# Patient Record
Sex: FEMALE | Race: WHITE | Employment: OTHER | URBAN - METROPOLITAN AREA
[De-identification: names, ages, dates, MRNs, and addresses within clinical notes are randomized per-mention and may not be internally consistent; named-entity substitution may affect disease eponyms.]

---

## 2017-01-16 ENCOUNTER — TELEPHONE (OUTPATIENT)
Dept: PAIN MANAGEMENT | Age: 67
End: 2017-01-16

## 2017-02-10 ENCOUNTER — TELEPHONE (OUTPATIENT)
Dept: INTERNAL MEDICINE CLINIC | Age: 67
End: 2017-02-10

## 2017-02-10 DIAGNOSIS — Z00.8 ENCOUNTER FOR PSYCHOLOGICAL EVALUATION: Primary | ICD-10-CM

## 2017-03-24 ENCOUNTER — OFFICE VISIT (OUTPATIENT)
Dept: INTERNAL MEDICINE CLINIC | Age: 67
End: 2017-03-24

## 2017-03-24 VITALS
SYSTOLIC BLOOD PRESSURE: 128 MMHG | HEART RATE: 64 BPM | WEIGHT: 192.6 LBS | DIASTOLIC BLOOD PRESSURE: 76 MMHG | HEIGHT: 59 IN | RESPIRATION RATE: 16 BRPM | BODY MASS INDEX: 38.83 KG/M2

## 2017-03-24 DIAGNOSIS — M48.061 SPINAL STENOSIS OF LUMBAR REGION: Primary | ICD-10-CM

## 2017-03-24 DIAGNOSIS — I10 ESSENTIAL HYPERTENSION: Chronic | ICD-10-CM

## 2017-03-24 DIAGNOSIS — R53.83 FATIGUE, UNSPECIFIED TYPE: ICD-10-CM

## 2017-03-24 PROCEDURE — 99214 OFFICE O/P EST MOD 30 MIN: CPT | Performed by: INTERNAL MEDICINE

## 2017-03-24 RX ORDER — ZOLPIDEM TARTRATE 10 MG/1
10 TABLET ORAL NIGHTLY
Qty: 90 TABLET | Refills: 1 | Status: SHIPPED | OUTPATIENT
Start: 2017-03-24 | End: 2017-08-16 | Stop reason: SDUPTHER

## 2017-03-24 RX ORDER — OXYMORPHONE HYDROCHLORIDE 5 MG/1
5 TABLET ORAL 2 TIMES DAILY
COMMUNITY

## 2017-03-24 RX ORDER — LISINOPRIL 40 MG/1
TABLET ORAL
Qty: 90 TABLET | Refills: 1 | Status: SHIPPED | OUTPATIENT
Start: 2017-03-24 | End: 2017-09-22 | Stop reason: SDUPTHER

## 2017-04-05 ENCOUNTER — TELEPHONE (OUTPATIENT)
Dept: INTERNAL MEDICINE CLINIC | Age: 67
End: 2017-04-05

## 2017-04-10 ASSESSMENT — ENCOUNTER SYMPTOMS
ALLERGIC/IMMUNOLOGIC NEGATIVE: 1
EYES NEGATIVE: 1
RESPIRATORY NEGATIVE: 1

## 2017-04-12 PROBLEM — M15.0 PRIMARY OSTEOARTHRITIS INVOLVING MULTIPLE JOINTS: Status: ACTIVE | Noted: 2017-04-12

## 2017-04-12 PROBLEM — M79.7 FIBROMYALGIA: Status: ACTIVE | Noted: 2017-04-12

## 2017-04-12 PROBLEM — R52 PAIN MANAGEMENT: Status: ACTIVE | Noted: 2017-04-12

## 2017-04-12 PROBLEM — M48.04 THORACIC SPINAL STENOSIS: Status: ACTIVE | Noted: 2017-04-12

## 2017-04-12 PROBLEM — M15.9 PRIMARY OSTEOARTHRITIS INVOLVING MULTIPLE JOINTS: Status: ACTIVE | Noted: 2017-04-12

## 2017-04-13 ENCOUNTER — OFFICE VISIT (OUTPATIENT)
Dept: RHEUMATOLOGY | Age: 67
End: 2017-04-13

## 2017-04-13 VITALS
BODY MASS INDEX: 37.97 KG/M2 | SYSTOLIC BLOOD PRESSURE: 110 MMHG | DIASTOLIC BLOOD PRESSURE: 60 MMHG | HEART RATE: 74 BPM | WEIGHT: 189.6 LBS

## 2017-04-13 DIAGNOSIS — M79.7 FIBROMYALGIA: ICD-10-CM

## 2017-04-13 DIAGNOSIS — M15.9 PRIMARY OSTEOARTHRITIS INVOLVING MULTIPLE JOINTS: ICD-10-CM

## 2017-04-13 DIAGNOSIS — R52 PAIN MANAGEMENT: ICD-10-CM

## 2017-04-13 DIAGNOSIS — M81.0 OSTEOPOROSIS: ICD-10-CM

## 2017-04-13 DIAGNOSIS — E55.9 VITAMIN D DEFICIENCY: ICD-10-CM

## 2017-04-13 DIAGNOSIS — M48.04 THORACIC SPINAL STENOSIS: Primary | ICD-10-CM

## 2017-04-13 PROCEDURE — 99213 OFFICE O/P EST LOW 20 MIN: CPT | Performed by: INTERNAL MEDICINE

## 2017-04-13 RX ORDER — GABAPENTIN 300 MG/1
600 CAPSULE ORAL 3 TIMES DAILY
Qty: 360 CAPSULE | Refills: 3 | Status: SHIPPED | OUTPATIENT
Start: 2017-04-13 | End: 2018-04-10 | Stop reason: SDUPTHER

## 2017-04-13 RX ORDER — DULOXETIN HYDROCHLORIDE 30 MG/1
30 CAPSULE, DELAYED RELEASE ORAL 2 TIMES DAILY
Qty: 180 CAPSULE | Refills: 3 | Status: SHIPPED | OUTPATIENT
Start: 2017-04-13 | End: 2017-07-14 | Stop reason: SDUPTHER

## 2017-04-14 ENCOUNTER — HOSPITAL ENCOUNTER (OUTPATIENT)
Dept: NON INVASIVE DIAGNOSTICS | Age: 67
Discharge: HOME | End: 2017-04-14
Attending: INTERNAL MEDICINE | Admitting: INTERNAL MEDICINE

## 2017-04-14 LAB
C DIFFICILE TOXIN, EIA: NORMAL
CRYPTOSPORIDIUM ANTIGEN STOOL: NORMAL
GIARDIA ANTIGEN STOOL: NORMAL
VITAMIN D 25-HYDROXY: 14.9 NG/ML

## 2017-04-15 LAB — GI BACTERIAL PATHOGENS BY PCR: NORMAL

## 2017-05-02 ENCOUNTER — TELEPHONE (OUTPATIENT)
Dept: INTERNAL MEDICINE CLINIC | Age: 67
End: 2017-05-02

## 2017-05-02 DIAGNOSIS — E55.9 VITAMIN D DEFICIENCY: Primary | ICD-10-CM

## 2017-05-02 RX ORDER — CHOLECALCIFEROL (VITAMIN D3) 1250 MCG
1 CAPSULE ORAL WEEKLY
Qty: 12 CAPSULE | Refills: 0 | Status: SHIPPED | OUTPATIENT
Start: 2017-05-02 | End: 2017-08-23

## 2017-05-04 ENCOUNTER — TELEPHONE (OUTPATIENT)
Dept: INTERNAL MEDICINE CLINIC | Age: 67
End: 2017-05-04

## 2017-05-31 ENCOUNTER — OFFICE VISIT (OUTPATIENT)
Dept: INTERNAL MEDICINE CLINIC | Age: 67
End: 2017-05-31

## 2017-05-31 VITALS
DIASTOLIC BLOOD PRESSURE: 84 MMHG | SYSTOLIC BLOOD PRESSURE: 138 MMHG | WEIGHT: 196 LBS | HEART RATE: 60 BPM | BODY MASS INDEX: 39.25 KG/M2

## 2017-05-31 DIAGNOSIS — Z01.818 PRE-OP EVALUATION: Primary | ICD-10-CM

## 2017-05-31 DIAGNOSIS — M48.061 SPINAL STENOSIS OF LUMBAR REGION: ICD-10-CM

## 2017-05-31 PROCEDURE — 99214 OFFICE O/P EST MOD 30 MIN: CPT | Performed by: INTERNAL MEDICINE

## 2017-05-31 PROCEDURE — 3288F FALL RISK ASSESSMENT DOCD: CPT | Performed by: INTERNAL MEDICINE

## 2017-05-31 PROCEDURE — G8510 SCR DEP NEG, NO PLAN REQD: HCPCS | Performed by: INTERNAL MEDICINE

## 2017-05-31 PROCEDURE — 93000 ELECTROCARDIOGRAM COMPLETE: CPT | Performed by: INTERNAL MEDICINE

## 2017-05-31 ASSESSMENT — PATIENT HEALTH QUESTIONNAIRE - PHQ9
SUM OF ALL RESPONSES TO PHQ QUESTIONS 1-9: 2
2. FEELING DOWN, DEPRESSED OR HOPELESS: 1
SUM OF ALL RESPONSES TO PHQ9 QUESTIONS 1 & 2: 2
1. LITTLE INTEREST OR PLEASURE IN DOING THINGS: 1

## 2017-07-03 ENCOUNTER — TELEPHONE (OUTPATIENT)
Dept: INTERNAL MEDICINE CLINIC | Age: 67
End: 2017-07-03

## 2017-07-14 ENCOUNTER — TELEPHONE (OUTPATIENT)
Dept: RHEUMATOLOGY | Age: 67
End: 2017-07-14

## 2017-07-14 DIAGNOSIS — M79.7 FIBROMYALGIA: ICD-10-CM

## 2017-07-14 RX ORDER — DULOXETIN HYDROCHLORIDE 30 MG/1
30 CAPSULE, DELAYED RELEASE ORAL 2 TIMES DAILY
Qty: 180 CAPSULE | Refills: 1 | Status: SHIPPED | OUTPATIENT
Start: 2017-07-14 | End: 2017-11-10 | Stop reason: SDUPTHER

## 2017-07-14 RX ORDER — LANSOPRAZOLE 30 MG/1
30 CAPSULE, DELAYED RELEASE ORAL NIGHTLY
Qty: 90 CAPSULE | Refills: 1 | Status: SHIPPED | OUTPATIENT
Start: 2017-07-14 | End: 2017-11-10 | Stop reason: SDUPTHER

## 2017-07-14 RX ORDER — POTASSIUM CHLORIDE 20 MEQ/1
20 TABLET, EXTENDED RELEASE ORAL 2 TIMES DAILY
Qty: 180 TABLET | Refills: 1 | Status: SHIPPED | OUTPATIENT
Start: 2017-07-14 | End: 2017-11-10 | Stop reason: SDUPTHER

## 2017-07-21 ENCOUNTER — OFFICE VISIT (OUTPATIENT)
Dept: INTERNAL MEDICINE CLINIC | Age: 67
End: 2017-07-21

## 2017-07-21 VITALS
TEMPERATURE: 97.8 F | OXYGEN SATURATION: 96 % | BODY MASS INDEX: 38.76 KG/M2 | HEIGHT: 60 IN | DIASTOLIC BLOOD PRESSURE: 76 MMHG | WEIGHT: 197.4 LBS | SYSTOLIC BLOOD PRESSURE: 120 MMHG | RESPIRATION RATE: 20 BRPM | HEART RATE: 94 BPM

## 2017-07-21 DIAGNOSIS — Z01.818 PRE-OP EVALUATION: ICD-10-CM

## 2017-07-21 DIAGNOSIS — M54.14 THORACIC RADICULOPATHY: Primary | ICD-10-CM

## 2017-07-21 PROCEDURE — 99214 OFFICE O/P EST MOD 30 MIN: CPT | Performed by: INTERNAL MEDICINE

## 2017-08-10 ENCOUNTER — TELEPHONE (OUTPATIENT)
Dept: INTERNAL MEDICINE CLINIC | Age: 67
End: 2017-08-10

## 2017-08-11 RX ORDER — ALPRAZOLAM 1 MG/1
1 TABLET ORAL 3 TIMES DAILY
Qty: 90 TABLET | Refills: 0 | Status: SHIPPED | OUTPATIENT
Start: 2017-08-11 | End: 2017-09-19 | Stop reason: SDUPTHER

## 2017-08-16 RX ORDER — ZOLPIDEM TARTRATE 10 MG/1
10 TABLET ORAL NIGHTLY
Qty: 30 TABLET | Refills: 2 | Status: SHIPPED | OUTPATIENT
Start: 2017-08-16 | End: 2018-03-07 | Stop reason: SDUPTHER

## 2017-08-17 ENCOUNTER — TELEPHONE (OUTPATIENT)
Dept: RHEUMATOLOGY | Age: 67
End: 2017-08-17

## 2017-08-18 ENCOUNTER — TELEPHONE (OUTPATIENT)
Dept: RHEUMATOLOGY | Age: 67
End: 2017-08-18

## 2017-08-18 RX ORDER — RAMELTEON 8 MG/1
8 TABLET ORAL NIGHTLY
Qty: 30 TABLET | Refills: 0 | Status: SHIPPED | OUTPATIENT
Start: 2017-08-18 | End: 2017-09-17

## 2017-08-21 DIAGNOSIS — E55.9 VITAMIN D DEFICIENCY: ICD-10-CM

## 2017-08-22 LAB — VITAMIN D 25-HYDROXY: 34 NG/ML

## 2017-09-08 ENCOUNTER — OFFICE VISIT (OUTPATIENT)
Dept: INTERNAL MEDICINE CLINIC | Age: 67
End: 2017-09-08

## 2017-09-08 VITALS
SYSTOLIC BLOOD PRESSURE: 128 MMHG | RESPIRATION RATE: 14 BRPM | TEMPERATURE: 98.3 F | BODY MASS INDEX: 37.81 KG/M2 | DIASTOLIC BLOOD PRESSURE: 72 MMHG | HEART RATE: 102 BPM | WEIGHT: 193.6 LBS | OXYGEN SATURATION: 94 %

## 2017-09-08 DIAGNOSIS — J01.00 ACUTE MAXILLARY SINUSITIS, RECURRENCE NOT SPECIFIED: Primary | ICD-10-CM

## 2017-09-08 PROCEDURE — 99213 OFFICE O/P EST LOW 20 MIN: CPT | Performed by: INTERNAL MEDICINE

## 2017-09-08 RX ORDER — GUAIFENESIN 600 MG/1
600 TABLET, EXTENDED RELEASE ORAL 2 TIMES DAILY
Qty: 20 TABLET | Refills: 0 | Status: SHIPPED | OUTPATIENT
Start: 2017-09-08 | End: 2018-08-14 | Stop reason: ALTCHOICE

## 2017-09-08 RX ORDER — AZITHROMYCIN 250 MG/1
TABLET, FILM COATED ORAL
Qty: 1 PACKET | Refills: 0 | Status: SHIPPED | OUTPATIENT
Start: 2017-09-08 | End: 2017-09-18

## 2017-09-17 ASSESSMENT — ENCOUNTER SYMPTOMS
ALLERGIC/IMMUNOLOGIC NEGATIVE: 1
EYES NEGATIVE: 1
RESPIRATORY NEGATIVE: 1
SORE THROAT: 0
RHINORRHEA: 1
GASTROINTESTINAL NEGATIVE: 1
SINUS PRESSURE: 1

## 2017-09-19 RX ORDER — ALPRAZOLAM 1 MG/1
1 TABLET ORAL 3 TIMES DAILY
Qty: 270 TABLET | Refills: 0 | Status: SHIPPED | OUTPATIENT
Start: 2017-09-19 | End: 2017-10-02 | Stop reason: SDUPTHER

## 2017-09-22 ENCOUNTER — OFFICE VISIT (OUTPATIENT)
Dept: INTERNAL MEDICINE CLINIC | Age: 67
End: 2017-09-22

## 2017-09-22 VITALS
HEART RATE: 80 BPM | SYSTOLIC BLOOD PRESSURE: 124 MMHG | WEIGHT: 194 LBS | DIASTOLIC BLOOD PRESSURE: 64 MMHG | RESPIRATION RATE: 16 BRPM | BODY MASS INDEX: 37.89 KG/M2

## 2017-09-22 DIAGNOSIS — Z01.818 PRE-OP EVALUATION: ICD-10-CM

## 2017-09-22 DIAGNOSIS — J01.00 SUBACUTE MAXILLARY SINUSITIS: ICD-10-CM

## 2017-09-22 DIAGNOSIS — M48.061 SPINAL STENOSIS OF LUMBAR REGION: Primary | ICD-10-CM

## 2017-09-22 DIAGNOSIS — I10 ESSENTIAL HYPERTENSION: Chronic | ICD-10-CM

## 2017-09-22 LAB
A/G RATIO: 1.4 (ref 1.1–2.2)
ALBUMIN SERPL-MCNC: 3.8 G/DL (ref 3.4–5)
ALP BLD-CCNC: 64 U/L (ref 40–129)
ALT SERPL-CCNC: 33 U/L (ref 10–40)
ANION GAP SERPL CALCULATED.3IONS-SCNC: 13 MMOL/L (ref 3–16)
APTT: 35.8 SEC (ref 24.1–34.9)
AST SERPL-CCNC: 33 U/L (ref 15–37)
BILIRUB SERPL-MCNC: 0.4 MG/DL (ref 0–1)
BUN BLDV-MCNC: 16 MG/DL (ref 7–20)
CALCIUM SERPL-MCNC: 9.5 MG/DL (ref 8.3–10.6)
CHLORIDE BLD-SCNC: 101 MMOL/L (ref 99–110)
CHOLESTEROL, TOTAL: 155 MG/DL (ref 0–199)
CO2: 29 MMOL/L (ref 21–32)
CREAT SERPL-MCNC: 0.5 MG/DL (ref 0.6–1.2)
GFR AFRICAN AMERICAN: >60
GFR NON-AFRICAN AMERICAN: >60
GLOBULIN: 2.7 G/DL
GLUCOSE BLD-MCNC: 98 MG/DL (ref 70–99)
HCT VFR BLD CALC: 40.9 % (ref 36–48)
HDLC SERPL-MCNC: 43 MG/DL (ref 40–60)
HEMOGLOBIN: 13.6 G/DL (ref 12–16)
INR BLD: 1.02 (ref 0.85–1.15)
LDL CHOLESTEROL CALCULATED: 77 MG/DL
MCH RBC QN AUTO: 30.9 PG (ref 26–34)
MCHC RBC AUTO-ENTMCNC: 33.3 G/DL (ref 31–36)
MCV RBC AUTO: 92.8 FL (ref 80–100)
PDW BLD-RTO: 13.4 % (ref 12.4–15.4)
PLATELET # BLD: 240 K/UL (ref 135–450)
PMV BLD AUTO: 8.5 FL (ref 5–10.5)
POTASSIUM SERPL-SCNC: 4.4 MMOL/L (ref 3.5–5.1)
PROTHROMBIN TIME: 11.5 SEC (ref 9.6–13)
RBC # BLD: 4.41 M/UL (ref 4–5.2)
SODIUM BLD-SCNC: 143 MMOL/L (ref 136–145)
TOTAL PROTEIN: 6.5 G/DL (ref 6.4–8.2)
TRIGL SERPL-MCNC: 175 MG/DL (ref 0–150)
VLDLC SERPL CALC-MCNC: 35 MG/DL
WBC # BLD: 7.3 K/UL (ref 4–11)

## 2017-09-22 PROCEDURE — 99214 OFFICE O/P EST MOD 30 MIN: CPT | Performed by: INTERNAL MEDICINE

## 2017-09-22 RX ORDER — LISINOPRIL 40 MG/1
TABLET ORAL
Qty: 90 TABLET | Refills: 1 | Status: SHIPPED | OUTPATIENT
Start: 2017-09-22 | End: 2017-09-22 | Stop reason: SDUPTHER

## 2017-09-22 RX ORDER — GUAIFENESIN 600 MG/1
600 TABLET, EXTENDED RELEASE ORAL 2 TIMES DAILY
Qty: 20 TABLET | Refills: 0 | Status: SHIPPED | OUTPATIENT
Start: 2017-09-22 | End: 2018-01-09 | Stop reason: SDUPTHER

## 2017-09-22 RX ORDER — LISINOPRIL 40 MG/1
TABLET ORAL
Qty: 90 TABLET | Refills: 1 | Status: SHIPPED | OUTPATIENT
Start: 2017-09-22 | End: 2018-03-07 | Stop reason: SDUPTHER

## 2017-09-22 RX ORDER — AZITHROMYCIN 250 MG/1
TABLET, FILM COATED ORAL
Qty: 1 PACKET | Refills: 0 | Status: SHIPPED | OUTPATIENT
Start: 2017-09-22 | End: 2017-10-02

## 2017-10-02 ENCOUNTER — TELEPHONE (OUTPATIENT)
Dept: INTERNAL MEDICINE CLINIC | Age: 67
End: 2017-10-02

## 2017-10-02 RX ORDER — ALPRAZOLAM 1 MG/1
1 TABLET ORAL 3 TIMES DAILY
Qty: 45 TABLET | Refills: 0 | Status: SHIPPED | OUTPATIENT
Start: 2017-10-02 | End: 2017-11-16 | Stop reason: SDUPTHER

## 2017-10-02 NOTE — TELEPHONE ENCOUNTER
Patient calling regarding her Xanax. She says she did not receive the pills that were ordered on 9/19/17. She received a letter from [address] [place] [address] [address] saying there was a problem in sending them. She says she is completely out. Asking what  would recommend to do.

## 2017-10-18 ENCOUNTER — TELEPHONE (OUTPATIENT)
Dept: RHEUMATOLOGY | Age: 67
End: 2017-10-18

## 2017-10-18 NOTE — TELEPHONE ENCOUNTER
Pt called stating that she has a terrible trouble with her allergies. She has been taking zyrtec daily. She is wondering if  would be ok with her taking the Zyrtec or would it be beneficial to try something else? Please call and advise.

## 2017-10-19 ENCOUNTER — OFFICE VISIT (OUTPATIENT)
Dept: RHEUMATOLOGY | Age: 67
End: 2017-10-19

## 2017-10-19 VITALS
BODY MASS INDEX: 36.72 KG/M2 | DIASTOLIC BLOOD PRESSURE: 70 MMHG | SYSTOLIC BLOOD PRESSURE: 138 MMHG | HEART RATE: 74 BPM | WEIGHT: 188 LBS

## 2017-10-19 DIAGNOSIS — M15.9 PRIMARY OSTEOARTHRITIS INVOLVING MULTIPLE JOINTS: Primary | ICD-10-CM

## 2017-10-19 DIAGNOSIS — M81.0 OSTEOPOROSIS, POST-MENOPAUSAL: Chronic | ICD-10-CM

## 2017-10-19 DIAGNOSIS — Z23 NEED FOR INFLUENZA VACCINATION: ICD-10-CM

## 2017-10-19 DIAGNOSIS — M48.04 THORACIC SPINAL STENOSIS: ICD-10-CM

## 2017-10-19 DIAGNOSIS — M79.7 FIBROMYALGIA: ICD-10-CM

## 2017-10-19 PROCEDURE — 90662 IIV NO PRSV INCREASED AG IM: CPT | Performed by: INTERNAL MEDICINE

## 2017-10-19 PROCEDURE — G0008 ADMIN INFLUENZA VIRUS VAC: HCPCS | Performed by: INTERNAL MEDICINE

## 2017-10-19 PROCEDURE — 99213 OFFICE O/P EST LOW 20 MIN: CPT | Performed by: INTERNAL MEDICINE

## 2017-10-19 NOTE — PROGRESS NOTES
[facility] ([facility]) Physicians  [facility] [place] [facility]ology Progress Note  Bin Healy MD            [x] [facility]         []  [facility]                                      [place] [address]. [address], [address]      [phone]                [phone]      Fax: [phone]                 Fax: [phone]           ______________________________________________________________________      Patient Name:  Lisa Shaw is a 79 y.o. patient     Returns today for follow-up of her osteoarthritis and osteoporosis. Since last seen she recently had a spinal stimulator placed by Dr. Sung Hdz. Notes that her axial spine symptoms have improved but now her peripheral arthritis have worsened as her narcotics have been decreased. She is having difficulty involving bilateral hands especially her right thumb and her right ankle. She did not get the DEXA scan done as was requested last office visit. Denies having any recent bone fractures. She has not received Reclast yet. Currently ambulating with the assistance of a single prong cane. Past medical/surgical history, medications and allergies are reviewed and updated as appropriate.     Allergies   Allergen Reactions    Sulfa Antibiotics Swelling       Past Medical History:        Diagnosis Date    Clostridium difficile infection 2/22/15    Hypertension     Osteoarthritis     Osteoporosis 2008    Rheumatic fever     Self-catheterizes urinary bladder     as needed    Urinary retention        Past Surgical History:        Procedure Laterality Date    BLADDER REPAIR      CARPAL TUNNEL RELEASE      CYSTOSCOPY  10/29/2012    bladder biopsy    DENTAL SURGERY      FOOT SURGERY      HYSTERECTOMY      LUMBAR SPINE SURGERY  2004    MANDIBLE RECONSTRUCTION      TOTAL KNEE BILITOT 0.4 09/22/2017 1021          No results found for: SEDRATE  No results found for: RONALDO, ERVIN, SSA, SSB, C3, C4  No results found for: HAV, HEPAIGM, HEPBIGM, HEPBCAB, HBEAG, HEPCAB  No results found for: RONALDO, ANATITER, ANAINT, PATH  No results found for: DSDNAG, DSDNAIGGIFA  No results found for: SSAROAB, SSALAAB  No results found for: SMAB, RNPAB  No results found for: CENTABIGG  No results found for: SEDRATE, RF, CCPABIGG  No results found for: C3, C4, ACE  Lab Results   Component Value Date    VITD25 34.0 08/21/2017       No results found. Assessment/Plan:       Assessment/Plan:  Mayra Rodriguez was seen today for follow-up. Diagnoses and all orders for this visit:    Primary osteoarthritis involving multiple joints- also advised her to consider using over-the-counter lidocaine patches  -     diclofenac sodium (VOLTAREN) 1 % GEL; Apply 2 g topically 4 times daily as needed for Pain. Risks, side effects and warning signs were fully discussed with patient. Reading information was given to patient to review. Fibromyalgiacontinue with Cymbalta and Neurontin. Thoracic spinal stenosiswill continue to follow-up with Dr. Abbie Wall. We'll see how she does with a new spinal stimulator    Osteoporosis, post-menopausal- needs a repeat DEXA scan to see whether or not she'll need continued Reclast.  -     DEXA Bone Density 2 Sites; Future    Return in about 6 months (around 4/19/2018). The risks and benefits of my recommendations, as well as other treatment options, benefits and side effects were discussed with the patient today. Questions were answered. [facility]  [facility]  [address] [place] [address] [address] , [address] [place], [place] [address]YWI:[phone]  [facility]:[phone]    NOTE: This report is transcribed by using voice recognition software dragon. Every effort is made to ensure accuracy; however, inadvertent computerized transcription errors may be present. Anupama Kaminski MD, 10/19/2017 10:37 AM

## 2017-10-19 NOTE — PATIENT INSTRUCTIONS
--fever, sore throat, swelling in your face or tongue, burning in your eyes, skin pain followed by a red or purple skin rash that spreads (especially in the face or upper body) and causes blistering and peeling. Common side effects may include:  · indigestion, gas, stomach pain, nausea, vomiting;  · diarrhea, constipation;  · headache, dizziness, drowsiness;  · stuffy nose;  · itching, increased sweating;  · increased blood pressure; or  · skin redness, itching, dryness, scaling, or peeling where the medicine was applied. This is not a complete list of side effects and others may occur. Call your doctor for medical advice about side effects. You may report side effects to FDA at 8-207-FDA-3098. What other drugs will affect diclofenac topical?  Ask your doctor before using diclofenac topical if you take an antidepressant such as citalopram, escitalopram, fluoxetine (Prozac), fluvoxamine, paroxetine, sertraline (Zoloft), trazodone, or vilazodone. Taking any of these medicines with an NSAID may cause you to bruise or bleed easily. Tell your doctor about all your current medicines and any you start or stop using, especially:  · cyclosporine;  · lithium;  · methotrexate;  · a blood thinner (warfarin, Coumadin, Jantoven);  · heart or blood pressure medication, including a diuretic or \"water pill\"; or  · steroid medicine (prednisone and others). This list is not complete. Other drugs may interact with diclofenac topical, including prescription and over-the-counter medicines, vitamins, and herbal products. Not all possible interactions are listed in this medication guide. Where can I get more information? Your pharmacist can provide more information about diclofenac topical.    Remember, keep this and all other medicines out of the reach of children, never share your medicines with others, and use this medication only for the indication prescribed.   Every effort has been made to ensure that the information provided by [address] is accurate, up-to-date, and complete, but no guarantee is made to that effect. Drug information contained herein may be time sensitive. [facility] information has been compiled for use by healthcare practitioners and consumers in the [place] and therefore [facility] does not warrant that uses outside of the [place] are appropriate, unless specifically indicated otherwise. [facility]'s drug information does not endorse drugs, diagnose patients or recommend therapy. [facility] drug information is an informational resource designed to assist licensed healthcare practitioners in caring for their patients and/or to serve consumers viewing this service as a supplement to, and not a substitute for, the expertise, skill, knowledge and judgment of healthcare practitioners. The absence of a warning for a given drug or drug combination in no way should be construed to indicate that the drug or drug combination is safe, effective or appropriate for any given patient. [facility] does not assume any responsibility for any aspect of healthcare administered with the aid of information [facility] provides. The information contained herein is not intended to cover all possible uses, directions, precautions, warnings, drug interactions, allergic reactions, or adverse effects. If you have questions about the drugs you are taking, check with your doctor, nurse or pharmacist.  Copyright 9301-1261 [address] Version: 9.05. Revision date: 5/25/2016. Care instructions adapted under license by [facility] ([facility]). If you have questions about a medical condition or this instruction, always ask your healthcare professional. [place] [address] any warranty or liability for your use of this information.        Patient Education          lidocaine topical  Pronunciation:  LYE gong jones TOP i tricia  Brand:  AneCream, AneCream with Tegaderm, Bactine, CidalEaze, Glydo, LidaMantle, Lidocream, Lidoderm, LidoRx, LMX 4, Medi-Quik Spray, RadiaGuard, RectiCare, Regenecare HA Spray, Solarcaine Aloe Extra Burn Relief, Xylocaine Topical  What is the most important information I should know about lidocaine topical?  An overdose of numbing medicine can cause fatal side effects if too much of the medicine is absorbed through your skin. Do not use large amounts of lidocaine topical, or cover treated skin areas with a bandage or plastic wrap without medical advice. Keep both used and unused lidocaine skin patches out of the reach of children or pets. The amount of lidocaine in the skin patches could be harmful to a child or pet who accidentally sucks on or swallows the patch. What is lidocaine topical?  Lidocaine is a local anesthetic (numbing medication). It works by blocking nerve signals in your body. Lidocaine topical (for use on the skin) is used to reduce pain or discomfort caused by skin irritations such as sunburn, insect bites, poison ivy, poison oak, poison sumac, and minor cuts, scratches, or burns. Lidocaine topical is also used to treat rectal discomfort caused by hemorrhoids. Lidocaine topical may also be used for purposes not listed in this medication guide. What should I discuss with my healthcare provider before using lidocaine topical?  You should not use lidocaine topical if you are allergic to any type of numbing medicine. Fatal overdoses have occurred when numbing medicines were used without the advice of a medical doctor (such as during a cosmetic procedure like laser hair removal). However, overdose has also occurred in women treated with a numbing medicine before having a mammography. Be aware that many cosmetic procedures are performed without a medical doctor present. To make sure lidocaine topical is safe for you, tell your doctor if you have:  · liver disease; or  · if you take a heart rhythm medicine. Lidocaine topical is not expected to harm an unborn baby.  Tell your doctor if you are likely that other drugs you take orally or inject will have an effect on topically applied lidocaine. But many drugs can interact with each other. Tell each of your health care providers about all medicines you use, including prescription and over-the-counter medicines, vitamins, and herbal products. Where can I get more information? Your pharmacist has information about lidocaine topical.    Remember, keep this and all other medicines out of the reach of children, never share your medicines with others, and use this medication only for the indication prescribed. Every effort has been made to ensure that the information provided by [address] is accurate, up-to-date, and complete, but no guarantee is made to that effect. Drug information contained herein may be time sensitive. [facility] information has been compiled for use by healthcare practitioners and consumers in the [place] and therefore [facility] does not warrant that uses outside of the [place] are appropriate, unless specifically indicated otherwise. [facility]'s drug information does not endorse drugs, diagnose patients or recommend therapy. [facility] drug information is an informational resource designed to assist licensed healthcare practitioners in caring for their patients and/or to serve consumers viewing this service as a supplement to, and not a substitute for, the expertise, skill, knowledge and judgment of healthcare practitioners. The absence of a warning for a given drug or drug combination in no way should be construed to indicate that the drug or drug combination is safe, effective or appropriate for any given patient. [facility] does not assume any responsibility for any aspect of healthcare administered with the aid of information [facility] provides. The information contained herein is not intended to cover all possible uses, directions, precautions, warnings, drug interactions, allergic reactions, or adverse effects.  If you

## 2017-10-20 ENCOUNTER — TELEPHONE (OUTPATIENT)
Dept: INTERNAL MEDICINE CLINIC | Age: 67
End: 2017-10-20

## 2017-10-26 ENCOUNTER — TELEPHONE (OUTPATIENT)
Dept: INTERNAL MEDICINE CLINIC | Age: 67
End: 2017-10-26

## 2017-10-26 NOTE — TELEPHONE ENCOUNTER
Pt calling having trouble with her Insurance and her Bushra Daubs are not sending it to her--something about it not being on the formulary ---please call the pt. Thanks.

## 2017-10-31 ENCOUNTER — HOSPITAL ENCOUNTER (OUTPATIENT)
Dept: GENERAL RADIOLOGY | Age: 67
Discharge: HOME | End: 2017-10-31
Attending: INTERNAL MEDICINE | Admitting: INTERNAL MEDICINE

## 2017-10-31 ENCOUNTER — TELEPHONE (OUTPATIENT)
Dept: INTERNAL MEDICINE CLINIC | Age: 67
End: 2017-10-31

## 2017-10-31 ENCOUNTER — TELEPHONE (OUTPATIENT)
Dept: RHEUMATOLOGY | Age: 67
End: 2017-10-31

## 2017-10-31 DIAGNOSIS — M81.0 AGE-RELATED OSTEOPOROSIS WITHOUT CURRENT PATHOLOGICAL FRACTURE: ICD-10-CM

## 2017-10-31 DIAGNOSIS — M81.0 OSTEOPOROSIS: ICD-10-CM

## 2017-10-31 NOTE — TELEPHONE ENCOUNTER
Spoke w/pt. She received a letter in September denying coverage. She will mail the letter to the office as opposed to calling her pharmacy.

## 2017-10-31 NOTE — TELEPHONE ENCOUNTER
Phone call from [facility]. They wanted to make sure that Dr. Jojo Hurd saw the DEXA Scan report. It is in Epic.

## 2017-11-02 ENCOUNTER — TELEPHONE (OUTPATIENT)
Dept: RHEUMATOLOGY | Age: 67
End: 2017-11-02

## 2017-11-06 ENCOUNTER — TELEPHONE (OUTPATIENT)
Dept: INTERNAL MEDICINE CLINIC | Age: 67
End: 2017-11-06

## 2017-11-06 NOTE — TELEPHONE ENCOUNTER
Pt is calling stating she is still not able to get her Xanax. She is requesting that Dr Silverman Speaker call her pharmacy [facility].

## 2017-11-10 ENCOUNTER — OFFICE VISIT (OUTPATIENT)
Dept: INTERNAL MEDICINE CLINIC | Age: 67
End: 2017-11-10

## 2017-11-10 VITALS
WEIGHT: 191 LBS | BODY MASS INDEX: 37.3 KG/M2 | SYSTOLIC BLOOD PRESSURE: 132 MMHG | RESPIRATION RATE: 16 BRPM | DIASTOLIC BLOOD PRESSURE: 68 MMHG | HEART RATE: 74 BPM

## 2017-11-10 DIAGNOSIS — K21.9 GASTROESOPHAGEAL REFLUX DISEASE WITHOUT ESOPHAGITIS: ICD-10-CM

## 2017-11-10 DIAGNOSIS — M79.7 FIBROMYALGIA: ICD-10-CM

## 2017-11-10 DIAGNOSIS — Z11.4 SCREENING FOR HIV WITHOUT PRESENCE OF RISK FACTORS: ICD-10-CM

## 2017-11-10 DIAGNOSIS — Z11.59 NEED FOR HEPATITIS C SCREENING TEST: ICD-10-CM

## 2017-11-10 DIAGNOSIS — F41.9 ANXIETY: Primary | ICD-10-CM

## 2017-11-10 LAB — HEPATITIS C ANTIBODY INTERPRETATION: NORMAL

## 2017-11-10 PROCEDURE — 99214 OFFICE O/P EST MOD 30 MIN: CPT | Performed by: INTERNAL MEDICINE

## 2017-11-10 RX ORDER — DULOXETIN HYDROCHLORIDE 30 MG/1
30 CAPSULE, DELAYED RELEASE ORAL 2 TIMES DAILY
Qty: 180 CAPSULE | Refills: 1 | Status: SHIPPED | OUTPATIENT
Start: 2017-11-10 | End: 2018-09-07 | Stop reason: SDUPTHER

## 2017-11-10 RX ORDER — LANSOPRAZOLE 30 MG/1
30 CAPSULE, DELAYED RELEASE ORAL NIGHTLY
Qty: 90 CAPSULE | Refills: 1 | Status: SHIPPED | OUTPATIENT
Start: 2017-11-10 | End: 2018-09-25 | Stop reason: ALTCHOICE

## 2017-11-10 RX ORDER — POTASSIUM CHLORIDE 20 MEQ/1
20 TABLET, EXTENDED RELEASE ORAL 2 TIMES DAILY
Qty: 180 TABLET | Refills: 1 | Status: SHIPPED | OUTPATIENT
Start: 2017-11-10 | End: 2018-09-07 | Stop reason: SDUPTHER

## 2017-11-10 RX ORDER — LORAZEPAM 1 MG/1
1 TABLET ORAL EVERY 6 HOURS PRN
Qty: 270 TABLET | Refills: 1 | Status: SHIPPED | OUTPATIENT
Start: 2017-11-10 | End: 2018-01-09

## 2017-11-10 RX ORDER — LORAZEPAM 1 MG/1
1 TABLET ORAL EVERY 8 HOURS PRN
Qty: 45 TABLET | Refills: 0 | Status: SHIPPED | OUTPATIENT
Start: 2017-11-10 | End: 2017-11-25

## 2017-11-13 LAB — HIV-1 AND HIV-2 ANTIBODIES: NORMAL

## 2017-11-14 ENCOUNTER — TELEPHONE (OUTPATIENT)
Dept: INTERNAL MEDICINE CLINIC | Age: 67
End: 2017-11-14

## 2017-11-14 NOTE — TELEPHONE ENCOUNTER
Pt state she has a yeast infection. Pt c/o dryness and itching. No discharge. X 3-4wks    Pt has tried OTC meds without relief. Pt asking for the pill to clear this up.     [facility] on file

## 2017-11-15 RX ORDER — FLUCONAZOLE 150 MG/1
150 TABLET ORAL ONCE
Qty: 1 TABLET | Refills: 1 | Status: SHIPPED | OUTPATIENT
Start: 2017-11-15 | End: 2017-11-15

## 2017-11-15 NOTE — TELEPHONE ENCOUNTER
Pt called yesterday to request a prescription for a yeast infection and didn't get a call back she said

## 2017-11-15 NOTE — TELEPHONE ENCOUNTER
Patient informed of message. Pt said the Ativan makes her itch. She has stopped taking it and wants the Xanax instead. She will just pay out of pocket for the Xanax. I advised pt she will have have to bring in remaining pills of Ativan and call pharmacy to cancel mail order script. Pt agreed and will come in tomorrow to exchange scripts.

## 2017-11-16 RX ORDER — ALPRAZOLAM 1 MG/1
1 TABLET ORAL 3 TIMES DAILY
Qty: 45 TABLET | Refills: 0 | Status: SHIPPED | OUTPATIENT
Start: 2017-11-16 | End: 2018-03-07 | Stop reason: SDUPTHER

## 2017-11-16 NOTE — TELEPHONE ENCOUNTER
Dr Gomez Leblanc is aware. Pt could not tolerate the Ativan so she will continue w/the Xanax. Insurance does not cover the Xanax, but pt is willing to pay for it.

## 2017-11-16 NOTE — TELEPHONE ENCOUNTER
Phone call from [facility]. Received a script for Alprazolam today. Patient wants to pay cash for this med. The only problem is on 11/10/17 she picked up Lorazepam #45 from [facility]. She ran this script through her insurance. Pharmacy wanted  to be aware.

## 2017-11-19 ASSESSMENT — ENCOUNTER SYMPTOMS
EYES NEGATIVE: 1
GASTROINTESTINAL NEGATIVE: 1
ALLERGIC/IMMUNOLOGIC NEGATIVE: 1
RESPIRATORY NEGATIVE: 1

## 2017-11-20 NOTE — PROGRESS NOTES
Subjective:      Patient ID: Lorel Soulier is a 79 y.o. female. HPI The patient returns for three-month follow up due to hypertension and anxiety. Patient otherwise is stable and voices note new complaints at this time. In addition, the patient has a history of fibromyalgia which continues to debilitate her. Past Medical History:   Diagnosis Date    Clostridium difficile infection 2/22/15    Hypertension     Osteoarthritis     Osteoporosis 2008    Rheumatic fever     Self-catheterizes urinary bladder     as needed    Urinary retention      Social History     Social History    Marital status:      Spouse name: N/A    Number of children: N/A    Years of education: N/A     Occupational History    retired      Social History Main Topics    Smoking status: Never Smoker    Smokeless tobacco: Never Used    Alcohol use Yes      Comment: occasional / wine once a month    Drug use: No    Sexual activity: Not on file     Other Topics Concern    Not on file     Social History Narrative    No narrative on file       Review of Systems   Constitutional: Negative. HENT: Negative. Eyes: Negative. Respiratory: Negative. Cardiovascular: Negative. Gastrointestinal: Negative. Endocrine: Negative. Genitourinary: Negative. Musculoskeletal: Negative. Skin: Negative. Allergic/Immunologic: Negative. Neurological: Negative. Hematological: Negative. Psychiatric/Behavioral: Negative. Objective:   Physical Exam   Constitutional: She is oriented to person, place, and time. She appears well-developed and well-nourished. Neck: Normal range of motion. Neck supple. No JVD present. Cardiovascular: Normal rate, regular rhythm, normal heart sounds and intact distal pulses. No murmur heard. Pulmonary/Chest: Effort normal.   Musculoskeletal: Normal range of motion. She exhibits no tenderness. Lymphadenopathy:     She has no cervical adenopathy.    Neurological: She

## 2017-11-28 ENCOUNTER — TELEPHONE (OUTPATIENT)
Dept: INTERNAL MEDICINE CLINIC | Age: 67
End: 2017-11-28

## 2017-11-28 NOTE — TELEPHONE ENCOUNTER
Late cancellation for today for a Reclast infusion. Patient states she recently had surgery and is in a lot of pain. She has asked if she can be rescheduled to next Tuesday. Please advise.

## 2017-12-27 ENCOUNTER — NURSE ONLY (OUTPATIENT)
Dept: RHEUMATOLOGY | Age: 67
End: 2017-12-27

## 2017-12-27 VITALS
RESPIRATION RATE: 16 BRPM | SYSTOLIC BLOOD PRESSURE: 142 MMHG | TEMPERATURE: 98 F | HEART RATE: 80 BPM | DIASTOLIC BLOOD PRESSURE: 78 MMHG

## 2017-12-27 DIAGNOSIS — M81.0 OSTEOPOROSIS, POST-MENOPAUSAL: Primary | Chronic | ICD-10-CM

## 2017-12-27 PROCEDURE — 96365 THER/PROPH/DIAG IV INF INIT: CPT | Performed by: INTERNAL MEDICINE

## 2017-12-27 RX ORDER — ZOLEDRONIC ACID 5 MG/100ML
5 INJECTION, SOLUTION INTRAVENOUS ONCE
Status: COMPLETED | OUTPATIENT
Start: 2017-12-27 | End: 2017-12-27

## 2017-12-27 RX ADMIN — ZOLEDRONIC ACID 5 MG: 5 INJECTION, SOLUTION INTRAVENOUS at 10:35

## 2017-12-27 NOTE — PROGRESS NOTES
Pt here for Reclast  infusion # 3 , no  visit with Dr. Cinthia Cho, today. No  Adverse effects from previous infusion, no labwork  drawn. Infusion  tolerated well. [facility].

## 2018-01-09 ENCOUNTER — HOSPITAL ENCOUNTER (OUTPATIENT)
Dept: PAIN MANAGEMENT | Age: 68
Discharge: HOME | End: 2018-01-09
Attending: ANESTHESIOLOGY | Admitting: ANESTHESIOLOGY

## 2018-01-09 VITALS
BODY MASS INDEX: 37.3 KG/M2 | TEMPERATURE: 97.5 F | DIASTOLIC BLOOD PRESSURE: 73 MMHG | WEIGHT: 190 LBS | RESPIRATION RATE: 14 BRPM | SYSTOLIC BLOOD PRESSURE: 146 MMHG | HEIGHT: 60 IN | HEART RATE: 86 BPM | OXYGEN SATURATION: 98 %

## 2018-01-09 DIAGNOSIS — R52 PAIN: ICD-10-CM

## 2018-01-09 RX ORDER — TRAZODONE HYDROCHLORIDE 50 MG/1
50 TABLET ORAL NIGHTLY
COMMUNITY
End: 2018-03-07

## 2018-01-09 ASSESSMENT — PAIN DESCRIPTION - DESCRIPTORS: DESCRIPTORS: PINS AND NEEDLES;SHARP

## 2018-01-09 NOTE — PROGRESS NOTES
Teaching/ education completed for home care including pain management, wound care and infection control. Patient verbalized understanding.

## 2018-03-07 ENCOUNTER — OFFICE VISIT (OUTPATIENT)
Dept: INTERNAL MEDICINE CLINIC | Age: 68
End: 2018-03-07

## 2018-03-07 VITALS
SYSTOLIC BLOOD PRESSURE: 122 MMHG | BODY MASS INDEX: 38.18 KG/M2 | HEIGHT: 59 IN | WEIGHT: 189.4 LBS | HEART RATE: 80 BPM | DIASTOLIC BLOOD PRESSURE: 72 MMHG | RESPIRATION RATE: 14 BRPM

## 2018-03-07 DIAGNOSIS — I10 ESSENTIAL HYPERTENSION: Chronic | ICD-10-CM

## 2018-03-07 DIAGNOSIS — M81.0 OSTEOPOROSIS, POST-MENOPAUSAL: Chronic | ICD-10-CM

## 2018-03-07 DIAGNOSIS — G47.09 OTHER INSOMNIA: ICD-10-CM

## 2018-03-07 DIAGNOSIS — M79.7 FIBROMYALGIA: ICD-10-CM

## 2018-03-07 DIAGNOSIS — F41.9 ANXIETY: ICD-10-CM

## 2018-03-07 DIAGNOSIS — M15.9 PRIMARY OSTEOARTHRITIS INVOLVING MULTIPLE JOINTS: ICD-10-CM

## 2018-03-07 DIAGNOSIS — Z78.9 SELF-CATHETERIZES URINARY BLADDER: Chronic | ICD-10-CM

## 2018-03-07 DIAGNOSIS — M62.08 DIASTASIS OF RECTUS ABDOMINIS: ICD-10-CM

## 2018-03-07 DIAGNOSIS — M54.50 ACUTE BILATERAL LOW BACK PAIN WITHOUT SCIATICA: Primary | ICD-10-CM

## 2018-03-07 LAB
ANION GAP SERPL CALCULATED.3IONS-SCNC: 18 MMOL/L (ref 3–16)
BILIRUBIN, POC: NEGATIVE
BLOOD URINE, POC: NEGATIVE
BUN BLDV-MCNC: 12 MG/DL (ref 7–20)
CALCIUM SERPL-MCNC: 9 MG/DL (ref 8.3–10.6)
CHLORIDE BLD-SCNC: 99 MMOL/L (ref 99–110)
CLARITY, POC: CLEAR
CO2: 26 MMOL/L (ref 21–32)
COLOR, POC: YELLOW
CREAT SERPL-MCNC: 0.5 MG/DL (ref 0.6–1.2)
GFR AFRICAN AMERICAN: >60
GFR NON-AFRICAN AMERICAN: >60
GLUCOSE BLD-MCNC: 86 MG/DL (ref 70–99)
GLUCOSE URINE, POC: NEGATIVE
KETONES, POC: NEGATIVE
LEUKOCYTE EST, POC: NEGATIVE
NITRITE, POC: NEGATIVE
PH, POC: 7
POTASSIUM SERPL-SCNC: 4.3 MMOL/L (ref 3.5–5.1)
PROTEIN, POC: NEGATIVE
SODIUM BLD-SCNC: 143 MMOL/L (ref 136–145)
SPECIFIC GRAVITY, POC: 1.01
UROBILINOGEN, POC: 0.2

## 2018-03-07 PROCEDURE — 81002 URINALYSIS NONAUTO W/O SCOPE: CPT | Performed by: INTERNAL MEDICINE

## 2018-03-07 PROCEDURE — 99214 OFFICE O/P EST MOD 30 MIN: CPT | Performed by: INTERNAL MEDICINE

## 2018-03-07 RX ORDER — TIZANIDINE 4 MG/1
4 TABLET ORAL 2 TIMES DAILY PRN
COMMUNITY
End: 2018-03-07

## 2018-03-07 RX ORDER — ZOLPIDEM TARTRATE 10 MG/1
10 TABLET ORAL NIGHTLY
Qty: 30 TABLET | Refills: 2 | Status: SHIPPED | OUTPATIENT
Start: 2018-03-07 | End: 2018-05-21 | Stop reason: SDUPTHER

## 2018-03-07 RX ORDER — LIDOCAINE 50 MG/G
OINTMENT TOPICAL 3 TIMES DAILY
COMMUNITY
End: 2018-08-14

## 2018-03-07 RX ORDER — ALPRAZOLAM 1 MG/1
1 TABLET ORAL 3 TIMES DAILY
Qty: 45 TABLET | Refills: 1 | Status: SHIPPED | OUTPATIENT
Start: 2018-03-07 | End: 2018-05-21 | Stop reason: SDUPTHER

## 2018-03-07 RX ORDER — LISINOPRIL 40 MG/1
TABLET ORAL
Qty: 90 TABLET | Refills: 1 | Status: SHIPPED | OUTPATIENT
Start: 2018-03-07 | End: 2018-09-25 | Stop reason: SDUPTHER

## 2018-03-07 ASSESSMENT — ENCOUNTER SYMPTOMS
CONSTIPATION: 0
CHEST TIGHTNESS: 0
SHORTNESS OF BREATH: 0
BACK PAIN: 1
DIARRHEA: 0

## 2018-03-07 NOTE — PROGRESS NOTES
MG extended release capsule Take 1 capsule by mouth 2 times daily (Patient taking differently: Take 60 mg by mouth 2 times daily ) 180 capsule 1    potassium chloride (KLOR-CON M) 20 MEQ extended release tablet Take 1 tablet by mouth 2 times daily 180 tablet 1    guaiFENesin (MUCINEX) 600 MG extended release tablet Take 1 tablet by mouth 2 times daily 20 tablet 0    Cholecalciferol (VITAMIN D3) 2000 units CAPS Take 2,000 Units by mouth daily      gabapentin (NEURONTIN) 300 MG capsule Take 2 capsules by mouth 3 times daily 360 capsule 3    oxymorphone (OPANA) 5 MG tablet Take 10 mg by mouth  2 x day.  zoledronic acid (RECLAST) 5 MG/100ML SOLN Infuse 5 mg intravenously once IV, yearly      Cetirizine HCl (ZYRTEC PO) Take 1 tablet by mouth daily       aspirin 81 MG chewable tablet Take 81 mg by mouth daily.  Calcium Carbonate-Vit D-Min (CALCIUM 1200 PO) Take 1 capsule by mouth 2 times daily.  Ascorbic Acid (VITAMIN C) 500 MG tablet Take 500 mg by mouth daily. Current Facility-Administered Medications   Medication Dose Route Frequency Provider Last Rate Last Dose    zoledronic acid (RECLAST) 5 mg/100 mL infusion  5 mg Intravenous See Admin Instructions Brigitte Scott  mL/hr at 04/06/16 0900 5 mg at 04/06/16 0900       Return in about 4 months (around 7/7/2018).

## 2018-03-07 NOTE — PATIENT INSTRUCTIONS
partner. · Keep your bedroom quiet, dark, and cool. Use curtains, blinds, or a sleep mask to block out light. To block out noise, use earplugs, soothing music, or a \"white noise\" machine. Your evening and bedtime routine  · Create a relaxing bedtime routine. You might want to take a warm shower or bath, listen to soothing music, or drink a cup of noncaffeinated tea. · Go to bed at the same time every night. And get up at the same time every morning, even if you feel tired. What to avoid  · Limit caffeine (coffee, tea, caffeinated sodas) during the day, and don't have any for at least 4 to 6 hours before bedtime. · Don't drink alcohol before bedtime. Alcohol can cause you to wake up more often during the night. · Don't smoke or use tobacco, especially in the evening. Nicotine can keep you awake. · Don't take naps during the day, especially close to bedtime. · Don't lie in bed awake for too long. If you can't fall asleep, or if you wake up in the middle of the night and can't get back to sleep within 15 minutes or so, get out of bed and go to another room until you feel sleepy. · Don't take medicine right before bed that may keep you awake or make you feel hyper or energized. Your doctor can tell you if your medicine may do this and if you can take it earlier in the day. If you can't sleep  · Imagine yourself in a peaceful, pleasant scene. Focus on the details and feelings of being in a place that is relaxing. · Get up and do a quiet or boring activity until you feel sleepy. · Don't drink any liquids after 6 p.m. if you wake up often because you have to go to the bathroom. Where can you learn more? Go to https://Cardeeoapril.RubyRide. org and sign in to your [facility] account. Enter N171 in the Teleport box to learn more about \"Learning About Sleeping Well. \"     If you do not have an account, please click on the \"Sign Up Now\" link. Current as of:  May 12, 2017  Content Version:

## 2018-03-07 NOTE — ASSESSMENT & PLAN NOTE
Involves multiple joints. Has seen Dr. Luke Poole for rheum. Sees Dr. Hilario Riggs for pain management. On Opana and  Gabapentin for pain.

## 2018-03-15 ENCOUNTER — TELEPHONE (OUTPATIENT)
Dept: INTERNAL MEDICINE CLINIC | Age: 68
End: 2018-03-15

## 2018-03-15 NOTE — TELEPHONE ENCOUNTER
Pt advised we cannot fill for pt -- got VM - left msg  For pt       Will forward to Dr Madhu Infante as a Ami [place]

## 2018-04-09 ENCOUNTER — TELEPHONE (OUTPATIENT)
Dept: INTERNAL MEDICINE CLINIC | Age: 68
End: 2018-04-09

## 2018-04-09 DIAGNOSIS — M79.7 FIBROMYALGIA: ICD-10-CM

## 2018-04-10 RX ORDER — GABAPENTIN 300 MG/1
600 CAPSULE ORAL 3 TIMES DAILY
Qty: 360 CAPSULE | Refills: 3 | Status: SHIPPED | OUTPATIENT
Start: 2018-04-10 | End: 2018-12-06 | Stop reason: SDUPTHER

## 2018-04-25 ENCOUNTER — TELEPHONE (OUTPATIENT)
Dept: INTERNAL MEDICINE CLINIC | Age: 68
End: 2018-04-25

## 2018-04-25 DIAGNOSIS — R25.2 LEG CRAMPS: Primary | ICD-10-CM

## 2018-05-21 ENCOUNTER — OFFICE VISIT (OUTPATIENT)
Dept: INTERNAL MEDICINE CLINIC | Age: 68
End: 2018-05-21

## 2018-05-21 VITALS
BODY MASS INDEX: 39.83 KG/M2 | SYSTOLIC BLOOD PRESSURE: 150 MMHG | WEIGHT: 197.2 LBS | HEART RATE: 76 BPM | DIASTOLIC BLOOD PRESSURE: 82 MMHG

## 2018-05-21 DIAGNOSIS — E16.2 HYPOGLYCEMIA: ICD-10-CM

## 2018-05-21 DIAGNOSIS — R59.9 ENLARGED LYMPH NODE: ICD-10-CM

## 2018-05-21 DIAGNOSIS — F51.04 CHRONIC INSOMNIA: ICD-10-CM

## 2018-05-21 DIAGNOSIS — R59.9 ENLARGED LYMPH NODE: Primary | ICD-10-CM

## 2018-05-21 DIAGNOSIS — F41.9 ANXIETY: ICD-10-CM

## 2018-05-21 LAB
BASOPHILS ABSOLUTE: 0.1 K/UL (ref 0–0.2)
BASOPHILS RELATIVE PERCENT: 0.7 %
EOSINOPHILS ABSOLUTE: 0.1 K/UL (ref 0–0.6)
EOSINOPHILS RELATIVE PERCENT: 1.8 %
HCT VFR BLD CALC: 41.9 % (ref 36–48)
HEMOGLOBIN: 14.1 G/DL (ref 12–16)
LYMPHOCYTES ABSOLUTE: 3.4 K/UL (ref 1–5.1)
LYMPHOCYTES RELATIVE PERCENT: 41.5 %
MCH RBC QN AUTO: 32.3 PG (ref 26–34)
MCHC RBC AUTO-ENTMCNC: 33.7 G/DL (ref 31–36)
MCV RBC AUTO: 96 FL (ref 80–100)
MONOCYTES ABSOLUTE: 0.7 K/UL (ref 0–1.3)
MONOCYTES RELATIVE PERCENT: 9 %
NEUTROPHILS ABSOLUTE: 3.8 K/UL (ref 1.7–7.7)
NEUTROPHILS RELATIVE PERCENT: 47 %
PDW BLD-RTO: 13.4 % (ref 12.4–15.4)
PLATELET # BLD: 288 K/UL (ref 135–450)
PMV BLD AUTO: 8.6 FL (ref 5–10.5)
RBC # BLD: 4.36 M/UL (ref 4–5.2)
WBC # BLD: 8.1 K/UL (ref 4–11)

## 2018-05-21 PROCEDURE — 99214 OFFICE O/P EST MOD 30 MIN: CPT | Performed by: INTERNAL MEDICINE

## 2018-05-21 RX ORDER — ZOLPIDEM TARTRATE 10 MG/1
TABLET ORAL
Qty: 30 TABLET | Refills: 2 | Status: SHIPPED | OUTPATIENT
Start: 2018-05-21 | End: 2018-07-21

## 2018-05-21 RX ORDER — ALPRAZOLAM 1 MG/1
1 TABLET ORAL 3 TIMES DAILY
Qty: 45 TABLET | Refills: 1 | Status: SHIPPED | OUTPATIENT
Start: 2018-05-21 | End: 2018-08-31 | Stop reason: SDUPTHER

## 2018-05-21 RX ORDER — LANCETS 30 GAUGE
EACH MISCELLANEOUS
Qty: 100 EACH | Refills: 3 | Status: SHIPPED | OUTPATIENT
Start: 2018-05-21 | End: 2021-09-20

## 2018-05-21 ASSESSMENT — ENCOUNTER SYMPTOMS
CONSTIPATION: 0
SHORTNESS OF BREATH: 0
DIARRHEA: 0
CHEST TIGHTNESS: 0
SORE THROAT: 0
SINUS PRESSURE: 0

## 2018-05-30 ENCOUNTER — TELEPHONE (OUTPATIENT)
Dept: INTERNAL MEDICINE CLINIC | Age: 68
End: 2018-05-30

## 2018-05-30 DIAGNOSIS — M79.7 FIBROMYALGIA: ICD-10-CM

## 2018-05-30 DIAGNOSIS — M13.0 POLYARTHRITIS: Primary | ICD-10-CM

## 2018-05-30 DIAGNOSIS — M51.24 THORACIC DISC HERNIATION: ICD-10-CM

## 2018-06-01 RX ORDER — GABAPENTIN 300 MG/1
CAPSULE ORAL
Qty: 360 CAPSULE | Refills: 0 | OUTPATIENT
Start: 2018-06-01

## 2018-07-05 DIAGNOSIS — R25.2 LEG CRAMPS: ICD-10-CM

## 2018-07-05 LAB
ANION GAP SERPL CALCULATED.3IONS-SCNC: 10 MMOL/L (ref 3–16)
BUN BLDV-MCNC: 16 MG/DL (ref 7–20)
CALCIUM SERPL-MCNC: 9 MG/DL (ref 8.3–10.6)
CHLORIDE BLD-SCNC: 109 MMOL/L (ref 99–110)
CO2: 24 MMOL/L (ref 21–32)
CREAT SERPL-MCNC: 0.5 MG/DL (ref 0.6–1.2)
GFR AFRICAN AMERICAN: >60
GFR NON-AFRICAN AMERICAN: >60
GLUCOSE BLD-MCNC: 96 MG/DL (ref 70–99)
MAGNESIUM: 1.9 MG/DL (ref 1.8–2.4)
POTASSIUM SERPL-SCNC: 4.4 MMOL/L (ref 3.5–5.1)
SODIUM BLD-SCNC: 143 MMOL/L (ref 136–145)

## 2018-07-06 ENCOUNTER — OFFICE VISIT (OUTPATIENT)
Dept: INTERNAL MEDICINE CLINIC | Age: 68
End: 2018-07-06

## 2018-07-06 VITALS
RESPIRATION RATE: 14 BRPM | HEART RATE: 68 BPM | WEIGHT: 196.8 LBS | SYSTOLIC BLOOD PRESSURE: 112 MMHG | BODY MASS INDEX: 39.75 KG/M2 | DIASTOLIC BLOOD PRESSURE: 64 MMHG

## 2018-07-06 DIAGNOSIS — M81.0 AGE-RELATED OSTEOPOROSIS WITHOUT CURRENT PATHOLOGICAL FRACTURE: ICD-10-CM

## 2018-07-06 DIAGNOSIS — E16.2 HYPOGLYCEMIA: ICD-10-CM

## 2018-07-06 DIAGNOSIS — B35.1 MYCOTIC TOENAILS: ICD-10-CM

## 2018-07-06 DIAGNOSIS — M79.7 FIBROMYALGIA: ICD-10-CM

## 2018-07-06 DIAGNOSIS — G47.33 OSA (OBSTRUCTIVE SLEEP APNEA): ICD-10-CM

## 2018-07-06 DIAGNOSIS — I10 ESSENTIAL HYPERTENSION: Primary | ICD-10-CM

## 2018-07-06 DIAGNOSIS — F41.9 ANXIETY: ICD-10-CM

## 2018-07-06 DIAGNOSIS — L60.0 ONYCHOMYCOSIS WITH INGROWN TOENAIL: ICD-10-CM

## 2018-07-06 DIAGNOSIS — B35.1 ONYCHOMYCOSIS WITH INGROWN TOENAIL: ICD-10-CM

## 2018-07-06 DIAGNOSIS — M51.36 DEGENERATIVE DISC DISEASE, LUMBAR: ICD-10-CM

## 2018-07-06 DIAGNOSIS — F51.04 CHRONIC INSOMNIA: ICD-10-CM

## 2018-07-06 DIAGNOSIS — I51.89 DIASTOLIC DYSFUNCTION: ICD-10-CM

## 2018-07-06 DIAGNOSIS — M79.89 LEG SWELLING: ICD-10-CM

## 2018-07-06 PROCEDURE — 99214 OFFICE O/P EST MOD 30 MIN: CPT | Performed by: INTERNAL MEDICINE

## 2018-07-06 RX ORDER — DIPHENOXYLATE HYDROCHLORIDE AND ATROPINE SULFATE 2.5; .025 MG/1; MG/1
1 TABLET ORAL PRN
COMMUNITY

## 2018-07-06 RX ORDER — TIZANIDINE 4 MG/1
4 TABLET ORAL PRN
COMMUNITY
End: 2018-10-16 | Stop reason: SINTOL

## 2018-07-06 ASSESSMENT — ENCOUNTER SYMPTOMS
CHEST TIGHTNESS: 0
SHORTNESS OF BREATH: 0
DIARRHEA: 1
BACK PAIN: 1
ABDOMINAL PAIN: 1
ANAL BLEEDING: 0
CONSTIPATION: 0

## 2018-07-06 ASSESSMENT — PATIENT HEALTH QUESTIONNAIRE - PHQ9
2. FEELING DOWN, DEPRESSED OR HOPELESS: 1
SUM OF ALL RESPONSES TO PHQ9 QUESTIONS 1 & 2: 1
SUM OF ALL RESPONSES TO PHQ QUESTIONS 1-9: 1
1. LITTLE INTEREST OR PLEASURE IN DOING THINGS: 0
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
## 2018-07-09 NOTE — ASSESSMENT & PLAN NOTE
Patient has known diastolic dysfunction. She also was diagnosed with obstructive sleep apnea which was treated with UPPV surgery but she never had a follow-up sleep study. We'll repeat an echocardiogram.  Recommend follow-up sleep study. She would like to delay the sleep study until she gets settled into her home.

## 2018-07-09 NOTE — ASSESSMENT & PLAN NOTE
Does sleep well with Ambien. Typically takes 5 mg q.h.s. but occasionally will take 10 mg. Denies unusual behaviors.

## 2018-07-09 NOTE — ASSESSMENT & PLAN NOTE
Continues to follow with pain management. Does have a spinal stimulator that this one doesn't work nearly as well as the one she had for the trial.  She does have an appointment for an epidural injection on Monday.
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
## 2018-07-26 DIAGNOSIS — E16.2 HYPOGLYCEMIA: ICD-10-CM

## 2018-07-26 DIAGNOSIS — I10 ESSENTIAL HYPERTENSION: ICD-10-CM

## 2018-07-26 DIAGNOSIS — M81.0 AGE-RELATED OSTEOPOROSIS WITHOUT CURRENT PATHOLOGICAL FRACTURE: ICD-10-CM

## 2018-07-26 LAB
A/G RATIO: 1.7 (ref 1.1–2.2)
ALBUMIN SERPL-MCNC: 4 G/DL (ref 3.4–5)
ALP BLD-CCNC: 58 U/L (ref 40–129)
ALT SERPL-CCNC: 32 U/L (ref 10–40)
ANION GAP SERPL CALCULATED.3IONS-SCNC: 13 MMOL/L (ref 3–16)
AST SERPL-CCNC: 26 U/L (ref 15–37)
BILIRUB SERPL-MCNC: 0.3 MG/DL (ref 0–1)
BUN BLDV-MCNC: 15 MG/DL (ref 7–20)
CALCIUM SERPL-MCNC: 9.3 MG/DL (ref 8.3–10.6)
CHLORIDE BLD-SCNC: 104 MMOL/L (ref 99–110)
CHOLESTEROL, TOTAL: 214 MG/DL (ref 0–199)
CO2: 26 MMOL/L (ref 21–32)
CREAT SERPL-MCNC: 0.6 MG/DL (ref 0.6–1.2)
GFR AFRICAN AMERICAN: >60
GFR NON-AFRICAN AMERICAN: >60
GLOBULIN: 2.3 G/DL
GLUCOSE BLD-MCNC: 117 MG/DL (ref 70–99)
HDLC SERPL-MCNC: 60 MG/DL (ref 40–60)
LDL CHOLESTEROL CALCULATED: 108 MG/DL
POTASSIUM SERPL-SCNC: 4.2 MMOL/L (ref 3.5–5.1)
SODIUM BLD-SCNC: 143 MMOL/L (ref 136–145)
TOTAL PROTEIN: 6.3 G/DL (ref 6.4–8.2)
TRIGL SERPL-MCNC: 230 MG/DL (ref 0–150)
TSH REFLEX: 1.44 UIU/ML (ref 0.27–4.2)
VITAMIN D 25-HYDROXY: 30.5 NG/ML
VLDLC SERPL CALC-MCNC: 46 MG/DL

## 2018-07-27 LAB
ESTIMATED AVERAGE GLUCOSE: 111.2 MG/DL
HBA1C MFR BLD: 5.5 %

## 2018-07-31 DIAGNOSIS — Z79.899 MEDICATION MANAGEMENT: ICD-10-CM

## 2018-07-31 DIAGNOSIS — E78.00 HYPERCHOLESTEREMIA: Primary | ICD-10-CM

## 2018-07-31 RX ORDER — ROSUVASTATIN CALCIUM 10 MG/1
10 TABLET, COATED ORAL NIGHTLY
Qty: 30 TABLET | Refills: 3 | Status: SHIPPED | OUTPATIENT
Start: 2018-07-31 | End: 2018-09-25 | Stop reason: SDUPTHER

## 2018-08-01 ENCOUNTER — HOSPITAL ENCOUNTER (OUTPATIENT)
Dept: NON INVASIVE DIAGNOSTICS | Age: 68
Discharge: HOME | End: 2018-08-01
Attending: INTERNAL MEDICINE | Admitting: INTERNAL MEDICINE

## 2018-08-01 DIAGNOSIS — M79.89 OTHER SPECIFIED SOFT TISSUE DISORDERS (CODE): ICD-10-CM

## 2018-08-01 LAB
LEFT VENTRICULAR EJECTION FRACTION HIGH VALUE: 55 %
LEFT VENTRICULAR EJECTION FRACTION MODE: NORMAL
LV EF: 55 %
LVEF MODALITY: NORMAL

## 2018-08-09 ENCOUNTER — TELEPHONE (OUTPATIENT)
Dept: INTERNAL MEDICINE CLINIC | Age: 68
End: 2018-08-09

## 2018-08-09 DIAGNOSIS — I10 ESSENTIAL HYPERTENSION: Primary | ICD-10-CM

## 2018-08-09 DIAGNOSIS — R93.1 ABNORMAL ECHOCARDIOGRAM: ICD-10-CM

## 2018-08-09 NOTE — TELEPHONE ENCOUNTER
[facility] scheduling calling because the diagnosis code for pt's stress test is not covered. I10 & R93.1      Please advise.

## 2018-08-10 ENCOUNTER — TELEPHONE (OUTPATIENT)
Dept: INTERNAL MEDICINE CLINIC | Age: 68
End: 2018-08-10

## 2018-08-13 ENCOUNTER — TELEPHONE (OUTPATIENT)
Dept: INTERNAL MEDICINE CLINIC | Age: 68
End: 2018-08-13

## 2018-08-13 NOTE — TELEPHONE ENCOUNTER
Pt called scheduling to schedule stress test. She is unable to do the stress with treadmill. Linden Dumont from scheduling is asking for a new order for stress test without treadmill.

## 2018-08-14 ENCOUNTER — OFFICE VISIT (OUTPATIENT)
Dept: INTERNAL MEDICINE CLINIC | Age: 68
End: 2018-08-14

## 2018-08-14 VITALS
WEIGHT: 194 LBS | RESPIRATION RATE: 14 BRPM | DIASTOLIC BLOOD PRESSURE: 68 MMHG | HEART RATE: 84 BPM | BODY MASS INDEX: 39.18 KG/M2 | SYSTOLIC BLOOD PRESSURE: 122 MMHG

## 2018-08-14 DIAGNOSIS — R93.1 ABNORMAL ECHOCARDIOGRAM: ICD-10-CM

## 2018-08-14 DIAGNOSIS — R53.81 PHYSICAL DECONDITIONING: ICD-10-CM

## 2018-08-14 PROCEDURE — 99214 OFFICE O/P EST MOD 30 MIN: CPT | Performed by: INTERNAL MEDICINE

## 2018-08-14 NOTE — PROGRESS NOTES
Patient: Donovan Winchester is a 76 y.o. female who presents today with the following Chief Complaint(s):  Chief Complaint   Patient presents with    Follow-up     Discuss additional cardiac testing       Had echocardiogram done for LE edema- normal EF (55%) but did show some posterior wall diskenesis. Is unable to walk on treadmill d/t history of knee replacement with burning in knees and feet. Also had back surgery with lots of hardware in place. Is not very active. She does \"breathe funny\" at times- not hard to breathe. Does get chest tightness. Is mild, feels tight in her upper chest. Notices when she gets stressed out. Is not very active given her physical limitations. Pain limits her activity. Has been limited in activity since around 2006 due to pain. EKG in May with no ischemia. Cardiac risk factors: HTN, Hyperlipidemia. Does take a BASA qd. Allergies   Allergen Reactions    Ativan [Lorazepam] Swelling     Tongue swelling    Sulfa Antibiotics Swelling    Keflex [Cephalexin] Other (See Comments)     Leg cramps      Past Medical History:   Diagnosis Date    Anxiety     Clostridium difficile infection 2/22/15    Hypertension     Insomnia disorder     Osteoarthritis     Osteoporosis 2008    Rheumatic fever     Self-catheterizes urinary bladder     as needed    Urinary retention       Past Surgical History:   Procedure Laterality Date    BLADDER REPAIR      CARPAL TUNNEL RELEASE      CYSTOSCOPY  10/29/2012    bladder biopsy    DENTAL SURGERY      FOOT SURGERY      HYSTERECTOMY      LUMBAR SPINE SURGERY  2004    MANDIBLE RECONSTRUCTION      OTHER SURGICAL HISTORY      spinal cord stimulator    TOTAL KNEE ARTHROPLASTY Right 10/18/13      Social History     Social History    Marital status:       Spouse name: N/A    Number of children: N/A    Years of education: N/A     Occupational History    retired      Social History Main Topics    Smoking status: Never Smoker    Smokeless tobacco: Never Used    Alcohol use Yes      Comment: occasional / wine once a month    Drug use: No    Sexual activity: Not on file     Other Topics Concern    Not on file     Social History Narrative    No narrative on file     Family History   Problem Relation Age of Onset    COPD Mother     High Blood Pressure Mother     Rheum Arthritis Mother     Thyroid Disease Mother     Alcohol Abuse Father     Clotting Disorder Sister     Thyroid Disease Sister     Hypertension Brother     Diabetes Sister     Heart Disease Sister     Hypertension Sister     Thyroid Disease Sister     Cancer Brother     Heart Disease Brother     Hypertension Brother     Substance Abuse Brother     No Known Problems Son     No Known Problems Daughter         Outpatient Medications Prior to Visit   Medication Sig Dispense Refill    rosuvastatin (CRESTOR) 10 MG tablet Take 1 tablet by mouth nightly 30 tablet 3    diphenoxylate-atropine (LOMOTIL) 2.5-0.025 MG per tablet Take 1 tablet by mouth as needed for Diarrhea. Pastora Anderson tiZANidine (ZANAFLEX) 4 MG tablet Take 4 mg by mouth as needed      Blood Glucose Monitoring Suppl DAYRON Check blood sugars fasting in morning and as needed for feeling bad. 1 Device 0    glucose blood VI test strips (ONE TOUCH ULTRA TEST) strip 1 each by In Vitro route daily Fasting qam and As needed. 100 strip 1    Lancets MISC Check sugars fasting qam and prn 100 each 3    gabapentin (NEURONTIN) 300 MG capsule Take 2 capsules by mouth 3 times daily.  360 capsule 3    lisinopril (PRINIVIL;ZESTRIL) 40 MG tablet TAKE 1 TABLET DAILY 90 tablet 1    lansoprazole (PREVACID) 30 MG delayed release capsule Take 1 capsule by mouth nightly 90 capsule 1    DULoxetine (CYMBALTA) 30 MG extended release capsule Take 1 capsule by mouth 2 times daily (Patient taking differently: Take 60 mg by mouth 2 times daily ) 180 capsule 1    potassium chloride (KLOR-CON M) 20 MEQ extended release Left Ear: Tympanic membrane, external ear and ear canal normal.   Nose: Nose normal.   Mouth/Throat: Oropharynx is clear and moist and mucous membranes are normal.   Eyes: Pupils are equal, round, and reactive to light. Conjunctivae and EOM are normal. Left eye exhibits no discharge. No scleral icterus. Neck: Carotid bruit is not present. No thyroid mass and no thyromegaly present. Cardiovascular: Normal rate, regular rhythm, normal heart sounds and intact distal pulses. No murmur heard. Pulses:       Dorsalis pedis pulses are 2+ on the right side, and 2+ on the left side. No LE edema   Pulmonary/Chest: Effort normal and breath sounds normal.   Musculoskeletal:   Slow, stiff gait   Lymphadenopathy:     She has no cervical adenopathy. Neurological: She is alert. ASSESSMENT/PLAN:    Problem List Items Addressed This Visit     Physical deconditioning     Very inactive related to pain- specifically in her back and knees. Abnormal echocardiogram     Cardiac risk factors include HTN and Hyperlipidemia. She is very sedentary as her activity is limited by pain in her back and knees (does follow with pain management) and as such she has no complaints of chest pain or shortness of breath. She does not climb stairs and is unable to do any exertional exercise. Echocardiogram was done to evaluate for diastolic dysfunction with increased LE edema and h/o BEBE treated surgically and never had follow up. Echo did show mild posterior wall diakinesis with normal EF. EKG did not show any evidence of previous ischemic event. As she does have cardiac risk factors but no symptoms (but is also extremely inactive), will ask her to see cardiology for their opinion regarding wall motion abnormality and need for additional testing. Continue on ASA 81 mg qd.           Relevant Orders    Erick Carrillo MD          Current Outpatient Prescriptions   Medication Sig Dispense Refill    rosuvastatin ([address] [place] [address])

## 2018-08-15 PROBLEM — R53.81 PHYSICAL DECONDITIONING: Status: ACTIVE | Noted: 2018-08-15

## 2018-08-15 ASSESSMENT — ENCOUNTER SYMPTOMS
CONSTIPATION: 0
DIARRHEA: 0
SHORTNESS OF BREATH: 0
CHEST TIGHTNESS: 1

## 2018-08-16 ENCOUNTER — TELEPHONE (OUTPATIENT)
Dept: CARDIOLOGY CLINIC | Age: 68
End: 2018-08-16

## 2018-08-16 NOTE — TELEPHONE ENCOUNTER
Patient called 8/15/18 to make a new patient appt due to an abnormal echo done at [facility] . LES wants GXT , his nurse Yemi Jean Baptiste is ordering the test . After reviewing results he will decide on appt day/time .

## 2018-08-21 ENCOUNTER — TELEPHONE (OUTPATIENT)
Dept: INTERNAL MEDICINE CLINIC | Age: 68
End: 2018-08-21

## 2018-08-23 ENCOUNTER — TELEPHONE (OUTPATIENT)
Dept: INTERNAL MEDICINE CLINIC | Age: 68
End: 2018-08-23

## 2018-08-23 ENCOUNTER — HOSPITAL ENCOUNTER (OUTPATIENT)
Dept: NON INVASIVE DIAGNOSTICS | Age: 68
Discharge: HOME | End: 2018-08-24
Attending: INTERNAL MEDICINE | Admitting: INTERNAL MEDICINE

## 2018-08-23 DIAGNOSIS — I10 ESSENTIAL (PRIMARY) HYPERTENSION: ICD-10-CM

## 2018-08-23 NOTE — TELEPHONE ENCOUNTER
[facility] call and states that, pt is there for stress test now,  But Dr Jeremias Larson already cancelled the stress test , but pt was not notified about this.

## 2018-08-24 ENCOUNTER — OFFICE VISIT (OUTPATIENT)
Dept: CARDIOLOGY CLINIC | Age: 68
End: 2018-08-24

## 2018-08-24 VITALS
HEART RATE: 91 BPM | WEIGHT: 194 LBS | SYSTOLIC BLOOD PRESSURE: 110 MMHG | HEIGHT: 57 IN | DIASTOLIC BLOOD PRESSURE: 70 MMHG | BODY MASS INDEX: 41.85 KG/M2

## 2018-08-24 DIAGNOSIS — E78.49 OTHER HYPERLIPIDEMIA: ICD-10-CM

## 2018-08-24 DIAGNOSIS — R93.1 ABNORMAL ECHOCARDIOGRAM: ICD-10-CM

## 2018-08-24 DIAGNOSIS — I10 ESSENTIAL HYPERTENSION: Primary | ICD-10-CM

## 2018-08-24 PROCEDURE — 93000 ELECTROCARDIOGRAM COMPLETE: CPT | Performed by: INTERNAL MEDICINE

## 2018-08-24 PROCEDURE — 99204 OFFICE O/P NEW MOD 45 MIN: CPT | Performed by: INTERNAL MEDICINE

## 2018-08-24 NOTE — PROGRESS NOTES
Last 3 Encounters:   08/24/18 110/70   08/14/18 122/68   07/06/18 112/64      Constitutional and General Appearance:  appears stated age  Eyes - no xanthelasma  Respiratory:  · Normal excursion and expansion without use of accessory muscles  · Resp Auscultation: Normal breath sounds without dullness  Cardiovascular:  · The apical impulses not displaced  · Heart is regular rate and rhythm with normal S1, S2  · PMI is normal  · The carotid upstroke is normal, no bruit noted   · JVP is not elevated  · Peripheral pulses are symmetrical  · There is no clubbing, cyanosis of the extremities  · No edema  · No varicosities  · Femoral Arteries: 2+ and equal without bruits  · Pedal Pulses: 2+ and equal   Abdomen:  · No masses or tenderness  · Aorta not palpable  · Normal bowel sounds  Neurological/Psychiatric:  · Alert and oriented x3  · Moves all extremities well  · Exhibits normal gait balance and coordination      Assessment/Plan  1. Essential hypertension -well controlled   EKG>sinus tachy   2. Abnormal echocardiogram - I have reviewed the images with Dr Blain Bence and we do not see any abnormality on the echo. He will try to amend the erroneous report. Echo 8/1/18: EF 55%. mild dyskinesis of the inferior wall. E/e\"=6.9 . Normal diastolic function. Mild tricuspid regurgitation with a RVSP of 23 mmHg  GXT 1/30/15: normal myocardial perfusion    3. Hyperlipidemia - stable, labs 7/18: ; TRIG 230; HDL 60;     PLAN: Continue risk factor reduction. She does not have evidence of any structural heart disease by echo or GXT. Scribe's attestation: This note was scribed in the presence of Dr Leo Saldaña MD by Jose Peraza RN. The scribe's documentation has been prepared under my direction and personally reviewed by me in its entirety. I confirm that the note above accurately reflects all work, treatment, procedures, and medical decision making performed by me.              Thank you for allowing to me to participate in the care of [facility].

## 2018-08-29 ENCOUNTER — TELEPHONE (OUTPATIENT)
Dept: CARDIOLOGY CLINIC | Age: 68
End: 2018-08-29

## 2018-08-29 NOTE — TELEPHONE ENCOUNTER
Seen on Friday and was told that [address] would talk to another doctor about her case then call her back .

## 2018-08-31 DIAGNOSIS — F41.9 ANXIETY: ICD-10-CM

## 2018-08-31 RX ORDER — ALPRAZOLAM 1 MG/1
TABLET ORAL
Qty: 45 TABLET | Refills: 0 | Status: SHIPPED | OUTPATIENT
Start: 2018-08-31 | End: 2018-09-24 | Stop reason: SDUPTHER

## 2018-09-05 ENCOUNTER — TELEPHONE (OUTPATIENT)
Dept: INTERNAL MEDICINE CLINIC | Age: 68
End: 2018-09-05

## 2018-09-05 NOTE — TELEPHONE ENCOUNTER
Pt is calling stating she is coming in tomorrow for labs and wants to know if she can drop off a urine sample at the same time. She states for the last 7 days she has had burning and foul smell to urine.

## 2018-09-06 DIAGNOSIS — R30.0 DYSURIA: Primary | ICD-10-CM

## 2018-09-06 DIAGNOSIS — M81.0 OSTEOPOROSIS, POST-MENOPAUSAL: Chronic | ICD-10-CM

## 2018-09-06 DIAGNOSIS — Z79.899 MEDICATION MANAGEMENT: ICD-10-CM

## 2018-09-06 DIAGNOSIS — E78.00 HYPERCHOLESTEREMIA: ICD-10-CM

## 2018-09-06 LAB
A/G RATIO: 1.7 (ref 1.1–2.2)
ALBUMIN SERPL-MCNC: 4 G/DL (ref 3.4–5)
ALP BLD-CCNC: 70 U/L (ref 40–129)
ALT SERPL-CCNC: 43 U/L (ref 10–40)
ANION GAP SERPL CALCULATED.3IONS-SCNC: 15 MMOL/L (ref 3–16)
AST SERPL-CCNC: 29 U/L (ref 15–37)
BACTERIA: ABNORMAL /HPF
BILIRUB SERPL-MCNC: <0.2 MG/DL (ref 0–1)
BILIRUBIN URINE: NEGATIVE
BLOOD, URINE: NEGATIVE
BUN BLDV-MCNC: 20 MG/DL (ref 7–20)
CALCIUM SERPL-MCNC: 9.2 MG/DL (ref 8.3–10.6)
CHLORIDE BLD-SCNC: 101 MMOL/L (ref 99–110)
CLARITY: ABNORMAL
CO2: 25 MMOL/L (ref 21–32)
COLOR: YELLOW
CREAT SERPL-MCNC: 0.6 MG/DL (ref 0.6–1.2)
EPITHELIAL CELLS, UA: 2 /HPF (ref 0–5)
GFR AFRICAN AMERICAN: >60
GFR NON-AFRICAN AMERICAN: >60
GLOBULIN: 2.4 G/DL
GLUCOSE BLD-MCNC: 105 MG/DL (ref 70–99)
GLUCOSE URINE: NEGATIVE MG/DL
HYALINE CASTS: 1 /LPF (ref 0–8)
KETONES, URINE: NEGATIVE MG/DL
LEUKOCYTE ESTERASE, URINE: ABNORMAL
MICROSCOPIC EXAMINATION: YES
NITRITE, URINE: NEGATIVE
PH UA: 6.5
POTASSIUM SERPL-SCNC: 4.2 MMOL/L (ref 3.5–5.1)
PROTEIN UA: NEGATIVE MG/DL
RBC UA: 2 /HPF (ref 0–4)
SODIUM BLD-SCNC: 141 MMOL/L (ref 136–145)
SPECIFIC GRAVITY UA: 1.01
TOTAL PROTEIN: 6.4 G/DL (ref 6.4–8.2)
URINE TYPE: ABNORMAL
UROBILINOGEN, URINE: 0.2 E.U./DL
WBC UA: 18 /HPF (ref 0–5)

## 2018-09-07 ENCOUNTER — TELEPHONE (OUTPATIENT)
Dept: INTERNAL MEDICINE CLINIC | Age: 68
End: 2018-09-07

## 2018-09-07 DIAGNOSIS — N39.0 RECURRENT UTI: Primary | Chronic | ICD-10-CM

## 2018-09-07 DIAGNOSIS — I10 ESSENTIAL HYPERTENSION: Primary | ICD-10-CM

## 2018-09-07 DIAGNOSIS — M79.7 FIBROMYALGIA: ICD-10-CM

## 2018-09-07 RX ORDER — POTASSIUM CHLORIDE 20 MEQ/1
20 TABLET, EXTENDED RELEASE ORAL 2 TIMES DAILY
Qty: 180 TABLET | Refills: 1 | Status: SHIPPED | OUTPATIENT
Start: 2018-09-07 | End: 2019-03-06 | Stop reason: SDUPTHER

## 2018-09-07 RX ORDER — DULOXETIN HYDROCHLORIDE 30 MG/1
30 CAPSULE, DELAYED RELEASE ORAL 2 TIMES DAILY
Qty: 180 CAPSULE | Refills: 1 | Status: SHIPPED | OUTPATIENT
Start: 2018-09-07 | End: 2019-03-15 | Stop reason: DRUGHIGH

## 2018-09-07 RX ORDER — CEPHALEXIN 250 MG/1
250 CAPSULE ORAL 3 TIMES DAILY
Qty: 21 CAPSULE | Refills: 0 | Status: SHIPPED | OUTPATIENT
Start: 2018-09-07 | End: 2018-09-10 | Stop reason: SINTOL

## 2018-09-09 LAB
ORGANISM: ABNORMAL
URINE CULTURE, ROUTINE: ABNORMAL
URINE CULTURE, ROUTINE: ABNORMAL

## 2018-09-10 ENCOUNTER — TELEPHONE (OUTPATIENT)
Dept: INTERNAL MEDICINE CLINIC | Age: 68
End: 2018-09-10

## 2018-09-10 DIAGNOSIS — N30.00 ACUTE CYSTITIS WITHOUT HEMATURIA: Primary | ICD-10-CM

## 2018-09-10 RX ORDER — AMOXICILLIN 875 MG/1
875 TABLET, COATED ORAL 2 TIMES DAILY
Qty: 10 TABLET | Refills: 0 | Status: SHIPPED | OUTPATIENT
Start: 2018-09-10 | End: 2018-09-12 | Stop reason: SINTOL

## 2018-09-10 NOTE — TELEPHONE ENCOUNTER
Pt call to let Dr Marcos Price know cephALEXin (KEFLEX) 250 MG capsule made her legs cramp. She cannot eat this med anymore.     She request call back,

## 2018-09-12 ENCOUNTER — TELEPHONE (OUTPATIENT)
Dept: INTERNAL MEDICINE CLINIC | Age: 68
End: 2018-09-12

## 2018-09-12 DIAGNOSIS — N39.0 RECURRENT UTI: Primary | Chronic | ICD-10-CM

## 2018-09-12 RX ORDER — NITROFURANTOIN 25; 75 MG/1; MG/1
100 CAPSULE ORAL 2 TIMES DAILY
Qty: 20 CAPSULE | Refills: 0 | Status: SHIPPED | OUTPATIENT
Start: 2018-09-12 | End: 2018-09-22

## 2018-09-13 DIAGNOSIS — N39.0 RECURRENT UTI: Chronic | ICD-10-CM

## 2018-09-19 ENCOUNTER — TELEPHONE (OUTPATIENT)
Dept: INTERNAL MEDICINE CLINIC | Age: 68
End: 2018-09-19

## 2018-09-19 DIAGNOSIS — F51.04 CHRONIC INSOMNIA: Primary | ICD-10-CM

## 2018-09-19 NOTE — TELEPHONE ENCOUNTER
By review of her chart, it looks like she has also tried Trazodone and Ambien- does she know why those were discontinued?       saw

## 2018-09-20 RX ORDER — TRAZODONE HYDROCHLORIDE 50 MG/1
TABLET ORAL
Qty: 30 TABLET | Refills: 0 | Status: SHIPPED | OUTPATIENT
Start: 2018-09-20 | End: 2018-11-09 | Stop reason: SDUPTHER

## 2018-09-20 NOTE — TELEPHONE ENCOUNTER
Ambien is not a great choice. Would prefer to try Trazodone as it has fewer side effects and is less likely to cause unsafe sleep behaviors and is less likely to have a severe interaction with her Xanax.  saw

## 2018-09-24 DIAGNOSIS — F41.9 ANXIETY: ICD-10-CM

## 2018-09-24 RX ORDER — ALPRAZOLAM 1 MG/1
TABLET ORAL
Qty: 45 TABLET | Refills: 0 | Status: SHIPPED | OUTPATIENT
Start: 2018-09-24 | End: 2019-02-08 | Stop reason: SDUPTHER

## 2018-09-25 ENCOUNTER — OFFICE VISIT (OUTPATIENT)
Dept: INTERNAL MEDICINE CLINIC | Age: 68
End: 2018-09-25
Payer: MEDICARE

## 2018-09-25 VITALS
DIASTOLIC BLOOD PRESSURE: 70 MMHG | WEIGHT: 195.4 LBS | SYSTOLIC BLOOD PRESSURE: 120 MMHG | HEART RATE: 76 BPM | BODY MASS INDEX: 38.36 KG/M2 | HEIGHT: 60 IN

## 2018-09-25 DIAGNOSIS — J01.01 RECURRENT MAXILLARY SINUSITIS: ICD-10-CM

## 2018-09-25 DIAGNOSIS — N39.0 RECURRENT UTI: Chronic | ICD-10-CM

## 2018-09-25 DIAGNOSIS — R35.0 FREQUENCY OF URINATION: Primary | ICD-10-CM

## 2018-09-25 DIAGNOSIS — F51.04 CHRONIC INSOMNIA: ICD-10-CM

## 2018-09-25 DIAGNOSIS — K76.0 FATTY LIVER: ICD-10-CM

## 2018-09-25 DIAGNOSIS — E78.5 HYPERLIPIDEMIA LDL GOAL <100: ICD-10-CM

## 2018-09-25 DIAGNOSIS — K21.9 GASTROESOPHAGEAL REFLUX DISEASE WITHOUT ESOPHAGITIS: ICD-10-CM

## 2018-09-25 DIAGNOSIS — I10 ESSENTIAL HYPERTENSION: Chronic | ICD-10-CM

## 2018-09-25 LAB
BILIRUBIN, POC: NORMAL
BLOOD URINE, POC: NORMAL
CLARITY, POC: CLEAR
COLOR, POC: YELLOW
GLUCOSE URINE, POC: NORMAL
KETONES, POC: NORMAL
LEUKOCYTE EST, POC: NORMAL
NITRITE, POC: NORMAL
PH, POC: 5.5
PROTEIN, POC: NORMAL
SPECIFIC GRAVITY, POC: 1.01
UROBILINOGEN, POC: NORMAL

## 2018-09-25 PROCEDURE — 81002 URINALYSIS NONAUTO W/O SCOPE: CPT | Performed by: INTERNAL MEDICINE

## 2018-09-25 PROCEDURE — 99214 OFFICE O/P EST MOD 30 MIN: CPT | Performed by: INTERNAL MEDICINE

## 2018-09-25 RX ORDER — FAMOTIDINE 20 MG/1
20 TABLET, FILM COATED ORAL 2 TIMES DAILY PRN
COMMUNITY
End: 2018-09-25 | Stop reason: CLARIF

## 2018-09-25 RX ORDER — CEFUROXIME AXETIL 250 MG/1
250 TABLET ORAL 2 TIMES DAILY
Qty: 42 TABLET | Refills: 0 | Status: SHIPPED | OUTPATIENT
Start: 2018-09-25 | End: 2018-10-16

## 2018-09-25 RX ORDER — FLUCONAZOLE 150 MG/1
150 TABLET ORAL ONCE
Qty: 1 TABLET | Refills: 1 | Status: SHIPPED | OUTPATIENT
Start: 2018-09-25 | End: 2018-09-25 | Stop reason: SDUPTHER

## 2018-09-25 RX ORDER — CEFUROXIME AXETIL 250 MG/1
250 TABLET ORAL 2 TIMES DAILY
Qty: 42 TABLET | Refills: 0 | Status: SHIPPED | OUTPATIENT
Start: 2018-09-25 | End: 2018-09-25 | Stop reason: SDUPTHER

## 2018-09-25 RX ORDER — ROSUVASTATIN CALCIUM 10 MG/1
10 TABLET, COATED ORAL NIGHTLY
Qty: 90 TABLET | Refills: 3 | Status: SHIPPED | OUTPATIENT
Start: 2018-09-25 | End: 2019-09-09 | Stop reason: SDUPTHER

## 2018-09-25 RX ORDER — FLUCONAZOLE 150 MG/1
150 TABLET ORAL ONCE
Qty: 1 TABLET | Refills: 1 | Status: SHIPPED | OUTPATIENT
Start: 2018-09-25 | End: 2018-09-25

## 2018-09-25 RX ORDER — LISINOPRIL 40 MG/1
TABLET ORAL
Qty: 90 TABLET | Refills: 1 | Status: SHIPPED | OUTPATIENT
Start: 2018-09-25 | End: 2019-03-15 | Stop reason: SDUPTHER

## 2018-09-25 NOTE — PATIENT INSTRUCTIONS
Continue Zyrtec. Continue saline nose spray,    If you symptoms are ENTIRELY GONE after 10-14 days, you may stop the antibiotic (ceftin) otherwise take for the full 3 weeks.

## 2018-09-25 NOTE — PROGRESS NOTES
ASCVD risk score (Juanis Shah et al., 2013) is: 9%    Values used to calculate the score:      Age: 76 years      Sex: Female      Is Non- : No      Diabetic: No      Tobacco smoker: No      Systolic Blood Pressure: 739 mmHg      Is BP treated: Yes      HDL Cholesterol: 60 mg/dL      Total Cholesterol: 214 mg/dL          Relevant Medications    lisinopril (PRINIVIL;ZESTRIL) 40 MG tablet    rosuvastatin (CRESTOR) 10 MG tablet    Gastroesophageal reflux disease without esophagitis     Recently changed from omeprazole to Pepcid. Doing ok with the change. Fatty liver     Following with Dr. Kendy Frost. Liver enzymes are normal.          Essential hypertension     Well controlled on lisinopril 40 mg po qd. Relevant Medications    lisinopril (PRINIVIL;ZESTRIL) 40 MG tablet    rosuvastatin (CRESTOR) 10 MG tablet    Chronic insomnia     Continue on Trazodone 50 mg po qhs. Other Visit Diagnoses     Frequency of urination    -  Primary    Relevant Orders    POCT Urinalysis no Micro (Completed)    URINE CULTURE (Completed)          Current Outpatient Prescriptions   Medication Sig Dispense Refill    lisinopril (PRINIVIL;ZESTRIL) 40 MG tablet TAKE 1 TABLET DAILY 90 tablet 1    cefUROXime (CEFTIN) 250 MG tablet Take 1 tablet by mouth 2 times daily for 21 days 42 tablet 0    rosuvastatin (CRESTOR) 10 MG tablet Take 1 tablet by mouth nightly 90 tablet 3    ALPRAZolam (XANAX) 1 MG tablet TAKE ONE TABLET BY MOUTH THREE TIMES A DAY 45 tablet 0    traZODone (DESYREL) 50 MG tablet 1/2-1 po qhs prn insomnia 30 tablet 0    DULoxetine (CYMBALTA) 30 MG extended release capsule Take 1 capsule by mouth 2 times daily 180 capsule 1    potassium chloride (KLOR-CON M) 20 MEQ extended release tablet Take 1 tablet by mouth 2 times daily 180 tablet 1    diphenoxylate-atropine (LOMOTIL) 2.5-0.025 MG per tablet Take 1 tablet by mouth as needed for Diarrhea. .      tiZANidine (ZANAFLEX) 4 MG

## 2018-09-27 LAB — URINE CULTURE, ROUTINE: NORMAL

## 2018-09-29 PROBLEM — K21.9 GASTROESOPHAGEAL REFLUX DISEASE WITHOUT ESOPHAGITIS: Status: ACTIVE | Noted: 2018-09-29

## 2018-09-29 PROBLEM — K76.0 FATTY LIVER: Status: ACTIVE | Noted: 2018-09-29

## 2018-09-29 PROBLEM — E78.5 HYPERLIPIDEMIA LDL GOAL <100: Status: ACTIVE | Noted: 2018-08-24

## 2018-09-29 ASSESSMENT — ENCOUNTER SYMPTOMS
CHEST TIGHTNESS: 0
RHINORRHEA: 0
SINUS PRESSURE: 1
DIARRHEA: 0
EYE ITCHING: 0
EYE DISCHARGE: 0
VOICE CHANGE: 0
NAUSEA: 0
TROUBLE SWALLOWING: 0
SINUS PAIN: 0
COUGH: 1
WHEEZING: 0
VOMITING: 0
SHORTNESS OF BREATH: 0
SORE THROAT: 1

## 2018-09-30 NOTE — ASSESSMENT & PLAN NOTE
UA mildly positive. Will send urine for C&S. Treating sinusitis with Ceftin which should help with UTI as well.

## 2018-09-30 NOTE — ASSESSMENT & PLAN NOTE
With symptoms progressing over the past 3 weeks. Continue with Zyrtec and nasal saline. Given length of symptoms, will add Ceftin 250 mg po BID x 3 weeks, may stop after 10-14 days if she completely symptom-free.

## 2018-10-02 RX ORDER — TRAZODONE HYDROCHLORIDE 50 MG/1
50 TABLET ORAL NIGHTLY
Qty: 90 TABLET | Refills: 1 | Status: SHIPPED | OUTPATIENT
Start: 2018-10-02 | End: 2019-03-15 | Stop reason: SDUPTHER

## 2018-10-11 ENCOUNTER — OFFICE VISIT (OUTPATIENT)
Dept: INTERNAL MEDICINE CLINIC | Age: 68
End: 2018-10-11
Payer: MEDICARE

## 2018-10-11 VITALS
WEIGHT: 197.4 LBS | OXYGEN SATURATION: 95 % | SYSTOLIC BLOOD PRESSURE: 118 MMHG | DIASTOLIC BLOOD PRESSURE: 80 MMHG | HEIGHT: 60 IN | HEART RATE: 112 BPM | BODY MASS INDEX: 38.76 KG/M2 | TEMPERATURE: 98.3 F

## 2018-10-11 DIAGNOSIS — R10.2 PELVIC PAIN: Primary | ICD-10-CM

## 2018-10-11 DIAGNOSIS — J32.9 RECURRENT SINUSITIS: ICD-10-CM

## 2018-10-11 DIAGNOSIS — R44.8 FACIAL PRESSURE: ICD-10-CM

## 2018-10-11 LAB
BILIRUBIN, POC: ABNORMAL
BLOOD URINE, POC: ABNORMAL
CLARITY, POC: CLEAR
COLOR, POC: YELLOW
GLUCOSE URINE, POC: ABNORMAL
INFLUENZA A ANTIBODY: NORMAL
INFLUENZA B ANTIBODY: NORMAL
KETONES, POC: ABNORMAL
LEUKOCYTE EST, POC: ABNORMAL
NITRITE, POC: ABNORMAL
PH, POC: 7
PROTEIN, POC: ABNORMAL
SPECIFIC GRAVITY, POC: 1.01
UROBILINOGEN, POC: ABNORMAL

## 2018-10-11 PROCEDURE — 87804 INFLUENZA ASSAY W/OPTIC: CPT | Performed by: NURSE PRACTITIONER

## 2018-10-11 PROCEDURE — 99213 OFFICE O/P EST LOW 20 MIN: CPT | Performed by: NURSE PRACTITIONER

## 2018-10-11 PROCEDURE — 81002 URINALYSIS NONAUTO W/O SCOPE: CPT | Performed by: NURSE PRACTITIONER

## 2018-10-11 ASSESSMENT — ENCOUNTER SYMPTOMS
SHORTNESS OF BREATH: 0
EYE DISCHARGE: 0
DIARRHEA: 0
SORE THROAT: 0
EYE PAIN: 0
PHOTOPHOBIA: 0
SINUS PAIN: 0
RHINORRHEA: 0
CONSTIPATION: 0
VOMITING: 0
COUGH: 0
TROUBLE SWALLOWING: 0
ABDOMINAL PAIN: 1
NAUSEA: 0
EYE REDNESS: 0
SINUS PRESSURE: 1
EYE ITCHING: 0
WHEEZING: 1

## 2018-10-11 NOTE — PATIENT INSTRUCTIONS
Continue to take Zyrtec daily  Take Flonase daily. Continue taking all of your antibiotic as prescribed  I will call with urine results  If symptoms worsen or fail to improve, call  Patient Education        Sinusitis: Care Instructions  Your Care Instructions    Sinusitis is an infection of the lining of the sinus cavities in your head. Sinusitis often follows a cold. It causes pain and pressure in your head and face. In most cases, sinusitis gets better on its own in 1 to 2 weeks. But some mild symptoms may last for several weeks. Sometimes antibiotics are needed. Follow-up care is a key part of your treatment and safety. Be sure to make and go to all appointments, and call your doctor if you are having problems. It's also a good idea to know your test results and keep a list of the medicines you take. How can you care for yourself at home? · Take an over-the-counter pain medicine, such as acetaminophen (Tylenol), ibuprofen (Advil, Motrin), or naproxen (Aleve). Read and follow all instructions on the label. · If the doctor prescribed antibiotics, take them as directed. Do not stop taking them just because you feel better. You need to take the full course of antibiotics. · Be careful when taking over-the-counter cold or flu medicines and Tylenol at the same time. Many of these medicines have acetaminophen, which is Tylenol. Read the labels to make sure that you are not taking more than the recommended dose. Too much acetaminophen (Tylenol) can be harmful. · Breathe warm, moist air from a steamy shower, a hot bath, or a sink filled with hot water. Avoid cold, dry air. Using a humidifier in your home may help. Follow the directions for cleaning the machine. · Use saline (saltwater) nasal washes to help keep your nasal passages open and wash out mucus and bacteria. You can buy saline nose drops at a grocery store or drugstore.  Or you can make your own at home by adding 1 teaspoon of salt and 1 teaspoon of

## 2018-10-11 NOTE — PROGRESS NOTES
Acute Office Visit  10/10/18     SUBJECTIVE:    Patient ID: Chelle Camejo is a 76 y.o. female. Chief Complaint   Patient presents with    Sinus Problem     bilateral ear pressure, headache described as pressure, facial pain- started about 4 weeks ago    Dysuria     & suprapubic pain- has tried several ATB, has noticed a little difference but not a lot      HPI: The patient presents to the office for an acute visit. On 09/07/18, Keflex was prescribed for a UTI. She took this for two days and stopped d/t leg cramps, so she was placed on amoxcillin 875 mg po BID x 5 days per her PCP. She was then placed on Macrobid. The pt was seen by her PCP on 9/25/18 and treated for a UTI and sinusitis. She had a mildly positive UA per the PCP and Ceftin was prescribed for the UTI and recurrent maxillary sinusitis. She was recommended to take Zyrtec and use nasal saline. She was prescribed Ceftin 250 mg PO BID x3 weeks. She presents today, stating her symptoms are improving, but not 100% better. She continues to take Ceftin 250 PO BID for 21 days. She is taking a Zyrtec daily and a Flonase nasal spray \"when she needs it. \" She states she has not used this frequently. She also uses saline nasal spray occasionally. Her symptoms started about 1 month ago. She thinks her symptoms are improving at this time, but she is not 100% better. She reports that her fatigue is persistent as well as bilateral ear pressure and facial pressure. However, she believes these are also improving. She still has a week of the ATB left per the pt. She reports that she is wheezing only last night, which resolved today. No itchy, watery eyes. No sneezing. She denies nasal congestion. She reports a dry nonproductive cough that only occurs every few days. She reports a feeling of fullness in her face and ears occasionally. She reports lower abdominal/pelvic cramps. She denies dysuria, frequency, urgency or odor.  She was having urinary bad. 1 Device 0    glucose blood VI test strips (ONE TOUCH ULTRA TEST) strip 1 each by In Vitro route daily Fasting qam and As needed. 100 strip 1    Lancets MISC Check sugars fasting qam and prn 100 each 3    gabapentin (NEURONTIN) 300 MG capsule Take 2 capsules by mouth 3 times daily. 360 capsule 3    Cholecalciferol (VITAMIN D3) 2000 units CAPS Take 2,000 Units by mouth daily      oxymorphone (OPANA) 5 MG tablet Take 10 mg by mouth  2 x day.  zoledronic acid (RECLAST) 5 MG/100ML SOLN Infuse 5 mg intravenously once IV, yearly      Cetirizine HCl (ZYRTEC PO) Take 1 tablet by mouth daily       aspirin 81 MG chewable tablet Take 81 mg by mouth daily.  Calcium Carbonate-Vit D-Min (CALCIUM 1200 PO) Take 1 capsule by mouth 2 times daily.  Ascorbic Acid (VITAMIN C) 500 MG tablet Take 500 mg by mouth daily. Current Facility-Administered Medications   Medication Dose Route Frequency Provider Last Rate Last Dose    zoledronic acid (RECLAST) 5 mg/100 mL infusion  5 mg Intravenous See Admin Instructions Richelle Navas  mL/hr at 04/06/16 0900 5 mg at 04/06/16 0900      Review of Systems   Constitutional: Positive for fatigue. Negative for appetite change, chills, diaphoresis, fever and unexpected weight change. HENT: Positive for sinus pressure. Negative for congestion, drooling, ear discharge, hearing loss, postnasal drip, rhinorrhea, sinus pain, sneezing, sore throat and trouble swallowing. Ear pressure   Eyes: Negative for photophobia, pain, discharge, redness, itching and visual disturbance. Respiratory: Positive for wheezing (last night only- has resolved). Negative for cough and shortness of breath. Cardiovascular: Negative for chest pain and palpitations. Gastrointestinal: Positive for abdominal pain. Negative for constipation, diarrhea, nausea and vomiting. Genitourinary: Positive for pelvic pain.  Negative for decreased urine volume, dysuria, flank pain, frequency, hematuria, urgency, vaginal bleeding, vaginal discharge and vaginal pain. Skin: Negative for pallor. Neurological: Negative for dizziness, light-headedness and headaches. OBJECTIVE:  /80   Pulse 112   Temp 98.3 °F (36.8 °C) (Oral)   Ht 5' (1.524 m)   Wt 197 lb 6.4 oz (89.5 kg)   SpO2 95%   BMI 38.55 kg/m²     Physical Exam   Constitutional: She is oriented to person, place, and time. She appears well-developed and well-nourished. No distress. HENT: + tenderness with maxillary and facial sinus pressure  Head: Normocephalic and atraumatic. Right Ear: Hearing and external ear normal.   Left Ear: Hearing and external ear normal.   Nose: Nose normal.   Mouth/Throat: Oropharynx is clear and moist and mucous membranes are normal. No oropharyngeal exudate. Eyes: Pupils are equal, round, and reactive to light. Conjunctivae and EOM are normal. Right eye exhibits no discharge. Left eye exhibits no discharge. No scleral icterus. Neck: Normal range of motion. Neck supple. No thyromegaly present. Cardiovascular: Normal rate, regular rhythm and normal heart sounds. No murmur heard. Pulmonary/Chest: Effort normal and breath sounds normal. No stridor. No respiratory distress. She has no wheezes. Abdominal: Soft. Bowel sounds are normal. She exhibits no distension and no mass. There is no tenderness. There is no rebound and no guarding. Genitourinary:   Genitourinary Comments: No CVA tenderness noted   Musculoskeletal: Normal range of motion. She exhibits no tenderness. Lymphadenopathy:     She has no cervical adenopathy. Neurological: She is alert and oriented to person, place, and time. Skin: Skin is warm and dry. No rash noted. She is not diaphoretic. No erythema. No pallor. Psychiatric: She has a normal mood and affect. Vitals reviewed. ASSESSMENT/PLAN:  Brendan Theodore was seen today for sinus problem and dysuria.     Diagnoses and all orders for this visit:    Pelvic pain  -

## 2018-10-14 LAB
ORGANISM: ABNORMAL
URINE CULTURE, ROUTINE: ABNORMAL
URINE CULTURE, ROUTINE: ABNORMAL

## 2018-10-16 ENCOUNTER — TELEPHONE (OUTPATIENT)
Dept: INTERNAL MEDICINE CLINIC | Age: 68
End: 2018-10-16

## 2018-10-16 DIAGNOSIS — N39.0 RECURRENT UTI: Primary | Chronic | ICD-10-CM

## 2018-10-16 RX ORDER — CIPROFLOXACIN 500 MG/1
500 TABLET, FILM COATED ORAL 2 TIMES DAILY
Qty: 10 TABLET | Refills: 0 | Status: SHIPPED | OUTPATIENT
Start: 2018-10-16 | End: 2018-10-21

## 2018-11-06 ENCOUNTER — TELEPHONE (OUTPATIENT)
Dept: INTERNAL MEDICINE CLINIC | Age: 68
End: 2018-11-06

## 2018-11-06 NOTE — TELEPHONE ENCOUNTER
Patient has an appointment on 11/9/18. She thinks she needs to get lab work done before her appointment. Please call and let her know.

## 2018-11-09 ENCOUNTER — OFFICE VISIT (OUTPATIENT)
Dept: INTERNAL MEDICINE CLINIC | Age: 68
End: 2018-11-09
Payer: MEDICARE

## 2018-11-09 VITALS
WEIGHT: 191.2 LBS | SYSTOLIC BLOOD PRESSURE: 154 MMHG | BODY MASS INDEX: 37.34 KG/M2 | DIASTOLIC BLOOD PRESSURE: 104 MMHG | HEART RATE: 60 BPM

## 2018-11-09 DIAGNOSIS — I51.89 DIASTOLIC DYSFUNCTION: ICD-10-CM

## 2018-11-09 DIAGNOSIS — F41.9 ANXIETY: ICD-10-CM

## 2018-11-09 DIAGNOSIS — Z23 NEED FOR INFLUENZA VACCINATION: ICD-10-CM

## 2018-11-09 DIAGNOSIS — E78.5 HYPERLIPIDEMIA LDL GOAL <100: ICD-10-CM

## 2018-11-09 DIAGNOSIS — I10 ESSENTIAL HYPERTENSION: ICD-10-CM

## 2018-11-09 DIAGNOSIS — M81.0 OSTEOPOROSIS, POST-MENOPAUSAL: Primary | Chronic | ICD-10-CM

## 2018-11-09 PROBLEM — J01.01 RECURRENT MAXILLARY SINUSITIS: Status: RESOLVED | Noted: 2018-09-25 | Resolved: 2018-11-09

## 2018-11-09 PROCEDURE — 99214 OFFICE O/P EST MOD 30 MIN: CPT | Performed by: INTERNAL MEDICINE

## 2018-11-09 PROCEDURE — G0008 ADMIN INFLUENZA VIRUS VAC: HCPCS | Performed by: INTERNAL MEDICINE

## 2018-11-09 PROCEDURE — 90662 IIV NO PRSV INCREASED AG IM: CPT | Performed by: INTERNAL MEDICINE

## 2018-11-09 ASSESSMENT — ENCOUNTER SYMPTOMS
BACK PAIN: 1
CONSTIPATION: 0
DIARRHEA: 0
CHEST TIGHTNESS: 0
SHORTNESS OF BREATH: 0

## 2018-11-09 NOTE — PROGRESS NOTES
Patient: Hanny Beasley is a 76 y.o. female who presents today with the following Chief Complaint(s):  Chief Complaint   Patient presents with    Check-Up     4 month tom        Here today for follow up. Is scheduled to get a nerve block in right knee with Dr. Aleida Dutton. Still following with pain management. Bladder is doing ok. Breathing is doing ok. No sob, no wheezing. Anxiety is \"ok\". Blood pressure is \"not good\". Forgot to take her medication today so is judi. Is typically good as long as she remembers to take it. No labs. Sugars have been ok for the most part but has had a few spells where her sugars were a little low- felt better after eating. Has been on Reclast for several years now. Thinks it has been around 4-5 years. Last DEXA 10/31/17. Dr. Leticia Ramesh. Needs flu vaccine today. Allergies   Allergen Reactions    Ativan [Lorazepam] Swelling     Tongue swelling    Sulfa Antibiotics Swelling    Keflex [Cephalexin] Other (See Comments)     Leg cramps      Past Medical History:   Diagnosis Date    Anxiety     Clostridium difficile infection 2/22/15    Hypertension     Insomnia disorder     Osteoarthritis     Osteoporosis 2008    Rheumatic fever     Self-catheterizes urinary bladder     as needed    Urinary retention       Past Surgical History:   Procedure Laterality Date    BLADDER REPAIR      CARPAL TUNNEL RELEASE      CYSTOSCOPY  10/29/2012    bladder biopsy    DENTAL SURGERY      FOOT SURGERY      HYSTERECTOMY      LUMBAR SPINE SURGERY  2004    MANDIBLE RECONSTRUCTION      OTHER SURGICAL HISTORY      spinal cord stimulator    TOTAL KNEE ARTHROPLASTY Right 10/18/13      Social History     Social History    Marital status:       Spouse name: N/A    Number of children: N/A    Years of education: N/A     Occupational History    retired      Social History Main Topics    Smoking status: Never Smoker    Smokeless tobacco: Never Used    Alcohol use Yes no discharge. No scleral icterus. Neck: Normal range of motion. Neck supple. Carotid bruit is not present. No thyroid mass and no thyromegaly present. Cardiovascular: Normal rate, regular rhythm, normal heart sounds and intact distal pulses. No murmur heard. Pulses:       Dorsalis pedis pulses are 2+ on the right side, and 2+ on the left side. No LE edema   Pulmonary/Chest: Effort normal. She has no wheezes. She has no rales. She exhibits no tenderness. Lymphadenopathy:     She has no cervical adenopathy. Neurological: She is alert and oriented to person, place, and time. Skin: Skin is warm and dry. No pallor. Psychiatric: She has a normal mood and affect. Her behavior is normal. Judgment and thought content normal.       ASSESSMENT/PLAN:    Problem List Items Addressed This Visit     Osteoporosis, post-menopausal - Primary (Chronic)     On Reclast yearly since 2016. Due for repeat DEXA. Ordered. Relevant Orders    Vitamin D 25 Hydroxy    Essential hypertension     Well controlled on lisinopril 40 mg po qd. Relevant Orders    CBC Auto Differential    Comprehensive Metabolic Panel, Fasting    Anxiety     Stable currently. Remains on Cymbalta 30 mg bid. Diastolic dysfunction     Stable. No further edema. Continue good blood pressure control. Normal diastolic function on last echo. Hyperlipidemia LDL goal <100     Remains on Crestor 10 mg qd. Due for labs again in January.           Relevant Orders    Comprehensive Metabolic Panel, Fasting    Lipid Panel    TSH with Reflex      Other Visit Diagnoses     Need for influenza vaccination              Current Outpatient Prescriptions   Medication Sig Dispense Refill    traZODone (DESYREL) 50 MG tablet Take 1 tablet by mouth nightly 90 tablet 1    lisinopril (PRINIVIL;ZESTRIL) 40 MG tablet TAKE 1 TABLET DAILY 90 tablet 1    rosuvastatin (CRESTOR) 10 MG tablet Take 1 tablet by mouth nightly 90 tablet 3    DULoxetine

## 2018-11-15 ENCOUNTER — TELEPHONE (OUTPATIENT)
Dept: INTERNAL MEDICINE CLINIC | Age: 68
End: 2018-11-15

## 2018-11-15 RX ORDER — HYDROCHLOROTHIAZIDE 12.5 MG/1
12.5 CAPSULE, GELATIN COATED ORAL EVERY MORNING
Qty: 30 CAPSULE | Refills: 0 | Status: SHIPPED | OUTPATIENT
Start: 2018-11-15 | End: 2018-11-30 | Stop reason: SDUPTHER

## 2018-11-21 DIAGNOSIS — F41.9 ANXIETY: ICD-10-CM

## 2018-11-21 RX ORDER — ALPRAZOLAM 1 MG/1
TABLET ORAL
Qty: 45 TABLET | Refills: 0 | Status: SHIPPED | OUTPATIENT
Start: 2018-11-21 | End: 2018-12-21

## 2018-11-30 ENCOUNTER — OFFICE VISIT (OUTPATIENT)
Dept: INTERNAL MEDICINE CLINIC | Age: 68
End: 2018-11-30
Payer: MEDICARE

## 2018-11-30 VITALS
RESPIRATION RATE: 14 BRPM | BODY MASS INDEX: 37.22 KG/M2 | DIASTOLIC BLOOD PRESSURE: 78 MMHG | WEIGHT: 190.6 LBS | HEART RATE: 64 BPM | SYSTOLIC BLOOD PRESSURE: 132 MMHG

## 2018-11-30 DIAGNOSIS — I10 ESSENTIAL HYPERTENSION: Primary | ICD-10-CM

## 2018-11-30 DIAGNOSIS — R05.9 COUGH: ICD-10-CM

## 2018-11-30 DIAGNOSIS — M79.7 FIBROMYALGIA: ICD-10-CM

## 2018-11-30 PROCEDURE — 99213 OFFICE O/P EST LOW 20 MIN: CPT | Performed by: INTERNAL MEDICINE

## 2018-11-30 RX ORDER — GABAPENTIN 300 MG/1
600 CAPSULE ORAL 3 TIMES DAILY
Qty: 360 CAPSULE | Refills: 3 | Status: CANCELLED | OUTPATIENT
Start: 2018-11-30 | End: 2019-11-30

## 2018-11-30 RX ORDER — HYDROCHLOROTHIAZIDE 12.5 MG/1
12.5 CAPSULE, GELATIN COATED ORAL EVERY MORNING
Qty: 90 CAPSULE | Refills: 3 | Status: SHIPPED | OUTPATIENT
Start: 2018-11-30 | End: 2018-12-17

## 2018-11-30 ASSESSMENT — ENCOUNTER SYMPTOMS
CONSTIPATION: 0
SINUS PRESSURE: 0
SHORTNESS OF BREATH: 0
COUGH: 1
DIARRHEA: 0
CHEST TIGHTNESS: 0

## 2018-11-30 NOTE — PROGRESS NOTES
Occupational History    retired      Social History Main Topics    Smoking status: Never Smoker    Smokeless tobacco: Never Used    Alcohol use Yes      Comment: occasional / wine once a month    Drug use: No    Sexual activity: Not on file     Other Topics Concern    Not on file     Social History Narrative    No narrative on file     Family History   Problem Relation Age of Onset    COPD Mother     High Blood Pressure Mother     Rheum Arthritis Mother     Thyroid Disease Mother     Alcohol Abuse Father     Clotting Disorder Sister     Thyroid Disease Sister     Hypertension Brother     Diabetes Sister     Heart Disease Sister     Hypertension Sister     Thyroid Disease Sister     Cancer Brother     Heart Disease Brother     Hypertension Brother     Substance Abuse Brother     No Known Problems Son     No Known Problems Daughter         Outpatient Medications Prior to Visit   Medication Sig Dispense Refill    ALPRAZolam (XANAX) 1 MG tablet TAKE ONE TABLET BY MOUTH THREE TIMES A DAY 45 tablet 0    traZODone (DESYREL) 50 MG tablet Take 1 tablet by mouth nightly 90 tablet 1    lisinopril (PRINIVIL;ZESTRIL) 40 MG tablet TAKE 1 TABLET DAILY 90 tablet 1    rosuvastatin (CRESTOR) 10 MG tablet Take 1 tablet by mouth nightly 90 tablet 3    DULoxetine (CYMBALTA) 30 MG extended release capsule Take 1 capsule by mouth 2 times daily 180 capsule 1    potassium chloride (KLOR-CON M) 20 MEQ extended release tablet Take 1 tablet by mouth 2 times daily 180 tablet 1    diphenoxylate-atropine (LOMOTIL) 2.5-0.025 MG per tablet Take 1 tablet by mouth as needed for Diarrhea. [facility] Blood Glucose Monitoring Suppl DAYORN Check blood sugars fasting in morning and as needed for feeling bad. 1 Device 0    glucose blood VI test strips (ONE TOUCH ULTRA TEST) strip 1 each by In Vitro route daily Fasting qam and As needed.  100 strip 1    Lancets MISC Check sugars fasting qam and prn 100 each 3    gabapentin

## 2018-12-06 DIAGNOSIS — M79.7 FIBROMYALGIA: ICD-10-CM

## 2018-12-07 RX ORDER — GABAPENTIN 300 MG/1
600 CAPSULE ORAL 3 TIMES DAILY
Qty: 360 CAPSULE | Refills: 1 | Status: SHIPPED | OUTPATIENT
Start: 2018-12-07 | End: 2019-03-15 | Stop reason: SDUPTHER

## 2018-12-07 RX ORDER — GABAPENTIN 300 MG/1
CAPSULE ORAL
Qty: 360 CAPSULE | Refills: 3 | OUTPATIENT
Start: 2018-12-07

## 2018-12-09 NOTE — ASSESSMENT & PLAN NOTE
Sounds to be related to GERD. Continue Pepcid but would schedule. May also be related to lisinopril and will consider changing to an ARB should her cough not improve with Pepcid.

## 2018-12-12 ENCOUNTER — TELEPHONE (OUTPATIENT)
Dept: INTERNAL MEDICINE CLINIC | Age: 68
End: 2018-12-12

## 2018-12-12 DIAGNOSIS — M81.0 OSTEOPOROSIS, POST-MENOPAUSAL: Primary | Chronic | ICD-10-CM

## 2018-12-30 PROBLEM — R05.9 COUGH: Status: RESOLVED | Noted: 2018-11-30 | Resolved: 2018-12-30

## 2019-02-08 DIAGNOSIS — F41.9 ANXIETY: ICD-10-CM

## 2019-02-08 RX ORDER — ALPRAZOLAM 1 MG/1
TABLET ORAL
Qty: 45 TABLET | Refills: 0 | Status: SHIPPED | OUTPATIENT
Start: 2019-02-08 | End: 2019-03-10

## 2019-02-12 ENCOUNTER — TELEPHONE (OUTPATIENT)
Dept: INTERNAL MEDICINE CLINIC | Age: 69
End: 2019-02-12

## 2019-02-13 ENCOUNTER — HOSPITAL ENCOUNTER (OUTPATIENT)
Dept: GENERAL RADIOLOGY | Age: 69
Discharge: HOME | End: 2019-02-13
Payer: MEDICARE

## 2019-02-13 ENCOUNTER — HOSPITAL ENCOUNTER (OUTPATIENT)
Age: 69
Discharge: HOME | End: 2019-02-13
Payer: MEDICARE

## 2019-02-13 DIAGNOSIS — M96.1 POSTLAMINECTOMY SYNDROME: ICD-10-CM

## 2019-02-13 PROCEDURE — 72100 X-RAY EXAM L-S SPINE 2/3 VWS: CPT

## 2019-02-25 ENCOUNTER — TELEPHONE (OUTPATIENT)
Dept: INTERNAL MEDICINE CLINIC | Age: 69
End: 2019-02-25

## 2019-02-26 DIAGNOSIS — M81.0 OSTEOPOROSIS, POST-MENOPAUSAL: Chronic | ICD-10-CM

## 2019-02-26 DIAGNOSIS — I10 ESSENTIAL HYPERTENSION: ICD-10-CM

## 2019-02-26 DIAGNOSIS — E78.5 HYPERLIPIDEMIA LDL GOAL <100: ICD-10-CM

## 2019-02-26 LAB
A/G RATIO: 1.4 (ref 1.1–2.2)
ALP BLD-CCNC: 49 U/L (ref 40–129)
ALT SERPL-CCNC: 29 U/L (ref 10–40)
AST SERPL-CCNC: 28 U/L (ref 15–37)
BASOPHILS ABSOLUTE: 0 K/UL (ref 0–0.2)
BASOPHILS RELATIVE PERCENT: 0.4 %
BILIRUB SERPL-MCNC: 0.5 MG/DL (ref 0–1)
CHOLESTEROL, TOTAL: 108 MG/DL (ref 0–199)
EOSINOPHILS ABSOLUTE: 0.1 K/UL (ref 0–0.6)
EOSINOPHILS RELATIVE PERCENT: 2 %
GLOBULIN: 2.9 G/DL
GLUCOSE BLD-MCNC: 95 MG/DL (ref 70–99)
HCT VFR BLD CALC: 42.3 % (ref 36–48)
HDLC SERPL-MCNC: 48 MG/DL (ref 40–60)
HEMOGLOBIN: 14.2 G/DL (ref 12–16)
LDL CHOLESTEROL CALCULATED: 35 MG/DL
LYMPHOCYTES ABSOLUTE: 2.6 K/UL (ref 1–5.1)
LYMPHOCYTES RELATIVE PERCENT: 42.2 %
MCH RBC QN AUTO: 31.1 PG (ref 26–34)
MCHC RBC AUTO-ENTMCNC: 33.5 G/DL (ref 31–36)
MCV RBC AUTO: 92.8 FL (ref 80–100)
MONOCYTES ABSOLUTE: 0.4 K/UL (ref 0–1.3)
MONOCYTES RELATIVE PERCENT: 6.8 %
NEUTROPHILS ABSOLUTE: 3 K/UL (ref 1.7–7.7)
NEUTROPHILS RELATIVE PERCENT: 48.6 %
PDW BLD-RTO: 13 % (ref 12.4–15.4)
PHOSPHORUS: 4.1 MG/DL (ref 2.5–4.9)
PLATELET # BLD: 204 K/UL (ref 135–450)
PMV BLD AUTO: 8.6 FL (ref 5–10.5)
RBC # BLD: 4.56 M/UL (ref 4–5.2)
TOTAL PROTEIN: 7.1 G/DL (ref 6.4–8.2)
TRIGL SERPL-MCNC: 127 MG/DL (ref 0–150)
TSH REFLEX: 1.86 UIU/ML (ref 0.27–4.2)
VITAMIN D 25-HYDROXY: 54.4 NG/ML
VLDLC SERPL CALC-MCNC: 25 MG/DL
WBC # BLD: 6.3 K/UL (ref 4–11)

## 2019-03-06 DIAGNOSIS — I10 ESSENTIAL HYPERTENSION: ICD-10-CM

## 2019-03-06 RX ORDER — POTASSIUM CHLORIDE 20 MEQ/1
TABLET, EXTENDED RELEASE ORAL
Qty: 180 TABLET | Refills: 1 | Status: SHIPPED | OUTPATIENT
Start: 2019-03-06 | End: 2019-09-02 | Stop reason: SDUPTHER

## 2019-03-15 ENCOUNTER — TELEPHONE (OUTPATIENT)
Dept: RHEUMATOLOGY | Age: 69
End: 2019-03-15

## 2019-03-15 ENCOUNTER — OFFICE VISIT (OUTPATIENT)
Dept: INTERNAL MEDICINE CLINIC | Age: 69
End: 2019-03-15
Payer: MEDICARE

## 2019-03-15 VITALS
BODY MASS INDEX: 38.75 KG/M2 | OXYGEN SATURATION: 97 % | HEART RATE: 98 BPM | WEIGHT: 198.4 LBS | DIASTOLIC BLOOD PRESSURE: 74 MMHG | SYSTOLIC BLOOD PRESSURE: 132 MMHG

## 2019-03-15 DIAGNOSIS — E78.5 HYPERLIPIDEMIA LDL GOAL <100: ICD-10-CM

## 2019-03-15 DIAGNOSIS — R06.00 DYSPNEA, UNSPECIFIED TYPE: ICD-10-CM

## 2019-03-15 DIAGNOSIS — M79.7 FIBROMYALGIA: ICD-10-CM

## 2019-03-15 DIAGNOSIS — F33.1 MODERATE EPISODE OF RECURRENT MAJOR DEPRESSIVE DISORDER (HCC): Primary | ICD-10-CM

## 2019-03-15 DIAGNOSIS — F51.04 CHRONIC INSOMNIA: ICD-10-CM

## 2019-03-15 DIAGNOSIS — R53.81 PHYSICAL DECONDITIONING: ICD-10-CM

## 2019-03-15 DIAGNOSIS — I10 ESSENTIAL HYPERTENSION: Chronic | ICD-10-CM

## 2019-03-15 DIAGNOSIS — J30.2 SEASONAL ALLERGIES: ICD-10-CM

## 2019-03-15 DIAGNOSIS — R06.09 DYSPNEA ON EXERTION: ICD-10-CM

## 2019-03-15 DIAGNOSIS — E66.01 CLASS 2 SEVERE OBESITY DUE TO EXCESS CALORIES WITH SERIOUS COMORBIDITY AND BODY MASS INDEX (BMI) OF 38.0 TO 38.9 IN ADULT (HCC): ICD-10-CM

## 2019-03-15 DIAGNOSIS — F41.9 ANXIETY: ICD-10-CM

## 2019-03-15 PROBLEM — E66.812 CLASS 2 SEVERE OBESITY DUE TO EXCESS CALORIES WITH SERIOUS COMORBIDITY AND BODY MASS INDEX (BMI) OF 38.0 TO 38.9 IN ADULT: Status: ACTIVE | Noted: 2019-03-15

## 2019-03-15 PROCEDURE — 93000 ELECTROCARDIOGRAM COMPLETE: CPT | Performed by: INTERNAL MEDICINE

## 2019-03-15 PROCEDURE — 99214 OFFICE O/P EST MOD 30 MIN: CPT | Performed by: INTERNAL MEDICINE

## 2019-03-15 RX ORDER — FLUTICASONE PROPIONATE 50 MCG
2 SPRAY, SUSPENSION (ML) NASAL DAILY
Qty: 3 BOTTLE | Refills: 1 | Status: SHIPPED | OUTPATIENT
Start: 2019-03-15 | End: 2020-03-30

## 2019-03-15 RX ORDER — GABAPENTIN 300 MG/1
600 CAPSULE ORAL 3 TIMES DAILY
Qty: 360 CAPSULE | Refills: 1 | Status: SHIPPED | OUTPATIENT
Start: 2019-03-15 | End: 2019-08-07 | Stop reason: SDUPTHER

## 2019-03-15 RX ORDER — HYDROXYZINE HYDROCHLORIDE 25 MG/1
25 TABLET, FILM COATED ORAL EVERY 8 HOURS PRN
Qty: 30 TABLET | Refills: 0 | Status: SHIPPED | OUTPATIENT
Start: 2019-03-15 | End: 2019-06-10 | Stop reason: SDUPTHER

## 2019-03-15 RX ORDER — LISINOPRIL 40 MG/1
TABLET ORAL
Qty: 90 TABLET | Refills: 1 | Status: SHIPPED | OUTPATIENT
Start: 2019-03-15 | End: 2019-09-11 | Stop reason: SDUPTHER

## 2019-03-15 RX ORDER — DULOXETIN HYDROCHLORIDE 30 MG/1
90 CAPSULE, DELAYED RELEASE ORAL DAILY
Qty: 180 CAPSULE | Refills: 1 | Status: SHIPPED
Start: 2019-03-15 | End: 2019-04-01 | Stop reason: SDUPTHER

## 2019-03-15 RX ORDER — HYDROCHLOROTHIAZIDE 12.5 MG/1
CAPSULE, GELATIN COATED ORAL
Qty: 30 CAPSULE | Refills: 5 | Status: SHIPPED | OUTPATIENT
Start: 2019-03-15 | End: 2019-08-02 | Stop reason: SDUPTHER

## 2019-03-15 RX ORDER — TRAZODONE HYDROCHLORIDE 50 MG/1
50 TABLET ORAL NIGHTLY
Qty: 90 TABLET | Refills: 1 | Status: SHIPPED | OUTPATIENT
Start: 2019-03-15 | End: 2019-05-15 | Stop reason: SDUPTHER

## 2019-03-15 ASSESSMENT — PATIENT HEALTH QUESTIONNAIRE - PHQ9
SUM OF ALL RESPONSES TO PHQ9 QUESTIONS 1 & 2: 2
SUM OF ALL RESPONSES TO PHQ QUESTIONS 1-9: 2
SUM OF ALL RESPONSES TO PHQ QUESTIONS 1-9: 2
2. FEELING DOWN, DEPRESSED OR HOPELESS: 1
1. LITTLE INTEREST OR PLEASURE IN DOING THINGS: 1

## 2019-03-17 ASSESSMENT — ENCOUNTER SYMPTOMS
SHORTNESS OF BREATH: 1
BLOOD IN STOOL: 0
DIARRHEA: 0
VOMITING: 0
CHEST TIGHTNESS: 0
WHEEZING: 0
NAUSEA: 0
BACK PAIN: 1
CONSTIPATION: 0
ABDOMINAL PAIN: 0

## 2019-03-18 ENCOUNTER — TELEPHONE (OUTPATIENT)
Dept: INTERNAL MEDICINE CLINIC | Age: 69
End: 2019-03-18

## 2019-03-21 ENCOUNTER — NURSE ONLY (OUTPATIENT)
Dept: RHEUMATOLOGY | Age: 69
End: 2019-03-21
Payer: MEDICARE

## 2019-03-21 VITALS
DIASTOLIC BLOOD PRESSURE: 76 MMHG | RESPIRATION RATE: 16 BRPM | TEMPERATURE: 97.9 F | HEART RATE: 80 BPM | SYSTOLIC BLOOD PRESSURE: 136 MMHG

## 2019-03-21 DIAGNOSIS — M81.0 OSTEOPOROSIS, POST-MENOPAUSAL: Primary | ICD-10-CM

## 2019-03-21 PROCEDURE — 96365 THER/PROPH/DIAG IV INF INIT: CPT | Performed by: INTERNAL MEDICINE

## 2019-03-21 RX ORDER — ZOLEDRONIC ACID 5 MG/100ML
5 INJECTION, SOLUTION INTRAVENOUS ONCE
Status: COMPLETED | OUTPATIENT
Start: 2019-03-21 | End: 2019-03-21

## 2019-03-21 RX ADMIN — ZOLEDRONIC ACID 5 MG: 5 INJECTION, SOLUTION INTRAVENOUS at 10:05

## 2019-03-27 ENCOUNTER — HOSPITAL ENCOUNTER (OUTPATIENT)
Dept: NON INVASIVE DIAGNOSTICS | Age: 69
Discharge: HOME | End: 2019-03-27
Payer: MEDICARE

## 2019-03-27 DIAGNOSIS — R06.09 DYSPNEA ON EXERTION: ICD-10-CM

## 2019-03-27 LAB
LV EF: 70 %
LVEF MODALITY: NORMAL

## 2019-03-27 PROCEDURE — 93017 CV STRESS TEST TRACING ONLY: CPT | Performed by: INTERNAL MEDICINE

## 2019-03-27 PROCEDURE — 78452 HT MUSCLE IMAGE SPECT MULT: CPT | Performed by: INTERNAL MEDICINE

## 2019-03-27 PROCEDURE — A9502 TC99M TETROFOSMIN: HCPCS | Performed by: INTERNAL MEDICINE

## 2019-03-27 PROCEDURE — 3430000000 HC RX DIAGNOSTIC RADIOPHARMACEUTICAL: Performed by: INTERNAL MEDICINE

## 2019-03-27 PROCEDURE — 6360000002 HC RX W HCPCS: Performed by: INTERNAL MEDICINE

## 2019-03-27 RX ADMIN — TETROFOSMIN 30 MILLICURIE: 0.23 INJECTION, POWDER, LYOPHILIZED, FOR SOLUTION INTRAVENOUS at 10:24

## 2019-03-27 RX ADMIN — REGADENOSON 0.4 MG: 0.08 INJECTION, SOLUTION INTRAVENOUS at 10:22

## 2019-03-27 RX ADMIN — TETROFOSMIN 10 MILLICURIE: 0.23 INJECTION, POWDER, LYOPHILIZED, FOR SOLUTION INTRAVENOUS at 09:00

## 2019-04-01 ENCOUNTER — TELEPHONE (OUTPATIENT)
Dept: INTERNAL MEDICINE CLINIC | Age: 69
End: 2019-04-01

## 2019-04-01 DIAGNOSIS — M79.7 FIBROMYALGIA: ICD-10-CM

## 2019-04-01 RX ORDER — KETOCONAZOLE 20 MG/ML
SHAMPOO TOPICAL
Qty: 120 ML | Refills: 2 | Status: SHIPPED | OUTPATIENT
Start: 2019-04-01 | End: 2019-08-23 | Stop reason: SDUPTHER

## 2019-04-01 RX ORDER — DULOXETIN HYDROCHLORIDE 30 MG/1
90 CAPSULE, DELAYED RELEASE ORAL DAILY
Qty: 180 CAPSULE | Refills: 1 | Status: SHIPPED | OUTPATIENT
Start: 2019-04-01 | End: 2019-04-08 | Stop reason: SDUPTHER

## 2019-04-01 NOTE — TELEPHONE ENCOUNTER
Patient was seen on 3/15/19. She says that  was going to send a script for medicated shampoo for her. Nothing was sent to [facility] in [place]. Would still like to try this shampoo as nothing else has worked for her flaky scalp.

## 2019-04-08 ENCOUNTER — TELEPHONE (OUTPATIENT)
Dept: INTERNAL MEDICINE CLINIC | Age: 69
End: 2019-04-08

## 2019-04-08 DIAGNOSIS — M79.7 FIBROMYALGIA: ICD-10-CM

## 2019-04-08 RX ORDER — DULOXETIN HYDROCHLORIDE 30 MG/1
90 CAPSULE, DELAYED RELEASE ORAL DAILY
Qty: 270 CAPSULE | Refills: 3 | Status: SHIPPED | OUTPATIENT
Start: 2019-04-08 | End: 2019-04-18 | Stop reason: DRUGHIGH

## 2019-04-08 RX ORDER — DULOXETIN HYDROCHLORIDE 30 MG/1
90 CAPSULE, DELAYED RELEASE ORAL DAILY
Qty: 30 CAPSULE | Refills: 0 | Status: SHIPPED | OUTPATIENT
Start: 2019-04-08 | End: 2019-04-19 | Stop reason: DRUGHIGH

## 2019-04-08 NOTE — TELEPHONE ENCOUNTER
Patient is having trouble getting her Cymbalta filled. She says that according to [facility] they need an override approval form  for the increased dose of 3 capsules daily. She is completely out. Asking if a small amount could be sent to [facility].

## 2019-04-08 NOTE — TELEPHONE ENCOUNTER
Re-sent rx to [facility] (was sent in on 4/1) and sent a 10 day supply to [facility] in [place] for her. saw

## 2019-04-17 ENCOUNTER — TELEPHONE (OUTPATIENT)
Dept: INTERNAL MEDICINE CLINIC | Age: 69
End: 2019-04-17

## 2019-04-17 NOTE — TELEPHONE ENCOUNTER
Pt calling needs you to order her Cymbalta for now for 2 a day to [facility] until she and you can get the 3 a day straightend out with the insurance ---she only has 2 pills left. Thanks.

## 2019-04-18 RX ORDER — DULOXETIN HYDROCHLORIDE 30 MG/1
60 CAPSULE, DELAYED RELEASE ORAL DAILY
Qty: 60 CAPSULE | Refills: 0 | Status: SHIPPED | OUTPATIENT
Start: 2019-04-18 | End: 2019-04-19 | Stop reason: DRUGHIGH

## 2019-04-18 RX ORDER — DULOXETIN HYDROCHLORIDE 30 MG/1
60 CAPSULE, DELAYED RELEASE ORAL DAILY
COMMUNITY
End: 2019-04-18 | Stop reason: SDUPTHER

## 2019-04-18 NOTE — TELEPHONE ENCOUNTER
Called [facility] and spoke with Liz Santana. Did the PA over the phone. Medicare and Selestine Pick will only allow 60 mg. An appeal can be done. They will send fax/letter with all this information. Explained to patient . She will be out so a script for Cymbalta 30 mg 2 daily will be sent to [facility].

## 2019-04-19 ENCOUNTER — TELEPHONE (OUTPATIENT)
Dept: RHEUMATOLOGY | Age: 69
End: 2019-04-19

## 2019-04-19 RX ORDER — DULOXETIN HYDROCHLORIDE 30 MG/1
30 CAPSULE, DELAYED RELEASE ORAL DAILY
Qty: 30 CAPSULE | Refills: 5 | Status: SHIPPED | OUTPATIENT
Start: 2019-04-19 | End: 2019-05-01 | Stop reason: DRUGHIGH

## 2019-04-19 RX ORDER — DULOXETIN HYDROCHLORIDE 60 MG/1
60 CAPSULE, DELAYED RELEASE ORAL DAILY
Qty: 30 CAPSULE | Refills: 5 | Status: SHIPPED | OUTPATIENT
Start: 2019-04-19 | End: 2019-05-01 | Stop reason: DRUGHIGH

## 2019-04-19 NOTE — TELEPHONE ENCOUNTER
Is she taking 1 60 and 1 30 mg for 90 mg or just 2 30 mg for 60 mg? I have both in her chart, please confirm. Also will send local supply and mail order.  Thank saw

## 2019-04-19 NOTE — TELEPHONE ENCOUNTER
Pt was taking 60 mg in the morning. She states she was then switched to 90 mg daily. She just started this week taking 60 mg since the insurance was not willing to pt for 90 mg daily.

## 2019-04-19 NOTE — TELEPHONE ENCOUNTER
Please let her know that I have called in a 30 mg and a 60 mg pill to take BOTH for a 90 mg dose. Please let me know if there is a problem.  saw

## 2019-05-01 ENCOUNTER — TELEPHONE (OUTPATIENT)
Dept: INTERNAL MEDICINE CLINIC | Age: 69
End: 2019-05-01

## 2019-05-01 RX ORDER — DULOXETIN HYDROCHLORIDE 60 MG/1
60 CAPSULE, DELAYED RELEASE ORAL DAILY
Qty: 30 CAPSULE | Refills: 5 | Status: SHIPPED | OUTPATIENT
Start: 2019-05-01 | End: 2019-07-11 | Stop reason: SDUPTHER

## 2019-05-01 NOTE — TELEPHONE ENCOUNTER
Pt calling ---pt wanting to know if she can go back to the 60 mg of Duloxetine instead of the 90 mg---it makes her light headed and has tingling doesn't like the way it makes her feel and she has not noticed any improvement in her condition---please call the pt. Thanks.

## 2019-05-07 ENCOUNTER — OFFICE VISIT (OUTPATIENT)
Dept: INTERNAL MEDICINE CLINIC | Age: 69
End: 2019-05-07
Payer: MEDICARE

## 2019-05-07 VITALS
OXYGEN SATURATION: 93 % | WEIGHT: 195.2 LBS | BODY MASS INDEX: 38.12 KG/M2 | HEART RATE: 104 BPM | SYSTOLIC BLOOD PRESSURE: 138 MMHG | DIASTOLIC BLOOD PRESSURE: 58 MMHG

## 2019-05-07 DIAGNOSIS — M54.41 ACUTE RIGHT-SIDED LOW BACK PAIN WITH RIGHT-SIDED SCIATICA: Primary | ICD-10-CM

## 2019-05-07 PROCEDURE — 99213 OFFICE O/P EST LOW 20 MIN: CPT | Performed by: INTERNAL MEDICINE

## 2019-05-07 RX ORDER — PREDNISONE 10 MG/1
TABLET ORAL
Qty: 30 TABLET | Refills: 0 | Status: SHIPPED | OUTPATIENT
Start: 2019-05-07 | End: 2019-05-19

## 2019-05-07 ASSESSMENT — ENCOUNTER SYMPTOMS
BACK PAIN: 1
CONSTIPATION: 0
DIARRHEA: 0

## 2019-05-07 NOTE — PROGRESS NOTES
Patient: Cameron Clay is a 71 y.o. female who presents today with the following Chief Complaint(s):  Chief Complaint   Patient presents with    Back Pain       HPI     Here today for acute visit. Was attempting to move her headboard with bungee cords and has since developed right sided low back pain, radiates around her hip and into her thigh. Started about 5-6 days ago. Called her pain specialist and cannot get a sooner appointment without imaging. Has been taking her Opana (from pain management),using ice and heat. Ice is helping a little. Hurts to lift her leg straight up to get into bed. Not taking any NSAID's. No weakness in her leg that is different than her knee OA. No new paresthesias or numbness (does have chronic b/l foot paresthesias). Lab Results   Component Value Date    CREATININE 0.6 09/06/2018         Allergies   Allergen Reactions    Ativan [Lorazepam] Swelling     Tongue swelling    Sulfa Antibiotics Swelling    Keflex [Cephalexin] Other (See Comments)     Leg cramps      Past Medical History:   Diagnosis Date    Anxiety     Clostridium difficile infection 2/22/15    Hypertension     Insomnia disorder     Osteoarthritis     Osteoporosis 2008    Rheumatic fever     Self-catheterizes urinary bladder     as needed    Urinary retention       Past Surgical History:   Procedure Laterality Date    BLADDER REPAIR      CARPAL TUNNEL RELEASE      CYSTOSCOPY  10/29/2012    bladder biopsy    DENTAL SURGERY      FOOT SURGERY      HYSTERECTOMY      LUMBAR SPINE SURGERY  2004    MANDIBLE RECONSTRUCTION      OTHER SURGICAL HISTORY      spinal cord stimulator    TOTAL KNEE ARTHROPLASTY Right 10/18/13      Social History     Socioeconomic History    Marital status:       Spouse name: Not on file    Number of children: Not on file    Years of education: Not on file    Highest education level: Not on file   Occupational History    Occupation: retired   Social Needs    capsule TAKE ONE CAPSULE BY MOUTH EVERY MORNING 30 capsule 5    gabapentin (NEURONTIN) 300 MG capsule Take 2 capsules by mouth 3 times daily. 360 capsule 1    lisinopril (PRINIVIL;ZESTRIL) 40 MG tablet TAKE 1 TABLET DAILY 90 tablet 1    fluticasone (FLONASE) 50 MCG/ACT nasal spray 2 sprays by Each Nare route daily 3 Bottle 1    traZODone (DESYREL) 50 MG tablet Take 1 tablet by mouth nightly 90 tablet 1    potassium chloride (KLOR-CON M) 20 MEQ extended release tablet TAKE 1 TABLET TWICE A  tablet 1    rosuvastatin (CRESTOR) 10 MG tablet Take 1 tablet by mouth nightly 90 tablet 3    diphenoxylate-atropine (LOMOTIL) 2.5-0.025 MG per tablet Take 1 tablet by mouth as needed for Diarrhea. [facility] Blood Glucose Monitoring Suppl DAYRON Check blood sugars fasting in morning and as needed for feeling bad. 1 Device 0    glucose blood VI test strips (ONE TOUCH ULTRA TEST) strip 1 each by In Vitro route daily Fasting qam and As needed. 100 strip 1    Lancets MISC Check sugars fasting qam and prn 100 each 3    Cholecalciferol (VITAMIN D3) 5000 units CAPS Take 5,000 Units by mouth daily       oxymorphone (OPANA) 5 MG tablet Take 10 mg by mouth  2 x day.  zoledronic acid (RECLAST) 5 MG/100ML SOLN Infuse 5 mg intravenously once IV, yearly      Cetirizine HCl (ZYRTEC PO) Take 1 tablet by mouth daily       aspirin 81 MG chewable tablet Take 81 mg by mouth daily.  Calcium Carbonate-Vit D-Min (CALCIUM 1200 PO) Take 1 capsule by mouth 2 times daily.  Ascorbic Acid (VITAMIN C) 500 MG tablet Take 500 mg by mouth daily.          Facility-Administered Medications Prior to Visit   Medication Dose Route Frequency Provider Last Rate Last Dose    zoledronic acid (RECLAST) 5 mg/100 mL infusion  5 mg Intravenous See Admin Instructions Alexandria Hdz  mL/hr at 04/06/16 0900 5 mg at 04/06/16 0900       Patient'spast medical history, surgical history, family history, medications,  and allergies  were all reviewed and updated as appropriate today. Review of Systems   Constitutional: Negative for fever. Gastrointestinal: Negative for constipation and diarrhea. Genitourinary: Negative for difficulty urinating. Musculoskeletal: Positive for back pain. Neurological: Negative for weakness and numbness. BP (!) 138/58   Pulse 104   Wt 195 lb 3.2 oz (88.5 kg)   SpO2 93%   BMI 38.12 kg/m²   Physical Exam   Constitutional: She is oriented to person, place, and time. She appears well-developed and well-nourished. Cardiovascular: Normal rate, regular rhythm and normal heart sounds. No murmur heard. Pulses:       Dorsalis pedis pulses are 2+ on the right side, and 2+ on the left side. No LE edema   Musculoskeletal:        Lumbar back: She exhibits decreased range of motion and tenderness (over right buttocks. ). She exhibits no swelling, no edema and no spasm. Straight Leg Raise negative. b/l. Neurological: She is alert and oriented to person, place, and time. She has normal strength. Reflex Scores:       Patellar reflexes are 2+ on the right side and 2+ on the left side. Achilles reflexes are 2+ on the right side and 2+ on the left side. ASSESSMENT/PLAN:    Problem List Items Addressed This Visit     Acute right-sided low back pain with right-sided sciatica - Primary     Prednisone taper. Has appointment with pain management next week. May need repeat MRI if symptoms do not improve with prednisone. Relevant Medications    predniSONE (DELTASONE) 10 MG tablet          Current Outpatient Medications   Medication Sig Dispense Refill    predniSONE (DELTASONE) 10 MG tablet Take 4 tablets by mouth daily for 3 days, THEN 3 tablets daily for 3 days, THEN 2 tablets daily for 3 days, THEN 1 tablet daily for 3 days.  30 tablet 0    DULoxetine (CYMBALTA) 60 MG extended release capsule Take 1 capsule by mouth daily 30 capsule 5    ketoconazole (NIZORAL) 2 % shampoo Apply topically daily as needed. 120 mL 2    hydrochlorothiazide (MICROZIDE) 12.5 MG capsule TAKE ONE CAPSULE BY MOUTH EVERY MORNING 30 capsule 5    gabapentin (NEURONTIN) 300 MG capsule Take 2 capsules by mouth 3 times daily. 360 capsule 1    lisinopril (PRINIVIL;ZESTRIL) 40 MG tablet TAKE 1 TABLET DAILY 90 tablet 1    fluticasone (FLONASE) 50 MCG/ACT nasal spray 2 sprays by Each Nare route daily 3 Bottle 1    potassium chloride (KLOR-CON M) 20 MEQ extended release tablet TAKE 1 TABLET TWICE A  tablet 1    rosuvastatin (CRESTOR) 10 MG tablet Take 1 tablet by mouth nightly 90 tablet 3    diphenoxylate-atropine (LOMOTIL) 2.5-0.025 MG per tablet Take 1 tablet by mouth as needed for Diarrhea. [facility] Blood Glucose Monitoring Suppl DAYRON Check blood sugars fasting in morning and as needed for feeling bad. 1 Device 0    glucose blood VI test strips (ONE TOUCH ULTRA TEST) strip 1 each by In Vitro route daily Fasting qam and As needed. 100 strip 1    Lancets MISC Check sugars fasting qam and prn 100 each 3    Cholecalciferol (VITAMIN D3) 5000 units CAPS Take 5,000 Units by mouth daily       oxymorphone (OPANA) 5 MG tablet Take 10 mg by mouth  2 x day.  zoledronic acid (RECLAST) 5 MG/100ML SOLN Infuse 5 mg intravenously once IV, yearly      Cetirizine HCl (ZYRTEC PO) Take 1 tablet by mouth daily       aspirin 81 MG chewable tablet Take 81 mg by mouth daily.  Calcium Carbonate-Vit D-Min (CALCIUM 1200 PO) Take 1 capsule by mouth 2 times daily.  Ascorbic Acid (VITAMIN C) 500 MG tablet Take 500 mg by mouth daily.         traZODone (DESYREL) 100 MG tablet Take 1 tablet by mouth nightly 30 tablet 5     Current Facility-Administered Medications   Medication Dose Route Frequency Provider Last Rate Last Dose    zoledronic acid (RECLAST) 5 mg/100 mL infusion  5 mg Intravenous See Admin Instructions Sharon Dc  mL/hr at 04/06/16 0900 5 mg at 04/06/16 0900       Return if symptoms worsen or fail to improve, for keep Kavitha appointment.

## 2019-05-15 ENCOUNTER — TELEPHONE (OUTPATIENT)
Dept: INTERNAL MEDICINE CLINIC | Age: 69
End: 2019-05-15

## 2019-05-15 RX ORDER — TRAZODONE HYDROCHLORIDE 100 MG/1
100 TABLET ORAL NIGHTLY
Qty: 30 TABLET | Refills: 5
Start: 2019-05-15 | End: 2019-06-04 | Stop reason: SDUPTHER

## 2019-05-19 NOTE — ASSESSMENT & PLAN NOTE
Prednisone taper. Has appointment with pain management next week. May need repeat MRI if symptoms do not improve with prednisone.

## 2019-06-04 ENCOUNTER — TELEPHONE (OUTPATIENT)
Dept: INTERNAL MEDICINE CLINIC | Age: 69
End: 2019-06-04

## 2019-06-04 RX ORDER — TRAZODONE HYDROCHLORIDE 100 MG/1
300 TABLET ORAL NIGHTLY
Qty: 270 TABLET | Refills: 1 | Status: SHIPPED | OUTPATIENT
Start: 2019-06-04 | End: 2019-11-23 | Stop reason: SDUPTHER

## 2019-06-05 ENCOUNTER — OFFICE VISIT (OUTPATIENT)
Dept: PSYCHIATRY | Age: 69
End: 2019-06-05
Payer: MEDICARE

## 2019-06-05 VITALS
HEART RATE: 89 BPM | DIASTOLIC BLOOD PRESSURE: 70 MMHG | OXYGEN SATURATION: 94 % | SYSTOLIC BLOOD PRESSURE: 100 MMHG | WEIGHT: 194.2 LBS | HEIGHT: 60 IN | BODY MASS INDEX: 38.13 KG/M2

## 2019-06-05 DIAGNOSIS — F33.0 MILD EPISODE OF RECURRENT MAJOR DEPRESSIVE DISORDER (HCC): Primary | ICD-10-CM

## 2019-06-05 DIAGNOSIS — F41.1 GENERALIZED ANXIETY DISORDER: ICD-10-CM

## 2019-06-05 PROCEDURE — 99204 OFFICE O/P NEW MOD 45 MIN: CPT | Performed by: PSYCHIATRY & NEUROLOGY

## 2019-06-05 RX ORDER — HYDROXYZINE HYDROCHLORIDE 25 MG/1
25 TABLET, FILM COATED ORAL 3 TIMES DAILY PRN
COMMUNITY
End: 2019-06-11

## 2019-06-05 RX ORDER — BUSPIRONE HYDROCHLORIDE 7.5 MG/1
7.5 TABLET ORAL 2 TIMES DAILY
Qty: 60 TABLET | Refills: 1 | Status: SHIPPED | OUTPATIENT
Start: 2019-06-05 | End: 2019-07-05 | Stop reason: SDUPTHER

## 2019-06-05 ASSESSMENT — ANXIETY QUESTIONNAIRES
6. BECOMING EASILY ANNOYED OR IRRITABLE: 0-NOT AT ALL SURE
GAD7 TOTAL SCORE: 9
7. FEELING AFRAID AS IF SOMETHING AWFUL MIGHT HAPPEN: 0-NOT AT ALL SURE
4. TROUBLE RELAXING: 2-OVER HALF THE DAYS
2. NOT BEING ABLE TO STOP OR CONTROL WORRYING: 3-NEARLY EVERY DAY
3. WORRYING TOO MUCH ABOUT DIFFERENT THINGS: 3-NEARLY EVERY DAY
1. FEELING NERVOUS, ANXIOUS, OR ON EDGE: 1-SEVERAL DAYS
5. BEING SO RESTLESS THAT IT IS HARD TO SIT STILL: 0-NOT AT ALL SURE

## 2019-06-05 ASSESSMENT — PATIENT HEALTH QUESTIONNAIRE - PHQ9
6. FEELING BAD ABOUT YOURSELF - OR THAT YOU ARE A FAILURE OR HAVE LET YOURSELF OR YOUR FAMILY DOWN: 0
3. TROUBLE FALLING OR STAYING ASLEEP: 3
8. MOVING OR SPEAKING SO SLOWLY THAT OTHER PEOPLE COULD HAVE NOTICED. OR THE OPPOSITE, BEING SO FIGETY OR RESTLESS THAT YOU HAVE BEEN MOVING AROUND A LOT MORE THAN USUAL: 0
SUM OF ALL RESPONSES TO PHQ QUESTIONS 1-9: 8
SUM OF ALL RESPONSES TO PHQ QUESTIONS 1-9: 8
9. THOUGHTS THAT YOU WOULD BE BETTER OFF DEAD, OR OF HURTING YOURSELF: 0
2. FEELING DOWN, DEPRESSED OR HOPELESS: 2
1. LITTLE INTEREST OR PLEASURE IN DOING THINGS: 0
7. TROUBLE CONCENTRATING ON THINGS, SUCH AS READING THE NEWSPAPER OR WATCHING TELEVISION: 0
10. IF YOU CHECKED OFF ANY PROBLEMS, HOW DIFFICULT HAVE THESE PROBLEMS MADE IT FOR YOU TO DO YOUR WORK, TAKE CARE OF THINGS AT HOME, OR GET ALONG WITH OTHER PEOPLE: 0
5. POOR APPETITE OR OVEREATING: 0
4. FEELING TIRED OR HAVING LITTLE ENERGY: 3
SUM OF ALL RESPONSES TO PHQ9 QUESTIONS 1 & 2: 2
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
## 2019-06-07 ENCOUNTER — TELEPHONE (OUTPATIENT)
Dept: INTERNAL MEDICINE CLINIC | Age: 69
End: 2019-06-07

## 2019-06-07 DIAGNOSIS — J30.2 SEASONAL ALLERGIES: Primary | ICD-10-CM

## 2019-06-07 RX ORDER — METHYLPREDNISOLONE 4 MG/1
TABLET ORAL
Qty: 1 KIT | Refills: 0 | Status: SHIPPED | OUTPATIENT
Start: 2019-06-07 | End: 2019-06-13

## 2019-06-07 NOTE — PROGRESS NOTES
ed Add 52 Modifier (Optional): no Consent: The patient's consent was obtained including but not limited to risks of crusting, scabbing, blistering, scarring, darker or lighter pigmentary change, recurrence, incomplete removal and infection. Medical Necessity Information: It is in your best interest to select a reason for this procedure from the list below. All of these items fulfill various CMS LCD requirements except the new and changing color options. Detail Level: Detailed Medical Necessity Clause: This procedure was medically necessary because the lesions that were treated were: Number Of Freeze-Thaw Cycles: 2 freeze-thaw cycles Post-Care Instructions: I reviewed with the patient in detail post-care instructions. Patient is to wear sunprotection, and avoid picking at any of the treated lesions. Pt may apply Vaseline to crusted or scabbing areas.

## 2019-06-07 NOTE — TELEPHONE ENCOUNTER
Pt calling wanting something for her allergies---eyes watering --headache-face swollen-ears ringing---offered her an appt but said she could not dirve today---please call pt. Thanks.

## 2019-06-27 ENCOUNTER — OFFICE VISIT (OUTPATIENT)
Dept: PSYCHOLOGY | Age: 69
End: 2019-06-27
Payer: MEDICARE

## 2019-06-27 DIAGNOSIS — F43.22 ADJUSTMENT DISORDER WITH ANXIOUS MOOD: Primary | ICD-10-CM

## 2019-06-27 PROCEDURE — 90791 PSYCH DIAGNOSTIC EVALUATION: CPT | Performed by: PSYCHOLOGIST

## 2019-06-27 ASSESSMENT — PATIENT HEALTH QUESTIONNAIRE - PHQ9
5. POOR APPETITE OR OVEREATING: 0
1. LITTLE INTEREST OR PLEASURE IN DOING THINGS: 0
7. TROUBLE CONCENTRATING ON THINGS, SUCH AS READING THE NEWSPAPER OR WATCHING TELEVISION: 0
2. FEELING DOWN, DEPRESSED OR HOPELESS: 1
4. FEELING TIRED OR HAVING LITTLE ENERGY: 3
9. THOUGHTS THAT YOU WOULD BE BETTER OFF DEAD, OR OF HURTING YOURSELF: 0
10. IF YOU CHECKED OFF ANY PROBLEMS, HOW DIFFICULT HAVE THESE PROBLEMS MADE IT FOR YOU TO DO YOUR WORK, TAKE CARE OF THINGS AT HOME, OR GET ALONG WITH OTHER PEOPLE: 1
8. MOVING OR SPEAKING SO SLOWLY THAT OTHER PEOPLE COULD HAVE NOTICED. OR THE OPPOSITE, BEING SO FIGETY OR RESTLESS THAT YOU HAVE BEEN MOVING AROUND A LOT MORE THAN USUAL: 0
6. FEELING BAD ABOUT YOURSELF - OR THAT YOU ARE A FAILURE OR HAVE LET YOURSELF OR YOUR FAMILY DOWN: 0
SUM OF ALL RESPONSES TO PHQ9 QUESTIONS 1 & 2: 1
3. TROUBLE FALLING OR STAYING ASLEEP: 3
SUM OF ALL RESPONSES TO PHQ QUESTIONS 1-9: 7
SUM OF ALL RESPONSES TO PHQ QUESTIONS 1-9: 7

## 2019-06-27 NOTE — PROGRESS NOTES
Behavioral Health Consultation  dArianna Oneill, Ph.D.  Psychologist  2019  10:09 AM      Time spent with Patient: 30 minutes  This is patient's first [facility] appointment. Reason for Consult:    Chief Complaint   Patient presents with    Stress       Pt provided informed consent for the behavioral health program. Discussed with patient model of service to include the limits of confidentiality (i.e. abuse reporting, suicide  intervention, etc.) and short-term intervention focused approach. Pt indicated understanding. Feedback given to PCP. S:  Pt seen per PCP re: anxiety. Pt reported symptoms of anxiety, including anxious thoughts (constantino r/t rel w her kids), insomnia (poor onset and maintenance, 3 hrs for onset w medication; estimates getting 6 hrs/night, thinks she needs 8), fatigue. Reported periods chest tight, jittery, but denied other sxs of panic. Occurs a few times/wk. Typically handles stress through prayer; no longer going to [employer] bc of pain, but watches on the TV.   6 yrs ago \"that's been the roughest thing of my life. \" Dalia Haw her children do not call as frequently as pt thinks is appropriate, but the do accept her calls (except for her daughter who will only allow pt to text \"so she has a record of what I said. \") This is r/t pt accusing daughter of saying daughter doesn't have any positive feelings for her, admits this may have been a misinterpretation d/t medications. Daughter lives next door, but pt is fearful of her dogs. Has one close friend and close to [facility] (not in good health). In the process of a reverse mortgage. 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 Wants to volunteer at the hospital, but struggling w pain. Enjoys working in her flowers, goes out 1x/wk w friend, watching TV, cleaning.       O:  MSE:    Appearance    alert, cooperative  Impulsive behavior No  Speech    spontaneous, normal rate and normal volume  Mood reports that she drinks alcohol. A:  PHQ Scores 6/27/2019 6/5/2019 3/15/2019 7/6/2018 5/31/2017   PHQ2 Score 1 2 2 1 2   PHQ9 Score 7 8 2 1 2     Interpretation of Total Score Depression Severity: 1-4 = Minimal depression, 5-9 = Mild depression, 10-14 = Moderate depression, 15-19 = Moderately severe depression, 20-27 = Severe depression      Diagnosis:    Adjustment disorder with anxiety    Plan:  Pt interventions:  Established rapport, Conducted functional assessment, [place]-setting to identify pt's primary goals for DEYANIRAJAZ ISSA [facility] visit / overall health and Supportive techniques    Documentation was done using voice recognition dragon software. Every effort was made to ensure accuracy; however, inadvertent, unintentional computerized transcription errors may be present.

## 2019-06-28 ENCOUNTER — TELEPHONE (OUTPATIENT)
Dept: INTERNAL MEDICINE CLINIC | Age: 69
End: 2019-06-28

## 2019-06-28 NOTE — TELEPHONE ENCOUNTER
Pt is taking trazodone 100mg   She has to take it 2-3hrs before she goes to bed. She is only gets 5-6hrs of sleep. Is there something else she can try?     Jim on file

## 2019-07-05 ENCOUNTER — OFFICE VISIT (OUTPATIENT)
Dept: INTERNAL MEDICINE CLINIC | Age: 69
End: 2019-07-05
Payer: MEDICARE

## 2019-07-05 VITALS
DIASTOLIC BLOOD PRESSURE: 68 MMHG | HEART RATE: 108 BPM | BODY MASS INDEX: 36.99 KG/M2 | OXYGEN SATURATION: 95 % | SYSTOLIC BLOOD PRESSURE: 128 MMHG | WEIGHT: 189.4 LBS

## 2019-07-05 DIAGNOSIS — I10 ESSENTIAL HYPERTENSION: Primary | ICD-10-CM

## 2019-07-05 DIAGNOSIS — L23.7 ALLERGIC CONTACT DERMATITIS DUE TO PLANTS, EXCEPT FOOD: ICD-10-CM

## 2019-07-05 DIAGNOSIS — R53.83 FATIGUE, UNSPECIFIED TYPE: ICD-10-CM

## 2019-07-05 DIAGNOSIS — E55.9 VITAMIN D INSUFFICIENCY: ICD-10-CM

## 2019-07-05 DIAGNOSIS — F41.1 GENERALIZED ANXIETY DISORDER: ICD-10-CM

## 2019-07-05 DIAGNOSIS — J01.10 ACUTE NON-RECURRENT FRONTAL SINUSITIS: ICD-10-CM

## 2019-07-05 DIAGNOSIS — K76.0 FATTY LIVER: ICD-10-CM

## 2019-07-05 DIAGNOSIS — E78.5 HYPERLIPIDEMIA LDL GOAL <100: ICD-10-CM

## 2019-07-05 PROCEDURE — 96372 THER/PROPH/DIAG INJ SC/IM: CPT | Performed by: INTERNAL MEDICINE

## 2019-07-05 PROCEDURE — 99214 OFFICE O/P EST MOD 30 MIN: CPT | Performed by: INTERNAL MEDICINE

## 2019-07-05 RX ORDER — BUSPIRONE HYDROCHLORIDE 10 MG/1
10 TABLET ORAL 3 TIMES DAILY
Qty: 30 TABLET | Refills: 1 | Status: SHIPPED | OUTPATIENT
Start: 2019-07-05 | End: 2019-08-14 | Stop reason: SDUPTHER

## 2019-07-05 RX ORDER — METHYLPREDNISOLONE ACETATE 80 MG/ML
80 INJECTION, SUSPENSION INTRA-ARTICULAR; INTRALESIONAL; INTRAMUSCULAR; SOFT TISSUE ONCE
Status: COMPLETED | OUTPATIENT
Start: 2019-07-05 | End: 2019-07-05

## 2019-07-05 RX ORDER — AMOXICILLIN AND CLAVULANATE POTASSIUM 875; 125 MG/1; MG/1
1 TABLET, FILM COATED ORAL 2 TIMES DAILY
Qty: 20 TABLET | Refills: 0 | Status: SHIPPED | OUTPATIENT
Start: 2019-07-05 | End: 2019-07-15

## 2019-07-05 RX ORDER — HYDROXYZINE 50 MG/1
50 TABLET, FILM COATED ORAL EVERY 4 HOURS PRN
Qty: 60 TABLET | Refills: 1 | Status: SHIPPED | OUTPATIENT
Start: 2019-07-05 | End: 2019-08-23 | Stop reason: ALTCHOICE

## 2019-07-05 RX ADMIN — METHYLPREDNISOLONE ACETATE 80 MG: 80 INJECTION, SUSPENSION INTRA-ARTICULAR; INTRALESIONAL; INTRAMUSCULAR; SOFT TISSUE at 10:35

## 2019-07-05 ASSESSMENT — ENCOUNTER SYMPTOMS
COUGH: 1
VOMITING: 0
SORE THROAT: 1
SHORTNESS OF BREATH: 0
EYE DISCHARGE: 0
SINUS PAIN: 0
CHEST TIGHTNESS: 0
EYE ITCHING: 0
VOICE CHANGE: 0
SINUS PRESSURE: 1
TROUBLE SWALLOWING: 0
RHINORRHEA: 0
DIARRHEA: 0
WHEEZING: 0
NAUSEA: 0

## 2019-07-05 NOTE — PROGRESS NOTES
insecurity:     Worry: Not on file     Inability: Not on file    Transportation needs:     Medical: Not on file     Non-medical: Not on file   Tobacco Use    Smoking status: Never Smoker    Smokeless tobacco: Never Used   Substance and Sexual Activity    Alcohol use: Yes     Comment: occasional / wine once a month    Drug use: No    Sexual activity: Not on file   Lifestyle    Physical activity:     Days per week: Not on file     Minutes per session: Not on file    Stress: Not on file   Relationships    Social connections:     Talks on phone: Not on file     Gets together: Not on file     Attends [employer]ism service: Not on file     Active member of club or organization: Not on file     Attends meetings of clubs or organizations: Not on file     Relationship status: Not on file    Intimate partner violence:     Fear of current or ex partner: Not on file     Emotionally abused: Not on file     Physically abused: Not on file     Forced sexual activity: Not on file   Other Topics Concern    Not on file   Social History Narrative    Not on file     Family History   Problem Relation Age of Onset    COPD Mother     High Blood Pressure Mother     Rheum Arthritis Mother     Thyroid Disease Mother     Alcohol Abuse Father     Clotting Disorder Sister     Thyroid Disease Sister     Hypertension Brother     Diabetes Sister     Heart Disease Sister     Hypertension Sister     Thyroid Disease Sister     Cancer Brother     Heart Disease Brother     Hypertension Brother     Substance Abuse Brother     No Known Problems Son     No Known Problems Daughter         Outpatient Medications Prior to Visit   Medication Sig Dispense Refill    traZODone (DESYREL) 100 MG tablet Take 3 tablets by mouth nightly 270 tablet 1    DULoxetine (CYMBALTA) 60 MG extended release capsule Take 1 capsule by mouth daily 30 capsule 5    ketoconazole (NIZORAL) 2 % shampoo Apply topically daily as needed.  120 mL 2    400 mL/hr at 04/06/16 0900 5 mg at 04/06/16 0900       Patient'spast medical history, surgical history, family history, medications,  and allergies  were all reviewed and updated as appropriate today. Review of Systems   Constitutional: Positive for fatigue. Negative for chills and fever. HENT: Positive for congestion, ear pain, postnasal drip, sinus pressure and sore throat. Negative for rhinorrhea, sinus pain, sneezing, trouble swallowing and voice change. Eyes: Negative for discharge and itching. Respiratory: Positive for cough. Negative for chest tightness, shortness of breath and wheezing. Cardiovascular: Negative for chest pain. Gastrointestinal: Negative for diarrhea, nausea and vomiting. Skin: Positive for rash. /68   Pulse 108   Wt 189 lb 6.4 oz (85.9 kg)   SpO2 95%   BMI 36.99 kg/m²   Physical Exam   Constitutional: She appears well-developed and well-nourished. She is cooperative. She appears ill. No distress. HENT:   Right Ear: No tenderness. Tympanic membrane is injected. Tympanic membrane is not retracted and not bulging. No middle ear effusion. Left Ear: No tenderness. Tympanic membrane is injected. Tympanic membrane is not retracted and not bulging. No middle ear effusion. Nose: Mucosal edema and rhinorrhea present. Right sinus exhibits frontal sinus tenderness. Right sinus exhibits no maxillary sinus tenderness. Left sinus exhibits frontal sinus tenderness. Left sinus exhibits no maxillary sinus tenderness. Mouth/Throat: No posterior oropharyngeal erythema. Cardiovascular: Normal rate, regular rhythm and normal heart sounds. Pulmonary/Chest: Effort normal and breath sounds normal. She has no wheezes. Lymphadenopathy:     She has no cervical adenopathy. Neurological: She is alert. Skin: Rash noted. Rash is papular. Confluent red, bumpy rash on face, upper chest.   Few red bumps on arms and back of neck. No weeping lesions.

## 2019-07-05 NOTE — ASSESSMENT & PLAN NOTE
Patient had to discontinue Xanax due to her pain medicine. She has not found hydroxyzine 25 mg as needed or BuSpar 7.5 mg twice daily to be effective. Increase BuSpar to 10 mg twice daily and increase hydroxyzine to 50 mg every 6 hours as needed.

## 2019-07-05 NOTE — ASSESSMENT & PLAN NOTE
Given length of symptoms and upcoming cataract surgery, will treat with Augmentin 875 twice daily x10 days (she lists an allergy to Keflex but states that she has tolerated penicillin without difficulties) continue Zyrtec and Sudafed. Continue Flonase. Add nasal saline. Depo-Medrol injection that is being given for her contact dermatitis will all sleep likely help with her sinus symptoms as well.

## 2019-07-06 ENCOUNTER — HOSPITAL ENCOUNTER (EMERGENCY)
Age: 69
Discharge: HOME | End: 2019-07-06
Attending: EMERGENCY MEDICINE
Payer: MEDICARE

## 2019-07-06 VITALS
DIASTOLIC BLOOD PRESSURE: 79 MMHG | SYSTOLIC BLOOD PRESSURE: 141 MMHG | HEART RATE: 92 BPM | HEIGHT: 61 IN | TEMPERATURE: 99.7 F | OXYGEN SATURATION: 97 % | RESPIRATION RATE: 20 BRPM | BODY MASS INDEX: 35.68 KG/M2 | WEIGHT: 189 LBS

## 2019-07-06 DIAGNOSIS — R21 RASH: Primary | ICD-10-CM

## 2019-07-06 PROCEDURE — 99282 EMERGENCY DEPT VISIT SF MDM: CPT

## 2019-07-06 PROCEDURE — 6370000000 HC RX 637 (ALT 250 FOR IP): Performed by: EMERGENCY MEDICINE

## 2019-07-06 RX ORDER — FAMOTIDINE 20 MG/1
20 TABLET, FILM COATED ORAL ONCE
Status: COMPLETED | OUTPATIENT
Start: 2019-07-06 | End: 2019-07-06

## 2019-07-06 RX ORDER — PREDNISONE 20 MG/1
60 TABLET ORAL ONCE
Status: COMPLETED | OUTPATIENT
Start: 2019-07-06 | End: 2019-07-06

## 2019-07-06 RX ORDER — PREDNISONE 20 MG/1
TABLET ORAL
Qty: 18 TABLET | Refills: 0 | Status: SHIPPED | OUTPATIENT
Start: 2019-07-06 | End: 2019-07-16

## 2019-07-06 RX ORDER — CIMETIDINE 400 MG/1
400 TABLET, FILM COATED ORAL 2 TIMES DAILY
Qty: 20 TABLET | Refills: 0 | Status: SHIPPED | OUTPATIENT
Start: 2019-07-06 | End: 2019-08-23 | Stop reason: ALTCHOICE

## 2019-07-06 RX ADMIN — FAMOTIDINE 20 MG: 20 TABLET, FILM COATED ORAL at 17:31

## 2019-07-06 RX ADMIN — PREDNISONE 60 MG: 20 TABLET ORAL at 17:31

## 2019-07-06 ASSESSMENT — PAIN DESCRIPTION - DESCRIPTORS: DESCRIPTORS: BURNING

## 2019-07-06 ASSESSMENT — PAIN DESCRIPTION - LOCATION
LOCATION: EYE
LOCATION: EYE

## 2019-07-06 ASSESSMENT — PAIN DESCRIPTION - PAIN TYPE
TYPE: ACUTE PAIN
TYPE: ACUTE PAIN

## 2019-07-06 ASSESSMENT — PAIN SCALES - GENERAL
PAINLEVEL_OUTOF10: 7
PAINLEVEL_OUTOF10: 7

## 2019-07-06 ASSESSMENT — PAIN DESCRIPTION - FREQUENCY: FREQUENCY: INTERMITTENT

## 2019-07-06 NOTE — ED TRIAGE NOTES
Complains of rash since 7/3/19 when she pulled vine off her house while trimming mike bushes. Complains of itching and burning.   Continues to spread despite receiving steroid shot yesterday

## 2019-07-06 NOTE — ED PROVIDER NOTES
stridor. Cardiovascular: Negative for chest pain. Gastrointestinal: Negative for diarrhea, nausea and vomiting. Genitourinary: Negative for flank pain. Musculoskeletal: Negative for neck pain and neck stiffness. Skin: Positive for rash. Neurological: Negative for speech difficulty and light-headedness. Hematological: Negative for adenopathy. Psychiatric/Behavioral: The patient is not nervous/anxious. Positives and Pertinent negatives as per HPI. Except as noted abovein the ROS, all other systems were reviewed and negative. PAST MEDICAL HISTORY     Past Medical History:   Diagnosis Date    Anxiety     Clostridium difficile infection 2/22/15    Hypertension     Insomnia disorder     Osteoarthritis     Osteoporosis 2008    Rheumatic fever     Self-catheterizes urinary bladder     as needed    Urinary retention          SURGICAL HISTORY     Past Surgical History:   Procedure Laterality Date    BLADDER REPAIR      CARPAL TUNNEL RELEASE      CYSTOSCOPY  10/29/2012    bladder biopsy    DENTAL SURGERY      FOOT SURGERY      HYSTERECTOMY      LUMBAR SPINE SURGERY  2004    MANDIBLE RECONSTRUCTION      OTHER SURGICAL HISTORY      spinal cord stimulator    TOTAL KNEE ARTHROPLASTY Right 10/18/13         CURRENTMEDICATIONS       Discharge Medication List as of 7/6/2019  5:27 PM      CONTINUE these medications which have NOT CHANGED    Details   amoxicillin-clavulanate (AUGMENTIN) 875-125 MG per tablet Take 1 tablet by mouth 2 times daily for 10 days, Disp-20 tablet, R-0Print      busPIRone (BUSPAR) 10 MG tablet Take 1 tablet by mouth 3 times daily, Disp-30 tablet, R-1Print      hydrOXYzine (ATARAX) 50 MG tablet Take 1 tablet by mouth every 4 hours as needed for Itching, Disp-60 tablet, R-1Print      DULoxetine (CYMBALTA) 60 MG extended release capsule Take 1 capsule by mouth daily, Disp-30 capsule, R-5Normal      ketoconazole (NIZORAL) 2 % shampoo Apply topically daily as needed. , a voice recognition program.  Efforts were made to edit the dictations but occasionally words aremis-transcribed.)    Ashwini Amador MD (electronically signed)            Norah Rodriguez MD  07/08/19 [id number]

## 2019-07-08 ASSESSMENT — ENCOUNTER SYMPTOMS
NAUSEA: 0
VOICE CHANGE: 0
EYE DISCHARGE: 0
VOMITING: 0
TROUBLE SWALLOWING: 0
WHEEZING: 0
SHORTNESS OF BREATH: 0
DIARRHEA: 0
STRIDOR: 0
SORE THROAT: 0

## 2019-07-11 ENCOUNTER — TELEPHONE (OUTPATIENT)
Dept: INTERNAL MEDICINE CLINIC | Age: 69
End: 2019-07-11

## 2019-07-11 RX ORDER — DULOXETIN HYDROCHLORIDE 60 MG/1
60 CAPSULE, DELAYED RELEASE ORAL DAILY
Qty: 90 CAPSULE | Refills: 3 | Status: SHIPPED | OUTPATIENT
Start: 2019-07-11 | End: 2020-07-22 | Stop reason: SDUPTHER

## 2019-07-11 NOTE — TELEPHONE ENCOUNTER
[facility] is requesting 90 day Duloxetine HCL 60 mg. Medication sent to the pharmacy.

## 2019-07-15 ENCOUNTER — OFFICE VISIT (OUTPATIENT)
Dept: INTERNAL MEDICINE CLINIC | Age: 69
End: 2019-07-15
Payer: MEDICARE

## 2019-07-15 VITALS
HEIGHT: 61 IN | BODY MASS INDEX: 35.5 KG/M2 | HEART RATE: 104 BPM | WEIGHT: 188 LBS | DIASTOLIC BLOOD PRESSURE: 78 MMHG | SYSTOLIC BLOOD PRESSURE: 136 MMHG

## 2019-07-15 DIAGNOSIS — Z01.818 PRE-OP EXAMINATION: Primary | ICD-10-CM

## 2019-07-15 DIAGNOSIS — F33.1 MODERATE EPISODE OF RECURRENT MAJOR DEPRESSIVE DISORDER (HCC): ICD-10-CM

## 2019-07-15 DIAGNOSIS — G47.33 OSA (OBSTRUCTIVE SLEEP APNEA): ICD-10-CM

## 2019-07-15 DIAGNOSIS — I51.89 DIASTOLIC DYSFUNCTION: ICD-10-CM

## 2019-07-15 DIAGNOSIS — F41.9 ANXIETY: ICD-10-CM

## 2019-07-15 DIAGNOSIS — H25.9 AGE-RELATED CATARACT OF BOTH EYES, UNSPECIFIED AGE-RELATED CATARACT TYPE: ICD-10-CM

## 2019-07-15 DIAGNOSIS — E66.01 CLASS 2 SEVERE OBESITY DUE TO EXCESS CALORIES WITH SERIOUS COMORBIDITY AND BODY MASS INDEX (BMI) OF 38.0 TO 38.9 IN ADULT (HCC): ICD-10-CM

## 2019-07-15 DIAGNOSIS — I10 ESSENTIAL HYPERTENSION: ICD-10-CM

## 2019-07-15 DIAGNOSIS — E78.5 HYPERLIPIDEMIA LDL GOAL <100: ICD-10-CM

## 2019-07-15 DIAGNOSIS — F51.04 CHRONIC INSOMNIA: ICD-10-CM

## 2019-07-15 PROCEDURE — 3288F FALL RISK ASSESSMENT DOCD: CPT | Performed by: NURSE PRACTITIONER

## 2019-07-15 PROCEDURE — 93000 ELECTROCARDIOGRAM COMPLETE: CPT | Performed by: NURSE PRACTITIONER

## 2019-07-15 PROCEDURE — 99214 OFFICE O/P EST MOD 30 MIN: CPT | Performed by: NURSE PRACTITIONER

## 2019-07-15 ASSESSMENT — ENCOUNTER SYMPTOMS
WHEEZING: 0
NAUSEA: 0
CHEST TIGHTNESS: 0
DIARRHEA: 0
ABDOMINAL DISTENTION: 0
SHORTNESS OF BREATH: 0
ABDOMINAL PAIN: 0
RHINORRHEA: 0
VOMITING: 0
EYE REDNESS: 0
BLOOD IN STOOL: 0
BACK PAIN: 0
COUGH: 0
EYE PAIN: 0
APNEA: 0
CONSTIPATION: 0
SINUS PRESSURE: 0

## 2019-07-15 NOTE — PROGRESS NOTES
She is not diaphoretic. No erythema. Psychiatric: She has a normal mood and affect. Thought content normal.     Diagnosis  Assessment and Plan  1. Pre-op examination  Perioperative risk related to the patient's upcoming surgery is considered low. She is cleared for surgery. Pre-op exam was completed on 7/15/19     - EKG 12 Lead: NSR not acute signs of ischemia or infarct. Has labs ordered - will have complete  Normal stress test in April 2019    2. Senile cataract of right eye, unspecified age-related cataract type  Stable, continue with planned surgery     3. Essential hypertension  Stable, controlled on current regimen. 4. Chronic insomnia  Stable, controlled on current regimen. 5. Diastolic dysfunction  Stable, controlled on current regimen. Last echo EF >55    6. BEBE (obstructive sleep apnea)  Stable, controlled on current regimen. 7. Hyperlipidemia LDL goal <100  Stable, controlled on current regimen. 8. Moderate episode of recurrent major depressive disorder (HCC)  Stable, controlled on current regimen. 9. Anxiety  Stable, controlled on current regimen. 10. Class 2 severe obesity due to excess calories with serious comorbidity and body mass index (BMI) of 38.0 to 38.9 in adult (HCC)  Stable, controlled on current regimen.         Follow up in 3 months or sooner if needed with PCP    Electronically signed by WILLOW Shah CNP on 7/15/2019 at 11:42 AM

## 2019-07-17 ENCOUNTER — ANESTHESIA EVENT (OUTPATIENT)
Dept: SURGERY | Age: 69
End: 2019-07-17
Payer: MEDICARE

## 2019-07-18 ENCOUNTER — ANESTHESIA (OUTPATIENT)
Dept: SURGERY | Age: 69
End: 2019-07-18
Payer: MEDICARE

## 2019-07-18 ENCOUNTER — PREP FOR PROCEDURE (OUTPATIENT)
Dept: OPHTHALMOLOGY | Age: 69
End: 2019-07-18

## 2019-07-18 ENCOUNTER — HOSPITAL ENCOUNTER (OUTPATIENT)
Age: 69
Setting detail: OUTPATIENT SURGERY
Discharge: HOME | End: 2019-07-18
Attending: OPHTHALMOLOGY | Admitting: OPHTHALMOLOGY
Payer: MEDICARE

## 2019-07-18 VITALS
BODY MASS INDEX: 35.34 KG/M2 | HEIGHT: 60 IN | RESPIRATION RATE: 20 BRPM | DIASTOLIC BLOOD PRESSURE: 66 MMHG | OXYGEN SATURATION: 94 % | TEMPERATURE: 97 F | WEIGHT: 180 LBS | SYSTOLIC BLOOD PRESSURE: 149 MMHG | HEART RATE: 78 BPM

## 2019-07-18 VITALS
SYSTOLIC BLOOD PRESSURE: 127 MMHG | DIASTOLIC BLOOD PRESSURE: 79 MMHG | OXYGEN SATURATION: 100 % | RESPIRATION RATE: 18 BRPM

## 2019-07-18 PROCEDURE — 2500000003 HC RX 250 WO HCPCS: Performed by: OPHTHALMOLOGY

## 2019-07-18 PROCEDURE — 6360000002 HC RX W HCPCS: Performed by: NURSE ANESTHETIST, CERTIFIED REGISTERED

## 2019-07-18 PROCEDURE — 2709999900 HC NON-CHARGEABLE SUPPLY: Performed by: OPHTHALMOLOGY

## 2019-07-18 PROCEDURE — 2580000003 HC RX 258: Performed by: NURSE ANESTHETIST, CERTIFIED REGISTERED

## 2019-07-18 PROCEDURE — 7100000010 HC PHASE II RECOVERY - FIRST 15 MIN: Performed by: OPHTHALMOLOGY

## 2019-07-18 PROCEDURE — 3600000014 HC SURGERY LEVEL 4 ADDTL 15MIN: Performed by: OPHTHALMOLOGY

## 2019-07-18 PROCEDURE — 3700000001 HC ADD 15 MINUTES (ANESTHESIA): Performed by: OPHTHALMOLOGY

## 2019-07-18 PROCEDURE — 6370000000 HC RX 637 (ALT 250 FOR IP): Performed by: OPHTHALMOLOGY

## 2019-07-18 PROCEDURE — 3600000004 HC SURGERY LEVEL 4 BASE: Performed by: OPHTHALMOLOGY

## 2019-07-18 PROCEDURE — 3700000000 HC ANESTHESIA ATTENDED CARE: Performed by: OPHTHALMOLOGY

## 2019-07-18 PROCEDURE — V2632 POST CHMBR INTRAOCULAR LENS: HCPCS | Performed by: OPHTHALMOLOGY

## 2019-07-18 DEVICE — LENS INTOCU +13.5 DIOPT L13MM DIA6MM 0DEG HAPTIC ANG A: Type: IMPLANTABLE DEVICE | Site: EYE | Status: FUNCTIONAL

## 2019-07-18 RX ORDER — BALANCED SALT SOLUTION 6.4; .75; .48; .3; 3.9; 1.7 MG/ML; MG/ML; MG/ML; MG/ML; MG/ML; MG/ML
SOLUTION OPHTHALMIC
Status: COMPLETED | OUTPATIENT
Start: 2019-07-18 | End: 2019-07-18

## 2019-07-18 RX ORDER — SODIUM CHLORIDE 9 MG/ML
INJECTION, SOLUTION INTRAVENOUS CONTINUOUS PRN
Status: DISCONTINUED | OUTPATIENT
Start: 2019-07-18 | End: 2019-07-18 | Stop reason: SDUPTHER

## 2019-07-18 RX ORDER — BRIMONIDINE TARTRATE 2 MG/ML
SOLUTION/ DROPS OPHTHALMIC
Status: COMPLETED | OUTPATIENT
Start: 2019-07-18 | End: 2019-07-18

## 2019-07-18 RX ORDER — CIPROFLOXACIN HYDROCHLORIDE 3.5 MG/ML
1 SOLUTION/ DROPS TOPICAL SEE ADMIN INSTRUCTIONS
Status: DISCONTINUED | OUTPATIENT
Start: 2019-07-18 | End: 2019-07-18 | Stop reason: HOSPADM

## 2019-07-18 RX ORDER — FENTANYL CITRATE 50 UG/ML
INJECTION, SOLUTION INTRAMUSCULAR; INTRAVENOUS PRN
Status: DISCONTINUED | OUTPATIENT
Start: 2019-07-18 | End: 2019-07-18 | Stop reason: SDUPTHER

## 2019-07-18 RX ORDER — FENTANYL CITRATE 50 UG/ML
INJECTION, SOLUTION INTRAMUSCULAR; INTRAVENOUS PRN
Status: DISCONTINUED | OUTPATIENT
Start: 2019-07-18 | End: 2019-07-18

## 2019-07-18 RX ORDER — SODIUM CHLORIDE 0.9 % (FLUSH) 0.9 %
10 SYRINGE (ML) INJECTION EVERY 12 HOURS SCHEDULED
Status: DISCONTINUED | OUTPATIENT
Start: 2019-07-18 | End: 2019-07-18 | Stop reason: SDUPTHER

## 2019-07-18 RX ORDER — SODIUM CHLORIDE 9 MG/ML
INJECTION, SOLUTION INTRAVENOUS CONTINUOUS
Status: DISCONTINUED | OUTPATIENT
Start: 2019-07-18 | End: 2019-07-18 | Stop reason: HOSPADM

## 2019-07-18 RX ORDER — SODIUM CHLORIDE 0.9 % (FLUSH) 0.9 %
10 SYRINGE (ML) INJECTION PRN
Status: DISCONTINUED | OUTPATIENT
Start: 2019-07-18 | End: 2019-07-18 | Stop reason: SDUPTHER

## 2019-07-18 RX ORDER — LIDOCAINE HYDROCHLORIDE 10 MG/ML
INJECTION, SOLUTION EPIDURAL; INFILTRATION; INTRACAUDAL; PERINEURAL
Status: COMPLETED | OUTPATIENT
Start: 2019-07-18 | End: 2019-07-18

## 2019-07-18 RX ORDER — TETRACAINE HYDROCHLORIDE 5 MG/ML
SOLUTION OPHTHALMIC
Status: COMPLETED | OUTPATIENT
Start: 2019-07-18 | End: 2019-07-18

## 2019-07-18 RX ORDER — SODIUM CHLORIDE 0.9 % (FLUSH) 0.9 %
10 SYRINGE (ML) INJECTION EVERY 12 HOURS SCHEDULED
Status: DISCONTINUED | OUTPATIENT
Start: 2019-07-18 | End: 2019-07-18 | Stop reason: HOSPADM

## 2019-07-18 RX ORDER — CIPROFLOXACIN HYDROCHLORIDE 3.5 MG/ML
1 SOLUTION/ DROPS TOPICAL SEE ADMIN INSTRUCTIONS
Status: CANCELLED | OUTPATIENT
Start: 2019-07-18

## 2019-07-18 RX ORDER — SODIUM CHLORIDE 0.9 % (FLUSH) 0.9 %
10 SYRINGE (ML) INJECTION PRN
Status: DISCONTINUED | OUTPATIENT
Start: 2019-07-18 | End: 2019-07-18 | Stop reason: HOSPADM

## 2019-07-18 RX ORDER — TETRACAINE HYDROCHLORIDE 5 MG/ML
1 SOLUTION OPHTHALMIC ONCE
Status: COMPLETED | OUTPATIENT
Start: 2019-07-18 | End: 2019-07-18

## 2019-07-18 RX ORDER — SODIUM CHLORIDE 0.9 % (FLUSH) 0.9 %
10 SYRINGE (ML) INJECTION PRN
Status: CANCELLED | OUTPATIENT
Start: 2019-07-18

## 2019-07-18 RX ORDER — SODIUM CHLORIDE 0.9 % (FLUSH) 0.9 %
10 SYRINGE (ML) INJECTION EVERY 12 HOURS SCHEDULED
Status: CANCELLED | OUTPATIENT
Start: 2019-07-18

## 2019-07-18 RX ORDER — TETRACAINE HYDROCHLORIDE 5 MG/ML
1 SOLUTION OPHTHALMIC ONCE
Status: CANCELLED | OUTPATIENT
Start: 2019-07-18 | End: 2019-07-18

## 2019-07-18 RX ADMIN — TETRACAINE HYDROCHLORIDE 1 DROP: 5 SOLUTION OPHTHALMIC at 09:27

## 2019-07-18 RX ADMIN — FENTANYL CITRATE 50 MCG: 50 INJECTION INTRAMUSCULAR; INTRAVENOUS at 10:18

## 2019-07-18 RX ADMIN — FENTANYL CITRATE 50 MCG: 50 INJECTION INTRAMUSCULAR; INTRAVENOUS at 10:33

## 2019-07-18 RX ADMIN — Medication 3 ML: at 09:27

## 2019-07-18 RX ADMIN — SODIUM CHLORIDE: 9 INJECTION, SOLUTION INTRAVENOUS at 10:16

## 2019-07-18 RX ADMIN — CIPROFLOXACIN 1 DROP: 3 SOLUTION OPHTHALMIC at 09:27

## 2019-07-18 ASSESSMENT — PAIN - FUNCTIONAL ASSESSMENT: PAIN_FUNCTIONAL_ASSESSMENT: 0-10

## 2019-07-18 ASSESSMENT — PAIN SCALES - GENERAL
PAINLEVEL_OUTOF10: 0
PAINLEVEL_OUTOF10: 0

## 2019-07-18 ASSESSMENT — ENCOUNTER SYMPTOMS
SHORTNESS OF BREATH: 1
DYSPNEA ACTIVITY LEVEL: AFTER AMBULATING 1 FLIGHT OF STAIRS

## 2019-07-18 ASSESSMENT — LIFESTYLE VARIABLES: SMOKING_STATUS: 0

## 2019-07-19 NOTE — PROGRESS NOTES
[facility] time___0945_________        Surgery time_____1115_______    Take the following medications with a sip of water: Takes lisinopril in the evening     Do not eat or drink anything after 12:00 midnight prior to your surgery. This includes water chewing gum, mints and ice chips. You may brush your teeth and gargle the morning of your surgery, but do not swallow the water     Please see your family doctor/pediatrician for a history and physical and/or concerning medications. H&P 7/15/2019 [place] NP    Bring any test results/reports from your physicians office. If you are under the care of a heart doctor or specialist doctor, please be aware that you may be asked to them for clearance    You may be asked to stop blood thinners such as Coumadin, Plavix, Fragmin, Lovenox, etc., or any anti-inflammatories such as:  Aspirin, Ibuprofen, Advil, Naproxen prior to your surgery. We also ask that you stop any OTC medications such as fish oil, vitamin E, glucosamine, garlic, Multivitamins, COQ 10, etc.    We ask that you do not smoke 24 hours prior to surgery  We ask that you do not  drink any alcoholic beverages 24 hours prior to surgery     You must make arrangements for a responsible adult to take you home after your surgery. For your safety you will not be allowed to leave alone or drive yourself home. Your surgery will be cancelled if you do not have a ride home. Also for your safety, it is strongly suggested that someone stay with you the first 24 hours after your surgery. A parent or legal guardian must accompany a child scheduled for surgery and plan to stay at the hospital until the child is discharged. Please do not bring other children with you. For your comfort, please wear simple loose fitting clothing to the hospital.  Please do not bring valuables. Wear short sleeve button down shirt or loose fitting. Bring eye drops.      Do not

## 2019-07-19 NOTE — PROGRESS NOTES
C-Difficile admission screening and protocol:     * Admitted with diarrhea? YES____    NO___x__     *Prior history of C-Diff. In last 3 months? YES____   NO__x___     *Antibiotic use in the past 6-8 weeks? NO______YES__x____                 If yes which  ANTIBIOTIC AND REASON__eye drops____     *Prior hospitalization or nursing home in the last month?  YES____   NO__x__

## 2019-07-22 ENCOUNTER — NURSE ONLY (OUTPATIENT)
Dept: INTERNAL MEDICINE CLINIC | Age: 69
End: 2019-07-22
Payer: MEDICARE

## 2019-07-22 ENCOUNTER — TELEPHONE (OUTPATIENT)
Dept: INTERNAL MEDICINE CLINIC | Age: 69
End: 2019-07-22

## 2019-07-22 DIAGNOSIS — N30.00 ACUTE CYSTITIS WITHOUT HEMATURIA: Primary | ICD-10-CM

## 2019-07-22 DIAGNOSIS — N30.00 ACUTE CYSTITIS WITHOUT HEMATURIA: ICD-10-CM

## 2019-07-22 DIAGNOSIS — R30.0 DYSURIA: Primary | ICD-10-CM

## 2019-07-22 LAB
BILIRUBIN, POC: NORMAL
BLOOD URINE, POC: NORMAL
CLARITY, POC: NORMAL
COLOR, POC: NORMAL
GLUCOSE URINE, POC: NORMAL
KETONES, POC: NORMAL
LEUKOCYTE EST, POC: NORMAL
NITRITE, POC: POSITIVE
PH, POC: 6
PROTEIN, POC: NORMAL
SPECIFIC GRAVITY, POC: 1.02
UROBILINOGEN, POC: 0.2

## 2019-07-22 PROCEDURE — 81002 URINALYSIS NONAUTO W/O SCOPE: CPT | Performed by: INTERNAL MEDICINE

## 2019-07-22 RX ORDER — CIPROFLOXACIN 250 MG/1
250 TABLET, FILM COATED ORAL 2 TIMES DAILY
Qty: 6 TABLET | Refills: 0 | Status: SHIPPED | OUTPATIENT
Start: 2019-07-22 | End: 2019-07-22 | Stop reason: SDUPTHER

## 2019-07-22 RX ORDER — CIPROFLOXACIN 250 MG/1
250 TABLET, FILM COATED ORAL 2 TIMES DAILY
Qty: 6 TABLET | Refills: 0 | Status: SHIPPED | OUTPATIENT
Start: 2019-07-22 | End: 2019-07-25

## 2019-07-25 ENCOUNTER — PREP FOR PROCEDURE (OUTPATIENT)
Dept: OPHTHALMOLOGY | Age: 69
End: 2019-07-25

## 2019-07-25 ENCOUNTER — ANESTHESIA EVENT (OUTPATIENT)
Dept: SURGERY | Age: 69
End: 2019-07-25
Payer: MEDICARE

## 2019-07-25 LAB
ORGANISM: ABNORMAL
URINE CULTURE, ROUTINE: ABNORMAL
URINE CULTURE, ROUTINE: ABNORMAL

## 2019-07-25 RX ORDER — SODIUM CHLORIDE 0.9 % (FLUSH) 0.9 %
10 SYRINGE (ML) INJECTION EVERY 12 HOURS SCHEDULED
Status: CANCELLED | OUTPATIENT
Start: 2019-07-25

## 2019-07-25 RX ORDER — SODIUM CHLORIDE 0.9 % (FLUSH) 0.9 %
10 SYRINGE (ML) INJECTION PRN
Status: CANCELLED | OUTPATIENT
Start: 2019-07-25

## 2019-07-25 RX ORDER — CIPROFLOXACIN HYDROCHLORIDE 3.5 MG/ML
1 SOLUTION/ DROPS TOPICAL SEE ADMIN INSTRUCTIONS
Status: CANCELLED | OUTPATIENT
Start: 2019-07-25

## 2019-07-25 RX ORDER — TETRACAINE HYDROCHLORIDE 5 MG/ML
1 SOLUTION OPHTHALMIC ONCE
Status: CANCELLED | OUTPATIENT
Start: 2019-07-25 | End: 2019-07-25

## 2019-07-26 ENCOUNTER — HOSPITAL ENCOUNTER (OUTPATIENT)
Age: 69
Setting detail: OUTPATIENT SURGERY
Discharge: HOME | End: 2019-07-26
Attending: OPHTHALMOLOGY | Admitting: OPHTHALMOLOGY
Payer: MEDICARE

## 2019-07-26 ENCOUNTER — ANESTHESIA (OUTPATIENT)
Dept: SURGERY | Age: 69
End: 2019-07-26
Payer: MEDICARE

## 2019-07-26 VITALS
TEMPERATURE: 96.8 F | RESPIRATION RATE: 18 BRPM | OXYGEN SATURATION: 95 % | WEIGHT: 189 LBS | SYSTOLIC BLOOD PRESSURE: 128 MMHG | HEART RATE: 78 BPM | HEIGHT: 61 IN | DIASTOLIC BLOOD PRESSURE: 75 MMHG | BODY MASS INDEX: 35.68 KG/M2

## 2019-07-26 VITALS — DIASTOLIC BLOOD PRESSURE: 62 MMHG | SYSTOLIC BLOOD PRESSURE: 93 MMHG | OXYGEN SATURATION: 98 %

## 2019-07-26 PROCEDURE — 2500000003 HC RX 250 WO HCPCS: Performed by: OPHTHALMOLOGY

## 2019-07-26 PROCEDURE — 6360000002 HC RX W HCPCS: Performed by: NURSE ANESTHETIST, CERTIFIED REGISTERED

## 2019-07-26 PROCEDURE — 3600000004 HC SURGERY LEVEL 4 BASE: Performed by: OPHTHALMOLOGY

## 2019-07-26 PROCEDURE — 3600000014 HC SURGERY LEVEL 4 ADDTL 15MIN: Performed by: OPHTHALMOLOGY

## 2019-07-26 PROCEDURE — 7100000011 HC PHASE II RECOVERY - ADDTL 15 MIN: Performed by: OPHTHALMOLOGY

## 2019-07-26 PROCEDURE — V2632 POST CHMBR INTRAOCULAR LENS: HCPCS | Performed by: OPHTHALMOLOGY

## 2019-07-26 PROCEDURE — 2500000003 HC RX 250 WO HCPCS: Performed by: NURSE ANESTHETIST, CERTIFIED REGISTERED

## 2019-07-26 PROCEDURE — 2709999900 HC NON-CHARGEABLE SUPPLY: Performed by: OPHTHALMOLOGY

## 2019-07-26 PROCEDURE — 3700000000 HC ANESTHESIA ATTENDED CARE: Performed by: OPHTHALMOLOGY

## 2019-07-26 PROCEDURE — 3700000001 HC ADD 15 MINUTES (ANESTHESIA): Performed by: OPHTHALMOLOGY

## 2019-07-26 PROCEDURE — 7100000010 HC PHASE II RECOVERY - FIRST 15 MIN: Performed by: OPHTHALMOLOGY

## 2019-07-26 PROCEDURE — 6370000000 HC RX 637 (ALT 250 FOR IP): Performed by: OPHTHALMOLOGY

## 2019-07-26 PROCEDURE — 2580000003 HC RX 258: Performed by: ANESTHESIOLOGY

## 2019-07-26 DEVICE — LENS INTOCU +14.5 DIOPT L13MM DIA6MM 0DEG HAPTIC ANG A: Type: IMPLANTABLE DEVICE | Site: EYE | Status: FUNCTIONAL

## 2019-07-26 RX ORDER — SODIUM CHLORIDE 0.9 % (FLUSH) 0.9 %
10 SYRINGE (ML) INJECTION EVERY 12 HOURS SCHEDULED
Status: DISCONTINUED | OUTPATIENT
Start: 2019-07-26 | End: 2019-07-26 | Stop reason: HOSPADM

## 2019-07-26 RX ORDER — BRIMONIDINE TARTRATE 2 MG/ML
SOLUTION/ DROPS OPHTHALMIC
Status: COMPLETED | OUTPATIENT
Start: 2019-07-26 | End: 2019-07-26

## 2019-07-26 RX ORDER — LIDOCAINE HYDROCHLORIDE 10 MG/ML
INJECTION, SOLUTION EPIDURAL; INFILTRATION; INTRACAUDAL; PERINEURAL
Status: COMPLETED | OUTPATIENT
Start: 2019-07-26 | End: 2019-07-26

## 2019-07-26 RX ORDER — SODIUM CHLORIDE 0.9 % (FLUSH) 0.9 %
10 SYRINGE (ML) INJECTION PRN
Status: DISCONTINUED | OUTPATIENT
Start: 2019-07-26 | End: 2019-07-26 | Stop reason: SDUPTHER

## 2019-07-26 RX ORDER — CIPROFLOXACIN HYDROCHLORIDE 3.5 MG/ML
1 SOLUTION/ DROPS TOPICAL SEE ADMIN INSTRUCTIONS
Status: COMPLETED | OUTPATIENT
Start: 2019-07-26 | End: 2019-07-26

## 2019-07-26 RX ORDER — LIDOCAINE HYDROCHLORIDE 20 MG/ML
INJECTION, SOLUTION EPIDURAL; INFILTRATION; INTRACAUDAL; PERINEURAL PRN
Status: DISCONTINUED | OUTPATIENT
Start: 2019-07-26 | End: 2019-07-26 | Stop reason: SDUPTHER

## 2019-07-26 RX ORDER — SODIUM CHLORIDE 0.9 % (FLUSH) 0.9 %
10 SYRINGE (ML) INJECTION PRN
Status: DISCONTINUED | OUTPATIENT
Start: 2019-07-26 | End: 2019-07-26 | Stop reason: HOSPADM

## 2019-07-26 RX ORDER — BALANCED SALT SOLUTION 6.4; .75; .48; .3; 3.9; 1.7 MG/ML; MG/ML; MG/ML; MG/ML; MG/ML; MG/ML
SOLUTION OPHTHALMIC
Status: COMPLETED | OUTPATIENT
Start: 2019-07-26 | End: 2019-07-26

## 2019-07-26 RX ORDER — LIDOCAINE HYDROCHLORIDE 10 MG/ML
1 INJECTION, SOLUTION EPIDURAL; INFILTRATION; INTRACAUDAL; PERINEURAL
Status: DISCONTINUED | OUTPATIENT
Start: 2019-07-26 | End: 2019-07-26 | Stop reason: HOSPADM

## 2019-07-26 RX ORDER — TETRACAINE HYDROCHLORIDE 5 MG/ML
1 SOLUTION OPHTHALMIC ONCE
Status: COMPLETED | OUTPATIENT
Start: 2019-07-26 | End: 2019-07-26

## 2019-07-26 RX ORDER — SODIUM CHLORIDE 9 MG/ML
INJECTION, SOLUTION INTRAVENOUS CONTINUOUS
Status: DISCONTINUED | OUTPATIENT
Start: 2019-07-26 | End: 2019-07-26 | Stop reason: HOSPADM

## 2019-07-26 RX ORDER — CIPROFLOXACIN 250 MG/1
250 TABLET, FILM COATED ORAL 2 TIMES DAILY
Qty: 14 TABLET | Refills: 0 | Status: SHIPPED | OUTPATIENT
Start: 2019-07-26 | End: 2019-08-02

## 2019-07-26 RX ORDER — SODIUM CHLORIDE 0.9 % (FLUSH) 0.9 %
10 SYRINGE (ML) INJECTION EVERY 12 HOURS SCHEDULED
Status: DISCONTINUED | OUTPATIENT
Start: 2019-07-26 | End: 2019-07-26 | Stop reason: SDUPTHER

## 2019-07-26 RX ORDER — TETRACAINE HYDROCHLORIDE 5 MG/ML
SOLUTION OPHTHALMIC
Status: COMPLETED | OUTPATIENT
Start: 2019-07-26 | End: 2019-07-26

## 2019-07-26 RX ORDER — PROPOFOL 10 MG/ML
INJECTION, EMULSION INTRAVENOUS PRN
Status: DISCONTINUED | OUTPATIENT
Start: 2019-07-26 | End: 2019-07-26 | Stop reason: SDUPTHER

## 2019-07-26 RX ADMIN — LIDOCAINE HYDROCHLORIDE 60 MG: 20 INJECTION, SOLUTION EPIDURAL; INFILTRATION; INTRACAUDAL; PERINEURAL at 10:35

## 2019-07-26 RX ADMIN — PROPOFOL 20 MG: 10 INJECTION, EMULSION INTRAVENOUS at 10:40

## 2019-07-26 RX ADMIN — PROPOFOL 20 MG: 10 INJECTION, EMULSION INTRAVENOUS at 10:44

## 2019-07-26 RX ADMIN — PROPOFOL 30 MG: 10 INJECTION, EMULSION INTRAVENOUS at 10:52

## 2019-07-26 RX ADMIN — CIPROFLOXACIN 1 DROP: 3 SOLUTION OPHTHALMIC at 09:44

## 2019-07-26 RX ADMIN — TETRACAINE HYDROCHLORIDE 1 DROP: 5 SOLUTION OPHTHALMIC at 09:36

## 2019-07-26 RX ADMIN — PROPOFOL 20 MG: 10 INJECTION, EMULSION INTRAVENOUS at 10:49

## 2019-07-26 RX ADMIN — Medication 3 ML: at 09:36

## 2019-07-26 RX ADMIN — PROPOFOL 20 MG: 10 INJECTION, EMULSION INTRAVENOUS at 10:42

## 2019-07-26 RX ADMIN — PROPOFOL 20 MG: 10 INJECTION, EMULSION INTRAVENOUS at 10:38

## 2019-07-26 RX ADMIN — CIPROFLOXACIN 1 DROP: 3 SOLUTION OPHTHALMIC at 09:36

## 2019-07-26 RX ADMIN — Medication 3 ML: at 09:43

## 2019-07-26 RX ADMIN — PROPOFOL 20 MG: 10 INJECTION, EMULSION INTRAVENOUS at 10:53

## 2019-07-26 RX ADMIN — PROPOFOL 20 MG: 10 INJECTION, EMULSION INTRAVENOUS at 10:39

## 2019-07-26 RX ADMIN — Medication 3 ML: at 09:48

## 2019-07-26 RX ADMIN — POVIDONE-IODINE 0.1 ML: 5 SOLUTION OPHTHALMIC at 09:36

## 2019-07-26 RX ADMIN — PROPOFOL 20 MG: 10 INJECTION, EMULSION INTRAVENOUS at 10:36

## 2019-07-26 RX ADMIN — SODIUM CHLORIDE: 9 INJECTION, SOLUTION INTRAVENOUS at 09:48

## 2019-07-26 RX ADMIN — PROPOFOL 30 MG: 10 INJECTION, EMULSION INTRAVENOUS at 10:35

## 2019-07-26 ASSESSMENT — PAIN SCALES - GENERAL
PAINLEVEL_OUTOF10: 0
PAINLEVEL_OUTOF10: 0

## 2019-07-26 ASSESSMENT — ENCOUNTER SYMPTOMS
SHORTNESS OF BREATH: 1
DYSPNEA ACTIVITY LEVEL: AFTER AMBULATING 1 FLIGHT OF STAIRS

## 2019-07-26 ASSESSMENT — LIFESTYLE VARIABLES: SMOKING_STATUS: 0

## 2019-07-26 ASSESSMENT — PAIN - FUNCTIONAL ASSESSMENT: PAIN_FUNCTIONAL_ASSESSMENT: 0-10

## 2019-07-26 NOTE — OP NOTE
Mary Jane Dickey    OPERATIVE NOTE    Preoperative Diagnosis: Cataract left eye    Postoperative Diagnosis: Cataract left eye    Procedure: Phacoemulsification with intraocular lens inplantation, left eye  Surgeon: Akanksha Goncalves MD    Anesthesia: MAC, topical.    Complications: none    Estimated blood loss: minimal    Specimens: none    Indications for procedure: The patient is a 71y.o. year old with decreased vision, glare and halos around lights, and trouble with activities of daily living. Examination revealed a visually significant cataract in the left eye. Risks, benefits, and alternatives to surgery were discussed with the patient and the patient elected to proceed with phacoemulsification with lens implantation. Details of the procedure: Following informed consent, the patient was taken to the operating room and placed in the supine position. Monitored anesthesia care was administered. The eye was prepped and draped in the usual sterile fashion using aseptic technique for cataract surgery. A side port incision was made. 1% preservative free lidocaine was injected through the side port incision for topical anesthesia. The eye was filled with viscoelastic and a 2.4 mm keratome blade was used to make a 3-plane clear corneal incision in the temporal cornea. The cystitome was used to make a tear in the anterior capsule and a Utrata forceps was used to make a complete curvilinear capsulorrhexis. The lens was hydrodissected and freely rotated. Phacoemulsification was performed. Irrigation/aspiration was used to remove all cortical material from the capsular bag. The eye was filled with viscoelastic and a foldable posterior chamber intraocular lens was injected into the capsular bag. The lens was rotated to the appropriate axis as needed. Irrigation/aspiration was used to remove all excess viscoelastic. The eye was pressurized with BSS and the wounds were check for leaks and none were found.   The

## 2019-07-30 DIAGNOSIS — E55.9 VITAMIN D INSUFFICIENCY: ICD-10-CM

## 2019-07-30 DIAGNOSIS — K76.0 FATTY LIVER: ICD-10-CM

## 2019-07-30 DIAGNOSIS — I10 ESSENTIAL HYPERTENSION: ICD-10-CM

## 2019-07-30 DIAGNOSIS — E78.5 HYPERLIPIDEMIA LDL GOAL <100: ICD-10-CM

## 2019-07-30 DIAGNOSIS — R53.83 FATIGUE, UNSPECIFIED TYPE: ICD-10-CM

## 2019-07-30 LAB
A/G RATIO: 1.7 (ref 1.1–2.2)
ALBUMIN SERPL-MCNC: 4.3 G/DL (ref 3.4–5)
ALP BLD-CCNC: 33 U/L (ref 40–129)
ALT SERPL-CCNC: 19 U/L (ref 10–40)
ANION GAP SERPL CALCULATED.3IONS-SCNC: 12 MMOL/L (ref 3–16)
AST SERPL-CCNC: 27 U/L (ref 15–37)
BASOPHILS ABSOLUTE: 0 K/UL (ref 0–0.2)
BASOPHILS RELATIVE PERCENT: 0.6 %
BILIRUB SERPL-MCNC: 0.4 MG/DL (ref 0–1)
BUN BLDV-MCNC: 19 MG/DL (ref 7–20)
CALCIUM SERPL-MCNC: 10.1 MG/DL (ref 8.3–10.6)
CHLORIDE BLD-SCNC: 99 MMOL/L (ref 99–110)
CHOLESTEROL, TOTAL: 124 MG/DL (ref 0–199)
CO2: 30 MMOL/L (ref 21–32)
CREAT SERPL-MCNC: 0.8 MG/DL (ref 0.6–1.2)
EOSINOPHILS ABSOLUTE: 0.1 K/UL (ref 0–0.6)
EOSINOPHILS RELATIVE PERCENT: 2.7 %
FOLATE: >20 NG/ML (ref 4.78–24.2)
GFR AFRICAN AMERICAN: >60
GFR NON-AFRICAN AMERICAN: >60
GLOBULIN: 2.5 G/DL
GLUCOSE BLD-MCNC: 88 MG/DL (ref 70–99)
HCT VFR BLD CALC: 40.3 % (ref 36–48)
HDLC SERPL-MCNC: 62 MG/DL (ref 40–60)
HEMOGLOBIN: 13.6 G/DL (ref 12–16)
LDL CHOLESTEROL CALCULATED: 39 MG/DL
LYMPHOCYTES ABSOLUTE: 2.1 K/UL (ref 1–5.1)
LYMPHOCYTES RELATIVE PERCENT: 38.6 %
MCH RBC QN AUTO: 31.6 PG (ref 26–34)
MCHC RBC AUTO-ENTMCNC: 33.7 G/DL (ref 31–36)
MCV RBC AUTO: 93.6 FL (ref 80–100)
MONOCYTES ABSOLUTE: 0.4 K/UL (ref 0–1.3)
MONOCYTES RELATIVE PERCENT: 6.9 %
NEUTROPHILS ABSOLUTE: 2.7 K/UL (ref 1.7–7.7)
NEUTROPHILS RELATIVE PERCENT: 51.2 %
PDW BLD-RTO: 13.2 % (ref 12.4–15.4)
PLATELET # BLD: 174 K/UL (ref 135–450)
PMV BLD AUTO: 8.2 FL (ref 5–10.5)
POTASSIUM SERPL-SCNC: 3.9 MMOL/L (ref 3.5–5.1)
RBC # BLD: 4.3 M/UL (ref 4–5.2)
SODIUM BLD-SCNC: 141 MMOL/L (ref 136–145)
TOTAL PROTEIN: 6.8 G/DL (ref 6.4–8.2)
TRIGL SERPL-MCNC: 114 MG/DL (ref 0–150)
TSH REFLEX: 1.32 UIU/ML (ref 0.27–4.2)
VITAMIN B-12: 354 PG/ML (ref 211–911)
VITAMIN D 25-HYDROXY: 56.6 NG/ML
VLDLC SERPL CALC-MCNC: 23 MG/DL
WBC # BLD: 5.3 K/UL (ref 4–11)

## 2019-08-02 RX ORDER — HYDROCHLOROTHIAZIDE 12.5 MG/1
CAPSULE, GELATIN COATED ORAL
Qty: 90 CAPSULE | Refills: 1 | Status: SHIPPED | OUTPATIENT
Start: 2019-08-02 | End: 2020-01-29

## 2019-08-07 ENCOUNTER — OFFICE VISIT (OUTPATIENT)
Dept: PSYCHOLOGY | Age: 69
End: 2019-08-07

## 2019-08-07 DIAGNOSIS — F43.22 ADJUSTMENT DISORDER WITH ANXIOUS MOOD: Primary | ICD-10-CM

## 2019-08-07 DIAGNOSIS — M79.7 FIBROMYALGIA: ICD-10-CM

## 2019-08-07 PROCEDURE — 90832 PSYTX W PT 30 MINUTES: CPT | Performed by: PSYCHOLOGIST

## 2019-08-07 RX ORDER — GABAPENTIN 300 MG/1
CAPSULE ORAL
Qty: 360 CAPSULE | Refills: 0 | Status: SHIPPED | OUTPATIENT
Start: 2019-08-07 | End: 2019-11-23 | Stop reason: SDUPTHER

## 2019-08-09 ENCOUNTER — TELEPHONE (OUTPATIENT)
Dept: INTERNAL MEDICINE CLINIC | Age: 69
End: 2019-08-09

## 2019-08-13 ENCOUNTER — TELEPHONE (OUTPATIENT)
Dept: INTERNAL MEDICINE CLINIC | Age: 69
End: 2019-08-13

## 2019-08-13 DIAGNOSIS — N39.0 RECURRENT UTI: Primary | Chronic | ICD-10-CM

## 2019-08-13 RX ORDER — NITROFURANTOIN 25; 75 MG/1; MG/1
100 CAPSULE ORAL 2 TIMES DAILY
Qty: 20 CAPSULE | Refills: 0 | Status: SHIPPED | OUTPATIENT
Start: 2019-08-13 | End: 2019-08-23 | Stop reason: ALTCHOICE

## 2019-08-14 LAB
EPITHELIAL CELLS, UA: 4 /HPF (ref 0–5)
HYALINE CASTS: 1 /LPF (ref 0–8)
RBC UA: 8 /HPF (ref 0–4)
WBC UA: 7 /HPF (ref 0–5)

## 2019-08-16 ENCOUNTER — TELEPHONE (OUTPATIENT)
Dept: INTERNAL MEDICINE CLINIC | Age: 69
End: 2019-08-16

## 2019-08-16 LAB — URINE CULTURE, ROUTINE: NORMAL

## 2019-08-23 ENCOUNTER — OFFICE VISIT (OUTPATIENT)
Dept: INTERNAL MEDICINE CLINIC | Age: 69
End: 2019-08-23
Payer: MEDICARE

## 2019-08-23 VITALS
WEIGHT: 184 LBS | BODY MASS INDEX: 34.74 KG/M2 | OXYGEN SATURATION: 93 % | HEART RATE: 78 BPM | DIASTOLIC BLOOD PRESSURE: 62 MMHG | SYSTOLIC BLOOD PRESSURE: 118 MMHG | HEIGHT: 61 IN

## 2019-08-23 DIAGNOSIS — F41.9 ANXIETY: Primary | ICD-10-CM

## 2019-08-23 DIAGNOSIS — I10 ESSENTIAL HYPERTENSION: ICD-10-CM

## 2019-08-23 DIAGNOSIS — L21.0 SEBORRHEA CAPITIS: ICD-10-CM

## 2019-08-23 DIAGNOSIS — R41.3 MEMORY IMPAIRMENT: ICD-10-CM

## 2019-08-23 DIAGNOSIS — G47.33 OSA (OBSTRUCTIVE SLEEP APNEA): ICD-10-CM

## 2019-08-23 DIAGNOSIS — M81.0 OSTEOPOROSIS, POST-MENOPAUSAL: Chronic | ICD-10-CM

## 2019-08-23 DIAGNOSIS — N39.0 RECURRENT UTI: Chronic | ICD-10-CM

## 2019-08-23 DIAGNOSIS — Z12.39 BREAST CANCER SCREENING: ICD-10-CM

## 2019-08-23 DIAGNOSIS — Z23 NEED FOR PNEUMOCOCCAL VACCINATION: ICD-10-CM

## 2019-08-23 DIAGNOSIS — F51.04 CHRONIC INSOMNIA: ICD-10-CM

## 2019-08-23 DIAGNOSIS — E78.5 HYPERLIPIDEMIA LDL GOAL <100: ICD-10-CM

## 2019-08-23 PROCEDURE — 99215 OFFICE O/P EST HI 40 MIN: CPT | Performed by: INTERNAL MEDICINE

## 2019-08-23 PROCEDURE — 90670 PCV13 VACCINE IM: CPT | Performed by: INTERNAL MEDICINE

## 2019-08-23 PROCEDURE — G0009 ADMIN PNEUMOCOCCAL VACCINE: HCPCS | Performed by: INTERNAL MEDICINE

## 2019-08-23 RX ORDER — KETOCONAZOLE 20 MG/ML
SHAMPOO TOPICAL
Qty: 120 ML | Refills: 2 | Status: SHIPPED | OUTPATIENT
Start: 2019-08-23 | End: 2020-12-11 | Stop reason: SDUPTHER

## 2019-08-23 ASSESSMENT — ENCOUNTER SYMPTOMS
ROS SKIN COMMENTS: SCALP ITCHING
CHEST TIGHTNESS: 0
DIARRHEA: 0
SHORTNESS OF BREATH: 0
CONSTIPATION: 0

## 2019-08-23 NOTE — PROGRESS NOTES
Instructions Obdulia Gamez  mL/hr at 04/06/16 0900 5 mg at 04/06/16 0900       Return in about 6 months (around 2/23/2020) for sooner if needed; labs prior.

## 2019-08-26 ENCOUNTER — TELEPHONE (OUTPATIENT)
Dept: INTERNAL MEDICINE CLINIC | Age: 69
End: 2019-08-26

## 2019-08-30 ENCOUNTER — TELEPHONE (OUTPATIENT)
Dept: INTERNAL MEDICINE CLINIC | Age: 69
End: 2019-08-30

## 2019-08-30 NOTE — TELEPHONE ENCOUNTER
Called Jim about request. Pharmacist said patient brought in script. They do not make the 1% anymore. There is an OTC rinse that is very similar. It is called Scalpisin 1%. Called patient to let her know. She will try this option.

## 2019-08-31 PROBLEM — L21.0 SEBORRHEA CAPITIS: Status: ACTIVE | Noted: 2019-08-31

## 2019-08-31 PROBLEM — R41.3 MEMORY IMPAIRMENT: Status: ACTIVE | Noted: 2019-08-31

## 2019-09-02 DIAGNOSIS — I10 ESSENTIAL HYPERTENSION: ICD-10-CM

## 2019-09-03 RX ORDER — POTASSIUM CHLORIDE 20 MEQ/1
TABLET, EXTENDED RELEASE ORAL
Qty: 180 TABLET | Refills: 4 | Status: SHIPPED | OUTPATIENT
Start: 2019-09-03 | End: 2021-03-03 | Stop reason: SDUPTHER

## 2019-09-05 ENCOUNTER — OFFICE VISIT (OUTPATIENT)
Dept: PSYCHOLOGY | Age: 69
End: 2019-09-05
Payer: MEDICARE

## 2019-09-05 DIAGNOSIS — F43.22 ADJUSTMENT DISORDER WITH ANXIOUS MOOD: Primary | ICD-10-CM

## 2019-09-05 PROCEDURE — 90832 PSYTX W PT 30 MINUTES: CPT | Performed by: PSYCHOLOGIST

## 2019-09-05 ASSESSMENT — PATIENT HEALTH QUESTIONNAIRE - PHQ9
8. MOVING OR SPEAKING SO SLOWLY THAT OTHER PEOPLE COULD HAVE NOTICED. OR THE OPPOSITE, BEING SO FIGETY OR RESTLESS THAT YOU HAVE BEEN MOVING AROUND A LOT MORE THAN USUAL: 0
6. FEELING BAD ABOUT YOURSELF - OR THAT YOU ARE A FAILURE OR HAVE LET YOURSELF OR YOUR FAMILY DOWN: 1
SUM OF ALL RESPONSES TO PHQ QUESTIONS 1-9: 10
1. LITTLE INTEREST OR PLEASURE IN DOING THINGS: 0
5. POOR APPETITE OR OVEREATING: 1
SUM OF ALL RESPONSES TO PHQ QUESTIONS 1-9: 10
3. TROUBLE FALLING OR STAYING ASLEEP: 2
10. IF YOU CHECKED OFF ANY PROBLEMS, HOW DIFFICULT HAVE THESE PROBLEMS MADE IT FOR YOU TO DO YOUR WORK, TAKE CARE OF THINGS AT HOME, OR GET ALONG WITH OTHER PEOPLE: 1
9. THOUGHTS THAT YOU WOULD BE BETTER OFF DEAD, OR OF HURTING YOURSELF: 0
7. TROUBLE CONCENTRATING ON THINGS, SUCH AS READING THE NEWSPAPER OR WATCHING TELEVISION: 1
4. FEELING TIRED OR HAVING LITTLE ENERGY: 3
2. FEELING DOWN, DEPRESSED OR HOPELESS: 2
SUM OF ALL RESPONSES TO PHQ9 QUESTIONS 1 & 2: 2

## 2019-09-05 NOTE — PATIENT INSTRUCTIONS
1. Write down what you want to say  2. Remember excuses versus explanations  3.  Continues to set good boundaries

## 2019-09-09 RX ORDER — ROSUVASTATIN CALCIUM 10 MG/1
TABLET, COATED ORAL
Qty: 90 TABLET | Refills: 4 | Status: SHIPPED | OUTPATIENT
Start: 2019-09-09 | End: 2020-08-14 | Stop reason: SDUPTHER

## 2019-09-11 DIAGNOSIS — I10 ESSENTIAL HYPERTENSION: Chronic | ICD-10-CM

## 2019-09-11 RX ORDER — LISINOPRIL 40 MG/1
TABLET ORAL
Qty: 90 TABLET | Refills: 4 | Status: SHIPPED | OUTPATIENT
Start: 2019-09-11 | End: 2021-03-03 | Stop reason: SDUPTHER

## 2019-10-17 ENCOUNTER — OFFICE VISIT (OUTPATIENT)
Dept: PSYCHOLOGY | Age: 69
End: 2019-10-17
Payer: MEDICARE

## 2019-10-17 DIAGNOSIS — F41.1 GENERALIZED ANXIETY DISORDER: Primary | ICD-10-CM

## 2019-10-17 DIAGNOSIS — F33.1 MODERATE EPISODE OF RECURRENT MAJOR DEPRESSIVE DISORDER (HCC): ICD-10-CM

## 2019-10-17 PROCEDURE — 90832 PSYTX W PT 30 MINUTES: CPT | Performed by: PSYCHOLOGIST

## 2019-10-29 ENCOUNTER — CARE COORDINATION (OUTPATIENT)
Dept: CARE COORDINATION | Age: 69
End: 2019-10-29

## 2019-11-16 LAB — URINE CULTURE, ROUTINE: NORMAL

## 2019-11-20 ENCOUNTER — TELEPHONE (OUTPATIENT)
Dept: INTERNAL MEDICINE CLINIC | Age: 69
End: 2019-11-20

## 2019-11-20 NOTE — TELEPHONE ENCOUNTER
Pt calling to cx appt for today---she is feeling better and doesn't feel she needs to come in now---Thanks.

## 2019-11-23 DIAGNOSIS — M79.7 FIBROMYALGIA: ICD-10-CM

## 2019-11-25 ENCOUNTER — CARE COORDINATION (OUTPATIENT)
Dept: CARE COORDINATION | Age: 69
End: 2019-11-25

## 2019-11-25 RX ORDER — TRAZODONE HYDROCHLORIDE 100 MG/1
TABLET ORAL
Qty: 270 TABLET | Refills: 4 | Status: SHIPPED | OUTPATIENT
Start: 2019-11-25 | End: 2020-12-02 | Stop reason: SDUPTHER

## 2019-11-25 RX ORDER — GABAPENTIN 300 MG/1
CAPSULE ORAL
Qty: 360 CAPSULE | Refills: 6 | Status: SHIPPED | OUTPATIENT
Start: 2019-11-25 | End: 2020-09-29 | Stop reason: SDUPTHER

## 2019-12-29 ENCOUNTER — APPOINTMENT (OUTPATIENT)
Dept: GENERAL RADIOLOGY | Age: 69
End: 2019-12-29
Payer: MEDICARE

## 2019-12-29 ENCOUNTER — HOSPITAL ENCOUNTER (EMERGENCY)
Age: 69
Discharge: HOME | End: 2019-12-29
Payer: MEDICARE

## 2019-12-29 VITALS
RESPIRATION RATE: 17 BRPM | TEMPERATURE: 97.1 F | DIASTOLIC BLOOD PRESSURE: 81 MMHG | SYSTOLIC BLOOD PRESSURE: 122 MMHG | HEART RATE: 97 BPM | OXYGEN SATURATION: 96 %

## 2019-12-29 PROCEDURE — 99283 EMERGENCY DEPT VISIT LOW MDM: CPT

## 2019-12-29 PROCEDURE — 73030 X-RAY EXAM OF SHOULDER: CPT

## 2019-12-29 PROCEDURE — 73080 X-RAY EXAM OF ELBOW: CPT

## 2019-12-29 RX ORDER — ACETAMINOPHEN 500 MG
1000 TABLET ORAL 3 TIMES DAILY
Qty: 60 TABLET | Refills: 0 | Status: SHIPPED | OUTPATIENT
Start: 2019-12-29 | End: 2020-02-06 | Stop reason: ALTCHOICE

## 2019-12-29 RX ORDER — ACETAMINOPHEN 500 MG
1000 TABLET ORAL ONCE
Status: DISCONTINUED | OUTPATIENT
Start: 2019-12-29 | End: 2019-12-29 | Stop reason: HOSPADM

## 2019-12-29 ASSESSMENT — PAIN SCALES - GENERAL: PAINLEVEL_OUTOF10: 7

## 2019-12-29 ASSESSMENT — PAIN DESCRIPTION - PAIN TYPE: TYPE: ACUTE PAIN

## 2019-12-29 NOTE — ED PROVIDER NOTES
[facility]        Pt Name: Erma Farooq  MRN: [id number]  [place] 1950  Date of evaluation: 12/29/2019  Provider: WILLOW Clay CNP  PCP: Toño Santacruz,     This patient was not seen and evaluated by the attending physician. CHIEF COMPLAINT       Chief Complaint   Patient presents with    Shoulder Pain     started last week, right elbow, and right shoulder pain        HISTORY OF PRESENT ILLNESS   (Location/Symptom, Timing/Onset, Context/Setting, Quality, Duration, Modifying Factors, Severity)  Note limiting factors. Erma Farooq is a 71 y.o. female medical history of anxiety, C. difficile, HTN, insomnia, OA, rheumatic fever, spinal cord stimulator, urinary retention, hysterectomy who presents the ED with complaints of  Right elbow pain and shoulder pain after trying to pull herself up into a large truck multiple times yesterday. Said that she started having intermittent shoulder and elbow pain yesterday, however was much worse yesterday. Says she has a history of arthritis. She is taking Opana for back pain, however this is not helped. She is asking for an Ace wrap and Tylenol for relief. Nursing Notes were all reviewed and agreed with or any disagreements were addressed in the HPI. REVIEW OF SYSTEMS    (2-9 systems for level 4, 10 or more for level 5)     Review of Systems    Positives and Pertinent negatives as per HPI. Except as noted above in the ROS, all other systems were reviewed and negative.        PAST MEDICAL HISTORY     Past Medical History:   Diagnosis Date    Anxiety     Clostridium difficile infection 2/22/15    Hypertension     Insomnia disorder     Osteoarthritis     Osteoporosis 2008    Rheumatic fever     S/P insertion of spinal cord stimulator     Self-catheterizes urinary bladder     as needed 7/8 no longer catherizing     Urinary retention SURGICAL HISTORY     Past Surgical History:   Procedure Laterality Date    BLADDER REPAIR      CARPAL TUNNEL RELEASE      COLONOSCOPY      CYSTOSCOPY  10/29/2012    bladder biopsy    DENTAL SURGERY      FOOT SURGERY      HYSTERECTOMY      INTRACAPSULAR CATARACT EXTRACTION Right 7/18/2019    PHACOEMULSIFICATION WITH INTRAOCULAR LENS IMPLANT performed by Alcides Ozuna MD at [facility] EXTRACTION Left 7/26/2019    PHACOEMULSIFICATION WITH INTRAOCULAR LENS IMPLANT performed by Alcides Ozuna MD at [place] [address]    MANDIBLE RECONSTRUCTION      OTHER SURGICAL HISTORY      spinal cord stimulator    TOTAL KNEE ARTHROPLASTY Right 10/18/13         CURRENTMEDICATIONS       Previous Medications    ASCORBIC ACID (VITAMIN C) 500 MG TABLET    Take 500 mg by mouth daily. ASPIRIN 81 MG CHEWABLE TABLET    Take 81 mg by mouth daily. BLOOD GLUCOSE MONITORING SUPPL DAYRON    Check blood sugars fasting in morning and as needed for feeling bad. BUSPIRONE (BUSPAR) 10 MG TABLET    TAKE ONE TABLET BY MOUTH THREE TIMES A DAY    CALCIUM CARBONATE-VIT D-MIN (CALCIUM 1200 PO)    Take 1 capsule by mouth 2 times daily. CETIRIZINE HCL (ZYRTEC PO)    Take 1 tablet by mouth 2 times daily     CHOLECALCIFEROL (VITAMIN D3) 5000 UNITS CAPS    Take 5,000 Units by mouth daily     DIPHENOXYLATE-ATROPINE (LOMOTIL) 2.5-0.025 MG PER TABLET    Take 1 tablet by mouth as needed for Diarrhea. .    DULOXETINE (CYMBALTA) 60 MG EXTENDED RELEASE CAPSULE    Take 1 capsule by mouth daily    FLUTICASONE (FLONASE) 50 MCG/ACT NASAL SPRAY    2 sprays by Each Nare route daily    GABAPENTIN (NEURONTIN) 300 MG CAPSULE    TAKE 2 CAPSULES THREE TIMES A DAY    GLUCOSE BLOOD VI TEST STRIPS (ONE TOUCH ULTRA TEST) STRIP    1 each by In Vitro route daily Fasting qam and As needed.     HYDROCHLOROTHIAZIDE (MICROZIDE) 12.5 MG CAPSULE    TAKE ONE CAPSULE BY MOUTH EVERY MORNING HYDROCORTISONE, TOPICAL, 1 % SOLN    Apply 1 each topically daily as needed (itching of scalp, flaking of scalp)    KETOCONAZOLE (NIZORAL) 2 % SHAMPOO    Apply topically daily as needed. LANCETS MISC    Check sugars fasting qam and prn    LISINOPRIL (PRINIVIL;ZESTRIL) 40 MG TABLET    TAKE 1 TABLET DAILY    OXYMORPHONE (OPANA) 5 MG TABLET    Take 10 mg by mouth  2 x day. POTASSIUM CHLORIDE (KLOR-CON M) 20 MEQ EXTENDED RELEASE TABLET    TAKE 1 TABLET TWICE A DAY    ROSUVASTATIN (CRESTOR) 10 MG TABLET    TAKE 1 TABLET NIGHTLY    TRAZODONE (DESYREL) 100 MG TABLET    TAKE 3 TABLETS NIGHTLY    ZOLEDRONIC ACID (RECLAST) 5 MG/100ML SOLN    Infuse 5 mg intravenously once IV, yearly         ALLERGIES     Ativan [lorazepam]; Sulfa antibiotics; and Keflex [cephalexin]    FAMILYHISTORY       Family History   Problem Relation Age of Onset    COPD Mother     High Blood Pressure Mother     Rheum Arthritis Mother     Thyroid Disease Mother     Alcohol Abuse Father     Clotting Disorder Sister     Thyroid Disease Sister     Hypertension Brother     Diabetes Sister     Heart Disease Sister     Hypertension Sister     Thyroid Disease Sister     Cancer Brother     Heart Disease Brother     Hypertension Brother     Substance Abuse Brother     No Known Problems Son     No Known Problems Daughter           SOCIAL HISTORY       Social History     Tobacco Use    Smoking status: Never Smoker    Smokeless tobacco: Never Used   Substance Use Topics    Alcohol use: Not Currently    Drug use: No       SCREENINGS             PHYSICAL EXAM    (up to 7 for level 4, 8 or more for level 5)     ED Triage Vitals [12/29/19 1208]   BP Temp Temp src Pulse Resp SpO2 Height Weight   122/81 97.1 °F (36.2 °C) -- 97 17 96 % -- --       Physical Exam  Vitals signs and nursing note reviewed. Constitutional:       Appearance: She is well-developed. HENT:      Head: Normocephalic and atraumatic.       Nose: Nose normal.   Eyes:

## 2020-01-06 ENCOUNTER — CARE COORDINATION (OUTPATIENT)
Dept: CARE COORDINATION | Age: 70
End: 2020-01-06

## 2020-01-06 ENCOUNTER — TELEPHONE (OUTPATIENT)
Dept: INTERNAL MEDICINE CLINIC | Age: 70
End: 2020-01-06

## 2020-01-06 NOTE — CARE COORDINATION
1/15/2020 10:45 AM F [facility] RM 3 [facility] Still Console   2/4/2020  9:40 AM Deedee Dewey, DO [facility]           New Medications?:   Yes   Tylenol Arthritis twice daily. Medication Reconciliation by phone - No  Understands Medications? Yes  Taking Medications? Yes  Can you swallow your pills? Yes    Any further needs in the home i.e. Equipment?   No    Link to services in community?:  No

## 2020-01-08 ENCOUNTER — TELEPHONE (OUTPATIENT)
Dept: INTERNAL MEDICINE CLINIC | Age: 70
End: 2020-01-08

## 2020-01-08 ENCOUNTER — CARE COORDINATION (OUTPATIENT)
Dept: CARE COORDINATION | Age: 70
End: 2020-01-08

## 2020-01-08 DIAGNOSIS — R30.0 DYSURIA: Primary | ICD-10-CM

## 2020-01-08 NOTE — CARE COORDINATION
Ambulatory Care Coordination Note  1/8/2020  CM Risk Score: 5  Charlson 10 Year Mortality Risk Score: 23%     ACC: Luda Echeverria RN    Summary Note: Call to follow up on shoulder pain. States she was ordered to take aleve to reduce inflammation, but has not been out to pick it up yet. States that she is now having chills and thinks that she has a UTI. Denies pain or burning or strong odor with urination. Denies fever. States that she will have blood work and u/a completed tomorrow. PLAN  Follow up shoulder pain/aleve. Follow up chills, possible UTI. Follow up fall prevention. Follow up maintains relationship with mental health provider; review symptoms to report. Care Coordination Interventions    Program Enrollment:  Complex Care  Referral from Primary Care Provider:  No  Suggested Interventions and Community Resources  Fall Risk Prevention:  Completed (Comment: Has grab bars. Uses cane prn. No shower bench and doesnt want one at this time. )  Medication Assistance Program:  Declined  Medi Set or Pill Pack:  Declined  Pharmacist:  Declined         Goals Addressed    None         Prior to Admission medications    Medication Sig Start Date End Date Taking?  Authorizing Provider   busPIRone (BUSPAR) 10 MG tablet TAKE ONE TABLET BY MOUTH THREE TIMES A DAY 1/6/20   Jennifer Michael, DO   acetaminophen (TYLENOL) 500 MG tablet Take 2 tablets by mouth 3 times daily for 30 doses 12/29/19 1/8/20  WILLOW Cain CNP   gabapentin (NEURONTIN) 300 MG capsule TAKE 2 CAPSULES THREE TIMES A DAY 11/25/19 1/24/20  Jennifer Michael, DO   traZODone (DESYREL) 100 MG tablet TAKE 3 TABLETS NIGHTLY 11/25/19   Jennifer Michael, DO   lisinopril (PRINIVIL;ZESTRIL) 40 MG tablet TAKE 1 TABLET DAILY 9/11/19   Jennifer Michael, DO   rosuvastatin (CRESTOR) 10 MG tablet TAKE 1 TABLET NIGHTLY 9/9/19   Jennifer Michael, DO   potassium chloride (KLOR-CON M) 20 MEQ extended release tablet TAKE 1 TABLET TWICE A DAY

## 2020-01-09 DIAGNOSIS — I10 ESSENTIAL HYPERTENSION: ICD-10-CM

## 2020-01-09 DIAGNOSIS — E78.5 HYPERLIPIDEMIA LDL GOAL <100: ICD-10-CM

## 2020-01-09 LAB
A/G RATIO: 1.4 (ref 1.1–2.2)
ALBUMIN SERPL-MCNC: 4.2 G/DL (ref 3.4–5)
ALP BLD-CCNC: 31 U/L (ref 40–129)
ALT SERPL-CCNC: 18 U/L (ref 10–40)
ANION GAP SERPL CALCULATED.3IONS-SCNC: 16 MMOL/L (ref 3–16)
AST SERPL-CCNC: 33 U/L (ref 15–37)
BILIRUB SERPL-MCNC: 0.4 MG/DL (ref 0–1)
BUN BLDV-MCNC: 13 MG/DL (ref 7–20)
CALCIUM SERPL-MCNC: 9.5 MG/DL (ref 8.3–10.6)
CHLORIDE BLD-SCNC: 99 MMOL/L (ref 99–110)
CHOLESTEROL, TOTAL: 129 MG/DL (ref 0–199)
CO2: 23 MMOL/L (ref 21–32)
CREAT SERPL-MCNC: 0.8 MG/DL (ref 0.6–1.2)
GFR AFRICAN AMERICAN: >60
GFR NON-AFRICAN AMERICAN: >60
GLOBULIN: 2.9 G/DL
GLUCOSE BLD-MCNC: 96 MG/DL (ref 70–99)
HDLC SERPL-MCNC: 54 MG/DL (ref 40–60)
LDL CHOLESTEROL CALCULATED: 44 MG/DL
POTASSIUM SERPL-SCNC: 4 MMOL/L (ref 3.5–5.1)
SODIUM BLD-SCNC: 138 MMOL/L (ref 136–145)
TOTAL PROTEIN: 7.1 G/DL (ref 6.4–8.2)
TRIGL SERPL-MCNC: 155 MG/DL (ref 0–150)
VLDLC SERPL CALC-MCNC: 31 MG/DL

## 2020-01-10 LAB
BASOPHILS ABSOLUTE: 0 K/UL (ref 0–0.2)
BASOPHILS RELATIVE PERCENT: 0.4 %
BILIRUBIN URINE: NEGATIVE
BLOOD, URINE: NEGATIVE
CLARITY: ABNORMAL
COLOR: YELLOW
COMMENT UA: ABNORMAL
CRENATED RBC'S: ABNORMAL
EOSINOPHILS ABSOLUTE: 0.2 K/UL (ref 0–0.6)
EOSINOPHILS RELATIVE PERCENT: 4.3 %
EPITHELIAL CELLS, UA: 17 /HPF (ref 0–5)
GLUCOSE URINE: NEGATIVE MG/DL
HCT VFR BLD CALC: 41.2 % (ref 36–48)
HEMOGLOBIN: 13.9 G/DL (ref 12–16)
KETONES, URINE: NEGATIVE MG/DL
LEUKOCYTE ESTERASE, URINE: ABNORMAL
LYMPHOCYTES ABSOLUTE: 2.2 K/UL (ref 1–5.1)
LYMPHOCYTES RELATIVE PERCENT: 38.6 %
MCH RBC QN AUTO: 32.5 PG (ref 26–34)
MCHC RBC AUTO-ENTMCNC: 33.8 G/DL (ref 31–36)
MCV RBC AUTO: 96.2 FL (ref 80–100)
MICROSCOPIC EXAMINATION: YES
MONOCYTES ABSOLUTE: 0.4 K/UL (ref 0–1.3)
MONOCYTES RELATIVE PERCENT: 6.6 %
NEUTROPHILS ABSOLUTE: 2.9 K/UL (ref 1.7–7.7)
NEUTROPHILS RELATIVE PERCENT: 50.1 %
NITRITE, URINE: NEGATIVE
PDW BLD-RTO: 13.1 % (ref 12.4–15.4)
PH UA: 7 (ref 5–8)
PLATELET # BLD: 235 K/UL (ref 135–450)
PLATELET SLIDE REVIEW: ADEQUATE
PMV BLD AUTO: 9 FL (ref 5–10.5)
POIKILOCYTES: ABNORMAL
PROTEIN UA: NEGATIVE MG/DL
RBC # BLD: 4.28 M/UL (ref 4–5.2)
RBC UA: ABNORMAL /HPF (ref 0–2)
SLIDE REVIEW: ABNORMAL
SPECIFIC GRAVITY UA: 1.01 (ref 1–1.03)
URINE TYPE: ABNORMAL
UROBILINOGEN, URINE: 0.2 E.U./DL
WBC # BLD: 5.8 K/UL (ref 4–11)
WBC UA: 45 /HPF (ref 0–5)

## 2020-01-10 RX ORDER — NITROFURANTOIN 25; 75 MG/1; MG/1
100 CAPSULE ORAL 2 TIMES DAILY
Qty: 20 CAPSULE | Refills: 0 | Status: SHIPPED | OUTPATIENT
Start: 2020-01-10 | End: 2020-01-20

## 2020-01-12 LAB
ORGANISM: ABNORMAL
URINE CULTURE, ROUTINE: ABNORMAL

## 2020-01-15 ENCOUNTER — HOSPITAL ENCOUNTER (OUTPATIENT)
Dept: GENERAL RADIOLOGY | Age: 70
Discharge: HOME | End: 2020-01-15
Payer: MEDICARE

## 2020-01-15 ENCOUNTER — HOSPITAL ENCOUNTER (OUTPATIENT)
Dept: WOMENS IMAGING | Age: 70
Discharge: HOME | End: 2020-01-15
Payer: MEDICARE

## 2020-01-15 PROCEDURE — 77063 BREAST TOMOSYNTHESIS BI: CPT

## 2020-01-15 PROCEDURE — 77080 DXA BONE DENSITY AXIAL: CPT

## 2020-01-20 ENCOUNTER — CARE COORDINATION (OUTPATIENT)
Dept: CARE COORDINATION | Age: 70
End: 2020-01-20

## 2020-01-20 SDOH — ECONOMIC STABILITY: TRANSPORTATION INSECURITY
IN THE PAST 12 MONTHS, HAS THE LACK OF TRANSPORTATION KEPT YOU FROM MEDICAL APPOINTMENTS OR FROM GETTING MEDICATIONS?: NO

## 2020-01-20 SDOH — ECONOMIC STABILITY: TRANSPORTATION INSECURITY
IN THE PAST 12 MONTHS, HAS LACK OF TRANSPORTATION KEPT YOU FROM MEETINGS, WORK, OR FROM GETTING THINGS NEEDED FOR DAILY LIVING?: NO

## 2020-01-20 NOTE — CARE COORDINATION
Ambulatory Care Coordination Note  1/20/2020  CM Risk Score: 5  Charlson 10 Year Mortality Risk Score: 23%     ACC: [place] Angela, RN    Summary Note: Reviewed and updated goals. Medication discrepancy noted: taking buspar 10mg daily instead of the prescribed TID. Educated on not changing her medications, doses or frequencies without contacting provider, as she could suffer effects of making these changes. Verbalized understanding. States shoulder has healed from injury. States that she continues with macrobid for bladder infection and that she thinks she is feeling better now. Cancelled last  appointment with Dr. Darryl Opitz stating that she feels that therapy is not doing her any good. Educated patient that having someone to talk to about her problems can be beneficial. Reviewed last therapy session and how Dr. Sang Walter suggestions helped her get through the holiday season. Stated that she will consider rescheduling appointment. Reminded of 02/04 appointment with pcp. PLAN  Follow up proper medication adherence. Follow up right place/time/care for ED use. Follow up [facility]; did she make appointment with dr. Darryl Opitz? Care Coordination Interventions    Program Enrollment:  Complex Care  Referral from Primary Care Provider:  No  Suggested Interventions and Community Resources  Fall Risk Prevention:  Completed (Comment: Has grab bars. Uses cane prn. No shower bench and doesnt want one at this time. )  Medication Assistance Program:  [address] [place] [address]  or Pill Pack:  Declined  Pharmacist:  Declined  Other Therapy Services:  Completed (Comment: Reviewed mental health therapy. )         Goals Addressed                 This Visit's Progress       Care Coordination     Behavioral Health   Worsening     I will work towards the following Behavioral Health goals: I will continue to follow up with my psychologist /counselor and/or psychiatrist., I will take my medications daily as prescribed.  and make myself a priority     Barriers: fear of failure, lack of support, overwhelmed by complexity of regimen and stress  Plan for overcoming my barriers: work with ACM and staff at [facility]   Confidence: 5/10  Anticipated Goal Completion Date: 5/2020       COMPLETED: Reduce Falls    On track     I will reduce my risk of falls by the following: Remove rugs or use non slip rugs  Use walking aids like cane or walker  Understanding the benefit of a tub bench for safety. Barriers: lack of motivation  Plan for overcoming my barriers: Discuss need for tub bench with provider. Use cane with ambulation as needed. Confidence: 3/10  Anticipated Goal Completion Date: 3/06/20            Prior to Admission medications    Medication Sig Start Date End Date Taking?  Authorizing Provider   nitrofurantoin, macrocrystal-monohydrate, (MACROBID) 100 MG capsule Take 1 capsule by mouth 2 times daily for 10 days 1/10/20 1/20/20 Yes Maria E Navas DO   busPIRone (BUSPAR) 10 MG tablet TAKE ONE TABLET BY MOUTH THREE TIMES A DAY  Patient taking differently: Take 10 mg by mouth daily  1/6/20  Yes Maria E Navas DO   gabapentin (NEURONTIN) 300 MG capsule TAKE 2 CAPSULES THREE TIMES A DAY 11/25/19 1/24/20 Yes Maria E Navas DO   traZODone (DESYREL) 100 MG tablet TAKE 3 TABLETS NIGHTLY 11/25/19  Yes Maria E Navas DO   lisinopril (PRINIVIL;ZESTRIL) 40 MG tablet TAKE 1 TABLET DAILY 9/11/19  Yes Maria E Navas DO   rosuvastatin (CRESTOR) 10 MG tablet TAKE 1 TABLET NIGHTLY 9/9/19  Yes Maria E Navas DO   potassium chloride (KLOR-CON M) 20 MEQ extended release tablet TAKE 1 TABLET TWICE A DAY 9/3/19  Yes Maria E Navas DO   hydrochlorothiazide (MICROZIDE) 12.5 MG capsule TAKE ONE CAPSULE BY MOUTH EVERY MORNING 8/2/19  Yes Maria E Navas DO   DULoxetine (CYMBALTA) 60 MG extended release capsule Take 1 capsule by mouth daily 7/11/19  Yes Maria E Navas DO   Cholecalciferol (VITAMIN D3) 5000 units CAPS Take 5,000 Units by mouth

## 2020-01-29 RX ORDER — HYDROCHLOROTHIAZIDE 12.5 MG/1
CAPSULE, GELATIN COATED ORAL
Qty: 90 CAPSULE | Refills: 4 | Status: SHIPPED | OUTPATIENT
Start: 2020-01-29 | End: 2020-11-13 | Stop reason: SDUPTHER

## 2020-02-05 ENCOUNTER — OFFICE VISIT (OUTPATIENT)
Dept: INTERNAL MEDICINE CLINIC | Age: 70
End: 2020-02-05
Payer: MEDICARE

## 2020-02-05 VITALS
SYSTOLIC BLOOD PRESSURE: 128 MMHG | OXYGEN SATURATION: 96 % | DIASTOLIC BLOOD PRESSURE: 70 MMHG | WEIGHT: 183.6 LBS | HEART RATE: 100 BPM | BODY MASS INDEX: 34.69 KG/M2

## 2020-02-05 PROBLEM — H60.543 ECZEMA OF BOTH EXTERNAL EARS: Status: ACTIVE | Noted: 2020-02-05

## 2020-02-05 PROBLEM — R10.11 RUQ PAIN: Status: ACTIVE | Noted: 2020-02-05

## 2020-02-05 PROBLEM — J01.10 ACUTE NON-RECURRENT FRONTAL SINUSITIS: Status: RESOLVED | Noted: 2019-07-05 | Resolved: 2020-02-05

## 2020-02-05 PROBLEM — R09.81 CHRONIC NASAL CONGESTION: Status: ACTIVE | Noted: 2020-02-05

## 2020-02-05 PROCEDURE — 99214 OFFICE O/P EST MOD 30 MIN: CPT | Performed by: INTERNAL MEDICINE

## 2020-02-05 RX ORDER — BUSPIRONE HYDROCHLORIDE 10 MG/1
10 TABLET ORAL DAILY
Qty: 30 TABLET | Refills: 5 | Status: SHIPPED | OUTPATIENT
Start: 2020-02-05 | End: 2020-04-03 | Stop reason: SDUPTHER

## 2020-02-05 RX ORDER — HYDROXYZINE 50 MG/1
50 TABLET, FILM COATED ORAL EVERY 4 HOURS PRN
Qty: 60 TABLET | Refills: 1 | Status: SHIPPED | OUTPATIENT
Start: 2020-02-05 | End: 2020-07-22 | Stop reason: SDUPTHER

## 2020-02-05 RX ORDER — DICLOFENAC SODIUM 75 MG/1
75 TABLET, DELAYED RELEASE ORAL 2 TIMES DAILY
Qty: 60 TABLET | Refills: 3 | Status: SHIPPED | OUTPATIENT
Start: 2020-02-05 | End: 2020-06-29 | Stop reason: SDUPTHER

## 2020-02-05 RX ORDER — FLUOCINOLONE ACETONIDE 0.11 MG/ML
1 OIL AURICULAR (OTIC) 2 TIMES DAILY
Qty: 1 BOTTLE | Refills: 3 | Status: SHIPPED | OUTPATIENT
Start: 2020-02-05

## 2020-02-05 ASSESSMENT — ENCOUNTER SYMPTOMS
BACK PAIN: 1
CONSTIPATION: 0
DIARRHEA: 0
CHEST TIGHTNESS: 0
SHORTNESS OF BREATH: 0

## 2020-02-05 NOTE — ASSESSMENT & PLAN NOTE
Most recent DEXA was in January 2020 and did show some improvement overall in her T-scores. She has been on Reclast since 2016. Continue Reclast for 2 additional years.

## 2020-02-05 NOTE — PROGRESS NOTES
Patient: Bruno Castro is a 71 y.o. female who presents today with the following Chief Complaint(s):  Chief Complaint   Patient presents with    6 Month Follow-Up       HPI     Here today for follow up. Does not feel well- tired, hurting all over. Does see Dr. Rodger Bray this afternoon for injection in her shoulder. Struggling with chronic sinus congestion- will wake up with sinuses congested and swelling. Would like to see ENT. Anxiety is doing ok with Buspar 10 mg qd. Is taking Tylenol and Aleve for OA. Is worried that her OA can change to RA. Having pain in RUQ that radiates around to her back. Pain is a dull pain. Does still have her gall bladder. Not sure what makes the pain worse. Pain is intermittent. Is wondering if she can drink a little bit of wine with dinner a few times per week. HLD- remains on Crestor 10 mg qd w/out difficulty. HTN- remains on lisionpirl 40 mg qd w/out difficulty. Osteoporosis- on Reclast. Due in March. Last DEXA was in January 2020 and was improved from 2017. Has been on Reclast about 7 or 8 years. Saw her gyn (Dr. Jeramie Hoskins) who gave her an estrogen cream to see if this helps with incontinence.      Lab Results   Component Value Date     01/09/2020    K 4.0 01/09/2020    CL 99 01/09/2020    CO2 23 01/09/2020    BUN 13 01/09/2020    CREATININE 0.8 01/09/2020    GLUCOSE 96 01/09/2020    CALCIUM 9.5 01/09/2020    PROT 7.1 01/09/2020    LABALBU 4.2 01/09/2020    BILITOT 0.4 01/09/2020    ALKPHOS 31 (L) 01/09/2020    AST 33 01/09/2020    ALT 18 01/09/2020    LABGLOM >60 01/09/2020    GFRAA >60 01/09/2020    AGRATIO 1.4 01/09/2020    GLOB 2.9 01/09/2020       Lab Results   Component Value Date    WBC 5.8 01/09/2020    HGB 13.9 01/09/2020    HCT 41.2 01/09/2020    MCV 96.2 01/09/2020     01/09/2020     Lab Results   Component Value Date    CHOL 129 01/09/2020    CHOL 124 07/30/2019    CHOL 108 02/26/2019     Lab Results   Component Value Date rosuvastatin (CRESTOR) 10 MG tablet TAKE 1 TABLET NIGHTLY 90 tablet 4    potassium chloride (KLOR-CON M) 20 MEQ extended release tablet TAKE 1 TABLET TWICE A  tablet 4    DULoxetine (CYMBALTA) 60 MG extended release capsule Take 1 capsule by mouth daily 90 capsule 3    fluticasone (FLONASE) 50 MCG/ACT nasal spray 2 sprays by Each Nare route daily 3 Bottle 1    diphenoxylate-atropine (LOMOTIL) 2.5-0.025 MG per tablet Take 1 tablet by mouth as needed for Diarrhea. Chelsy Bogaert Cholecalciferol (VITAMIN D3) 5000 units CAPS Take 5,000 Units by mouth daily       oxymorphone (OPANA) 5 MG tablet Take 5 mg by mouth 2 times daily. Indications: per DR Harvey Mckeon 2 x day      zoledronic acid (RECLAST) 5 MG/100ML SOLN Infuse 5 mg intravenously once IV, yearly      Cetirizine HCl (ZYRTEC PO) Take 1 tablet by mouth 2 times daily       aspirin 81 MG chewable tablet Take 81 mg by mouth daily.  Calcium Carbonate-Vit D-Min (CALCIUM 1200 PO) Take 1 capsule by mouth 2 times daily.  Ascorbic Acid (VITAMIN C) 500 MG tablet Take 500 mg by mouth daily.  busPIRone (BUSPAR) 10 MG tablet TAKE ONE TABLET BY MOUTH THREE TIMES A DAY (Patient taking differently: Take 10 mg by mouth daily ) 30 tablet 3    acetaminophen (TYLENOL) 500 MG tablet Take 2 tablets by mouth 3 times daily for 30 doses 60 tablet 0    gabapentin (NEURONTIN) 300 MG capsule TAKE 2 CAPSULES THREE TIMES A  capsule 6    ketoconazole (NIZORAL) 2 % shampoo Apply topically daily as needed. (Patient not taking: Reported on 1/20/2020) 120 mL 2    Blood Glucose Monitoring Suppl DAYRON Check blood sugars fasting in morning and as needed for feeling bad. (Patient not taking: Reported on 1/20/2020) 1 Device 0    glucose blood VI test strips (ONE TOUCH ULTRA TEST) strip 1 each by In Vitro route daily Fasting qam and As needed.  (Patient not taking: Reported on 1/20/2020) 100 strip 1    Lancets MISC Check sugars fasting qam and prn (Patient not taking: Reported on 1/20/2020) 100 each 3     Facility-Administered Medications Prior to Visit   Medication Dose Route Frequency Provider Last Rate Last Dose    zoledronic acid (RECLAST) 5 mg/100 mL infusion  5 mg Intravenous See Admin Instructions Bernardo Yadav  mL/hr at 04/06/16 0900 5 mg at 04/06/16 0900       Patient'spast medical history, surgical history, family history, medications,  and allergies  were all reviewed and updated as appropriate today. Review of Systems   Constitutional: Negative for appetite change, fatigue and fever. Respiratory: Negative for chest tightness and shortness of breath. Cardiovascular: Negative for chest pain. Gastrointestinal: Negative for constipation and diarrhea. Genitourinary: Positive for frequency. Musculoskeletal: Positive for back pain. Negative for gait problem. Skin: Negative for rash. /70   Pulse 100   Wt 183 lb 9.6 oz (83.3 kg)   SpO2 96%   BMI 34.69 kg/m²   Physical Exam  Vitals signs and nursing note reviewed. Constitutional:       Appearance: She is well-developed. She is not toxic-appearing. HENT:      Head: Normocephalic. Right Ear: Tympanic membrane, ear canal and external ear normal.      Left Ear: Tympanic membrane, ear canal and external ear normal.      Ears:      Comments: Dry skin b/l ears. Nose: Nose normal.      Mouth/Throat:      Mouth: Mucous membranes are moist.      Pharynx: No oropharyngeal exudate or posterior oropharyngeal erythema. Neck:      Thyroid: No thyroid mass or thyromegaly. Vascular: No carotid bruit. Cardiovascular:      Rate and Rhythm: Normal rate and regular rhythm. Pulses:           Dorsalis pedis pulses are 2+ on the right side and 2+ on the left side. Heart sounds: Normal heart sounds. No murmur. Comments: No LE edema  Pulmonary:      Effort: Pulmonary effort is normal.      Breath sounds: Normal breath sounds. Abdominal:      Palpations: Abdomen is soft. Tenderness: There is abdominal tenderness in the right upper quadrant. Hernia: A hernia is present. Hernia is present in the ventral area (rectus diastasis). Lymphadenopathy:      Cervical: No cervical adenopathy. Neurological:      Mental Status: She is alert. Psychiatric:         Mood and Affect: Mood normal.         Behavior: Behavior normal. Behavior is cooperative. ASSESSMENT/PLAN:    Problem List Items Addressed This Visit     Chronic nasal congestion     Patient complains of chronic sinus congestion and swelling. She does use Flonase and Zyrtec daily. Refer to ENT per her request.         Relevant Orders    Coco Bone MD, Otolaryngology, [facility]    Eczema of both external ears     Add Dermotic drops twice daily as needed. Relevant Medications    fluocinolone (DERMOTIC) 0.01 % OIL oil    Essential hypertension     Well-controlled on lisinopril 40 mg daily. Continue. Relevant Orders    CBC Auto Differential    Comprehensive Metabolic Panel    Fatty liver     Most recent LFTs are normal.  She is complaining of some right upper quadrant discomfort. Check ultrasound. Relevant Orders    US Liver    Generalized anxiety disorder - Primary     Seems to be doing better overall. She remains on BuSpar 10 mg nightly with hydroxyzine as needed  With Cymbalta 60 mg daily. Relevant Medications    hydrOXYzine (ATARAX) 50 MG tablet    busPIRone (BUSPAR) 10 MG tablet    Hyperlipidemia LDL goal <100     Well-controlled on Crestor 10 mg daily. Relevant Orders    Lipid Panel    TSH with Reflex    Moderate episode of recurrent major depressive disorder ([address]     She actually seems to be doing better today. Continue Cymbalta 60 mg daily. Relevant Medications    hydrOXYzine (ATARAX) 50 MG tablet    busPIRone (BUSPAR) 10 MG tablet    BEBE (obstructive sleep apnea)     Patient does not have CPAP.   She states that she is status post surgical repair. Osteoporosis, post-menopausal (Chronic)     Most recent DEXA was in January 2020 and did show some improvement overall in her T-scores. She has been on Reclast since 2016. Continue Reclast for 2 additional years. Relevant Orders    Vitamin D 25 Hydroxy    Primary osteoarthritis involving multiple joints     Creatinine is normal.  Add diclofenac 75 mg twice daily as needed. Does follow with Dr. Felice Angel for pain management and is on Opana and gabapentin. She has seen Dr. Ramone Boone in the past for rheumatologic work-up which has been negative. She is concerned that osteoarthritis can transition to rheumatoid arthritis. Reviewed that these are different diseases and she should not develop rheumatoid arthritis because of having osteoarthritis. Relevant Medications    diclofenac (VOLTAREN) 75 MG EC tablet    RUQ pain     LFTs are normal.  She does have a history of fatty liver. She is not had her gallbladder removed. Check right upper quadrant ultrasound. Relevant Orders    US Liver    Seasonal allergies     Continue with Zyrtec and Flonase. Referring to ENT due to chronic sinus congestion.                Current Outpatient Medications   Medication Sig Dispense Refill    hydrOXYzine (ATARAX) 50 MG tablet Take 1 tablet by mouth every 4 hours as needed for Itching 60 tablet 1    busPIRone (BUSPAR) 10 MG tablet Take 1 tablet by mouth daily 30 tablet 5    diclofenac (VOLTAREN) 75 MG EC tablet Take 1 tablet by mouth 2 times daily 60 tablet 3    fluocinolone (DERMOTIC) 0.01 % OIL oil Place 1 drop in ear(s) 2 times daily 1 Bottle 3    hydrochlorothiazide (MICROZIDE) 12.5 MG capsule TAKE 1 CAPSULE EVERY MORNING 90 capsule 4    traZODone (DESYREL) 100 MG tablet TAKE 3 TABLETS NIGHTLY 270 tablet 4    lisinopril (PRINIVIL;ZESTRIL) 40 MG tablet TAKE 1 TABLET DAILY 90 tablet 4    rosuvastatin (CRESTOR) 10 MG tablet TAKE 1 TABLET NIGHTLY 90 tablet 4    potassium chloride (KLOR-CON M) 20 MEQ extended release tablet TAKE 1 TABLET TWICE A  tablet 4    DULoxetine (CYMBALTA) 60 MG extended release capsule Take 1 capsule by mouth daily 90 capsule 3    fluticasone (FLONASE) 50 MCG/ACT nasal spray 2 sprays by Each Nare route daily 3 Bottle 1    diphenoxylate-atropine (LOMOTIL) 2.5-0.025 MG per tablet Take 1 tablet by mouth as needed for Diarrhea. Maryagnes Course Cholecalciferol (VITAMIN D3) 5000 units CAPS Take 5,000 Units by mouth daily       oxymorphone (OPANA) 5 MG tablet Take 5 mg by mouth 2 times daily. Indications: per DR Rachel Metcalf 2 x day      zoledronic acid (RECLAST) 5 MG/100ML SOLN Infuse 5 mg intravenously once IV, yearly      Cetirizine HCl (ZYRTEC PO) Take 1 tablet by mouth 2 times daily       aspirin 81 MG chewable tablet Take 81 mg by mouth daily.  Calcium Carbonate-Vit D-Min (CALCIUM 1200 PO) Take 1 capsule by mouth 2 times daily.  Ascorbic Acid (VITAMIN C) 500 MG tablet Take 500 mg by mouth daily.  acetaminophen (TYLENOL) 500 MG tablet Take 2 tablets by mouth 3 times daily for 30 doses 60 tablet 0    gabapentin (NEURONTIN) 300 MG capsule TAKE 2 CAPSULES THREE TIMES A  capsule 6    ketoconazole (NIZORAL) 2 % shampoo Apply topically daily as needed. (Patient not taking: Reported on 1/20/2020) 120 mL 2    Blood Glucose Monitoring Suppl DAYRON Check blood sugars fasting in morning and as needed for feeling bad. (Patient not taking: Reported on 1/20/2020) 1 Device 0    glucose blood VI test strips (ONE TOUCH ULTRA TEST) strip 1 each by In Vitro route daily Fasting qam and As needed.  (Patient not taking: Reported on 1/20/2020) 100 strip 1    Lancets MISC Check sugars fasting qam and prn (Patient not taking: Reported on 1/20/2020) 100 each 3     Current Facility-Administered Medications   Medication Dose Route Frequency Provider Last Rate Last Dose    zoledronic acid (RECLAST) 5 mg/100 mL infusion  5 mg Intravenous See Rachana Minor

## 2020-02-05 NOTE — ASSESSMENT & PLAN NOTE
Most recent LFTs are normal.  She is complaining of some right upper quadrant discomfort. Check ultrasound.

## 2020-02-05 NOTE — ASSESSMENT & PLAN NOTE
Patient complains of chronic sinus congestion and swelling. She does use Flonase and Zyrtec daily.   Refer to ENT per her request.

## 2020-02-05 NOTE — PATIENT INSTRUCTIONS
Do take Aleve or Advil with diclofenac. You may take up to 6 Extra Strength Tylenol or 4 Tylenol Arthritis per day.

## 2020-02-06 ENCOUNTER — CARE COORDINATION (OUTPATIENT)
Dept: CARE COORDINATION | Age: 70
End: 2020-02-06

## 2020-02-06 RX ORDER — ACETAMINOPHEN 500 MG
1000 TABLET ORAL 3 TIMES DAILY PRN
COMMUNITY

## 2020-02-06 SDOH — ECONOMIC STABILITY: FOOD INSECURITY: WITHIN THE PAST 12 MONTHS, YOU WORRIED THAT YOUR FOOD WOULD RUN OUT BEFORE YOU GOT MONEY TO BUY MORE.: NEVER TRUE

## 2020-02-06 SDOH — SOCIAL STABILITY: SOCIAL NETWORK: ARE YOU MARRIED, WIDOWED, DIVORCED, SEPARATED, NEVER MARRIED, OR LIVING WITH A PARTNER?: WIDOWED

## 2020-02-06 SDOH — ECONOMIC STABILITY: FOOD INSECURITY: WITHIN THE PAST 12 MONTHS, THE FOOD YOU BOUGHT JUST DIDN'T LAST AND YOU DIDN'T HAVE MONEY TO GET MORE.: NEVER TRUE

## 2020-02-06 NOTE — CARE COORDINATION
Ambulatory Care Coordination Note  2/6/2020  CM Risk Score: 5  Charlson 10 Year Mortality Risk Score: 23%     ACC: Amanda Pedroza RN    Summary Note: Reviewed goal. Has no plan to follow up with mental health provider at this time. Is pleased with current treatment. Reviewed medications for oa. Fall prevention reviewed; states gets on stepladder. Discussed limiting to using when someone is there with her and having phone in her pocket. Denied any other fall risks. Denies uti symptoms at this time. Denies any barriers to meeting her health care goals at this time. Encouraged to reach out to [facility] or PCP office with any additional needs. Closing episode. Care Coordination Interventions    Program Enrollment:  Complex Care  Referral from Primary Care Provider:  No  Suggested Interventions and Community Resources  Fall Risk Prevention:  Completed (Comment: Uses stepladdder at times. Instructed to have phone in pocket and someone close by when using ladder. )  Medication Assistance Program:  [facility]  or Pill Pack:  Declined  Pharmacist:  Declined  Other Therapy Services:  Declined (Comment: Reviewed mental health therapy. )         Goals Addressed                 This Visit's Progress       Care Coordination     COMPLETED: Behavioral Health   No change     I will work towards the following Behavioral Health goals: I will continue to follow up with my psychologist /counselor and/or psychiatrist., I will take my medications daily as prescribed. and make myself a priority     Barriers: fear of failure, lack of support, overwhelmed by complexity of regimen and stress  Plan for overcoming my barriers: work with ACM and staff at [facility]   Confidence: 5/10  Anticipated Goal Completion Date: 5/2020            Prior to Admission medications    Medication Sig Start Date End Date Taking?  Authorizing Provider   acetaminophen (TYLENOL) 500 MG tablet Take 1,000 mg by mouth 3 times daily as needed for Pain   Yes Historical Provider, MD   diclofenac (VOLTAREN) 75 MG EC tablet Take 1 tablet by mouth 2 times daily 2/5/20  Yes Kylee Quarry, DO   hydrOXYzine (ATARAX) 50 MG tablet Take 1 tablet by mouth every 4 hours as needed for Itching 2/5/20   Kylee Quarry, DO   busPIRone (BUSPAR) 10 MG tablet Take 1 tablet by mouth daily 2/5/20   [facility] Quarry, DO   fluocinolone (DERMOTIC) 0.01 % OIL oil Place 1 drop in ear(s) 2 times daily 2/5/20   Kylee Quarry, DO   hydrochlorothiazide (MICROZIDE) 12.5 MG capsule TAKE 1 CAPSULE EVERY MORNING 1/29/20 Bebe Quarry, DO   gabapentin (NEURONTIN) 300 MG capsule TAKE 2 CAPSULES THREE TIMES A DAY 11/25/19 1/24/20  [facility] Quar, DO   traZODone (DESYREL) 100 MG tablet TAKE 3 TABLETS NIGHTLY 11/25/19   [facility] Quar, DO   lisinopril (PRINIVIL;ZESTRIL) 40 MG tablet TAKE 1 TABLET DAILY 9/11/19   [facility], DO   rosuvastatin (CRESTOR) 10 MG tablet TAKE 1 TABLET NIGHTLY 9/9/19   [facility], DO   potassium chloride (KLOR-CON M) 20 MEQ extended release tablet TAKE 1 TABLET TWICE A DAY 9/3/19   [facility] Quar, DO   ketoconazole (NIZORAL) 2 % shampoo Apply topically daily as needed. Patient not taking: Reported on 1/20/2020 8/23/19 Bebe Quar, DO   DULoxetine (CYMBALTA) 60 MG extended release capsule Take 1 capsule by mouth daily 7/11/19   [facility], DO   fluticasone [facility]) 50 MCG/ACT nasal spray 2 sprays by Each Nare route daily 3/15/19   Kylee Quar, DO   diphenoxylate-atropine (LOMOTIL) 2.5-0.025 MG per tablet Take 1 tablet by mouth as needed for Diarrhea. .    Historical Provider, MD   Blood Glucose Monitoring Suppl DAYRON Check blood sugars fasting in morning and as needed for feeling bad. Patient not taking: Reported on 1/20/2020 5/21/18   Kylee Quarblaze, DO   glucose blood VI test strips (ONE TOUCH ULTRA TEST) strip 1 each by In Vitro route daily Fasting qam and As needed.   Patient not taking: Reported on 1/20/2020 5/21/18   Mary Lundberg DO   Lancets MISC Check sugars fasting qam and prn  Patient not taking: Reported on 1/20/2020 5/21/18   Mary Lundberg DO   Cholecalciferol (VITAMIN D3) 5000 units CAPS Take 5,000 Units by mouth daily     Historical Provider, MD   oxymorphone (OPANA) 5 MG tablet Take 5 mg by mouth 2 times daily. Indications: per DR Harvey Mckeon 2 x day    Historical Provider, MD   zoledronic acid (RECLAST) 5 MG/100ML SOLN Infuse 5 mg intravenously once IV, yearly    Historical Provider, MD   Cetirizine HCl (ZYRTEC PO) Take 1 tablet by mouth 2 times daily     Historical Provider, MD   aspirin 81 MG chewable tablet Take 81 mg by mouth daily. Historical Provider, MD   Calcium Carbonate-Vit D-Min (CALCIUM 1200 PO) Take 1 capsule by mouth 2 times daily. Historical Provider, MD   Ascorbic Acid (VITAMIN C) 500 MG tablet Take 500 mg by mouth daily.       Historical Provider, MD       Future Appointments   Date Time Provider Sung Kovacs   8/7/2020 10:00 AM DO GINA [facility]      and   General Assessment    Do you have any symptoms that are causing concern?:  No

## 2020-02-07 ENCOUNTER — TELEPHONE (OUTPATIENT)
Dept: INTERNAL MEDICINE CLINIC | Age: 70
End: 2020-02-07

## 2020-02-07 NOTE — TELEPHONE ENCOUNTER
I recommend taking 1 in the morning and 1 at night. Take with food. Can be taken with her other pills. saw

## 2020-02-07 NOTE — TELEPHONE ENCOUNTER
Patient is requesting a call back from Dr. Chantell Duong or her nurse. Patient has some questions that she would like to go over with either one.

## 2020-03-03 ENCOUNTER — TELEPHONE (OUTPATIENT)
Dept: RHEUMATOLOGY | Age: 70
End: 2020-03-03

## 2020-03-03 NOTE — LETTER
[facility] Rheumatology  [id number]  Phone: [phone]  Fax: [phone]    Salud Pedro DO        June 17, 2020    Tuan Quiroz [address] [place] [address]      Dear Mirella Allison:    We have tried to contact you several times regarding your Reclast infusion for your . We need to     If you have any questions or concerns, please don't hesitate to call.     Sincerely,        Salud Pedro DO

## 2020-04-03 ENCOUNTER — TELEPHONE (OUTPATIENT)
Dept: INTERNAL MEDICINE CLINIC | Age: 70
End: 2020-04-03

## 2020-04-03 RX ORDER — BUSPIRONE HYDROCHLORIDE 10 MG/1
10 TABLET ORAL 2 TIMES DAILY
Qty: 180 TABLET | Refills: 3 | Status: SHIPPED | OUTPATIENT
Start: 2020-04-03 | End: 2021-02-23 | Stop reason: SDUPTHER

## 2020-04-13 NOTE — TELEPHONE ENCOUNTER
From: Karlos Tejada  Sent: 4/13/2020 11:35 AM CDT  To: Iveth Garcia Md ~  [facility] Clinical Support Pool  Subject: RE: FW: Non-Urgent Medical Question    I would like to go through with a referral to an ENT please.   ----- Message -----  From: Iveth Garcia MD  Sent: 4/13/20, 11:32 AM  To: Karlos Tejada  Subject: RE: FW: Non-Urgent Medical Question    I am glad that you were able to discuss with Dr. Peñaloza. We can certainly refer you to discuss with ENT as well. Would you like that referral at this time? They will also be like to see you via video and schedule any procedures later.     Dr. RUIZ     ----- Message -----  From: Karlos Tejada  Sent: 4/13/2020 11:19 AM CDT  To: , *  Subject: Non-Urgent Medical Question     Good morning,    I was able to have a virtual visit with KIT Bowen. He prescribed me sucrelfede based off of my symptoms with my esophagus. After telling him how this “sensation” had advanced from my stomach to my throat and face, and how the pressure feels like my face is going to split he suggested it might not be the gastrointestinal track and something to do with my throat, but was more than willing to go through with an endoscopy when they start doing those procedures again and he sees my medical records from [place].     Much regards,    Karlos   Has she tried anything OTC? Allegra, Claritin, or Zyrtec?   saw

## 2020-05-27 ENCOUNTER — TELEPHONE (OUTPATIENT)
Dept: INTERNAL MEDICINE CLINIC | Age: 70
End: 2020-05-27

## 2020-06-08 ENCOUNTER — TELEPHONE (OUTPATIENT)
Dept: ADMINISTRATIVE | Age: 70
End: 2020-06-08

## 2020-06-08 DIAGNOSIS — M81.0 OSTEOPOROSIS, POST-MENOPAUSAL: Primary | Chronic | ICD-10-CM

## 2020-06-09 NOTE — TELEPHONE ENCOUNTER
Tried to speak with pt about scheduling her Relcast injection. She needs to get her lab work done. She answered asked me to hold on for a minute, she lost me. Then call disconnected.

## 2020-06-16 ENCOUNTER — HOSPITAL ENCOUNTER (OUTPATIENT)
Dept: GENERAL RADIOLOGY | Age: 70
Discharge: HOME | End: 2020-06-16
Payer: MEDICARE

## 2020-06-16 ENCOUNTER — HOSPITAL ENCOUNTER (OUTPATIENT)
Age: 70
Discharge: HOME | End: 2020-06-16
Payer: MEDICARE

## 2020-06-16 PROCEDURE — 72100 X-RAY EXAM L-S SPINE 2/3 VWS: CPT

## 2020-06-22 ENCOUNTER — TELEPHONE (OUTPATIENT)
Dept: INTERNAL MEDICINE CLINIC | Age: 70
End: 2020-06-22

## 2020-06-22 DIAGNOSIS — I10 ESSENTIAL HYPERTENSION: ICD-10-CM

## 2020-06-22 DIAGNOSIS — E78.5 HYPERLIPIDEMIA LDL GOAL <100: ICD-10-CM

## 2020-06-22 DIAGNOSIS — M81.0 OSTEOPOROSIS, POST-MENOPAUSAL: Chronic | ICD-10-CM

## 2020-06-22 LAB
A/G RATIO: 2 (ref 1.1–2.2)
ALBUMIN SERPL-MCNC: 4.2 G/DL (ref 3.4–5)
ALP BLD-CCNC: 37 U/L (ref 40–129)
ALT SERPL-CCNC: 28 U/L (ref 10–40)
ANION GAP SERPL CALCULATED.3IONS-SCNC: 13 MMOL/L (ref 3–16)
AST SERPL-CCNC: 30 U/L (ref 15–37)
BASOPHILS ABSOLUTE: 0 K/UL (ref 0–0.2)
BASOPHILS RELATIVE PERCENT: 0.5 %
BILIRUB SERPL-MCNC: 0.4 MG/DL (ref 0–1)
BUN BLDV-MCNC: 14 MG/DL (ref 7–20)
CALCIUM SERPL-MCNC: 8.8 MG/DL (ref 8.3–10.6)
CHLORIDE BLD-SCNC: 98 MMOL/L (ref 99–110)
CHOLESTEROL, TOTAL: 108 MG/DL (ref 0–199)
CO2: 28 MMOL/L (ref 21–32)
CREAT SERPL-MCNC: 0.7 MG/DL (ref 0.6–1.2)
EOSINOPHILS ABSOLUTE: 0.2 K/UL (ref 0–0.6)
EOSINOPHILS RELATIVE PERCENT: 3.1 %
GFR AFRICAN AMERICAN: >60
GFR NON-AFRICAN AMERICAN: >60
GLOBULIN: 2.1 G/DL
GLUCOSE BLD-MCNC: 102 MG/DL (ref 70–99)
HCT VFR BLD CALC: 40.9 % (ref 36–48)
HDLC SERPL-MCNC: 50 MG/DL (ref 40–60)
HEMOGLOBIN: 14.1 G/DL (ref 12–16)
LDL CHOLESTEROL CALCULATED: 30 MG/DL
LYMPHOCYTES ABSOLUTE: 2 K/UL (ref 1–5.1)
LYMPHOCYTES RELATIVE PERCENT: 37 %
MCH RBC QN AUTO: 32.4 PG (ref 26–34)
MCHC RBC AUTO-ENTMCNC: 34.6 G/DL (ref 31–36)
MCV RBC AUTO: 93.8 FL (ref 80–100)
MONOCYTES ABSOLUTE: 0.5 K/UL (ref 0–1.3)
MONOCYTES RELATIVE PERCENT: 8.6 %
NEUTROPHILS ABSOLUTE: 2.8 K/UL (ref 1.7–7.7)
NEUTROPHILS RELATIVE PERCENT: 50.8 %
PDW BLD-RTO: 12.4 % (ref 12.4–15.4)
PHOSPHORUS: 3.1 MG/DL (ref 2.5–4.9)
PLATELET # BLD: 191 K/UL (ref 135–450)
PMV BLD AUTO: 9.1 FL (ref 5–10.5)
POTASSIUM SERPL-SCNC: 4.2 MMOL/L (ref 3.5–5.1)
RBC # BLD: 4.36 M/UL (ref 4–5.2)
SODIUM BLD-SCNC: 139 MMOL/L (ref 136–145)
TOTAL PROTEIN: 6.3 G/DL (ref 6.4–8.2)
TRIGL SERPL-MCNC: 141 MG/DL (ref 0–150)
TSH REFLEX: 0.98 UIU/ML (ref 0.27–4.2)
VITAMIN D 25-HYDROXY: 69.9 NG/ML
VLDLC SERPL CALC-MCNC: 28 MG/DL
WBC # BLD: 5.5 K/UL (ref 4–11)

## 2020-06-25 ENCOUNTER — OFFICE VISIT (OUTPATIENT)
Dept: ENT CLINIC | Age: 70
End: 2020-06-25
Payer: MEDICARE

## 2020-06-25 VITALS — HEART RATE: 75 BPM | TEMPERATURE: 97.7 F | DIASTOLIC BLOOD PRESSURE: 74 MMHG | SYSTOLIC BLOOD PRESSURE: 126 MMHG

## 2020-06-25 PROCEDURE — 99203 OFFICE O/P NEW LOW 30 MIN: CPT | Performed by: OTOLARYNGOLOGY

## 2020-06-25 RX ORDER — METHYLPREDNISOLONE 4 MG/1
4 TABLET ORAL SEE ADMIN INSTRUCTIONS
Qty: 1 KIT | Refills: 0 | Status: SHIPPED | OUTPATIENT
Start: 2020-06-25 | End: 2020-07-22 | Stop reason: ALTCHOICE

## 2020-06-25 ASSESSMENT — ENCOUNTER SYMPTOMS
PHOTOPHOBIA: 0
SINUS PRESSURE: 1
SORE THROAT: 0
RESPIRATORY NEGATIVE: 1
VOICE CHANGE: 0
EYE DISCHARGE: 1
TROUBLE SWALLOWING: 0
ALLERGIC/IMMUNOLOGIC NEGATIVE: 1
EYE REDNESS: 1
EYE ITCHING: 1
RHINORRHEA: 1
FACIAL SWELLING: 0
SINUS PAIN: 1

## 2020-06-25 NOTE — PROGRESS NOTES
membrane, ear canal and external ear normal. There is no impacted cerumen. Nose:      Comments: Nasal mucosa treated with cotton pledgets  impregnated with Afrin and lidocaine placed in middle meatuses against turbinates. Pledgets left in place for five minutes and removed enabling enhanced visualization. The exam revealed positive edema of mucosa. it was negative for erythema indicative of infection. There was not evidence of deviation of the septum which was not significant. There were not polyps present. .There were not other masses present. The turbinates were not enlarged beyond normal.       Mouth/Throat:      Mouth: Mucous membranes are moist.      Pharynx: Oropharynx is clear. No oropharyngeal exudate or posterior oropharyngeal erythema. Eyes:      Extraocular Movements: Extraocular movements intact. Conjunctiva/sclera: Conjunctivae normal.      Pupils: Pupils are equal, round, and reactive to light. Neck:      Musculoskeletal: Normal range of motion and neck supple. No neck rigidity or muscular tenderness. Cardiovascular:      Rate and Rhythm: Normal rate and regular rhythm. Pulmonary:      Effort: Pulmonary effort is normal.   Lymphadenopathy:      Cervical: No cervical adenopathy. Skin:     General: Skin is warm and dry. Neurological:      General: No focal deficit present. Mental Status: She is alert and oriented to person, place, and time. Mental status is at baseline. Psychiatric:         Mood and Affect: Mood normal.         Behavior: Behavior normal.         Thought Content: Thought content normal.         Judgment: Judgment normal.          ASSESSMENT:    Problem seems most likely that of allergic sinusitis although allergy testing was negative which would make it likely vasomotor rhinitis. Since it is more confined to the right maxillary and frontal area, sinus CT scan will be obtained to further evaluate the problem.     PLAN:     Kwabena Haines will be given as well as the

## 2020-06-29 ENCOUNTER — VIRTUAL VISIT (OUTPATIENT)
Dept: INTERNAL MEDICINE CLINIC | Age: 70
End: 2020-06-29
Payer: MEDICARE

## 2020-06-29 PROCEDURE — 99214 OFFICE O/P EST MOD 30 MIN: CPT | Performed by: INTERNAL MEDICINE

## 2020-06-29 RX ORDER — AMLODIPINE BESYLATE 5 MG/1
5 TABLET ORAL DAILY
Qty: 30 TABLET | Refills: 3 | Status: SHIPPED
Start: 2020-06-29 | End: 2020-08-07 | Stop reason: SINTOL

## 2020-06-29 RX ORDER — DICLOFENAC SODIUM 75 MG/1
75 TABLET, DELAYED RELEASE ORAL 2 TIMES DAILY
Qty: 180 TABLET | Refills: 3 | Status: SHIPPED | OUTPATIENT
Start: 2020-06-29 | End: 2021-06-29

## 2020-06-29 RX ORDER — POLYETHYLENE GLYCOL 3350 17 G/17G
POWDER, FOR SOLUTION ORAL
Qty: 510 G | Refills: 5 | Status: SHIPPED | OUTPATIENT
Start: 2020-06-29 | End: 2020-07-22 | Stop reason: SDUPTHER

## 2020-06-29 NOTE — PROGRESS NOTES
Heart Disease Sister     Hypertension Sister     Thyroid Disease Sister     Cancer Brother     Heart Disease Brother     Hypertension Brother     Substance Abuse Brother     No Known Problems Son     No Known Problems Daughter        Allergies   Allergen Reactions    Ativan [Lorazepam] Swelling     Tongue swelling    Sulfa Antibiotics Swelling    Keflex [Cephalexin] Other (See Comments)     Leg cramps       Current Outpatient Medications   Medication Sig Dispense Refill    amLODIPine (NORVASC) 5 MG tablet Take 1 tablet by mouth daily 30 tablet 3    diclofenac (VOLTAREN) 75 MG EC tablet Take 1 tablet by mouth 2 times daily 180 tablet 3    busPIRone (BUSPAR) 10 MG tablet Take 1 tablet by mouth 2 times daily 180 tablet 3    acetaminophen (TYLENOL) 500 MG tablet Take 1,000 mg by mouth 3 times daily as needed for Pain      fluocinolone (DERMOTIC) 0.01 % OIL oil Place 1 drop in ear(s) 2 times daily 1 Bottle 3    hydrochlorothiazide (MICROZIDE) 12.5 MG capsule TAKE 1 CAPSULE EVERY MORNING 90 capsule 4    traZODone (DESYREL) 100 MG tablet TAKE 3 TABLETS NIGHTLY 270 tablet 4    lisinopril (PRINIVIL;ZESTRIL) 40 MG tablet TAKE 1 TABLET DAILY 90 tablet 4    rosuvastatin (CRESTOR) 10 MG tablet TAKE 1 TABLET NIGHTLY 90 tablet 4    potassium chloride (KLOR-CON M) 20 MEQ extended release tablet TAKE 1 TABLET TWICE A  tablet 4    ketoconazole (NIZORAL) 2 % shampoo Apply topically daily as needed. 120 mL 2    diphenoxylate-atropine (LOMOTIL) 2.5-0.025 MG per tablet Take 1 tablet by mouth as needed for Diarrhea. .      glucose blood VI test strips (ONE TOUCH ULTRA TEST) strip 1 each by In Vitro route daily Fasting qam and As needed. 100 strip 1    Lancets MISC Check sugars fasting qam and prn 100 each 3    Cholecalciferol (VITAMIN D3) 5000 units CAPS Take 5,000 Units by mouth daily       oxymorphone (OPANA) 5 MG tablet Take 5 mg by mouth 2 times daily.  Indications: per DR Florentino José 2 x day      zoledronic for congestion, ST, ear pain  Eyes: no discharge, no change in vision  Endo: no low blood sugars, no polyurea, polydipsia,   Heart: no chest pain, no palpitations, no LE edema  Pulm: no SOB, wheezing, cough  GI: positive for constipation, no diarrhea  : no dysuria  Psych: no depression, no anxiety, no suicidal thoughts  Neuro: no weakness, no gait problems, no paresthesias, no numbness, no HA, no lightheadedness, no dizziness  M/S: positive back pain, no neck pain, no joint swelling, positive joint pain. PHYSICAL EXAMINATION:  [ INSTRUCTIONS:  \"[x]\" Indicates a positive item  \"[]\" Indicates a negative item  -- DELETE ALL ITEMS NOT EXAMINED]      Due to this being a TeleHealth encounter, evaluation of the following organ systems is limited: Vitals/Constitutional/EENT/Resp/CV/GI//MS/Neuro/Skin/Heme-Lymph-Imm. Assessment and Plan  Chronic nasal congestion  Is following with Dr. Kaylene Abrams for ENT. She has a scheduled CT of her sinuses coming up. Essential hypertension  Blood pressure is above goal.  Possibly related to pain. Continue lisinopril 40 mg daily. Add amlodipine 5 mg daily. Generalized anxiety disorder  Continue Cymbalta 60 mg daily with BuSpar 10 mg daily with additional dose as needed. Was previously on Xanax but was discontinued due to chronic pain medication use. Hyperlipidemia LDL goal <100  Continue Crestor 10 mg daily. Well-controlled. Memory impairment  Patient is struggling somewhat with her medications. We will ask for nurse care coordinator to reach out to her to see if we can get her set up for pill packs for prescriptions. Memory loss is probably multifactorial but certainly she needs a work-up for dementia. Moderate episode of recurrent major depressive disorder (HCC)  Continue Cymbalta 60 mg daily. Osteoporosis, post-menopausal  Was due for Reclast in March but was delayed due to Valentin. She will call and reschedule her appointment.     Polyarthritis  Creatinine

## 2020-06-30 NOTE — ASSESSMENT & PLAN NOTE
Was due for Reclast in March but was delayed due to Valentin. She will call and reschedule her appointment.

## 2020-06-30 NOTE — ASSESSMENT & PLAN NOTE
Continue Cymbalta 60 mg daily with BuSpar 10 mg daily with additional dose as needed. Was previously on Xanax but was discontinued due to chronic pain medication use.

## 2020-07-01 ENCOUNTER — CARE COORDINATION (OUTPATIENT)
Dept: CARE COORDINATION | Age: 70
End: 2020-07-01

## 2020-07-01 NOTE — CARE COORDINATION
Ambulatory Care Coordination Note  7/1/2020  CM Risk Score: 5  Charlson 10 Year Mortality Risk Score: 47%     ACC: Taylor David, RN    Summary Note: Call to patient to review need for pillpacks for ease of use and adherence. Patient complains that she has arthritic hands impacting her ability to manage the pill bottles and arrange medications in pillbox. Educated on pill packs and how to use. Requested referral to pillpacks supplier. Referral sent to [facility] with [place] to establish services. PLAN  Follow up [facility] reached out to her to establish services. Follow up happiness with services and adherence. Ambulatory Care Coordination Assessment    Care Coordination Protocol  Program Enrollment:  Complex Care  Referral from Primary Care Provider:  Yes  Week 1 - Initial Assessment     Do you have all of your prescriptions and are they filled?:  Yes  Barriers to medication adherence:  Complexity of regimen, Other  Other barriers to medication adherence:  difficult to manage with arthritic hands   Are you able to afford your medications?:  Yes  How often do you have trouble taking your medications the way you have been told to take them?:  I always take them as prescribed. Do you have Home O2 Therapy?:  No      Ability to seek help/take action for Emergent Urgent situations i.e. fire, crime, inclement weather or health crisis. :  Independent  Ability to ambulate to restroom:  Independent  Ability handle personal hygeine needs (bathing/dressing/grooming): Independent  Ability to manage Medications: Independent  Ability to prepare Food Preparation:  Independent  Ability to maintain home (clean home, laundry): Independent  Ability to drive and/or has transportation:  Independent  Ability to do shopping:  Independent  Ability to manage finances:   Independent  Is patient able to live independently?:  Yes     Current Housing:  Private Residence        Per the [address] [place] [address] Rd, did the patient have 2 or more falls or 1 fall with injury in the past year?:  No     Frequent urination at night?:  No  Do you use rails/bars?:  Yes  Do you have a non-slip tub mat?:  Yes     Are you experiencing loss of meaning?:  No  Are you experiencing loss of hope and peace?:  No     Suggested Interventions and Community Resources   Medi Set or Pill Pack:  Completed         Set up/Review an Education Plan, Set up/Review Goals              Prior to Admission medications    Medication Sig Start Date End Date Taking? Authorizing Provider   amLODIPine (NORVASC) 5 MG tablet Take 1 tablet by mouth daily 6/29/20   Elane Dice, DO   polyethylene glycol [facility]) 17 GM/SCOOP powder 1/2-1 capful in 8 oz water or prune juice qd prn constipation.  6/29/20   Elane Dice, DO   diclofenac (VOLTAREN) 75 MG EC tablet Take 1 tablet by mouth 2 times daily 6/29/20   Elane Dice, DO   methylPREDNISolone (MEDROL, ALAN,) 4 MG tablet Take 1 tablet by mouth See Admin Instructions Take by mouth. 6/25/20   Alana Neves MD   busPIRone (BUSPAR) 10 MG tablet Take 1 tablet by mouth 2 times daily 4/3/20   Elghada Mcclellane, DO   fluticasone [facility]) 50 MCG/ACT nasal spray SPRAY TWO SPRAYS IN EACH NOSTRIL ONCE DAILY 3/30/20   Elghada Dice, DO   acetaminophen (TYLENOL) 500 MG tablet Take 1,000 mg by mouth 3 times daily as needed for Pain    Historical Provider, MD   hydrOXYzine (ATARAX) 50 MG tablet Take 1 tablet by mouth every 4 hours as needed for Itching 2/5/20   Elane Dice, DO   fluocinolone (DERMOTIC) 0.01 % OIL oil Place 1 drop in ear(s) 2 times daily 2/5/20   Elane Dice, DO   hydrochlorothiazide (MICROZIDE) 12.5 MG capsule TAKE 1 CAPSULE EVERY MORNING 1/29/20   Elane Dice, DO   gabapentin (NEURONTIN) 300 MG capsule TAKE 2 CAPSULES THREE TIMES A DAY 11/25/19 1/24/20  Elane Dice, DO   traZODone (DESYREL) 100 MG tablet TAKE 3 TABLETS NIGHTLY 11/25/19   Elane Dice, DO   lisinopril (PRINIVIL;ZESTRIL) 40 MG tablet TAKE 1 TABLET DAILY 9/11/19   Estefani Yates, DO   rosuvastatin (CRESTOR) 10 MG tablet TAKE 1 TABLET NIGHTLY 9/9/19   Estefani Yates, DO   potassium chloride (KLOR-CON M) 20 MEQ extended release tablet TAKE 1 TABLET TWICE A DAY 9/3/19   Estefani Yates, DO   ketoconazole (NIZORAL) 2 % shampoo Apply topically daily as needed. 8/23/19   Estefani Yates, DO   DULoxetine (CYMBALTA) 60 MG extended release capsule Take 1 capsule by mouth daily 7/11/19   Estefani Yates, DO   diphenoxylate-atropine (LOMOTIL) 2.5-0.025 MG per tablet Take 1 tablet by mouth as needed for Diarrhea. .    Historical Provider, MD   glucose blood VI test strips (ONE TOUCH ULTRA TEST) strip 1 each by In Vitro route daily Fasting qam and As needed. 5/21/18   Estefani Yates DO   Lancets MISC Check sugars fasting qam and prn 5/21/18   Estefani Yates, DO   Cholecalciferol (VITAMIN D3) 5000 units CAPS Take 5,000 Units by mouth daily     Historical Provider, MD   oxymorphone (OPANA) 5 MG tablet Take 5 mg by mouth 2 times daily. Indications: per DR Tierra Harris 2 x day    Historical Provider, MD   zoledronic acid (RECLAST) 5 MG/100ML SOLN Infuse 5 mg intravenously once IV, yearly    Historical Provider, MD   Cetirizine HCl (ZYRTEC PO) Take 1 tablet by mouth 2 times daily     Historical Provider, MD   aspirin 81 MG chewable tablet Take 81 mg by mouth daily. Historical Provider, MD   Calcium Carbonate-Vit D-Min (CALCIUM 1200 PO) Take 1 capsule by mouth 2 times daily. Historical Provider, MD   Ascorbic Acid (VITAMIN C) 500 MG tablet Take 500 mg by mouth daily.       Historical Provider, MD       Future Appointments   Date Time Provider Sung Kovacs   7/10/2020 10:30 AM [facility] CT  1 [facility] CT Luciano Rogers   8[facility]/2020 10:00 [facility]      and   General Assessment    Do you have any symptoms that are causing concern?:  Yes  Progression since Onset:  Unchanged  Reported Symptoms:   (Comment: sinus pressure being addressed by ENT)

## 2020-07-01 NOTE — PROGRESS NOTES
Dr. Ori Lux, hope you are well. I spoke with patient and sent referral to [facility] for pillpacks. Will follow up in a couple of weeks to make sure she has established care and getting her pillpacks.    Thanks,  Tano Can

## 2020-07-06 ENCOUNTER — TELEPHONE (OUTPATIENT)
Dept: INTERNAL MEDICINE CLINIC | Age: 70
End: 2020-07-06

## 2020-07-06 NOTE — TELEPHONE ENCOUNTER
[facility] received a call from:Mary Jane Dickey    Name of Caller: patient     Relationship to patient:self     Organization name: n/a    Best contact number: [phone]    Reason for call: pt stated she stopped taking amLODIPine (NORVASC) 5 MG tablet because it was given her leg cramps, she also stated can she get a urine sample.

## 2020-07-06 NOTE — TELEPHONE ENCOUNTER
Pt does not want to come all the way into our office. She wants to know if she can go to the [facility]?

## 2020-07-07 DIAGNOSIS — R30.0 DYSURIA: ICD-10-CM

## 2020-07-07 LAB
BACTERIA: ABNORMAL /HPF
BILIRUBIN URINE: NEGATIVE
BLOOD, URINE: NEGATIVE
CLARITY: ABNORMAL
COLOR: YELLOW
EPITHELIAL CELLS, UA: 7 /HPF (ref 0–5)
GLUCOSE URINE: NEGATIVE MG/DL
HYALINE CASTS: 2 /LPF (ref 0–8)
KETONES, URINE: NEGATIVE MG/DL
LEUKOCYTE ESTERASE, URINE: ABNORMAL
MICROSCOPIC EXAMINATION: YES
NITRITE, URINE: NEGATIVE
PH UA: 7 (ref 5–8)
PROTEIN UA: NEGATIVE MG/DL
RBC UA: 1 /HPF (ref 0–4)
SPECIFIC GRAVITY UA: 1.01 (ref 1–1.03)
URINE REFLEX TO CULTURE: ABNORMAL
URINE TYPE: ABNORMAL
UROBILINOGEN, URINE: 0.2 E.U./DL
WBC UA: 6 /HPF (ref 0–5)

## 2020-07-08 ENCOUNTER — TELEPHONE (OUTPATIENT)
Dept: INTERNAL MEDICINE CLINIC | Age: 70
End: 2020-07-08

## 2020-07-08 RX ORDER — CIPROFLOXACIN 250 MG/1
250 TABLET, FILM COATED ORAL 2 TIMES DAILY
Qty: 14 TABLET | Refills: 0 | Status: SHIPPED | OUTPATIENT
Start: 2020-07-08 | End: 2020-07-15

## 2020-07-08 NOTE — TELEPHONE ENCOUNTER
Patient called back. She would like the prescription sent to [facility] in [facility].

## 2020-07-10 ENCOUNTER — HOSPITAL ENCOUNTER (OUTPATIENT)
Dept: CT IMAGING | Age: 70
Discharge: HOME | End: 2020-07-10
Payer: MEDICARE

## 2020-07-10 ENCOUNTER — TELEPHONE (OUTPATIENT)
Dept: ENT CLINIC | Age: 70
End: 2020-07-10

## 2020-07-10 PROCEDURE — 70486 CT MAXILLOFACIAL W/O DYE: CPT

## 2020-07-10 RX ORDER — CARBAMAZEPINE 100 MG/1
100 TABLET, EXTENDED RELEASE ORAL 2 TIMES DAILY
Qty: 60 TABLET | Refills: 3 | Status: SHIPPED | OUTPATIENT
Start: 2020-07-10

## 2020-07-10 RX ORDER — CIPROFLOXACIN 250 MG/1
250 TABLET, FILM COATED ORAL 2 TIMES DAILY
Qty: 14 TABLET | Refills: 0 | OUTPATIENT
Start: 2020-07-10 | End: 2020-07-17

## 2020-07-10 NOTE — TELEPHONE ENCOUNTER
Notes recorded by Maura Garcia LPN on 7/39/0717 at 5:14 PM EDT  [phone] (home)   Call to Pt, Pt verbalized understanding of CT results.  Pt states she has has no further improvement  ------    Notes recorded by Roverto Agee MD on 7/10/2020 at 1:05 PM EDT  CT scan is totally negative.  Therefore, medication appears to be the only option.  Hopefully, she is a little better with the current medication.  Me know if is persisting and perhaps we can try something else.

## 2020-07-10 NOTE — TELEPHONE ENCOUNTER
Since the CT scan is entirely negative, it seems that this may well be atypical facial pain and we will try Tegretol. I would like him to contact us back in 2 weeks.

## 2020-07-10 NOTE — TELEPHONE ENCOUNTER
----- Message from Stan Segovia MD sent at 7/10/2020  1:05 PM EDT -----  CT scan is totally negative. Therefore, medication appears to be the only option. Hopefully, she is a little better with the current medication. Me know if is persisting and perhaps we can try something else.

## 2020-07-15 ENCOUNTER — TELEPHONE (OUTPATIENT)
Dept: ENT CLINIC | Age: 70
End: 2020-07-15

## 2020-07-15 RX ORDER — PREDNISONE 10 MG/1
TABLET ORAL
Qty: 25 TABLET | Refills: 0 | Status: SHIPPED | OUTPATIENT
Start: 2020-07-15 | End: 2020-08-07 | Stop reason: ALTCHOICE

## 2020-07-15 NOTE — TELEPHONE ENCOUNTER
Pt had a VV last Friday for a congesion and she was given medication, she believes the meds have made her cough worse and her ears aren't any better

## 2020-07-21 ENCOUNTER — TELEPHONE (OUTPATIENT)
Dept: INTERNAL MEDICINE CLINIC | Age: 70
End: 2020-07-21

## 2020-07-21 NOTE — TELEPHONE ENCOUNTER
Left message for the pt to return my call. Called pt to make sure she wanted to switch to this [facility]. Please ask the pt when she calls back.

## 2020-07-21 NOTE — TELEPHONE ENCOUNTER
Pt returned call to [facility] ([facility]) (see below)  Pt stated that Waldo Ferron called her and she missed   the call. pt said to send all the medications to [facility].

## 2020-07-22 RX ORDER — FLUOCINOLONE ACETONIDE 0.11 MG/ML
5 OIL AURICULAR (OTIC) 2 TIMES DAILY PRN
Qty: 30 ML | Refills: 3 | Status: SHIPPED | OUTPATIENT
Start: 2020-07-22 | End: 2020-12-02

## 2020-07-22 RX ORDER — HYDROXYZINE 50 MG/1
50 TABLET, FILM COATED ORAL EVERY 4 HOURS PRN
Qty: 270 TABLET | Refills: 1 | Status: SHIPPED | OUTPATIENT
Start: 2020-07-22 | End: 2020-09-04 | Stop reason: SDUPTHER

## 2020-07-22 RX ORDER — FLUOCINOLONE ACETONIDE 0.1 MG/ML
SOLUTION TOPICAL
Qty: 270 ML | Refills: 3 | Status: SHIPPED | OUTPATIENT
Start: 2020-07-22 | End: 2020-08-07

## 2020-07-22 RX ORDER — POLYETHYLENE GLYCOL 3350 17 G/17G
POWDER, FOR SOLUTION ORAL
Qty: 3 BOTTLE | Refills: 3 | Status: SHIPPED | OUTPATIENT
Start: 2020-07-22

## 2020-07-22 RX ORDER — DULOXETIN HYDROCHLORIDE 60 MG/1
60 CAPSULE, DELAYED RELEASE ORAL DAILY
Qty: 90 CAPSULE | Refills: 3 | Status: SHIPPED | OUTPATIENT
Start: 2020-07-22 | End: 2021-06-14

## 2020-07-23 ENCOUNTER — TELEPHONE (OUTPATIENT)
Dept: INTERNAL MEDICINE CLINIC | Age: 70
End: 2020-07-23

## 2020-07-23 RX ORDER — FLUTICASONE PROPIONATE 50 MCG
SPRAY, SUSPENSION (ML) NASAL
Qty: 3 BOTTLE | Refills: 1 | Status: SHIPPED | OUTPATIENT
Start: 2020-07-23 | End: 2021-03-22

## 2020-07-24 ENCOUNTER — TELEPHONE (OUTPATIENT)
Dept: INTERNAL MEDICINE CLINIC | Age: 70
End: 2020-07-24

## 2020-07-24 NOTE — TELEPHONE ENCOUNTER
----- Message from Aviva Perez sent at 7/24/2020  9:58 AM EDT -----  Subject: Message to Provider    QUESTIONS  Information for Provider? Pt is needing the order for the injection faxed   to where she needs to get it.  ---------------------------------------------------------------------------  --------------  CALL BACK INFO  What is the best way for the office to contact you? OK to leave message on   voicemail  Preferred Call Back Phone Number? [phone]  ---------------------------------------------------------------------------  --------------  SCRIPT ANSWERS  Relationship to Patient?  Self

## 2020-08-04 ENCOUNTER — NURSE TRIAGE (OUTPATIENT)
Dept: OTHER | Facility: CLINIC | Age: 70
End: 2020-08-04

## 2020-08-04 ENCOUNTER — TELEPHONE (OUTPATIENT)
Dept: INTERNAL MEDICINE CLINIC | Age: 70
End: 2020-08-04

## 2020-08-04 RX ORDER — NITROFURANTOIN 25; 75 MG/1; MG/1
100 CAPSULE ORAL 2 TIMES DAILY
Qty: 20 CAPSULE | Refills: 0 | Status: SHIPPED | OUTPATIENT
Start: 2020-08-04 | End: 2020-08-14

## 2020-08-04 RX ORDER — NITROFURANTOIN 25; 75 MG/1; MG/1
100 CAPSULE ORAL 2 TIMES DAILY
Qty: 20 CAPSULE | Refills: 0 | Status: SHIPPED | OUTPATIENT
Start: 2020-08-04 | End: 2020-08-04 | Stop reason: SDUPTHER

## 2020-08-04 NOTE — TELEPHONE ENCOUNTER
I will call in macrobid for her. Will need to leave urine sample for cx if no better. She should also see urology since she has had so many UTI's.

## 2020-08-04 NOTE — TELEPHONE ENCOUNTER
Reason for Disposition   [1] Taking antibiotic > 72 hours (3 days) for UTI AND [2] painful urination or frequency not improved    Answer Assessment - Initial Assessment Questions  1. ANTIBIOTIC: \"What antibiotic are you taking? \" \"How many times per day? \"      cipro 2 x day  2. DURATION: \"When was the antibiotic started? \"  Took all antibiotic  3. MAIN SYMPTOM: \"What is the main symptom you are concerned about? \"  Sluggish, cloudy urine  4. FEVER: \"Do you have a fever? \" If so, ask: \"What is it, how was it measured, and when did it start? \"  no  5. OTHER SYMPTOMS: \"Do you have any other symptoms? \" (e.g., flank pain, vaginal discharge, blood in urine)  no    Protocols used: URINARY TRACT INFECTION ON ANTIBIOTIC FOLLOW-UP CALL - FEMALE-ADULT-    Transferred call to [facility] Osito Morrell

## 2020-08-06 ENCOUNTER — CARE COORDINATION (OUTPATIENT)
Dept: CARE COORDINATION | Age: 70
End: 2020-08-06

## 2020-08-06 NOTE — CARE COORDINATION
Ambulatory Care Coordination Note  8/6/2020  CM Risk Score: 1  Charlson 10 Year Mortality Risk Score: 47%     ACC: Urban Oliver, RN    Summary Note: Call to follow up on referral for pill packs with [facility] [place] [facility]. States that she was contacted by a representative and that she expects to begin getting services around mid August. Reminded that she will still need to get her short term medications, such as antibiotics, at her local pharmacy. Verbalizes understanding. Denies any other concerns or questions at this time. PLAN  Follow up happiness with pill pack services and adherence. Consider closure      Care Coordination Interventions    Program Enrollment:  Complex Care  Referral from Primary Care Provider:  Yes  Suggested Interventions and Community Resources  Medi Set or Pill Pack:  Completed (Comment: Referred to [facility])         Goals Addressed                 This Visit's Progress       Care Coordination     Medication Management   Improving     I will take my medication as directed. Barriers: impairment:  physical: arthritic hands impairing ability to open bottles and arrange medications in pillbox  Plan for overcoming my barriers: Referral to [facility] for pillpacks   Confidence: 8/10  Anticipated Goal Completion Date: 09/01            Prior to Admission medications    Medication Sig Start Date End Date Taking?  Authorizing Provider   nitrofurantoin, macrocrystal-monohydrate, (MACROBID) 100 MG capsule Take 1 capsule by mouth 2 times daily for 10 days 8/4/20 8/14/20  Olam Course, DO   fluticasone [facility]) 50 MCG/ACT nasal spray SPRAY TWO SPRAYS IN [facility] NOSTRIL ONCE DAILY 7/23/20   Olam Course, DO   DULoxetine (CYMBALTA) 60 MG extended release capsule Take 1 capsule by mouth daily 7/22/20   Olam Course, DO   hydrOXYzine (ATARAX) 50 MG tablet Take 1 tablet by mouth every 4 hours as needed for Itching 7/22/20   Olam Course, DO   polyethylene glycol [facility]) 17 GM/SCOOP powder 1/2-1 capful in 8 oz water or prune juice qd prn constipation. 7/22/20   Vince Cortez, DO   fluocinolone (DERMOTIC) 0.01 % OIL oil Place 5 drops in ear(s) 2 times daily as needed (itching ears) 7/22/20   Vince Arreagae, DO   fluocinolone acetonide (SYNALAR) 0.01 % external solution Apply topically BID as needed for itchy scalp. 7/22/20   Vince Cortez, DO   predniSONE (DELTASONE) 10 MG tablet Take 2 tablets bid for 3 days, 1 tablet bid for 2 days, 1 tablet daily for 2 days, then one half tablet daily 7/15/20   Jac Manzano MD   carBAMazepine (TEGRETOL-XR) 100 MG extended release tablet Take 1 tablet by mouth 2 times daily 7/10/20   Jac Manzano MD   amLODIPine (NORVASC) 5 MG tablet Take 1 tablet by mouth daily 6/29/20   Vince Cortez, DO   diclofenac (VOLTAREN) 75 MG EC tablet Take 1 tablet by mouth 2 times daily 6/29/20   Vince Cortez, DO   busPIRone (BUSPAR) 10 MG tablet Take 1 tablet by mouth 2 times daily 4/3/20   Vince Cortez, DO   acetaminophen (TYLENOL) 500 MG tablet Take 1,000 mg by mouth 3 times daily as needed for Pain    Historical MD Amado   fluocinolone (DERMOTIC) 0.01 % OIL oil Place 1 drop in ear(s) 2 times daily 2/5/20   Vince Cortez, DO   hydrochlorothiazide (MICROZIDE) 12.5 MG capsule TAKE 1 CAPSULE EVERY MORNING 1/29/20   Vince Cortez, DO   gabapentin (NEURONTIN) 300 MG capsule TAKE 2 CAPSULES THREE TIMES A DAY 11/25/19 1/24/20  Vince Cortez, DO   traZODone (DESYREL) 100 MG tablet TAKE 3 TABLETS NIGHTLY 11/25/19   Vince Cortez, DO   lisinopril (PRINIVIL;ZESTRIL) 40 MG tablet TAKE 1 TABLET DAILY 9/11/19   Vince Arreagae, DO   rosuvastatin (CRESTOR) 10 MG tablet TAKE 1 TABLET NIGHTLY 9/9/19   Vince Cortez, DO   potassium chloride (KLOR-CON M) 20 MEQ extended release tablet TAKE 1 TABLET TWICE A DAY 9/3/19   Vince Dana,    ketoconazole (NIZORAL) 2 % shampoo Apply topically daily as needed.  8/23/19   Ronen Dobbs

## 2020-08-07 ENCOUNTER — OFFICE VISIT (OUTPATIENT)
Dept: INTERNAL MEDICINE CLINIC | Age: 70
End: 2020-08-07
Payer: MEDICARE

## 2020-08-07 VITALS
SYSTOLIC BLOOD PRESSURE: 122 MMHG | BODY MASS INDEX: 35.61 KG/M2 | WEIGHT: 181.4 LBS | DIASTOLIC BLOOD PRESSURE: 72 MMHG | HEART RATE: 46 BPM | HEIGHT: 60 IN | TEMPERATURE: 99.7 F

## 2020-08-07 PROCEDURE — 99214 OFFICE O/P EST MOD 30 MIN: CPT | Performed by: INTERNAL MEDICINE

## 2020-08-07 RX ORDER — OXYBUTYNIN CHLORIDE 15 MG/1
15 TABLET, EXTENDED RELEASE ORAL DAILY
Qty: 30 TABLET | Refills: 3 | Status: SHIPPED | OUTPATIENT
Start: 2020-08-07 | End: 2021-02-23 | Stop reason: SDUPTHER

## 2020-08-07 RX ORDER — FLUOCINOLONE ACETONIDE 0.11 MG/ML
1 OIL TOPICAL
COMMUNITY
Start: 2020-06-20 | End: 2020-12-28 | Stop reason: SDUPTHER

## 2020-08-07 RX ORDER — TIZANIDINE 2 MG/1
2 TABLET ORAL EVERY 8 HOURS PRN
Qty: 10 TABLET | Refills: 0 | Status: SHIPPED | OUTPATIENT
Start: 2020-08-07 | End: 2020-08-26

## 2020-08-07 RX ORDER — OXYBUTYNIN CHLORIDE 10 MG/1
1 TABLET, EXTENDED RELEASE ORAL DAILY
COMMUNITY
Start: 2020-06-22 | End: 2020-08-07 | Stop reason: SDUPTHER

## 2020-08-07 ASSESSMENT — ENCOUNTER SYMPTOMS
CONSTIPATION: 0
DIARRHEA: 0
CHEST TIGHTNESS: 0
BACK PAIN: 1
SHORTNESS OF BREATH: 0

## 2020-08-07 NOTE — PROGRESS NOTES
Patient: Sandi Franco is a 79 y.o. female who presents today with the following Chief Complaint(s):  Chief Complaint   Patient presents with    6 Month Follow-Up     Pt has multiple concerns- brought in list.       HPI     Dr. Alveria Boast started her on Ditropan XL 10 mg qd and add vaginal inserts twice weekly. Also saw Dr. Ester Gutierrez who had given her estrace cream but she did not find it as helpful. Did see Dr. Ester Gutierrez before she saw Dr. Alveria Boast. Sinuses have been \"congested\". Feeling like she has a lot of facial pressure. Has started to drain. Did see Dr. Torres Kearney and Saúl Yarbrough didn't know anything about the ringing in my ears\". Saw her Dr. Aurelia Perez for ortho for her hip. Did get an injection yesterday. Still having pain. Has appointment in 2 weeks. Also is wondering if the spinal stimulator is causing continued pain in her back. Dr. Colleen Moore put in her spinal stimulator and she still sees him. Will see him in around 2 weeks as well. Having some increased knee pain (TKA with Dr. Radha Wray around 2012). Feels like it is starting to collapse. Feet have been swelling a little more. Did stop taking amlodipine about 3 days after she started it as she felt like it was giving her leg cramps. Swelling does resolve once she is off of her feet. No SOB. Does take Sudafed OTC BID for congestion. Is wondering if that is safe. Is wondering if she can take Saline NS between doses of Flonase. Has struggled a some with word finding. Gets her words \"mixed up\". Sometimes has pain in her side. Previously took Flexeril but gave her diarrhea.      Allergies   Allergen Reactions    Ativan [Lorazepam] Swelling     Tongue swelling    Sulfa Antibiotics Swelling    Keflex [Cephalexin] Other (See Comments)     Leg cramps      Past Medical History:   Diagnosis Date    Anxiety     Clostridium difficile infection 2/22/15    Hypertension     Insomnia disorder     Osteoarthritis     Osteoporosis 2008    Rheumatic fever     S/P insertion of spinal cord stimulator     Self-catheterizes urinary bladder     as needed 7/8 no longer catherizing     Urinary retention       Past Surgical History:   Procedure Laterality Date    BLADDER REPAIR      CARPAL TUNNEL RELEASE      COLONOSCOPY      CYSTOSCOPY  10/29/2012    bladder biopsy    DENTAL SURGERY      FOOT SURGERY      HYSTERECTOMY      INTRACAPSULAR CATARACT EXTRACTION Right 7/18/2019    PHACOEMULSIFICATION WITH INTRAOCULAR LENS IMPLANT performed by Florentino Horn MD at [address] [place] [address] EXTRACTION Left 7/26/2019    PHACOEMULSIFICATION WITH INTRAOCULAR LENS IMPLANT performed by Florentino Horn MD at [place] [address]    MANDIBLE RECONSTRUCTION      OTHER SURGICAL HISTORY      spinal cord stimulator    TOTAL KNEE ARTHROPLASTY Right 10/18/13      Social History     Socioeconomic History    Marital status:      Spouse name: Not on file    Number of children: Not on file    Years of education: Not on file    Highest education level: Not on file   Occupational History    Occupation: retired   Social Needs    Financial resource strain: Not on file    Food insecurity     Worry: Never true     Inability: Never true   [employer] needs     Medical: No     Non-medical: No   Tobacco Use    Smoking status: Never Smoker    Smokeless tobacco: Never Used   Substance and Sexual Activity    Alcohol use: Not Currently    Drug use: No    Sexual activity: Not on file   Lifestyle    Physical activity     Days per week: Not on file     Minutes per session: Not on file    Stress: Not on file   Relationships    Social connections     Talks on phone: Not on file     Gets together: Not on file     Attends Jainism service: Not on file     Active member of club or organization: Not on file     Attends meetings of clubs or organizations: Not on file     Relationship status:      Intimate partner fluocinolone (DERMOTIC) 0.01 % OIL oil Place 1 drop in ear(s) 2 times daily 1 Bottle 3    hydrochlorothiazide (MICROZIDE) 12.5 MG capsule TAKE 1 CAPSULE EVERY MORNING 90 capsule 4    traZODone (DESYREL) 100 MG tablet TAKE 3 TABLETS NIGHTLY 270 tablet 4    lisinopril (PRINIVIL;ZESTRIL) 40 MG tablet TAKE 1 TABLET DAILY 90 tablet 4    rosuvastatin (CRESTOR) 10 MG tablet TAKE 1 TABLET NIGHTLY 90 tablet 4    potassium chloride (KLOR-CON M) 20 MEQ extended release tablet TAKE 1 TABLET TWICE A  tablet 4    ketoconazole (NIZORAL) 2 % shampoo Apply topically daily as needed. 120 mL 2    diphenoxylate-atropine (LOMOTIL) 2.5-0.025 MG per tablet Take 1 tablet by mouth as needed for Diarrhea. .      glucose blood VI test strips (ONE TOUCH ULTRA TEST) strip 1 each by In Vitro route daily Fasting qam and As needed. 100 strip 1    Lancets MISC Check sugars fasting qam and prn 100 each 3    Cholecalciferol (VITAMIN D3) 5000 units CAPS Take 5,000 Units by mouth daily       oxymorphone (OPANA) 5 MG tablet Take 5 mg by mouth 2 times daily. Indications: per DR Bravo Ortiz 2 x day      zoledronic acid (RECLAST) 5 MG/100ML SOLN Infuse 5 mg intravenously once IV, yearly      Cetirizine HCl (ZYRTEC PO) Take 1 tablet by mouth 2 times daily       aspirin 81 MG chewable tablet Take 81 mg by mouth daily.  Calcium Carbonate-Vit D-Min (CALCIUM 1200 PO) Take 1 capsule by mouth 2 times daily.  Ascorbic Acid (VITAMIN C) 500 MG tablet Take 500 mg by mouth daily.  fluocinolone (DERMA-SMOOTHE) 0.01 % external oil Apply 1 each topically Twice a Week      oxybutynin (DITROPAN-XL) 10 MG extended release tablet Take 1 tablet by mouth daily      fluocinolone acetonide (SYNALAR) 0.01 % external solution Apply topically BID as needed for itchy scalp.  270 mL 3    predniSONE (DELTASONE) 10 MG tablet Take 2 tablets bid for 3 days, 1 tablet bid for 2 days, 1 tablet daily for 2 days, then one half tablet daily 25 tablet 0    amLODIPine (NORVASC) 5 MG tablet Take 1 tablet by mouth daily 30 tablet 3    gabapentin (NEURONTIN) 300 MG capsule TAKE 2 CAPSULES THREE TIMES A  capsule 6     Facility-Administered Medications Prior to Visit   Medication Dose Route Frequency Provider Last Rate Last Dose    zoledronic acid (RECLAST) 5 mg/100 mL infusion  5 mg Intravenous See Admin Instructions Mahesh Alvarenga  mL/hr at 04/06/16 0900 5 mg at 04/06/16 0900       Patient'spast medical history, surgical history, family history, medications,  and allergies  were all reviewed and updated as appropriate today. Review of Systems   Constitutional: Negative for appetite change, fatigue and fever. Respiratory: Negative for chest tightness and shortness of breath. Cardiovascular: Negative for chest pain. Gastrointestinal: Negative for constipation and diarrhea. Musculoskeletal: Positive for back pain and gait problem. Skin: Negative for rash. /72   Pulse (!) 46   Temp 99.7 °F (37.6 °C) (Temporal)   Ht 5' (1.524 m)   Wt 181 lb 6.4 oz (82.3 kg)   BMI 35.43 kg/m²   Physical Exam  Vitals signs and nursing note reviewed. Constitutional:       Appearance: She is well-developed. She is not toxic-appearing. HENT:      Head: Normocephalic. Right Ear: Tympanic membrane, ear canal and external ear normal.      Left Ear: Tympanic membrane, ear canal and external ear normal.   Neck:      Thyroid: No thyroid mass or thyromegaly. Vascular: No carotid bruit. Cardiovascular:      Rate and Rhythm: Normal rate and regular rhythm. Pulses:           Dorsalis pedis pulses are 2+ on the right side and 2+ on the left side. Heart sounds: Normal heart sounds. No murmur. Comments: No LE edema  Pulmonary:      Effort: Pulmonary effort is normal.      Breath sounds: Normal breath sounds. Lymphadenopathy:      Cervical: No cervical adenopathy.    Neurological:      Mental Status: She is alert and oriented to person, place, and time. Gait: Gait abnormal (stiff). Psychiatric:         Mood and Affect: Affect normal. Mood is anxious. Behavior: Behavior is not aggressive. Behavior is cooperative. ASSESSMENT/PLAN:    Problem List Items Addressed This Visit     Chronic nasal congestion - Primary     Reassured patient that she may use nasal saline along with Flonase. Discouraged use of Sudafed in light of her hypertension and recommend use of Coricidin HBP as an alternative. Class 2 severe obesity due to excess calories with serious comorbidity and body mass index (BMI) of 38.0 to 38.9 in [facility])     Discussed weight loss and low-carb diet. DDD (degenerative disc disease), thoracolumbar     Follows with Dr. De Anne for pain. Add tizanidine for muscle relaxer as needed. Flexeril helped but gave her diarrhea. Relevant Medications    tiZANidine (ZANAFLEX) 2 MG tablet    Essential hypertension     Continue lisinopril 40 mg daily and hydrochlorothiazide 12.5 mg daily. Well-controlled. Hyperlipidemia LDL goal <100     Continue Crestor 10 mg daily. Memory impairment     MOCA exam at return. OAB (overactive bladder)     Increase oxybutynin ER to 15 mg daily. Encouraged to reach out to urology and gynecology. BEBE (obstructive sleep apnea)     Patient is status post surgical repair with UPPPV. May still require additional work-up and possible CPAP.          RESOLVED: Pain management          Current Outpatient Medications   Medication Sig Dispense Refill    oxybutynin (DITROPAN XL) 15 MG extended release tablet Take 1 tablet by mouth daily 30 tablet 3    tiZANidine (ZANAFLEX) 2 MG tablet Take 1 tablet by mouth every 8 hours as needed (pain and spasm) 10 tablet 0    fluticasone (FLONASE) 50 MCG/ACT nasal spray SPRAY TWO SPRAYS IN EACH NOSTRIL ONCE DAILY 3 Bottle 1    DULoxetine (CYMBALTA) 60 MG extended release capsule Take 1 capsule by mouth daily 90 capsule 3    hydrOXYzine (ATARAX) 50 MG tablet Take 1 tablet by mouth every 4 hours as needed for Itching 270 tablet 1    polyethylene glycol (GLYCOLAX) 17 GM/SCOOP powder 1/2-1 capful in 8 oz water or prune juice qd prn constipation. 3 Bottle 3    fluocinolone (DERMOTIC) 0.01 % OIL oil Place 5 drops in ear(s) 2 times daily as needed (itching ears) 30 mL 3    carBAMazepine (TEGRETOL-XR) 100 MG extended release tablet Take 1 tablet by mouth 2 times daily 60 tablet 3    diclofenac (VOLTAREN) 75 MG EC tablet Take 1 tablet by mouth 2 times daily 180 tablet 3    busPIRone (BUSPAR) 10 MG tablet Take 1 tablet by mouth 2 times daily 180 tablet 3    acetaminophen (TYLENOL) 500 MG tablet Take 1,000 mg by mouth 3 times daily as needed for Pain      fluocinolone (DERMOTIC) 0.01 % OIL oil Place 1 drop in ear(s) 2 times daily 1 Bottle 3    hydrochlorothiazide (MICROZIDE) 12.5 MG capsule TAKE 1 CAPSULE EVERY MORNING 90 capsule 4    traZODone (DESYREL) 100 MG tablet TAKE 3 TABLETS NIGHTLY 270 tablet 4    lisinopril (PRINIVIL;ZESTRIL) 40 MG tablet TAKE 1 TABLET DAILY 90 tablet 4    potassium chloride (KLOR-CON M) 20 MEQ extended release tablet TAKE 1 TABLET TWICE A  tablet 4    ketoconazole (NIZORAL) 2 % shampoo Apply topically daily as needed. 120 mL 2    diphenoxylate-atropine (LOMOTIL) 2.5-0.025 MG per tablet Take 1 tablet by mouth as needed for Diarrhea. .      glucose blood VI test strips (ONE TOUCH ULTRA TEST) strip 1 each by In Vitro route daily Fasting qam and As needed. 100 strip 1    Lancets MISC Check sugars fasting qam and prn 100 each 3    Cholecalciferol (VITAMIN D3) 5000 units CAPS Take 5,000 Units by mouth daily       oxymorphone (OPANA) 5 MG tablet Take 5 mg by mouth 2 times daily.  Indications: per DR Camila Puente 2 x day      zoledronic acid (RECLAST) 5 MG/100ML SOLN Infuse 5 mg intravenously once IV, yearly      Cetirizine HCl (ZYRTEC PO) Take 1 tablet by mouth 2 times daily       aspirin 81 MG chewable tablet Take 81 mg by mouth daily.  Calcium Carbonate-Vit D-Min (CALCIUM 1200 PO) Take 1 capsule by mouth 2 times daily.  Ascorbic Acid (VITAMIN C) 500 MG tablet Take 500 mg by mouth daily.  rosuvastatin (CRESTOR) 10 MG tablet TAKE 1 TABLET NIGHTLY 15 tablet 0    fluocinolone (DERMA-SMOOTHE) 0.01 % external oil Apply 1 each topically Twice a Week      gabapentin (NEURONTIN) 300 MG capsule TAKE 2 CAPSULES THREE TIMES A  capsule 6     Current Facility-Administered Medications   Medication Dose Route Frequency Provider Last Rate Last Dose    zoledronic acid (RECLAST) 5 mg/100 mL infusion  5 mg Intravenous See Admin Instructions Ratna Alvarado  mL/hr at 04/06/16 0900 5 mg at 04/06/16 0900   40 minutes were spent with patient today in face to face encounter, more than 50% of it was counseling regarding multiple complaints . Return in about 4 weeks (around 9/4/2020) for MOCA; AMW in 3 months.

## 2020-08-07 NOTE — PATIENT INSTRUCTIONS
For cold symptoms and congestion, you may safely try nasal saline,  Mucinex or Coridin HBP. Try to avoid medications labeled \"cold and sinus\" as these may raise your blood pressure or heart rate.

## 2020-08-14 RX ORDER — ROSUVASTATIN CALCIUM 10 MG/1
TABLET, COATED ORAL
Qty: 15 TABLET | Refills: 0 | Status: SHIPPED | OUTPATIENT
Start: 2020-08-14 | End: 2020-12-11 | Stop reason: SDUPTHER

## 2020-08-16 PROBLEM — N32.81 OAB (OVERACTIVE BLADDER): Status: ACTIVE | Noted: 2020-08-16

## 2020-08-16 PROBLEM — R52 PAIN MANAGEMENT: Status: RESOLVED | Noted: 2017-04-12 | Resolved: 2020-08-16

## 2020-08-16 NOTE — ASSESSMENT & PLAN NOTE
Follows with Dr. Rosanne Burleson for pain. Add tizanidine for muscle relaxer as needed. Flexeril helped but gave her diarrhea.

## 2020-08-16 NOTE — ASSESSMENT & PLAN NOTE
Reassured patient that she may use nasal saline along with Flonase. Discouraged use of Sudafed in light of her hypertension and recommend use of Coricidin HBP as an alternative.

## 2020-08-21 ENCOUNTER — APPOINTMENT (OUTPATIENT)
Dept: GENERAL RADIOLOGY | Age: 70
End: 2020-08-21
Payer: MEDICARE

## 2020-08-21 ENCOUNTER — HOSPITAL ENCOUNTER (EMERGENCY)
Age: 70
Discharge: HOME | End: 2020-08-21
Payer: MEDICARE

## 2020-08-21 VITALS
WEIGHT: 185 LBS | RESPIRATION RATE: 16 BRPM | DIASTOLIC BLOOD PRESSURE: 74 MMHG | TEMPERATURE: 99.1 F | HEART RATE: 82 BPM | SYSTOLIC BLOOD PRESSURE: 156 MMHG | HEIGHT: 60 IN | OXYGEN SATURATION: 10 % | BODY MASS INDEX: 36.32 KG/M2

## 2020-08-21 PROCEDURE — 6360000002 HC RX W HCPCS: Performed by: PHYSICIAN ASSISTANT

## 2020-08-21 PROCEDURE — 73090 X-RAY EXAM OF FOREARM: CPT

## 2020-08-21 PROCEDURE — 99283 EMERGENCY DEPT VISIT LOW MDM: CPT

## 2020-08-21 PROCEDURE — 73562 X-RAY EXAM OF KNEE 3: CPT

## 2020-08-21 PROCEDURE — 90471 IMMUNIZATION ADMIN: CPT | Performed by: PHYSICIAN ASSISTANT

## 2020-08-21 PROCEDURE — 90715 TDAP VACCINE 7 YRS/> IM: CPT | Performed by: PHYSICIAN ASSISTANT

## 2020-08-21 PROCEDURE — 73130 X-RAY EXAM OF HAND: CPT

## 2020-08-21 RX ORDER — BACITRACIN ZINC AND POLYMYXIN B SULFATE 500; 1000 [USP'U]/G; [USP'U]/G
OINTMENT TOPICAL
Status: DISCONTINUED
Start: 2020-08-21 | End: 2020-08-21 | Stop reason: HOSPADM

## 2020-08-21 RX ADMIN — TETANUS TOXOID, REDUCED DIPHTHERIA TOXOID AND ACELLULAR PERTUSSIS VACCINE, ADSORBED 0.5 ML: 5; 2.5; 8; 8; 2.5 SUSPENSION INTRAMUSCULAR at 17:03

## 2020-08-21 ASSESSMENT — ENCOUNTER SYMPTOMS
VOMITING: 0
NAUSEA: 0
RESPIRATORY NEGATIVE: 1
CONSTIPATION: 0
BACK PAIN: 0
COLOR CHANGE: 0
SHORTNESS OF BREATH: 0
COUGH: 0
DIARRHEA: 0
ABDOMINAL PAIN: 0
PHOTOPHOBIA: 0

## 2020-08-21 ASSESSMENT — PAIN SCALES - GENERAL
PAINLEVEL_OUTOF10: 8
PAINLEVEL_OUTOF10: 7

## 2020-08-21 NOTE — ED NOTES
Pt splint placed by Debbie Cardenas,   Pt fingers natural in color, cap refill less that 3 seconds. Walked pt out to the parking lot, Pt vss   Pt has no distress.       Trino CanoGeising[facility]  08/21/20 [id number]

## 2020-08-21 NOTE — ED PROVIDER NOTES
[address] [facility]        Pt Name: Gloria Carpenter  MRN: [id number]  [place] 1950  Date of evaluation: 8/21/2020  Provider: HE Hameed  PCP: Sierra Jeff, DO    Evaluation by MARGARET. My supervising physician was available for consultation. CHIEF COMPLAINT       Chief Complaint   Patient presents with    Fall     pt states she fell around 1100- was cleaning out shed, backed up and fell over pile of shingles. pt states did not hit head, with c/o chronic back issues- with c/o pain to right arm/hand       HISTORY OF PRESENT ILLNESS   (Location, Timing/Onset, Context/Setting, Quality, Duration, Modifying Factors, Severity, Associated Signs and Symptoms)  Note limiting factors. Gloria Carpenter is a 79 y.o. female with past medical show anxiety, osteoarthritis, osteoporosis, previous rheumatic fever who presents to the ED plan of a fall. States around 11 she was in her shed cleaning out the shed. Patient states she backed up and tripped over a stack of shingles. Patient states she fell backwards. States she injured her right side. Denies any head trauma or loss of consciousness. States she does have some chronic back problems but denies any injury to her back. Denies any injury to her hip. Patient states she has bruising and abrasion to her right knee, right forearm and right hand. Patient states she has sharp aching pain rated 8/10. Patient states pain worse with palpation and attempted movement. States decreased range of motion and strength due to pain. Patient denies any blood thinners other than aspirin. Patient states she does take Opana at home for chronic back pain. Declines need for pain medication at this time. Unsure of last tetanus vaccine. Denies any other injury or trauma throughout. Has been able to ambulate since the fall.   Denies headache, neck pain, bowel/bladder incontinence, retention, saddle anesthesia, distal numbness/tingling, visual changes, speech disturbances, lightheadedness/dizziness or other associated symptoms this time. Nursing Notes were all reviewed and agreed with or any disagreements were addressed in the HPI. REVIEW OF SYSTEMS    (2-9 systems for level 4, 10 or more for level 5)     Review of Systems   Constitutional: Negative for activity change, appetite change, chills, diaphoresis, fatigue and fever. Eyes: Negative for photophobia and visual disturbance. Respiratory: Negative. Negative for cough and shortness of breath. Cardiovascular: Negative. Negative for chest pain. Gastrointestinal: Negative for abdominal pain, constipation, diarrhea, nausea and vomiting. Genitourinary: Negative for decreased urine volume, difficulty urinating, dysuria, flank pain, frequency, hematuria and urgency. Musculoskeletal: Positive for arthralgias, joint swelling and myalgias. Negative for back pain, gait problem, neck pain and neck stiffness. Skin: Positive for wound. Negative for color change, pallor and rash. Neurological: Negative for dizziness, tremors, seizures, syncope, facial asymmetry, speech difficulty, weakness, light-headedness, numbness and headaches. Positives and Pertinent negatives as per HPI. Except as noted above in the ROS, all other systems were reviewed and negative.        PAST MEDICAL HISTORY     Past Medical History:   Diagnosis Date    Anxiety     Clostridium difficile infection 2/22/15    Hypertension     Insomnia disorder     Osteoarthritis     Osteoporosis 2008    Rheumatic fever     S/P insertion of spinal cord stimulator     Self-catheterizes urinary bladder     as needed 7/8 no longer catherizing     Urinary retention          SURGICAL HISTORY     Past Surgical History:   Procedure Laterality Date    BLADDER REPAIR      CARPAL TUNNEL RELEASE      COLONOSCOPY      CYSTOSCOPY  10/29/2012    bladder biopsy    DENTAL SURGERY      FOOT SURGERY      HYSTERECTOMY      INTRACAPSULAR CATARACT EXTRACTION Right 7/18/2019    PHACOEMULSIFICATION WITH INTRAOCULAR LENS IMPLANT performed by Luis Miguel Gill MD at [facility] EXTRACTION Left 7/26/2019    PHACOEMULSIFICATION WITH INTRAOCULAR LENS IMPLANT performed by Luis Miguel Gill MD at [place] [address]  2004    MANDIBLE RECONSTRUCTION      OTHER SURGICAL HISTORY      spinal cord stimulator    TOTAL KNEE ARTHROPLASTY Right 10/18/13         CURRENTMEDICATIONS       Discharge Medication List as of 8/21/2020  6:43 PM      CONTINUE these medications which have NOT CHANGED    Details   rosuvastatin (CRESTOR) 10 MG tablet TAKE 1 TABLET NIGHTLY, Disp-15 tablet,R-0Normal      fluocinolone (DERMA-SMOOTHE) 0.01 % external oil Apply 1 each topically Twice a Week, Topical, TWICE WEEKLY Starting Sat 6/20/2020, Historical Med      oxybutynin (DITROPAN XL) 15 MG extended release tablet Take 1 tablet by mouth daily, Disp-30 tablet,R-3Replaces oxybutinin ER 10 mgNormal      tiZANidine (ZANAFLEX) 2 MG tablet Take 1 tablet by mouth every 8 hours as needed (pain and spasm), Disp-10 tablet,R-0Normal      fluticasone (FLONASE) 50 MCG/ACT nasal spray SPRAY TWO SPRAYS IN EACH NOSTRIL ONCE DAILY, Disp-3 Bottle,R-1Normal      DULoxetine (CYMBALTA) 60 MG extended release capsule Take 1 capsule by mouth daily, Disp-90 capsule,R-3Normal      hydrOXYzine (ATARAX) 50 MG tablet Take 1 tablet by mouth every 4 hours as needed for Itching, Disp-270 tablet,R-1Normal      polyethylene glycol (GLYCOLAX) 17 GM/SCOOP powder 1/2-1 capful in 8 oz water or prune juice qd prn constipation. , Disp-3 Bottle,R-3Normal      !! fluocinolone (DERMOTIC) 0.01 % OIL oil Place 5 drops in ear(s) 2 times daily as needed (itching ears), Disp-30 mL,R-3Normal      carBAMazepine (TEGRETOL-XR) 100 MG extended release tablet Take 1 tablet by mouth 2 times daily, Disp-60 tablet, R-3Normal diclofenac (VOLTAREN) 75 MG EC tablet Take 1 tablet by mouth 2 times daily, Disp-180 tablet, R-3Normal      busPIRone (BUSPAR) 10 MG tablet Take 1 tablet by mouth 2 times daily, Disp-180 tablet, R-3Normal      acetaminophen (TYLENOL) 500 MG tablet Take 1,000 mg by mouth 3 times daily as needed for PainHistorical Med      !! fluocinolone (DERMOTIC) 0.01 % OIL oil Place 1 drop in ear(s) 2 times daily, Disp-1 Bottle, R-3Normal      hydrochlorothiazide (MICROZIDE) 12.5 MG capsule TAKE 1 CAPSULE EVERY MORNING, Disp-90 capsule, R-4Normal      traZODone (DESYREL) 100 MG tablet TAKE 3 TABLETS NIGHTLY, Disp-270 tablet, R-4Normal      lisinopril (PRINIVIL;ZESTRIL) 40 MG tablet TAKE 1 TABLET DAILY, Disp-90 tablet, R-4Normal      potassium chloride (KLOR-CON M) 20 MEQ extended release tablet TAKE 1 TABLET TWICE A DAY, Disp-180 tablet, R-4Normal      ketoconazole (NIZORAL) 2 % shampoo Apply topically daily as needed. , Disp-120 mL, R-2, Print      diphenoxylate-atropine (LOMOTIL) 2.5-0.025 MG per tablet Take 1 tablet by mouth as needed for Diarrhea. .Historical Med      glucose blood VI test strips (ONE TOUCH ULTRA TEST) strip DAILY Starting Mon 5/21/2018, Disp-100 strip, R-1, NormalFasting qam and As needed. Lancets MISC Disp-100 each, R-3, NormalCheck sugars fasting qam and prn      Cholecalciferol (VITAMIN D3) 5000 units CAPS Take 5,000 Units by mouth daily Historical Med      oxymorphone (OPANA) 5 MG tablet Take 5 mg by mouth 2 times daily. Indications: per DR Camila Puente 2 x dayHistorical Med      zoledronic acid (RECLAST) 5 MG/100ML SOLN Infuse 5 mg intravenously once IV, yearly      Cetirizine HCl (ZYRTEC PO) Take 1 tablet by mouth 2 times daily Historical Med      aspirin 81 MG chewable tablet Take 81 mg by mouth daily. Calcium Carbonate-Vit D-Min (CALCIUM 1200 PO) Take 1 capsule by mouth 2 times daily. Ascorbic Acid (VITAMIN C) 500 MG tablet Take 500 mg by mouth daily.         gabapentin (NEURONTIN) 300 MG capsule TAKE 2 CAPSULES THREE TIMES A DAY, Disp-360 capsule, R-6Normal       !! - Potential duplicate medications found. Please discuss with provider. ALLERGIES     Ativan [lorazepam]; Sulfa antibiotics; and Keflex [cephalexin]    FAMILYHISTORY       Family History   Problem Relation Age of Onset    COPD Mother     High Blood Pressure Mother     Rheum Arthritis Mother     Thyroid Disease Mother     Alcohol Abuse Father     Clotting Disorder Sister     Thyroid Disease Sister     Hypertension Brother     Diabetes Sister     Heart Disease Sister     Hypertension Sister     Thyroid Disease Sister     Cancer Brother     Heart Disease Brother     Hypertension Brother     Substance Abuse Brother     No Known Problems Son     No Known Problems Daughter           SOCIAL HISTORY       Social History     Tobacco Use    Smoking status: Never Smoker    Smokeless tobacco: Never Used   Substance Use Topics    Alcohol use: Not Currently    Drug use: No       SCREENINGS             PHYSICAL EXAM    (up to 7 for level 4, 8 or more for level 5)     ED Triage Vitals [08/21/20 1420]   BP Temp Temp Source Pulse Resp SpO2 Height Weight   123/77 99.1 °F (37.3 °C) Oral 94 16 92 % 5' (1.524 m) 185 lb (83.9 kg)       Physical Exam  Constitutional:       General: She is not in acute distress. Appearance: Normal appearance. She is well-developed. She is not ill-appearing, toxic-appearing or diaphoretic. HENT:      Head: Normocephalic and atraumatic. Comments: Atraumatic. No raccoon eyes or rider sign. No epistaxis. No septal hematoma. No trismus or jaw malocclusion. No evidence of [place] fracture. Right Ear: External ear normal.      Left Ear: External ear normal.      Mouth/Throat:      Mouth: Mucous membranes are moist.      Pharynx: No oropharyngeal exudate or posterior oropharyngeal erythema. Eyes:      General:         Right eye: No discharge. Left eye: No discharge. Extraocular Movements: Extraocular movements intact. Conjunctiva/sclera: Conjunctivae normal.      Pupils: Pupils are equal, round, and reactive to light. Neck:      Musculoskeletal: Normal range of motion and neck supple. No neck rigidity or muscular tenderness. Cardiovascular:      Rate and Rhythm: Normal rate and regular rhythm. Pulses: Normal pulses. Heart sounds: Normal heart sounds. No murmur. No friction rub. No gallop. Pulmonary:      Effort: Pulmonary effort is normal. No respiratory distress. Breath sounds: Normal breath sounds. No stridor. No wheezing, rhonchi or rales. Chest:      Chest wall: No tenderness. Abdominal:      General: Abdomen is flat. There is no distension. Palpations: Abdomen is soft. There is no mass. Tenderness: There is no abdominal tenderness. There is no right CVA tenderness, left CVA tenderness, guarding or rebound. Hernia: No hernia is present. Musculoskeletal: Normal range of motion. Comments: No TTP to midline cervical, thoracic or lumbar spine. No anterior chest wall tenderness. No pelvis instability. Patient does have what appears to be abrasion and edema/ecchymoses noted to the right lateral knee. Full range of motion and strength. Gait normal.  Distal neurovascular intact. No other TTP to the lower extremities bilaterally. Patient has associated edema, ecchymoses and abrasion/skin tear noted to the right forearm. Also has bruising and tenderness to palpation over the right hand specifically over the fourth and fifth digits. No anatomical snuffbox tenderness. Radial pulse and capillary refill brisk. Sensation intact to radial ulnar aspects of distal fingertips. Decreased range of motion and strength at the fourth and fifth digits due to pain and swelling. Full range of motion strength of the elbow and shoulder. No other tenderness to palpation diffusely throughout the upper extremities bilaterally. Lymphadenopathy:      Cervical: No cervical adenopathy. Skin:     General: Skin is warm and dry. Coloration: Skin is not pale. Findings: No erythema or rash. Neurological:      General: No focal deficit present. Mental Status: She is alert and oriented to person, place, and time. GCS: GCS eye subscore is 4. GCS verbal subscore is 5. GCS motor subscore is 6. Cranial Nerves: Cranial nerves are intact. No cranial nerve deficit. Sensory: No sensory deficit. Motor: Motor function is intact. Gait: Gait is intact. Gait normal.   Psychiatric:         Behavior: Behavior normal.         DIAGNOSTIC RESULTS   LABS:    Labs Reviewed - No data to display    All other labs were within normal range or not returned as of this dictation. EKG: All EKG's are interpreted by the Emergency Department Physician in the absence of a cardiologist.  Please see their note for interpretation of EKG. RADIOLOGY:   Non-plain film images such as CT, Ultrasound and MRI are read by the radiologist. Plain radiographic images are visualized and preliminarily interpreted by the ED Provider with the below findings:        Interpretation per the Radiologist below, if available at the time of this note:    XR RADIUS ULNA RIGHT (2 VIEWS)   Final Result   Left forearm:  Unremarkable. Degenerative changes present. Right hand:  Acute appearing fracture involving the proximal end of the   proximal phalanx of the 5th finger in the setting of extensive background   degenerative changes. Extension to the proximal articular surface may be   present. Recommend close orthopedic follow-up. XR HAND RIGHT (MIN 3 VIEWS)   Final Result   Left forearm:  Unremarkable. Degenerative changes present. Right hand:  Acute appearing fracture involving the proximal end of the   proximal phalanx of the 5th finger in the setting of extensive background   degenerative changes.   Extension to the proximal

## 2020-08-24 ENCOUNTER — CARE COORDINATION (OUTPATIENT)
Dept: CARE COORDINATION | Age: 70
End: 2020-08-24

## 2020-08-24 NOTE — CARE COORDINATION
Reviewed goal for medication organization and home delivery for ease of use and improved adherence. [facility] is expecting to get medications delivered this week. Ambulatory Care Coordination  ED Follow up Call    Call to follow up on ED visit. Patient sustained fracture to right small finger and multiple skin tears and bruises. Reason for ED visit:  Fall with injuries including fracture to finger   Status:     not changed    Did you call your PCP prior to going to the ED? No      Did you receive a discharge instructions from the Emergency Room? Yes  Review of Instructions:     Understands what to report/when to return?:  Yes   Understands discharge instructions?:  Yes   Following discharge instructions?:  Yes       Are there any new complaints of pain? No  New Pain Meds? No    Constipation prophylaxis needed? N/A    If you have a wound is the dressing clean, dry, and intact? n/a  Understands wound care regimen? N/A    Are there any other complaints/concerns that you wish to tell your provider? No     FU appts/Provider:    Future Appointments   Date Time Provider Sung Kovacs   9/1/2020  9:00 AM DO GINA Moses   11/13/2020 10:00 AM DO GINA Moses           New Medications?:   No      Medication Reconciliation by phone - Yes  Understands Medications? Yes  Taking Medications? Yes  Can you swallow your pills? Yes    Any further needs in the home i.e. Equipment?   Not Applicable    Link to services in community?:  No

## 2020-08-26 RX ORDER — TIZANIDINE 2 MG/1
TABLET ORAL
Qty: 10 TABLET | Refills: 0 | Status: SHIPPED | OUTPATIENT
Start: 2020-08-26 | End: 2020-09-04 | Stop reason: SDUPTHER

## 2020-09-04 RX ORDER — HYDROXYZINE 50 MG/1
50 TABLET, FILM COATED ORAL EVERY 4 HOURS PRN
Qty: 270 TABLET | Refills: 1 | Status: SHIPPED | OUTPATIENT
Start: 2020-09-04 | End: 2022-09-09

## 2020-09-04 RX ORDER — TIZANIDINE 2 MG/1
2 TABLET ORAL EVERY 8 HOURS PRN
Qty: 60 TABLET | Refills: 2 | Status: SHIPPED
Start: 2020-09-04 | End: 2021-06-15 | Stop reason: SINTOL

## 2020-09-04 NOTE — TELEPHONE ENCOUNTER
Pt called for refill on Tizanidine HCL(requesting more than 10 pills) & Hydroxyzine HCL    [facility]

## 2020-09-29 ENCOUNTER — OFFICE VISIT (OUTPATIENT)
Dept: INTERNAL MEDICINE CLINIC | Age: 70
End: 2020-09-29
Payer: MEDICARE

## 2020-09-29 VITALS
BODY MASS INDEX: 35.08 KG/M2 | SYSTOLIC BLOOD PRESSURE: 110 MMHG | HEART RATE: 80 BPM | DIASTOLIC BLOOD PRESSURE: 78 MMHG | WEIGHT: 179.6 LBS | TEMPERATURE: 97.2 F | OXYGEN SATURATION: 93 %

## 2020-09-29 DIAGNOSIS — R41.89 COGNITIVE IMPAIRMENT: ICD-10-CM

## 2020-09-29 LAB
FOLATE: 15.82 NG/ML (ref 4.78–24.2)
VITAMIN B-12: 349 PG/ML (ref 211–911)

## 2020-09-29 PROCEDURE — G0009 ADMIN PNEUMOCOCCAL VACCINE: HCPCS | Performed by: INTERNAL MEDICINE

## 2020-09-29 PROCEDURE — 90732 PPSV23 VACC 2 YRS+ SUBQ/IM: CPT | Performed by: INTERNAL MEDICINE

## 2020-09-29 PROCEDURE — 99214 OFFICE O/P EST MOD 30 MIN: CPT | Performed by: INTERNAL MEDICINE

## 2020-09-29 RX ORDER — GABAPENTIN 400 MG/1
400 CAPSULE ORAL 3 TIMES DAILY
Qty: 90 CAPSULE | Refills: 1 | Status: SHIPPED | OUTPATIENT
Start: 2020-09-29 | End: 2020-10-26

## 2020-09-29 NOTE — PROGRESS NOTES
Patient: Ashlie Saunders is a 79 y.o. female who presents today with the following Chief Complaint(s):  Chief Complaint   Patient presents with    Check-Up       HPI     Here today for evaluation of memory. Has noticed that she has been misplacing things, cannot recall where she parked her car, what she ate for lunch, names. No late bills. No problems remembering to medications. No difficulties with driving, no difficulties completing tasks around the home. Mood is not good. Has days where depression is worse than others. Will have days where she does not talk to anyone. Has a daughter who has not spoken to her in over 1 year. Sleep is interrupted d/t pain. Gabapentin dose is 600 mg TID. Medications come from [facility]. Has found that Diclofenac has been very helpful with regards to pain. Would like to take it regularly.      Allergies   Allergen Reactions    Ativan [Lorazepam] Swelling     Tongue swelling    Sulfa Antibiotics Swelling    Keflex [Cephalexin] Other (See Comments)     Leg cramps      Past Medical History:   Diagnosis Date    Anxiety     Clostridium difficile infection 2/22/15    Hypertension     Insomnia disorder     Osteoarthritis     Osteoporosis 2008    Rheumatic fever     S/P insertion of spinal cord stimulator     Self-catheterizes urinary bladder     as needed 7/8 no longer catherizing     Urinary retention       Past Surgical History:   Procedure Laterality Date    BLADDER REPAIR      CARPAL TUNNEL RELEASE      COLONOSCOPY      CYSTOSCOPY  10/29/2012    bladder biopsy    DENTAL SURGERY      FOOT SURGERY      HYSTERECTOMY      INTRACAPSULAR CATARACT EXTRACTION Right 7/18/2019    PHACOEMULSIFICATION WITH INTRAOCULAR LENS IMPLANT performed by Pollo Karimi MD at [address] [place] [address] EXTRACTION Left 7/26/2019    PHACOEMULSIFICATION WITH INTRAOCULAR LENS IMPLANT performed by Pollo Karimi MD at [place] [address] LUMBAR SPINE SURGERY  2004    MANDIBLE RECONSTRUCTION      OTHER SURGICAL HISTORY      spinal cord stimulator    TOTAL KNEE ARTHROPLASTY Right 10/18/13      Social History     Socioeconomic History    Marital status:      Spouse name: Not on file    Number of children: Not on file    Years of education: Not on file    Highest education level: Not on file   Occupational History    Occupation: retired   Social Needs    Financial resource strain: Not on file    Food insecurity     Worry: Never true     Inability: Never true   [employer] needs     Medical: No     Non-medical: No   Tobacco Use    Smoking status: Never Smoker    Smokeless tobacco: Never Used   Substance and Sexual Activity    Alcohol use: Not Currently    Drug use: No    Sexual activity: Not on file   Lifestyle    Physical activity     Days per week: Not on file     Minutes per session: Not on file    Stress: Not on file   Relationships    Social connections     Talks on phone: Not on file     Gets together: Not on file     Attends Caodaism service: Not on file     Active member of club or organization: Not on file     Attends meetings of clubs or organizations: Not on file     Relationship status:      Intimate partner violence     Fear of current or ex partner: Not on file     Emotionally abused: Not on file     Physically abused: Not on file     Forced sexual activity: Not on file   Other Topics Concern    Not on file   Social History Narrative    Not on file     Family History   Problem Relation Age of Onset    COPD Mother     High Blood Pressure Mother     Rheum Arthritis Mother     Thyroid Disease Mother     Alcohol Abuse Father     Clotting Disorder Sister     Thyroid Disease Sister     Hypertension Brother     Diabetes Sister     Heart Disease Sister     Hypertension Sister     Thyroid Disease Sister     Cancer Brother     Heart Disease Brother     Hypertension Brother     Substance Abuse Brother     No Known Problems Son     No Known Problems Daughter         Outpatient Medications Prior to Visit   Medication Sig Dispense Refill    hydrOXYzine (ATARAX) 50 MG tablet Take 1 tablet by mouth every 4 hours as needed for Itching 270 tablet 1    tiZANidine (ZANAFLEX) 2 MG tablet Take 1 tablet by mouth every 8 hours as needed (prn) 60 tablet 2    rosuvastatin (CRESTOR) 10 MG tablet TAKE 1 TABLET NIGHTLY 15 tablet 0    fluocinolone (DERMA-SMOOTHE) 0.01 % external oil Apply 1 each topically Twice a Week      oxybutynin (DITROPAN XL) 15 MG extended release tablet Take 1 tablet by mouth daily 30 tablet 3    fluticasone (FLONASE) 50 MCG/ACT nasal spray SPRAY TWO SPRAYS IN EACH NOSTRIL ONCE DAILY 3 Bottle 1    DULoxetine (CYMBALTA) 60 MG extended release capsule Take 1 capsule by mouth daily 90 capsule 3    polyethylene glycol (GLYCOLAX) 17 GM/SCOOP powder 1/2-1 capful in 8 oz water or prune juice qd prn constipation.  3 Bottle 3    fluocinolone (DERMOTIC) 0.01 % OIL oil Place 5 drops in ear(s) 2 times daily as needed (itching ears) 30 mL 3    carBAMazepine (TEGRETOL-XR) 100 MG extended release tablet Take 1 tablet by mouth 2 times daily 60 tablet 3    diclofenac (VOLTAREN) 75 MG EC tablet Take 1 tablet by mouth 2 times daily 180 tablet 3    busPIRone (BUSPAR) 10 MG tablet Take 1 tablet by mouth 2 times daily 180 tablet 3    acetaminophen (TYLENOL) 500 MG tablet Take 1,000 mg by mouth 3 times daily as needed for Pain      fluocinolone (DERMOTIC) 0.01 % OIL oil Place 1 drop in ear(s) 2 times daily 1 Bottle 3    hydrochlorothiazide (MICROZIDE) 12.5 MG capsule TAKE 1 CAPSULE EVERY MORNING 90 capsule 4    traZODone (DESYREL) 100 MG tablet TAKE 3 TABLETS NIGHTLY 270 tablet 4    lisinopril (PRINIVIL;ZESTRIL) 40 MG tablet TAKE 1 TABLET DAILY 90 tablet 4    potassium chloride (KLOR-CON M) 20 MEQ extended release tablet TAKE 1 TABLET TWICE A  tablet 4    ketoconazole (NIZORAL) 2 % shampoo Apply topically daily as needed. 120 mL 2    diphenoxylate-atropine (LOMOTIL) 2.5-0.025 MG per tablet Take 1 tablet by mouth as needed for Diarrhea. .      glucose blood VI test strips (ONE TOUCH ULTRA TEST) strip 1 each by In Vitro route daily Fasting qam and As needed. 100 strip 1    Lancets MISC Check sugars fasting qam and prn 100 each 3    Cholecalciferol (VITAMIN D3) 5000 units CAPS Take 5,000 Units by mouth daily       oxymorphone (OPANA) 5 MG tablet Take 5 mg by mouth 2 times daily. Indications: per DR Matthew Velasco 2 x day      zoledronic acid (RECLAST) 5 MG/100ML SOLN Infuse 5 mg intravenously once IV, yearly      Cetirizine HCl (ZYRTEC PO) Take 1 tablet by mouth 2 times daily       aspirin 81 MG chewable tablet Take 81 mg by mouth daily.  Calcium Carbonate-Vit D-Min (CALCIUM 1200 PO) Take 1 capsule by mouth 2 times daily.  Ascorbic Acid (VITAMIN C) 500 MG tablet Take 500 mg by mouth daily.  gabapentin (NEURONTIN) 300 MG capsule TAKE 2 CAPSULES THREE TIMES A  capsule 6     Facility-Administered Medications Prior to Visit   Medication Dose Route Frequency Provider Last Rate Last Dose    zoledronic acid (RECLAST) 5 mg/100 mL infusion  5 mg Intravenous See Admin Instructions Eduardo Jhaveri  mL/hr at 04/06/16 0900 5 mg at 04/06/16 0900       Patient'spast medical history, surgical history, family history, medications,  and allergies  were all reviewed and updated as appropriate today. Review of Systems   Constitutional: Positive for fatigue. Negative for appetite change and fever. Respiratory: Negative for chest tightness and shortness of breath. Cardiovascular: Negative for chest pain. Gastrointestinal: Negative for constipation and diarrhea. Musculoskeletal: Positive for back pain and gait problem. Skin: Negative for rash. Psychiatric/Behavioral: Positive for dysphoric mood. The patient is nervous/anxious.         /78   Pulse 80   Temp 97.2 °F (36.2 °C) versus medications, specifically gabapentin, pain medication. Have asked her to return to Dr. Braydon Mccrary for psychiatric evaluation and to see Dr. Luis Aldridge for neurology. Check CT head, B12 and folate, syphilis. TSH has been normal on recent laboratory work. Relevant Orders    Amado Joyce MD, Neurology, [facility]    External Referral to Psychiatry    VITAMIN B12 & FOLATE (Completed)    Syphilis Antibody Cascading Reflex (Completed)    Depressive disorder     Patient has previously seen Dr. Wilfrid Isaac. Remains on Cymbalta 60 mg daily with BuSpar 7.5 mg 3 times daily as needed. Going to ask her to return to Dr. Wilfrid Isaac to try to help with  her cognitive impairment and her anxiety and depression versus dementia versus a combination of of all 3. Relevant Orders    External Referral to Psychiatry    Fibromyalgia     Patient follows with Dr. Angelique Hamman for pain. We will try to decrease her dose of gabapentin from 600 mg 3 times daily to 400 mg 3 times daily in case gabapentin is contributing to her memory problems. Relevant Medications    gabapentin (NEURONTIN) 400 MG capsule    Hyperlipidemia LDL goal <100    BEBE (obstructive sleep apnea)     Patient is status post surgical repair with UPPPV. May still require additional work-up and possible CPAP. Discussed that this may also be contributing to her memory problems. Current Outpatient Medications   Medication Sig Dispense Refill    gabapentin (NEURONTIN) 400 MG capsule Take 1 capsule by mouth 3 times daily for 60 days.  90 capsule 1    hydrOXYzine (ATARAX) 50 MG tablet Take 1 tablet by mouth every 4 hours as needed for Itching 270 tablet 1    tiZANidine (ZANAFLEX) 2 MG tablet Take 1 tablet by mouth every 8 hours as needed (prn) 60 tablet 2    rosuvastatin (CRESTOR) 10 MG tablet TAKE 1 TABLET NIGHTLY 15 tablet 0    fluocinolone (DERMA-SMOOTHE) 0.01 % external oil Apply 1 each topically Twice a Week      oxybutynin (DITROPAN XL) 15 MG extended release tablet Take 1 tablet by mouth daily 30 tablet 3    fluticasone (FLONASE) 50 MCG/ACT nasal spray SPRAY TWO SPRAYS IN EACH NOSTRIL ONCE DAILY 3 Bottle 1    DULoxetine (CYMBALTA) 60 MG extended release capsule Take 1 capsule by mouth daily 90 capsule 3    polyethylene glycol (GLYCOLAX) 17 GM/SCOOP powder 1/2-1 capful in 8 oz water or prune juice qd prn constipation. 3 Bottle 3    fluocinolone (DERMOTIC) 0.01 % OIL oil Place 5 drops in ear(s) 2 times daily as needed (itching ears) 30 mL 3    carBAMazepine (TEGRETOL-XR) 100 MG extended release tablet Take 1 tablet by mouth 2 times daily 60 tablet 3    diclofenac (VOLTAREN) 75 MG EC tablet Take 1 tablet by mouth 2 times daily 180 tablet 3    busPIRone (BUSPAR) 10 MG tablet Take 1 tablet by mouth 2 times daily 180 tablet 3    acetaminophen (TYLENOL) 500 MG tablet Take 1,000 mg by mouth 3 times daily as needed for Pain      fluocinolone (DERMOTIC) 0.01 % OIL oil Place 1 drop in ear(s) 2 times daily 1 Bottle 3    hydrochlorothiazide (MICROZIDE) 12.5 MG capsule TAKE 1 CAPSULE EVERY MORNING 90 capsule 4    traZODone (DESYREL) 100 MG tablet TAKE 3 TABLETS NIGHTLY 270 tablet 4    lisinopril (PRINIVIL;ZESTRIL) 40 MG tablet TAKE 1 TABLET DAILY 90 tablet 4    potassium chloride (KLOR-CON M) 20 MEQ extended release tablet TAKE 1 TABLET TWICE A  tablet 4    ketoconazole (NIZORAL) 2 % shampoo Apply topically daily as needed. 120 mL 2    diphenoxylate-atropine (LOMOTIL) 2.5-0.025 MG per tablet Take 1 tablet by mouth as needed for Diarrhea. .      glucose blood VI test strips (ONE TOUCH ULTRA TEST) strip 1 each by In Vitro route daily Fasting qam and As needed. 100 strip 1    Lancets MISC Check sugars fasting qam and prn 100 each 3    Cholecalciferol (VITAMIN D3) 5000 units CAPS Take 5,000 Units by mouth daily       oxymorphone (OPANA) 5 MG tablet Take 5 mg by mouth 2 times daily.  Indications: per  [place] 2 x day      zoledronic acid (RECLAST) 5 MG/100ML SOLN Infuse 5 mg intravenously once IV, yearly      Cetirizine HCl (ZYRTEC PO) Take 1 tablet by mouth 2 times daily       aspirin 81 MG chewable tablet Take 81 mg by mouth daily.  Calcium Carbonate-Vit D-Min (CALCIUM 1200 PO) Take 1 capsule by mouth 2 times daily.  Ascorbic Acid (VITAMIN C) 500 MG tablet Take 500 mg by mouth daily.  Cyanocobalamin (B-12) 1000 MCG SUBL Place 1,000 Units under the tongue daily 90 tablet 3     Current Facility-Administered Medications   Medication Dose Route Frequency Provider Last Rate Last Dose    zoledronic acid (RECLAST) 5 mg/100 mL infusion  5 mg Intravenous See Admin Instructions Isa Merchant  mL/hr at 04/06/16 0900 5 mg at 04/06/16 0900       Return in about 3 months (around 12/29/2020) for move November appt to December. f/u.

## 2020-09-30 LAB — TOTAL SYPHILLIS IGG/IGM: NORMAL

## 2020-09-30 PROCEDURE — 90694 VACC AIIV4 NO PRSRV 0.5ML IM: CPT | Performed by: INTERNAL MEDICINE

## 2020-09-30 PROCEDURE — G0008 ADMIN INFLUENZA VIRUS VAC: HCPCS | Performed by: INTERNAL MEDICINE

## 2020-10-01 RX ORDER — MAGNESIUM 200 MG
1000 TABLET ORAL DAILY
Qty: 90 TABLET | Refills: 3 | Status: SHIPPED | OUTPATIENT
Start: 2020-10-01 | End: 2020-10-02 | Stop reason: SDUPTHER

## 2020-10-02 RX ORDER — MAGNESIUM 200 MG
1000 TABLET ORAL DAILY
Qty: 90 TABLET | Refills: 3 | Status: SHIPPED | OUTPATIENT
Start: 2020-10-02

## 2020-10-04 PROBLEM — F32.A DEPRESSIVE DISORDER: Status: ACTIVE | Noted: 2020-10-04

## 2020-10-04 PROBLEM — R41.89 COGNITIVE IMPAIRMENT: Status: ACTIVE | Noted: 2019-08-31

## 2020-10-04 ASSESSMENT — ENCOUNTER SYMPTOMS
DIARRHEA: 0
CHEST TIGHTNESS: 0
CONSTIPATION: 0
BACK PAIN: 1
SHORTNESS OF BREATH: 0

## 2020-10-04 NOTE — ASSESSMENT & PLAN NOTE
MOCA 19/20 points. Is not interfering with self-care. Unclear if this represents dementia versus pseudodementia associated with depression and anxiety versus under treated sleep apnea versus medications, specifically gabapentin, pain medication. Have asked her to return to Dr. Stephanie Mckeon for psychiatric evaluation and to see Dr. Alexsander Hardy for neurology. Check CT head, B12 and folate, syphilis. TSH has been normal on recent laboratory work.

## 2020-10-04 NOTE — ASSESSMENT & PLAN NOTE
Patient follows with Dr. Ashley Mathis for pain. We will try to decrease her dose of gabapentin from 600 mg 3 times daily to 400 mg 3 times daily in case gabapentin is contributing to her memory problems.

## 2020-10-04 NOTE — ASSESSMENT & PLAN NOTE
Patient has previously seen Dr. Marisol Gordillo. Remains on Cymbalta 60 mg daily with BuSpar 7.5 mg 3 times daily as needed. Going to ask her to return to Dr. Marisol Gordillo to try to help with  her cognitive impairment and her anxiety and depression versus dementia versus a combination of of all 3.

## 2020-10-04 NOTE — ASSESSMENT & PLAN NOTE
Patient is status post surgical repair with UPPPV. May still require additional work-up and possible CPAP. Discussed that this may also be contributing to her memory problems.

## 2020-10-04 NOTE — ASSESSMENT & PLAN NOTE
Patient has previously seen Dr. Talat Ward. Remains on Cymbalta 60 mg daily with BuSpar 7.5 mg 3 times daily as needed. Going to ask her to return to Dr. Talat Ward to try to help with  her cognitive impairment and her anxiety and depression versus dementia versus a combination of of all 3.

## 2020-10-06 ENCOUNTER — VIRTUAL VISIT (OUTPATIENT)
Dept: INTERNAL MEDICINE CLINIC | Age: 70
End: 2020-10-06
Payer: MEDICARE

## 2020-10-06 PROCEDURE — G0439 PPPS, SUBSEQ VISIT: HCPCS | Performed by: INTERNAL MEDICINE

## 2020-10-06 ASSESSMENT — PATIENT HEALTH QUESTIONNAIRE - PHQ9
SUM OF ALL RESPONSES TO PHQ9 QUESTIONS 1 & 2: 0
2. FEELING DOWN, DEPRESSED OR HOPELESS: 0
1. LITTLE INTEREST OR PLEASURE IN DOING THINGS: 0
SUM OF ALL RESPONSES TO PHQ QUESTIONS 1-9: 0
SUM OF ALL RESPONSES TO PHQ QUESTIONS 1-9: 0

## 2020-10-06 ASSESSMENT — LIFESTYLE VARIABLES: HOW OFTEN DO YOU HAVE A DRINK CONTAINING ALCOHOL: 0

## 2020-10-06 NOTE — PATIENT INSTRUCTIONS
Personalized Preventive Plan for Jazz Shoulder - 10/6/2020  Medicare offers a range of preventive health benefits. Some of the tests and screenings are paid in full while other may be subject to a deductible, co-insurance, and/or copay. Some of these benefits include a comprehensive review of your medical history including lifestyle, illnesses that may run in your family, and various assessments and screenings as appropriate. After reviewing your medical record and screening and assessments performed today your provider may have ordered immunizations, labs, imaging, and/or referrals for you. A list of these orders (if applicable) as well as your Preventive Care list are included within your After Visit Summary for your review. Other Preventive Recommendations:    · A preventive eye exam performed by an eye specialist is recommended every 1-2 years to screen for glaucoma; cataracts, macular degeneration, and other eye disorders. · A preventive dental visit is recommended every 6 months. · Try to get at least 150 minutes of exercise per week or 10,000 steps per day on a pedometer . · Order or download the FREE \"Exercise & Physical Activity: Your Everyday Guide\" from [employer]. Call 0-168.960.7981 or search [employer] online. · You need 0150-7076 mg of calcium and 4563-5291 IU of vitamin D per day. It is possible to meet your calcium requirement with diet alone, but a vitamin D supplement is usually necessary to meet this goal.  · When exposed to the sun, use a sunscreen that protects against both UVA and UVB radiation with an SPF of 30 or greater. Reapply every 2 to 3 hours or after sweating, drying off with a towel, or swimming. · Always wear a seat belt when traveling in a car. Always wear a helmet when riding a bicycle or motorcycle. Heart-Healthy Diet   Sodium, Fat, and Cholesterol Controlled Diet       What Is a Heart Healthy Diet?    A heart-healthy diet is one that limits sodium , certain types of fat , and cholesterol . This type of diet is recommended for:   People with any form of cardiovascular disease (eg, coronary heart disease , peripheral vascular disease , previous heart attack , previous stroke )   People with risk factors for cardiovascular disease, such as high blood pressure , high cholesterol , or diabetes   Anyone who wants to lower their risk of developing cardiovascular disease   Sodium    Sodium is a mineral found in many foods. In general, most people consume much more sodium than they need. Diets high in sodium can increase blood pressure and lead to edema (water retention). On a heart-healthy diet, you should consume no more than 2,300 mg (milligrams) of sodium per dayabout the amount in one teaspoon of table salt. The foods highest in sodium include table salt (about 50% sodium), processed foods, convenience foods, and preserved foods. Cholesterol    Cholesterol is a fat-like, waxy substance in your blood. Our bodies make some cholesterol. It is also found in animal products, with the highest amounts in fatty meat, egg yolks, whole milk, cheese, shellfish, and organ meats. On a heart-healthy diet, you should limit your cholesterol intake to less than 200 mg per day. It is normal and important to have some cholesterol in your bloodstream. But too much cholesterol can cause plaque to build up within your arteries, which can eventually lead to a heart attack or stroke. The two types of cholesterol that are most commonly referred to are:   Low-density lipoprotein (LDL) cholesterol  Also known as bad cholesterol, this is the cholesterol that tends to build up along your arteries. Bad cholesterol levels are increased by eating fats that are saturated or hydrogenated. Optimal level of this cholesterol is less than 100. Over 130 starts to get risky for heart disease.    High-density lipoprotein (HDL) cholesterol  Also known as good cholesterol, this type of cholesterol actually carries cholesterol away from your arteries and may, therefore, help lower your risk of having a heart attack. You want this level to be high (ideally greater than 60). It is a risk to have a level less than 40. You can raise this good cholesterol by eating olive oil, canola oil, avocados, or nuts. Exercise raises this level, too. Fat    Fat is calorie dense and packs a lot of calories into a small amount of food. Even though fats should be limited due to their high calorie content, not all fats are bad. In fact, some fats are quite healthful. Fat can be broken down into four main types. The good-for-you fats are:   Monounsaturated fat  found in oils such as olive and canola, avocados, and nuts and natural nut butters; can decrease cholesterol levels, while keeping levels of HDL cholesterol high   Polyunsaturated fat  found in oils such as safflower, sunflower, soybean, corn, and sesame; can decrease total cholesterol and LDL cholesterol   Omega-3 fatty acids  particularly those found in fatty fish (such as salmon, trout, tuna, mackerel, herring, and sardines); can decrease risk of arrhythmias, decrease triglyceride levels, and slightly lower blood pressure   The fats that you want to limit are:   Saturated fat  found in animal products, many fast foods, and a few vegetables; increases total blood cholesterol, including LDL levels   Animal fats that are saturated include: butter, lard, whole-milk dairy products, meat fat, and poultry skin   Vegetable fats that are saturated include: hydrogenated shortening, palm oil, coconut oil, cocoa butter   Hydrogenated or trans fat  found in margarine and vegetable shortening, most shelf stable snack foods, and fried foods; increases LDL and decreases HDL     It is generally recommended that you limit your total fat for the day to less than 30% of your total calories.  If you follow an 1800-calorie heart healthy diet, for example, this would mean 60 grams of fat or less per day. Saturated fat and trans fat in your diet raises your blood cholesterol the most, much more than dietary cholesterol does. For this reason, on a heart-healthy diet, less than 7% of your calories should come from saturated fat and ideally 0% from trans fat. On an 1800-calorie diet, this translates into less than 14 grams of saturated fat per day, leaving 46 grams of fat to come from mono- and polyunsaturated fats.    Food Choices on a Heart Healthy Diet   Food Category   Foods Recommended   Foods to Avoid   Grains   Breads and rolls without salted tops Most dry and cooked cereals Unsalted crackers and breadsticks Low-sodium or homemade breadcrumbs or stuffing All rice and pastas   Breads, rolls, and crackers with salted tops High-fat baked goods (eg, muffins, donuts, pastries) Quick breads, self-rising flour, and biscuit mixes Regular bread crumbs Instant hot cereals Commercially prepared rice, pasta, or stuffing mixes   Vegetables   Most fresh, frozen, and low-sodium canned vegetables Low-sodium and salt-free vegetable juices Canned vegetables if unsalted or rinsed   Regular canned vegetables and juices, including sauerkraut and pickled vegetables Frozen vegetables with sauces Commercially prepared potato and vegetable mixes   Fruits   Most fresh, frozen, and canned fruits All fruit juices   Fruits processed with salt or sodium   Milk   Nonfat or low-fat (1%) milk Nonfat or low-fat yogurt Cottage cheese, low-fat ricotta, cheeses labeled as low-fat and low-sodium   Whole milk Reduced-fat (2%) milk Malted and chocolate milk Full fat yogurt Most cheeses (unless low-fat and low salt) Buttermilk (no more than 1 cup per week)   Meats and Beans   Lean cuts of fresh or frozen beef, veal, lamb, or pork (look for the word loin) Fresh or frozen poultry without the skin Fresh or frozen fish and some shellfish Egg whites and egg substitutes (Limit whole eggs to three per week) Tofu Nuts or seeds (unsalted, dry-roasted), low-sodium peanut butter Dried peas, beans, and lentils   Any smoked, cured, salted, or canned meat, fish, or poultry (including wang, chipped beef, cold cuts, hot dogs, sausages, sardines, and anchovies) Poultry skins Breaded and/or fried fish or meats Canned peas, beans, and lentils Salted nuts   Fats and Oils   Olive oil and canola oil Low-sodium, low-fat salad dressings and mayonnaise   Butter, margarine, coconut and palm oils, wang fat   Snacks, Sweets, and Condiments   Low-sodium or unsalted versions of broths, soups, soy sauce, and condiments Pepper, herbs, and spices; vinegar, lemon, or lime juice Low-fat frozen desserts (yogurt, sherbet, fruit bars) Sugar, cocoa powder, honey, syrup, jam, and preserves Low-fat, trans-fat free cookies, cakes, and pies Abundio and animal crackers, fig bars, ishan snaps   High-fat desserts Broth, soups, gravies, and sauces, made from instant mixes or other high-sodium ingredients Salted snack foods Canned olives Meat tenderizers, seasoning salt, and most flavored vinegars   Beverages   Low-sodium carbonated beverages Tea and coffee in moderation Soy milk   Commercially softened water   Suggestions   Make whole grains, fruits, and vegetables the base of your diet. Choose heart-healthy fats such as canola, olive, and flaxseed oil, and foods high in heart-healthy fats, such as nuts, seeds, soybeans, tofu, and fish. Eat fish at least twice per week; the fish highest in omega-3 fatty acids and lowest in mercury include salmon, herring, mackerel, sardines, and canned chunk light tuna. If you eat fish less than twice per week or have high triglycerides, talk to your doctor about taking fish oil supplements. Read food labels.    For products low in fat and cholesterol, look for fat free, low-fat, cholesterol free, saturated fat free, and trans fat freeAlso scan the Nutrition Facts Label, which lists saturated fat, trans fat, and cholesterol amounts. For products low in sodium, look for sodium free, very low sodium, low sodium, no added salt, and unsalted   Skip the salt when cooking or at the table; if food needs more flavor, get creative and try out different herbs and spices. Garlic and onion also add substantial flavor to foods. Trim any visible fat off meat and poultry before cooking, and drain the fat off after merlos. Use cooking methods that require little or no added fat, such as grilling, boiling, baking, poaching, broiling, roasting, steaming, stir-frying, and sauting. Avoid fast food and convenience food. They tend to be high in saturated and trans fat and have a lot of added salt. Talk to a registered dietitian for individualized diet advice. Last Reviewed: March 2011 Karli Peñaloza MS, MPH, RD   Updated: 3/29/2011   ·     DASH Diet: After Your Visit  Your Care Instructions  The DASH diet is an eating plan that can help lower your blood pressure. DASH stands for Dietary Approaches to Stop Hypertension. Hypertension is high blood pressure. The DASH diet focuses on eating foods that are high in calcium, potassium, and magnesium. These nutrients can lower blood pressure. The foods that are highest in these nutrients are fruits, vegetables, low-fat dairy products, nuts, seeds, and legumes. But taking calcium, potassium, and magnesium supplements instead of eating foods that are high in those nutrients does not have the same effect. The DASH diet also includes whole grains, fish, and poultry. The DASH diet is one of several lifestyle changes your doctor may recommend to lower your high blood pressure. Your doctor may also want you to decrease the amount of sodium in your diet. Lowering sodium while following the DASH diet can lower blood pressure even further than just the DASH diet alone. Follow-up care is a key part of your treatment and safety.  Be sure to make and go to all appointments, and call your doctor if you are having problems. Its also a good idea to know your test results and keep a list of the medicines you take. How can you care for yourself at home? Following the DASH diet  Eat 4 to 5 servings of fruit each day. A serving is 1 medium-sized piece of fruit, ½ cup chopped or canned fruit, 1/4 cup dried fruit, or 4 ounces (½ cup) of fruit juice. Choose fruit more often than fruit juice. Eat 4 to 5 servings of vegetables each day. A serving is 1 cup of lettuce or raw leafy vegetables, ½ cup of chopped or cooked vegetables, or 4 ounces (½ cup) of vegetable juice. Choose vegetables more often than vegetable juice. Get 2 to 3 servings of low-fat and fat-free dairy each day. A serving is 8 ounces of milk, 1 cup of yogurt, or 1 ½ ounces of cheese. Eat 7 to 8 servings of grains each day. A serving is 1 slice of bread, 1 ounce of dry cereal, or ½ cup of cooked rice, pasta, or cooked cereal. Try to choose whole-grain products as much as possible. Limit lean meat, poultry, and fish to 6 ounces each day. Six ounces is about the size of two decks of cards. Eat 4 to 5 servings of nuts, seeds, and legumes (cooked dried beans, lentils, and split peas) each week. A serving is 1/3 cup of nuts, 2 tablespoons of seeds, or ½ cup cooked dried beans or peas. Limit sweets and added sugars to 5 servings or less a week. A serving is 1 tablespoon jelly or jam, ½ cup sorbet, or 1 cup of lemonade. Tips for success  Start small. Do not try to make dramatic changes to your diet all at once. You might feel that you are missing out on your favorite foods and then be more likely to not follow the plan. Make small changes, and stick with them. Once those changes become habit, add a few more changes. Try some of the following:  Make it a goal to eat a fruit or vegetable at every meal and at snacks. This will make it easy to get the recommended amount of fruits and vegetables each day.   Try yogurt topped with fruit and nuts for a snack or healthy dessert. Add lettuce, tomato, cucumber, and onion to sandwiches. Combine a ready-made pizza crust with low-fat mozzarella cheese and lots of vegetable toppings. Try using tomatoes, squash, spinach, broccoli, carrots, cauliflower, and onions. Have a variety of cut-up vegetables with a low-fat dip as an appetizer instead of chips and dip. Sprinkle sunflower seeds or chopped almonds over salads. Or try adding chopped walnuts or almonds to cooked vegetables. Try some vegetarian meals using beans and peas. Add garbanzo or kidney beans to salads. Make burritos and tacos with mashed renee beans or black beans    © 7808-0411 [employer]. Care instructions adapted under license by [facility]. This care instruction is for use with your licensed healthcare professional. If you have questions about a medical condition or this instruction, always ask your healthcare professional. [place] [address] any warranty or liability for your use of this information. Content Version: 9.4.12155; Last Revised: March 15, 2012              ·     Keep Your Memory Franchesca Garcia       Many factors can affect your ability to remembera hectic lifestyle, aging, stress, chronic disease, and certain medicines. But, there are steps you can take to sharpen your mind and help preserve your memory. Challenge Your Brain   Regularly challenging your mind may help keeps it in top shape. Good mental exercises include:   Crossword puzzlesUse a dictionary if you need it; you will learn more that way. Brainteasers Try some! Crafts, such as wood working and sewing   Hobbies, such as gardening and building model airplanes   SocializingVisit old friends or join groups to meet new ones.    Reading   Learning a new language   Taking a class, whether it be art history or lety chi   TravelingExperience the food, history, and culture of your destination   Learning to use a computer   Going to [employer], the theater, or thought-provoking movies   Changing things in your daily life, such as reversing your pattern in the grocery store or brushing your teeth using your nondominant hand   Use Memory Aids   There is no need to remember every detail on your own. These memory aids can help:   Calendars and day planners   Electronic organizers to store all sorts of helpful informationThese devices can \"beep\" to remind you of appointments. A book of days to record birthdays, anniversaries, and other occasions that occur on the same date every year   Detailed \"to-do\" lists and strategically placed sticky notes   Quick \"study\" sessionsBefore a gathering, review who will be there so their names will be fresh in your mind. Establish routinesFor example, keep your keys, wallet, and umbrella in the same place all the time or take medicine with your 8:00 AM glass of juice   Live a Healthy Life   Many actions that will keep your body strong will do the same for your mind. For example:   Talk to Your Doctor About Herbs and Supplements    Malnutrition and vitamin deficiencies can impair your mental function. For example, vitamin B12 deficiency can cause a range of symptoms, including confusion. But, what if your nutritional needs are being met? Can herbs and supplements still offer a benefit? Researchers have investigated a range of natural remedies, such as ginkgo , ginseng , and the supplement phosphatidylserine (PS). So far, though, the evidence is inconsistent as to whether these products can improve memory or thinking. If you are interested in taking herbs and supplements, talk to your doctor first because they may interact with other medicines that you are taking. Exercise Regularly    Among the many benefits of regular exercise are increased blood flow to the brain and decreased risk of certain diseases that can interfere with memory function. One study found that even moderate exercise has a beneficial effect.  Examples of \"moderate\" exercise include:   Playing 18 holes of golf once a week, without a cart   Playing tennis twice a week   Walking one mile per day   Manage Stress    It can be tough to remember what is important when your mind is cluttered. Make time for relaxation. Choose activities that calm you down, and make it routine. Manage Chronic Conditions    Side effects of high blood pressure , diabetes, and heart disease can interfere with mental function. Many of the lifestyle steps discussed here can help manage these conditions. Strive to eat a healthy diet, exercise regularly, get stress under control, and follow your doctor's advice for your condition. Minimize Medications    Talk to your doctor about the medicines that you take. Some may be unnecessary. Also, healthy lifestyle habits may lower the need for certain drugs. Last Reviewed: April 2010 Rea Opitz, MD   Updated: 4/13/2010   ·     [address]       As we get older, changes in balance, gait, strength, vision, hearing, and cognition make even the most youthful senior more prone to accidents. Falls are one of the leading health risks for older people. This increased risk of falling is related to:   Aging process (eg, decreased muscle strength, slowed reflexes)   Higher incidence of chronic health problems (eg, arthritis, diabetes) that may limit mobility, agility or sensory awareness   Side effects of medicine (eg, dizziness, blurred vision)especially medicines like prescription pain medicines and drugs used to treat mental health conditions   Depending on the brittleness of your bones, the consequences of a fall can be serious and long lasting. Home Life   Research by the Association of Aging [facility]) shows that some home accidents among older adults can be prevented by making simple lifestyle changes and basic modifications and repairs to the home environment.  Here are some lifestyle changes that experts recommend:   Have your hearing and vision checked regularly. Be sure to wear prescription glasses that are right for you. Speak to your doctor or pharmacist about the possible side effects of your medicines. A number of medicines can cause dizziness. If you have problems with sleep, talk to your doctor. Limit your intake of alcohol. If necessary, use a cane or walker to help maintain your balance. Wear supportive, rubber-soled shoes, even at home. If you live in a region that gets wintry weather, you may want to put special cleats on your shoes to prevent you from slipping on the snow and ice. Exercise regularly to help maintain muscle tone, agility, and balance. Always hold the banister when going up or down stairs. Also, use  bars when getting in or out of the bath or shower, or using the toilet. To avoid dizziness, get up slowly from a lying down position. Sit up first, dangling your legs for a minute or two before rising to a standing position. Overall Home Safety Check   According to the Consumer Product Safety Commision's \"Older Consumer Home Safety Checklist,\" it is important to check for potential hazards in each room. And remember, proper lighting is an essential factor in home safety. If you cannot see clearly, you are more likely to fall. Important questions to ask yourself include:   Are lamp, electric, extension, and telephone cords placed out of the flow of traffic and maintained in good condition? Have frayed cords been replaced? Are all small rugs and runners slip resistant? If not, you can secure them to the floor with a special double-sided carpet tape. Are smoke detectors properly locatedone on every floor of your home and one outside of every sleeping area? Are they in good working order? Are batteries replaced at least once a year? Do you have a well-maintained carbon monoxide detector outside every sleeping are in your home?    Does your furniture layout leave plenty of space to maneuver between and around chairs, tables, beds, and sofas? Are hallways, stairs and passages between rooms well lit? Can you reach a lamp without getting out of bed? Are floor surfaces well maintained? Shag rugs, high-pile carpeting, tile floors, and polished wood floors can be particularly slippery. Stairs should always have handrails and be carpeted or fitted with a non-skid tread. Is your telephone easily reachable. Is the cord safely tucked away? Room by Room   According to the Association of Aging, bathrooms and carmen are the two most potentially hazardous rooms in your home. In the Kitchen    Be sure your stove is in proper working order and always make sure burners and the oven are off before you go out or go to sleep. Keep pots on the back burners, turn handles away from the front of the stove, and keep stove clean and free of grease build-up. Kitchen ventilation systems and range exhausts should be working properly. Keep flammable objects such as towels and pot holders away from the cooking area except when in use. Make sure kitchen curtains are tied back. Move cords and appliances away from the sink and hot surfaces. If extension cords are needed, install wiring guides so they do not hang over the sink, range, or working areas. Look for coffee pots, kettles and toaster ovens with automatic shut-offs. Keep a mop handy in the kitchen so you can wipe up spills instantly. You should also have a small fire extinguisher. Arrange your kitchen with frequently used items on lower shelves to avoid the need to stand on a stepstool to reach them. Make sure countertops are well-lit to avoid injuries while cutting and preparing food. In the Bathroom    Use a non-slip mat or decals in the tub and shower, since wet, soapy tile or porcelain surfaces are extremely slippery. Make sure bathroom rugs are non-skid or tape them firmly to the floor.  Bathtubs should have at least one, preferably two, grab bars, firmly attached to structural supports in the wall. (Do not use built-in soap holders or glass shower doors as grab bars.)    Tub seats fitted with non-slip material on the legs allow you to wash sitting down. For people with limited mobility, bathtub transfer benches allow you to slide safely into the tub. Raised toilet seats and toilet safety rails are helpful for those with knee or hip problems. In the [facility]    Make sure you use a nightlight and that the area around your bed is clear of potential obstacles. Be careful with electric blankets and never go to sleep with a heating pad, which can cause serious burns even if on a low setting. Use fire-resistant mattress covers and pillows, and NEVER smoke in bed. Keep a phone next to the bed that is programmed to dial 911 at the push of a button. If you have a chronic condition, you may want to sign on with an automatic call-in service. Typically the system includes a small pendant that connects directly to an emergency medical voice-response system. You should also make arrangements to stay in contact with someonefriend, neighbor, family memberon a regular schedule. Fire Prevention   According to the [employer]. (Smoke Alarms for Every) [address] [place] [address], senior citizens are one of the two highest risk groups for death and serious injuries due to residential fires. When cooking, wear short-sleeved items, never a bulky long-sleeved robe. The [facility]'s Safety Checklist for Older Consumers emphasizes the importance of checking basements, garages, workshops and storage areas for fire hazards, such as volatile liquids, piles of old rags or clothing and overloaded circuits. Never smoke in bed or when lying down on a couch or recliner chair. Small portable electric or kerosene heaters are responsible for many home fires and should be used cautiously if at all. If you do use one, be sure to keep them away from flammable materials. In case of fire, make sure you have a pre-established emergency exit plan. Have a professional check your fireplace and other fuel-burning appliances yearly. Helping Hands   Baby boomers entering the walters years will continue to see the development of new products to help older adults live safely and independently in spite of age-related changes. Making Life More Livable  , by Rox Hernandez, lists over 1,000 products for \"living well in the mature years,\" such as bathing and mobility aids, household security devices, ergonomically designed knives and peelers, and faucet valves and knobs for temperature control. Medical supply stores and organizations are good sources of information about products that improve your quality of life and insure your safety.      Last Reviewed: November 2009 Cristiane Betancourt MD   Updated: 3/7/2011     ·

## 2020-10-06 NOTE — PROGRESS NOTES
Medicare Annual Wellness Visit  Name: Yareli Chan Date: 10/6/2020   MRN: [id number] Sex: Female   Age: 79 y.o. Ethnicity: Non-/Non    : 1950 Race: Jalil Dickey is here for [employer] [facility]    Screenings for behavioral, psychosocial and functional/safety risks, and cognitive dysfunction are all negative except as indicated below. These results, as well as other patient data from the [address] form, are documented in Flowsheets linked to this Encounter. Allergies   Allergen Reactions    Ativan [Lorazepam] Swelling     Tongue swelling    Sulfa Antibiotics Swelling    Keflex [Cephalexin] Other (See Comments)     Leg cramps       Prior to Visit Medications    Medication Sig Taking? Authorizing Provider   Cyanocobalamin (B-12) 1000 MCG SUBL Place 1,000 Units under the tongue daily  Bacilio Hodge DO   gabapentin (NEURONTIN) 400 MG capsule Take 1 capsule by mouth 3 times daily for 60 days. Bacilio Hodge DO   hydrOXYzine (ATARAX) 50 MG tablet Take 1 tablet by mouth every 4 hours as needed for Itching  Bacilio Hodge DO   tiZANidine (ZANAFLEX) 2 MG tablet Take 1 tablet by mouth every 8 hours as needed (prn)  Bacilio Hodge DO   rosuvastatin (CRESTOR) 10 MG tablet TAKE 1 TABLET NIGHTLY  Bacilio Hodge DO   fluocinolone (DERMA-SMOOTHE) 0.01 % external oil Apply 1 each topically Twice a Week  Historical Provider, MD   oxybutynin (DITROPAN XL) 15 MG extended release tablet Take 1 tablet by mouth daily  Bacilio Hodge DO   fluticasone [facility]) 50 MCG/ACT nasal spray SPRAY TWO SPRAYS IN EACH NOSTRIL ONCE DAILY  Bacilio Hodge DO   DULoxetine (CYMBALTA) 60 MG extended release capsule Take 1 capsule by mouth daily  Bacilio Hodge DO   polyethylene glycol (GLYCOLAX) 17 GM/SCOOP powder 1/2-1 capful in 8 oz water or prune juice qd prn constipation.   Bacilio Hodge DO   fluocinolone (DERMOTIC) 0.01 % OIL oil Place 5 drops in ear(s) 2 times daily as needed (itching ears)  Eduardo Rojo, DO   carBAMazepine (TEGRETOL-XR) 100 MG extended release tablet Take 1 tablet by mouth 2 times daily  Cirilo Villarreal MD   diclofenac (VOLTAREN) 75 MG EC tablet Take 1 tablet by mouth 2 times daily  Eduardo Rojo, DO   busPIRone (BUSPAR) 10 MG tablet Take 1 tablet by mouth 2 times daily  Eduardo Rojo, DO   acetaminophen (TYLENOL) 500 MG tablet Take 1,000 mg by mouth 3 times daily as needed for Pain  Historical Provider, MD   fluocinolone (DERMOTIC) 0.01 % OIL oil Place 1 drop in ear(s) 2 times daily  Eduardo Rojo, DO   hydrochlorothiazide (MICROZIDE) 12.5 MG capsule TAKE 1 CAPSULE EVERY MORNING  Yumiko Govea DO   traZODone (DESYREL) 100 MG tablet TAKE 3 TABLETS NIGHTLY  Eduardo Darrel, DO   lisinopril (PRINIVIL;ZESTRIL) 40 MG tablet TAKE 1 TABLET DAILY  Eduardo Rojo, DO   potassium chloride (KLOR-CON M) 20 MEQ extended release tablet TAKE 1 TABLET TWICE A DAY  Yumiko Govea,    ketoconazole (NIZORAL) 2 % shampoo Apply topically daily as needed. Eduardo Rojo, DO   diphenoxylate-atropine (LOMOTIL) 2.5-0.025 MG per tablet Take 1 tablet by mouth as needed for Diarrhea. .  Historical Provider, MD   glucose blood VI test strips (ONE TOUCH ULTRA TEST) strip 1 each by In Vitro route daily Fasting qam and As needed. Eduardo Rojo,    Lancets MISC Check sugars fasting qam and prn  Eduardo Rojo, DO   Cholecalciferol (VITAMIN D3) 5000 units CAPS Take 5,000 Units by mouth daily   Historical Provider, MD   oxymorphone (OPANA) 5 MG tablet Take 5 mg by mouth 2 times daily. Indications: per DR Sharifa Prakash 2 x day  Historical Provider, MD   zoledronic acid (RECLAST) 5 MG/100ML SOLN Infuse 5 mg intravenously once IV, yearly  Historical Provider, MD   Cetirizine HCl (ZYRTEC PO) Take 1 tablet by mouth 2 times daily   Historical Provider, MD   aspirin 81 MG chewable tablet Take 81 mg by mouth daily.     Historical Provider, MD Calcium Carbonate-Vit D-Min (CALCIUM 1200 PO) Take 1 capsule by mouth 2 times daily. Historical Provider, MD   Ascorbic Acid (VITAMIN C) 500 MG tablet Take 500 mg by mouth daily.     Historical Provider, MD       Past Medical History:   Diagnosis Date    Anxiety     Clostridium difficile infection 2/22/15    Hypertension     Insomnia disorder     Osteoarthritis     Osteoporosis 2008    Rheumatic fever     S/P insertion of spinal cord stimulator     Self-catheterizes urinary bladder     as needed 7/8 no longer catherizing     Urinary retention        Past Surgical History:   Procedure Laterality Date    BLADDER REPAIR      CARPAL TUNNEL RELEASE      COLONOSCOPY      CYSTOSCOPY  10/29/2012    bladder biopsy    DENTAL SURGERY      FOOT SURGERY      HYSTERECTOMY      INTRACAPSULAR CATARACT EXTRACTION Right 7/18/2019    PHACOEMULSIFICATION WITH INTRAOCULAR LENS IMPLANT performed by Yue Galdamez MD at [address] [place] [address] CATARACT EXTRACTION Left 7/26/2019    PHACOEMULSIFICATION WITH INTRAOCULAR LENS IMPLANT performed by Yue Galdamez MD at [place] [address]  2004    MANDIBLE RECONSTRUCTION      OTHER SURGICAL HISTORY      spinal cord stimulator    TOTAL KNEE ARTHROPLASTY Right 10/18/13       Family History   Problem Relation Age of Onset    COPD Mother     High Blood Pressure Mother     Rheum Arthritis Mother     Thyroid Disease Mother     Alcohol Abuse Father     Clotting Disorder Sister     Thyroid Disease Sister     Hypertension Brother     Diabetes Sister     Heart Disease Sister     Hypertension Sister     Thyroid Disease Sister     Cancer Brother     Heart Disease Brother     Hypertension Brother     Substance Abuse Brother     No Known Problems Son     No Known Problems Daughter        CareTeam (Including outside providers/suppliers regularly involved in providing care):   Patient Care Team:  Bacilio Hodge DO as PCP - General (Internal Medicine)  Guy Salmon DO as [facility] - [facility] Empaneled Provider  Jayme Apley, MD as Consulting Physician (General Surgery)  Rylee Degroot RN as [facility]    Wt Readings from Last 3 Encounters:   09/29/20 179 lb 9.6 oz (81.5 kg)   08/21/20 185 lb (83.9 kg)   08/07/20 181 lb 6.4 oz (82.3 kg)     There were no vitals filed for this visit. There is no height or weight on file to calculate BMI. Based upon direct observation of the patient, evaluation of cognition reveals recent memory intact, remote memory impaired. Patient's complete Health Risk Assessment and screening values have been reviewed and are found in Flowsheets. The following problems were reviewed today and where indicated follow up appointments were made and/or referrals ordered. Positive Risk Factor Screenings with Interventions:     Fall Risk:  2 or more falls in past year?: no  Fall with injury in past year?: (!) yes  Fall Risk Interventions:    · Home safety tips provided    Health Habits/Nutrition:  Health Habits/Nutrition  Do you exercise for at least 20 minutes 2-3 times per week?: (!) No  Have you lost any weight without trying in the past 3 months?: No  Do you eat fewer than 2 meals per day?: No  Have you seen a dentist within the past year?: Yes  There is no height or weight on file to calculate BMI.   Health Habits/Nutrition Interventions:  · Inadequate physical activity:  patient is not ready to increase his/her physical activity level at this time    Personalized Preventive Plan   Current Health Maintenance Status  Immunization History   Administered Date(s) Administered    Influenza 09/15/2011, 09/18/2012, 09/24/2013    Influenza Vaccine, unspecified formulation 09/18/2012, 09/24/2013, 09/20/2015    Influenza Virus Vaccine 09/18/2014    Influenza Whole 10/09/2015    Influenza, High Dose (Fluzone 65 yrs and older) 10/19/2017, 11/09/2018, 10/03/2019    Influenza, Quadv, adjuvanted, 65 yrs +, IM, PF (Fluad) 09/30/2020    Pneumococcal Conjugate 13-valent ([id number]) 08/23/2019    Pneumococcal Polysaccharide ([id number]) 09/29/2020    Tdap (Boostrix, Adacel) 08/21/2020        Health Maintenance   Topic Date Due    Shingles Vaccine (1 of 2) 02/07/2000    Annual Wellness Visit (AWV)  05/29/2019    Lipid screen  06/22/2021    Potassium monitoring  06/22/2021    Creatinine monitoring  06/22/2021    Diabetes screen  07/26/2021    Breast cancer screen  01/15/2022    Colon cancer screen colonoscopy  06/17/2025    DTaP/Tdap/Td vaccine (2 - Td) 08/21/2030    DEXA (modify frequency per FRAX score)  Completed    Flu vaccine  Completed    Pneumococcal 65+ years Vaccine  Completed    Hepatitis C screen  Completed    Hepatitis A vaccine  Aged Out    Hepatitis B vaccine  Aged Out    Hib vaccine  Aged Out    Meningococcal (ACWY) vaccine  Aged Out     Recommendations for [employer] Due: see orders and patient instructions/AVS.  . Recommended screening schedule for the next 5-10 years is provided to the patient in written form: see Patient Instructions/AVS.    I, Adele Conde RN, 10/6/2020, performed the documented evaluation under the direct supervision of the attending physician. Yonas Souza is a 79 y.o. female being evaluated by a Virtual Visit (phone) encounter to address concerns as mentioned above. A caregiver was present when appropriate. Due to this being a TeleHealth encounter (During [facility]- public health emergency), evaluation of the following organ systems was limited: Vitals/Constitutional/EENT/Resp/CV/GI//MS/Neuro/Skin/Heme-Lymph-Imm.   Pursuant to the emergency declaration under the [facility], [address] [place] [address] waiver authority and the [employer] and [employer] Act, this Virtual Visit was conducted with patient's (and/or legal guardian's) consent, to reduce the patient's risk of exposure to COVID-19 and provide necessary medical care. The patient (and/or legal guardian) has also been advised to contact this office for worsening conditions or problems, and seek emergency medical treatment and/or call 911 if deemed necessary. Patient identification was verified at the start of the visit: Yes    Total time spent for this encounter: 20 minutes    Services were provided through a video synchronous discussion virtually to substitute for in-person clinic visit. Patient and provider were located at their individual homes. --Bowen Scanlon RN on 10/6/2020 at 11:07 AM    An electronic signature was used to authenticate this note. This encounter was performed under myGrabiel DOs, direct supervision, 10/6/2020.

## 2020-10-09 ENCOUNTER — HOSPITAL ENCOUNTER (OUTPATIENT)
Dept: CT IMAGING | Age: 70
Discharge: HOME | End: 2020-10-09
Payer: MEDICARE

## 2020-10-09 PROCEDURE — 70450 CT HEAD/BRAIN W/O DYE: CPT

## 2020-10-12 ENCOUNTER — OFFICE VISIT (OUTPATIENT)
Dept: PRIMARY CARE CLINIC | Age: 70
End: 2020-10-12
Payer: MEDICARE

## 2020-10-12 PROCEDURE — 99211 OFF/OP EST MAY X REQ PHY/QHP: CPT | Performed by: NURSE PRACTITIONER

## 2020-10-12 NOTE — PROGRESS NOTES
Francisco Crook received a viral test for COVID-19. They were educated on isolation and quarantine as appropriate. For any symptoms, they were directed to seek care from their PCP, given contact information to establish with a doctor, directed to an urgent care or the emergency room.

## 2020-10-12 NOTE — PATIENT INSTRUCTIONS

## 2020-10-13 ENCOUNTER — TELEPHONE (OUTPATIENT)
Dept: INTERNAL MEDICINE CLINIC | Age: 70
End: 2020-10-13

## 2020-10-13 LAB — SARS-COV-2, NAA: NOT DETECTED

## 2020-10-14 ENCOUNTER — HOSPITAL ENCOUNTER (OUTPATIENT)
Dept: ULTRASOUND IMAGING | Age: 70
Discharge: HOME | End: 2020-10-14
Payer: MEDICARE

## 2020-10-14 PROCEDURE — 76770 US EXAM ABDO BACK WALL COMP: CPT

## 2020-10-15 NOTE — PROGRESS NOTES
Name_______________________________________Printed:____________________  Date and time of [id number] _______________________Arrival Time:_0630 MAIN_______________   1. The instructions given regarding when and if a patient needs to stop oral intake prior to surgery varies. Follow the specific instructions you were given                  _X__Nothing to eat or to drink after Midnight the night before.                             ____Endoscopy patient follow your DRS instructions-generally you will be doing a part of the prep after Midnight                   ____Carbo loading or ERAS instructions will be given to select patients-if you have been given those instructions -please do the following                           The evening before your surgery after dinner before midnight drink 40 ounces of gatorade. If you are diabetic use sugar free. The morning of surgery drink 40 ounces of water. This needs to be finished 3 hours prior to your surgery start time. 2. Take the following pills with a small sip of water on the morning of surgery__BUSPAR, OPANA, TEGRETOL, FONASE IF NEEDED_________________________________________________                  Do not take blood pressure medications ending in pril or sartan the nayan prior to surgery or the morning of surgery_   3. Aspirin, Ibuprofen, Advil, Naproxen, Vitamin E and other Anti-inflammatory products and supplements should be stopped for 5 -7days before surgery or as directed by your physician. 4. Check with your Doctor regarding stopping Plavix, Coumadin,Eliquis, Lovenox,Effient,Pradaxa,Xarelto, Fragmin or other blood thinners and follow their instructions. 5. Do not smoke, and do not drink any alcoholic beverages 24 hours prior to surgery. This includes NA Beer. Refrain from the usage of any recreational drugs. 6. You may brush your teeth and gargle the morning of surgery. DO NOT SWALLOW WATER   7.  You MUST make arrangements for a responsible adult to stay on site while you are here and take you home after your surgery. You will not be allowed to leave alone or drive yourself home. It is strongly suggested someone stay with you the first 24 hrs. Your surgery will be cancelled if you do not have a ride home. 8. A parent/legal guardian must accompany a child scheduled for surgery and plan to stay at the hospital until the child is discharged. Please do not bring other children with you. 9. Please wear simple, loose fitting clothing to the hospital.  Caitlin Cash not bring valuables (money, credit cards, checkbooks, etc.) Do not wear any makeup (including no eye makeup) or nail polish on your fingers or toes. 10. DO NOT wear any jewelry or piercings on day of surgery. All body piercing jewelry must be removed. 11. If you have ___dentures, they will be removed before going to the OR; we will provide you a container. If you wear ___contact lenses or ___glasses, they will be removed; please bring a case for them. 12. Please see your family doctor/pediatrician for a history & physical and/or concerning medications. Bring any test results/reports from your physician's office. PCP__________________Phone___________H&P Appt. Date________             13 If you  have a Living Will and Durable Power of  for Healthcare, please bring in a copy. 15. Notify your Surgeon if you develop any illness between now and surgery  time, cough, cold, fever, sore throat, nausea, vomiting, etc.  Please notify your surgeon if you experience dizziness, shortness of breath or blurred vision between now & the time of your surgery             15. DO NOT shave your operative site 96 hours prior to surgery. For face & neck surgery, men may use an electric razor 48 hours prior to surgery. 16. Shower the night before or morning of surgery using an antibacterial soap or as you have been instructed.              17. To provide excellent care visitors will be limited to one in the room at any given time. 18.  Please bring picture ID and insurance card. 19.  Visit our web site for additional information:  [email or web address]              20.During flu season no children under the age of 15 are permitted in the hospital for the safety of all patients. 21. If you take a long acting insulin in the evening only  take half of your usual  dose the night  before your procedure              22. If you use a c-pap please bring DOS if staying overnight,             23.[facility] to [facility] prescriptions. 24. If you use oxygen and have a portable tank please bring it  with you the DOS             25. Bring a complete list of all your medications with name and dose include any supplements. 26. Other___Patient instructed to get their COVID-19 test done as directed by their doctor (5-7 days prior to procedure)  or patient states will get on __________. Patient was notified that they need to have an appointment,number to call provided. The day the COVID test is done is considered day one. Instructed to self quarantine after test until DOS. There is a one visitor policy at [facility] for all surgeries and endoscopies. Whether the visitor can stay or will be asked to wait in the car will depend on the current policy and if social distancing can be maintained. The policy is subject to change at any time. Please make sure the visitor has a cell phone that is on,charged and able to accept calls, as this may be the way that the staff communicates with them. Pain management is NO VISITOR policyThe patients ride is expected to remain in the car with a cell phone for communication. If the ride is leaving the hospital grounds please make sure they are back in time for pickup.  Have the patient inform the staff on arrival what their rides plans are while the patient is in the facility. At the MAIN there is one visitor allowed. Please note that the visitor policy is subject to change._______________________________________   *Please call pre admission testing if you any further questions   [facility] [address] [place] [address]. [place]  [phone]   [address] [place] [address]       All above information reviewed with patient in person or by phone. Patient verbalizes understanding. All questions and concerns addressed.                                                                                                  Patient/Rep___PT_________________                                                                                                                                    PRE OP INSTRUCTIONS

## 2020-10-16 ENCOUNTER — TELEPHONE (OUTPATIENT)
Dept: INTERNAL MEDICINE CLINIC | Age: 70
End: 2020-10-16

## 2020-10-16 NOTE — TELEPHONE ENCOUNTER
----- Message from Cameron Kumar sent at 10/16/2020  9:32 AM EDT -----  Subject: Results Request    QUESTIONS  Which lab or imaging result is the patient calling about? Brain Scan   Results  Which provider ordered the test? Priscila Davis   At what location was the test performed? Date the test was performed? 2020-10-09  Additional Information for Provider? Pt would like the results of her   brain scan  ---------------------------------------------------------------------------  --------------  CALL BACK INFO  What is the best way for the office to contact you? OK to leave message on   voicemail  Preferred Call Back Phone Number?  [phone]

## 2020-10-19 ENCOUNTER — HOSPITAL ENCOUNTER (OUTPATIENT)
Age: 70
Setting detail: OUTPATIENT SURGERY
Discharge: HOME | End: 2020-10-19
Attending: UROLOGY | Admitting: UROLOGY
Payer: MEDICARE

## 2020-10-19 VITALS
DIASTOLIC BLOOD PRESSURE: 85 MMHG | OXYGEN SATURATION: 96 % | TEMPERATURE: 97.2 F | HEART RATE: 78 BPM | HEIGHT: 57 IN | BODY MASS INDEX: 38.4 KG/M2 | RESPIRATION RATE: 12 BRPM | WEIGHT: 178 LBS | SYSTOLIC BLOOD PRESSURE: 112 MMHG

## 2020-10-19 PROCEDURE — 3600000014 HC SURGERY LEVEL 4 ADDTL 15MIN: Performed by: UROLOGY

## 2020-10-19 PROCEDURE — 7100000010 HC PHASE II RECOVERY - FIRST 15 MIN: Performed by: UROLOGY

## 2020-10-19 PROCEDURE — 7100000011 HC PHASE II RECOVERY - ADDTL 15 MIN: Performed by: UROLOGY

## 2020-10-19 PROCEDURE — 2709999900 HC NON-CHARGEABLE SUPPLY: Performed by: UROLOGY

## 2020-10-19 PROCEDURE — 6370000000 HC RX 637 (ALT 250 FOR IP): Performed by: UROLOGY

## 2020-10-19 PROCEDURE — 2580000003 HC RX 258: Performed by: UROLOGY

## 2020-10-19 PROCEDURE — 3600000004 HC SURGERY LEVEL 4 BASE: Performed by: UROLOGY

## 2020-10-19 RX ORDER — MAGNESIUM HYDROXIDE 1200 MG/15ML
LIQUID ORAL
Status: COMPLETED | OUTPATIENT
Start: 2020-10-19 | End: 2020-10-19

## 2020-10-19 RX ORDER — CIPROFLOXACIN 500 MG/1
500 TABLET, FILM COATED ORAL EVERY 12 HOURS SCHEDULED
Status: DISCONTINUED | OUTPATIENT
Start: 2020-10-19 | End: 2020-10-19 | Stop reason: HOSPADM

## 2020-10-19 RX ORDER — LIDOCAINE HYDROCHLORIDE 20 MG/ML
JELLY TOPICAL
Status: COMPLETED | OUTPATIENT
Start: 2020-10-19 | End: 2020-10-19

## 2020-10-19 RX ADMIN — CIPROFLOXACIN 500 MG: 500 TABLET, FILM COATED ORAL at 07:10

## 2020-10-19 NOTE — OP NOTE
Urology Operative Report  [facility]    Provider: Alla Siu MD Patient ID:  Admission Date: 10/19/2020 Name: Chad Anderson  OR Date: 10/19/2020  MRN: [id number]   Patient Location: OR/NONE : 1950  Attending: Alla Siu MD Date of Service: 10/19/2020  PCP: Hunter Downing DO     Date of Operation: 10/19/2020    Preoperative Diagnosis: difficulty with micturation    Postoperative Diagnosis: same    Procedure:    1. Flexible cystoscopy    Surgeon:   Alla Siu MD    Anesthesia: Local anesthesia topical 2% lidocaine gel    Indications: Chad Anderson is a 79 y.o. female who presents for the above named surgery. Informed consent was obtained and the risks, benefits, and details of the procedure were explained to the patient who elected to proceed. Details of Procedure: The patient was brought to the operating room and placed in the supine frogleg position on the operating room table. SCDs were placed on the lower extremities. The genitals were prepped and draped in the usual sterile fashion. A routine timeout was performed, confirming the patient, procedure, site, risk of fire, patient allergies and confirming that preoperative antibiotics had been administered prior to beginning. Local anesthesia was given. A urethral caruncle with some atrophy was seen externally. The flexible cystoscope was inserted. The exam was normal with no bladder tumors, stones, or diverticulum. At the end of the procedure all counts were correct. The patient tolerated the procedure well and was transported to the PACU in stable condition. Findings: normal cysto, urethral caruncle on exam    Estimated Blood Loss: none                  Drains: none          Specimens: none    Complications: none apparent           Disposition:  PACU - hemodynamically stable.             Alla Siu MD  10/19/2020

## 2020-10-19 NOTE — PROGRESS NOTES
Patient admitted to PACU via stretcher for phase 2 recovery, arouses to stimuli, moves ext to command. Respirations adeq on RA spo2 92%. Skin warm and dry with good color. abd soft. Patient denies pain at this time, will continue to monitor.

## 2020-10-26 ENCOUNTER — HOSPITAL ENCOUNTER (OUTPATIENT)
Age: 70
Discharge: HOME | End: 2020-10-26
Payer: MEDICARE

## 2020-10-26 LAB
C-REACTIVE PROTEIN: 0.8 MG/L (ref 0–5.1)
SEDIMENTATION RATE, ERYTHROCYTE: 10 MM/HR (ref 0–30)

## 2020-10-26 PROCEDURE — 36415 COLL VENOUS BLD VENIPUNCTURE: CPT

## 2020-10-26 PROCEDURE — 86140 C-REACTIVE PROTEIN: CPT

## 2020-10-26 PROCEDURE — 85652 RBC SED RATE AUTOMATED: CPT

## 2020-10-26 RX ORDER — GABAPENTIN 400 MG/1
CAPSULE ORAL
Qty: 90 CAPSULE | Refills: 3 | Status: SHIPPED
Start: 2020-10-26 | End: 2020-10-27 | Stop reason: DRUGHIGH

## 2020-10-27 ENCOUNTER — TELEPHONE (OUTPATIENT)
Dept: INTERNAL MEDICINE CLINIC | Age: 70
End: 2020-10-27

## 2020-10-27 RX ORDER — GABAPENTIN 600 MG/1
600 TABLET ORAL 3 TIMES DAILY
Qty: 270 TABLET | Refills: 1 | Status: SHIPPED | OUTPATIENT
Start: 2020-10-27 | End: 2021-03-03 | Stop reason: SDUPTHER

## 2020-10-27 NOTE — TELEPHONE ENCOUNTER
Pt calling ---you had decreased her gabapentin but it was not working for her so she went back back up on dose---any questions please call the pt. Thanks.

## 2020-11-03 ENCOUNTER — TELEPHONE (OUTPATIENT)
Dept: RHEUMATOLOGY | Age: 70
End: 2020-11-03

## 2020-11-03 NOTE — TELEPHONE ENCOUNTER
Pt called stating that she went to get labs a while ago so she can get her Reclast infusion. She hasn't heard anything. Reclast #5   Last infusion 03/21/2019  Labs: 01/09/20  Dexa: 01/15/20  Next appt: 08/07/20  DX: Anthony May    Jarod Cruz           11:47 AM   Note      I called [employer] [phone]  Spoke with Luz Galvez. Ref. # [id number]        0 co-insurance  100% covered by plan  0 Deductible  0 OOP     PA NOT REQUIRED        She will go get safety labs tomorrow.

## 2020-11-05 DIAGNOSIS — M81.0 OSTEOPOROSIS, POST-MENOPAUSAL: ICD-10-CM

## 2020-11-05 LAB
CALCIUM SERPL-MCNC: 8.9 MG/DL (ref 8.3–10.6)
CREAT SERPL-MCNC: 0.7 MG/DL (ref 0.6–1.2)
GFR AFRICAN AMERICAN: >60
GFR NON-AFRICAN AMERICAN: >60

## 2020-11-11 ENCOUNTER — TELEPHONE (OUTPATIENT)
Dept: INTERNAL MEDICINE CLINIC | Age: 70
End: 2020-11-11

## 2020-11-11 NOTE — TELEPHONE ENCOUNTER
Pt called would like to speak with someone concerning her water pill. She is questioning if she should be taking a water pill or not.   Also, would like to ask about Reclast

## 2020-11-13 RX ORDER — HYDROCHLOROTHIAZIDE 12.5 MG/1
CAPSULE, GELATIN COATED ORAL
Qty: 90 CAPSULE | Refills: 4 | Status: SHIPPED | OUTPATIENT
Start: 2020-11-13 | End: 2021-11-15

## 2020-11-17 ENCOUNTER — CARE COORDINATION (OUTPATIENT)
Dept: CARE COORDINATION | Age: 70
End: 2020-11-17

## 2020-11-18 ENCOUNTER — TELEPHONE (OUTPATIENT)
Dept: INTERNAL MEDICINE CLINIC | Age: 70
End: 2020-11-18

## 2020-11-18 DIAGNOSIS — R30.0 DYSURIA: ICD-10-CM

## 2020-11-18 LAB
BACTERIA: ABNORMAL /HPF
BILIRUBIN URINE: NEGATIVE
BLOOD, URINE: NEGATIVE
CLARITY: ABNORMAL
COLOR: YELLOW
COMMENT UA: ABNORMAL
EPITHELIAL CELLS, UA: 7 /HPF (ref 0–5)
GLUCOSE URINE: NEGATIVE MG/DL
HYALINE CASTS: 5 /LPF (ref 0–8)
KETONES, URINE: NEGATIVE MG/DL
LEUKOCYTE ESTERASE, URINE: ABNORMAL
MICROSCOPIC EXAMINATION: YES
NITRITE, URINE: NEGATIVE
PH UA: 6.5 (ref 5–8)
PROTEIN UA: NEGATIVE MG/DL
RBC UA: ABNORMAL /HPF (ref 0–4)
SPECIFIC GRAVITY UA: 1.01 (ref 1–1.03)
URINE REFLEX TO CULTURE: ABNORMAL
URINE TYPE: ABNORMAL
UROBILINOGEN, URINE: 0.2 E.U./DL
WBC UA: 5 /HPF (ref 0–5)

## 2020-11-18 NOTE — TELEPHONE ENCOUNTER
----- Message from [facility] sent at 11/18/2020  8:19 AM EST -----  Subject: Message to Provider    QUESTIONS  Information for Provider? Patient is calling wanting to have a urine test   tone in [place]. Call patient back with more information.   ---------------------------------------------------------------------------  --------------  CALL BACK INFO  What is the best way for the office to contact you? OK to respond with   secure message via [employer] portal (only for patients who have registered   [employer] account)  Preferred Call Back Phone Number? [phone]  ---------------------------------------------------------------------------  --------------  SCRIPT ANSWERS  Relationship to Patient?  Self

## 2020-11-19 LAB — URINE CULTURE, ROUTINE: NORMAL

## 2020-11-20 ENCOUNTER — TELEPHONE (OUTPATIENT)
Dept: INTERNAL MEDICINE CLINIC | Age: 70
End: 2020-11-20

## 2020-11-20 RX ORDER — PHENAZOPYRIDINE HYDROCHLORIDE 200 MG/1
200 TABLET, FILM COATED ORAL 3 TIMES DAILY
Qty: 9 TABLET | Refills: 0 | Status: SHIPPED | OUTPATIENT
Start: 2020-11-20 | End: 2020-11-23

## 2020-11-20 NOTE — TELEPHONE ENCOUNTER
Patient states she never got results from urine test. Advised patient it looked like she may have a UTI and her urine was sent out for culture. Patient asking about treatment. Please call back.

## 2020-11-20 NOTE — TELEPHONE ENCOUNTER
Urine culture was actually negative for infection. I will call in 3 days of pyridium for her and then repeat the UA after she finishes the medication if she is still having sx. Sent to Shyam Rouse in [facility]. Maggie Ch I did not sign off on these results prior to instructions being given to patient.

## 2020-11-24 ENCOUNTER — TELEPHONE (OUTPATIENT)
Dept: INTERNAL MEDICINE CLINIC | Age: 70
End: 2020-11-24

## 2020-11-25 RX ORDER — NITROFURANTOIN 25; 75 MG/1; MG/1
100 CAPSULE ORAL 2 TIMES DAILY
Qty: 20 CAPSULE | Refills: 0 | Status: SHIPPED | OUTPATIENT
Start: 2020-11-25 | End: 2020-12-05

## 2020-12-02 ENCOUNTER — OFFICE VISIT (OUTPATIENT)
Dept: PSYCHIATRY | Age: 70
End: 2020-12-02
Payer: MEDICARE

## 2020-12-02 VITALS
SYSTOLIC BLOOD PRESSURE: 116 MMHG | HEART RATE: 74 BPM | TEMPERATURE: 95.8 F | WEIGHT: 174 LBS | BODY MASS INDEX: 37.54 KG/M2 | HEIGHT: 57 IN | DIASTOLIC BLOOD PRESSURE: 70 MMHG

## 2020-12-02 PROCEDURE — 90792 PSYCH DIAG EVAL W/MED SRVCS: CPT | Performed by: PSYCHIATRY & NEUROLOGY

## 2020-12-02 RX ORDER — TRAZODONE HYDROCHLORIDE 100 MG/1
200 TABLET ORAL NIGHTLY
Qty: 180 TABLET | Refills: 1 | Status: SHIPPED | OUTPATIENT
Start: 2020-12-02 | End: 2021-05-18

## 2020-12-02 RX ORDER — DONEPEZIL HYDROCHLORIDE 5 MG/1
5 TABLET, FILM COATED ORAL EVERY EVENING
Qty: 90 TABLET | Refills: 0 | Status: SHIPPED | OUTPATIENT
Start: 2020-12-02 | End: 2021-01-04 | Stop reason: SINTOL

## 2020-12-02 ASSESSMENT — ANXIETY QUESTIONNAIRES
3. WORRYING TOO MUCH ABOUT DIFFERENT THINGS: 1-SEVERAL DAYS
2. NOT BEING ABLE TO STOP OR CONTROL WORRYING: 1-SEVERAL DAYS
6. BECOMING EASILY ANNOYED OR IRRITABLE: 0-NOT AT ALL
5. BEING SO RESTLESS THAT IT IS HARD TO SIT STILL: 0-NOT AT ALL
7. FEELING AFRAID AS IF SOMETHING AWFUL MIGHT HAPPEN: 0-NOT AT ALL
4. TROUBLE RELAXING: 0-NOT AT ALL
GAD7 TOTAL SCORE: 2
1. FEELING NERVOUS, ANXIOUS, OR ON EDGE: 0-NOT AT ALL

## 2020-12-02 ASSESSMENT — PATIENT HEALTH QUESTIONNAIRE - PHQ9
SUM OF ALL RESPONSES TO PHQ QUESTIONS 1-9: 7
10. IF YOU CHECKED OFF ANY PROBLEMS, HOW DIFFICULT HAVE THESE PROBLEMS MADE IT FOR YOU TO DO YOUR WORK, TAKE CARE OF THINGS AT HOME, OR GET ALONG WITH OTHER PEOPLE: 0
SUM OF ALL RESPONSES TO PHQ QUESTIONS 1-9: 7
1. LITTLE INTEREST OR PLEASURE IN DOING THINGS: 0
2. FEELING DOWN, DEPRESSED OR HOPELESS: 1
5. POOR APPETITE OR OVEREATING: 1
3. TROUBLE FALLING OR STAYING ASLEEP: 0
SUM OF ALL RESPONSES TO PHQ9 QUESTIONS 1 & 2: 1
SUM OF ALL RESPONSES TO PHQ QUESTIONS 1-9: 7
4. FEELING TIRED OR HAVING LITTLE ENERGY: 2
6. FEELING BAD ABOUT YOURSELF - OR THAT YOU ARE A FAILURE OR HAVE LET YOURSELF OR YOUR FAMILY DOWN: 0
8. MOVING OR SPEAKING SO SLOWLY THAT OTHER PEOPLE COULD HAVE NOTICED. OR THE OPPOSITE, BEING SO FIGETY OR RESTLESS THAT YOU HAVE BEEN MOVING AROUND A LOT MORE THAN USUAL: 1
7. TROUBLE CONCENTRATING ON THINGS, SUCH AS READING THE NEWSPAPER OR WATCHING TELEVISION: 2
9. THOUGHTS THAT YOU WOULD BE BETTER OFF DEAD, OR OF HURTING YOURSELF: 0

## 2020-12-02 NOTE — Clinical Note
She chose to return to see you after our visit. I added aricept. She seems to have dementia to me (vascular vs alzheimers), although things may be more acutely worse due to UTI, or med side effects.  Im checking a tegretol level just to make sure that's normal.
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
## 2020-12-03 ENCOUNTER — OFFICE VISIT (OUTPATIENT)
Dept: NEUROLOGY | Age: 70
End: 2020-12-03
Payer: MEDICARE

## 2020-12-03 VITALS
TEMPERATURE: 97 F | HEIGHT: 57 IN | WEIGHT: 174.5 LBS | BODY MASS INDEX: 37.65 KG/M2 | HEART RATE: 74 BPM | SYSTOLIC BLOOD PRESSURE: 128 MMHG | DIASTOLIC BLOOD PRESSURE: 80 MMHG | RESPIRATION RATE: 14 BRPM

## 2020-12-03 PROCEDURE — 99205 OFFICE O/P NEW HI 60 MIN: CPT | Performed by: PSYCHIATRY & NEUROLOGY

## 2020-12-03 NOTE — PROGRESS NOTES
NEUROLOGY CONSULTATION     Chief Complaint   Patient presents with    New Patient     referred by Dr Isela Casper for cognitive impairment- short term memory issues       HISTORY OF PRESENT ILLNESS :    Johanna Huang is a 79 y.o. female who is referred by Dr. Isela Casper   History was obtained from patient  Patient was referred for evaluation of short-term memory impairment. Patient states that symptoms started approximately around June 2020. Onset was gradual.  Symptoms are persistent. No clear aggravating or relieving factors to symptoms. Patient states that she sometimes has difficulty managing her financial affairs with balancing the checkbook and is made a few mistakes. She also has occasional difficulty with driving directions. She got lost one time but was able to find her way around. She has short-term memory with difficulty remembering people's names and appointments. Patient also has mild anxiety and depression. Patient states that she was diagnosed with obstructive sleep apnea several years ago. She had some surgery in her jaw and since then she has been feeling better. However she does admit that she does not feel rested when she wakes up in the morning has generalized fatigue and has daytime naps.     REVIEW OF SYSTEMS    Constitutional:  [x]   Chills   [x]  Fatigue   []  Fevers   []  Malaise   []  Weight loss     [] Denies all of the above    Eyes:  []  Double vision   []  Blurry vision     [x] Denies all of the above    Ears, nose, mouth, throat, and face:   [] Hearing loss    []   Hoarseness      []  Snoring    [x]  Tinnitus       [] Denies all of the above     Respiratory:   []  Cough    []  Shortness of breath         [x] Denies all of the above     Cardiovascular:   []  Chest pain    []  Exertional chest pressure/discomfort           [] Palpitations    []  Syncope     [x] Denies all of the above    Gastrointestinal:   [] retired   Social Needs    Financial resource strain: None    Food insecurity     Worry: Never true     Inability: Never true    Transportation needs     Medical: No     Non-medical: No   Tobacco Use    Smoking status: Never Smoker    Smokeless tobacco: Never Used   Substance and Sexual Activity    Alcohol use: Not Currently    Drug use: No    Sexual activity: None   Lifestyle    Physical activity     Days per week: None     Minutes per session: None    Stress: None   Relationships    Social connections     Talks on phone: None     Gets together: None     Attends [employer] service: None     Active member of club or organization: None     Attends meetings of clubs or organizations: None     Relationship status:     Intimate partner violence     Fear of current or ex partner: None     Emotionally abused: None     Physically abused: None     Forced sexual activity: None   Other Topics Concern    None   Social History Narrative    None       PHYSICAL EXAMINATION:  /80   Pulse 74   Temp 97 °F (36.1 °C)   Resp 14   Ht 4' 9\" (1.448 m)   Wt 174 lb 8 oz (79.2 kg)   BMI 37.76 kg/m²   Appearance: Well appearing, well nourished and in no distress  Mental Status Exam: Patient is alert, oriented to person, place and time. Recent memory: Could  recall 2 out of 3 objects in 3 minutes  Patient had difficulty with serial sevens. She had difficulty with spelling  Fund of Knowledge was limited  Attention/concentration is normal.   Speech : No dysarthria  Language : No aphasia  Funduscopic Exam: sharp disc margins  Cranial Nerves:   II: Visual fields:  Full to confrontation  III: Pupils:  equal, round, reactive to light  III,IV,VI: Extra Ocular Movements are intact.  No nystagmus  V: Facial sensation is intact to pin prick and light touch  VII: Facial strength and movements: intact and symmetric smile,cheek puffing and eyebrow elevation  VIII: Hearing:  Intact to finger rub bilaterally  IX: Palate elevation is symmetric  XI: Shoulder shrug is intact  XII: Tongue movements are normal  Motor:  Muscle tone and bulk are normal.   Strength is symmetrical 5/5 in all four extremities. Sensory: Intact to light touch and  pin prick in all four extremities  Coordination:  Normal  Finger to Nose and Heel to Shin bilaterally    . Reflexes:  DTR 1and symmetric bilaterally  Plantar response: Flexor bilaterally  Gait: Gait and station is normal.   Romberg: negative  Vascular: No carotid bruit bilaterally        DATA:  LABS:  General Labs:    CBC:   Lab Results   Component Value Date    WBC 5.5 06/22/2020    RBC 4.36 06/22/2020    HGB 14.1 06/22/2020    HCT 40.9 06/22/2020    MCV 93.8 06/22/2020    MCH 32.4 06/22/2020    MCHC 34.6 06/22/2020    RDW 12.4 06/22/2020     06/22/2020    MPV 9.1 06/22/2020     BMP:    Lab Results   Component Value Date     06/22/2020    K 4.2 06/22/2020    CL 98 06/22/2020    CO2 28 06/22/2020    BUN 14 06/22/2020    LABALBU 4.2 06/22/2020    CREATININE 0.7 11/05/2020    CALCIUM 8.9 11/05/2020    GFRAA >60 11/05/2020    GFRAA >60 06/06/2013    LABGLOM >60 11/05/2020    GLUCOSE 102 06/22/2020     RADIOLOGY REVIEW:  I have reviewed radiology image(s) and reports(s) of: CT head    IMPRESSION :  Late onset Alzheimer's type dementia without any behavioral disturbances  Mild anxiety and depression  Obstructive sleep apnea. I suspect that this may increase her risk for worsening dementia  TSH was normal  B12 level was borderline but within the normal range    RECOMMENDATIONS :  Discussed at length with patient  Reviewed CT head images with her  Agree with starting Aricept 5 mg daily patient is waiting for the prescriptions to arrive and will start it after that  Side effects were discussed. Continue B12 supplementation  I have also strongly recommended a sleep study.   I will see her back in 3 months and consider increasing the dose  Thank you for this consultation        Please note a portion of this chart was generated using dragon dictation software. Although every effort was made to ensure the accuracy of this automated transcription, some errors in transcription may have occurred.

## 2020-12-11 ENCOUNTER — OFFICE VISIT (OUTPATIENT)
Dept: INTERNAL MEDICINE CLINIC | Age: 70
End: 2020-12-11
Payer: MEDICARE

## 2020-12-11 VITALS
SYSTOLIC BLOOD PRESSURE: 130 MMHG | OXYGEN SATURATION: 97 % | WEIGHT: 175.4 LBS | HEART RATE: 96 BPM | DIASTOLIC BLOOD PRESSURE: 64 MMHG | BODY MASS INDEX: 37.96 KG/M2

## 2020-12-11 DIAGNOSIS — Z79.899 ON LONG TERM DRUG THERAPY: ICD-10-CM

## 2020-12-11 PROBLEM — R41.89 COGNITIVE IMPAIRMENT: Status: RESOLVED | Noted: 2019-08-31 | Resolved: 2020-12-11

## 2020-12-11 PROBLEM — F02.80 LATE ONSET ALZHEIMER'S DISEASE WITHOUT BEHAVIORAL DISTURBANCE (HCC): Status: ACTIVE | Noted: 2020-12-11

## 2020-12-11 PROBLEM — G30.1 LATE ONSET ALZHEIMER'S DISEASE WITHOUT BEHAVIORAL DISTURBANCE (HCC): Status: ACTIVE | Noted: 2020-12-11

## 2020-12-11 LAB
BILIRUBIN, POC: NORMAL
BLOOD URINE, POC: NORMAL
CLARITY, POC: NORMAL
COLOR, POC: NORMAL
GLUCOSE URINE, POC: NORMAL
KETONES, POC: NORMAL
LEUKOCYTE EST, POC: NORMAL
NITRITE, POC: NORMAL
PH, POC: 6
PROTEIN, POC: NORMAL
SPECIFIC GRAVITY, POC: 1.02
UROBILINOGEN, POC: 0.2

## 2020-12-11 PROCEDURE — G0439 PPPS, SUBSEQ VISIT: HCPCS | Performed by: INTERNAL MEDICINE

## 2020-12-11 PROCEDURE — 81002 URINALYSIS NONAUTO W/O SCOPE: CPT | Performed by: INTERNAL MEDICINE

## 2020-12-11 RX ORDER — ROSUVASTATIN CALCIUM 10 MG/1
10 TABLET, COATED ORAL DAILY
Qty: 90 TABLET | Refills: 3 | Status: SHIPPED | OUTPATIENT
Start: 2020-12-11 | End: 2021-10-20

## 2020-12-11 RX ORDER — KETOCONAZOLE 20 MG/ML
SHAMPOO TOPICAL
Qty: 120 ML | Refills: 5 | Status: SHIPPED | OUTPATIENT
Start: 2020-12-11 | End: 2020-12-17

## 2020-12-11 RX ORDER — GABAPENTIN 400 MG/1
1 CAPSULE ORAL 3 TIMES DAILY
COMMUNITY
Start: 2020-12-09 | End: 2020-12-11 | Stop reason: ALTCHOICE

## 2020-12-11 ASSESSMENT — PATIENT HEALTH QUESTIONNAIRE - PHQ9
SUM OF ALL RESPONSES TO PHQ QUESTIONS 1-9: 2
2. FEELING DOWN, DEPRESSED OR HOPELESS: 1
1. LITTLE INTEREST OR PLEASURE IN DOING THINGS: 1
SUM OF ALL RESPONSES TO PHQ QUESTIONS 1-9: 2
SUM OF ALL RESPONSES TO PHQ QUESTIONS 1-9: 2
SUM OF ALL RESPONSES TO PHQ9 QUESTIONS 1 & 2: 2

## 2020-12-11 ASSESSMENT — LIFESTYLE VARIABLES: HOW OFTEN DO YOU HAVE A DRINK CONTAINING ALCOHOL: 0

## 2020-12-11 NOTE — PATIENT INSTRUCTIONS
You may take AZO to help with symptoms of bladder infections. Just please don't take it until after you leave a urine sample. Patient Education        Dementia: Care Instructions  Your Care Instructions     Dementia is a loss of mental skills that affects your daily life. It is different than the occasional trouble with memory that is part of aging. You may find it hard to remember things that you feel you should be able to remember. Or you may feel that your mind is just not working as well as usual.  Finding out that you have dementia is a shock. You may be afraid and worried about how the condition will change your life. Although there is no cure at this time, medicine may slow memory loss and improve thinking for a while. Other medicines may be able to help you sleep or cope with depression and behavior changes. Dementia often gets worse slowly. But it can get worse quickly. As dementia gets worse, it may become harder to do common things that take planning, like making a list and going shopping. Over time, the disease may make it hard for you to take care of yourself. Some people with dementia need others to help care for them. Dementia is different for everyone. You may be able to function well for a long time. In the early stage of the condition, you can do things at home to make life easier and safer. You also can keep doing your hobbies and other activities. Many people find comfort in planning now for their future needs. Follow-up care is a key part of your treatment and safety. Be sure to make and go to all appointments, and call your doctor if you are having problems. It's also a good idea to know your test results and keep a list of the medicines you take. How can you care for yourself at home? · Take your medicines exactly as prescribed. Call your doctor if you think you are having a problem with your medicine. · Eat healthy foods. Eat lots of whole grains, fruits, and vegetables every day. If you are not hungry, try snacks or nutritional drinks such as Boost, Ensure, or Sustacal.  · If you have problems sleeping:  ? Try not to nap too close to your bedtime. ? Exercise regularly. Walking is a good choice. ? Try a glass of warm milk or caffeine-free herbal tea before bed. · Do tasks and activities during the time of day when you feel your best. It may help to develop a daily routine. · Post labels, lists, and sticky notes to help you remember things. Write your activities on a calendar you can easily find. Put your clock where you can easily see it. · Stay active. Take walks in familiar places, or with friends or loved ones. Try to stay active mentally too. Read and work crossword puzzles if you enjoy these activities. · Do not drive unless you can pass an on-road driving test. If you are not sure if you are safe to drive, your state 's license bureau can test you. · Keep a cordless phone and a flashlight with new batteries by your bed. If possible, put a phone in each of the main rooms of your house, or carry a cell phone in case you fall and cannot reach a phone. Or, you can wear a device around your neck or wrist. You push a button that sends a signal for help. Acknowledge your emotions and plan for the future  · Talk openly and honestly with your doctor. · Let yourself grieve. It is common to feel angry, scared, frustrated, anxious, or depressed. · Get emotional support from family, friends, a support group, or a counselor experienced in working with people who have dementia. · Ask for help if you need it. · Tell your doctor how you feel. You may feel upset, angry, or worried at times. Many things can cause this, including poor sleep, medicine side effects, confusion, and pain. Your doctor may be able to help you. · Plan for the future. ? Talk to your family and doctor about preparing a living will and other important papers while you can make decisions.  These papers tell your doctors how to care for you at the end of your life. ? Consider naming a person to make decisions about your care if you are not able to. When should you call for help? Call 911 anytime you think you may need emergency care. For example, call if:    · You are lost and do not know whom to call.     · You are injured and do not know whom to call. Call your doctor now or seek immediate medical care if:    · You are more confused or upset than usual.     · You feel like you could hurt yourself because your mind is not working well. Watch closely for changes in your health, and be sure to contact your doctor if you have any problems. Where can you learn more? Go to https://TransceptapePhotofyeweb.[employer]x. org and sign in to your "[employer]" account. Enter U469 in the ClaimReturn box to learn more about \"Dementia: Care Instructions. \"     If you do not have an account, please click on the \"Sign Up Now\" link. Current as of: January 31, 2020               Content Version: 12.6  © 9067-7892 [employer]. Care instructions adapted under license by [facility] ([facility]). If you have questions about a medical condition or this instruction, always ask your healthcare professional. [place] [address] any warranty or liability for your use of this information. Patient Education        Learning About Dementia  What is dementia? We all forget things as we get older. Many older people have a slight loss of memory that does not affect their daily lives. But memory loss that gets worse may mean that you have dementia. Dementia is a loss of mental skills that affects your daily life. It can cause problems with memory, problem-solving, and learning. It also can cause problems with thinking and planning. Dementia usually gets worse over time. But how quickly it gets worse is different for each person. Some people stay the same for years. Others lose skills quickly.   Your chances of having dementia rise as you get older. But this doesn't mean that everyone will get it. How is dementia diagnosed? To diagnose dementia, your doctor will:  · Do a physical exam.  · Ask questions about recent and past illnesses and life events. The doctor will want to talk to a close family member to check details. · Ask you to do some simple things that test your memory and other mental skills. Your doctor may ask you to tell what day and year it is, repeat a series of words, or draw a clock face. The doctor may do tests to look for a cause that can be treated. For example, you might have blood tests to check your thyroid or to look for an infection. You might also have a test that shows a picture of your brain, like an MRI or a CT scan. These tests can help your doctor find a tumor or brain injury. Knowing the type of dementia a person has can help the doctor prescribe medicines or other treatments. What are the symptoms? Usually the first symptom of dementia is memory loss. Often the person who has the memory problem doesn't notice it, but family and friends do. People who have dementia may have increasing trouble with:  · Recalling recent events. They may forget appointments or lose objects. · Recognizing people and places. · Keeping up with conversations and activity. · Finding their way around familiar places, or driving to and from places they know well. · Keeping up personal care such as grooming or bathing. · Planning and carrying out routine tasks. They may have trouble following a recipe or writing a letter or email. How is dementia treated? Medicines for dementia can slow it down for a while and make it easier to live with. Medicines can't cure it. But they may help improve mental function, mood, or behavior. If a stroke caused the dementia, doing things to reduce the chance of another stroke may help.  They include eating healthy foods, being active, staying at a healthy weight, and not smoking. As dementia gets worse, a person may get depressed or angry and upset. An active social life, counseling, and sometimes medicine may help with changing emotions. The goals of ongoing treatment are to keep the person safely at home as long as possible and to provide support and guidance to the caregivers. The person will need routine follow-up visits. The doctor will monitor medicines and the person's level of functioning. Follow-up care is a key part of your treatment and safety. Be sure to make and go to all appointments, and call your doctor if you are having problems. It's also a good idea to know your test results and keep a list of the medicines you take. Where can you learn more? Go to https://GetourguidepeEnclarity.[employer]. org and sign in to your [employer] account. Enter [id number] in the Seeo box to learn more about \"Learning About Dementia. \"     If you do not have an account, please click on the \"Sign Up Now\" link. Current as of: January 31, 2020               Content Version: 12.6  © 4538-2545 [employer]. Care instructions adapted under license by [facility] ([facility]). If you have questions about a medical condition or this instruction, always ask your healthcare professional. [place] [address] any warranty or liability for your use of this information. Patient Education        Helping A Person With Dementia: Care Instructions  Your Care Instructions    Dementia is a loss of mental skills that affects daily life. It is different from mild memory loss that occurs with aging. Dementia can cause problems with memory, thinking clearly, and planning. It is different for everyone. But it usually gets worse slowly. Some people who have dementia can function well for a long time. But at some point it may become hard for the person to care for himself or herself. It can be upsetting to learn that a loved one has this condition.  You may be afraid and or other hobbies. · Talk openly with the doctor about any behavior changes. Many people who have dementia become easily upset or agitated or feel worried. There are many things that can cause this, such as medicine side effects, confusion, and pain. It may be helpful to:  ? Keep distractions to a minimum. It may also help to keep noise levels low and voices quiet. ? Develop simple daily routines for bathing, dressing, and other activities. And remind your loved one often about upcoming changes to the daily routine, such as trips or appointments. ? Ask what is upsetting him or her. Keep in mind that people who have dementia don't always know why they are upset. · Take steps to help if the person is sundowning. This is the restless behavior and trouble with sleeping that may occur in late afternoon and at night. Try not to let the person nap during the day. Offer a glass of warm milk or caffeine-free tea before bedtime. · Be patient. A task may take the person longer than it used to. · For as long as he or she is able, allow your loved one to make decisions about activities, food, clothing, and other choices. Let him or her be independent, even if tasks take more time or are not done perfectly. Tailor tasks to the person's abilities. For example, if cooking is no longer safe, ask for other help. Your loved one can help set the table, or make simple dishes such as a salad. When the person needs help, offer it gently. Staying safe  · Make your home (or your loved one's home) safe. Tack down rugs, and put no-slip tape in the tub. Install handrails, and put safety switches on stoves and appliances. Keep rooms free of clutter. Make sure walkways around furniture are clear. Do not move furniture around, because the person may become confused. · Use locks on doors and cupboards. Lock up knives, scissors, medicines, cleaning supplies, and other dangerous things.   · Do not let the person drive or cook if he or she can't do it safely. A person with dementia should not drive unless he or she is able to pass an on-road driving test. Your state 's license bureau can do a driving test if there is any question. · Get medical alert jewelry for the person so that you can be contacted if he or she wanders away. If possible, provide a safe place for wandering, such as an enclosed yard or garden. Taking care of yourself  · Ask your doctor about support groups and other resources in your area. · Take care of your health. Be sure to eat healthy foods and get enough rest and exercise. · Take time for yourself. Respite services provide someone to stay with the person for a short time while you get out of the house for a few hours. · Make time for an activity that you enjoy. Read, listen to music, paint, do crafts, or play an instrument, even if it's only for a few minutes a day. · Spend time with family, friends, and others in your support system. When should you call for help? Call 911 anytime you think the person may need emergency care. For example, call if:    · The person who has dementia wanders away and you can't find him or her.     · The person who has dementia is seriously injured. Call the doctor now or seek immediate medical care if:    · The person suddenly sees things that are not there (hallucinates).     · The person has a sudden change in his or her behavior. Watch closely for changes in the person's health, and be sure to contact the doctor if:    · The person has symptoms that could cause injury.     · The person has problems with his or her medicine.     · You need more information to care for a person with dementia.     · You need respite care so you can take a break. Where can you learn more? Go to https://chapril.[employer]. org and sign in to your [employer] account. Enter W887 in the [facility] box to learn more about \"Helping A Person With Dementia: Care Instructions. \" meet this goal.  · When exposed to the sun, use a sunscreen that protects against both UVA and UVB radiation with an SPF of 30 or greater. Reapply every 2 to 3 hours or after sweating, drying off with a towel, or swimming. · Always wear a seat belt when traveling in a car. Always wear a helmet when riding a bicycle or motorcycle.

## 2020-12-11 NOTE — PROGRESS NOTES
hydroCHLOROthiazide (MICROZIDE) 12.5 MG capsule TAKE 1 CAPSULE EVERY MORNING Yes Fay Olp, DO   gabapentin (NEURONTIN) 600 MG tablet Take 1 tablet by mouth 3 times daily for 180 days. Yes Fay Olp, DO   Cyanocobalamin (B-12) 1000 MCG SUBL Place 1,000 Units under the tongue daily Yes Fay Olp, DO   hydrOXYzine (ATARAX) 50 MG tablet Take 1 tablet by mouth every 4 hours as needed for Itching Yes Fay Olp, DO   tiZANidine (ZANAFLEX) 2 MG tablet Take 1 tablet by mouth every 8 hours as needed (prn) Yes Fay Olp, DO   fluocinolone (DERMA-SMOOTHE) 0.01 % external oil Apply 1 each topically Twice a Week Yes Historical Provider, MD   oxybutynin (DITROPAN XL) 15 MG extended release tablet Take 1 tablet by mouth daily Yes Fay Olp, DO   fluticasone (FLONASE) 50 MCG/ACT nasal spray SPRAY TWO SPRAYS IN EACH NOSTRIL ONCE DAILY Yes Fay Olp, DO   DULoxetine (CYMBALTA) 60 MG extended release capsule Take 1 capsule by mouth daily Yes Fay Olp, DO   polyethylene glycol (GLYCOLAX) 17 GM/SCOOP powder 1/2-1 capful in 8 oz water or prune juice qd prn constipation.  Yes Fay Olp, DO   carBAMazepine (TEGRETOL-XR) 100 MG extended release tablet Take 1 tablet by mouth 2 times daily Yes Mel Morrell MD   diclofenac (VOLTAREN) 75 MG EC tablet Take 1 tablet by mouth 2 times daily  Patient taking differently: Take 75 mg by mouth 2 times daily as needed for Pain  Yes Fay Olp, DO   busPIRone (BUSPAR) 10 MG tablet Take 1 tablet by mouth 2 times daily Yes Fay Olp, DO   acetaminophen (TYLENOL) 500 MG tablet Take 1,000 mg by mouth 3 times daily as needed for Pain Yes Historical Provider, MD   fluocinolone (DERMOTIC) 0.01 % OIL oil Place 1 drop in ear(s) 2 times daily Yes Fay Olp, DO   lisinopril (PRINIVIL;ZESTRIL) 40 MG tablet TAKE 1 TABLET DAILY Yes Fay Olp, DO potassium chloride (KLOR-CON M) 20 MEQ extended release tablet TAKE 1 TABLET TWICE A DAY Yes Shante Jiménez,    diphenoxylate-atropine (LOMOTIL) 2.5-0.025 MG per tablet Take 1 tablet by mouth as needed for Diarrhea. . Yes Historical Provider, MD   glucose blood VI test strips (ONE TOUCH ULTRA TEST) strip 1 each by In Vitro route daily Fasting qam and As needed. Yes Shante Jiménez DO   Lancets MISC Check sugars fasting qam and prn Yes Shante Jiménez DO   Cholecalciferol (VITAMIN D3) 5000 units CAPS Take 5,000 Units by mouth daily  Yes Historical Provider, MD   oxymorphone (OPANA) 5 MG tablet Take 5 mg by mouth 2 times daily. Indications: per DR Alice Bella TAKES 3 TIMES A DAY. TRYING TO CUT BACK. Yes Historical Provider, MD   zoledronic acid (RECLAST) 5 MG/100ML SOLN Infuse 5 mg intravenously once IV, yearly Yes Historical Provider, MD   Cetirizine HCl (ZYRTEC PO) Take 1 tablet by mouth daily  Yes Historical Provider, MD   aspirin 81 MG chewable tablet Take 81 mg by mouth daily. Yes Historical Provider, MD   Calcium Carbonate-Vit D-Min (CALCIUM 1200 PO) Take 1 capsule by mouth 2 times daily. Yes Historical Provider, MD   Ascorbic Acid (VITAMIN C) 500 MG tablet Take 500 mg by mouth daily.    Yes Historical Provider, MD         Past Medical History:   Diagnosis Date    Anxiety     Anxiety and depression     Chronic back pain     Clostridium difficile infection 2/22/15    Hyperlipidemia     Hypertension     Insomnia disorder     Osteoarthritis     Osteoporosis 2008    Rheumatic fever     S/P insertion of spinal cord stimulator     Self-catheterizes urinary bladder     as needed 7/8 no longer catherizing     Urinary retention        Past Surgical History:   Procedure Laterality Date    BLADDER REPAIR      CARPAL TUNNEL RELEASE      COLONOSCOPY      CYSTOSCOPY  10/29/2012    bladder biopsy    CYSTOSCOPY N/A 10/19/2020    FLEXIBLE CYSTOSCOPY performed by Ly Torres MD at [address] [place] [address]  DENTAL SURGERY      FOOT SURGERY      HYSTERECTOMY      INTRACAPSULAR CATARACT EXTRACTION Right 7/18/2019    PHACOEMULSIFICATION WITH INTRAOCULAR LENS IMPLANT performed by Selin Hanna MD at [facility] EXTRACTION Left 7/26/2019    PHACOEMULSIFICATION WITH INTRAOCULAR LENS IMPLANT performed by Selin Hanna MD at [place] [address]    MANDIBLE RECONSTRUCTION      OTHER SURGICAL HISTORY      spinal cord stimulator    TOTAL KNEE ARTHROPLASTY Right 10/18/13         Family History   Problem Relation Age of Onset    COPD Mother     High Blood Pressure Mother     Rheum Arthritis Mother     Thyroid Disease Mother     Alcohol Abuse Father     Clotting Disorder Sister     Thyroid Disease Sister     Hypertension Brother     Diabetes Sister     Heart Disease Sister     Hypertension Sister     Thyroid Disease Sister     Cancer Brother     Heart Disease Brother     Hypertension Brother     Substance Abuse Brother     Alcohol Abuse Son     No Known Problems Daughter     Dementia Neg Hx        CareTeam (Including outside providers/suppliers regularly involved in providing care):   Patient Care Team:  Beth Jeff DO as PCP - General (Internal Medicine)  Beth Jeff DO as PCP - [facility] Empaneled Provider  Carissa Laird MD as Consulting Physician (General Surgery)  Shyla Arriola RN as Ambulatory Care Manager    Wt Readings from Last 3 Encounters:   12/11/20 175 lb 6.4 oz (79.6 kg)   12/03/20 174 lb 8 oz (79.2 kg)   12/02/20 174 lb (78.9 kg)     Vitals:    12/11/20 1019   BP: 130/64   Pulse: 96   SpO2: 97%   Weight: 175 lb 6.4 oz (79.6 kg)     Body mass index is 37.96 kg/m². Based upon direct observation of the patient, evaluation of cognition reveals remote memory intact, recent memory impaired. General Appearance: alert and oriented to person, place and time, well developed and well- nourished, in no acute distress  Skin: warm and dry, no rash or erythema  Head: normocephalic and atraumatic  Eyes: pupils equal, round, and reactive to light, extraocular eye movements intact, conjunctivae normal  ENT: tympanic membrane, external ear and ear canal normal bilaterally, nose without deformity, nasal mucosa and turbinates normal without polyps  Neck: supple and non-tender without mass, no thyromegaly or thyroid nodules, no cervical lymphadenopathy  Pulmonary/Chest: clear to auscultation bilaterally- no wheezes, rales or rhonchi, normal air movement, no respiratory distress  Cardiovascular: normal rate, regular rhythm, normal S1 and S2, no murmurs, rubs, clicks, or gallops, distal pulses intact, no carotid bruits  Abdomen: soft, non-tender, non-distended, normal bowel sounds, no masses or organomegaly  Extremities: no cyanosis, clubbing or edema  Musculoskeletal: normal range of motion, no joint swelling, deformity or tenderness  Neurologic: reflexes normal and symmetric, no cranial nerve deficit, gait, coordination and speech normal    Patient's complete Health Risk Assessment and screening values have been reviewed and are found in Flowsheets. The following problems were reviewed today and where indicated follow up appointments were made and/or referrals ordered. Positive Risk Factor Screenings with Interventions:     Fall Risk:  Timed Up and Go Test > 12 seconds? (Complete if either Fall Risk answers are Yes): no  2 or more falls in past year?: no  Fall with injury in past year?: (!) yes  Fall Risk Interventions:    · Patient declines any further evaluation/treatment for this issue fell while trying to retrieve something off of a shelf on a ladder. Does not typically climg ladders.      General Health and ACP:  General  In general, how would you say your health is?: Liset Cota In the past 7 days, have you experienced any of the following? New or Increased Pain, New or Increased Fatigue, Loneliness, Social Isolation, Stress or Anger?: (!) New or Increased Fatigue, Loneliness, Stress  Do you get the social and emotional support that you need?: (!) No  Do you have a Living Will?: Yes  Advance Directives     Power of  Living Will ACP-Advance Directive ACP-Power of     Not on File Not on File Not on File Not on File      General Health Risk Interventions:  · Fatigue: Referral placed to sleep management  · Inadequate social/emotional support: patient's comments regarding inadequate social support: children are not supportive. Have stated that they will not help to care for her. Discussed possible need for ECF/AL. She has plans to discuss recent dx of dementia with her children. Health Habits/Nutrition:  Health Habits/Nutrition  Do you exercise for at least 20 minutes 2-3 times per week?: (!) No  Have you lost any weight without trying in the past 3 months?: No  Do you eat fewer than 2 meals per day?: No  Have you seen a dentist within the past year?: (!) No     Health Habits/Nutrition Interventions:  · Inadequate physical activity:  patient is not ready to increase his/her physical activity level at this time- cannot exercise d/t extensive back pain. · Dental exam overdue:  patient declines dental evaluation- has no lower teeth, dentures on top. Dentures fit well.      Hearing/Vision:  No exam data present  Hearing/Vision  Do you or your family notice any trouble with your hearing?: No  Do you have difficulty driving, watching TV, or doing any of your daily activities because of your eyesight?: (!) Yes  Have you had an eye exam within the past year?: Yes  Hearing/Vision Interventions:  · Vision concerns:  is following with her opthalmologist.     Personalized Preventive Plan   Current Health Maintenance Status  Immunization History   Administered Date(s) Administered  Influenza 09/15/2011, 09/18/2012, 09/24/2013    Influenza Vaccine, unspecified formulation 09/18/2012, 09/24/2013, 09/20/2015    Influenza Virus Vaccine 09/18/2014    Influenza Whole 10/09/2015    Influenza, High Dose (Fluzone 65 yrs and older) 10/19/2017, 11/09/2018, 10/03/2019    Influenza, Quadv, adjuvanted, 65 yrs +, IM, PF (Fluad) 09/30/2020    Pneumococcal Conjugate 13-valent ([id number]) 08/23/2019    Pneumococcal Polysaccharide ([id number]) 09/29/2020    Tdap (Boostrix, Adacel) 08/21/2020        Health Maintenance   Topic Date Due    Shingles Vaccine (1 of 2) 02/07/2000    Lipid screen  06/22/2021    Potassium monitoring  06/22/2021    Diabetes screen  07/26/2021    Annual Wellness Visit (AWV)  10/07/2021    Creatinine monitoring  11/05/2021    Breast cancer screen  01/15/2022    Colon cancer screen colonoscopy  06/17/2025    DTaP/Tdap/Td vaccine (2 - Td) 08/21/2030    DEXA (modify frequency per FRAX score)  Completed    Flu vaccine  Completed    Pneumococcal 65+ years Vaccine  Completed    Hepatitis C screen  Completed    Hepatitis A vaccine  Aged Out    Hepatitis B vaccine  Aged Out    Hib vaccine  Aged Out    Meningococcal (ACWY) vaccine  Aged Out     Recommendations for [employer] Due: see orders and patient instructions/AVS.  . Recommended screening schedule for the next 5-10 years is provided to the patient in written form: see Patient Instructions/AVS.    Travis Mejia was seen today for medicare awv. Diagnoses and all orders for this visit:    Routine general medical examination at a health care facility  Patient is up-to-date with regards to health maintenance. Concerns addressed as per below. Essential hypertension  Continue lisinopril 40 mg daily and hydrochlorothiazide 12.5 mg daily. Well-controlled.     BEBE (obstructive sleep apnea) Patient is now agreeable to sleep study after seeing Dr. Zoila Marcano and Dr. Kenneth hCaudhari. She is s/p UPPP but still symptomatic. She has an appointment later this month. Seborrhea capitis  Resume Nizoral shampoo 2-3 times weekly with hydrocortisone solution 2-3 times weekly. Late onset Alzheimer's disease without behavioral disturbance ([address]  Recently diagnosed and started on Aricept. Discussed diagnosis with patient at length. Discussed that she needs to let her children know that she has been diagnosed with dementia and will require future care. Recurrent UTI  Patient follows with Dr. Urmila Allan for urology. Encourage patient to reach out to Dr. Ria Quintana when she is having urinary tract infection symptoms but patient would prefer not to do this is he requires office visits. Reviewed with patient that she does need to follow-up with him given the frequency of her symptoms. Check UA today. Seborrhea capitis  -     ketoconazole (NIZORAL) 2 % shampoo; Use 3 times weekly. Need for prophylactic vaccination and inoculation against varicella  -     zoster recombinant adjuvanted vaccine [facility]) 50 MCG/0.5ML SUSR injection; Inject 0.5 mLs into the muscle once for 1 dose    Recurrent UTI  -     POCT Urinalysis no Micro  -     Culture, Urine    Other orders  -     rosuvastatin (CRESTOR) 10 MG tablet; Take 1 tablet by mouth daily TAKE 1 TABLET NIGHTLY                Advance Care Planning   Advanced Care Planning: Discussed the patients choices for care and treatment in case of a health event that adversely affects decision-making abilities. Also discussed the patients long-term treatment options.  Reviewed with the patient the [facility] of [address] [place] [address] Declaration forms Reviewed the process of designating a competent adult as an Agent (or -in-fact) that could take make health care decisions for the patient if incompetent. Patient was asked to complete the declaration forms, either acknowledge the forms by a public notary or an eligible witness and provide a signed copy to the practice office. Has a living will. Son is DOPA HC. Does not want life support long-term. Is agreeable to short term life support. Time spent (minutes): 5 minutes      Obesity Counseling: Assessed behavioral health risks and factors affecting choice of behavior. Suggested weight control approaches, including dietary changes behavioral modification and follow up plan. Provided educational and support documentation. Time spent (minutes): 5 minutes  Struggles to figure out what she wants to eat. Is not ready to work on improving. Cardiovascular Disease Risk Counseling: Assessed the patient's risk to develop cardiovascular disease and reviewed main risk factors. Reviewed steps to reduce disease risk including:   · Quitting tobacco use, reducing amount smoked, or not starting the habit  · Making healthy food choices  · Being physically active and gradualy increasing activity levels   · Reduce weight and determine a healthy BMI goal  · Monitor blood pressure and treat if higher than 140/90 mmHg  · Maintain blood total cholesterol levels under 5 mmol/l or 190 mg/dl  · Maintain LDL cholesterol levels under 3.0 mmol/l or 115 mg/dl   · Control blood glucose levels  · Consider taking aspirin (75 mg daily), once blood pressure is controlled   Provided a follow up plan.   Time spent (minutes): 5 minutes

## 2020-12-12 LAB
CARBAMAZEPINE DOSE: ABNORMAL
CARBAMAZEPINE LEVEL: <2 UG/ML (ref 4–12)

## 2020-12-13 PROBLEM — Z00.00 ROUTINE GENERAL MEDICAL EXAMINATION AT A HEALTH CARE FACILITY: Status: ACTIVE | Noted: 2020-12-13

## 2020-12-14 NOTE — ASSESSMENT & PLAN NOTE
Patient follows with Dr. Hossein Valderrama for urology. Encourage patient to reach out to Dr. Faustino Roy when she is having urinary tract infection symptoms but patient would prefer not to do this is he requires office visits. Reviewed with patient that she does need to follow-up with him given the frequency of her symptoms. Check UA today.

## 2020-12-14 NOTE — ASSESSMENT & PLAN NOTE
Patient is now agreeable to sleep study after seeing Dr. Anibal Baldwin and Dr. Jerald Helms. She is s/p UPPP but still symptomatic. She has an appointment later this month.

## 2020-12-14 NOTE — ASSESSMENT & PLAN NOTE
Recently diagnosed and started on Aricept. Discussed diagnosis with patient at length. Discussed that she needs to let her children know that she has been diagnosed with dementia and will require future care.

## 2020-12-17 RX ORDER — KETOCONAZOLE 20 MG/ML
SHAMPOO TOPICAL
Qty: 1 BOTTLE | Refills: 1 | Status: SHIPPED | OUTPATIENT
Start: 2020-12-17

## 2020-12-22 ENCOUNTER — TELEPHONE (OUTPATIENT)
Dept: PULMONOLOGY | Age: 70
End: 2020-12-22

## 2020-12-28 RX ORDER — FLUOCINOLONE ACETONIDE 0.11 MG/ML
1 OIL TOPICAL
Qty: 118.28 ML | Refills: 1 | Status: SHIPPED | OUTPATIENT
Start: 2020-12-28 | End: 2021-09-07

## 2020-12-28 NOTE — TELEPHONE ENCOUNTER
Pt called asking for refills on the Derma Smooth oil for her scalp. Pended to myrna in [facility]. Call if any questions.

## 2020-12-30 ENCOUNTER — TELEPHONE (OUTPATIENT)
Dept: INTERNAL MEDICINE CLINIC | Age: 70
End: 2020-12-30

## 2020-12-30 NOTE — TELEPHONE ENCOUNTER
Patient states she was started on donepezil  and begin having diarrhea at the same time. She stopped the medication on Sunday and the diarrhea has eased up some. She is starting to get a little formed stool. She would like some direction. I advised her Dr. Ezra Busch is on vacation this week as well as Dr. Cricket Simpson who is her PCP. Please advise.

## 2020-12-31 ENCOUNTER — TELEPHONE (OUTPATIENT)
Dept: NEUROLOGY | Age: 70
End: 2020-12-31

## 2020-12-31 NOTE — TELEPHONE ENCOUNTER
Patient's psychiatrist prescribed donepezil 5 mg once daily. Dr Ross Negron told her it was a good idea, however since starting med 2 weeks ago she has had constant diarrhea. She stopped med 2 days ago and is doing better. She tried to call the psychiatrist but he is on vacation. Per Dr Ross Negron, stay off of medication until Monday, he can not change the medication that another doctor has prescribed so check back with psychiatrist on Monday.

## 2021-01-04 ENCOUNTER — TELEPHONE (OUTPATIENT)
Dept: PSYCHIATRY | Age: 71
End: 2021-01-04

## 2021-01-04 RX ORDER — GALANTAMINE HYDROBROMIDE 8 MG/1
8 CAPSULE, EXTENDED RELEASE ORAL
Qty: 30 CAPSULE | Refills: 3 | Status: SHIPPED | OUTPATIENT
Start: 2021-01-04 | End: 2021-01-12 | Stop reason: SDUPTHER

## 2021-01-04 RX ORDER — GALANTAMINE HYDROBROMIDE 8 MG/1
8 CAPSULE, EXTENDED RELEASE ORAL
Qty: 15 CAPSULE | Refills: 0 | Status: SHIPPED
Start: 2021-01-04 | End: 2021-01-12 | Stop reason: SDUPTHER

## 2021-01-04 NOTE — TELEPHONE ENCOUNTER
Mary Bowman, can you let patient know I sent over a new prescription for Galantamine ER 8mg. Take one every morning. It may cost more than the donepezil. If it is out of her budget, we can hold on starting any medications until I see her for a follow up visit.

## 2021-01-04 NOTE — TELEPHONE ENCOUNTER
Pt called stating that she was placed on Aricept. She reported that she had diarrhea for 2 weeks. She decided to stopped the medication a week ago and her diarrhea seems to be resolving. She is wondering if she can have something else for her dementia. If Moshe You wants to change med. She only asks for a 2 week supply first to try.

## 2021-01-05 ENCOUNTER — VIRTUAL VISIT (OUTPATIENT)
Dept: PULMONOLOGY | Age: 71
End: 2021-01-05
Payer: MEDICARE

## 2021-01-05 DIAGNOSIS — F33.1 MODERATE EPISODE OF RECURRENT MAJOR DEPRESSIVE DISORDER (HCC): Chronic | ICD-10-CM

## 2021-01-05 DIAGNOSIS — I10 ESSENTIAL HYPERTENSION: Chronic | ICD-10-CM

## 2021-01-05 DIAGNOSIS — E66.01 CLASS 2 SEVERE OBESITY DUE TO EXCESS CALORIES WITH SERIOUS COMORBIDITY AND BODY MASS INDEX (BMI) OF 38.0 TO 38.9 IN ADULT (HCC): Chronic | ICD-10-CM

## 2021-01-05 DIAGNOSIS — K21.9 GASTROESOPHAGEAL REFLUX DISEASE WITHOUT ESOPHAGITIS: Chronic | ICD-10-CM

## 2021-01-05 DIAGNOSIS — G47.33 OBSTRUCTIVE SLEEP APNEA (ADULT) (PEDIATRIC): Primary | ICD-10-CM

## 2021-01-05 PROBLEM — E66.812 CLASS 2 SEVERE OBESITY DUE TO EXCESS CALORIES WITH SERIOUS COMORBIDITY AND BODY MASS INDEX (BMI) OF 38.0 TO 38.9 IN ADULT: Chronic | Status: ACTIVE | Noted: 2019-03-15

## 2021-01-05 PROCEDURE — 99204 OFFICE O/P NEW MOD 45 MIN: CPT | Performed by: INTERNAL MEDICINE

## 2021-01-05 ASSESSMENT — SLEEP AND FATIGUE QUESTIONNAIRES
HOW LIKELY ARE YOU TO NOD OFF OR FALL ASLEEP WHILE SITTING AND READING: 1
HOW LIKELY ARE YOU TO NOD OFF OR FALL ASLEEP WHEN YOU ARE A PASSENGER IN A CAR FOR AN HOUR WITHOUT A BREAK: 2
HOW LIKELY ARE YOU TO NOD OFF OR FALL ASLEEP WHILE SITTING INACTIVE IN A PUBLIC PLACE: 0
HOW LIKELY ARE YOU TO NOD OFF OR FALL ASLEEP WHILE SITTING AND TALKING TO SOMEONE: 0
HOW LIKELY ARE YOU TO NOD OFF OR FALL ASLEEP WHILE WATCHING TV: 0
HOW LIKELY ARE YOU TO NOD OFF OR FALL ASLEEP WHILE LYING DOWN TO REST IN THE AFTERNOON WHEN CIRCUMSTANCES PERMIT: 3
HOW LIKELY ARE YOU TO NOD OFF OR FALL ASLEEP IN A CAR, WHILE STOPPED FOR A FEW MINUTES IN TRAFFIC: 0

## 2021-01-05 NOTE — LETTER
[facility] Sleep Medicine  [id number] [facility]  [address] [address]  Phone: [phone]  Fax: [phone]      January 5, 2021       Patient: Fernando Gutiérrez   MR Number: [id number]   YOB: 1950   Date of Visit: 1/5/2021     Thank you for allowing me to participate in the care of [facility]. Here is my assessment and plan. Also attached is a copy of her consult note:    ASSESSMENT:  Visit Diagnoses and Associated Orders     Obstructive sleep apnea (adult) (pediatric)   (New Problem)  -  Primary    needs work-up    Home Sleep Study (HST) [66858 Custom]   - Future Order         Essential hypertension   (Stable)           Gastroesophageal reflux disease without esophagitis   (Stable)           Moderate episode of recurrent major depressive disorder (HCC)   (Stable)           Class 2 severe obesity due to excess calories with serious comorbidity and body mass index (BMI) of 38.0 to 38.9 in adult ([address]   (Stable)                 Plan:  Reviewed BEBE: pathophysiology, diagnosis, complications and treatment. Instructed her not to drive if drowsy. Continue medications per her PCP and other physicians. Limit caffeine use after 3pm. Standard of care is to do in-lab PSG but insurance is mandating an inferior HST. 1 wk follow up after study to review her results. The chronic medical conditions listed are directly related to the primary diagnosis listed above. The management of the primary diagnosis affects the secondary diagnosis and vice versa. Continue meds for: HTN, GERD, and depression. Pt would medically benefit from wt loss for BEBE (diet, exercise, surgical). Orders Placed This Encounter   Procedures    Home Sleep Study (HST)         If you have questions or concerns, please do not hesitate to call me. I look forward to following Kushal Barros along with you.     Sincerely,      Michael Patrick MD    CC providers:  Arleth Nolan MD  [address] [place] [address], [address] [facility] [address]  Via In [facility], [address],[facility], [phone]  Via In [employer]

## 2021-01-05 NOTE — PROGRESS NOTES
Bharathi Case MD, Ashish Taylor, [facility]  Tiffanie Kehrt CNP  David Barber CNP [address] [place] [address]  [address] [facility], [address] [facility] [phone]   [facility] [address]  [address]. [facility]. Kimmy [address] [phone]     Video Visit- Consult    Pursuant to the emergency declaration under the [address] [place] [address] authority and the [employer] Act, this Virtual  Visit was conducted, with patient's consent, to reduce the patient's risk of exposure to COVID-19. Services were provided through a video synchronous discussion virtually to substitute for in-person clinic visit. Patient was located in their home. Assessment:      Visit Diagnoses and Associated Orders     Obstructive sleep apnea (adult) (pediatric)   (New Problem)  -  Primary    needs work-up    Home Sleep Study (HST) [81698 Custom]   - Future Order         Essential hypertension   (Stable)           Gastroesophageal reflux disease without esophagitis   (Stable)           Moderate episode of recurrent major depressive disorder (HCC)   (Stable)           Class 2 severe obesity due to excess calories with serious comorbidity and body mass index (BMI) of 38.0 to 38.9 in adult ([address]   (Stable)                  Plan:      Reviewed BEBE: pathophysiology, diagnosis, complications and treatment. Instructed her not to drive if drowsy. Continue medications per her PCP and other physicians. Limit caffeine use after 3pm. Standard of care is to do in-lab PSG but insurance is mandating an inferior HST. 1 wk follow up after study to review her results. The chronic medical conditions listed are directly related to the primary diagnosis listed above. The management of the primary diagnosis affects the secondary diagnosis and vice versa. Continue meds for: HTN, GERD, and depression. Pt would medically benefit from wt loss for BEBE (diet, exercise, surgical). Orders Placed This Encounter   Procedures    Home Sleep Study (HST)          Subjective:     Patient ID: Lex Kurtz is a 79 y.o. female. Chief Complaint   Patient presents with    Daytime Sleepiness       HPI:      Lex Kurtz is a 79 y.o. female referred by Jona Ivan MD for a sleep evaluation. She complains of: snoring, excessive daytime sleepiness , non-restorative sleep, napping and tossing and turning at night. She denies: cataplexy and hypnagogic hallucinations. Take trazodone 200 mg to stay asleep. Hypertension, gastroesophageal reflux disease, depression, and obesity: stable on meds and followed by pt's PCP and other physicians. Previous evaluation and treatment has included- Had polysomnography over 15 years ago, had mandible lengthening procedure. No old records available. DOT/CDL - No  FAA/'s license -No    Previous Report(s) Reviewed: historical medical records, office notes, andreferral letter(s). Pertinent data has been documented. [place] - Total score: 6    Caffeine Intake - None. Social History     Socioeconomic History    Marital status:       Spouse name: Not on file    Number of children: Not on file    Years of education: Not on file    Highest education level: Not on file   Occupational History    Occupation: retired   Social Needs    Financial resource strain: Not on file    Food insecurity     Worry: Never true     Inability: Never true   [employer] needs     Medical: No     Non-medical: No   Tobacco Use    Smoking status: Never Smoker    Smokeless tobacco: Never Used   Substance and Sexual Activity    Alcohol use: Not Currently    Drug use: No    Sexual activity: Not on file   Lifestyle    Physical activity     Days per week: Not on file     Minutes per session: Not on file    Stress: Not on file   Relationships    Social connections     Talks on phone: Not on file     Gets together: Not on file  polyethylene glycol (GLYCOLAX) 17 GM/SCOOP powder 1/2-1 capful in 8 oz water or prune juice qd prn constipation. 3 Bottle 3    carBAMazepine (TEGRETOL-XR) 100 MG extended release tablet Take 1 tablet by mouth 2 times daily 60 tablet 3    diclofenac (VOLTAREN) 75 MG EC tablet Take 1 tablet by mouth 2 times daily (Patient taking differently: Take 75 mg by mouth 2 times daily as needed for Pain ) 180 tablet 3    busPIRone (BUSPAR) 10 MG tablet Take 1 tablet by mouth 2 times daily 180 tablet 3    acetaminophen (TYLENOL) 500 MG tablet Take 1,000 mg by mouth 3 times daily as needed for Pain      fluocinolone (DERMOTIC) 0.01 % OIL oil Place 1 drop in ear(s) 2 times daily 1 Bottle 3    lisinopril (PRINIVIL;ZESTRIL) 40 MG tablet TAKE 1 TABLET DAILY 90 tablet 4    potassium chloride (KLOR-CON M) 20 MEQ extended release tablet TAKE 1 TABLET TWICE A  tablet 4    diphenoxylate-atropine (LOMOTIL) 2.5-0.025 MG per tablet Take 1 tablet by mouth as needed for Diarrhea. .      glucose blood VI test strips (ONE TOUCH ULTRA TEST) strip 1 each by In Vitro route daily Fasting qam and As needed. 100 strip 1    Lancets MISC Check sugars fasting qam and prn 100 each 3    Cholecalciferol (VITAMIN D3) 5000 units CAPS Take 5,000 Units by mouth daily       oxymorphone (OPANA) 5 MG tablet Take 5 mg by mouth 2 times daily. Indications: per DR Alireza Landa TAKES 3 TIMES A DAY. TRYING TO CUT BACK.  Cetirizine HCl (ZYRTEC PO) Take 1 tablet by mouth daily       aspirin 81 MG chewable tablet Take 81 mg by mouth daily.  Calcium Carbonate-Vit D-Min (CALCIUM 1200 PO) Take 1 capsule by mouth 2 times daily.  Ascorbic Acid (VITAMIN C) 500 MG tablet Take 500 mg by mouth daily.         zoledronic acid (RECLAST) 5 MG/100ML SOLN Infuse 5 mg intravenously once IV, yearly       Current Facility-Administered Medications   Medication Dose Route Frequency Provider Last Rate Last Admin  zoledronic acid (RECLAST) 5 mg/100 mL infusion  5 mg Intravenous See Admin Instructions Kenrick Pretty  mL/hr at 04/06/16 0900 5 mg at 04/06/16 0900       Allergies as of 01/05/2021 - Review Complete 01/05/2021   Allergen Reaction Noted    Ativan [lorazepam] Swelling 01/09/2018    Sulfa antibiotics Swelling 04/25/2011    Keflex [cephalexin] Other (See Comments) 09/10/2018       Patient Active Problem List   Diagnosis    Essential hypertension    Osteoarthritis    Osteoporosis, post-menopausal    Polyarthritis    Right knee meniscal tear    Recurrent UTI    Self-catheterizes urinary bladder    Left upper quadrant pain    Thoracic disc herniation    Thoracic stenosis    Thoracic radiculopathy    DDD (degenerative disc disease), thoracolumbar    Degenerative disc disease, lumbar    Intercostal neuralgia    Pseudoarthrosis of lumbar spine    Thoracic spinal stenosis    Fibromyalgia    Primary osteoarthritis involving multiple joints    Chronic insomnia    Anxiety    Diastasis of rectus abdominis    Enlarged lymph node    Hypoglycemia    Onychomycosis with ingrown toenail    Mycotic toenails    Diastolic dysfunction    Leg swelling    BEBE (obstructive sleep apnea)    Abnormal echocardiogram    Physical deconditioning    Hyperlipidemia LDL goal <100    Gastroesophageal reflux disease without esophagitis    Fatty liver    Moderate episode of recurrent major depressive disorder (HCC)    Dyspnea on exertion    Seasonal allergies    Class 2 severe obesity due to excess calories with serious comorbidity and body mass index (BMI) of 38.0 to 38.9 in adult [facility])    Acute right-sided low back pain with right-sided sciatica    Generalized anxiety disorder    Allergic contact dermatitis due to plants, except food    Seborrhea capitis    RUQ pain    Eczema of both external ears    Chronic nasal congestion    OAB (overactive bladder)    Depressive disorder  Late onset Alzheimer's disease without behavioral disturbance ([facility] [address]    Routine general medical examination at a health care facility       Past Medical History:   Diagnosis Date    Anxiety     Anxiety and depression     Chronic back pain     Clostridium difficile infection 2/22/15    Hyperlipidemia     Hypertension     Insomnia disorder     Osteoarthritis     Osteoporosis 2008    Rheumatic fever     S/P insertion of spinal cord stimulator     Self-catheterizes urinary bladder     as needed 7/8 no longer catherizing     Urinary retention        Past Surgical History:   Procedure Laterality Date    BLADDER REPAIR      CARPAL TUNNEL RELEASE      COLONOSCOPY      CYSTOSCOPY  10/29/2012    bladder biopsy    CYSTOSCOPY N/A 10/19/2020    FLEXIBLE CYSTOSCOPY performed by Anais Bloom MD at [facility]      INTRACAPSULAR CATARACT EXTRACTION Right 7/18/2019    PHACOEMULSIFICATION WITH INTRAOCULAR LENS IMPLANT performed by Chaparro Taylor MD at [address] [place] [address] EXTRACTION Left 7/26/2019    PHACOEMULSIFICATION WITH INTRAOCULAR LENS IMPLANT performed by Chaparro [facility] MD at [facility]  2004    MANDIBLE RECONSTRUCTION      OTHER SURGICAL HISTORY      spinal cord stimulator    TOTAL KNEE ARTHROPLASTY Right 10/18/13       Family History   Problem Relation Age of Onset    COPD Mother     High Blood Pressure Mother     Rheum Arthritis Mother     Thyroid Disease Mother     Alcohol Abuse Father     Clotting Disorder Sister     Thyroid Disease Sister     Hypertension Brother     Diabetes Sister     Heart Disease Sister     Hypertension Sister     Thyroid Disease Sister     Cancer Brother     Heart Disease Brother     Hypertension Brother     Substance Abuse Brother     Alcohol Abuse Son     No Known Problems Daughter     Dementia Neg Hx        Objective:     Vitals: Patient reported Height and Weight Calculated BMI   Patient-Reported Vitals 1/5/2021   Patient-Reported Weight 175#   Patient-Reported Height 4'9\"   Patient-Reported Systolic -   Patient-Reported Diastolic -   Patient-Reported Pulse -       37.9     Due to COVID-19 this was a virtual visit and physical exam was deferred.     Electronically signed by Bernard Benz MD on1/5/2021 at 12:32 PM

## 2021-01-12 ENCOUNTER — TELEPHONE (OUTPATIENT)
Dept: PSYCHIATRY | Age: 71
End: 2021-01-12

## 2021-01-12 ENCOUNTER — TELEPHONE (OUTPATIENT)
Dept: INTERNAL MEDICINE CLINIC | Age: 71
End: 2021-01-12

## 2021-01-12 PROBLEM — Z00.00 ROUTINE GENERAL MEDICAL EXAMINATION AT A HEALTH CARE FACILITY: Status: RESOLVED | Noted: 2020-12-13 | Resolved: 2021-01-12

## 2021-01-12 RX ORDER — GALANTAMINE HYDROBROMIDE 8 MG/1
8 CAPSULE, EXTENDED RELEASE ORAL
Qty: 90 CAPSULE | Refills: 0 | Status: SHIPPED | OUTPATIENT
Start: 2021-01-12 | End: 2021-03-24

## 2021-01-12 NOTE — TELEPHONE ENCOUNTER
Patient requests refill for galantamine (RAZADYNE ER) 8 MG extended release capsule. She realizes script was sent on 1/4, however, she wants to request it now before she runs out and since she gets it through the mail.

## 2021-01-12 NOTE — TELEPHONE ENCOUNTER
Patient states that her bottle indicates zero refills. She does want a script sent to [facility]. I did advise her to check with Jim to see if they have refills on file.

## 2021-01-12 NOTE — TELEPHONE ENCOUNTER
Diarrhea x3 weeks. Denies fever, abdominal pain. Completed virtual visit with GI last week - dx with colitis - started on a steroid and lomotil, diarrhea improving. Stool samples collected, negative for infection. She is experiencing fatigue related to colitis and is requesting medicine to help her feel less tired. I explained she could be dehydrated due to diarrhea. She reports good urine output, denies dizziness/shortness of breath. Monitors blood pressure at home and wnl. She declined a virtual visit at this. Reinforced rest, adequate hydration; gatorade/water, bland diet, increase protein intake as tolerated, and eating smaller meals more often. Reinforced red flag symptoms and instructed her to notify office with worsening symptoms. Please advise on additional recommendations.

## 2021-01-12 NOTE — TELEPHONE ENCOUNTER
Does she want this sent to [facility] mail order pharmacy? She already has 3 refills at [address] [facility]. I'm not sure what she is requesting.

## 2021-01-12 NOTE — TELEPHONE ENCOUNTER
Patient requests medication to help her feel better. She states she has been exhausted from having diarrhea for 3 weeks. However, this is being taken care of by GI. But patient states that she has not been eating right and she just doesn't feel well and wants to request medication to help her feel better.

## 2021-01-12 NOTE — TELEPHONE ENCOUNTER
Unfortunately, there is nothing that I can give her that will help her fatigue. I agree with directions from Cely Roberts. Can also try drinking Gatorade G-2 (low sugar) for extra hydration.  saw

## 2021-01-18 ENCOUNTER — CARE COORDINATION (OUTPATIENT)
Dept: CARE COORDINATION | Age: 71
End: 2021-01-18

## 2021-01-18 NOTE — CARE COORDINATION
Call to follow up care management goal of medication adherence. Patient [facility] and is [facility]. Denies any barriers to adherence now. Reviewed upcoming sleep study for tomorrow. Denies any concerns at this time and agrees to reach out with future needs. Closing episode.

## 2021-01-19 ENCOUNTER — HOSPITAL ENCOUNTER (OUTPATIENT)
Dept: SLEEP CENTER | Age: 71
Discharge: HOME | End: 2021-01-19
Payer: MEDICARE

## 2021-01-19 DIAGNOSIS — G47.33 OBSTRUCTIVE SLEEP APNEA (ADULT) (PEDIATRIC): ICD-10-CM

## 2021-01-19 PROCEDURE — 95806 SLEEP STUDY UNATT&RESP EFFT: CPT

## 2021-01-19 PROCEDURE — 95806 SLEEP STUDY UNATT&RESP EFFT: CPT | Performed by: INTERNAL MEDICINE

## 2021-01-22 ENCOUNTER — TELEPHONE (OUTPATIENT)
Dept: PULMONOLOGY | Age: 71
End: 2021-01-22

## 2021-01-25 ENCOUNTER — TELEPHONE (OUTPATIENT)
Dept: PULMONOLOGY | Age: 71
End: 2021-01-25

## 2021-01-25 NOTE — PROGRESS NOTES
Carolina Foster         : 1950 A-[birth year]    Diagnosis: [x] BEBE (G47.33) [] CSA (G47.31) [] Apnea (G47.30)   Length of Need: [x] 12 Months [] 99 Months [] Other:    Machine (VICTOR HUGO!): [x] Respironics Dream Station      Auto [] ResMed AirSense     Auto [] Other:     [x]  CPAP () [] Bilevel ()   Mode: [x] Auto [] Spontaneous    Mode: [] Auto [] Spontaneous          P min 6 cmH2O  Pmax 16 cmH2O                 Comfort Settings:   - Ramp Pressure: 4 cmH2O                                        - Ramp time: 15 min                                     -  Flex/EPR - 3 full time                                    - For ResMed Bilevel (TiMax-4 sec   TiMin- 0.2 sec)     Humidifier: [x] Heated ()        [x] Water chamber replacement ()/ 1 per 6 months        Mask:   [x] Nasal () /1 per 3 months [] Full Face () /1 per 3 months   [x] Patient choice -Size and fit mask [] Patient Choice - Size and fit mask   [] Dispense:  [] Dispense:    [] Headgear () / 1 per 3 months [] Headgear () / 1 per 3 months   [x] Replacement Nasal Cushion ()/2 per month [] Interface Replacement ()/1 per month   [] Replacement Nasal Pillows ()/2 per month         Tubing: [x] Heated ()/1 per 3 months    [] Standard ()/1 per 3 months [] Other:           Filters: [x] Non-disposable ()/1 per 6 months     [x] Ultra-Fine, Disposable ()/2 per month        Miscellaneous: [] Chin Strap ()/ 1 per 6 months [] O2 bleed-in:       LPM   [] Oximetry on CPAP/Bilevel []  Other:    [x] Modem: ()         Start Order Date: 21    MEDICAL JUSTIFICATION:  I, the undersigned, certify that the above prescribed supplies are medically necessary for this patients wellbeing. In my opinion, the supplies are both reasonable and necessary in reference to accepted standards of medicalpractice in treatment of this patients condition.     Claudean Peon, MD      NPI: [id number]       Order Signed Date: 21    Electronically signed by Jessica Mcclure MD on 2021 at 2:52 PM    Lois Jeffrey  1950  [address] [facility] [address]  [phone] (home)   [phone] (mobile)      Insurance Info (confirm with patient if correct):  Payor/Plan Subscr  Sex Relation Sub.  Ins. ID Effective Group Num

## 2021-02-19 ENCOUNTER — HOSPITAL ENCOUNTER (OUTPATIENT)
Age: 71
Discharge: HOME | End: 2021-02-19
Payer: MEDICARE

## 2021-02-19 ENCOUNTER — HOSPITAL ENCOUNTER (OUTPATIENT)
Dept: GENERAL RADIOLOGY | Age: 71
Discharge: HOME | End: 2021-02-19
Payer: MEDICARE

## 2021-02-19 DIAGNOSIS — R19.7 DIARRHEA, UNSPECIFIED TYPE: ICD-10-CM

## 2021-02-19 PROCEDURE — 74018 RADEX ABDOMEN 1 VIEW: CPT

## 2021-02-23 RX ORDER — OXYBUTYNIN CHLORIDE 15 MG/1
15 TABLET, EXTENDED RELEASE ORAL DAILY
Qty: 90 TABLET | Refills: 3 | Status: SHIPPED | OUTPATIENT
Start: 2021-02-23 | End: 2021-02-23 | Stop reason: SDUPTHER

## 2021-02-23 RX ORDER — BUSPIRONE HYDROCHLORIDE 10 MG/1
10 TABLET ORAL 2 TIMES DAILY
Qty: 20 TABLET | Refills: 0 | Status: SHIPPED | OUTPATIENT
Start: 2021-02-23 | End: 2021-10-06 | Stop reason: SDUPTHER

## 2021-02-23 RX ORDER — BUSPIRONE HYDROCHLORIDE 10 MG/1
10 TABLET ORAL 2 TIMES DAILY
Qty: 180 TABLET | Refills: 3 | Status: SHIPPED | OUTPATIENT
Start: 2021-02-23 | End: 2021-02-23 | Stop reason: SDUPTHER

## 2021-02-23 RX ORDER — OXYBUTYNIN CHLORIDE 15 MG/1
15 TABLET, EXTENDED RELEASE ORAL DAILY
Qty: 10 TABLET | Refills: 0 | Status: SHIPPED | OUTPATIENT
Start: 2021-02-23 | End: 2021-03-22 | Stop reason: SDUPTHER

## 2021-02-23 NOTE — TELEPHONE ENCOUNTER
Patient requesting refill for 90-day supply of busPIRone (BUSPAR) 10 MG tablet and oxybutynin (DITROPAN XL) 15 MG extended release tablet sent to [address] [place] [address]. She is out of medication and would like 10-day supply sent to [facility] for both medications in [place] until she receives it from [address] [place] [address].

## 2021-02-26 ENCOUNTER — TELEPHONE (OUTPATIENT)
Dept: INTERNAL MEDICINE CLINIC | Age: 71
End: 2021-02-26

## 2021-02-26 NOTE — TELEPHONE ENCOUNTER
----- Message from Tawny Watson sent at 2/26/2021  9:15 AM EST -----  Subject: Message to Provider    QUESTIONS  Information for Provider? Cedrick Thomson would like to reschedule her follow up   visit on 3/12 with Dr. Sal Mondragon for a later time in the day if possible. She   has another appt that same morning. Please reach out to her as soon as   possible.   ---------------------------------------------------------------------------  --------------  CALL BACK INFO  What is the best way for the office to contact you? OK to leave message on   voicemail  Preferred Call Back Phone Number? [phone]  ---------------------------------------------------------------------------  --------------  SCRIPT ANSWERS  Relationship to Patient?  Self

## 2021-03-03 DIAGNOSIS — I10 ESSENTIAL HYPERTENSION: Chronic | ICD-10-CM

## 2021-03-03 DIAGNOSIS — M79.7 FIBROMYALGIA: ICD-10-CM

## 2021-03-03 RX ORDER — GABAPENTIN 600 MG/1
600 TABLET ORAL 3 TIMES DAILY
Qty: 270 TABLET | Refills: 1 | Status: SHIPPED | OUTPATIENT
Start: 2021-03-03 | End: 2021-09-05

## 2021-03-03 RX ORDER — LISINOPRIL 40 MG/1
TABLET ORAL
Qty: 90 TABLET | Refills: 3 | Status: SHIPPED | OUTPATIENT
Start: 2021-03-03 | End: 2022-02-07

## 2021-03-03 RX ORDER — POTASSIUM CHLORIDE 20 MEQ/1
TABLET, EXTENDED RELEASE ORAL
Qty: 180 TABLET | Refills: 3 | Status: SHIPPED | OUTPATIENT
Start: 2021-03-03 | End: 2022-02-07

## 2021-03-03 NOTE — TELEPHONE ENCOUNTER
[place] from [facility] called. They sent a refill request on 2/19 for:    Gabapentin 400mg    Lisinopril 40 mg    Kcl 20 MEQ     They called to follow up on that request. Pended medications.

## 2021-03-15 ENCOUNTER — OFFICE VISIT (OUTPATIENT)
Dept: INTERNAL MEDICINE CLINIC | Age: 71
End: 2021-03-15
Payer: MEDICARE

## 2021-03-15 VITALS
DIASTOLIC BLOOD PRESSURE: 68 MMHG | WEIGHT: 171.2 LBS | OXYGEN SATURATION: 97 % | BODY MASS INDEX: 37.05 KG/M2 | HEART RATE: 76 BPM | SYSTOLIC BLOOD PRESSURE: 110 MMHG

## 2021-03-15 DIAGNOSIS — K76.0 FATTY LIVER: ICD-10-CM

## 2021-03-15 DIAGNOSIS — N32.81 OVERACTIVE BLADDER: Primary | ICD-10-CM

## 2021-03-15 DIAGNOSIS — F32.A DEPRESSIVE DISORDER: ICD-10-CM

## 2021-03-15 DIAGNOSIS — R73.9 HYPERGLYCEMIA: ICD-10-CM

## 2021-03-15 DIAGNOSIS — M81.0 OSTEOPOROSIS, POST-MENOPAUSAL: Chronic | ICD-10-CM

## 2021-03-15 DIAGNOSIS — I10 ESSENTIAL HYPERTENSION: Chronic | ICD-10-CM

## 2021-03-15 DIAGNOSIS — E53.8 B12 DEFICIENCY: ICD-10-CM

## 2021-03-15 DIAGNOSIS — E78.5 HYPERLIPIDEMIA LDL GOAL <100: ICD-10-CM

## 2021-03-15 DIAGNOSIS — G30.1 LATE ONSET ALZHEIMER'S DISEASE WITHOUT BEHAVIORAL DISTURBANCE (HCC): ICD-10-CM

## 2021-03-15 DIAGNOSIS — G47.33 OSA (OBSTRUCTIVE SLEEP APNEA): ICD-10-CM

## 2021-03-15 DIAGNOSIS — F02.80 LATE ONSET ALZHEIMER'S DISEASE WITHOUT BEHAVIORAL DISTURBANCE (HCC): ICD-10-CM

## 2021-03-15 DIAGNOSIS — M79.7 FIBROMYALGIA: ICD-10-CM

## 2021-03-15 DIAGNOSIS — F41.9 ANXIETY: ICD-10-CM

## 2021-03-15 DIAGNOSIS — Z91.81 AT HIGH RISK FOR FALLS: ICD-10-CM

## 2021-03-15 LAB
A/G RATIO: 1.7 (ref 1.1–2.2)
ALBUMIN SERPL-MCNC: 4.2 G/DL (ref 3.4–5)
ALP BLD-CCNC: 34 U/L (ref 40–129)
ALT SERPL-CCNC: 47 U/L (ref 10–40)
ANION GAP SERPL CALCULATED.3IONS-SCNC: 12 MMOL/L (ref 3–16)
AST SERPL-CCNC: 37 U/L (ref 15–37)
BASOPHILS ABSOLUTE: 0 K/UL (ref 0–0.2)
BASOPHILS RELATIVE PERCENT: 0.3 %
BILIRUB SERPL-MCNC: <0.2 MG/DL (ref 0–1)
BUN BLDV-MCNC: 15 MG/DL (ref 7–20)
CALCIUM SERPL-MCNC: 9.3 MG/DL (ref 8.3–10.6)
CHLORIDE BLD-SCNC: 93 MMOL/L (ref 99–110)
CHOLESTEROL, TOTAL: 139 MG/DL (ref 0–199)
CO2: 30 MMOL/L (ref 21–32)
CREAT SERPL-MCNC: 0.9 MG/DL (ref 0.6–1.2)
EOSINOPHILS ABSOLUTE: 0.1 K/UL (ref 0–0.6)
EOSINOPHILS RELATIVE PERCENT: 1.6 %
FOLATE: 12.56 NG/ML (ref 4.78–24.2)
GFR AFRICAN AMERICAN: >60
GFR NON-AFRICAN AMERICAN: >60
GLOBULIN: 2.5 G/DL
GLUCOSE BLD-MCNC: 78 MG/DL (ref 70–99)
HCT VFR BLD CALC: 37.3 % (ref 36–48)
HDLC SERPL-MCNC: 71 MG/DL (ref 40–60)
HEMOGLOBIN: 12.7 G/DL (ref 12–16)
LDL CHOLESTEROL CALCULATED: 54 MG/DL
LYMPHOCYTES ABSOLUTE: 2.6 K/UL (ref 1–5.1)
LYMPHOCYTES RELATIVE PERCENT: 33 %
MCH RBC QN AUTO: 31.3 PG (ref 26–34)
MCHC RBC AUTO-ENTMCNC: 34.1 G/DL (ref 31–36)
MCV RBC AUTO: 91.9 FL (ref 80–100)
MONOCYTES ABSOLUTE: 0.7 K/UL (ref 0–1.3)
MONOCYTES RELATIVE PERCENT: 9.2 %
NEUTROPHILS ABSOLUTE: 4.4 K/UL (ref 1.7–7.7)
NEUTROPHILS RELATIVE PERCENT: 55.9 %
PDW BLD-RTO: 13 % (ref 12.4–15.4)
PLATELET # BLD: 205 K/UL (ref 135–450)
PMV BLD AUTO: 7.8 FL (ref 5–10.5)
POTASSIUM SERPL-SCNC: 4 MMOL/L (ref 3.5–5.1)
RBC # BLD: 4.06 M/UL (ref 4–5.2)
SODIUM BLD-SCNC: 135 MMOL/L (ref 136–145)
TOTAL PROTEIN: 6.7 G/DL (ref 6.4–8.2)
TRIGL SERPL-MCNC: 69 MG/DL (ref 0–150)
TSH REFLEX: 0.94 UIU/ML (ref 0.27–4.2)
VITAMIN B-12: 1528 PG/ML (ref 211–911)
VITAMIN D 25-HYDROXY: 52.7 NG/ML
VLDLC SERPL CALC-MCNC: 14 MG/DL
WBC # BLD: 7.9 K/UL (ref 4–11)

## 2021-03-15 PROCEDURE — 99214 OFFICE O/P EST MOD 30 MIN: CPT | Performed by: INTERNAL MEDICINE

## 2021-03-15 RX ORDER — BUDESONIDE 3 MG/1
6 CAPSULE, COATED PELLETS ORAL EVERY MORNING
COMMUNITY

## 2021-03-15 RX ORDER — PSEUDOEPHEDRINE HYDROCHLORIDE 30 MG/1
30 TABLET ORAL 2 TIMES DAILY PRN
Qty: 30 TABLET | Refills: 5 | Status: SHIPPED | OUTPATIENT
Start: 2021-03-15 | End: 2022-03-15

## 2021-03-15 NOTE — PATIENT INSTRUCTIONS
Try taking Sudafed 30 mg at night when you go to bed to see if that helps with your congestion. You will need to ask your pharmacist for this. Please bring all of your pills with you to your follow up appointment so that we can make sure that everything is correct. If Myrbetriq is not too expensive, please stop Oxybutynin and take Mybretiqu in place of oxybutinin. It may do a better job treating your over active bladder with fewer side effects. If Mybetriq is too expensive, please call and we may be able to figure out a way to get you help to get it.

## 2021-03-15 NOTE — PROGRESS NOTES
Osteoporosis 2008    Rheumatic fever     S/P insertion of spinal cord stimulator     Self-catheterizes urinary bladder     as needed 7/8 no longer catherizing     Urinary retention       Past Surgical History:   Procedure Laterality Date    BLADDER REPAIR      CARPAL TUNNEL RELEASE      COLONOSCOPY      CYSTOSCOPY  10/29/2012    bladder biopsy    CYSTOSCOPY N/A 10/19/2020    FLEXIBLE CYSTOSCOPY performed by Hiro Cook MD at [facility]      INTRACAPSULAR CATARACT EXTRACTION Right 7/18/2019    PHACOEMULSIFICATION WITH INTRAOCULAR LENS IMPLANT performed by Matthew Rose MD at [address] EXTRACTION Left 7/26/2019    PHACOEMULSIFICATION WITH INTRAOCULAR LENS IMPLANT performed by Matthew Rose MD at [place] [address]    MANDIBLE RECONSTRUCTION      OTHER SURGICAL HISTORY      spinal cord stimulator    TOTAL KNEE ARTHROPLASTY Right 10/18/13      Social History     Socioeconomic History    Marital status:       Spouse name: Not on file    Number of children: Not on file    Years of education: Not on file    Highest education level: Not on file   Occupational History    Occupation: retired   Social Needs    Financial resource strain: Not on file    Food insecurity     Worry: Never true     Inability: Never true   [employer] needs     Medical: No     Non-medical: No   Tobacco Use    Smoking status: Never Smoker    Smokeless tobacco: Never Used   Substance and Sexual Activity    Alcohol use: Not Currently    Drug use: No    Sexual activity: Not on file   Lifestyle    Physical activity     Days per week: Not on file     Minutes per session: Not on file    Stress: Not on file   Relationships    Social connections     Talks on phone: Not on file     Gets together: Not on file     Attends Jewish service: Not on file     Active member of club or organization: Not on file     Attends meetings of clubs or organizations: Not on file     Relationship status:     Intimate partner violence     Fear of current or ex partner: Not on file     Emotionally abused: Not on file     Physically abused: Not on file     Forced sexual activity: Not on file   Other Topics Concern    Not on file   Social History Narrative    Not on file     Family History   Problem Relation Age of Onset    COPD Mother     High Blood Pressure Mother     Rheum Arthritis Mother     Thyroid Disease Mother     Alcohol Abuse Father     Clotting Disorder Sister     Thyroid Disease Sister     Hypertension Brother     Diabetes Sister     Heart Disease Sister     Hypertension Sister     Thyroid Disease Sister     Cancer Brother     Heart Disease Brother     Hypertension Brother     Substance Abuse Brother     Alcohol Abuse Son     No Known Problems Daughter     Dementia Neg Hx         Outpatient Medications Prior to Visit   Medication Sig Dispense Refill    budesonide (ENTOCORT EC) 3 MG extended release capsule Take 6 mg by mouth every morning      gabapentin (NEURONTIN) 600 MG tablet Take 1 tablet by mouth 3 times daily for 180 days.  270 tablet 1    lisinopril (PRINIVIL;ZESTRIL) 40 MG tablet TAKE 1 TABLET DAILY 90 tablet 3    potassium chloride (KLOR-CON M) 20 MEQ extended release tablet TAKE 1 TABLET TWICE A  tablet 3    oxybutynin (DITROPAN XL) 15 MG extended release tablet Take 1 tablet by mouth daily 10 tablet 0    busPIRone (BUSPAR) 10 MG tablet Take 1 tablet by mouth 2 times daily 20 tablet 0    galantamine (RAZADYNE ER) 8 MG extended release capsule Take 1 capsule by mouth daily (with breakfast) 90 capsule 0    fluocinolone (DERMA-SMOOTHE) 0.01 % external oil Apply 1 each topically Twice a Week 118.28 mL 1    ketoconazole (NIZORAL) 2 % shampoo APPLY TOPICALLY DAILY AS NEEDED 1 Bottle 1    rosuvastatin (CRESTOR) 10 MG tablet Take 1 tablet by mouth daily TAKE 1 TABLET NIGHTLY 90 tablet 3    traZODone (DESYREL) 100 MG tablet Take 2 tablets by mouth nightly 180 tablet 1    hydroCHLOROthiazide (MICROZIDE) 12.5 MG capsule TAKE 1 CAPSULE EVERY MORNING 90 capsule 4    Cyanocobalamin (B-12) 1000 MCG SUBL Place 1,000 Units under the tongue daily 90 tablet 3    hydrOXYzine (ATARAX) 50 MG tablet Take 1 tablet by mouth every 4 hours as needed for Itching 270 tablet 1    tiZANidine (ZANAFLEX) 2 MG tablet Take 1 tablet by mouth every 8 hours as needed (prn) 60 tablet 2    fluticasone (FLONASE) 50 MCG/ACT nasal spray SPRAY TWO SPRAYS IN EACH NOSTRIL ONCE DAILY 3 Bottle 1    DULoxetine (CYMBALTA) 60 MG extended release capsule Take 1 capsule by mouth daily 90 capsule 3    polyethylene glycol (GLYCOLAX) 17 GM/SCOOP powder 1/2-1 capful in 8 oz water or prune juice qd prn constipation. 3 Bottle 3    carBAMazepine (TEGRETOL-XR) 100 MG extended release tablet Take 1 tablet by mouth 2 times daily 60 tablet 3    diclofenac (VOLTAREN) 75 MG EC tablet Take 1 tablet by mouth 2 times daily (Patient taking differently: Take 75 mg by mouth 2 times daily as needed for Pain ) 180 tablet 3    acetaminophen (TYLENOL) 500 MG tablet Take 1,000 mg by mouth 3 times daily as needed for Pain      fluocinolone (DERMOTIC) 0.01 % OIL oil Place 1 drop in ear(s) 2 times daily 1 Bottle 3    diphenoxylate-atropine (LOMOTIL) 2.5-0.025 MG per tablet Take 1 tablet by mouth as needed for Diarrhea. .      glucose blood VI test strips (ONE TOUCH ULTRA TEST) strip 1 each by In Vitro route daily Fasting qam and As needed. 100 strip 1    Lancets MISC Check sugars fasting qam and prn 100 each 3    Cholecalciferol (VITAMIN D3) 5000 units CAPS Take 5,000 Units by mouth daily       oxymorphone (OPANA) 5 MG tablet Take 5 mg by mouth 2 times daily. Indications: per DR Isauro Hill TAKES 3 TIMES A DAY. TRYING TO CUT BACK.       zoledronic acid (RECLAST) 5 MG/100ML SOLN Infuse 5 mg intravenously once IV, yearly      Cetirizine HCl (ZYRTEC PO) Take 1 tablet by mouth daily       aspirin 81 MG chewable tablet Take 81 mg by mouth daily.  Calcium Carbonate-Vit D-Min (CALCIUM 1200 PO) Take 1 capsule by mouth 2 times daily.  Ascorbic Acid (VITAMIN C) 500 MG tablet Take 500 mg by mouth daily. Facility-Administered Medications Prior to Visit   Medication Dose Route Frequency Provider Last Rate Last Admin    zoledronic acid (RECLAST) 5 mg/100 mL infusion  5 mg Intravenous See Admin Instructions Frida Coppola  mL/hr at 04/06/16 0900 5 mg at 04/06/16 0900       Patient'spast medical history, surgical history, family history, medications,  and allergies  were all reviewed and updated as appropriate today. Review of Systems    /68   Pulse 76   Wt 171 lb 3.2 oz (77.7 kg)   SpO2 97%   BMI 37.05 kg/m²   Physical Exam    ASSESSMENT/PLAN:    Problem List Items Addressed This Visit     Essential hypertension (Chronic)     Continue lisinopril 40 mg daily and hydrochlorothiazide 12.5 mg daily. Well-controlled. Relevant Orders    CBC Auto Differential (Completed)    Comprehensive Metabolic Panel (Completed)    Osteoporosis, post-menopausal (Chronic)     Patient has had a total of 5 doses of Reclast: 2011, 2012, 2016, 2017, 2019. She did not get a Reclast injection in 2020 due to COVID-19 pandemic. Her last bone density was in January 2020 and showed improvement with osteopenia. We will leave off of treatment and repeat bone density again in January 2022. If additional treatment is required we will change her to Prolia as she has already had a total of 5 treatments with Reclast over the past 10 years. Relevant Orders    Vitamin D 25 Hydroxy (Completed)    Fibromyalgia     Patient did not tolerate weaning of gabapentin from 600 mg 3 times daily to 400 mg 3 times daily and she is back up to 600 mg 3 times daily.          Relevant Medications    budesonide (ENTOCORT EC) 3 MG extended release capsule    Anxiety     Continue Cymbalta 60 mg daily and BuSpar 7.5 mg 3 times daily as needed. BEBE (obstructive sleep apnea)     Patient recently had a sleep study and got her CPAP machine about 3 weeks ago. She has been tolerating CPAP and still she started getting congested when the tree started blooming. Continue Zyrtec and Coricidin. Add Sudafed 30 mg nightly. Recommend nasal saline at at bedtime as well. Hyperlipidemia LDL goal <100     Continue Crestor 10 mg daily. Check lipid panel, CMP. Relevant Orders    Lipid Panel (Completed)    TSH with Reflex (Completed)    Fatty liver     Continue to monitor LFTs. Relevant Orders    Comprehensive Metabolic Panel (Completed)    Depressive disorder     Continue Cymbalta 60 mg daily with BuSpar 7.5 mg 3 times daily. She did recently see Dr. Odette Haile who did not feel that depression was contributing to her cognitive issues. Late onset Alzheimer's disease without behavioral disturbance ([facility] [address]     Patient is now on Razadyne in place of Aricept for tolerability reasons. Diagnosis has been confirmed with Dr. Gus Ochoa and Dr. Odette Haile. Other Visit Diagnoses     Overactive bladder    -  Primary    Relevant Medications    mirabegron (MYRBETRIQ) 50 MG TB24    B12 deficiency        Relevant Orders    VITAMIN B12 & FOLATE (Completed)    Hyperglycemia        Relevant Orders    HEMOGLOBIN A1C (Completed)    At high risk for falls              Current Outpatient Medications   Medication Sig Dispense Refill    budesonide (ENTOCORT EC) 3 MG extended release capsule Take 6 mg by mouth every morning      pseudoephedrine (DECONGESTANT) 30 MG tablet Take 1 tablet by mouth 2 times daily as needed for Congestion 30 tablet 5    mirabegron (MYRBETRIQ) 50 MG TB24 Take 50 mg by mouth daily 30 tablet 5    gabapentin (NEURONTIN) 600 MG tablet Take 1 tablet by mouth 3 times daily for 180 days.  270 tablet 1    lisinopril (PRINIVIL;ZESTRIL) 40 MG tablet TAKE 1 TABLET DAILY 90 tablet 3    potassium chloride (KLOR-CON M) 20 MEQ extended release tablet TAKE 1 TABLET TWICE A  tablet 3    oxybutynin (DITROPAN XL) 15 MG extended release tablet Take 1 tablet by mouth daily 10 tablet 0    busPIRone (BUSPAR) 10 MG tablet Take 1 tablet by mouth 2 times daily 20 tablet 0    galantamine (RAZADYNE ER) 8 MG extended release capsule Take 1 capsule by mouth daily (with breakfast) 90 capsule 0    fluocinolone (DERMA-SMOOTHE) 0.01 % external oil Apply 1 each topically Twice a Week 118.28 mL 1    ketoconazole (NIZORAL) 2 % shampoo APPLY TOPICALLY DAILY AS NEEDED 1 Bottle 1    rosuvastatin (CRESTOR) 10 MG tablet Take 1 tablet by mouth daily TAKE 1 TABLET NIGHTLY 90 tablet 3    traZODone (DESYREL) 100 MG tablet Take 2 tablets by mouth nightly 180 tablet 1    hydroCHLOROthiazide (MICROZIDE) 12.5 MG capsule TAKE 1 CAPSULE EVERY MORNING 90 capsule 4    Cyanocobalamin (B-12) 1000 MCG SUBL Place 1,000 Units under the tongue daily 90 tablet 3    hydrOXYzine (ATARAX) 50 MG tablet Take 1 tablet by mouth every 4 hours as needed for Itching 270 tablet 1    tiZANidine (ZANAFLEX) 2 MG tablet Take 1 tablet by mouth every 8 hours as needed (prn) 60 tablet 2    fluticasone (FLONASE) 50 MCG/ACT nasal spray SPRAY TWO SPRAYS IN EACH NOSTRIL ONCE DAILY 3 Bottle 1    DULoxetine (CYMBALTA) 60 MG extended release capsule Take 1 capsule by mouth daily 90 capsule 3    polyethylene glycol (GLYCOLAX) 17 GM/SCOOP powder 1/2-1 capful in 8 oz water or prune juice qd prn constipation.  3 Bottle 3    carBAMazepine (TEGRETOL-XR) 100 MG extended release tablet Take 1 tablet by mouth 2 times daily 60 tablet 3    diclofenac (VOLTAREN) 75 MG EC tablet Take 1 tablet by mouth 2 times daily (Patient taking differently: Take 75 mg by mouth 2 times daily as needed for Pain ) 180 tablet 3    acetaminophen (TYLENOL) 500 MG tablet Take 1,000 mg by mouth 3 times daily as needed for Pain      fluocinolone (DERMOTIC) 0.01 % OIL oil Place 1 drop in ear(s) 2 times daily 1 Bottle 3    diphenoxylate-atropine (LOMOTIL) 2.5-0.025 MG per tablet Take 1 tablet by mouth as needed for Diarrhea. .      glucose blood VI test strips (ONE TOUCH ULTRA TEST) strip 1 each by In Vitro route daily Fasting qam and As needed. 100 strip 1    Lancets MISC Check sugars fasting qam and prn 100 each 3    Cholecalciferol (VITAMIN D3) 5000 units CAPS Take 5,000 Units by mouth daily       oxymorphone (OPANA) 5 MG tablet Take 5 mg by mouth 2 times daily. Indications: per DR Benitez Ranks TAKES 3 TIMES A DAY. TRYING TO CUT BACK.  zoledronic acid (RECLAST) 5 MG/100ML SOLN Infuse 5 mg intravenously once IV, yearly      Cetirizine HCl (ZYRTEC PO) Take 1 tablet by mouth daily       aspirin 81 MG chewable tablet Take 81 mg by mouth daily.  Calcium Carbonate-Vit D-Min (CALCIUM 1200 PO) Take 1 capsule by mouth 2 times daily.  Ascorbic Acid (VITAMIN C) 500 MG tablet Take 500 mg by mouth daily. Current Facility-Administered Medications   Medication Dose Route Frequency Provider Last Rate Last Admin    zoledronic acid (RECLAST) 5 mg/100 mL infusion  5 mg Intravenous See Admin Instructions Inderjit Edwards  mL/hr at 04/06/16 0900 5 mg at 04/06/16 0900       Return in about 3 months (around 6/15/2021). On the basis of positive falls risk screening, assessment and plan is as follows: patient declines any further evaluation/treatment for increased falls risk.

## 2021-03-16 LAB
ESTIMATED AVERAGE GLUCOSE: 96.8 MG/DL
HBA1C MFR BLD: 5 %

## 2021-03-18 ENCOUNTER — TELEPHONE (OUTPATIENT)
Dept: INTERNAL MEDICINE CLINIC | Age: 71
End: 2021-03-18

## 2021-03-18 NOTE — TELEPHONE ENCOUNTER
Patient called to let dr. Ted Nova know the Myrbetriq is too expensive for her. It is $90 for 30 days. Please advise what else she can try.

## 2021-03-19 ENCOUNTER — OFFICE VISIT (OUTPATIENT)
Dept: NEUROLOGY | Age: 71
End: 2021-03-19
Payer: MEDICARE

## 2021-03-19 VITALS
BODY MASS INDEX: 36.89 KG/M2 | WEIGHT: 171 LBS | TEMPERATURE: 95.3 F | HEART RATE: 80 BPM | HEIGHT: 57 IN | SYSTOLIC BLOOD PRESSURE: 116 MMHG | DIASTOLIC BLOOD PRESSURE: 72 MMHG

## 2021-03-19 DIAGNOSIS — F02.80 LATE ONSET ALZHEIMER'S DISEASE WITHOUT BEHAVIORAL DISTURBANCE (HCC): Primary | ICD-10-CM

## 2021-03-19 DIAGNOSIS — G30.1 LATE ONSET ALZHEIMER'S DISEASE WITHOUT BEHAVIORAL DISTURBANCE (HCC): Primary | ICD-10-CM

## 2021-03-19 DIAGNOSIS — G47.33 OBSTRUCTIVE SLEEP APNEA: ICD-10-CM

## 2021-03-19 PROCEDURE — 99213 OFFICE O/P EST LOW 20 MIN: CPT | Performed by: PSYCHIATRY & NEUROLOGY

## 2021-03-19 NOTE — PROGRESS NOTES
Landen Olson   Neurology followup    Subjective:   CC/HP  History was obtained from the patient. Patient has late onset Alzheimer's type dementia. Patient feels that her memory impairment is about the same. Patient is on low-dose galantamine. She initially wondered if Aricept caused her diarrhea but later found out that she had a different etiology for the diarrhea. Patient also was diagnosed with obstructive sleep apnea and started a CPAP machine. She is feeling better in that regard. REVIEW OF SYSTEMS    Constitutional:  []   Chills   []  Fatigue   []  Fevers   []  Malaise   []  Weight loss     [x] Denies all of the above    Respiratory:   []  Cough    []  Shortness of breath         [x] Denies all of the above     Cardiovascular:   []  Chest pain    []  Exertional chest pressure/discomfort           [] Palpitations    []  Syncope     [x] Denies all of the above        Past Medical History:   Diagnosis Date    Anxiety     Anxiety and depression     Chronic back pain     Clostridium difficile infection 2/22/15    Hyperlipidemia     Hypertension     Insomnia disorder     Osteoarthritis     Osteoporosis 2008    Rheumatic fever     S/P insertion of spinal cord stimulator     Self-catheterizes urinary bladder     as needed 7/8 no longer catherizing     Urinary retention      Family History   Problem Relation Age of Onset    COPD Mother     High Blood Pressure Mother     Rheum Arthritis Mother     Thyroid Disease Mother     Alcohol Abuse Father     Clotting Disorder Sister     Thyroid Disease Sister     Hypertension Brother     Diabetes Sister     Heart Disease Sister     Hypertension Sister     Thyroid Disease Sister     Cancer Brother     Heart Disease Brother     Hypertension Brother     Substance Abuse Brother     Alcohol Abuse Son     No Known Problems Daughter     Dementia Neg Hx      Social History     Socioeconomic History    Marital status:       Spouse name: Not on file    Number of children: Not on file    Years of education: Not on file    Highest education level: Not on file   Occupational History    Occupation: retired   Social Needs    Financial resource strain: Not on file    Food insecurity     Worry: Never true     Inability: Never true   [employer] needs     Medical: No     Non-medical: No   Tobacco Use    Smoking status: Never Smoker    Smokeless tobacco: Never Used   Substance and Sexual Activity    Alcohol use: Not Currently    Drug use: No    Sexual activity: Not on file   Lifestyle    Physical activity     Days per week: Not on file     Minutes per session: Not on file    Stress: Not on file   Relationships    Social connections     Talks on phone: Not on file     Gets together: Not on file     Attends [employer] service: Not on file     Active member of club or organization: Not on file     Attends meetings of clubs or organizations: Not on file     Relationship status:     Intimate partner violence     Fear of current or ex partner: Not on file     Emotionally abused: Not on file     Physically abused: Not on file     Forced sexual activity: Not on file   Other Topics Concern    Not on file   Social History Narrative    Not on file        Objective:  Exam:  /72   Pulse 80   Temp 95.3 °F (35.2 °C)   Ht 4' 9\" (1.448 m)   Wt 171 lb (77.6 kg)   BMI 37.00 kg/m²   This is a well-nourished patient in no acute distress  Patient is awake, alert and oriented x3. Speech is normal. Impaired short-term memory  Pupils are equal round reacting to light. Extraocular movements intact. Face symmetrical. Tongue midline. Motor exam shows normal symmetrical strength. Deep tendon reflexes normal. Plantar reflexes downgoing. Sensory exam normal. Coordination normal. Gait normal. No carotid bruit. No neck stiffness.       Data :  LABS:  General Labs:    CBC:   Lab Results   Component Value Date    WBC 7.9 03/15/2021    RBC 4.06

## 2021-03-22 RX ORDER — FLUTICASONE PROPIONATE 50 MCG
SPRAY, SUSPENSION (ML) NASAL
Qty: 1 BOTTLE | Refills: 0 | Status: SHIPPED | OUTPATIENT
Start: 2021-03-22 | End: 2021-07-02 | Stop reason: SDUPTHER

## 2021-03-22 NOTE — ASSESSMENT & PLAN NOTE
Continue Cymbalta 60 mg daily with BuSpar 7.5 mg 3 times daily. She did recently see Dr. Yuliet Coburn who did not feel that depression was contributing to her cognitive issues.

## 2021-03-22 NOTE — ASSESSMENT & PLAN NOTE
Patient is now on Razadyne in place of Aricept for tolerability reasons. Diagnosis has been confirmed with Dr. Giselle Aguilar and Dr. Bridger Crook.

## 2021-03-22 NOTE — TELEPHONE ENCOUNTER
Adele-she has previously tried oxybutynin which caused dry mouth and did not really work and estrogen cream.  Is there any way we can see if she qualifies for patient assistance for Myrbetriq or if we can get a tier exception through her insurance?   Thanks

## 2021-03-22 NOTE — ASSESSMENT & PLAN NOTE
Patient has had a total of 5 doses of Reclast: 2011, 2012, 2016, 2017, 2019. She did not get a Reclast injection in 2020 due to COVID-19 pandemic. Her last bone density was in January 2020 and showed improvement with osteopenia. We will leave off of treatment and repeat bone density again in January 2022. If additional treatment is required we will change her to Prolia as she has already had a total of 5 treatments with Reclast over the past 10 years.

## 2021-03-22 NOTE — ASSESSMENT & PLAN NOTE
Patient recently had a sleep study and got her CPAP machine about 3 weeks ago. She has been tolerating CPAP and still she started getting congested when the tree started blooming. Continue Zyrtec and Coricidin. Add Sudafed 30 mg nightly. Recommend nasal saline at at bedtime as well.

## 2021-03-22 NOTE — TELEPHONE ENCOUNTER
Will try Jonna Rend in place of oxybutynin. If this is too expensive, she will need to stay on oxybutinin. I have not sent this in as I was not sure which pharmacy she would like to use.  saw

## 2021-03-22 NOTE — TELEPHONE ENCOUNTER
Unfortunately she does not qualify for patient assistance through [employer] due to her insurance. I did contact [facility] but the request for a tier exception was denied. She will continue with oxybutynin at this time.

## 2021-03-23 NOTE — TELEPHONE ENCOUNTER
Medication:   Requested Prescriptions     Pending Prescriptions Disp Refills    galantamine (RAZADYNE ER) 8 MG extended release capsule [Pharmacy Med Name: GALANTAMINE ER 8MG CAP 8 Capsule] 30 capsule 0     Sig: TAKE 1 CAPSULE BY MOUTH DAILY WITH BREAKFAST        Last Filled:      Patient Phone Number: [phone] (home)     Last appt: 12/2/2020   Next appt: Visit date not found    Last [place]:   RX Monitoring 6/5/2019   Attestation -   Periodic Controlled Substance Monitoring No signs of potential drug abuse or diversion identified.

## 2021-03-26 RX ORDER — GALANTAMINE HYDROBROMIDE 8 MG/1
CAPSULE, EXTENDED RELEASE ORAL
Qty: 90 CAPSULE | Refills: 3 | Status: SHIPPED | OUTPATIENT
Start: 2021-03-26 | End: 2021-06-29 | Stop reason: SDUPTHER

## 2021-03-29 RX ORDER — FESOTERODINE FUMARATE 8 MG/1
8 TABLET, FILM COATED, EXTENDED RELEASE ORAL DAILY
Qty: 30 TABLET | Refills: 1 | Status: SHIPPED | OUTPATIENT
Start: 2021-03-29 | End: 2021-06-15

## 2021-03-30 NOTE — PROGRESS NOTES
Acute Office Visit  3/31/2021    SUBJECTIVE:    Patient ID: Demond Baxter is a 70 y.o. female. Chief Complaint   Patient presents with    Ear Problem     HPI: The patient presents to the office for an acute visit. Pt reports that she started with a cough, chills, sore throat, facial/sinus pressure and left ear soreness. States that she has a small bump in front of her ear that she thinks is related to sinuses. States this has been present for 2-3 weeks and is worsening. Denies fevers. Denies N/V, diarrhea or abdominal pain. Denies CP, SOB or wheezing. Denies history of COPD or asthma. Treatment tried and response - sudafed   Recent ill contacts? No  No recent travel  Tobacco use or second hand smoke exposure - No    Allergies   Allergen Reactions    Ativan [Lorazepam] Swelling     Tongue swelling    Sulfa Antibiotics Swelling    Keflex [Cephalexin] Other (See Comments)     Leg cramps     Current Outpatient Medications   Medication Sig Dispense Refill    azithromycin (ZITHROMAX) 250 MG tablet Take 1 tablet by mouth See Admin Instructions for 5 days 500mg on day 1 followed by 250mg on days 2 - 5 6 tablet 0    Fesoterodine Fumarate ER (TOVIAZ) 8 MG TB24 Take 8 mg by mouth daily 30 tablet 1    galantamine (RAZADYNE ER) 8 MG extended release capsule TAKE 1 CAPSULE BY MOUTH DAILY WITH BREAKFAST 90 capsule 3    fluticasone (FLONASE) 50 MCG/ACT nasal spray SPRAY TWO SPRAYS IN EACH NOSTRIL ONCE DAILY 1 Bottle 0    budesonide (ENTOCORT EC) 3 MG extended release capsule Take 6 mg by mouth every morning      pseudoephedrine (DECONGESTANT) 30 MG tablet Take 1 tablet by mouth 2 times daily as needed for Congestion 30 tablet 5    gabapentin (NEURONTIN) 600 MG tablet Take 1 tablet by mouth 3 times daily for 180 days.  270 tablet 1    lisinopril (PRINIVIL;ZESTRIL) 40 MG tablet TAKE 1 TABLET DAILY 90 tablet 3    potassium chloride (KLOR-CON M) 20 MEQ extended release tablet TAKE 1 TABLET TWICE A  tablet 3    oxybutynin (DITROPAN XL) 15 MG extended release tablet Take 1 tablet by mouth daily 10 tablet 0    busPIRone (BUSPAR) 10 MG tablet Take 1 tablet by mouth 2 times daily 20 tablet 0    fluocinolone (DERMA-SMOOTHE) 0.01 % external oil Apply 1 each topically Twice a Week 118.28 mL 1    ketoconazole (NIZORAL) 2 % shampoo APPLY TOPICALLY DAILY AS NEEDED 1 Bottle 1    rosuvastatin (CRESTOR) 10 MG tablet Take 1 tablet by mouth daily TAKE 1 TABLET NIGHTLY 90 tablet 3    traZODone (DESYREL) 100 MG tablet Take 2 tablets by mouth nightly 180 tablet 1    hydroCHLOROthiazide (MICROZIDE) 12.5 MG capsule TAKE 1 CAPSULE EVERY MORNING 90 capsule 4    Cyanocobalamin (B-12) 1000 MCG SUBL Place 1,000 Units under the tongue daily 90 tablet 3    hydrOXYzine (ATARAX) 50 MG tablet Take 1 tablet by mouth every 4 hours as needed for Itching 270 tablet 1    tiZANidine (ZANAFLEX) 2 MG tablet Take 1 tablet by mouth every 8 hours as needed (prn) 60 tablet 2    DULoxetine (CYMBALTA) 60 MG extended release capsule Take 1 capsule by mouth daily 90 capsule 3    polyethylene glycol (GLYCOLAX) 17 GM/SCOOP powder 1/2-1 capful in 8 oz water or prune juice qd prn constipation. 3 Bottle 3    carBAMazepine (TEGRETOL-XR) 100 MG extended release tablet Take 1 tablet by mouth 2 times daily 60 tablet 3    diclofenac (VOLTAREN) 75 MG EC tablet Take 1 tablet by mouth 2 times daily (Patient taking differently: Take 75 mg by mouth 2 times daily as needed for Pain ) 180 tablet 3    acetaminophen (TYLENOL) 500 MG tablet Take 1,000 mg by mouth 3 times daily as needed for Pain      fluocinolone (DERMOTIC) 0.01 % OIL oil Place 1 drop in ear(s) 2 times daily 1 Bottle 3    diphenoxylate-atropine (LOMOTIL) 2.5-0.025 MG per tablet Take 1 tablet by mouth as needed for Diarrhea. Eneida Solders Cholecalciferol (VITAMIN D3) 5000 units CAPS Take 5,000 Units by mouth daily       oxymorphone (OPANA) 5 MG tablet Take 5 mg by mouth 2 times daily.  Indications: per DR Sandie Pineda TAKES 3 TIMES A DAY. TRYING TO CUT BACK.  zoledronic acid (RECLAST) 5 MG/100ML SOLN Infuse 5 mg intravenously once IV, yearly      Cetirizine HCl (ZYRTEC PO) Take 1 tablet by mouth daily       aspirin 81 MG chewable tablet Take 81 mg by mouth daily.  Calcium Carbonate-Vit D-Min (CALCIUM 1200 PO) Take 1 capsule by mouth 2 times daily.  Ascorbic Acid (VITAMIN C) 500 MG tablet Take 500 mg by mouth daily.  glucose blood VI test strips (ONE TOUCH ULTRA TEST) strip 1 each by In Vitro route daily Fasting qam and As needed. 100 strip 1    Lancets MISC Check sugars fasting qam and prn 100 each 3     Current Facility-Administered Medications   Medication Dose Route Frequency Provider Last Rate Last Admin    zoledronic acid (RECLAST) 5 mg/100 mL infusion  5 mg Intravenous See Admin Instructions Karen Chadwick  mL/hr at 04/06/16 0900 5 mg at 04/06/16 0900      Review of Systems   Constitutional: Positive for chills. Negative for appetite change, diaphoresis, fatigue and fever. HENT: Positive for congestion, ear pain, rhinorrhea, sinus pressure and sore throat. Negative for drooling, ear discharge, hearing loss, postnasal drip, sneezing and trouble swallowing. Eyes: Negative for pain and visual disturbance. Respiratory: Positive for cough. Negative for shortness of breath and wheezing. Cardiovascular: Negative for chest pain and palpitations. Gastrointestinal: Negative for abdominal pain, constipation, diarrhea, nausea and vomiting. Skin: Negative for pallor. Neurological: Negative for light-headedness and headaches. OBJECTIVE:  /68   Pulse 70   Temp 96.6 °F (35.9 °C) (Temporal)   Ht 4' 9\" (1.448 m)   Wt 170 lb 12.8 oz (77.5 kg)   BMI 36.96 kg/m²    Physical Exam  Vitals signs reviewed. Constitutional:       General: She is not in acute distress. Appearance: She is well-developed. She is not diaphoretic.    HENT:      Head: Normocephalic and atraumatic. Right Ear: Hearing, tympanic membrane and external ear normal.      Left Ear: Hearing, tympanic membrane and external ear normal.   Eyes:      Conjunctiva/sclera: Conjunctivae normal.      Pupils: Pupils are equal, round, and reactive to light. Cardiovascular:      Rate and Rhythm: Normal rate and regular rhythm. Pulmonary:      Effort: Pulmonary effort is normal. No respiratory distress. Breath sounds: Normal breath sounds. No stridor. No wheezing. Skin:     General: Skin is warm and dry. Neurological:      Mental Status: She is alert and oriented to person, place, and time. ASSESSMENT/PLAN:  Eloisa Luis was seen today for ear problem. Diagnoses and all orders for this visit:    Cough  -     Unlikely COVID-19 d/t length of symptoms. Pt states she is not interested in COVID-19 testing. Reviewed CDC guidelines on quarantine- pt agreeable. - Symptoms for 2 to 3 weeks without relief. Using over-the-counter regimens without relief. - Recommend symptomatic management as well as antibiotic. Patient is agreeable to plan. - Patient allergy to Keflex and sulfa  - azithromycin (ZITHROMAX) 250 MG tablet; Take 1 tablet by mouth See Admin Instructions for 5 days 500mg on day 1 followed by 250mg on days 2 - 5 - patient education handout provided and reviewed with the pt. - Patient will call if symptoms worsen or fail to improve  - Red flag warning signs reviewed with the patient she will go to the ER if these occur     Return for as previously scheduled or sooner if needed. Pt informed to call if symptoms worsen or fail to improve. All questions answered. Patient states no further questions or concerns at this time.     Electronically signed by WILLOW Chappell CNP 03/31/21

## 2021-03-31 ENCOUNTER — OFFICE VISIT (OUTPATIENT)
Dept: INTERNAL MEDICINE CLINIC | Age: 71
End: 2021-03-31
Payer: MEDICARE

## 2021-03-31 VITALS
SYSTOLIC BLOOD PRESSURE: 116 MMHG | HEART RATE: 70 BPM | TEMPERATURE: 96.6 F | HEIGHT: 57 IN | DIASTOLIC BLOOD PRESSURE: 68 MMHG | WEIGHT: 170.8 LBS | BODY MASS INDEX: 36.85 KG/M2

## 2021-03-31 DIAGNOSIS — R05.9 COUGH: Primary | ICD-10-CM

## 2021-03-31 PROCEDURE — 99213 OFFICE O/P EST LOW 20 MIN: CPT | Performed by: NURSE PRACTITIONER

## 2021-03-31 RX ORDER — AZITHROMYCIN 250 MG/1
250 TABLET, FILM COATED ORAL SEE ADMIN INSTRUCTIONS
Qty: 6 TABLET | Refills: 0 | Status: SHIPPED | OUTPATIENT
Start: 2021-03-31 | End: 2021-04-05

## 2021-03-31 RX ORDER — OXYBUTYNIN CHLORIDE 15 MG/1
15 TABLET, EXTENDED RELEASE ORAL DAILY
Qty: 90 TABLET | Refills: 3 | Status: SHIPPED | OUTPATIENT
Start: 2021-03-31 | End: 2022-01-04

## 2021-03-31 ASSESSMENT — ENCOUNTER SYMPTOMS
SHORTNESS OF BREATH: 0
DIARRHEA: 0
RHINORRHEA: 1
COUGH: 1
SORE THROAT: 1
WHEEZING: 0
NAUSEA: 0
VOMITING: 0
SINUS PRESSURE: 1
EYE PAIN: 0
ABDOMINAL PAIN: 0
TROUBLE SWALLOWING: 0
CONSTIPATION: 0

## 2021-03-31 NOTE — PATIENT INSTRUCTIONS
Patient Education        azithromycin (oral/injection)  Pronunciation:  linwood SIERRATH jory MYE sin  Brand:  Azithromycin 3 Day Dose Pack, Azithromycin 5 Day Dose Pack, Zithromax, Zithromax IV, Zithromax TRI-ALAN, Zithromax Z-Alan  What is the most important information I should know about azithromycin? You should not use azithromycin if you have ever had an allergic reaction, jaundice, or liver problems while taking this medicine. You should not use azithromycin if you have ever had a severe allergic reaction to similar drugs such as clarithromycin, erythromycin, or telithromycin. What is azithromycin? Azithromycin is used to treat many different types of infections caused by bacteria, including infections of the lungs, sinus, throat, tonsils, skin, urinary tract, cervix, or genitals. Azithromycin may also be used for purposes not listed in this medication guide. What should I discuss with my healthcare provider before using azithromycin? You should not use azithromycin if you are allergic to it, or if you have ever had:  · jaundice or liver problems caused by taking azithromycin; or  · a severe allergic reaction to similar drugs such as clarithromycin, erythromycin, or telithromycin. Azithromycin oral should not be used to treat pneumonia in people who have:  · cystic fibrosis;  · an infection after being in a hospital;  · an infection in the blood;  · a weak immune system (caused by diseases such as HIV/AIDS or cancer); or  · in older adults and those who are ill or debilitated. Tell your doctor if you have ever had:  · pneumonia;  · liver or kidney disease;  · myasthenia gravis;  · low levels of potassium in your blood;  · a heart rhythm disorder; or  · long QT syndrome (in you or a family member). It is not known  whether this medicine is effective in treating genital ulcers in women. Tell your doctor if you are pregnant or breastfeeding.  Taking azithromycin while breastfeeding may cause diarrhea, vomiting, or rash in the nursing baby. Azithromycin is not approved for use by anyone younger than 7 months old. Azithromycin should not be used to treat a throat or tonsil infection in a child younger than 3years old. How should I take azithromycin? Follow all directions on your prescription label and read all medication guides or instruction sheets. Use the medicine exactly as directed. Azithromycin oral  is taken by mouth. Azithromycin injection is given as an infusion into a vein, usually for 2 days before you switch to azithromycin oral. A healthcare provider will give you this injection. You may take azithromycin oral with or without food. Shake the oral suspension (liquid) before you measure a dose. Use the dosing syringe provided, or use a medicine dose-measuring device (not a kitchen spoon). Use this medicine for the full prescribed length of time, even if your symptoms quickly improve. Skipping doses can increase your risk of infection that is resistant to medication. Azithromycin will not treat a viral infection such as the flu or a common cold. Store at room temperature away from moisture and heat. Throw away any unused liquid medicine after 10 days. What happens if I miss a dose? Take the medicine as soon as you can, but skip the missed dose if it is almost time for your next dose. Do not take two doses at one time. What happens if I overdose? Seek emergency medical attention or call the Poison Help line at 1-262.499.7813. What should I avoid while taking azithromycin? Antibiotic medicines can cause diarrhea, which may be a sign of a new infection. If you have diarrhea that is watery or bloody, call your doctor before using anti-diarrhea medicine. Azithromycin could make you sunburn more easily. Avoid sunlight or tanning beds. Wear protective clothing and use sunscreen (SPF 30 or higher) when you are outdoors. What are the possible side effects of azithromycin?   Get emergency medical help if you have signs of an allergic reaction (hives, difficult breathing, swelling in your face or throat) or a severe skin reaction (fever, sore throat, burning in your eyes, skin pain, red or purple skin rash that spreads and causes blistering and peeling). Seek medical treatment if you have a serious drug reaction that can affect many parts of your body. Symptoms may include: skin rash, fever, swollen glands, muscle aches, severe weakness, unusual bruising, or yellowing of your skin or eyes. Call your doctor at once if you have:  · severe stomach pain, diarrhea that is watery or bloody;  · fast or pounding heartbeats, fluttering in your chest, shortness of breath, and sudden dizziness (like you might pass out); or  · liver problems --nausea, vomiting, loss of appetite, stomach pain (upper right side), tiredness, itching, dark urine, yoselin-colored stools, jaundice (yellowing of the skin or eyes); Call your doctor right away if a baby taking azithromycin becomes irritable or vomits while eating or nursing. Older adults may be more likely to have side effects on heart rhythm, including a life-threatening fast heart rate. Common side effects may include:  · nausea, vomiting; or  · stomach pain. This is not a complete list of side effects and others may occur. Call your doctor for medical advice about side effects. You may report side effects to FDA at 1-613-FDA-8982. What other drugs will affect azithromycin? Tell your doctor about all your other medicines, especially:  · colchicine;  · digoxin;  · nelfinavir;  · phenytoin;  · an antacid that contains aluminum or magnesium --Acid Gone, Gaviscon, Gelusil, Maalox, Milk of Magnesia, Mylanta, Pepcid Complete, Rolaids, Rulox, and others; or  · a blood thinner --warfarin, Coumadin, Jantoven. This list is not complete. Other drugs may affect azithromycin, including prescription and over-the-counter medicines, vitamins, and herbal products.  Not all possible drug interactions are listed here. Where can I get more information? Your pharmacist can provide more information about azithromycin. Remember, keep this and all other medicines out of the reach of children, never share your medicines with others, and use this medication only for the indication prescribed. Every effort has been made to ensure that the information provided by [address] is accurate, up-to-date, and complete, but no guarantee is made to that effect. Drug information contained herein may be time sensitive. [facility] information has been compiled for use by healthcare practitioners and consumers in the [place] and therefore [facility] does not warrant that uses outside of the [place] are appropriate, unless specifically indicated otherwise. [facility]'s drug information does not endorse drugs, diagnose patients or recommend therapy. [facility]'s drug information is an informational resource designed to assist licensed healthcare practitioners in caring for their patients and/or to serve consumers viewing this service as a supplement to, and not a substitute for, the expertise, skill, knowledge and judgment of healthcare practitioners. The absence of a warning for a given drug or drug combination in no way should be construed to indicate that the drug or drug combination is safe, effective or appropriate for any given patient. [facility] does not assume any responsibility for any aspect of healthcare administered with the aid of information [facility] provides. The information contained herein is not intended to cover all possible uses, directions, precautions, warnings, drug interactions, allergic reactions, or adverse effects. If you have questions about the drugs you are taking, check with your doctor, nurse or pharmacist.  Copyright 6927-0613 [address]: 18.01. Revision date: 5/2/2019. Care instructions adapted under license by [facility] ([facility]).  If you have questions about a medical condition or this instruction, always ask your healthcare professional. [place] [address] any warranty or liability for your use of this information.

## 2021-04-01 ENCOUNTER — VIRTUAL VISIT (OUTPATIENT)
Dept: PULMONOLOGY | Age: 71
End: 2021-04-01
Payer: MEDICARE

## 2021-04-01 DIAGNOSIS — K21.9 GASTROESOPHAGEAL REFLUX DISEASE WITHOUT ESOPHAGITIS: Chronic | ICD-10-CM

## 2021-04-01 DIAGNOSIS — E66.01 CLASS 2 SEVERE OBESITY DUE TO EXCESS CALORIES WITH SERIOUS COMORBIDITY AND BODY MASS INDEX (BMI) OF 38.0 TO 38.9 IN ADULT (HCC): Chronic | ICD-10-CM

## 2021-04-01 DIAGNOSIS — F51.04 CHRONIC INSOMNIA: ICD-10-CM

## 2021-04-01 DIAGNOSIS — G47.33 OSA (OBSTRUCTIVE SLEEP APNEA): Primary | ICD-10-CM

## 2021-04-01 DIAGNOSIS — I51.89 DIASTOLIC DYSFUNCTION: ICD-10-CM

## 2021-04-01 DIAGNOSIS — I10 ESSENTIAL HYPERTENSION: Chronic | ICD-10-CM

## 2021-04-01 PROCEDURE — 99214 OFFICE O/P EST MOD 30 MIN: CPT | Performed by: NURSE PRACTITIONER

## 2021-04-01 ASSESSMENT — SLEEP AND FATIGUE QUESTIONNAIRES
HOW LIKELY ARE YOU TO NOD OFF OR FALL ASLEEP WHILE WATCHING TV: 2
HOW LIKELY ARE YOU TO NOD OFF OR FALL ASLEEP WHILE SITTING INACTIVE IN A PUBLIC PLACE: 0

## 2021-04-01 NOTE — PROGRESS NOTES
Cedric Wheatley         : 1950    Diagnosis: [x] BEBE (G47.33) [] CSA (G47.31) [x] Apnea (G47.30)   Length of Need: [x] 6 Months [] 99 Months [x] Other: Hypoxia Unspecified (R09.02)       Miscellaneous: [] Chin Strap ()/ 1 per 6 months [] O2 bleed-in:       LPM   [x] Oximetry on CPAP/Bilevel []  Other:          Start Order Date: 21    MEDICAL JUSTIFICATION:  I, the undersigned, certify that the above prescribed supplies are medically necessary for this patients wellbeing. In my opinion, the supplies are both reasonable and necessary in reference to accepted standards of medicalpractice in treatment of this patients condition. WILLOW Corrales CNP      NPI: [id number]       Order Signed Date: 21    Electronically signed by WILLOW Corrales CNP on 2021 at 10:30 AM    Cedric Wheatley  1950  [address] [place] [address]  [phone] (home)   [phone] (mobile)      Insurance Info (confirm with patient if correct):  Payor/Plan Subscr  Sex Relation Sub.  Ins. ID Effective Group Num

## 2021-04-01 NOTE — PROGRESS NOTES
Joelle Villalta MD, FAASM, [facility]  Gayathri Romero, MSN, RN, CNP     [address] [facility]  [place] [address] [address] [facility] [address]  Dept: [phone]  Dept Fax: [phone]  Loc: [phone]    Subjective:     Patient ID: Lowell Mccrary is a 70 y.o. female. Chief Complaint   Patient presents with    Sleep Apnea       HPI:      Sleep Medicine Video Visit    Pursuant to the emergency declaration under the [address] [place] [address] [facility] authority and the [employer] Act this Telephone Visit was insisted, with patient's consent, to reduce the patient's risk of exposure to COVID-19 and provide continuity of care for an established patient. Services were provided through a synchronous discussion over a telephone and/or Video chat to substitute for in-person clinic visit, and coded as such. While patient is at home. Machine Modem/Download Info:  Compliance (hours/night): 7.4 hrs/night  Download AHI (/hour): 10.8 /HR  Average CPAP Pressure : 9.3 cmH2O           APAP - Settings  Pressure Min: 6 cmH2O  Pressure Max: 16 cmH2O                 Comfort Settings  Humidity Level (0-8): 3  Flex/EPR (0-3): 3 PAP Mask  Mask Type: Nasal mask  Clinically Relevant Leak: Yes     [place] - Total score: 6    Follow-up :     Last Visit : January 2021    Had study Insurance mandated HST done on 1/20/21 which showed an RHI - 19.3/hr with Low SaO2 - 82% and time below 90% of 116.2min.   This is consistent with moderate BEBE (327.23)      Subjective Health Changes: Sinus infection      Over Night Oximetry: [] Yes  [] No  [x] NA [] WNL   Using O2: [] Yes  [] No  [x] NA   Patient is compliant with the machine  [x] Yes  [] No [] Per patient   Feeling rested when using the machine   [x] Yes  [] No     Pressure is comfortable with inspiration and expiration  [x] Yes  [] No   ([x] NA   [] Feeling of suffocation [] Feeling like not enough air    [] To much pressure)     Noticed changes in pressure  [x] NA  [] Yes    [] No     Mask is fitting well  [x] Yes  [] No   Noting Mask Air Leak  [x] Yes  [] No   Having painful Aerophagia  [] Yes  [x] No   Nocturia   1-2  per night. Having  HA upon waking  [] Yes  [x] No   Dry mouth upon waking   Dry Nose  Dry Eyes  [] Yes  [x] No   Congestion upon waking   [] Yes  [x] No    Nose Bleeds  [] Yes  [x] No   Using Sleep Aides  Trazodone  [] NA  [] OTC  [x] Per our office   [] Per another provider   Understands how to change humidification and/or tubing temperature for comfort while at home  [x] Yes  [] No     Difficulties falling asleep  [] Yes  [x] No   Difficulties staying asleep  [] Yes  [x] No   Approximate time to bed  8pm   Approximate wake time  2-3am   Taking Naps  frequent   If taking naps usual length  2 hours     [] NA   If taking naps using the machine [] NA  [x] Yes    [] No    [] With and With out    Drowsy when driving  [] Yes  [x] No     Does patient carry a DOT/CDL  [] Yes  [x] No     Does patient carry FAA/[employer] License   [] Yes  [x] No      Any concerns noted with the machine at this time  [] Yes  [x] No       Assessment/Plan:   1. BEBE (obstructive sleep apnea)  Assessment & Plan:  After downloading data and reviewing  Reviewed compliance download with pt. Supplies and parts as needed for his machine. These are medically necessary. Continue medications per his PCP and other physicians. Limit caffeine use after 3pm.    The chronic medical conditions listed are directly related to the primary diagnosis listed above. The management of the primary diagnosis affects the secondary diagnosis and vice vers    2. Gastroesophageal reflux disease without esophagitis  Assessment & Plan:  Chronic- stable. After speaking with patient:    Agree with current plan, and would agree to continue this plan per prescribing and managing physician.      3. Essential hypertension  Assessment & Plan:  Chronic- stable. After speaking with patient:    Agree with current plan, and would agree to continue this plan per prescribing and managing physician. 4. Diastolic dysfunction  Assessment & Plan:  Chronic- stable. After speaking with patient:    Agree with current plan, and would agree to continue this plan per prescribing and managing physician. 5. Class 2 severe obesity due to excess calories with serious comorbidity and body mass index (BMI) of 38.0 to 38.9 in adult [facility])  Assessment & Plan:  Patient encouraged to work on maintaining a healthy weight per height. Achievable with diet restriction/modifications and exercise (may consult primary care if unsure of any restrictions or concerns). 6. Chronic insomnia  Assessment & Plan:  Chronic- stable. After speaking with patient:    Agree with current plan, and would agree to continue this plan per prescribing and managing physician. The chronic medical conditions listed are directly related to the primary diagnosis listed above. The management of the primary diagnosis affects the secondary diagnosis and vice versa. - After pulling data and reviewing it   - Reviewed compliance download with patient    -Medically necessary supplies and parts as needed for her machine.   - Continue medications per his primary care provider and other physicians.   - Encouraged to limit caffeine use after 3pm.    - Encouraged her to work on weight loss through diet and exercise  - Educated not to drive when feeling sleepy   - Patient using [employer]  - Titration studies (Patient has failed the current treatment)  CPAP verus Bilevel function  Over night oximetry that I will order today in office  [employer]  Office locations    The chronic medical conditions listed are directly related to the primary diagnosis listed above. The management of the primary diagnosis affects the secondary diagnosis and vice versa.     - Will follow up in off in 3 months

## 2021-04-06 ENCOUNTER — IMMUNIZATION (OUTPATIENT)
Dept: PRIMARY CARE CLINIC | Age: 71
End: 2021-04-06
Payer: MEDICARE

## 2021-04-06 PROCEDURE — 91301 COVID-19, MODERNA VACCINE 100MCG/0.5ML DOSE: CPT | Performed by: FAMILY MEDICINE

## 2021-04-06 PROCEDURE — 0011A COVID-19, MODERNA VACCINE 100MCG/0.5ML DOSE: CPT | Performed by: FAMILY MEDICINE

## 2021-04-13 ENCOUNTER — TELEPHONE (OUTPATIENT)
Dept: PULMONOLOGY | Age: 71
End: 2021-04-13

## 2021-04-13 ENCOUNTER — TELEPHONE (OUTPATIENT)
Dept: INTERNAL MEDICINE CLINIC | Age: 71
End: 2021-04-13

## 2021-04-13 NOTE — TELEPHONE ENCOUNTER
----- Message from Miranda Rosa sent at 4/12/2021  8:55 AM EDT -----  Subject: Appointment Request    Reason for Call: Urgent Cough Cold    QUESTIONS  Type of Appointment? Established Patient  Reason for appointment request? No appointments available during search  Additional Information for Provider? Patient having a lot of sinus issues   and dizziness. please advise and call  ---------------------------------------------------------------------------  --------------  CALL BACK INFO  What is the best way for the office to contact you? OK to leave message on   voicemail  Preferred Call Back Phone Number? [phone]  ---------------------------------------------------------------------------  --------------  SCRIPT ANSWERS  Relationship to Patient? Self  Appointment reason? Symptomatic  Select script based on patient symptoms? Adult Cough/Cold Symptoms [Runny   Nose   Sore Throat   Flu   Sinus   Sinus Infection   Upper Respiratory Infection [URI]   Congestion]   Are you currently unable to finish sentences due to any difficulty   breathing? No  Are you unable to swallow liquids? No  Are you having fevers (100.4 or greater)   chills   or sweats? No  Do you have COPD   asthma or a chronic lung condition? No  Have your symptoms been present for more than 5 days? Yes   Have you been diagnosed with   tested for   or told that you are suspected of having COVID-19 (Coronavirus)? No  Have you had a fever or taken medication to treat a fever within the past   3 days? No  Have you had a cough   shortness of breath or flu-like symptoms within the past 3 days? No  Do you currently have flu-like symptoms including fever or chills   cough   shortness of breath   or difficulty breathing   or new loss of taste or smell? No  (Service Expert  click yes below to proceed with [employer] As Usual   Scheduling)?  Yes

## 2021-04-13 NOTE — TELEPHONE ENCOUNTER
We need the most recent machine compliance to fully access the night the study was preformed.  Before making any further determinations

## 2021-04-13 NOTE — TELEPHONE ENCOUNTER
Left message for the pt to return my call. When she calls back please offer her a VV with Dr Faust tomorrow.

## 2021-04-14 ENCOUNTER — VIRTUAL VISIT (OUTPATIENT)
Dept: INTERNAL MEDICINE CLINIC | Age: 71
End: 2021-04-14
Payer: MEDICARE

## 2021-04-14 DIAGNOSIS — J01.00 SUBACUTE MAXILLARY SINUSITIS: Primary | ICD-10-CM

## 2021-04-14 DIAGNOSIS — R68.84 JAW PAIN: ICD-10-CM

## 2021-04-14 PROCEDURE — 99213 OFFICE O/P EST LOW 20 MIN: CPT | Performed by: INTERNAL MEDICINE

## 2021-04-14 RX ORDER — AMOXICILLIN AND CLAVULANATE POTASSIUM 875; 125 MG/1; MG/1
1 TABLET, FILM COATED ORAL 2 TIMES DAILY
Qty: 28 TABLET | Refills: 0 | Status: SHIPPED | OUTPATIENT
Start: 2021-04-14 | End: 2021-04-28

## 2021-04-14 NOTE — TELEPHONE ENCOUNTER
Patient stated that she found out that she wasn't locking her humidifier when she refilled it and she didn't have the hose connected tightly. She also has had a sinus infection for the past 3 weeks. She really doesn't want to do another study. Now that she corrected the issues with the hose and humidifier, could she repeat the overnight oximetry once her sinus infection is cleared up?

## 2021-04-14 NOTE — PROGRESS NOTES
Scott Mata         : 1950    Diagnosis: [x] BEBE (G47.33) [] CSA (G47.31) [x] Apnea (G47.30)   Length of Need: [x] 6 Months [] 99 Months [x] Other: Hypoxia Unspecified (R09.02)       Miscellaneous: [] Chin Strap ()/ 1 per 6 months [] O2 bleed-in:       LPM   [x] Oximetry on CPAP/Bilevel []  Other:          Start Order Date: 21    MEDICAL JUSTIFICATION:  I, the undersigned, certify that the above prescribed supplies are medically necessary for this patients wellbeing. In my opinion, the supplies are both reasonable and necessary in reference to accepted standards of medicalpractice in treatment of this patients condition. WILLOW Burns CNP      NPI: [id number]       Order Signed Date: 21    Electronically signed by WILLOW Burns CNP on 2021 at 4:24 PM    Scott Mata  1950  [address]  [phone] (home)   [phone] (mobile)      Insurance Info (confirm with patient if correct):  Payor/Plan Subscr  Sex Relation Sub.  Ins. ID Effective Group Num

## 2021-04-14 NOTE — PROGRESS NOTES
Farzana Philippe (:  1950) is a 70 y.o. female,Established patient, here for evaluation of the following chief complaint(s): Dizziness (pressure around eyes) and Headache      ASSESSMENT/PLAN:  1. Subacute maxillary sinusitis  Assessment & Plan:  Failed Zithromax. Treat with Augmentin 875 mg twice daily for 14 days. If she is not completely better at the end of 14 days will extend for an additional 7 to 14 days. 2. Jaw pain  Assessment & Plan:  Suspect TMJ and arthritis as possible cause of pain. Recommend use of heat and Tylenol as needed. No follow-ups on file. SUBJECTIVE/OBJECTIVE:  HPI     Phone visit today, unable to do video. Is c/o HA, scratchy throat, and feeling \"dizzy\"/light headed. Also c/o \"bone in her face\" bothering her. States that is sore and swollen (? TMJ joint, not mastoid per patient description). Does have a cough off and on, loose but not productive. Symptoms have been going on for about 3 weeks now. No fevers, no SOB. Denies n/v/d, loss of taste or smell. Did get her COVID vaccine last week (21), symptoms started prior to her vaccine and she is continuing to feel bad. Has been using Coricidin, Flonase, Zyrtec, and saline nose spray without improvement. Did see Araceli Castro for this about 2 weeks ago on 3/31/21 and was given Zithromax. Feels like she is now worse and not better. Review of Systems   Constitutional: Negative for appetite change, fatigue and fever. HENT: Positive for congestion, sinus pressure and sore throat. Negative for voice change. Respiratory: Negative for chest tightness and shortness of breath. Cardiovascular: Negative for chest pain. Gastrointestinal: Negative for constipation and diarrhea. Skin: Negative for rash.        Patient-Reported Vitals 2021   Patient-Reported Weight 170lb   Patient-Reported Height 4'9\"   Patient-Reported Systolic -   Patient-Reported Diastolic -   Patient-Reported Pulse -

## 2021-04-20 ENCOUNTER — TELEPHONE (OUTPATIENT)
Dept: INTERNAL MEDICINE CLINIC | Age: 71
End: 2021-04-20

## 2021-04-20 RX ORDER — FLUCONAZOLE 150 MG/1
150 TABLET ORAL
Qty: 2 TABLET | Refills: 0 | Status: SHIPPED | OUTPATIENT
Start: 2021-04-20 | End: 2021-04-26

## 2021-04-21 NOTE — TELEPHONE ENCOUNTER
Pt notified. She is asking if she could please go back to what she was taking before for her anxiety.

## 2021-04-21 NOTE — TELEPHONE ENCOUNTER
Unfortunately, because of her pain medication she is not able to go back on Xanax or Ativan. Xanax and Ativan are also not good for people with memory problems. I  can offer her to f/u with Dr. Taqueria Vences again regarding her anxiety to see if he can adjust her medications to get it under better control. I also recommend increasing her Buspar (busprione) to 3 times daily from twice daily to see if that helps improve her symptoms.      saw

## 2021-04-25 PROBLEM — J01.00 SUBACUTE MAXILLARY SINUSITIS: Status: ACTIVE | Noted: 2018-09-25

## 2021-04-25 ASSESSMENT — ENCOUNTER SYMPTOMS
SORE THROAT: 1
VOICE CHANGE: 0
SHORTNESS OF BREATH: 0
CHEST TIGHTNESS: 0
CONSTIPATION: 0
SINUS PRESSURE: 1
DIARRHEA: 0

## 2021-04-25 NOTE — ASSESSMENT & PLAN NOTE
Failed Zithromax. Treat with Augmentin 875 mg twice daily for 14 days. If she is not completely better at the end of 14 days will extend for an additional 7 to 14 days.

## 2021-04-26 ENCOUNTER — TELEPHONE (OUTPATIENT)
Dept: PULMONOLOGY | Age: 71
End: 2021-04-26

## 2021-05-04 ENCOUNTER — IMMUNIZATION (OUTPATIENT)
Dept: PRIMARY CARE CLINIC | Age: 71
End: 2021-05-04
Payer: MEDICARE

## 2021-05-04 PROCEDURE — 91301 COVID-19, MODERNA VACCINE 100MCG/0.5ML DOSE: CPT | Performed by: FAMILY MEDICINE

## 2021-05-04 PROCEDURE — 0012A COVID-19, MODERNA VACCINE 100MCG/0.5ML DOSE: CPT | Performed by: FAMILY MEDICINE

## 2021-05-18 RX ORDER — TRAZODONE HYDROCHLORIDE 100 MG/1
TABLET ORAL
Qty: 60 TABLET | Refills: 1 | Status: SHIPPED | OUTPATIENT
Start: 2021-05-18 | End: 2021-07-14

## 2021-06-04 ENCOUNTER — TELEPHONE (OUTPATIENT)
Dept: PULMONOLOGY | Age: 71
End: 2021-06-04

## 2021-06-04 NOTE — TELEPHONE ENCOUNTER
Patient call and is having problems with machine,\" The machine oxygen keeps going down while machine goes up. \" call ([phone])

## 2021-06-07 ENCOUNTER — TELEPHONE (OUTPATIENT)
Dept: RHEUMATOLOGY | Age: 71
End: 2021-06-07

## 2021-06-07 ENCOUNTER — TELEPHONE (OUTPATIENT)
Dept: PULMONOLOGY | Age: 71
End: 2021-06-07

## 2021-06-07 DIAGNOSIS — M81.0 OSTEOPOROSIS, POST-MENOPAUSAL: Primary | ICD-10-CM

## 2021-06-07 NOTE — TELEPHONE ENCOUNTER
Patient called office for results. Informed patient that overnight pulse oximetry needs to be repeated. Patient states she needs to get new mask. She will call the office back to have oximetry reordered once she receives a new mask.

## 2021-06-07 NOTE — TELEPHONE ENCOUNTER
Please obtain VOB and PA for IV reclast infusion. Last infusion: Reclast #4 03/21/2019  Labs: Lab orders place and pt notified.   Last Dexa Scan:01/15/2020  DX: M81.0 Osteoporosis, post-menopausal

## 2021-06-09 NOTE — TELEPHONE ENCOUNTER
VOB  RECLAST ()  Co Ins-15% Covered 85%  OOP-$3000.00 Met-$537.11  No Deductibles  No PA Required if in network  Ref# [id number]

## 2021-06-14 RX ORDER — DULOXETIN HYDROCHLORIDE 60 MG/1
CAPSULE, DELAYED RELEASE ORAL
Qty: 30 CAPSULE | Refills: 3 | Status: SHIPPED | OUTPATIENT
Start: 2021-06-14 | End: 2021-10-14

## 2021-06-15 ENCOUNTER — OFFICE VISIT (OUTPATIENT)
Dept: INTERNAL MEDICINE CLINIC | Age: 71
End: 2021-06-15
Payer: MEDICARE

## 2021-06-15 VITALS
DIASTOLIC BLOOD PRESSURE: 74 MMHG | SYSTOLIC BLOOD PRESSURE: 108 MMHG | OXYGEN SATURATION: 98 % | BODY MASS INDEX: 37.65 KG/M2 | WEIGHT: 174 LBS | HEART RATE: 80 BPM

## 2021-06-15 DIAGNOSIS — G47.62 NOCTURNAL LEG CRAMPS: ICD-10-CM

## 2021-06-15 DIAGNOSIS — J34.2 DEVIATED SEPTUM: ICD-10-CM

## 2021-06-15 DIAGNOSIS — G30.1 LATE ONSET ALZHEIMER'S DISEASE WITHOUT BEHAVIORAL DISTURBANCE (HCC): Primary | ICD-10-CM

## 2021-06-15 DIAGNOSIS — B37.0 ORAL THRUSH: ICD-10-CM

## 2021-06-15 DIAGNOSIS — M19.049 ARTHRITIS OF HAND: ICD-10-CM

## 2021-06-15 DIAGNOSIS — F02.80 LATE ONSET ALZHEIMER'S DISEASE WITHOUT BEHAVIORAL DISTURBANCE (HCC): Primary | ICD-10-CM

## 2021-06-15 PROCEDURE — 99214 OFFICE O/P EST MOD 30 MIN: CPT | Performed by: INTERNAL MEDICINE

## 2021-06-15 RX ORDER — BACLOFEN 10 MG/1
10 TABLET ORAL 3 TIMES DAILY PRN
Qty: 60 TABLET | Refills: 2 | Status: SHIPPED | OUTPATIENT
Start: 2021-06-15 | End: 2022-03-01

## 2021-06-15 ASSESSMENT — ENCOUNTER SYMPTOMS
DIARRHEA: 0
CONSTIPATION: 0
SHORTNESS OF BREATH: 0
BACK PAIN: 1
CHEST TIGHTNESS: 0

## 2021-06-15 NOTE — PROGRESS NOTES
Patient: Robyn Mares is a 70 y.o. female who presents today with the following Chief Complaint(s):  Chief Complaint   Patient presents with    Check-Up       HPI     Here today for follow up. Did spray some perfume in her eye- used eye drops to clean it out, vision is ok and eye is no longer burning. Would like to take a magnesium supplement (Calm). Has noticed that her hands are having difficulty picking things up- like strings and buttons. Started suddenly. Has not noticed this on the left side. Has also noticed cutting her hair is having difficult to get her scissors in the correct position. Not sure on timing but perhaps about the same timing as the difficulty with fine motor control on the right hand (about 4 weeks). Has also noticed weakness in her right hand- things will suddenly just slide out of her hand. Has also noticed a lump in her left side of her face and feels like her left nostril is blocked. Has a corn on her left second toe. Wants to cut it off. Does not want to see podiatry. Did get her COVID vaccines. Would like to try a different muscle relaxer. Is currently on tizanidine and feels like it gives her diarrhea. If she only takes 1/2 of the muscle relaxer she will be ok but gets diarrhea if she takes 1 whole pill. Is having really bad leg cramps, sometimes is so severe that she cannot put her foot on the ground and then does not have any at other times. Tongue will burn and is very sensitive- notices this if she gets too much tooth paste on her tongue or if she eats spicy foods. Has noticed for the past 3-4 weeks. Did talk to her family a little bit about her dementia. Her son has been supportive. No issues with driving yet but has had some issues with balancing her check book.      Allergies   Allergen Reactions    Ativan [Lorazepam] Swelling     Tongue swelling    Sulfa Antibiotics Swelling    Keflex [Cephalexin] Other (See Comments)     Leg cramps      Past Medical History:   Diagnosis Date    Anxiety     Anxiety and depression     Chronic back pain     Clostridium difficile infection 2/22/15    Hyperlipidemia     Hypertension     Insomnia disorder     Osteoarthritis     Osteoporosis 2008    Rheumatic fever     S/P insertion of spinal cord stimulator     Self-catheterizes urinary bladder     as needed 7/8 no longer catherizing     Urinary retention       Past Surgical History:   Procedure Laterality Date    BLADDER REPAIR      CARPAL TUNNEL RELEASE      COLONOSCOPY      CYSTOSCOPY  10/29/2012    bladder biopsy    CYSTOSCOPY N/A 10/19/2020    FLEXIBLE CYSTOSCOPY performed by Donell Schirmer, MD at [facility]      INTRACAPSULAR CATARACT EXTRACTION Right 7/18/2019    PHACOEMULSIFICATION WITH INTRAOCULAR LENS IMPLANT performed by Gema Montes MD at [address] [place] [address] EXTRACTION Left 7/26/2019    PHACOEMULSIFICATION WITH INTRAOCULAR LENS IMPLANT performed by Gema Montes MD at [place] [address]    MANDIBLE RECONSTRUCTION      OTHER SURGICAL HISTORY      spinal cord stimulator    TOTAL KNEE ARTHROPLASTY Right 10/18/13      Social History     Socioeconomic History    Marital status:       Spouse name: Not on file    Number of children: Not on file    Years of education: Not on file    Highest education level: Not on file   Occupational History    Occupation: retired   Tobacco Use    Smoking status: Never Smoker    Smokeless tobacco: Never Used   Vaping Use    Vaping Use: Never used   Substance and Sexual Activity    Alcohol use: Not Currently    Drug use: No    Sexual activity: Not on file   Other Topics Concern    Not on file   Social History Narrative    Not on file     Social Determinants of Health     Financial Resource Strain:     Difficulty of Paying Living Expenses:    Food Insecurity:     Worried About [facility] in the Last Year:    [address] in the Last Year:    Transportation Needs:     Lack of Transportation (Medical):      Lack of Transportation (Non-Medical):    Physical Activity:     Days of Exercise per Week:     Minutes of Exercise per Session:    Stress:     Feeling of Stress :    Social Connections:     Frequency of Communication with Friends and Family:     Frequency of Social Gatherings with Friends and Family:     Attends Nondenominational Services:     Active Member of Clubs or Organizations:     Attends Club or Organization Meetings:     Marital Status:    Intimate Partner Violence:     Fear of Current or Ex-Partner:     Emotionally Abused:     Physically Abused:     Sexually Abused:      Family History   Problem Relation Age of Onset    COPD Mother     High Blood Pressure Mother     Rheum Arthritis Mother     Thyroid Disease Mother     Alcohol Abuse Father     Clotting Disorder Sister     Thyroid Disease Sister     Hypertension Brother     Diabetes Sister     Heart Disease Sister     Hypertension Sister     Thyroid Disease Sister     Cancer Brother     Heart Disease Brother     Hypertension Brother     Substance Abuse Brother     Alcohol Abuse Son     No Known Problems Daughter     Dementia Neg Hx         Outpatient Medications Prior to Visit   Medication Sig Dispense Refill    DULoxetine (CYMBALTA) 60 MG extended release capsule TAKE 1 CAPSULE BY MOUTH EVERY DAY 30 capsule 3    oxybutynin (DITROPAN XL) 15 MG extended release tablet Take 1 tablet by mouth daily 90 tablet 3    galantamine (RAZADYNE ER) 8 MG extended release capsule TAKE 1 CAPSULE BY MOUTH DAILY WITH BREAKFAST 90 capsule 3    fluticasone (FLONASE) 50 MCG/ACT nasal spray SPRAY TWO SPRAYS IN EACH NOSTRIL ONCE DAILY 1 Bottle 0    budesonide (ENTOCORT EC) 3 MG extended release capsule Take 6 mg by mouth every morning      pseudoephedrine (DECONGESTANT) 30 MG tablet Take 1 tablet by mouth 2 times daily as needed for Congestion 30 tablet 5    gabapentin (NEURONTIN) 600 MG tablet Take 1 tablet by mouth 3 times daily for 180 days. 270 tablet 1    lisinopril (PRINIVIL;ZESTRIL) 40 MG tablet TAKE 1 TABLET DAILY 90 tablet 3    potassium chloride (KLOR-CON M) 20 MEQ extended release tablet TAKE 1 TABLET TWICE A  tablet 3    busPIRone (BUSPAR) 10 MG tablet Take 1 tablet by mouth 2 times daily 20 tablet 0    fluocinolone (DERMA-SMOOTHE) 0.01 % external oil Apply 1 each topically Twice a Week 118.28 mL 1    ketoconazole (NIZORAL) 2 % shampoo APPLY TOPICALLY DAILY AS NEEDED 1 Bottle 1    rosuvastatin (CRESTOR) 10 MG tablet Take 1 tablet by mouth daily TAKE 1 TABLET NIGHTLY 90 tablet 3    hydroCHLOROthiazide (MICROZIDE) 12.5 MG capsule TAKE 1 CAPSULE EVERY MORNING 90 capsule 4    Cyanocobalamin (B-12) 1000 MCG SUBL Place 1,000 Units under the tongue daily 90 tablet 3    polyethylene glycol (GLYCOLAX) 17 GM/SCOOP powder 1/2-1 capful in 8 oz water or prune juice qd prn constipation. 3 Bottle 3    carBAMazepine (TEGRETOL-XR) 100 MG extended release tablet Take 1 tablet by mouth 2 times daily 60 tablet 3    diclofenac (VOLTAREN) 75 MG EC tablet Take 1 tablet by mouth 2 times daily (Patient taking differently: Take 75 mg by mouth 2 times daily as needed for Pain ) 180 tablet 3    acetaminophen (TYLENOL) 500 MG tablet Take 1,000 mg by mouth 3 times daily as needed for Pain      fluocinolone (DERMOTIC) 0.01 % OIL oil Place 1 drop in ear(s) 2 times daily 1 Bottle 3    diphenoxylate-atropine (LOMOTIL) 2.5-0.025 MG per tablet Take 1 tablet by mouth as needed for Diarrhea. .      glucose blood VI test strips (ONE TOUCH ULTRA TEST) strip 1 each by In Vitro route daily Fasting qam and As needed.  100 strip 1    Lancets MISC Check sugars fasting qam and prn 100 each 3    Cholecalciferol (VITAMIN D3) 5000 units CAPS Take 5,000 Units by mouth daily       oxymorphone (OPANA) 5 MG tablet Take 5 mg by mouth 2 times daily. Indications: per DR Maria Alejandra Riojas TAKES 3 TIMES A DAY. TRYING TO CUT BACK.  zoledronic acid (RECLAST) 5 MG/100ML SOLN Infuse 5 mg intravenously once IV, yearly      Cetirizine HCl (ZYRTEC PO) Take 1 tablet by mouth daily       aspirin 81 MG chewable tablet Take 81 mg by mouth daily.  Calcium Carbonate-Vit D-Min (CALCIUM 1200 PO) Take 1 capsule by mouth 2 times daily.  Ascorbic Acid (VITAMIN C) 500 MG tablet Take 500 mg by mouth daily.  traZODone (DESYREL) 100 MG tablet TAKE TWO (2) TABLETS BY MOUTH EVERY NIGHT (Patient not taking: Reported on 6/15/2021) 60 tablet 1    Fesoterodine Fumarate ER (TOVIAZ) 8 MG TB24 Take 8 mg by mouth daily 30 tablet 1    hydrOXYzine (ATARAX) 50 MG tablet Take 1 tablet by mouth every 4 hours as needed for Itching (Patient not taking: Reported on 6/15/2021) 270 tablet 1    tiZANidine (ZANAFLEX) 2 MG tablet Take 1 tablet by mouth every 8 hours as needed (prn) 60 tablet 2     Facility-Administered Medications Prior to Visit   Medication Dose Route Frequency Provider Last Rate Last Admin    zoledronic acid (RECLAST) 5 mg/100 mL infusion  5 mg Intravenous See Admin Instructions Laly Xiong  mL/hr at 04/06/16 0900 5 mg at 04/06/16 0900       Patient'spast medical history, surgical history, family history, medications,  and allergies  were all reviewed and updated as appropriate today. Review of Systems   Constitutional: Negative for appetite change, fatigue and fever. HENT: Positive for congestion. Respiratory: Negative for chest tightness and shortness of breath. Cardiovascular: Negative for chest pain. Gastrointestinal: Negative for constipation and diarrhea. Musculoskeletal: Positive for arthralgias and back pain. Skin: Negative for rash. /74   Pulse 80   Wt 174 lb (78.9 kg)   SpO2 98%   BMI 37.65 kg/m²   Physical Exam  Vitals and nursing note reviewed.    Constitutional:       Appearance: She is well-developed. She is not toxic-appearing. HENT:      Head: Normocephalic. Right Ear: Tympanic membrane, ear canal and external ear normal.      Left Ear: Tympanic membrane, ear canal and external ear normal.      Nose: Septal deviation (left) present. Left Nostril: Occlusion present. Mouth/Throat:      Mouth: Mucous membranes are moist.      Tongue: No lesions. Tongue deviates from midline. Comments: Mild deviation of tongue to left. Mild white patches on tongue  Eyes:      General: No scleral icterus. Extraocular Movements: Extraocular movements intact. Conjunctiva/sclera: Conjunctivae normal.      Pupils: Pupils are equal, round, and reactive to light. Neck:      Thyroid: No thyroid mass or thyromegaly. Vascular: No carotid bruit. Cardiovascular:      Rate and Rhythm: Normal rate and regular rhythm. Pulses:           Dorsalis pedis pulses are 2+ on the right side and 2+ on the left side. Heart sounds: Normal heart sounds. No murmur heard. Comments: No LE edema  Pulmonary:      Effort: Pulmonary effort is normal.      Breath sounds: Normal breath sounds. Musculoskeletal:      Right hand: Deformity present. Normal range of motion. Normal strength. Left hand: Deformity present. Normal range of motion. Normal strength. Comments: Tinel's and Phalen's negative b/l. Lymphadenopathy:      Cervical: No cervical adenopathy. Neurological:      Mental Status: She is alert and oriented to person, place, and time. Mental status is at baseline. Cranial Nerves: Cranial nerves are intact. No dysarthria. Motor: No weakness or tremor. Coordination: Coordination normal.      Gait: Gait is intact. Psychiatric:         Mood and Affect: Mood normal.         Behavior: Behavior normal. Behavior is cooperative.          ASSESSMENT/PLAN:    Problem List Items Addressed This Visit     Arthritis of hand      Suspect this is the cause of the spasm) 60 tablet 2    nystatin (MYCOSTATIN) 526017 UNIT/ML suspension Take 5 mLs by mouth 4 times daily for 10 days Retain in mouth as long as possible 200 mL 0    DULoxetine (CYMBALTA) 60 MG extended release capsule TAKE 1 CAPSULE BY MOUTH EVERY DAY 30 capsule 3    oxybutynin (DITROPAN XL) 15 MG extended release tablet Take 1 tablet by mouth daily 90 tablet 3    galantamine (RAZADYNE ER) 8 MG extended release capsule TAKE 1 CAPSULE BY MOUTH DAILY WITH BREAKFAST 90 capsule 3    fluticasone (FLONASE) 50 MCG/ACT nasal spray SPRAY TWO SPRAYS IN EACH NOSTRIL ONCE DAILY 1 Bottle 0    budesonide (ENTOCORT EC) 3 MG extended release capsule Take 6 mg by mouth every morning      pseudoephedrine (DECONGESTANT) 30 MG tablet Take 1 tablet by mouth 2 times daily as needed for Congestion 30 tablet 5    gabapentin (NEURONTIN) 600 MG tablet Take 1 tablet by mouth 3 times daily for 180 days. 270 tablet 1    lisinopril (PRINIVIL;ZESTRIL) 40 MG tablet TAKE 1 TABLET DAILY 90 tablet 3    potassium chloride (KLOR-CON M) 20 MEQ extended release tablet TAKE 1 TABLET TWICE A  tablet 3    busPIRone (BUSPAR) 10 MG tablet Take 1 tablet by mouth 2 times daily 20 tablet 0    fluocinolone (DERMA-SMOOTHE) 0.01 % external oil Apply 1 each topically Twice a Week 118.28 mL 1    ketoconazole (NIZORAL) 2 % shampoo APPLY TOPICALLY DAILY AS NEEDED 1 Bottle 1    rosuvastatin (CRESTOR) 10 MG tablet Take 1 tablet by mouth daily TAKE 1 TABLET NIGHTLY 90 tablet 3    hydroCHLOROthiazide (MICROZIDE) 12.5 MG capsule TAKE 1 CAPSULE EVERY MORNING 90 capsule 4    Cyanocobalamin (B-12) 1000 MCG SUBL Place 1,000 Units under the tongue daily 90 tablet 3    polyethylene glycol (GLYCOLAX) 17 GM/SCOOP powder 1/2-1 capful in 8 oz water or prune juice qd prn constipation.  3 Bottle 3    carBAMazepine (TEGRETOL-XR) 100 MG extended release tablet Take 1 tablet by mouth 2 times daily 60 tablet 3    diclofenac (VOLTAREN) 75 MG EC tablet Take 1 tablet by mouth 2 times daily (Patient taking differently: Take 75 mg by mouth 2 times daily as needed for Pain ) 180 tablet 3    acetaminophen (TYLENOL) 500 MG tablet Take 1,000 mg by mouth 3 times daily as needed for Pain      fluocinolone (DERMOTIC) 0.01 % OIL oil Place 1 drop in ear(s) 2 times daily 1 Bottle 3    diphenoxylate-atropine (LOMOTIL) 2.5-0.025 MG per tablet Take 1 tablet by mouth as needed for Diarrhea. .      glucose blood VI test strips (ONE TOUCH ULTRA TEST) strip 1 each by In Vitro route daily Fasting qam and As needed. 100 strip 1    Lancets MISC Check sugars fasting qam and prn 100 each 3    Cholecalciferol (VITAMIN D3) 5000 units CAPS Take 5,000 Units by mouth daily       oxymorphone (OPANA) 5 MG tablet Take 5 mg by mouth 2 times daily. Indications: per DR Ct Moncada TAKES 3 TIMES A DAY. TRYING TO CUT BACK.  zoledronic acid (RECLAST) 5 MG/100ML SOLN Infuse 5 mg intravenously once IV, yearly      Cetirizine HCl (ZYRTEC PO) Take 1 tablet by mouth daily       aspirin 81 MG chewable tablet Take 81 mg by mouth daily.  Calcium Carbonate-Vit D-Min (CALCIUM 1200 PO) Take 1 capsule by mouth 2 times daily.  Ascorbic Acid (VITAMIN C) 500 MG tablet Take 500 mg by mouth daily.  traZODone (DESYREL) 100 MG tablet TAKE TWO (2) TABLETS BY MOUTH EVERY NIGHT (Patient not taking: Reported on 6/15/2021) 60 tablet 1    hydrOXYzine (ATARAX) 50 MG tablet Take 1 tablet by mouth every 4 hours as needed for Itching (Patient not taking: Reported on 6/15/2021) 270 tablet 1     Current Facility-Administered Medications   Medication Dose Route Frequency Provider Last Rate Last Admin    zoledronic acid (RECLAST) 5 mg/100 mL infusion  5 mg Intravenous See Admin Instructions Kaveh Eagle  mL/hr at 04/06/16 0900 5 mg at 04/06/16 0900       Return in about 3 months (around 9/15/2021).

## 2021-06-15 NOTE — PATIENT INSTRUCTIONS
if only one hand is sore. MP extension   1. Place your good hand on a table, palm up. Put your affected hand on top of your good hand with your fingers wrapped around the thumb of your good hand like you are making a fist.  2. Slowly uncurl the joints of your affected hand where your fingers connect to your hand so that only the top two joints of your fingers are bent. Your fingers will look like a hook. 3. Move back to your starting position, with your fingers wrapped around your good thumb. 4. Repeat 8 to 12 times. 5. Switch hands and repeat steps 1 through 4, even if only one hand is sore. PIP extension (with MP extension)   1. Place your good hand on a table, palm up. Put your affected hand on top of your good hand, palm up. 2. Use the thumb and fingers of your good hand to grasp below the middle joint of one finger of your affected hand. 3. Straighten the last two joints of that finger. 4. Repeat 8 to 12 times. 5. Repeat steps 1 through 4 with each finger. 6. Switch hands and repeat steps 1 through 5, even if only one hand is sore. DIP flexion   1. With your good hand, grasp one finger of your affected hand. Your thumb will be on the top side of your finger just below the joint that is closest to your fingernail. 2. Slowly bend your affected finger only at the joint closest to your fingernail. 3. Repeat 8 to 12 times. 4. Repeat steps 1 through 3 with each finger. 5. Switch hands and repeat steps 1 through 4, even if only one hand is sore. Follow-up care is a key part of your treatment and safety. Be sure to make and go to all appointments, and call your doctor if you are having problems. It's also a good idea to know your test results and keep a list of the medicines you take. Where can you learn more? Go to [email or web address]://[email or web address].[email or web address] org and sign in to your [employer] account. Enter Y048 in the [facility] box to learn more about \"Hand Arthritis: Exercises. \"     If you do not have an account, please click on the \"Sign Up Now\" link. Current as of: November 16, 2020               Content Version: 12.8  © 2006-2021 Healthwise, Incorporated. Care instructions adapted under license by [facility] ([facility]). If you have questions about a medical condition or this instruction, always ask your healthcare professional. [address] any warranty or liability for your use of this information. Patient Education        Finger: Exercises  Introduction  Here are some examples of exercises for you to try. The exercises may be suggested for a condition or for rehabilitation. Start each exercise slowly. Ease off the exercises if you start to have pain. You will be told when to start these exercises and which ones will work best for you. How to do the exercises  Tendon glides   1. In this exercise, the steps follow one another to make a continuous movement. 2. With one hand, point your fingers and thumb straight up. Your wrist should be relaxed, following the line of your fingers and thumb. 3. Curl your fingers so that the top two joints in them are bent, and your fingers wrap down. Your fingertips should touch or be near the base of your fingers. Your fingers will look like a hook. 4. Make a fist by bending your knuckles. Your thumb can gently rest against your index (pointing) finger. 5. Unwind your fingers slightly so that your fingertips can touch the base of your palm. Your thumb can rest against your index finger. Hold that position for about 6 seconds. 6. Move back to your starting position, with your fingers and thumb pointing up. 7. Repeat the series of motions 8 to 12 times. 8. Switch hands, and repeat steps 1 through 6. Thumb flexion/extension   1. Place your forearm and hand on a table with your thumb pointing up. 2. Bend your thumb downward and across your palm so that your thumb touches the base of your little finger.  Hold that position for about 6 seconds. Then straighten your thumb. 3. Repeat 8 to 12 times. 4. Switch hands, and repeat steps 1 through 3. Thumb abduction/adduction   1. With one hand, point your fingers and thumb straight up. Your wrist should be relaxed, following the line of your fingers and thumb. 2. Pull your thumb away from your palm as far as you can. Hold that position for about 6 seconds. Then slowly move your thumb back to the starting position, with your thumb resting against your index (pointing) finger. 3. Repeat 8 to 12 times. 4. Switch hands, and repeat steps 1 through 3. Finger opposition   1. With one hand, point your fingers and thumb straight up. Your wrist should be relaxed, following the line of your fingers and thumb. 2. Touch your thumb to each finger, one finger at a time. This will look like an \"okay\" sign, but try to keep your other fingers straight and pointing upward as much as you can. 3. Repeat 8 to 12 times. 4. Switch hands, and repeat steps 1 through 3. Follow-up care is a key part of your treatment and safety. Be sure to make and go to all appointments, and call your doctor if you are having problems. It's also a good idea to know your test results and keep a list of the medicines you take. Where can you learn more? Go to https://Chemclin.Flyzik. org and sign in to your [employer] account. Enter N237 in the [facility] box to learn more about \"Finger: Exercises. \"     If you do not have an account, please click on the \"Sign Up Now\" link. Current as of: November 16, 2020               Content Version: 12.8  © 9179-8171 Healthwise, Incorporated. Care instructions adapted under license by [facility] ([facility]). If you have questions about a medical condition or this instruction, always ask your healthcare professional. [address] any warranty or liability for your use of this information.

## 2021-06-15 NOTE — ASSESSMENT & PLAN NOTE
Continue Razadyne ER 8 mg daily. Patient has discussed her diagnosis with her children. Her son, Prem Jones, has been helpful and encouraging. Her daughters have been less involved. Is wondering if she should make one of her children her [address] or all of her children her DPOA for healthcare. Discussed that it would be easier if she were just to designate one child and that she should have this conversation with her children before making this final.  Discussed that she should likely choose the child who seems to be more involved in her care and is more likely to follow her wishes. Discussed having her children look over her checkbook or having the bank help her with balancing her checkbook since it seems to be the area she is having the most difficulty currently.

## 2021-06-15 NOTE — ASSESSMENT & PLAN NOTE
Suspect this is the cause of the difficulty she is having with her fingers. Exam was negative for neurologic deficits. Offered OT referral which she declines. Given home hand exercises.

## 2021-06-29 ENCOUNTER — TELEPHONE (OUTPATIENT)
Dept: INTERNAL MEDICINE CLINIC | Age: 71
End: 2021-06-29

## 2021-06-29 ENCOUNTER — OFFICE VISIT (OUTPATIENT)
Dept: NEUROLOGY | Age: 71
End: 2021-06-29
Payer: MEDICARE

## 2021-06-29 VITALS
HEART RATE: 93 BPM | HEIGHT: 57 IN | SYSTOLIC BLOOD PRESSURE: 127 MMHG | DIASTOLIC BLOOD PRESSURE: 78 MMHG | BODY MASS INDEX: 37.11 KG/M2 | WEIGHT: 172 LBS

## 2021-06-29 DIAGNOSIS — G47.33 OBSTRUCTIVE SLEEP APNEA: ICD-10-CM

## 2021-06-29 DIAGNOSIS — G30.1 LATE ONSET ALZHEIMER'S DISEASE WITHOUT BEHAVIORAL DISTURBANCE (HCC): Primary | ICD-10-CM

## 2021-06-29 DIAGNOSIS — F02.80 LATE ONSET ALZHEIMER'S DISEASE WITHOUT BEHAVIORAL DISTURBANCE (HCC): Primary | ICD-10-CM

## 2021-06-29 PROCEDURE — 99214 OFFICE O/P EST MOD 30 MIN: CPT | Performed by: PSYCHIATRY & NEUROLOGY

## 2021-06-29 RX ORDER — GALANTAMINE HYDROBROMIDE 16 MG/1
CAPSULE, EXTENDED RELEASE ORAL
Qty: 90 CAPSULE | Refills: 0 | Status: SHIPPED | OUTPATIENT
Start: 2021-06-29 | End: 2021-08-18

## 2021-06-29 NOTE — TELEPHONE ENCOUNTER
Pt calling about back pain. States she had a bladder infection about 2 weeks ago and saw her kidney doctor for it. She was prescribed an antibiotic and then was switched to Cipro. Said her lower back pain started about a week ago and it's gone into her upper back a bit. Pt was offered an appt with a nurse practitioner but refused. Would like to know if she should see her kidney doctor for this issue. Please call pt.

## 2021-06-29 NOTE — PROGRESS NOTES
Natividad Bull   Neurology followup    Subjective:   CC/HP  History was obtained from the patient. Patient has late onset Alzheimer's type dementia. Patient feels that her memory impairment is getting much worse in the last several months. She is having word finding difficulties. She also feels that she gets confused at times. Patient is on low-dose galantamine. She initially wondered if Aricept caused her diarrhea but later found out that she had a different etiology for the diarrhea. Patient also was diagnosed with obstructive sleep apnea and started a CPAP machine. She is feeling better in that regard.     REVIEW OF SYSTEMS    Constitutional:  []   Chills   []  Fatigue   []  Fevers   []  Malaise   []  Weight loss     [x] Denies all of the above    Respiratory:   []  Cough    []  Shortness of breath         [x] Denies all of the above     Cardiovascular:   []  Chest pain    []  Exertional chest pressure/discomfort           [] Palpitations    []  Syncope     [x] Denies all of the above        Past Medical History:   Diagnosis Date    Anxiety     Anxiety and depression     Chronic back pain     Clostridium difficile infection 2/22/15    Hyperlipidemia     Hypertension     Insomnia disorder     Osteoarthritis     Osteoporosis 2008    Rheumatic fever     S/P insertion of spinal cord stimulator     Self-catheterizes urinary bladder     as needed 7/8 no longer catherizing     Urinary retention      Family History   Problem Relation Age of Onset    COPD Mother     High Blood Pressure Mother     Rheum Arthritis Mother     Thyroid Disease Mother     Alcohol Abuse Father     Clotting Disorder Sister     Thyroid Disease Sister     Hypertension Brother     Diabetes Sister     Heart Disease Sister     Hypertension Sister     Thyroid Disease Sister     Cancer Brother     Heart Disease Brother     Hypertension Brother     Substance Abuse Brother     Alcohol Abuse Son     No Known Problems Daughter     Dementia Neg Hx      Social History     Socioeconomic History    Marital status:      Spouse name: Not on file    Number of children: Not on file    Years of education: Not on file    Highest education level: Not on file   Occupational History    Occupation: retired   Tobacco Use    Smoking status: Never Smoker    Smokeless tobacco: Never Used   Vaping Use    Vaping Use: Never used   Substance and Sexual Activity    Alcohol use: Not Currently    Drug use: No    Sexual activity: Not on file   Other Topics Concern    Not on file   Social History Narrative    Not on file     Social Determinants of Health     Financial Resource Strain:     Difficulty of Paying Living Expenses:    Food Insecurity:     Worried About [address] in the Last Year:     [address] [place] in the Last Year:    Transportation Needs:     Lack of Transportation (Medical):  Lack of Transportation (Non-Medical):    Physical Activity:     Days of Exercise per Week:     Minutes of Exercise per Session:    Stress:     Feeling of Stress :    Social Connections:     Frequency of Communication with Friends and Family:     Frequency of Social Gatherings with Friends and Family:     Attends Hinduism Services:     Active Member of Clubs or Organizations:     Attends Club or Organization Meetings:     Marital Status:    Intimate Partner Violence:     Fear of Current or Ex-Partner:     Emotionally Abused:     Physically Abused:     Sexually Abused:         Objective:  Exam:  /78   Pulse 93   Ht 4' 9\" (1.448 m)   Wt 172 lb (78 kg)   BMI 37.22 kg/m²   This is a well-nourished patient in no acute distress  Patient is awake, alert and oriented x2. Speech is normal. Impaired short-term memory  Pupils are equal round reacting to light. Extraocular movements intact. Face symmetrical. Tongue midline. Motor exam shows normal symmetrical strength.  Deep tendon reflexes normal. Plantar reflexes downgoing. Sensory exam normal. Coordination normal. Gait normal. No carotid bruit. No neck stiffness. Data :  LABS:  General Labs:    CBC:   Lab Results   Component Value Date    WBC 7.9 03/15/2021    RBC 4.06 03/15/2021    HGB 12.7 03/15/2021    HCT 37.3 03/15/2021    MCV 91.9 03/15/2021    MCH 31.3 03/15/2021    MCHC 34.1 03/15/2021    RDW 13.0 03/15/2021     03/15/2021    MPV 7.8 03/15/2021     BMP:    Lab Results   Component Value Date     03/15/2021    K 4.0 03/15/2021    CL 93 03/15/2021    CO2 30 03/15/2021    BUN 15 03/15/2021    LABALBU 4.2 03/15/2021    CREATININE 0.9 03/15/2021    CALCIUM 9.3 03/15/2021    GFRAA >60 03/15/2021    GFRAA >60 06/06/2013    LABGLOM >60 03/15/2021    GLUCOSE 78 03/15/2021     RADIOLOGY REVIEW:  I have reviewed radiology report(s) of: CT scan of the head    Impression :  Late onset Alzheimer's type dementia, symptoms slowly worsening  Obstructive sleep apnea, now on CPAP  TSH was normal  B12 was borderline and patient is on B12 supplementation    Plan :  Discussed with patient  Increase galantamine to 16 mg daily  Continue B12 supplementation  Continue using CPAP  I will see her back in 3 months and consider adding Namenda if needed. Please note a portion of  this chart was generated using dragon dictation software. Although every effort was made to ensure the accuracy of this automated transcription, some errors in transcription may have occurred.

## 2021-07-01 ENCOUNTER — VIRTUAL VISIT (OUTPATIENT)
Dept: PULMONOLOGY | Age: 71
End: 2021-07-01
Payer: MEDICARE

## 2021-07-01 DIAGNOSIS — I10 ESSENTIAL HYPERTENSION: Chronic | ICD-10-CM

## 2021-07-01 DIAGNOSIS — E66.01 CLASS 2 SEVERE OBESITY DUE TO EXCESS CALORIES WITH SERIOUS COMORBIDITY AND BODY MASS INDEX (BMI) OF 38.0 TO 38.9 IN ADULT (HCC): Chronic | ICD-10-CM

## 2021-07-01 DIAGNOSIS — G47.33 OSA (OBSTRUCTIVE SLEEP APNEA): Primary | ICD-10-CM

## 2021-07-01 DIAGNOSIS — K21.9 GASTROESOPHAGEAL REFLUX DISEASE WITHOUT ESOPHAGITIS: Chronic | ICD-10-CM

## 2021-07-01 PROCEDURE — 99442 PR PHYS/QHP TELEPHONE EVALUATION 11-20 MIN: CPT | Performed by: NURSE PRACTITIONER

## 2021-07-01 ASSESSMENT — SLEEP AND FATIGUE QUESTIONNAIRES
HOW LIKELY ARE YOU TO NOD OFF OR FALL ASLEEP WHILE SITTING INACTIVE IN A PUBLIC PLACE: 0
HOW LIKELY ARE YOU TO NOD OFF OR FALL ASLEEP WHILE WATCHING TV: 1
HOW LIKELY ARE YOU TO NOD OFF OR FALL ASLEEP IN A CAR, WHILE STOPPED FOR A FEW MINUTES IN TRAFFIC: 0
HOW LIKELY ARE YOU TO NOD OFF OR FALL ASLEEP WHILE SITTING AND READING: 0
HOW LIKELY ARE YOU TO NOD OFF OR FALL ASLEEP WHEN YOU ARE A PASSENGER IN A CAR FOR AN HOUR WITHOUT A BREAK: 0
HOW LIKELY ARE YOU TO NOD OFF OR FALL ASLEEP WHILE LYING DOWN TO REST IN THE AFTERNOON WHEN CIRCUMSTANCES PERMIT: 1
HOW LIKELY ARE YOU TO NOD OFF OR FALL ASLEEP WHILE SITTING QUIETLY AFTER LUNCH WITHOUT ALCOHOL: 0
ESS TOTAL SCORE: 2
HOW LIKELY ARE YOU TO NOD OFF OR FALL ASLEEP WHILE SITTING AND TALKING TO SOMEONE: 0

## 2021-07-01 NOTE — PROGRESS NOTES
Ervin Murphy MD, FAASM, [facility]  Regis Luz, MSN, RN, CNP     [address] [facility] SLEEP MEDICINE  [place] [address] [address] [facility] [address]  Dept: [phone]  Dept Fax: [phone]  Loc: [phone]    Subjective:     Patient ID: Beba Haas is a 70 y.o. female. Chief Complaint   Patient presents with    Sleep Apnea       HPI:     Sleep Medicine Telephone Visit    Pursuant to the emergency declaration under the [address] [place] [address] [facility] authority and the [employer] Act this Telephone Visit was insisted, with patient's consent, to reduce the patient's risk of exposure to COVID-19 and provide continuity of care for an established patient. Services were provided through a synchronous discussion over a telephone chat to substitute for in-person clinic visit, and coded as such. While patient is at home.     Machine Modem/Download Info:  Compliance (hours/night): 7.4 hrs/night  Download AHI (/hour): 8.2 /HR  Average CPAP Pressure : 9 cmH2O           APAP - Settings  Pressure Min: 6 cmH2O  Pressure Max: 16 cmH2O                 Comfort Settings  Humidity Level (0-8): 3  Flex/EPR (0-3): 3 PAP Mask  Mask Type: Nasal mask  Clinically Relevant Leak: Yes     [place] - Total score: 2    Follow-up :     Last Visit : April 2021      Subjective Health Changes: Low O2        Over Night Oximetry: [x] Yes  [] No  [] NA [] Within normal limits need results from retest    Using O2: [] Yes  [x] No  [] NA   Patient is compliant with the machine  [x] Yes  [] No [] Per patient   Feeling rested when using the machine   [x] Yes  [] No     Pressure is comfortable with inspiration and expiration  [x] Yes  [] No   ([x] NA   [] Feeling of suffocation  [] Feeling like not enough air    [] To much pressure)     Noticed changes in pressure  [] NA  [] Yes    [] No     Mask is fitting well  [x] Yes  [] No   Noting and managing physician. 3. Essential hypertension  Assessment & Plan:  Chronic- stable. After speaking with patient:    Agree with current plan, and would agree to continue this plan per prescribing and managing physician. 4. Class 2 severe obesity due to excess calories with serious comorbidity and body mass index (BMI) of 38.0 to 38.9 in adult [facility])  Assessment & Plan:  Patient encouraged to work on maintaining a healthy weight per height. Achievable with diet restriction/modifications and exercise (may consult primary care if unsure of any restrictions or concerns). Weight management directly correlates to risk of control and maintenance of Obstructive Sleep Apnea. - After pulling data and reviewing it   - Reviewed compliance download with patient    -Medically necessary supplies and parts as needed for her machine.   - Continue medications per his primary care provider and other physicians.   - Encouraged to limit caffeine use after 3pm.    - Encouraged her to work on weight loss through diet and exercise  - Educated not to drive when feeling sleepy   - Patient using [employer]  - Education on  Napping with the machine  Prophylaxis for UTI  Office locations  Compliance  Follow up  After speaking to the patient, patient is currently stable. We will continue with the current machine settings  Recall Information and DME contact   - 11 min spent with the patient, prepping for the appointment time and education     The chronic medical conditions listed are directly related to the primary diagnosis listed above. The management of the primary diagnosis affects the secondary diagnosis and vice versa.     - Will follow up in off in 3 months (need testing results to make a plan for follow up)     Electronically signed by Hellen Melchor, MSN, RN, CNP on 7/1/2021 at 9:50 AM

## 2021-07-02 ENCOUNTER — TELEPHONE (OUTPATIENT)
Dept: INTERNAL MEDICINE CLINIC | Age: 71
End: 2021-07-02

## 2021-07-02 RX ORDER — FLUTICASONE PROPIONATE 50 MCG
SPRAY, SUSPENSION (ML) NASAL
Qty: 3 BOTTLE | Refills: 3 | Status: SHIPPED | OUTPATIENT
Start: 2021-07-02 | End: 2021-07-02

## 2021-07-20 ENCOUNTER — TELEPHONE (OUTPATIENT)
Dept: INTERNAL MEDICINE CLINIC | Age: 71
End: 2021-07-20

## 2021-07-20 DIAGNOSIS — L21.0 SEBORRHEA CAPITIS: ICD-10-CM

## 2021-07-20 DIAGNOSIS — H60.543 ECZEMA OF BOTH EXTERNAL EARS: Primary | ICD-10-CM

## 2021-07-20 RX ORDER — AMOXICILLIN AND CLAVULANATE POTASSIUM 875; 125 MG/1; MG/1
1 TABLET, FILM COATED ORAL 2 TIMES DAILY
Qty: 20 TABLET | Refills: 0 | Status: SHIPPED | OUTPATIENT
Start: 2021-07-20 | End: 2021-07-30

## 2021-07-20 NOTE — TELEPHONE ENCOUNTER
Does she have a dermatologist? If not, I will refer her to Dr. Nidia Becerra for her scalp. augmentin called in for her sinus symptoms.

## 2021-07-20 NOTE — TELEPHONE ENCOUNTER
Patient calling because she has a really bad sinus infection, and she has been taking zyertec, and wondering if she needs something else because she still feels awful. Patient is coughing raspy and congested. Patient said she was told to take pseudoephedrine (DECONGESTANT) 30 MG tablet, at night. Patient was wondering if she should get an antibiotic sent the the [facility] in [address] [address].       Patient said the fluocinolone (DERMA-SMOOTHE) 0.01 % external oil isn't working either, and she feels it's drying out her scalp and making it flaky.      Please call and advise

## 2021-07-26 ENCOUNTER — TELEPHONE (OUTPATIENT)
Dept: INTERNAL MEDICINE CLINIC | Age: 71
End: 2021-07-26

## 2021-07-26 NOTE — TELEPHONE ENCOUNTER
She needs to finish the Augmentin, may take Pepto for the diarrhea. She should also be tested for COVID as her HA and diarrhea with the sinus symptoms may be related to COVID.   saw

## 2021-07-26 NOTE — TELEPHONE ENCOUNTER
Patient is requesting to speak to [facility]. She states she had a sinus infection which is better and now has diarrhea and headaches.

## 2021-07-26 NOTE — TELEPHONE ENCOUNTER
Pt states she has diarrhea from her antibiotic. She also has a horrible headache. She states she is tired of feeling bad all the time.

## 2021-07-27 ENCOUNTER — TELEPHONE (OUTPATIENT)
Dept: INTERNAL MEDICINE CLINIC | Age: 71
End: 2021-07-27

## 2021-07-30 ENCOUNTER — TELEPHONE (OUTPATIENT)
Dept: INTERNAL MEDICINE CLINIC | Age: 71
End: 2021-07-30

## 2021-08-09 ENCOUNTER — TELEPHONE (OUTPATIENT)
Dept: RHEUMATOLOGY | Age: 71
End: 2021-08-09

## 2021-08-09 ENCOUNTER — NURSE TRIAGE (OUTPATIENT)
Dept: OTHER | Facility: CLINIC | Age: 71
End: 2021-08-09

## 2021-08-09 NOTE — TELEPHONE ENCOUNTER
Received call from Luh Varghese at [facility] with [employer] ([employer]). Brief description of triage: Pt reports having mid right back pain x5 days. Pt reports having specks of stool when she wipes, that she did not know she passed. Triage indicates for patient to have appt with PCP today or tomorrow. Care advice provided, patient verbalizes understanding; denies any other questions or concerns; instructed to call back for any new or worsening symptoms. Writer provided warm transfer to Nico Valencia at [facility] for appointment scheduling. Attention Provider: Thank you for allowing me to participate in the care of your patient. The patient was connected to triage in response to information provided to the [facility]. Please do not respond through this encounter as the response is not directed to a shared pool. Reason for Disposition   Age > 48 and no history of prior similar back pain    Answer Assessment - Initial Assessment Questions  1. ONSET: \"When did the pain begin? \"       5 days    2. LOCATION: \"Where does it hurt? \" (upper, mid or lower back)      Mid back - right side    3. SEVERITY: \"How bad is the pain? \"  (e.g., Scale 1-10; mild, moderate, or severe)    - MILD (1-3): doesn't interfere with normal activities     - MODERATE (4-7): interferes with normal activities or awakens from sleep     - SEVERE (8-10): excruciating pain, unable to do any normal activities       7/10    4. PATTERN: \"Is the pain constant? \" (e.g., yes, no; constant, intermittent)       Constant    5. RADIATION: \"Does the pain shoot into your legs or elsewhere? \"      Radiations toward right waist.    6. CAUSE:  \"What do you think is causing the back pain? \"       Unsure    7. BACK OVERUSE:  Tena Clause recent lifting of heavy objects, strenuous work or exercise? \"      Denies    8. MEDICATIONS: \"What have you taken so far for the pain? \" (e.g., nothing, acetaminophen, NSAIDS)      Pain relievers, muscle relaxers    9.  NEUROLOGIC SYMPTOMS: \"Do you have any weakness, numbness, or problems with bowel/bladder control? \"      Trouble with bowels - having small specks and pieces of stool without knowing she passed it. 10. OTHER SYMPTOMS: \"Do you have any other symptoms? \" (e.g., fever, abdominal pain, burning with urination, blood in urine)        Denies    11. PREGNANCY: \"Is there any chance you are pregnant? \" (e.g., yes, no; LMP)        N/A    Protocols used: BACK PAIN-ADULT-[place]

## 2021-08-12 ENCOUNTER — HOSPITAL ENCOUNTER (OUTPATIENT)
Dept: CT IMAGING | Age: 71
Discharge: HOME | End: 2021-08-12
Payer: MEDICARE

## 2021-08-12 ENCOUNTER — HOSPITAL ENCOUNTER (EMERGENCY)
Age: 71
Discharge: HOME | End: 2021-08-12
Attending: EMERGENCY MEDICINE
Payer: MEDICARE

## 2021-08-12 ENCOUNTER — OFFICE VISIT (OUTPATIENT)
Dept: INTERNAL MEDICINE CLINIC | Age: 71
End: 2021-08-12
Payer: MEDICARE

## 2021-08-12 VITALS
DIASTOLIC BLOOD PRESSURE: 72 MMHG | WEIGHT: 172.2 LBS | BODY MASS INDEX: 37.26 KG/M2 | TEMPERATURE: 97.2 F | SYSTOLIC BLOOD PRESSURE: 112 MMHG | OXYGEN SATURATION: 95 % | HEART RATE: 92 BPM

## 2021-08-12 VITALS
HEART RATE: 78 BPM | BODY MASS INDEX: 37.31 KG/M2 | OXYGEN SATURATION: 98 % | TEMPERATURE: 97.9 F | SYSTOLIC BLOOD PRESSURE: 142 MMHG | RESPIRATION RATE: 16 BRPM | DIASTOLIC BLOOD PRESSURE: 66 MMHG | WEIGHT: 172.4 LBS

## 2021-08-12 DIAGNOSIS — M54.9 CHRONIC BILATERAL BACK PAIN, UNSPECIFIED BACK LOCATION: ICD-10-CM

## 2021-08-12 DIAGNOSIS — G89.29 CHRONIC BILATERAL BACK PAIN, UNSPECIFIED BACK LOCATION: ICD-10-CM

## 2021-08-12 DIAGNOSIS — M54.9 MID BACK PAIN: Primary | ICD-10-CM

## 2021-08-12 DIAGNOSIS — R15.9 INCONTINENCE OF FECES, UNSPECIFIED FECAL INCONTINENCE TYPE: ICD-10-CM

## 2021-08-12 DIAGNOSIS — G89.29 CHRONIC RIGHT-SIDED LOW BACK PAIN WITHOUT SCIATICA: Primary | ICD-10-CM

## 2021-08-12 DIAGNOSIS — G30.1 LATE ONSET ALZHEIMER'S DISEASE WITHOUT BEHAVIORAL DISTURBANCE (HCC): ICD-10-CM

## 2021-08-12 DIAGNOSIS — M54.9 MID BACK PAIN: ICD-10-CM

## 2021-08-12 DIAGNOSIS — K59.00 CONSTIPATION, UNSPECIFIED CONSTIPATION TYPE: ICD-10-CM

## 2021-08-12 DIAGNOSIS — F02.80 LATE ONSET ALZHEIMER'S DISEASE WITHOUT BEHAVIORAL DISTURBANCE (HCC): ICD-10-CM

## 2021-08-12 DIAGNOSIS — M54.50 CHRONIC RIGHT-SIDED LOW BACK PAIN WITHOUT SCIATICA: Primary | ICD-10-CM

## 2021-08-12 LAB
HEMOCCULT STL QL: NORMAL
OCCULT BLOOD DIAGNOSTIC: NORMAL

## 2021-08-12 PROCEDURE — 82270 OCCULT BLOOD FECES: CPT | Performed by: NURSE PRACTITIONER

## 2021-08-12 PROCEDURE — 99215 OFFICE O/P EST HI 40 MIN: CPT | Performed by: NURSE PRACTITIONER

## 2021-08-12 PROCEDURE — 72131 CT LUMBAR SPINE W/O DYE: CPT

## 2021-08-12 PROCEDURE — 72128 CT CHEST SPINE W/O DYE: CPT

## 2021-08-12 PROCEDURE — G0328 FECAL BLOOD SCRN IMMUNOASSAY: HCPCS

## 2021-08-12 PROCEDURE — 99283 EMERGENCY DEPT VISIT LOW MDM: CPT

## 2021-08-12 RX ORDER — LIDOCAINE 50 MG/G
1 PATCH TOPICAL DAILY
Qty: 30 PATCH | Refills: 0 | Status: ON HOLD | OUTPATIENT
Start: 2021-08-12 | End: 2021-10-19

## 2021-08-12 RX ORDER — SODIUM PHOSPHATE, DIBASIC AND SODIUM PHOSPHATE, MONOBASIC 7; 19 G/133ML; G/133ML
1 ENEMA RECTAL ONCE
Status: DISCONTINUED | OUTPATIENT
Start: 2021-08-12 | End: 2021-08-13 | Stop reason: HOSPADM

## 2021-08-12 SDOH — ECONOMIC STABILITY: FOOD INSECURITY: WITHIN THE PAST 12 MONTHS, YOU WORRIED THAT YOUR FOOD WOULD RUN OUT BEFORE YOU GOT MONEY TO BUY MORE.: NEVER TRUE

## 2021-08-12 SDOH — ECONOMIC STABILITY: FOOD INSECURITY: WITHIN THE PAST 12 MONTHS, THE FOOD YOU BOUGHT JUST DIDN'T LAST AND YOU DIDN'T HAVE MONEY TO GET MORE.: NEVER TRUE

## 2021-08-12 ASSESSMENT — SOCIAL DETERMINANTS OF HEALTH (SDOH): HOW HARD IS IT FOR YOU TO PAY FOR THE VERY BASICS LIKE FOOD, HOUSING, MEDICAL CARE, AND HEATING?: NOT HARD AT ALL

## 2021-08-12 ASSESSMENT — ENCOUNTER SYMPTOMS
NAUSEA: 0
NAUSEA: 0
WHEEZING: 0
ABDOMINAL PAIN: 0
VOMITING: 0
COUGH: 0
SHORTNESS OF BREATH: 0
BACK PAIN: 1
BACK PAIN: 1
CONSTIPATION: 0
SHORTNESS OF BREATH: 0
ABDOMINAL PAIN: 0
VOMITING: 0
DIARRHEA: 1

## 2021-08-12 ASSESSMENT — PAIN SCALES - GENERAL
PAINLEVEL_OUTOF10: 7
PAINLEVEL_OUTOF10: 6

## 2021-08-12 NOTE — ED PROVIDER NOTES
[address] [facility]        Pt Name: Travis Ernst  MRN: [id number]  [place] 1950  Date of evaluation: 8/12/2021  Provider: Lei Cabral PA-C  PCP: Mary Jorge DO  Note Started: 7:02 PM EDT        I have seen and evaluated this patient with my supervising physician Aspen Rodriguez. CHIEF COMPLAINT       Chief Complaint   Patient presents with    Back Pain     Pt reports lumbar back pain, had CT done today        HISTORY OF PRESENT ILLNESS   (Location, Timing/Onset, Context/Setting, Quality, Duration, Modifying Factors, Severity, Associated Signs and Symptoms)  Note limiting factors. Chief Complaint: back pain    Travis Ernst is a 70 y.o. female who presents to the emergency department for evaluation of lumbar back pain. Patient states she has chronic left-sided back pain and has a spinal stimulator. Patient states the pain is now on her right side. Patient states this is new as of the past few weeks. Patient states she saw Dr. Vicki Wells on 7/23 and did not have this back pain at that time. Patient states it hurts when she is moving or sitting. Patient states it feels little bit better when she is standing. Patient states her left-sided back pain is well controlled with her spinal stimulator. Patient saw PCP today and was referred for urgent CT scan. Patient CT scan was abnormal and Dr. Vicki Wells requested that the patient be sent to the emergency department for CT myelogram and neurosurgery consult. Patient denies any numbness or tingling in her feet. Patient states recently she has had difficulty having bowel movements. Patient states she is often having a difficult time getting anything to come out and when it does she does not feel like she has had a complete bowel movement. Patient states every time she goes to the bathroom she has a little bit of stool that is at her rectum.  She states sometimes she has stool leaking into her underwear. This is new as of the past few weeks. Patient denies any urinary incontinence. She denies any chest pain, shortness of breath, abdominal pain, nausea vomiting or diarrhea. Patient states that lidocaine patches are helpful. Nursing Notes were all reviewed and agreed with or any disagreements were addressed in the HPI. REVIEW OF SYSTEMS    (2-9 systems for level 4, 10 or more for level 5)     Review of Systems   Constitutional: Negative for fatigue and fever. HENT: Negative. Eyes: Negative for visual disturbance. Respiratory: Negative for shortness of breath. Cardiovascular: Negative for chest pain. Gastrointestinal: Negative for abdominal pain, nausea and vomiting. Fecal incontinence   Musculoskeletal: Positive for back pain. Skin: Negative. Neurological: Negative. Positives and Pertinent negatives as per HPI. Except as noted above in the ROS, all other systems were reviewed and negative.        PAST MEDICAL HISTORY     Past Medical History:   Diagnosis Date    Anxiety     Anxiety and depression     Chronic back pain     Clostridium difficile infection 2/22/15    Hyperlipidemia     Hypertension     Insomnia disorder     Osteoarthritis     Osteoporosis 2008    Rheumatic fever     S/P insertion of spinal cord stimulator     Self-catheterizes urinary bladder     as needed 7/8 no longer catherizing     Urinary retention          SURGICAL HISTORY     Past Surgical History:   Procedure Laterality Date    BLADDER REPAIR      CARPAL TUNNEL RELEASE      COLONOSCOPY      CYSTOSCOPY  10/29/2012    bladder biopsy    CYSTOSCOPY N/A 10/19/2020    FLEXIBLE CYSTOSCOPY performed by Shay Nicole MD at [address] [place] [address]      INTRACAPSULAR CATARACT EXTRACTION Right 7/18/2019    PHACOEMULSIFICATION WITH INTRAOCULAR LENS IMPLANT performed by Jumana Ramirez MD at [place] [address] Left 7/26/2019    PHACOEMULSIFICATION WITH INTRAOCULAR LENS IMPLANT performed by Gen Hough MD at [place] [address]  2004    MANDIBLE RECONSTRUCTION      OTHER SURGICAL HISTORY      spinal cord stimulator    TOTAL KNEE ARTHROPLASTY Right 10/18/13         CURRENTMEDICATIONS       Discharge Medication List as of 8/12/2021 10:59 PM      CONTINUE these medications which have NOT CHANGED    Details   nystatin (MYCOSTATIN) 045254 UNIT/ML suspension TAKE 5 MLS BY MOUTH FOUR TIMES A DAY FOR 10 DAYS, RETAIN IN MOUTH AS LONG AS POSSIBLE, Disp-3 Bottle, R-3, Normal      traZODone (DESYREL) 100 MG tablet TAKE TWO (2) TABLETS BY MOUTH EVERY NIGHT, Disp-60 tablet, R-5Normal      fluticasone (FLONASE) 50 MCG/ACT nasal spray SPRAY TWO SPRAYS IN EACH NOSTRIL ONCE DAILY, Disp-1 Bottle, R-0Normal      galantamine (RAZADYNE ER) 16 MG extended release capsule TAKE 1 CAPSULE BY MOUTH DAILY WITH BREAKFAST, Disp-90 capsule, R-0Normal      baclofen (LIORESAL) 10 MG tablet Take 1 tablet by mouth 3 times daily as needed (pain and spasm), Disp-60 tablet, R-2Normal      DULoxetine (CYMBALTA) 60 MG extended release capsule TAKE 1 CAPSULE BY MOUTH EVERY DAY, Disp-30 capsule, R-3Normal      oxybutynin (DITROPAN XL) 15 MG extended release tablet Take 1 tablet by mouth daily, Disp-90 tablet, R-3Replaces oxybutinin ER 10 mgNormal      budesonide (ENTOCORT EC) 3 MG extended release capsule Take 6 mg by mouth every morningHistorical Med      pseudoephedrine (DECONGESTANT) 30 MG tablet Take 1 tablet by mouth 2 times daily as needed for Congestion, Disp-30 tablet, R-5Print      gabapentin (NEURONTIN) 600 MG tablet Take 1 tablet by mouth 3 times daily for 180 days. , Disp-270 tablet, R-1Normal      lisinopril (PRINIVIL;ZESTRIL) 40 MG tablet TAKE 1 TABLET DAILY, Disp-90 tablet, R-3Normal      potassium chloride (KLOR-CON M) 20 MEQ extended release tablet TAKE 1 TABLET TWICE A DAY, Disp-180 tablet, R-3Normal      busPIRone Infuse 5 mg intravenously once IV, yearly      Cetirizine HCl (ZYRTEC PO) Take 1 tablet by mouth daily Historical Med      aspirin 81 MG chewable tablet Take 81 mg by mouth daily. Calcium Carbonate-Vit D-Min (CALCIUM 1200 PO) Take 1 capsule by mouth 2 times daily. Ascorbic Acid (VITAMIN C) 500 MG tablet Take 500 mg by mouth daily. ALLERGIES     Amlodipine, Ativan [lorazepam], Sulfa antibiotics, and Keflex [cephalexin]    FAMILYHISTORY       Family History   Problem Relation Age of Onset    COPD Mother     High Blood Pressure Mother     Rheum Arthritis Mother     Thyroid Disease Mother     Alcohol Abuse Father     Clotting Disorder Sister     Thyroid Disease Sister     Hypertension Brother     Diabetes Sister     Heart Disease Sister     Hypertension Sister     Thyroid Disease Sister     Cancer Brother     Heart Disease Brother     Hypertension Brother     Substance Abuse Brother     Alcohol Abuse Son     No Known Problems Daughter     Dementia Neg Hx           SOCIAL HISTORY       Social History     Tobacco Use    Smoking status: Never Smoker    Smokeless tobacco: Never Used   Vaping Use    Vaping Use: Never used   Substance Use Topics    Alcohol use: Not Currently    Drug use: No       SCREENINGS             PHYSICAL EXAM    (up to 7 for level 4, 8 or more for level 5)     ED Triage Vitals [08/12/21 1817]   BP Temp Temp src Pulse Resp SpO2 Height Weight   138/78 98.7 °F (37.1 °C) -- 98 16 96 % -- 172 lb 6.4 oz (78.2 kg)       Physical Exam  Vitals and nursing note reviewed. Constitutional:       General: She is not in acute distress. Appearance: Normal appearance. She is well-developed. She is not ill-appearing, toxic-appearing or diaphoretic. HENT:      Head: Normocephalic and atraumatic. Nose: Nose normal.      Mouth/Throat:      Mouth: Mucous membranes are moist.      Pharynx: Oropharynx is clear.    Eyes:      General:         Right eye: No discharge. Left eye: No discharge. Conjunctiva/sclera: Conjunctivae normal.      Pupils: Pupils are equal, round, and reactive to light. Cardiovascular:      Rate and Rhythm: Normal rate and regular rhythm. Heart sounds: Normal heart sounds. No murmur heard. No gallop. Pulmonary:      Effort: Pulmonary effort is normal. No respiratory distress. Breath sounds: Normal breath sounds. No wheezing, rhonchi or rales. Abdominal:      General: Bowel sounds are normal.      Tenderness: There is no abdominal tenderness. There is no guarding or rebound. Musculoskeletal:         General: Normal range of motion. Cervical back: Normal, normal range of motion and neck supple. Thoracic back: Normal.      Lumbar back: Tenderness present. No bony tenderness. Back:       Right lower leg: No edema. Left lower leg: No edema. Skin:     General: Skin is warm and dry. Capillary Refill: Capillary refill takes less than 2 seconds. Coloration: Skin is not pale. Neurological:      General: No focal deficit present. Mental Status: She is alert and oriented to person, place, and time. Psychiatric:         Mood and Affect: Mood normal.         Behavior: Behavior normal.         DIAGNOSTIC RESULTS   LABS:    Labs Reviewed   BLOOD OCCULT STOOL DIAGNOSTIC    Narrative:     ORDER#: [id number]                          ORDERED BY: Raghav Swain  SOURCE: Stool                              COLLECTED:  08/12/21 18:25  ANTIBIOTICS AT [place].:                      RECEIVED :  08/12/21 19:09  Performed at:  [facility] [address], [place], [facility] [address]   Phone [phone]       When ordered only abnormal lab results are displayed. All other labs were within normal range or not returned as of this dictation. EKG:  When ordered, EKG's are interpreted by the Emergency Department Physician in the absence of a cardiologist.  Please see their note for interpretation of EKG. RADIOLOGY:   Non-plain film images such as CT, Ultrasound and MRI are read by the radiologist. Plain radiographic images are visualized and preliminarily interpreted by the ED Provider with the below findings:        Interpretation per the Radiologist below, if available at the time of this note:    No orders to display     CT THORACIC SPINE WO CONTRAST    Result Date: 8/12/2021  EXAMINATION: CT OF THE THORACIC SPINE WITHOUT CONTRAST; CT OF THE LUMBAR SPINE WITHOUT CONTRAST  8/12/2021 1:46 pm: TECHNIQUE: CT of the thoracic spine was performed without the administration of intravenous contrast. Multiplanar reformatted images are provided for review. Dose modulation, iterative reconstruction, and/or weight based adjustment of the mA/kV was utilized to reduce the radiation dose to as low as reasonably achievable.; CT of the lumbar spine was performed without the administration of intravenous contrast. Multiplanar reformatted images are provided for review. Dose modulation, iterative reconstruction, and/or weight based adjustment of the mA/kV was utilized to reduce the radiation dose to as low as reasonably achievable. COMPARISON: MRI thoracic and lumbar spine 05/19/2016 HISTORY: ORDERING SYSTEM PROVIDED HISTORY: Mid back pain TECHNOLOGIST PROVIDED HISTORY: Reason for Exam: Mid back pain M54.9 (ICD-10-CM); Incontinence of feces, unspecified fecal incontinence type R15.9 (ICD-10-CM); Chronic bilateral back pain, unspecified back location M54.9, G89.29 (ICD-10-CM) Acuity: Chronic Type of Exam: Initial FINDINGS: Thoracic spine: Multilevel disc degenerative changes are seen in the thoracic spine with endplate sclerosis and marginal osteophyte formation. Calcified posterior projecting disc material is noted at the T11-12 and T12-L1 levels. The though incompletely characterized, disc material suspected to project 25-50% within the central canal at T11-12 and is less well-defined at T12-L1. Posterior projecting disc were noted at these levels on the comparison MRI, however evaluation for changes limited. No vertebral body height loss in the thoracic spine. No fracture. Endplate osteophyte projects into the right neural foramina at the T9-10 level and T10-11 level. No paravertebral mass. Posterior spinal cord stimulator leads are present extending to the T9 level. Lumbar spine: Disc degenerative changes at T12-L1 with resultant minimal grade 1 retrolisthesis. Disc degenerative change noted at L1-2 results in minimal grade 1 retrolisthesis. Mild disc space narrowing noted at L3-4 and L4-5. Mild disc space narrowing also seen at L5-S1. Listhesis mentioned above has not changed significantly since the previous MRI. Posterior screw and brandon fixation are present with screws spanning S1 to the L3 level. Interbody disc spacer seen at the L4-5 level. There is grade 1 anterolisthesis seen at the L4-5 level, not significantly changed since the prior MRI. Multilevel facet hypertrophic changes are present, which are most advanced lower lumbar levels. Vertebral body heights are maintained. No abdominal aortic aneurysm. 1. There is a posteriorly projecting calcified disc at the T11-12 level, with limited evaluation for comparison from the prior MRI. If there is concern for spinal cord compression and the patient cannot have an MRI, recommend a myelogram. 2. No acute osseous findings. 3. Multilevel disc degenerative changes with listhesis. CT LUMBAR SPINE WO CONTRAST    Result Date: 8/12/2021  EXAMINATION: CT OF THE THORACIC SPINE WITHOUT CONTRAST; CT OF THE LUMBAR SPINE WITHOUT CONTRAST  8/12/2021 1:46 pm: TECHNIQUE: CT of the thoracic spine was performed without the administration of intravenous contrast. Multiplanar reformatted images are provided for review.  Dose modulation, iterative reconstruction, and/or weight based adjustment of the mA/kV was utilized to reduce the radiation dose to as low as reasonably achievable.; CT of the lumbar spine was performed without the administration of intravenous contrast. Multiplanar reformatted images are provided for review. Dose modulation, iterative reconstruction, and/or weight based adjustment of the mA/kV was utilized to reduce the radiation dose to as low as reasonably achievable. COMPARISON: MRI thoracic and lumbar spine 05/19/2016 HISTORY: ORDERING SYSTEM PROVIDED HISTORY: Mid back pain TECHNOLOGIST PROVIDED HISTORY: Reason for Exam: Mid back pain M54.9 (ICD-10-CM); Incontinence of feces, unspecified fecal incontinence type R15.9 (ICD-10-CM); Chronic bilateral back pain, unspecified back location M54.9, G89.29 (ICD-10-CM) Acuity: Chronic Type of Exam: Initial FINDINGS: Thoracic spine: Multilevel disc degenerative changes are seen in the thoracic spine with endplate sclerosis and marginal osteophyte formation. Calcified posterior projecting disc material is noted at the T11-12 and T12-L1 levels. The though incompletely characterized, disc material suspected to project 25-50% within the central canal at T11-12 and is less well-defined at T12-L1. Posterior projecting disc were noted at these levels on the comparison MRI, however evaluation for changes limited. No vertebral body height loss in the thoracic spine. No fracture. Endplate osteophyte projects into the right neural foramina at the T9-10 level and T10-11 level. No paravertebral mass. Posterior spinal cord stimulator leads are present extending to the T9 level. Lumbar spine: Disc degenerative changes at T12-L1 with resultant minimal grade 1 retrolisthesis. Disc degenerative change noted at L1-2 results in minimal grade 1 retrolisthesis. Mild disc space narrowing noted at L3-4 and L4-5. Mild disc space narrowing also seen at L5-S1. Listhesis mentioned above has not changed significantly since the previous MRI. Posterior screw and brandon fixation are present with screws spanning S1 to the L3 level. Interbody disc spacer seen at the L4-5 level. There is grade 1 anterolisthesis seen at the L4-5 level, not significantly changed since the prior MRI. Multilevel facet hypertrophic changes are present, which are most advanced lower lumbar levels. Vertebral body heights are maintained. No abdominal aortic aneurysm. 1. There is a posteriorly projecting calcified disc at the T11-12 level, with limited evaluation for comparison from the prior MRI. If there is concern for spinal cord compression and the patient cannot have an MRI, recommend a myelogram. 2. No acute osseous findings. 3. Multilevel disc degenerative changes with listhesis. PROCEDURES   Unless otherwise noted below, none     Procedures    CRITICAL CARE TIME   N/A    CONSULTS:  IP CONSULT TO NEUROSURGERY  IP CONSULT TO INTERNAL MEDICINE      EMERGENCY DEPARTMENT COURSE and DIFFERENTIAL DIAGNOSIS/MDM:   Vitals:    Vitals:    08/12/21 1817 08/12/21 2130 08/12/21 2301   BP: 138/78 137/72 (!) 142/66   Pulse: 98 84 78   Resp: 16 16 16   Temp: 98.7 °F (37.1 °C) 97.9 °F (36.6 °C)    TempSrc:  Oral    SpO2: 96% 98% 98%   Weight: 172 lb 6.4 oz (78.2 kg)         Patient was given the following medications:  Medications   fleet rectal enema 1 enema (1 enema Rectal Not Given 8/12/21 2132)         Patient presents emergency department for evaluation of right-sided lower back pain. Patient has chronic left-sided lower back pain with a spinal cord stimulator. Patient states this back pain is well controlled at this time. Patient states she has had the right-sided back pain for the past few weeks. Patient went to primary care today for this back pain and some fecal incontinence. Patient states she feels as though she is never able to complete a bowel movement and often has stool when wiping after urinating. Patient denies any saddle anesthesia. Denies any urinary incontinence. Denies any loss of sensation down her bilateral legs.   Patient on encouraged to return to the emergency department for any difficulty urinating, loss of sensation down her legs or inability to walk. Patient expressed understanding will be discharged home. Pt denies any history of new numbness, weakness, incontinence of bowel or bladder, constipation, saddle anesthesia or paresthesias. I estimate there is LOW risk for ABDOMINAL AORTIC ANEURYSM, CAUDA EQUINA SYNDROME, EPIDURAL MASS LESION, OR CORD COMPRESSION, thus I consider the discharge disposition reasonable. FINAL IMPRESSION      1. Chronic right-sided low back pain without sciatica    2.  Constipation, unspecified constipation type          DISPOSITION/PLAN   DISPOSITION Decision To Discharge 08/12/2021 10:32:33 PM      PATIENT REFERRED TO:  Yonis Barraza MD  [address] [place] [address]  [phone]    Call in 1 day  for an appointment for follow-up    [facility] Emergency Department  [address] [place] [address]  [phone]    If symptoms worsen    Yoli Kilgore MD  [address] [facility]  [phone]            DISCHARGE MEDICATIONS:  Discharge Medication List as of 8/12/2021 10:59 PM      START taking these medications    Details   lidocaine (LIDODERM) 5 % Place 1 patch onto the skin daily 12 hours on, 12 hours off., Disp-30 patch, R-0Print             DISCONTINUED MEDICATIONS:  Discharge Medication List as of 8/12/2021 10:59 PM                 (Please note that portions of this note were completed with a voice recognition program.  Efforts were made to edit the dictations but occasionally words are mis-transcribed.)    Sallie Mckinley PA-C (electronically signed)            Sallie Mckinley PA-C  08/12/21 48 Mable Álvarez PA-C  08/12/21 [id number]

## 2021-08-12 NOTE — PROGRESS NOTES
Acute Office Visit  8/12/2021    SUBJECTIVE:    Patient ID: Holli Akers is a 70 y.o. female. Chief Complaint   Patient presents with    Back Pain     a few months    Encopresis     says everytime she wipes even when she's only urinating she has fecal matter, 3 weeks     HPI: The patient presents to the office for an acute visit. History of chronic back pains- \"I've got a lot of problems with my back. \" Has stimulator in the left back. Sees pain management for back pains. Reports she has seen many orthopedics and \"they said they wouldn't touch my back. \"  Patient reports right back pains for a few months and worsening. States this is new. States this is in her mid-back. Uses muscle relaxants. Ice and heat dont help. Lidocaine patches help some. Pulling on the hose worsens the pain. Denies injury or trauma. No unexplained weight loss. No fevers/night sweats/chills. No pelvic anesthesia. No sciatica. No numbness or tingling. Chronic diarrhea after ATB per pt. States she had ATB for 6 weeks and diarrhea started. She took Lomotil with relief. Pt brought in stool today in a ziplock bag and states that she has seeping from her rectum when she urinates intermittently since. This has now been occurring for about 3 weeks- but she states this has never happened before. States she can be having a BM \"seeping\" and not know. Denies blood in stool.      Allergies   Allergen Reactions    Amlodipine     Ativan [Lorazepam] Swelling     Tongue swelling    Sulfa Antibiotics Swelling    Keflex [Cephalexin] Other (See Comments)     Leg cramps     Current Facility-Administered Medications   Medication Dose Route Frequency Provider Last Rate Last Admin    zoledronic acid (RECLAST) 5 mg/100 mL infusion  5 mg Intravenous See Admin Instructions Terri Haider  mL/hr at 04/06/16 0900 5 mg at 04/06/16 0900     Current Outpatient Medications   Medication Sig Dispense Refill    nystatin (MYCOSTATIN) 108633 UNIT/ML suspension TAKE 5 MLS BY MOUTH FOUR TIMES A DAY FOR 10 DAYS, RETAIN IN MOUTH AS LONG AS POSSIBLE 3 Bottle 3    traZODone (DESYREL) 100 MG tablet TAKE TWO (2) TABLETS BY MOUTH EVERY NIGHT 60 tablet 5    fluticasone (FLONASE) 50 MCG/ACT nasal spray SPRAY TWO SPRAYS IN EACH NOSTRIL ONCE DAILY 1 Bottle 0    galantamine (RAZADYNE ER) 16 MG extended release capsule TAKE 1 CAPSULE BY MOUTH DAILY WITH BREAKFAST 90 capsule 0    baclofen (LIORESAL) 10 MG tablet Take 1 tablet by mouth 3 times daily as needed (pain and spasm) 60 tablet 2    DULoxetine (CYMBALTA) 60 MG extended release capsule TAKE 1 CAPSULE BY MOUTH EVERY DAY 30 capsule 3    oxybutynin (DITROPAN XL) 15 MG extended release tablet Take 1 tablet by mouth daily 90 tablet 3    budesonide (ENTOCORT EC) 3 MG extended release capsule Take 6 mg by mouth every morning      pseudoephedrine (DECONGESTANT) 30 MG tablet Take 1 tablet by mouth 2 times daily as needed for Congestion 30 tablet 5    gabapentin (NEURONTIN) 600 MG tablet Take 1 tablet by mouth 3 times daily for 180 days.  270 tablet 1    lisinopril (PRINIVIL;ZESTRIL) 40 MG tablet TAKE 1 TABLET DAILY 90 tablet 3    potassium chloride (KLOR-CON M) 20 MEQ extended release tablet TAKE 1 TABLET TWICE A  tablet 3    busPIRone (BUSPAR) 10 MG tablet Take 1 tablet by mouth 2 times daily 20 tablet 0    fluocinolone (DERMA-SMOOTHE) 0.01 % external oil Apply 1 each topically Twice a Week 118.28 mL 1    ketoconazole (NIZORAL) 2 % shampoo APPLY TOPICALLY DAILY AS NEEDED 1 Bottle 1    rosuvastatin (CRESTOR) 10 MG tablet Take 1 tablet by mouth daily TAKE 1 TABLET NIGHTLY 90 tablet 3    hydroCHLOROthiazide (MICROZIDE) 12.5 MG capsule TAKE 1 CAPSULE EVERY MORNING 90 capsule 4    Cyanocobalamin (B-12) 1000 MCG SUBL Place 1,000 Units under the tongue daily 90 tablet 3    hydrOXYzine (ATARAX) 50 MG tablet Take 1 tablet by mouth every 4 hours as needed for Itching 270 tablet 1    polyethylene glycol (GLYCOLAX) 17 GM/SCOOP powder 1/2-1 capful in 8 oz water or prune juice qd prn constipation. 3 Bottle 3    carBAMazepine (TEGRETOL-XR) 100 MG extended release tablet Take 1 tablet by mouth 2 times daily 60 tablet 3    acetaminophen (TYLENOL) 500 MG tablet Take 1,000 mg by mouth 3 times daily as needed for Pain      fluocinolone (DERMOTIC) 0.01 % OIL oil Place 1 drop in ear(s) 2 times daily 1 Bottle 3    diphenoxylate-atropine (LOMOTIL) 2.5-0.025 MG per tablet Take 1 tablet by mouth as needed for Diarrhea. .      glucose blood VI test strips (ONE TOUCH ULTRA TEST) strip 1 each by In Vitro route daily Fasting qam and As needed. 100 strip 1    Lancets MISC Check sugars fasting qam and prn 100 each 3    Cholecalciferol (VITAMIN D3) 5000 units CAPS Take 5,000 Units by mouth daily       oxymorphone (OPANA) 5 MG tablet Take 5 mg by mouth 2 times daily. Indications: per DR Kinza Mir TAKES 3 TIMES A DAY. TRYING TO CUT BACK.  zoledronic acid (RECLAST) 5 MG/100ML SOLN Infuse 5 mg intravenously once IV, yearly      Cetirizine HCl (ZYRTEC PO) Take 1 tablet by mouth daily       aspirin 81 MG chewable tablet Take 81 mg by mouth daily.  Calcium Carbonate-Vit D-Min (CALCIUM 1200 PO) Take 1 capsule by mouth 2 times daily.  Ascorbic Acid (VITAMIN C) 500 MG tablet Take 500 mg by mouth daily. Review of Systems   Constitutional: Negative for chills, fatigue, fever and unexpected weight change. Respiratory: Negative for cough, shortness of breath and wheezing. Cardiovascular: Negative for chest pain and palpitations. Gastrointestinal: Positive for diarrhea (chronic- but resolved at this time). Negative for abdominal pain, constipation, nausea and vomiting. Fecal continence   Musculoskeletal: Positive for back pain. Skin: Negative for pallor and rash. Neurological: Negative for dizziness, weakness, light-headedness, numbness and headaches.      OBJECTIVE:  /72   Pulse 92   Temp 97.2 °F (36.2 °C) (Temporal)   Wt 172 lb 3.2 oz (78.1 kg)   SpO2 95%   BMI 37.26 kg/m²    Physical Exam  Vitals reviewed. Constitutional:       General: She is not in acute distress. Appearance: She is well-developed. She is not diaphoretic. HENT:      Head: Normocephalic and atraumatic. Eyes:      Pupils: Pupils are equal, round, and reactive to light. Cardiovascular:      Rate and Rhythm: Normal rate and regular rhythm. Pulmonary:      Effort: Pulmonary effort is normal. No respiratory distress. Breath sounds: Normal breath sounds. No wheezing or rales. Chest:      Chest wall: No tenderness. Abdominal:      General: Bowel sounds are normal. There is no distension. Palpations: Abdomen is soft. Tenderness: There is no abdominal tenderness. There is no guarding. Genitourinary:     Rectum: Guaiac result negative. Comments: Fecal matter noted around rectum and and pad during rectal exam  Musculoskeletal:      Comments: Back pain reported with palpation   Skin:     General: Skin is warm and dry. Neurological:      General: No focal deficit present. Mental Status: She is alert and oriented to person, place, and time. Sensory: No sensory deficit. Motor: No weakness. Coordination: Coordination normal.      Gait: Gait normal.   Psychiatric:         Mood and Affect: Mood normal.        A chaperone, AVINASH Lawrence was present during rectal exam    ASSESSMENT/PLAN:  Tyler De Santiago was seen today for back pain and encopresis. Diagnoses and all orders for this visit:    Mid back pain/Chronic bilateral back pain, unspecified back location/Incontinence of feces, unspecified fecal incontinence type/Late onset Alzheimer's disease without behavioral disturbance ([facility]  -     Patient with chronic back pain, but now worse in the right mid back. Fecal incontinence started a few weeks after the back pain. - Rectal exam today. Rectal tone intact.   However, there is stool noted around the rectum and in the patient's pad. - Guaiac-  negative. - Reviewed the case with Dr. Delia Elmore, internal medicine. Recommend stat scan. Stat MRI unlikely. Will perform stat CT of the patient's lumbar and thoracic spine at this time.  - CT THORACIC SPINE WO CONTRAST; STAT  -     CT LUMBAR SPINE WO CONTRAST; STAT  - Symptomatic management was reviewed. - Continue with orthopedic  - Continue with pain management  - Red flag warning signs reviewed with the patient and she will go to the ER if these occur    ADDENDUM:  CT results returned. Called pain management, Dr. Sharifa Prakash. Dr. Sharifa Prakash saw the patient on 7/23 and states she was having none of these symptoms. He states she has a history of colitis and he reviewed the thoracic MRI from 2/20/2017 versus results today- Difficult to compare. Due to new s/s- he recommends the patient be seen in the emergency room for a CT myelogram and neurosurgery consult. Called pt and reviewed. Risks vs benefits reviewed. She was agreeable. Report called to [facility] ER per pt request. All questions answered. 45 minutes were spent reviewing the chart, coordinating care of the patient and counseling face to face with the patient. Return for as previously scheduled or sooner if needed. Pt informed to call if symptoms worsen or fail to improve. All questions answered. Patient states no further questions or concerns at this time.     Electronically signed by Mortimer Kub, APRN - CNP 08/12/21

## 2021-08-13 ENCOUNTER — TELEPHONE (OUTPATIENT)
Dept: INTERNAL MEDICINE CLINIC | Age: 71
End: 2021-08-13

## 2021-08-13 NOTE — ED NOTES
Bed: 05  Expected date:   Expected time:   Means of arrival:   Comments:  Evs called     Rafi Herrmann  08/12/21 7002

## 2021-08-13 NOTE — ED PROVIDER NOTES
[facility]      Pt Name: Salinas Woods  MRN: [id number]  [place] 1950  Date of evaluation: 8/12/2021  Provider: Chang Ortiz MD  I independently performed a history and physical on Salinas Woods. All diagnostic, treatment, and disposition decisions were made by myself in conjunction with the advanced practice provider. HPI: Salinas Woods presented with   Chief Complaint   Patient presents with    Back Pain     Pt reports lumbar back pain, had CT done today      Salinas Woods has a past medical history of Anxiety, Anxiety and depression, Chronic back pain, Clostridium difficile infection (2/22/15), Hyperlipidemia, Hypertension, Insomnia disorder, Osteoarthritis, Osteoporosis (2008), Rheumatic fever, S/P insertion of spinal cord stimulator, Self-catheterizes urinary bladder, and Urinary retention. She has a past surgical history that includes Lumbar spine surgery (2004); bladder repair; Carpal tunnel release; Cystoscopy (10/29/2012); Total knee arthroplasty (Right, 10/18/13); Foot surgery; Dental surgery; Hysterectomy; Mandible reconstruction; other surgical history; Colonoscopy; Intracapsular cataract extraction (Right, 7/18/2019); Intracapsular cataract extraction (Left, 7/26/2019); and Cystoscopy (N/A, 10/19/2020).     Current Facility-Administered Medications on File Prior to Encounter   Medication Dose Route Frequency Provider Last Rate Last Admin    zoledronic acid (RECLAST) 5 mg/100 mL infusion  5 mg Intravenous See Admin Instructions Miguel A Haney  mL/hr at 04/06/16 0900 5 mg at 04/06/16 0900     Current Outpatient Medications on File Prior to Encounter   Medication Sig Dispense Refill    nystatin (MYCOSTATIN) 604595 UNIT/ML suspension TAKE 5 MLS BY MOUTH FOUR TIMES A DAY FOR 10 DAYS, RETAIN IN MOUTH AS LONG AS POSSIBLE 3 Bottle 3    traZODone (DESYREL) 100 MG tablet TAKE TWO (2) TABLETS BY MOUTH EVERY NIGHT 60 tablet 5    fluticasone (FLONASE) 50 MCG/ACT nasal spray SPRAY TWO SPRAYS IN EACH NOSTRIL ONCE DAILY 1 Bottle 0    galantamine (RAZADYNE ER) 16 MG extended release capsule TAKE 1 CAPSULE BY MOUTH DAILY WITH BREAKFAST 90 capsule 0    baclofen (LIORESAL) 10 MG tablet Take 1 tablet by mouth 3 times daily as needed (pain and spasm) 60 tablet 2    DULoxetine (CYMBALTA) 60 MG extended release capsule TAKE 1 CAPSULE BY MOUTH EVERY DAY 30 capsule 3    oxybutynin (DITROPAN XL) 15 MG extended release tablet Take 1 tablet by mouth daily 90 tablet 3    budesonide (ENTOCORT EC) 3 MG extended release capsule Take 6 mg by mouth every morning      pseudoephedrine (DECONGESTANT) 30 MG tablet Take 1 tablet by mouth 2 times daily as needed for Congestion 30 tablet 5    gabapentin (NEURONTIN) 600 MG tablet Take 1 tablet by mouth 3 times daily for 180 days. 270 tablet 1    lisinopril (PRINIVIL;ZESTRIL) 40 MG tablet TAKE 1 TABLET DAILY 90 tablet 3    potassium chloride (KLOR-CON M) 20 MEQ extended release tablet TAKE 1 TABLET TWICE A  tablet 3    busPIRone (BUSPAR) 10 MG tablet Take 1 tablet by mouth 2 times daily 20 tablet 0    fluocinolone (DERMA-SMOOTHE) 0.01 % external oil Apply 1 each topically Twice a Week 118.28 mL 1    ketoconazole (NIZORAL) 2 % shampoo APPLY TOPICALLY DAILY AS NEEDED 1 Bottle 1    rosuvastatin (CRESTOR) 10 MG tablet Take 1 tablet by mouth daily TAKE 1 TABLET NIGHTLY 90 tablet 3    hydroCHLOROthiazide (MICROZIDE) 12.5 MG capsule TAKE 1 CAPSULE EVERY MORNING 90 capsule 4    Cyanocobalamin (B-12) 1000 MCG SUBL Place 1,000 Units under the tongue daily 90 tablet 3    hydrOXYzine (ATARAX) 50 MG tablet Take 1 tablet by mouth every 4 hours as needed for Itching 270 tablet 1    polyethylene glycol (GLYCOLAX) 17 GM/SCOOP powder 1/2-1 capful in 8 oz water or prune juice qd prn constipation.  3 Bottle 3    carBAMazepine (TEGRETOL-XR) 100 MG extended release tablet Take 1 tablet by mouth 2 times daily 60 tablet 3    acetaminophen (TYLENOL) 500 MG tablet Take 1,000 mg by mouth 3 times daily as needed for Pain      fluocinolone (DERMOTIC) 0.01 % OIL oil Place 1 drop in ear(s) 2 times daily 1 Bottle 3    diphenoxylate-atropine (LOMOTIL) 2.5-0.025 MG per tablet Take 1 tablet by mouth as needed for Diarrhea. .      glucose blood VI test strips (ONE TOUCH ULTRA TEST) strip 1 each by In Vitro route daily Fasting qam and As needed. 100 strip 1    Lancets MISC Check sugars fasting qam and prn 100 each 3    Cholecalciferol (VITAMIN D3) 5000 units CAPS Take 5,000 Units by mouth daily       oxymorphone (OPANA) 5 MG tablet Take 5 mg by mouth 2 times daily. Indications: per DR Vicki Wells TAKES 3 TIMES A DAY. TRYING TO CUT BACK.  zoledronic acid (RECLAST) 5 MG/100ML SOLN Infuse 5 mg intravenously once IV, yearly      Cetirizine HCl (ZYRTEC PO) Take 1 tablet by mouth daily       aspirin 81 MG chewable tablet Take 81 mg by mouth daily.  Calcium Carbonate-Vit D-Min (CALCIUM 1200 PO) Take 1 capsule by mouth 2 times daily.  Ascorbic Acid (VITAMIN C) 500 MG tablet Take 500 mg by mouth daily. PHYSICAL EXAM  Vitals: BP (!) 142/66   Pulse 78   Temp 97.9 °F (36.6 °C) (Oral)   Resp 16   Wt 172 lb 6.4 oz (78.2 kg)   SpO2 98%   BMI 37.31 kg/m²   Constitutional:  70 y.o. female alert  HENT:  Atraumatic, oral mucosa moist  Neck:  No visible JVD, supple  Chest/Lungs:  Respiratory effort normal, clear, regular  Abdomen:  Non-distended  Back:  No gross deformity  Extremities:  Normal tone and perfusion, normal strength, normal gait, light touch sensation intact     Medical Decision Making and Plan: Briefly, this is an 70 y. o.female who presented with concern for abnormality seen on CT imaging earlier today. We reviewed the findings. Clinical correlation with this finding does not require any emergent diagnostic procedures. The patient has mild discomfort, is ambulatory and very well in appearance.   No urinary retention or incontinence, no numbness or weakness. She does have constipation and we recommend use of enema. We did confirm the appropriateness of outpatient follow up with neurosurgery, Dr Ofelia Mina. I also relayed our plan to her primary care NP who actually had also seen her earlier in the day. Pt says she was a bit startled about the recommendation that she come to the ER for treatment tonight. Raciel Rae was given appropriate discharge instructions. Referral to follow up provider. For further details of [facility] encounter, please see documentation by advanced practice provider Dale Fox PA-C. Discharge Medication List as of 8/12/2021 10:59 PM      START taking these medications    Details   lidocaine (LIDODERM) 5 % Place 1 patch onto the skin daily 12 hours on, 12 hours off., Disp-30 patch, [id number]           FOLLOW UP:    Yonis Barraza MD  [address] [address]  [phone]    Call in 1 day  for an appointment for follow-up    [facility] Emergency Department  [address] [facility]  [phone]    If symptoms worsen    Yoli Kilgore MD  [address]  [phone]          FINAL IMPRESSION:    1. Chronic right-sided low back pain without sciatica    2.  Constipation, unspecified constipation type       DISPOSITION Decision To Discharge 08/12/2021 10:32:33 PM       Consuelo Carlson MD  08/13/21 [id number]

## 2021-08-13 NOTE — TELEPHONE ENCOUNTER
----- Message from Inga Lopez sent at 8/13/2021 10:14 AM EDT -----  Subject: Message to Provider    QUESTIONS  Information for Provider? Patient wants to speak to Dr. Bhavin Pemberton regarding her   back. Does not want an appt.   ---------------------------------------------------------------------------  --------------  CALL BACK INFO  What is the best way for the office to contact you? OK to leave message on   voicemail  Preferred Call Back Phone Number? [phone]  ---------------------------------------------------------------------------  --------------  SCRIPT ANSWERS  Relationship to Patient?  Self

## 2021-08-16 ENCOUNTER — OFFICE VISIT (OUTPATIENT)
Dept: ENT CLINIC | Age: 71
End: 2021-08-16
Payer: MEDICARE

## 2021-08-16 ENCOUNTER — TELEPHONE (OUTPATIENT)
Dept: ENT CLINIC | Age: 71
End: 2021-08-16

## 2021-08-16 VITALS
TEMPERATURE: 97.5 F | DIASTOLIC BLOOD PRESSURE: 74 MMHG | SYSTOLIC BLOOD PRESSURE: 123 MMHG | HEART RATE: 85 BPM | WEIGHT: 173 LBS | BODY MASS INDEX: 37.44 KG/M2

## 2021-08-16 DIAGNOSIS — B37.0 ORAL THRUSH: ICD-10-CM

## 2021-08-16 DIAGNOSIS — J01.90 ACUTE RHINOSINUSITIS: Primary | ICD-10-CM

## 2021-08-16 DIAGNOSIS — J30.0 CHRONIC VASOMOTOR RHINITIS: Chronic | ICD-10-CM

## 2021-08-16 PROCEDURE — 99213 OFFICE O/P EST LOW 20 MIN: CPT | Performed by: OTOLARYNGOLOGY

## 2021-08-16 RX ORDER — CLOTRIMAZOLE 10 MG/1
10 LOZENGE ORAL; TOPICAL
Qty: 50 TABLET | Refills: 0 | Status: SHIPPED | OUTPATIENT
Start: 2021-08-16 | End: 2021-08-26

## 2021-08-16 RX ORDER — AZELASTINE 1 MG/ML
SPRAY, METERED NASAL
Qty: 1 BOTTLE | Refills: 2 | Status: SHIPPED | OUTPATIENT
Start: 2021-08-16 | End: 2022-03-02 | Stop reason: SDUPTHER

## 2021-08-16 RX ORDER — AMOXICILLIN AND CLAVULANATE POTASSIUM 875; 125 MG/1; MG/1
1 TABLET, FILM COATED ORAL 2 TIMES DAILY
Qty: 28 TABLET | Refills: 0 | Status: SHIPPED | OUTPATIENT
Start: 2021-08-16 | End: 2021-08-30

## 2021-08-16 ASSESSMENT — ENCOUNTER SYMPTOMS
RHINORRHEA: 0
FACIAL SWELLING: 1
SINUS PAIN: 1
SINUS PRESSURE: 1

## 2021-08-16 NOTE — PATIENT INSTRUCTIONS
RHINOSINUSITIS CARE  · You may use acetaminophen (eg. Tylenol) or Ibuprofen (eg. Advil) (over the counter medications) as needed for fever or pain. · Take Probiotic while you are taking antibiotics, to prevent diarrhea, stomach upset, pseudomembranous colitis, and C. difficile diarrhea. This may be obtained at your pharmacy or health food store. Alternatively, you may eat one cup of yogurt with active or live cultures twice daily while taking the antibiotic. Continue for two to three days after completion of the antibiotic. · Use a 12 hour decongestant spray, 0.05% oxymetazoline (e.g. Afrin, Duration, 4-Way). Spray each nostril twice three times a day for three days, then two times a day for 2 days, and then stop for two days and then repeat the cycle once. · Take one Mucinex-D (red orange box) maximum strength tablet each morning and one Mucinex (blue box) maximum strength tablet each evening, about 12 hours later, daily for the next ten days. Take only is approved by your PCP regarding high blood pressure. · Use fluticasone 2 sprays in each nostril once daily. · It may take several days to several weeks for your sinusitis to clear up. It is important to take all your medications as prescribed. Please continue your antibiotics as prescribed.     · Please call the office if your condition worsens or if symptoms persist after treatment is completed.        ===================================================================      ADVERSE AND SIDE EFFECTS OF MEDICATIONS:    Please be aware of the following possible adverse reactions, side effects, and complications of the following medications, including, but not limited to: allergic reaction, interactions with other medications, nausea, headache, diarrhea, persistent symptoms, failure to improve, and the following:     Augmentin (antibiotic):  diarrhea, colitis (severe infection and inflammation of the large intestine), pseudomembranous colitis (severe clavulanate potassium may also be used for purposes not listed in this medication guide. What should I discuss with my healthcare provider before taking amoxicillin and clavulanate potassium? You should not use this medicine if you are allergic to it, or if:  · you have severe kidney disease (or if you are on dialysis);  · you have had liver problems or jaundice while taking amoxicillin and clavulanate potassium; or  · you are allergic to any penicillin or cephalosporin antibiotic, such as Amoxil, Ceftin, Cefzil, Moxatag, Omnicef, and others. Tell your doctor if you have ever had:  · liver disease (hepatitis or jaundice);  · kidney disease; or  · mononucleosis. The liquid or chewable tablet may contain phenylalanine. Tell your doctor if you have phenylketonuria (PKU). Tell your doctor if you are pregnant or breastfeeding. Amoxicillin and clavulanate potassium can make birth control pills less effective. Ask your doctor about using a non-hormonal birth control (condom, diaphragm, cervical cap, or contraceptive sponge) to prevent pregnancy. Do not give this medicine to a child without medical advice. How should I take amoxicillin and clavulanate potassium? Follow all directions on your prescription label and read all medication guides or instruction sheets. Use the medicine exactly as directed. Amoxicillin and clavulanate potassium may work best if you take it at the start of a meal.  Take the medicine every 12 hours. Do not crush or chew the extended-release tablet. Swallow the pill whole, or break the pill in half and take both halves one at a time. Tell your doctor if you have trouble swallowing a whole or half pill. You must chew the chewable tablet before you swallow it. Shake the oral suspension (liquid) before you measure a dose. Use the dosing syringe provided, or use a medicine dose-measuring device (not a kitchen spoon). This medicine can affect the results of certain medical tests.  Tell any doctor who treats you that you are using amoxicillin and clavulanate potassium. Use this medicine for the full prescribed length of time, even if your symptoms quickly improve. Skipping doses can increase your risk of infection that is resistant to medication. Amoxicillin and clavulanate potassium will not treat a viral infection such as the flu or a common cold. Store the tablets at room temperature away from moisture and heat. Store the liquid  in the refrigerator. Throw away any unused liquid after 10 days. What happens if I miss a dose? Take the medicine as soon as you can, but skip the missed dose if it is almost time for your next dose. Do not take two doses at one time. What happens if I overdose? Seek emergency medical attention or call the Poison Help line at 1-991.662.7990. Overdose can cause nausea, vomiting, stomach pain, diarrhea, skin rash, drowsiness, hyperactivity, and decreased urination. What should I avoid while taking amoxicillin and clavulanate potassium? Avoid taking this medicine together with or just after eating a high-fat meal. This will make it harder for your body to absorb the medication. Antibiotic medicines can cause diarrhea, which may be a sign of a new infection. If you have diarrhea that is watery or bloody, call your doctor before using anti-diarrhea medicine. What are the possible side effects of amoxicillin and clavulanate potassium? Get emergency medical help if you have signs of an allergic reaction (hives, difficult breathing, swelling in your face or throat) or a severe skin reaction (fever, sore throat, burning eyes, skin pain, red or purple skin rash with blistering and peeling).   Call your doctor at once if you have:  · severe stomach pain, diarrhea that is watery or bloody (even if it occurs months after your last dose);  · pale or yellowed skin, dark colored urine, fever, confusion or weakness;  · loss of appetite, upper stomach pain;  · little or no urination; or  · easy bruising or bleeding. Common side effects may include:  · nausea, vomiting; diarrhea;  · rash, itching;  · vaginal itching or discharge; or  · diaper rash. This is not a complete list of side effects and others may occur. Call your doctor for medical advice about side effects. You may report side effects to FDA at 5-310-FDA-3015. What other drugs will affect amoxicillin and clavulanate potassium? Tell your doctor about all your other medicines, especially:  · allopurinol;  · probenecid; or  · a blood thinner --warfarin, Coumadin, Jantoven. This list is not complete. Other drugs may affect amoxicillin and clavulanate potassium, including prescription and over-the-counter medicines, vitamins, and herbal products. Not all possible drug interactions are listed here. Where can I get more information? Your pharmacist can provide more information about amoxicillin and clavulanate potassium. Remember, keep this and all other medicines out of the reach of children, never share your medicines with others, and use this medication only for the indication prescribed. Every effort has been made to ensure that the information provided by [address] is accurate, up-to-date, and complete, but no guarantee is made to that effect. Drug information contained herein may be time sensitive. [facility] information has been compiled for use by healthcare practitioners and consumers in the [facility] and therefore [facility] does not warrant that uses outside of the [facility] are appropriate, unless specifically indicated otherwise. [facility]'s drug information does not endorse drugs, diagnose patients or recommend therapy.  [facility] drug information is an informational resource designed to assist licensed healthcare practitioners in caring for their patients and/or to serve consumers viewing this service as a supplement to, and not a substitute for, the expertise, skill, knowledge and judgment of healthcare practitioners. The absence of a warning for a given drug or drug combination in no way should be construed to indicate that the drug or drug combination is safe, effective or appropriate for any given patient. [facility] does not assume any responsibility for any aspect of healthcare administered with the aid of information [facility] provides. The information contained herein is not intended to cover all possible uses, directions, precautions, warnings, drug interactions, allergic reactions, or adverse effects. If you have questions about the drugs you are taking, check with your doctor, nurse or pharmacist.  Copyright 6674-6866 [address]: 12.01. Revision date: 2/24/2020. Care instructions adapted under license by [facility] [place] ([facility]). If you have questions about a medical condition or this instruction, always ask your healthcare professional. [place] [address] any warranty or liability for your use of this information.

## 2021-08-16 NOTE — PROGRESS NOTES
mucosal edema, congestion or rhinorrhea. Right Turbinates: Not enlarged. Left Turbinates: Not enlarged. Right Sinus: No maxillary sinus tenderness or frontal sinus tenderness. Left Sinus: No maxillary sinus tenderness or frontal sinus tenderness. Mouth/Throat:      Lips: Pink. No lesions. Mouth: Mucous membranes are moist. No oral lesions. Tongue: No lesions. Palate: No mass and lesions. Pharynx: Oropharynx is clear. Uvula midline. No oropharyngeal exudate or posterior oropharyngeal erythema. Tonsils: No tonsillar exudate or tonsillar abscesses. Comments: INDIRECT LARYNGOSCOPY:  Visualization was suboptimal due to a strong gag reflex and guarding. The base of tongue and epiglottis appeared to be normal.  Unable to visualize the vocal cords and hypopharynx, and endolarynx. Neck:      Thyroid: No thyroid mass, thyromegaly or thyroid tenderness. Trachea: No tracheal deviation. Musculoskeletal:      Cervical back: Normal range of motion and neck supple. Lymphadenopathy:      Cervical: No cervical adenopathy. Neurological:      Mental Status: She is alert. REVIEW OF IMAGES          I independently reviewed the images of the CT scan of the sinuses, and I see left maxillary sinus mucosal thickening and some opacified ethmoid air cells bilaterally. Narrative   EXAMINATION:   CT OF THE SINUS WITHOUT CONTRAST  7/10/2020 10:38 am     FINDINGS:   SINUSES/MASTOIDS:  The  sphenoid, ethmoid and frontal sinuses are clear. Mild mucosal thickening inferiorly in the left maxillary sinus.  Patent large   nasal antral window of the left maxillary sinus.  Smaller patent right-sided   window.  The bilateral ostiomeatal units are patent.  Ethmoid roofs are   symmetric.  Resection of some of the ethmoid air cells has been performed. Nasal septum predominantly lies in the midline.  Nasal turbinates normal in   size.  The mastoid air cells are well aerated.     SOFT TISSUES:  Visualized soft tissues demonstrate no acute abnormality.  The   visualized portion of the intracranial contents demonstrate no gross acute   abnormality.           Impression   No evidence of acute or significant chronic sinusitis.  Mild mucous   thickening inferiorly in the left maxillary sinus.  Bilateral patent nasal   antral windows.  Patent ostiomeatal complexes.             IMPRESSION / DIAGNOSES / Steven Calixto was seen today for nasal congestion and ear problem. Diagnoses and all orders for this visit:    Acute rhinosinusitis  -     amoxicillin-clavulanate (AUGMENTIN) 875-125 MG per tablet; Take 1 tablet by mouth 2 times daily for 14 days    Oral thrush  -     clotrimazole (MYCELEX) 10 MG rufus; Take 1 tablet by mouth 5 times daily for 10 days    Chronic vasomotor rhinitis  -     azelastine (ASTELIN) 0.1 % nasal spray; Use 2 sprays in each nostril 2 times daily. Use fluticasone 15 minutes after the morning dose of astelin. RECOMMENDATIONS/PLAN      1. See Patient Instructions on file for this visit, which were discussed with the patient. 2. Acetaminophen (eg. Tylenol) or Ibuprofen (eg. Advil) (over the counter medications) as needed for pain. 3. Mucinex-D, Sudafed, or other OTC medications for nasal and/or sinus congestion. 4. Consider Ponaris for dry nose. 5. Return in about 1 month (around 9/16/2021) for recheck/follow-up, and sooner if condition worsens.             MEDICAL DECISION MAKING    # and complexity of problems addressed:  83835 - Low     1 stable chronic illness  1 acute, uncomplicated illness or injury     Amount and/or Complexity of Data to be Reviewed and Analyzed  73368 - Straightforward  no data or only 1 test or document reviewed or ordered    Risk of Complications and /or Morbidity or Mortality of Patient Management  31556 - Moderate  Prescription drug management

## 2021-08-16 NOTE — TELEPHONE ENCOUNTER
Spoke with pt and she is seeing [facility] on 9-1-2021. She wants to know exactly what is going on with her back. She states no one has clarified with her what the results on the CT scan were, only that she was to go straight to the ER.

## 2021-08-16 NOTE — TELEPHONE ENCOUNTER
Pt.calling again asking about her deviated septum she is asking is this the reason she has to wear her cpap at night.

## 2021-08-16 NOTE — TELEPHONE ENCOUNTER
Patient asking because of her deviated septum,  Is that the reason for  her Sleep apnea      Patient was seen today with Dr Marcelino Verde , she forgot to ask him this question please advise    YARELISI an alert comes up that patient tested covid  positive recently    Please advise

## 2021-08-18 DIAGNOSIS — F02.80 LATE ONSET ALZHEIMER'S DISEASE WITHOUT BEHAVIORAL DISTURBANCE (HCC): Primary | ICD-10-CM

## 2021-08-18 DIAGNOSIS — G30.1 LATE ONSET ALZHEIMER'S DISEASE WITHOUT BEHAVIORAL DISTURBANCE (HCC): Primary | ICD-10-CM

## 2021-08-18 RX ORDER — GALANTAMINE HYDROBROMIDE 16 MG/1
CAPSULE, EXTENDED RELEASE ORAL
Qty: 30 CAPSULE | Refills: 1 | Status: SHIPPED | OUTPATIENT
Start: 2021-08-18 | End: 2021-10-05 | Stop reason: SDUPTHER

## 2021-08-23 ENCOUNTER — TELEPHONE (OUTPATIENT)
Dept: PULMONOLOGY | Age: 71
End: 2021-08-23

## 2021-08-23 NOTE — TELEPHONE ENCOUNTER
Patient has called to order a chine strap to prevent mouth from opening in the middle of the night. Has also asked about recall and was asked to register machine by calling [phone]. Patient was instructed to continue using machine and a message will be sent to their provider about their individual plan of care. Patient was instructed not to store machine where it is exposed to extreme heat, cold, or direct sunlight, not to travel with it in the trunk of their car, not to use any after-market  like the So Clean or Ozone . Patient was instructed to quit using the machine and call the office if they notice any particles in the tubing while cleaning, any unusual odors coming from the machine or have any new onset of symptoms like cough or headache. Patient was informed that we will call them back if the provider has any further instructions or wants them to stop using their machine, otherwise, continue using machine.

## 2021-08-26 ENCOUNTER — TELEPHONE (OUTPATIENT)
Dept: PULMONOLOGY | Age: 71
End: 2021-08-26

## 2021-08-26 NOTE — TELEPHONE ENCOUNTER
Patient has called to follow up on order for chin strap. Explained she has a deviated septum and wanted to know if this has to do with apnea?   PT contact ([phone])

## 2021-08-26 NOTE — TELEPHONE ENCOUNTER
The patient is wanting an orders for a chin strap?     The deviated septum can alter the passage of the nasal masks, does not alter the actual Obstructive Sleep Apnea

## 2021-09-03 DIAGNOSIS — M79.7 FIBROMYALGIA: ICD-10-CM

## 2021-09-05 RX ORDER — GABAPENTIN 600 MG/1
TABLET ORAL
Qty: 90 TABLET | Refills: 1 | Status: SHIPPED | OUTPATIENT
Start: 2021-09-05 | End: 2021-10-14

## 2021-09-07 RX ORDER — FLUOCINOLONE ACETONIDE 0.11 MG/ML
OIL TOPICAL
Qty: 1 EACH | Refills: 3 | Status: SHIPPED | OUTPATIENT
Start: 2021-09-07

## 2021-09-16 ENCOUNTER — OFFICE VISIT (OUTPATIENT)
Dept: ENT CLINIC | Age: 71
End: 2021-09-16
Payer: MEDICARE

## 2021-09-16 VITALS — TEMPERATURE: 97.3 F | HEART RATE: 72 BPM | DIASTOLIC BLOOD PRESSURE: 71 MMHG | SYSTOLIC BLOOD PRESSURE: 156 MMHG

## 2021-09-16 DIAGNOSIS — J30.0 CHRONIC VASOMOTOR RHINITIS: Primary | Chronic | ICD-10-CM

## 2021-09-16 PROCEDURE — 99213 OFFICE O/P EST LOW 20 MIN: CPT | Performed by: OTOLARYNGOLOGY

## 2021-09-16 ASSESSMENT — ENCOUNTER SYMPTOMS
RHINORRHEA: 0
SINUS PAIN: 0
SORE THROAT: 0

## 2021-09-16 NOTE — PROGRESS NOTES
[facility]       PCP:  Gabe Wilson  Chief Complaint   Patient presents with    Nose Problem       HISTORY OF PRESENT ILLNESS       Shanon Holcomb is a 70 y.o. female here for recheck and follow up of nose problem. Patient stated that her nasal symptoms are \"Better, but not gone. \"        REVIEW OF SYSTEMS   Review of Systems   Constitutional: Negative for chills and fever. HENT: Negative for ear discharge, ear pain, rhinorrhea, sinus pain and sore throat.         PAST MEDICAL HISTORY    Past Medical History:   Diagnosis Date    Anxiety     Anxiety and depression     Chronic back pain     Clostridium difficile infection 2/22/15    Hyperlipidemia     Hypertension     Insomnia disorder     Osteoarthritis     Osteoporosis 2008    Rheumatic fever     S/P insertion of spinal cord stimulator     Self-catheterizes urinary bladder     as needed 7/8 no longer catherizing     Urinary retention          Past Surgical History:   Procedure Laterality Date    BLADDER REPAIR      CARPAL TUNNEL RELEASE      COLONOSCOPY      CYSTOSCOPY  10/29/2012    bladder biopsy    CYSTOSCOPY N/A 10/19/2020    FLEXIBLE CYSTOSCOPY performed by Bear Day MD at [facility]      INTRACAPSULAR CATARACT EXTRACTION Right 7/18/2019    PHACOEMULSIFICATION WITH INTRAOCULAR LENS IMPLANT performed by Emma Espinoza MD at [address] [place] [address] EXTRACTION Left 7/26/2019    PHACOEMULSIFICATION WITH INTRAOCULAR LENS IMPLANT performed by Emma Espinoza MD at [place] [address]  2004    MANDIBLE RECONSTRUCTION      OTHER SURGICAL HISTORY      spinal cord stimulator    TOTAL KNEE ARTHROPLASTY Right 10/18/13         EXAMINATION      Vitals:    09/16/21 1115   BP: (!) 156/71   Site: Right Upper Arm   Position: Sitting

## 2021-09-17 ENCOUNTER — TELEPHONE (OUTPATIENT)
Dept: ENT CLINIC | Age: 71
End: 2021-09-17

## 2021-09-20 ENCOUNTER — OFFICE VISIT (OUTPATIENT)
Dept: INTERNAL MEDICINE CLINIC | Age: 71
End: 2021-09-20
Payer: MEDICARE

## 2021-09-20 VITALS
BODY MASS INDEX: 37.52 KG/M2 | OXYGEN SATURATION: 95 % | DIASTOLIC BLOOD PRESSURE: 84 MMHG | HEART RATE: 96 BPM | WEIGHT: 173.4 LBS | SYSTOLIC BLOOD PRESSURE: 110 MMHG

## 2021-09-20 DIAGNOSIS — G30.1 LATE ONSET ALZHEIMER'S DISEASE WITHOUT BEHAVIORAL DISTURBANCE (HCC): Primary | ICD-10-CM

## 2021-09-20 DIAGNOSIS — M79.89 RIGHT AXILLARY SWELLING: ICD-10-CM

## 2021-09-20 DIAGNOSIS — E16.2 HYPOGLYCEMIA: ICD-10-CM

## 2021-09-20 DIAGNOSIS — R09.81 CHRONIC NASAL CONGESTION: ICD-10-CM

## 2021-09-20 DIAGNOSIS — M81.0 OSTEOPOROSIS, POST-MENOPAUSAL: ICD-10-CM

## 2021-09-20 DIAGNOSIS — F32.A DEPRESSIVE DISORDER: ICD-10-CM

## 2021-09-20 DIAGNOSIS — I10 ESSENTIAL HYPERTENSION: ICD-10-CM

## 2021-09-20 DIAGNOSIS — N39.0 RECURRENT UTI: ICD-10-CM

## 2021-09-20 DIAGNOSIS — F02.80 LATE ONSET ALZHEIMER'S DISEASE WITHOUT BEHAVIORAL DISTURBANCE (HCC): Primary | ICD-10-CM

## 2021-09-20 DIAGNOSIS — Z12.31 ENCOUNTER FOR SCREENING MAMMOGRAM FOR MALIGNANT NEOPLASM OF BREAST: ICD-10-CM

## 2021-09-20 DIAGNOSIS — E78.5 HYPERLIPIDEMIA LDL GOAL <100: ICD-10-CM

## 2021-09-20 DIAGNOSIS — E55.9 VITAMIN D INSUFFICIENCY: ICD-10-CM

## 2021-09-20 LAB
BILIRUBIN, POC: NORMAL
BLOOD URINE, POC: NORMAL
CLARITY, POC: NORMAL
COLOR, POC: NORMAL
GLUCOSE URINE, POC: NORMAL
KETONES, POC: NORMAL
LEUKOCYTE EST, POC: NORMAL
NITRITE, POC: NORMAL
PH, POC: 7
PROTEIN, POC: NORMAL
SPECIFIC GRAVITY, POC: 1.01
UROBILINOGEN, POC: 0.2

## 2021-09-20 PROCEDURE — 90694 VACC AIIV4 NO PRSRV 0.5ML IM: CPT | Performed by: INTERNAL MEDICINE

## 2021-09-20 PROCEDURE — 99214 OFFICE O/P EST MOD 30 MIN: CPT | Performed by: INTERNAL MEDICINE

## 2021-09-20 PROCEDURE — 81002 URINALYSIS NONAUTO W/O SCOPE: CPT | Performed by: INTERNAL MEDICINE

## 2021-09-20 PROCEDURE — G0008 ADMIN INFLUENZA VIRUS VAC: HCPCS | Performed by: INTERNAL MEDICINE

## 2021-09-20 ASSESSMENT — ENCOUNTER SYMPTOMS
CHEST TIGHTNESS: 0
SHORTNESS OF BREATH: 0
DIARRHEA: 1
CONSTIPATION: 0

## 2021-09-20 NOTE — PROGRESS NOTES
Patient: Prachi Butler is a 70 y.o. female who presents today with the following Chief Complaint(s):  Chief Complaint   Patient presents with    Check-Up       HPI     Here today for follow up. Is worried that she may have a UTI- did leave a urine sample. Sx x 7 days. Pain in lower abdomen and dysuria. Is c/o swelling in her feet, hands, and wrist. Swelling is intermittent. Is wondering if she is having low blood sugars. Had 2 episodes where she felt very shaky and nervous. Took a sugar pill and then ate a sandwich and felt better. Does notice that if she eats breakfast, a small mid-morning snack, and then eats lunch she will not have these issues. Under her right arm was swollen and puffy- first noticed this morning when she woke up. Last mammogram was in 1/2020. Does notice some mild SOB with increased activity. Does still struggle with constant congestion. Is wearing her CPAP which makes congestion worse. Did see Dr. Huy Stewart for ENT last week. Did start her on Zyrtec and is taking Coricidin HBP but Dr. Huy Stewart recommended Sudafed which she has not yet tried. Does still see Dr. Du Castro for pain management. Memory is about the same. Medications come pre-packaged. No difficulty with managing her finances (did mess up her checkbook a few times but bank has helped her to correct). Son will help her when she gets older. No issues with driving. Not getting lost. Is not having difficulties caring for herself. Is worried about what will happen when she needs to go to a nursing home. Anxiety is still pretty high. No longer follows with Dr. Ellen Rg- did see him once in December 2020 and no follow up was needed. Does not want to change medication at this time.       Lab Results   Component Value Date     09/20/2021    K 4.5 09/20/2021    CL 95 (L) 09/20/2021    CO2 23 09/20/2021    BUN 11 09/20/2021    CREATININE 0.7 09/20/2021    GLUCOSE 96 09/20/2021    CALCIUM 10.1 09/20/2021 PROT 7.4 09/20/2021    LABALBU 4.6 09/20/2021    BILITOT 0.4 09/20/2021    ALKPHOS 48 09/20/2021    AST 40 (H) 09/20/2021    ALT 33 09/20/2021    LABGLOM >60 09/20/2021    GFRAA >60 09/20/2021    AGRATIO 1.6 09/20/2021    GLOB 2.8 09/20/2021       Lab Results   Component Value Date    CHOL 134 09/20/2021    CHOL 139 03/15/2021    CHOL 108 06/22/2020     Lab Results   Component Value Date    TRIG 121 09/20/2021    TRIG 69 03/15/2021    TRIG 141 06/22/2020     Lab Results   Component Value Date    HDL 68 (H) 09/20/2021    HDL 71 (H) 03/15/2021    HDL 50 06/22/2020     Lab Results   Component Value Date    LDLCALC 42 09/20/2021    LDLCALC 54 03/15/2021    LDLCALC 30 06/22/2020     Lab Results   Component Value Date    LABVLDL 24 09/20/2021    LABVLDL 14 03/15/2021    LABVLDL 28 06/22/2020     No results found for: CHOLHDLRATIO  Lab Results   Component Value Date    WBC 5.3 09/20/2021    HGB 13.9 09/20/2021    HCT 41.0 09/20/2021    MCV 93.1 09/20/2021     09/20/2021       15/2021  9:59 PM - [facility] Incoming Lab Results From Soft (Epic Adt)    Component Value Ref Range & Units Status Collected Lab   TSH 0.94  0.27 - 4.20 uIU/mL Final 03/15/2021  1:05 PM  - [facility]   Testing Performed By          Allergies   Allergen Reactions    Amlodipine     Ativan [Lorazepam] Swelling     Tongue swelling    Sulfa Antibiotics Swelling    Keflex [Cephalexin] Other (See Comments)     Leg cramps      Past Medical History:   Diagnosis Date    Anxiety     Anxiety and depression     Chronic back pain     Clostridium difficile infection 2/22/15    Hyperlipidemia     Hypertension     Insomnia disorder     Osteoarthritis     Osteoporosis 2008    Rheumatic fever     S/P insertion of spinal cord stimulator     Self-catheterizes urinary bladder     as needed 7/8 no longer catherizing     Urinary retention       Past Surgical History:   Procedure Laterality Date    BLADDER REPAIR      CARPAL TUNNEL RELEASE      COLONOSCOPY      CYSTOSCOPY  10/29/2012    bladder biopsy    CYSTOSCOPY N/A 10/19/2020    FLEXIBLE CYSTOSCOPY performed by Daniella Capellan MD at [address] [facility]      INTRACAPSULAR CATARACT EXTRACTION Right 7/18/2019    PHACOEMULSIFICATION WITH INTRAOCULAR LENS IMPLANT performed by Radha Kovacs MD at [address] EXTRACTION Left 7/26/2019    PHACOEMULSIFICATION WITH INTRAOCULAR LENS IMPLANT performed by Radha Kovacs MD at [place] [address]    MANDIBLE RECONSTRUCTION      OTHER SURGICAL HISTORY      spinal cord stimulator    TOTAL KNEE ARTHROPLASTY Right 10/18/13      Social History     Socioeconomic History    Marital status:      Spouse name: Not on file    Number of children: Not on file    Years of education: Not on file    Highest education level: Not on file   Occupational History    Occupation: retired   Tobacco Use    Smoking status: Never Smoker    Smokeless tobacco: Never Used   Vaping Use    Vaping Use: Never used   Substance and Sexual Activity    Alcohol use: Not Currently    Drug use: No    Sexual activity: Not on file   Other Topics Concern    Not on file   Social History Narrative    Not on file     Social Determinants of Health     Financial Resource Strain: Low Risk     Difficulty of Paying Living Expenses: Not hard at all   Food Insecurity: No Food Insecurity    Worried About [facility] in the Last Year: Never true    [facility] in the Last Year: Never true   Transportation Needs:     Lack of Transportation (Medical):      Lack of Transportation (Non-Medical):    Physical Activity:     Days of Exercise per Week:     Minutes of Exercise per Session:    Stress:     Feeling of Stress :    Social Connections:     Frequency of Communication with Friends and Family:     Frequency of Social Gatherings with Friends and Family:     Attends L[employer] Services:     Active Member of Clubs or Organizations:     Attends Club or Organization Meetings:     Marital Status:    Intimate Partner Violence:     Fear of Current or Ex-Partner:     Emotionally Abused:     Physically Abused:     Sexually Abused:      Family History   Problem Relation Age of Onset    COPD Mother     High Blood Pressure Mother     Rheum Arthritis Mother     Thyroid Disease Mother     Alcohol Abuse Father     Clotting Disorder Sister     Thyroid Disease Sister     Hypertension Brother     Diabetes Sister     Heart Disease Sister     Hypertension Sister     Thyroid Disease Sister     Cancer Brother     Heart Disease Brother     Hypertension Brother     Substance Abuse Brother     Alcohol Abuse Son     No Known Problems Daughter     Dementia Neg Hx         Outpatient Medications Prior to Visit   Medication Sig Dispense Refill    fluocinolone (DERMA-SMOOTHE) 0.01 % external oil APPLY TOPICALLY TWICE A WEEK 1 each 3    gabapentin (NEURONTIN) 600 MG tablet TAKE 1 TABLET BY MOUTH THREE TIMES DAILY *DOSE CHANGE: REPLACES 400MG DOSE* 90 tablet 1    galantamine (RAZADYNE ER) 16 MG extended release capsule TAKE 1 CAPSULE BY MOUTH DAILY WITH BREAKFAST 30 capsule 1    azelastine (ASTELIN) 0.1 % nasal spray Use 2 sprays in each nostril 2 times daily. Use fluticasone 15 minutes after the morning dose of astelin. 1 Bottle 2    lidocaine (LIDODERM) 5 % Place 1 patch onto the skin daily 12 hours on, 12 hours off.  30 patch 0    nystatin (MYCOSTATIN) 884060 UNIT/ML suspension TAKE 5 MLS BY MOUTH FOUR TIMES A DAY FOR 10 DAYS, RETAIN IN MOUTH AS LONG AS POSSIBLE 3 Bottle 3    traZODone (DESYREL) 100 MG tablet TAKE TWO (2) TABLETS BY MOUTH EVERY NIGHT 60 tablet 5    fluticasone (FLONASE) 50 MCG/ACT nasal spray SPRAY TWO SPRAYS IN EACH NOSTRIL ONCE DAILY 1 Bottle 0    baclofen (LIORESAL) 10 MG tablet Take 1 tablet by mouth 3 times daily as needed (pain and spasm) 60 tablet 2  DULoxetine (CYMBALTA) 60 MG extended release capsule TAKE 1 CAPSULE BY MOUTH EVERY DAY 30 capsule 3    oxybutynin (DITROPAN XL) 15 MG extended release tablet Take 1 tablet by mouth daily 90 tablet 3    budesonide (ENTOCORT EC) 3 MG extended release capsule Take 6 mg by mouth every morning      pseudoephedrine (DECONGESTANT) 30 MG tablet Take 1 tablet by mouth 2 times daily as needed for Congestion 30 tablet 5    lisinopril (PRINIVIL;ZESTRIL) 40 MG tablet TAKE 1 TABLET DAILY 90 tablet 3    potassium chloride (KLOR-CON M) 20 MEQ extended release tablet TAKE 1 TABLET TWICE A  tablet 3    busPIRone (BUSPAR) 10 MG tablet Take 1 tablet by mouth 2 times daily 20 tablet 0    ketoconazole (NIZORAL) 2 % shampoo APPLY TOPICALLY DAILY AS NEEDED 1 Bottle 1    rosuvastatin (CRESTOR) 10 MG tablet Take 1 tablet by mouth daily TAKE 1 TABLET NIGHTLY 90 tablet 3    hydroCHLOROthiazide (MICROZIDE) 12.5 MG capsule TAKE 1 CAPSULE EVERY MORNING 90 capsule 4    Cyanocobalamin (B-12) 1000 MCG SUBL Place 1,000 Units under the tongue daily 90 tablet 3    hydrOXYzine (ATARAX) 50 MG tablet Take 1 tablet by mouth every 4 hours as needed for Itching 270 tablet 1    polyethylene glycol (GLYCOLAX) 17 GM/SCOOP powder 1/2-1 capful in 8 oz water or prune juice qd prn constipation. 3 Bottle 3    carBAMazepine (TEGRETOL-XR) 100 MG extended release tablet Take 1 tablet by mouth 2 times daily 60 tablet 3    acetaminophen (TYLENOL) 500 MG tablet Take 1,000 mg by mouth 3 times daily as needed for Pain      fluocinolone (DERMOTIC) 0.01 % OIL oil Place 1 drop in ear(s) 2 times daily 1 Bottle 3    diphenoxylate-atropine (LOMOTIL) 2.5-0.025 MG per tablet Take 1 tablet by mouth as needed for Diarrhea. Elmon Plunk Cholecalciferol (VITAMIN D3) 5000 units CAPS Take 5,000 Units by mouth daily       oxymorphone (OPANA) 5 MG tablet Take 5 mg by mouth 2 times daily. Indications: per DR Juarez Wagoner TAKES 3 TIMES A DAY. TRYING TO CUT BACK.       zoledronic acid (RECLAST) 5 MG/100ML SOLN Infuse 5 mg intravenously once IV, yearly      Cetirizine HCl (ZYRTEC PO) Take 1 tablet by mouth daily       aspirin 81 MG chewable tablet Take 81 mg by mouth daily.  Calcium Carbonate-Vit D-Min (CALCIUM 1200 PO) Take 1 capsule by mouth 2 times daily.  Ascorbic Acid (VITAMIN C) 500 MG tablet Take 500 mg by mouth daily.  glucose blood VI test strips (ONE TOUCH ULTRA TEST) strip 1 each by In Vitro route daily Fasting qam and As needed. (Patient not taking: Reported on 9/20/2021) 100 strip 1    Lancets MISC Check sugars fasting qam and prn (Patient not taking: Reported on 9/20/2021) 100 each 3     Facility-Administered Medications Prior to Visit   Medication Dose Route Frequency Provider Last Rate Last Admin    zoledronic acid (RECLAST) 5 mg/100 mL infusion  5 mg IntraVENous See Admin Instructions Valery Garcia  mL/hr at 04/06/16 0900 5 mg at 04/06/16 0900       Patient'spast medical history, surgical history, family history, medications,  and allergies  were all reviewed and updated as appropriate today. Review of Systems   Constitutional: Negative for appetite change, fatigue and fever. Respiratory: Negative for chest tightness and shortness of breath. Cardiovascular: Negative for chest pain. Gastrointestinal: Positive for diarrhea. Negative for constipation. Genitourinary: Positive for dysuria. Skin: Negative for rash. /84   Pulse 96   Wt 173 lb 6.4 oz (78.7 kg)   SpO2 95%   BMI 37.52 kg/m²   Physical Exam  Vitals and nursing note reviewed. Constitutional:       Appearance: She is well-developed. She is not toxic-appearing. HENT:      Head: Normocephalic. Right Ear: Tympanic membrane, ear canal and external ear normal.      Left Ear: Tympanic membrane, ear canal and external ear normal.      Mouth/Throat:      Pharynx: No oropharyngeal exudate or posterior oropharyngeal erythema.    Eyes:      General: No scleral icterus. Extraocular Movements: Extraocular movements intact. Conjunctiva/sclera: Conjunctivae normal.      Pupils: Pupils are equal, round, and reactive to light. Neck:      Thyroid: No thyroid mass or thyromegaly. Vascular: No carotid bruit. Cardiovascular:      Rate and Rhythm: Normal rate and regular rhythm. Heart sounds: Normal heart sounds. No murmur heard. Pulmonary:      Effort: Pulmonary effort is normal.      Breath sounds: Normal breath sounds. Musculoskeletal:      Right lower leg: No edema. Left lower leg: No edema. Lymphadenopathy:      Cervical: No cervical adenopathy. Upper Body:      Right upper body: No axillary adenopathy. Left upper body: No axillary adenopathy. Neurological:      General: No focal deficit present. Mental Status: She is alert and oriented to person, place, and time. Psychiatric:         Mood and Affect: Affect normal. Mood is anxious. Behavior: Behavior normal. Behavior is cooperative. ASSESSMENT/PLAN:    Problem List Items Addressed This Visit     Chronic nasal congestion     Recommend over-the-counter Coricidin HBP. Continue with Zyrtec. Recommend nasal saline. Is interfering with use of CPAP. Depressive disorder     Continue Cymbalta 60 mg daily and BuSpar 7.5 mg 3 times daily. Patient has seen Dr. Janina Mclaughlin and was released to return as needed. Essential hypertension (Chronic)     Continue lisinopril 40 mg daily and hydrochlorothiazide 12.5 mg daily. Well-controlled. Check BMP. Relevant Orders    CBC Auto Differential (Completed)    Comprehensive Metabolic Panel (Completed)    Lipid Panel (Completed)    TSH with Reflex (Completed)    Vitamin D 25 Hydroxy (Completed)    Hyperlipidemia LDL goal <100      Continue Crestor 10 mg daily. Check lipid panel, CMP.          Relevant Orders    CBC Auto Differential (Completed)    Comprehensive Metabolic Panel (Completed) of breast        Relevant Orders    DIEGO TIFFANI DIGITAL DIAGNOSTIC BILATERAL          Current Outpatient Medications   Medication Sig Dispense Refill    fluocinolone (DERMA-SMOOTHE) 0.01 % external oil APPLY TOPICALLY TWICE A WEEK 1 each 3    gabapentin (NEURONTIN) 600 MG tablet TAKE 1 TABLET BY MOUTH THREE TIMES DAILY *DOSE CHANGE: REPLACES 400MG DOSE* 90 tablet 1    galantamine (RAZADYNE ER) 16 MG extended release capsule TAKE 1 CAPSULE BY MOUTH DAILY WITH BREAKFAST 30 capsule 1    azelastine (ASTELIN) 0.1 % nasal spray Use 2 sprays in each nostril 2 times daily. Use fluticasone 15 minutes after the morning dose of astelin. 1 Bottle 2    lidocaine (LIDODERM) 5 % Place 1 patch onto the skin daily 12 hours on, 12 hours off.  30 patch 0    nystatin (MYCOSTATIN) 319704 UNIT/ML suspension TAKE 5 MLS BY MOUTH FOUR TIMES A DAY FOR 10 DAYS, RETAIN IN MOUTH AS LONG AS POSSIBLE 3 Bottle 3    traZODone (DESYREL) 100 MG tablet TAKE TWO (2) TABLETS BY MOUTH EVERY NIGHT 60 tablet 5    fluticasone (FLONASE) 50 MCG/ACT nasal spray SPRAY TWO SPRAYS IN EACH NOSTRIL ONCE DAILY 1 Bottle 0    baclofen (LIORESAL) 10 MG tablet Take 1 tablet by mouth 3 times daily as needed (pain and spasm) 60 tablet 2    DULoxetine (CYMBALTA) 60 MG extended release capsule TAKE 1 CAPSULE BY MOUTH EVERY DAY 30 capsule 3    oxybutynin (DITROPAN XL) 15 MG extended release tablet Take 1 tablet by mouth daily 90 tablet 3    budesonide (ENTOCORT EC) 3 MG extended release capsule Take 6 mg by mouth every morning      pseudoephedrine (DECONGESTANT) 30 MG tablet Take 1 tablet by mouth 2 times daily as needed for Congestion 30 tablet 5    lisinopril (PRINIVIL;ZESTRIL) 40 MG tablet TAKE 1 TABLET DAILY 90 tablet 3    potassium chloride (KLOR-CON M) 20 MEQ extended release tablet TAKE 1 TABLET TWICE A  tablet 3    busPIRone (BUSPAR) 10 MG tablet Take 1 tablet by mouth 2 times daily 20 tablet 0    ketoconazole (NIZORAL) 2 % shampoo APPLY TOPICALLY DAILY AS NEEDED 1 Bottle 1    rosuvastatin (CRESTOR) 10 MG tablet Take 1 tablet by mouth daily TAKE 1 TABLET NIGHTLY 90 tablet 3    hydroCHLOROthiazide (MICROZIDE) 12.5 MG capsule TAKE 1 CAPSULE EVERY MORNING 90 capsule 4    Cyanocobalamin (B-12) 1000 MCG SUBL Place 1,000 Units under the tongue daily 90 tablet 3    hydrOXYzine (ATARAX) 50 MG tablet Take 1 tablet by mouth every 4 hours as needed for Itching 270 tablet 1    polyethylene glycol (GLYCOLAX) 17 GM/SCOOP powder 1/2-1 capful in 8 oz water or prune juice qd prn constipation. 3 Bottle 3    carBAMazepine (TEGRETOL-XR) 100 MG extended release tablet Take 1 tablet by mouth 2 times daily 60 tablet 3    acetaminophen (TYLENOL) 500 MG tablet Take 1,000 mg by mouth 3 times daily as needed for Pain      fluocinolone (DERMOTIC) 0.01 % OIL oil Place 1 drop in ear(s) 2 times daily 1 Bottle 3    diphenoxylate-atropine (LOMOTIL) 2.5-0.025 MG per tablet Take 1 tablet by mouth as needed for Diarrhea. Buren Patricia Cholecalciferol (VITAMIN D3) 5000 units CAPS Take 5,000 Units by mouth daily       oxymorphone (OPANA) 5 MG tablet Take 5 mg by mouth 2 times daily. Indications: per DR Sarai George TAKES 3 TIMES A DAY. TRYING TO CUT BACK.  zoledronic acid (RECLAST) 5 MG/100ML SOLN Infuse 5 mg intravenously once IV, yearly      Cetirizine HCl (ZYRTEC PO) Take 1 tablet by mouth daily       aspirin 81 MG chewable tablet Take 81 mg by mouth daily.  Calcium Carbonate-Vit D-Min (CALCIUM 1200 PO) Take 1 capsule by mouth 2 times daily.  Ascorbic Acid (VITAMIN C) 500 MG tablet Take 500 mg by mouth daily. Current Facility-Administered Medications   Medication Dose Route Frequency Provider Last Rate Last Admin    zoledronic acid (RECLAST) 5 mg/100 mL infusion  5 mg IntraVENous See Admin Instructions Aman Haynes  mL/hr at 04/06/16 0900 5 mg at 04/06/16 0900       Return in about 3 months (around 12/20/2021).

## 2021-09-20 NOTE — PATIENT INSTRUCTIONS
When you take antibiotics for a bladder infection, please take a probiotic and eat yogurt daily while on antibiotics. This may help with your diarrhea.

## 2021-09-22 ENCOUNTER — TELEPHONE (OUTPATIENT)
Dept: INTERNAL MEDICINE CLINIC | Age: 71
End: 2021-09-22

## 2021-09-22 DIAGNOSIS — R30.0 DYSURIA: Primary | ICD-10-CM

## 2021-09-22 NOTE — TELEPHONE ENCOUNTER
Urine cx was contaminated. I can place another order for a repeat urine with culture if she would like to repeat it.  saw

## 2021-09-24 ENCOUNTER — TELEPHONE (OUTPATIENT)
Dept: INTERNAL MEDICINE CLINIC | Age: 71
End: 2021-09-24

## 2021-09-24 NOTE — TELEPHONE ENCOUNTER
Patient is requesting results of labs she had done on Monday. Patient advised Dr Jerry Prasad is out of office today and it may be next week before she gets a call back.

## 2021-09-26 PROBLEM — M79.89 RIGHT AXILLARY SWELLING: Status: ACTIVE | Noted: 2021-09-26

## 2021-09-26 PROBLEM — E55.9 VITAMIN D INSUFFICIENCY: Status: ACTIVE | Noted: 2021-09-26

## 2021-09-26 NOTE — ASSESSMENT & PLAN NOTE
Discussed eating small, frequent meals that are higher in protein and lower in carbs to help with low blood sugars. Check hemoglobin A1c.

## 2021-09-26 NOTE — ASSESSMENT & PLAN NOTE
Patient has had a total of 5 doses of Reclast: 2011, 2012, 2016, 2017, 2019. She did not get a Reclast injection in 2020 due to COVID-19 pandemic. Her last bone density was in January 2020 and showed improvement with osteopenia. We will leave off of treatment and repeat bone density again in January 2022. If additional treatment is required we will change her to Prolia as she has already had a total of 5 treatments with Reclast over the past 10 years. Check vitamin D level.

## 2021-09-26 NOTE — ASSESSMENT & PLAN NOTE
Continue Cymbalta 60 mg daily and BuSpar 7.5 mg 3 times daily. Patient has seen Dr. Yee Winston and was released to return as needed.

## 2021-09-26 NOTE — ASSESSMENT & PLAN NOTE
Recommend over-the-counter Coricidin HBP. Continue with Zyrtec. Recommend nasal saline. Is interfering with use of CPAP.

## 2021-10-05 ENCOUNTER — VIRTUAL VISIT (OUTPATIENT)
Dept: NEUROLOGY | Age: 71
End: 2021-10-05
Payer: MEDICARE

## 2021-10-05 DIAGNOSIS — G30.1 LATE ONSET ALZHEIMER'S DISEASE WITHOUT BEHAVIORAL DISTURBANCE (HCC): Primary | ICD-10-CM

## 2021-10-05 DIAGNOSIS — F02.80 LATE ONSET ALZHEIMER'S DISEASE WITHOUT BEHAVIORAL DISTURBANCE (HCC): Primary | ICD-10-CM

## 2021-10-05 DIAGNOSIS — G47.33 OBSTRUCTIVE SLEEP APNEA: ICD-10-CM

## 2021-10-05 PROCEDURE — 99442 PR PHYS/QHP TELEPHONE EVALUATION 11-20 MIN: CPT | Performed by: PSYCHIATRY & NEUROLOGY

## 2021-10-05 RX ORDER — GALANTAMINE HYDROBROMIDE 16 MG/1
CAPSULE, EXTENDED RELEASE ORAL
Qty: 90 CAPSULE | Refills: 1 | Status: SHIPPED | OUTPATIENT
Start: 2021-10-05 | End: 2022-04-11

## 2021-10-05 NOTE — PROGRESS NOTES
TELEHEALTH EVALUATION -- Audio/telephone evaluation (During [facility] public health emergency)    Due to COVID 19 outbreak, patient's office visit was converted to a virtual visit. Patient was contacted and agreed to proceed with a virtual visit via   Telephone Visit   The risks and benefits of converting to a virtual visit were discussed in light of the current infectious disease epidemic. Patient also understood that insurance coverage and co-pays are up to their individual insurance plans. Patricia Gamboa   Neurology followup    Subjective:   CC/HP  History was obtained from the patient. Patient states that she is doing reasonably well. She continues to have some memory impairment but it is not a whole lot different from before. During the last office visit we increased her galantamine dose to 16 mg daily and she is tolerating it fairly well without any side effects. No other new neurological symptoms. Detailed history:  Patient has late onset Alzheimer's type dementia. She is having word finding difficulties. She also feels that she gets confused at times. Patient is on low-dose galantamine. She initially wondered if Aricept caused her diarrhea but later found out that she had a different etiology for the diarrhea. Patient also was diagnosed with obstructive sleep apnea and started a CPAP machine. She is feeling better in that regard.   REVIEW OF SYSTEMS    Constitutional:  []   Chills   []  Fatigue   []  Fevers   []  Malaise   []  Weight loss     [x] Denies all of the above    Respiratory:   [x]  Cough    []  Shortness of breath         [] Denies all of the above     Cardiovascular:   []  Chest pain    []  Exertional chest pressure/discomfort           [] Palpitations    []  Syncope     [x] Denies all of the above        Past Medical History:   Diagnosis Date    Anxiety     Anxiety and depression     Chronic back pain     Clostridium difficile infection 2/22/15    Hyperlipidemia     Hypertension     Insomnia disorder     Osteoarthritis     Osteoporosis 2008    Rheumatic fever     S/P insertion of spinal cord stimulator     Self-catheterizes urinary bladder     as needed 7/8 no longer catherizing     Urinary retention      Family History   Problem Relation Age of Onset    COPD Mother     High Blood Pressure Mother     Rheum Arthritis Mother     Thyroid Disease Mother     Alcohol Abuse Father     Clotting Disorder Sister     Thyroid Disease Sister     Hypertension Brother     Diabetes Sister     Heart Disease Sister     Hypertension Sister     Thyroid Disease Sister     Cancer Brother     Heart Disease Brother     Hypertension Brother     Substance Abuse Brother     Alcohol Abuse Son     No Known Problems Daughter     Dementia Neg Hx      Social History     Socioeconomic History    Marital status:      Spouse name: Not on file    Number of children: Not on file    Years of education: Not on file    Highest education level: Not on file   Occupational History    Occupation: retired   Tobacco Use    Smoking status: Never Smoker    Smokeless tobacco: Never Used   Vaping Use    Vaping Use: Never used   Substance and Sexual Activity    Alcohol use: Not Currently    Drug use: No    Sexual activity: Not on file   Other Topics Concern    Not on file   Social History Narrative    Not on file     Social Determinants of Health     Financial Resource Strain: Low Risk     Difficulty of Paying Living Expenses: Not hard at all   Food Insecurity: No Food Insecurity    Worried About [facility] in the Last Year: Never true    [facility] in the Last Year: Never true   Transportation Needs:     Lack of Transportation (Medical):      Lack of Transportation (Non-Medical):    Physical Activity:     Days of Exercise per Week:     Minutes of Exercise per Session:    Stress:     Feeling of Stress :    Social Connections:     Frequency of Communication with Friends and Family:     Frequency of Social Gatherings with Friends and Family:     Attends [employer] witness Services:     Active Member of Clubs or Organizations:     Attends Club or Organization Meetings:     Marital Status:    Intimate Partner Violence:     Fear of Current or Ex-Partner:     Emotionally Abused:     Physically Abused:     Sexually Abused:         Objective:  Exam:  There were no vitals taken for this visit. This is a telephone visit and a detailed neurological examination could not be performed. Patient sounded awake and alert but was oriented x2. Data :  LABS:  General Labs:    CBC:   Lab Results   Component Value Date    WBC 5.3 09/20/2021    RBC 4.41 09/20/2021    HGB 13.9 09/20/2021    HCT 41.0 09/20/2021    MCV 93.1 09/20/2021    MCH 31.7 09/20/2021    MCHC 34.0 09/20/2021    RDW 13.3 09/20/2021     09/20/2021    MPV 7.8 09/20/2021     BMP:    Lab Results   Component Value Date     09/20/2021    K 4.5 09/20/2021    CL 95 09/20/2021    CO2 23 09/20/2021    BUN 11 09/20/2021    LABALBU 4.6 09/20/2021    CREATININE 0.7 09/20/2021    CALCIUM 10.1 09/20/2021    GFRAA >60 09/20/2021    GFRAA >60 06/06/2013    LABGLOM >60 09/20/2021    GLUCOSE 96 09/20/2021     RADIOLOGY REVIEW:  I have reviewed radiology report(s) of: CT scan of the head    Impression :  Late onset Alzheimer's type dementia, reasonably stable  Obstructive sleep apnea on CPAP  TSH was normal  B12 was borderline and patient is on B12 supplementation    Plan :  Discussed with patient over the telephone  Continue galantamine 16 mg daily  Refilled prescriptions  Continue CPAP  Continue B12 supplementation  I will see her back in 6 months and consider adding Namenda if needed. She will call me sooner if she feels that her symptoms are getting worse. Time spent with patient during this telephone visit was: 11 to 20 minutes      Please note a portion of  this chart was generated using dragon dictation software. Although every effort was made to ensure the accuracy of this automated transcription, some errors in transcription may have occurred. Pursuant to the emergency declaration under the [address] [place] [address] waiver authority and the [employer] Act, this Virtual  Visit was conducted, with patient's consent, to reduce the patient's risk of exposure to COVID-19 and provide continuity of care for an established patient.

## 2021-10-06 ENCOUNTER — TELEPHONE (OUTPATIENT)
Dept: INTERNAL MEDICINE CLINIC | Age: 71
End: 2021-10-06

## 2021-10-06 RX ORDER — BUSPIRONE HYDROCHLORIDE 10 MG/1
10 TABLET ORAL 2 TIMES DAILY
Qty: 180 TABLET | Refills: 3 | Status: SHIPPED | OUTPATIENT
Start: 2021-10-06 | End: 2021-10-20 | Stop reason: SDUPTHER

## 2021-10-06 NOTE — TELEPHONE ENCOUNTER
Patient is requesting for Dr Kassie Dunaway to increase her dose of busPIRone (BUSPAR) 10 MG tablet. She is currently taking 1 tablet 2 times daily. Patient states she uses [address]

## 2021-10-11 ENCOUNTER — TELEPHONE (OUTPATIENT)
Dept: INTERNAL MEDICINE CLINIC | Age: 71
End: 2021-10-11

## 2021-10-11 ENCOUNTER — TELEPHONE (OUTPATIENT)
Dept: PULMONOLOGY | Age: 71
End: 2021-10-11

## 2021-10-11 DIAGNOSIS — N63.10 BREAST MASS, RIGHT: Primary | ICD-10-CM

## 2021-10-11 NOTE — TELEPHONE ENCOUNTER
[facility] scheduling called to say the orders for the patient require new codes for insurance purposes. Insurance will not cover procedure with the current codes. For the mammogram and the right breast ultrasound.

## 2021-10-14 DIAGNOSIS — M79.7 FIBROMYALGIA: ICD-10-CM

## 2021-10-14 RX ORDER — GABAPENTIN 600 MG/1
TABLET ORAL
Qty: 90 TABLET | Refills: 2 | Status: SHIPPED | OUTPATIENT
Start: 2021-10-14 | End: 2022-01-06

## 2021-10-14 RX ORDER — DULOXETIN HYDROCHLORIDE 60 MG/1
CAPSULE, DELAYED RELEASE ORAL
Qty: 30 CAPSULE | Refills: 3 | Status: SHIPPED | OUTPATIENT
Start: 2021-10-14 | End: 2022-02-07

## 2021-10-14 NOTE — PROGRESS NOTES
PATIENT REACHED   YES__X__NO____    PREOP INSTRUCTIONS LEFT ON VM NUMBER_______________      DATE__10/19/21_______ TIME__0945_______ARRIVAL_0845_______PLACE_[facility]___________  NOTHING TO EAT OR DRINK  AFTER MIDNIGHT THE EVENING PRIOR OR AS INSTRUCTED BY YOUR DR.  Golden Lomax NEED A RESPONSIBLE ADULT AGE 18 OR OLDER TO DRIVE YOU HOME  PLEASE BRING INSURANCE CARD. PICTURE ID AND COMPLETE LIST OF MEDS  WEAR LOOSE COMFORTABLE CLOTHING  FOLLOW ANY INSTRUCTIONS YOUR DRS OFFICE HAS GIVEN YOU,INCLUDING WHAT MEDICATIONS TO TAKE THE AM OF PROCEDURE AND WHEN AND IF YOU NEED TO STOP ANY BLOOD THINNERS. IF YOU HAVE QUESTIONS REGARDING THIS CALL THE OFFICE  THE GOAL BLOOD SUGAR THE AM OF PROCEDURE  OR LESS ABOVE THAT THE PROCEDURE MAY BE CANCELLED  ANY QUESTIONS CALL YOUR DOCTOR. ALSO,PLEASE READ THE INSTRUCTION PACKET FROM YOUR DR IF YOU RECEIVED ONE. SPINE INTERVENTION NUMBER [phone]      OTHER___________________________________      VISITOR POLICY(subject to change)      There is a one visitor policy at [facility] for all surgeries and endoscopies. Whether the visitor can stay or will be asked to wait in the car will depend on the current policy and if social distancing can be maintained. The policy is subject to change at any time. Please make sure the visitor has a cell phone that is on,charged and able to accept calls, as this may be the way that the staff communicates with them. Pain management is NO VISITOR policyThe patients ride is expected to remain in the car with a cell phone for communication. If the ride is leaving the hospital grounds please make sure they are back in time for pickup. Have the patient inform the staff on arrival what their rides plans are while the patient is in the facility. At the MAIN there is one visitor allowed. Please note that the visitor policy is subject to change.

## 2021-10-19 ENCOUNTER — APPOINTMENT (OUTPATIENT)
Dept: GENERAL RADIOLOGY | Age: 71
End: 2021-10-19
Attending: ANESTHESIOLOGY
Payer: MEDICARE

## 2021-10-19 ENCOUNTER — HOSPITAL ENCOUNTER (OUTPATIENT)
Age: 71
Setting detail: OUTPATIENT SURGERY
Discharge: HOME | End: 2021-10-19
Attending: ANESTHESIOLOGY | Admitting: ANESTHESIOLOGY
Payer: MEDICARE

## 2021-10-19 VITALS
HEIGHT: 57 IN | SYSTOLIC BLOOD PRESSURE: 128 MMHG | BODY MASS INDEX: 37.32 KG/M2 | WEIGHT: 173 LBS | OXYGEN SATURATION: 97 % | TEMPERATURE: 97.2 F | RESPIRATION RATE: 14 BRPM | HEART RATE: 88 BPM | DIASTOLIC BLOOD PRESSURE: 79 MMHG

## 2021-10-19 PROCEDURE — 2709999900 HC NON-CHARGEABLE SUPPLY: Performed by: ANESTHESIOLOGY

## 2021-10-19 PROCEDURE — 3610000054 HC PAIN LEVEL 3 BASE (NON-OR): Performed by: ANESTHESIOLOGY

## 2021-10-19 PROCEDURE — 77003 FLUOROGUIDE FOR SPINE INJECT: CPT

## 2021-10-19 PROCEDURE — 99152 MOD SED SAME PHYS/QHP 5/>YRS: CPT | Performed by: ANESTHESIOLOGY

## 2021-10-19 PROCEDURE — 6360000002 HC RX W HCPCS: Performed by: ANESTHESIOLOGY

## 2021-10-19 RX ORDER — MIDAZOLAM HYDROCHLORIDE 1 MG/ML
INJECTION INTRAMUSCULAR; INTRAVENOUS
Status: COMPLETED | OUTPATIENT
Start: 2021-10-19 | End: 2021-10-19

## 2021-10-19 RX ORDER — FENTANYL CITRATE 50 UG/ML
INJECTION, SOLUTION INTRAMUSCULAR; INTRAVENOUS
Status: COMPLETED | OUTPATIENT
Start: 2021-10-19 | End: 2021-10-19

## 2021-10-19 RX ORDER — METHYLPREDNISOLONE ACETATE 80 MG/ML
INJECTION, SUSPENSION INTRA-ARTICULAR; INTRALESIONAL; INTRAMUSCULAR; SOFT TISSUE
Status: COMPLETED | OUTPATIENT
Start: 2021-10-19 | End: 2021-10-19

## 2021-10-19 ASSESSMENT — PAIN SCALES - GENERAL
PAINLEVEL_OUTOF10: 0
PAINLEVEL_OUTOF10: 0

## 2021-10-19 ASSESSMENT — PAIN DESCRIPTION - DESCRIPTORS: DESCRIPTORS: OTHER (COMMENT)

## 2021-10-19 ASSESSMENT — PAIN - FUNCTIONAL ASSESSMENT
PAIN_FUNCTIONAL_ASSESSMENT: 0-10
PAIN_FUNCTIONAL_ASSESSMENT: PREVENTS OR INTERFERES SOME ACTIVE ACTIVITIES AND ADLS

## 2021-10-20 ENCOUNTER — TELEPHONE (OUTPATIENT)
Dept: INTERNAL MEDICINE CLINIC | Age: 71
End: 2021-10-20

## 2021-10-20 DIAGNOSIS — Z12.31 ENCOUNTER FOR SCREENING MAMMOGRAM FOR MALIGNANT NEOPLASM OF BREAST: Primary | ICD-10-CM

## 2021-10-20 RX ORDER — BUSPIRONE HYDROCHLORIDE 10 MG/1
10 TABLET ORAL 2 TIMES DAILY
Qty: 180 TABLET | Refills: 3 | Status: SHIPPED | OUTPATIENT
Start: 2021-10-20 | End: 2021-10-25 | Stop reason: SDUPTHER

## 2021-10-20 RX ORDER — ROSUVASTATIN CALCIUM 10 MG/1
TABLET, COATED ORAL
Qty: 30 TABLET | Refills: 3 | Status: SHIPPED | OUTPATIENT
Start: 2021-10-20 | End: 2022-02-07

## 2021-10-20 NOTE — TELEPHONE ENCOUNTER
Buspar. Pt declined NP or another provider in the office. She will wait and see if the Dizziness goes away. Pt also needs her mammogram reordered due to dx her insurance will not pay.

## 2021-10-20 NOTE — TELEPHONE ENCOUNTER
Patient had request doctor increase medication strength the doctor agreed but sent in the same strength to the pharmacy. She also stated she needs a new mammogram order. It was not covered by insurance with the way it was ordered. She says she is having dizzy spells started 3 weeks ago.

## 2021-10-21 NOTE — OP NOTE
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 After obtaining written informed consent, the patient had a 20 g IV placed, and NS was running at 30 ml/hour, the patient was placed in the supine position with the leg slightly flexed. Versed and Fentanyl was given to the patient intravenous, a NC at 4 l was placed and monitors attached. Pre-procedure blood pressure and pulse were stable and recorded in patients clinic chart. MEDICAL NECESSITY: This patient has chronic pain that has failed to respond to conservative measures as outlined in the original history and last physical exam.   The patient's symptoms include Pain and disability of a moderate to severe degree with intermittent refereed pain. The goals of treatment are to   1) Achieve optimal pain control, recognizing that a pain-free state may not be achievable;   2) Minimize adverse outcomes;   3) Enhance functional abilities, and physical and psychological well-being; and   4) Enhance the quality of life for patients with chronic pain.   The patient has documented pathology through radiographic imaging, the patient has the same or similar procedure in the past which resulted in significant improvement more than 50% reduction in the pain, as well improvement in their Activity of daily living and quality of life, and this would be the goal for the first time procedure. PROCEDURE:   The patient was placed in the prone position on fluoroscopy table. The patient was placed in the prone position on fluoroscopy table. The lower back was prepped with ChloraPrep times three and draped in the usual sterile fashion under strict aseptic conditions. The skin over the sacral hiatus was identified under fluoroscopic guidance and infiltrated with 2 ml = 1% Lidocaine for local anesthesia via 25 gauge needle. An 18-gauge Touhy needle was used under fluoroscopic guidance to access the epidural space using loss of resistance to air technique. Following negative aspiration, under lateral projection and AP projection Omnipaque contrast was used to confirm the needle position and contrast spread. A mixture of Depomedrol 80 mg = 2 ml in 4ml of preservative free saline was injected with minimal pressure. There was no evidence of CSF, paresthesia or heme. The needle was cleared with preservative free local anesthetic and removed. Skin was cleaned and a sterile dressing was applied. Following the procedure the patient's vital signs were stable. The patient was discharged home in good condition after being given discharge instructions.     Electronically signed by Audrey Martinez MD on 10/21/2021 at 1:33 PM

## 2021-10-21 NOTE — H&P
Update History & Physical    The patient's History and Physical of October 14, 2021 was reviewed with the patient and I examined the patient. There was no change. The surgical site was confirmed by the patient and me. Plan: The risks, benefits, expected outcome, and alternative to the recommended procedure have been discussed with the patient. Patient understands and wants to proceed with the procedure.      Electronically signed by Claudia Mosley MD on 10/21/2021 at 1:34 PM

## 2021-10-25 RX ORDER — BUSPIRONE HYDROCHLORIDE 10 MG/1
10 TABLET ORAL 3 TIMES DAILY
Qty: 180 TABLET | Refills: 3
Start: 2021-10-25 | End: 2022-06-03 | Stop reason: SDUPTHER

## 2021-10-25 NOTE — TELEPHONE ENCOUNTER
Pt notified. Pt states she is already taking 10 mg twice a day. She was requesting to have to the dose increased.

## 2021-11-09 ENCOUNTER — TELEPHONE (OUTPATIENT)
Dept: INTERNAL MEDICINE CLINIC | Age: 71
End: 2021-11-09

## 2021-11-09 DIAGNOSIS — N63.0 BREAST SWELLING: Primary | ICD-10-CM

## 2021-11-10 NOTE — TELEPHONE ENCOUNTER
Pt notified. Pt states the medication she is on for her arthritis is not working. The Gabapentin is no longer working. She would like something else.

## 2021-11-15 RX ORDER — HYDROCHLOROTHIAZIDE 12.5 MG/1
CAPSULE, GELATIN COATED ORAL
Qty: 30 CAPSULE | Refills: 4 | Status: SHIPPED | OUTPATIENT
Start: 2021-11-15 | End: 2022-04-15

## 2021-11-19 ENCOUNTER — OFFICE VISIT (OUTPATIENT)
Dept: INTERNAL MEDICINE CLINIC | Age: 71
End: 2021-11-19
Payer: MEDICARE

## 2021-11-19 VITALS
OXYGEN SATURATION: 94 % | SYSTOLIC BLOOD PRESSURE: 136 MMHG | HEART RATE: 88 BPM | DIASTOLIC BLOOD PRESSURE: 64 MMHG | BODY MASS INDEX: 38 KG/M2 | WEIGHT: 175.6 LBS

## 2021-11-19 DIAGNOSIS — R42 INTERMITTENT LIGHTHEADEDNESS: ICD-10-CM

## 2021-11-19 DIAGNOSIS — F02.818 LATE ONSET ALZHEIMER'S DEMENTIA WITH BEHAVIORAL DISTURBANCE (HCC): ICD-10-CM

## 2021-11-19 DIAGNOSIS — N32.81 OAB (OVERACTIVE BLADDER): ICD-10-CM

## 2021-11-19 DIAGNOSIS — I10 ESSENTIAL HYPERTENSION: Chronic | ICD-10-CM

## 2021-11-19 DIAGNOSIS — N39.0 RECURRENT UTI: Primary | ICD-10-CM

## 2021-11-19 DIAGNOSIS — R68.2 DRY MOUTH: ICD-10-CM

## 2021-11-19 DIAGNOSIS — M15.9 PRIMARY OSTEOARTHRITIS INVOLVING MULTIPLE JOINTS: Chronic | ICD-10-CM

## 2021-11-19 DIAGNOSIS — G30.1 LATE ONSET ALZHEIMER'S DEMENTIA WITH BEHAVIORAL DISTURBANCE (HCC): ICD-10-CM

## 2021-11-19 DIAGNOSIS — R25.1 TREMOR: ICD-10-CM

## 2021-11-19 LAB
BILIRUBIN, POC: NORMAL
BLOOD URINE, POC: NORMAL
CLARITY, POC: NORMAL
COLOR, POC: NORMAL
GLUCOSE URINE, POC: NORMAL
KETONES, POC: NORMAL
LEUKOCYTE EST, POC: NORMAL
NITRITE, POC: NORMAL
PH, POC: 6.5
PROTEIN, POC: NORMAL
SPECIFIC GRAVITY, POC: 1.02
UROBILINOGEN, POC: 0.2

## 2021-11-19 PROCEDURE — 81002 URINALYSIS NONAUTO W/O SCOPE: CPT | Performed by: INTERNAL MEDICINE

## 2021-11-19 PROCEDURE — 99214 OFFICE O/P EST MOD 30 MIN: CPT | Performed by: INTERNAL MEDICINE

## 2021-11-19 RX ORDER — NABUMETONE 750 MG/1
750 TABLET, FILM COATED ORAL 2 TIMES DAILY
Qty: 180 TABLET | Refills: 3 | Status: SHIPPED | OUTPATIENT
Start: 2021-11-19 | End: 2022-09-09 | Stop reason: ALTCHOICE

## 2021-11-19 NOTE — PROGRESS NOTES
Past Medical History:   Diagnosis Date    Anxiety     Anxiety and depression     Chronic back pain     Clostridium difficile infection 2/22/15    Hyperlipidemia     Hypertension     Insomnia disorder     Osteoarthritis     Osteoporosis 2008    Rheumatic fever     S/P insertion of spinal cord stimulator     Self-catheterizes urinary bladder     as needed 7/8 no longer catherizing     Urinary retention       Past Surgical History:   Procedure Laterality Date    BLADDER REPAIR      CARPAL TUNNEL RELEASE      COLONOSCOPY      CYSTOSCOPY  10/29/2012    bladder biopsy    CYSTOSCOPY N/A 10/19/2020    FLEXIBLE CYSTOSCOPY performed by Vincenzo Sears MD at [facility]      INTRACAPSULAR CATARACT EXTRACTION Right 7/18/2019    PHACOEMULSIFICATION WITH INTRAOCULAR LENS IMPLANT performed by Gage Taylor MD at [address] [facility] EXTRACTION Left 7/26/2019    PHACOEMULSIFICATION WITH INTRAOCULAR LENS IMPLANT performed by Gage Taylor MD at [place] [address]    MANDIBLE RECONSTRUCTION      OTHER SURGICAL HISTORY      spinal cord stimulator    PAIN MANAGEMENT PROCEDURE N/A 10/19/2021    CAUDAL EPIDURAL STEROID INJECTION WITH FLUOROSCOPY performed by Morgan Oh MD at [address] [place] [address] Right 10/18/13      Social History     Socioeconomic History    Marital status:       Spouse name: Not on file    Number of children: Not on file    Years of education: Not on file    Highest education level: Not on file   Occupational History    Occupation: retired   Tobacco Use    Smoking status: Never Smoker    Smokeless tobacco: Never Used   Vaping Use    Vaping Use: Never used   Substance and Sexual Activity    Alcohol use: Not Currently    Drug use: No    Sexual activity: Not on file   Other Topics Concern    Not on file   Social History Narrative    Not on file Social Determinants of Health     Financial Resource Strain: Low Risk     Difficulty of Paying Living Expenses: Not hard at all   Food Insecurity: No Food Insecurity    Worried About Running Out of Food in the Last Year: Never true    Ryanne of Food in the Last Year: Never true   Transportation Needs:     Lack of Transportation (Medical): Not on file    Lack of Transportation (Non-Medical):  Not on file   Physical Activity:     Days of Exercise per Week: Not on file    Minutes of Exercise per Session: Not on file   Stress:     Feeling of Stress : Not on file   Social Connections:     Frequency of Communication with Friends and Family: Not on file    Frequency of Social Gatherings with Friends and Family: Not on file    Attends Jehovah's witness Services: Not on file    Active Member of Clubs or Organizations: Not on file    Attends Club or Organization Meetings: Not on file    Marital Status: Not on file   Intimate Partner Violence:     Fear of Current or Ex-Partner: Not on file    Emotionally Abused: Not on file    Physically Abused: Not on file    Sexually Abused: Not on file   Housing Stability:     Unable to Pay for Housing in the Last Year: Not on file    Number of [place] in the Last Year: Not on file    Unstable Housing in the Last Year: Not on file     Family History   Problem Relation Age of Onset    COPD Mother     High Blood Pressure Mother     Rheum Arthritis Mother     Thyroid Disease Mother     Alcohol Abuse Father     Clotting Disorder Sister     Thyroid Disease Sister     Hypertension Brother     Diabetes Sister     Heart Disease Sister     Hypertension Sister     Thyroid Disease Sister     Cancer Brother     Heart Disease Brother     Hypertension Brother     Substance Abuse Brother     Alcohol Abuse Son     No Known Problems Daughter     Dementia Neg Hx         Outpatient Medications Prior to Visit   Medication Sig Dispense Refill    hydroCHLOROthiazide (MICROZIDE) 12.5 MG capsule TAKE 1 CAPSULE BY MOUTH EVERY MORNING 30 capsule 4    busPIRone (BUSPAR) 10 MG tablet Take 1 tablet by mouth 3 times daily 180 tablet 3    rosuvastatin (CRESTOR) 10 MG tablet TAKE (1) TABLET BY MOUTH NIGHTLY 30 tablet 3    gabapentin (NEURONTIN) 600 MG tablet TAKE 1 TABLET BY MOUTH THREE TIMES DAILY 90 tablet 2    DULoxetine (CYMBALTA) 60 MG extended release capsule TAKE 1 CAPSULE BY MOUTH EVERY DAY 30 capsule 3    diclofenac sodium (VOLTAREN) 1 % GEL       galantamine (RAZADYNE ER) 16 MG extended release capsule TAKE 1 CAPSULE BY MOUTH DAILY WITH BREAKFAST 90 capsule 1    fluocinolone (DERMA-SMOOTHE) 0.01 % external oil APPLY TOPICALLY TWICE A WEEK 1 each 3    azelastine (ASTELIN) 0.1 % nasal spray Use 2 sprays in each nostril 2 times daily. Use fluticasone 15 minutes after the morning dose of astelin.  1 Bottle 2    nystatin (MYCOSTATIN) 810711 UNIT/ML suspension TAKE 5 MLS BY MOUTH FOUR TIMES A DAY FOR 10 DAYS, RETAIN IN MOUTH AS LONG AS POSSIBLE 3 Bottle 3    traZODone (DESYREL) 100 MG tablet TAKE TWO (2) TABLETS BY MOUTH EVERY NIGHT 60 tablet 5    fluticasone (FLONASE) 50 MCG/ACT nasal spray SPRAY TWO SPRAYS IN EACH NOSTRIL ONCE DAILY 1 Bottle 0    baclofen (LIORESAL) 10 MG tablet Take 1 tablet by mouth 3 times daily as needed (pain and spasm) 60 tablet 2    oxybutynin (DITROPAN XL) 15 MG extended release tablet Take 1 tablet by mouth daily 90 tablet 3    budesonide (ENTOCORT EC) 3 MG extended release capsule Take 6 mg by mouth every morning      pseudoephedrine (DECONGESTANT) 30 MG tablet Take 1 tablet by mouth 2 times daily as needed for Congestion 30 tablet 5    lisinopril (PRINIVIL;ZESTRIL) 40 MG tablet TAKE 1 TABLET DAILY 90 tablet 3    potassium chloride (KLOR-CON M) 20 MEQ extended release tablet TAKE 1 TABLET TWICE A  tablet 3    ketoconazole (NIZORAL) 2 % shampoo APPLY TOPICALLY DAILY AS NEEDED 1 Bottle 1    Cyanocobalamin (B-12) 1000 MCG SUBL Place well-developed. She is not toxic-appearing. HENT:      Head: Normocephalic. Right Ear: Tympanic membrane, ear canal and external ear normal.      Left Ear: Tympanic membrane, ear canal and external ear normal.      Mouth/Throat:      Pharynx: No oropharyngeal exudate or posterior oropharyngeal erythema. Eyes:      General: No scleral icterus. Extraocular Movements: Extraocular movements intact. Conjunctiva/sclera: Conjunctivae normal.      Pupils: Pupils are equal, round, and reactive to light. Neck:      Thyroid: No thyroid mass or thyromegaly. Vascular: No carotid bruit. Cardiovascular:      Rate and Rhythm: Normal rate and regular rhythm. Heart sounds: Normal heart sounds. No murmur heard. Pulmonary:      Effort: Pulmonary effort is normal.      Breath sounds: Normal breath sounds. Musculoskeletal:      Right lower leg: No edema. Left lower leg: No edema. Lymphadenopathy:      Cervical: No cervical adenopathy. Neurological:      General: No focal deficit present. Mental Status: She is alert and oriented to person, place, and time. Motor: Tremor present. Comments: Mild resting tremor. Psychiatric:         Mood and Affect: Mood normal.         Behavior: Behavior normal. Behavior is cooperative. ASSESSMENT/PLAN:    Problem List Items Addressed This Visit     Dry mouth      Likely related to oxybutynin. We will treat with nystatin suspension in case there is some thrush as well. Essential hypertension (Chronic)     Continue lisinopril 40 mg daily and hydrochlorothiazide 12.5 mg daily. Well-controlled. Intermittent lightheadedness      Encourage patient to stand up slowly. Late onset Alzheimer's dementia with behavioral disturbance [facility])     Following with Dr. Tolbert  from neurology. Remains on Razadyne ER 8 mg daily. Has noticed some worsening of her memory.   Does use [facility] pharmacy to help with medication management. Will need growth chart shows that care as patient is unclear exactly what she is taking in her list appears to be out of date. OAB (overactive bladder)      Continue oxybutynin ER 15 mg daily. Discussed that this is likely contributing to her dry mouth. Primary osteoarthritis involving multiple joints      Discontinue diclofenac. Trial of Relafen 750 mg twice daily. Most recent creatinine was normal.         Relevant Medications    nabumetone (RELAFEN) 750 MG tablet    Recurrent UTI - Primary (Chronic)     Send urine for culture. Needs to follow-up with urology. Relevant Orders    POCT Urinalysis no Micro (Completed)    Culture, Urine (Completed)    Tremor      Suspect somehow related to medication as her tremors seem to happen after taking her morning medication and then resolved several hours later. Encouraged to discuss with Dr. Say Donovan at her follow-up visit.                Current Outpatient Medications   Medication Sig Dispense Refill    nabumetone (RELAFEN) 750 MG tablet Take 1 tablet by mouth 2 times daily 180 tablet 3    nystatin (MYCOSTATIN) 291830 UNIT/ML suspension Take 5 mLs by mouth 4 times daily for 10 days 200 mL 0    hydroCHLOROthiazide (MICROZIDE) 12.5 MG capsule TAKE 1 CAPSULE BY MOUTH EVERY MORNING 30 capsule 4    busPIRone (BUSPAR) 10 MG tablet Take 1 tablet by mouth 3 times daily 180 tablet 3    rosuvastatin (CRESTOR) 10 MG tablet TAKE (1) TABLET BY MOUTH NIGHTLY 30 tablet 3    gabapentin (NEURONTIN) 600 MG tablet TAKE 1 TABLET BY MOUTH THREE TIMES DAILY 90 tablet 2    DULoxetine (CYMBALTA) 60 MG extended release capsule TAKE 1 CAPSULE BY MOUTH EVERY DAY 30 capsule 3    diclofenac sodium (VOLTAREN) 1 % GEL       galantamine (RAZADYNE ER) 16 MG extended release capsule TAKE 1 CAPSULE BY MOUTH DAILY WITH BREAKFAST 90 capsule 1    fluocinolone (DERMA-SMOOTHE) 0.01 % external oil APPLY TOPICALLY TWICE A WEEK 1 each 3    azelastine (ASTELIN) 0.1 % nasal spray Use 2 sprays in each nostril 2 times daily. Use fluticasone 15 minutes after the morning dose of astelin. 1 Bottle 2    nystatin (MYCOSTATIN) 380850 UNIT/ML suspension TAKE 5 MLS BY MOUTH FOUR TIMES A DAY FOR 10 DAYS, RETAIN IN MOUTH AS LONG AS POSSIBLE 3 Bottle 3    traZODone (DESYREL) 100 MG tablet TAKE TWO (2) TABLETS BY MOUTH EVERY NIGHT 60 tablet 5    fluticasone (FLONASE) 50 MCG/ACT nasal spray SPRAY TWO SPRAYS IN EACH NOSTRIL ONCE DAILY 1 Bottle 0    baclofen (LIORESAL) 10 MG tablet Take 1 tablet by mouth 3 times daily as needed (pain and spasm) 60 tablet 2    oxybutynin (DITROPAN XL) 15 MG extended release tablet Take 1 tablet by mouth daily 90 tablet 3    budesonide (ENTOCORT EC) 3 MG extended release capsule Take 6 mg by mouth every morning      pseudoephedrine (DECONGESTANT) 30 MG tablet Take 1 tablet by mouth 2 times daily as needed for Congestion 30 tablet 5    lisinopril (PRINIVIL;ZESTRIL) 40 MG tablet TAKE 1 TABLET DAILY 90 tablet 3    potassium chloride (KLOR-CON M) 20 MEQ extended release tablet TAKE 1 TABLET TWICE A  tablet 3    ketoconazole (NIZORAL) 2 % shampoo APPLY TOPICALLY DAILY AS NEEDED 1 Bottle 1    Cyanocobalamin (B-12) 1000 MCG SUBL Place 1,000 Units under the tongue daily 90 tablet 3    hydrOXYzine (ATARAX) 50 MG tablet Take 1 tablet by mouth every 4 hours as needed for Itching 270 tablet 1    polyethylene glycol (GLYCOLAX) 17 GM/SCOOP powder 1/2-1 capful in 8 oz water or prune juice qd prn constipation. 3 Bottle 3    carBAMazepine (TEGRETOL-XR) 100 MG extended release tablet Take 1 tablet by mouth 2 times daily 60 tablet 3    acetaminophen (TYLENOL) 500 MG tablet Take 1,000 mg by mouth 3 times daily as needed for Pain      fluocinolone (DERMOTIC) 0.01 % OIL oil Place 1 drop in ear(s) 2 times daily 1 Bottle 3    diphenoxylate-atropine (LOMOTIL) 2.5-0.025 MG per tablet Take 1 tablet by mouth as needed for Diarrhea. [facility] Cholecalciferol (VITAMIN D3) 5000 units CAPS Take 5,000 Units by mouth daily       oxymorphone (OPANA) 5 MG tablet Take 5 mg by mouth 2 times daily. Indications: per DR Jeanie Kumar TAKES 3 TIMES A DAY. TRYING TO CUT BACK.  zoledronic acid (RECLAST) 5 MG/100ML SOLN Infuse 5 mg intravenously once IV, yearly      Cetirizine HCl (ZYRTEC PO) Take 1 tablet by mouth daily       aspirin 81 MG chewable tablet Take 81 mg by mouth daily.  Calcium Carbonate-Vit D-Min (CALCIUM 1200 PO) Take 1 capsule by mouth 2 times daily.  Ascorbic Acid (VITAMIN C) 500 MG tablet Take 500 mg by mouth daily. Current Facility-Administered Medications   Medication Dose Route Frequency Provider Last Rate Last Admin    zoledronic acid (RECLAST) 5 mg/100 mL infusion  5 mg IntraVENous See Admin Instructions Sebastian Britton  mL/hr at 04/06/16 0900 5 mg at 04/06/16 0900       Return in about 4 months (around 3/19/2022).

## 2021-11-19 NOTE — PATIENT INSTRUCTIONS
Stop DICLOFENAC. Start NABUMETONE (RELAFEN) 750 mg twice daily for arthritis. Please bring your medications with you to your next appointment. We are moving! Our last day at [address] will be November 30. We will have virtual appointments only on December 1 and in December 2 while we are in the process of moving. We will open in her new office on December 3. Our new office is located behind [employer] on [address]. It is about 3 minutes from her current office.     New office address:  Jose De Jesus [address] [place] [address], [address] [place] [address]  [phone]

## 2021-11-21 PROBLEM — M54.41 ACUTE RIGHT-SIDED LOW BACK PAIN WITH RIGHT-SIDED SCIATICA: Status: RESOLVED | Noted: 2019-05-07 | Resolved: 2021-11-21

## 2021-11-21 PROBLEM — F02.818 LATE ONSET ALZHEIMER'S DEMENTIA WITH BEHAVIORAL DISTURBANCE (HCC): Status: ACTIVE | Noted: 2020-12-11

## 2021-11-21 PROBLEM — M19.049 ARTHRITIS OF HAND: Status: RESOLVED | Noted: 2021-06-15 | Resolved: 2021-11-21

## 2021-11-21 PROBLEM — R68.2 DRY MOUTH: Status: ACTIVE | Noted: 2021-11-21

## 2021-11-21 PROBLEM — R59.9 ENLARGED LYMPH NODE: Status: RESOLVED | Noted: 2018-05-21 | Resolved: 2021-11-21

## 2021-11-21 PROBLEM — M79.89 RIGHT AXILLARY SWELLING: Status: RESOLVED | Noted: 2021-09-26 | Resolved: 2021-11-21

## 2021-11-21 PROBLEM — R42 INTERMITTENT LIGHTHEADEDNESS: Status: ACTIVE | Noted: 2021-11-21

## 2021-11-21 PROBLEM — R06.09 DYSPNEA ON EXERTION: Status: RESOLVED | Noted: 2019-03-15 | Resolved: 2021-11-21

## 2021-11-21 PROBLEM — R10.11 RUQ PAIN: Status: RESOLVED | Noted: 2020-02-05 | Resolved: 2021-11-21

## 2021-11-21 PROBLEM — R68.84 JAW PAIN: Status: RESOLVED | Noted: 2021-04-14 | Resolved: 2021-11-21

## 2021-11-21 PROBLEM — B35.1 MYCOTIC TOENAILS: Status: RESOLVED | Noted: 2018-07-06 | Resolved: 2021-11-21

## 2021-11-21 PROBLEM — J01.00 SUBACUTE MAXILLARY SINUSITIS: Status: RESOLVED | Noted: 2018-09-25 | Resolved: 2021-11-21

## 2021-11-21 PROBLEM — R25.1 TREMOR: Status: ACTIVE | Noted: 2021-11-21

## 2021-11-21 PROBLEM — B37.0 ORAL THRUSH: Status: RESOLVED | Noted: 2021-06-15 | Resolved: 2021-11-21

## 2021-11-21 LAB — URINE CULTURE, ROUTINE: NORMAL

## 2021-11-22 NOTE — ASSESSMENT & PLAN NOTE
Suspect somehow related to medication as her tremors seem to happen after taking her morning medication and then resolved several hours later. Encouraged to discuss with Dr. Berto Benavides at her follow-up visit.

## 2021-11-22 NOTE — ASSESSMENT & PLAN NOTE
Likely related to oxybutynin. We will treat with nystatin suspension in case there is some thrush as well.

## 2021-11-22 NOTE — ASSESSMENT & PLAN NOTE
Following with Dr. Paz Dubois from neurology. Remains on Razadyne ER 8 mg daily. Has noticed some worsening of her memory. [facility] help with medication management. Will need growth chart shows that care as patient is unclear exactly what she is taking in her list appears to be out of date.

## 2021-11-23 ENCOUNTER — TELEPHONE (OUTPATIENT)
Dept: INTERNAL MEDICINE CLINIC | Age: 71
End: 2021-11-23

## 2021-11-23 NOTE — TELEPHONE ENCOUNTER
Pt has requested medication sent to the pharmacy. She also was given her results of urine culture. She states she is still having issues urinating, fatigue, and feels like she has a UTI.

## 2021-11-23 NOTE — TELEPHONE ENCOUNTER
She does not have a UTI. Urine cx was negative. Can you please check with [facility] and see if she has been getting diclofenac? Cannot start Relefan and take diclofenac as well. Can you also please confirm medications with [facility]?      Thanks

## 2021-11-24 ENCOUNTER — TELEPHONE (OUTPATIENT)
Dept: INTERNAL MEDICINE CLINIC | Age: 71
End: 2021-11-24

## 2021-11-24 NOTE — TELEPHONE ENCOUNTER
Pt calling has terrible cough -taste isn't great--can hardly get up so weak---wondering if could have covid --told her only way to know is if she gets tested but she has no one to take her and can't get anywhere her self---please call pt. Thanks.

## 2022-01-03 ENCOUNTER — OFFICE VISIT (OUTPATIENT)
Dept: NEUROLOGY | Age: 72
End: 2022-01-03
Payer: MEDICARE

## 2022-01-03 VITALS
HEART RATE: 85 BPM | BODY MASS INDEX: 37.76 KG/M2 | WEIGHT: 175 LBS | SYSTOLIC BLOOD PRESSURE: 131 MMHG | HEIGHT: 57 IN | DIASTOLIC BLOOD PRESSURE: 78 MMHG

## 2022-01-03 DIAGNOSIS — F02.80 LATE ONSET ALZHEIMER'S DISEASE WITHOUT BEHAVIORAL DISTURBANCE (HCC): Primary | ICD-10-CM

## 2022-01-03 DIAGNOSIS — G47.33 OBSTRUCTIVE SLEEP APNEA: ICD-10-CM

## 2022-01-03 DIAGNOSIS — E66.01 SEVERE OBESITY (BMI 35.0-35.9 WITH COMORBIDITY) (HCC): ICD-10-CM

## 2022-01-03 DIAGNOSIS — G30.1 LATE ONSET ALZHEIMER'S DISEASE WITHOUT BEHAVIORAL DISTURBANCE (HCC): Primary | ICD-10-CM

## 2022-01-03 PROCEDURE — 99214 OFFICE O/P EST MOD 30 MIN: CPT | Performed by: PSYCHIATRY & NEUROLOGY

## 2022-01-03 RX ORDER — MEMANTINE HYDROCHLORIDE 5 MG/1
TABLET ORAL
Qty: 60 TABLET | Refills: 2 | Status: SHIPPED | OUTPATIENT
Start: 2022-01-03 | End: 2022-04-13 | Stop reason: SDUPTHER

## 2022-01-03 NOTE — PROGRESS NOTES
Maile Neville   Neurology followup    Subjective:   CC/HP  History was obtained from the patient. Patient has late onset Alzheimer's type dementia. Patient feels that her memory impairment is getting much worse in the last several months. She is having word finding difficulties. She also feels that she gets confused at times. Patient is on low-dose galantamine. She initially wondered if Aricept caused her diarrhea but later found out that she had a different etiology for the diarrhea. Patient also was diagnosed with obstructive sleep apnea and started a CPAP machine. She is feeling better in that regard.     REVIEW OF SYSTEMS    Constitutional:  []   Chills   []  Fatigue   []  Fevers   []  Malaise   []  Weight loss     [x] Denies all of the above    Respiratory:   []  Cough    []  Shortness of breath         [x] Denies all of the above     Cardiovascular:   []  Chest pain    []  Exertional chest pressure/discomfort           [] Palpitations    []  Syncope     [x] Denies all of the above        Past Medical History:   Diagnosis Date    Anxiety     Anxiety and depression     Chronic back pain     Clostridium difficile infection 2/22/15    Hyperlipidemia     Hypertension     Insomnia disorder     Osteoarthritis     Osteoporosis 2008    Rheumatic fever     S/P insertion of spinal cord stimulator     Self-catheterizes urinary bladder     as needed 7/8 no longer catherizing     Urinary retention      Family History   Problem Relation Age of Onset    COPD Mother     High Blood Pressure Mother     Rheum Arthritis Mother     Thyroid Disease Mother     Alcohol Abuse Father     Clotting Disorder Sister     Thyroid Disease Sister     Hypertension Brother     Diabetes Sister     Heart Disease Sister     Hypertension Sister     Thyroid Disease Sister     Cancer Brother     Heart Disease Brother     Hypertension Brother     Substance Abuse Brother     Alcohol Abuse Son     No Known Problems Daughter     Dementia Neg Hx      Social History     Socioeconomic History    Marital status:      Spouse name: Not on file    Number of children: Not on file    Years of education: Not on file    Highest education level: Not on file   Occupational History    Occupation: retired   Tobacco Use    Smoking status: Never Smoker    Smokeless tobacco: Never Used   Vaping Use    Vaping Use: Never used   Substance and Sexual Activity    Alcohol use: Not Currently    Drug use: No    Sexual activity: Not on file   Other Topics Concern    Not on file   Social History Narrative    Not on file     Social Determinants of Health     Financial Resource Strain: Low Risk     Difficulty of Paying Living Expenses: Not hard at all   Food Insecurity: No Food Insecurity    Worried About [address] in the Last Year: Never true    [address] in the Last Year: Never true   Transportation Needs:     Lack of Transportation (Medical): Not on file    Lack of Transportation (Non-Medical):  Not on file   Physical Activity:     Days of Exercise per Week: Not on file    Minutes of Exercise per Session: Not on file   Stress:     Feeling of Stress : Not on file   Social Connections:     Frequency of Communication with Friends and Family: Not on file    Frequency of Social Gatherings with Friends and Family: Not on file    Attends Jain Services: Not on file    Active Member of [address] [place] [address] [employer] or Organizations: Not on file    Attends Club or Organization Meetings: Not on file    Marital Status: Not on file   Intimate Partner Violence:     Fear of Current or Ex-Partner: Not on file    Emotionally Abused: Not on file    Physically Abused: Not on file    Sexually Abused: Not on file   Housing Stability:     Unable to Pay for Housing in the Last Year: Not on file    Number of [place] in the Last Year: Not on file    Unstable Housing in the Last Year: Not on file        Objective:  Exam:  /78 Pulse 85   Ht 4' 9\" (1.448 m)   Wt 175 lb (79.4 kg)   BMI 37.87 kg/m²   This is a well-nourished patient in no acute distress  Patient is awake, alert and oriented x2. Speech is normal. Impaired short-term memory  Pupils are equal round reacting to light. Extraocular movements intact. Face symmetrical. Tongue midline. Motor exam shows normal symmetrical strength. Deep tendon reflexes normal. Plantar reflexes downgoing. Sensory exam normal. Coordination normal. Gait normal. No carotid bruit. No neck stiffness. Data :  LABS:  General Labs:    CBC:   Lab Results   Component Value Date    WBC 5.3 09/20/2021    RBC 4.41 09/20/2021    HGB 13.9 09/20/2021    HCT 41.0 09/20/2021    MCV 93.1 09/20/2021    MCH 31.7 09/20/2021    MCHC 34.0 09/20/2021    RDW 13.3 09/20/2021     09/20/2021    MPV 7.8 09/20/2021     BMP:    Lab Results   Component Value Date     09/20/2021    K 4.5 09/20/2021    CL 95 09/20/2021    CO2 23 09/20/2021    BUN 11 09/20/2021    LABALBU 4.6 09/20/2021    CREATININE 0.7 09/20/2021    CALCIUM 10.1 09/20/2021    GFRAA >60 09/20/2021    GFRAA >60 06/06/2013    LABGLOM >60 09/20/2021    GLUCOSE 96 09/20/2021     RADIOLOGY REVIEW:  I have reviewed radiology report(s) of: CT scan of the head    Impression :  Late onset Alzheimer's type dementia, symptoms slowly worsening  Obstructive sleep apnea, now on CPAP  TSH was normal  B12 was borderline and patient is on B12 supplementation    Plan :  Discussed with patient  Continue galantamine 60 mg daily  I will add Namenda 5 mg twice daily  Side effects were discussed. Continue B12 supplementation  Continue using CPAP  I will see her back in 3 months         Please note a portion of  this chart was generated using dragon dictation software. Although every effort was made to ensure the accuracy of this automated transcription, some errors in transcription may have occurred.

## 2022-01-04 RX ORDER — OXYBUTYNIN CHLORIDE 15 MG/1
TABLET, EXTENDED RELEASE ORAL
Qty: 30 TABLET | Refills: 3 | Status: SHIPPED | OUTPATIENT
Start: 2022-01-04 | End: 2022-05-13

## 2022-01-06 DIAGNOSIS — M79.7 FIBROMYALGIA: ICD-10-CM

## 2022-01-06 RX ORDER — GABAPENTIN 600 MG/1
TABLET ORAL
Qty: 90 TABLET | Refills: 2 | Status: SHIPPED | OUTPATIENT
Start: 2022-01-06 | End: 2022-04-15

## 2022-01-10 RX ORDER — TRAZODONE HYDROCHLORIDE 100 MG/1
TABLET ORAL
Qty: 60 TABLET | Refills: 5 | Status: SHIPPED | OUTPATIENT
Start: 2022-01-10 | End: 2022-07-07

## 2022-01-11 NOTE — ASSESSMENT & PLAN NOTE
----- Message from Lisa Chaves MD sent at 1/10/2022  1:04 PM CST -----  All labs for inflammatory arthritis came back negative Recommend referral to podiatry. She will check with her insurance to see who is covered.

## 2022-02-07 DIAGNOSIS — I10 ESSENTIAL HYPERTENSION: Chronic | ICD-10-CM

## 2022-02-07 RX ORDER — ROSUVASTATIN CALCIUM 10 MG/1
TABLET, COATED ORAL
Qty: 30 TABLET | Refills: 3 | Status: SHIPPED | OUTPATIENT
Start: 2022-02-07 | End: 2022-06-09

## 2022-02-07 RX ORDER — DULOXETIN HYDROCHLORIDE 60 MG/1
CAPSULE, DELAYED RELEASE ORAL
Qty: 30 CAPSULE | Refills: 3 | Status: SHIPPED | OUTPATIENT
Start: 2022-02-07 | End: 2022-06-03 | Stop reason: SDUPTHER

## 2022-02-07 RX ORDER — LISINOPRIL 40 MG/1
TABLET ORAL
Qty: 30 TABLET | Refills: 3 | Status: SHIPPED | OUTPATIENT
Start: 2022-02-07 | End: 2022-06-09

## 2022-02-07 RX ORDER — POTASSIUM CHLORIDE 20 MEQ/1
TABLET, EXTENDED RELEASE ORAL
Qty: 60 TABLET | Refills: 3 | Status: SHIPPED | OUTPATIENT
Start: 2022-02-07 | End: 2022-06-10

## 2022-02-07 NOTE — TELEPHONE ENCOUNTER
Phone call to patient. Answered her question regarding a deviated septum. I advised her that that probably is not the reason she has a need for CPAP. The reason she needs CPAP is obstructive sleep apnea syndrome. However the deviated septum if severe enough could contribute to the obstructive sleep apnea syndrome and straightening the septum and opening up the nose may improve the sleep apnea or render the nasal CPAP more effective but would probably not cure or resolve the obstructive sleep apnea syndrome. She stated she is not interested at this time in pursuing septal reconstructive surgery but will contact me if she does decide to proceed. In addition. I asked her about her Covid status. She did have the Covid vaccine about a year ago. She also had a Covid test about 1 month ago at the [place] urgent [facility] which did come back positive. She currently has no symptoms of Covid. medications

## 2022-02-23 DIAGNOSIS — J30.0 CHRONIC VASOMOTOR RHINITIS: Chronic | ICD-10-CM

## 2022-03-01 RX ORDER — BACLOFEN 10 MG/1
TABLET ORAL
Qty: 60 TABLET | Refills: 2 | Status: SHIPPED | OUTPATIENT
Start: 2022-03-01 | End: 2022-10-21 | Stop reason: SDUPTHER

## 2022-03-02 ENCOUNTER — OFFICE VISIT (OUTPATIENT)
Dept: INTERNAL MEDICINE CLINIC | Age: 72
End: 2022-03-02
Payer: MEDICARE

## 2022-03-02 VITALS
DIASTOLIC BLOOD PRESSURE: 74 MMHG | SYSTOLIC BLOOD PRESSURE: 120 MMHG | BODY MASS INDEX: 39.56 KG/M2 | HEART RATE: 72 BPM | OXYGEN SATURATION: 97 % | WEIGHT: 182.8 LBS

## 2022-03-02 DIAGNOSIS — R30.0 DYSURIA: ICD-10-CM

## 2022-03-02 DIAGNOSIS — R68.2 DRY MOUTH: ICD-10-CM

## 2022-03-02 DIAGNOSIS — E78.5 HYPERLIPIDEMIA LDL GOAL <100: ICD-10-CM

## 2022-03-02 DIAGNOSIS — E16.2 HYPOGLYCEMIA: ICD-10-CM

## 2022-03-02 DIAGNOSIS — R53.83 FATIGUE, UNSPECIFIED TYPE: ICD-10-CM

## 2022-03-02 DIAGNOSIS — F33.1 MODERATE EPISODE OF RECURRENT MAJOR DEPRESSIVE DISORDER (HCC): ICD-10-CM

## 2022-03-02 DIAGNOSIS — E16.2 HYPOGLYCEMIA: Primary | ICD-10-CM

## 2022-03-02 DIAGNOSIS — E55.9 VITAMIN D INSUFFICIENCY: ICD-10-CM

## 2022-03-02 DIAGNOSIS — F02.818 LATE ONSET ALZHEIMER'S DEMENTIA WITH BEHAVIORAL DISTURBANCE (HCC): ICD-10-CM

## 2022-03-02 DIAGNOSIS — J30.0 CHRONIC VASOMOTOR RHINITIS: Chronic | ICD-10-CM

## 2022-03-02 DIAGNOSIS — G30.1 LATE ONSET ALZHEIMER'S DEMENTIA WITH BEHAVIORAL DISTURBANCE (HCC): ICD-10-CM

## 2022-03-02 DIAGNOSIS — I10 ESSENTIAL HYPERTENSION: ICD-10-CM

## 2022-03-02 DIAGNOSIS — E66.01 CLASS 2 SEVERE OBESITY DUE TO EXCESS CALORIES WITH SERIOUS COMORBIDITY AND BODY MASS INDEX (BMI) OF 38.0 TO 38.9 IN ADULT (HCC): ICD-10-CM

## 2022-03-02 LAB
BASOPHILS ABSOLUTE: 0 K/UL (ref 0–0.2)
BASOPHILS RELATIVE PERCENT: 0.7 %
BILIRUBIN, POC: NORMAL
BLOOD URINE, POC: NORMAL
CLARITY, POC: NORMAL
COLOR, POC: NORMAL
EOSINOPHILS ABSOLUTE: 0.2 K/UL (ref 0–0.6)
EOSINOPHILS RELATIVE PERCENT: 2.6 %
GLUCOSE URINE, POC: NORMAL
HCT VFR BLD CALC: 40.8 % (ref 36–48)
HEMOGLOBIN: 14 G/DL (ref 12–16)
KETONES, POC: NORMAL
LEUKOCYTE EST, POC: NORMAL
LYMPHOCYTES ABSOLUTE: 1.9 K/UL (ref 1–5.1)
LYMPHOCYTES RELATIVE PERCENT: 32.4 %
MCH RBC QN AUTO: 32.2 PG (ref 26–34)
MCHC RBC AUTO-ENTMCNC: 34.2 G/DL (ref 31–36)
MCV RBC AUTO: 94 FL (ref 80–100)
MONOCYTES ABSOLUTE: 0.5 K/UL (ref 0–1.3)
MONOCYTES RELATIVE PERCENT: 7.8 %
NEUTROPHILS ABSOLUTE: 3.4 K/UL (ref 1.7–7.7)
NEUTROPHILS RELATIVE PERCENT: 56.5 %
NITRITE, POC: NORMAL
PDW BLD-RTO: 12.7 % (ref 12.4–15.4)
PH, POC: 6
PLATELET # BLD: 213 K/UL (ref 135–450)
PMV BLD AUTO: 7.8 FL (ref 5–10.5)
PROTEIN, POC: NORMAL
RBC # BLD: 4.34 M/UL (ref 4–5.2)
SPECIFIC GRAVITY, POC: 1.01
UROBILINOGEN, POC: 0.2
WBC # BLD: 6 K/UL (ref 4–11)

## 2022-03-02 PROCEDURE — 81002 URINALYSIS NONAUTO W/O SCOPE: CPT | Performed by: INTERNAL MEDICINE

## 2022-03-02 PROCEDURE — 99214 OFFICE O/P EST MOD 30 MIN: CPT | Performed by: INTERNAL MEDICINE

## 2022-03-02 PROCEDURE — 3288F FALL RISK ASSESSMENT DOCD: CPT | Performed by: INTERNAL MEDICINE

## 2022-03-02 RX ORDER — GLUCOSAMINE HCL/CHONDROITIN SU 500-400 MG
CAPSULE ORAL
Qty: 50 STRIP | Refills: 5 | Status: SHIPPED | OUTPATIENT
Start: 2022-03-02

## 2022-03-02 RX ORDER — LANCETS 30 GAUGE
EACH MISCELLANEOUS
Qty: 50 EACH | Refills: 5 | Status: SHIPPED | OUTPATIENT
Start: 2022-03-02

## 2022-03-02 RX ORDER — AZELASTINE 1 MG/ML
SPRAY, METERED NASAL
Qty: 1 EACH | Refills: 5 | Status: SHIPPED | OUTPATIENT
Start: 2022-03-02

## 2022-03-02 ASSESSMENT — PATIENT HEALTH QUESTIONNAIRE - PHQ9
1. LITTLE INTEREST OR PLEASURE IN DOING THINGS: 1
2. FEELING DOWN, DEPRESSED OR HOPELESS: 0
SUM OF ALL RESPONSES TO PHQ QUESTIONS 1-9: 1
SUM OF ALL RESPONSES TO PHQ9 QUESTIONS 1 & 2: 1
SUM OF ALL RESPONSES TO PHQ QUESTIONS 1-9: 1

## 2022-03-02 NOTE — PROGRESS NOTES
Patient: Kale Saucedo is a 67 y.o. female who presents today with the following Chief Complaint(s):  Chief Complaint   Patient presents with    Check-Up       HPI     Here today for follow up. Is not feeling well today. Hasn't felt well for the past 2 weeks. Is just very tired. Has had some increased congestion. Has had COVID twice in July and November 2021. Bowels have been \"messed up\" recently. Is wondering if she has low blood sugar. Had an \"attack\" the other day where she just could not stop shaking. Took 2-3 sugar pills and then ate and felt better. Does not have a home glucometer. Has noticed improvement in arthritis pain with Relafen. Would like to double up on days that her arthritis flares. Has been trying to reach ENT- would like refill on Astelin. C/o soreness on her tongue- worse with eating salty foods. Also has a dry mouth. Dry mouth seems to come and go with soreness on her tongue. Did see Dr. Mecca Palacios in January. Added Namenda to Razadyne. Has been compliant with CPAP for the most part. Anxiety and depression- about the same to maybe a little worse with current events.     Allergies   Allergen Reactions    Amlodipine     Ativan [Lorazepam] Swelling     Tongue swelling    Sulfa Antibiotics Swelling    Keflex [Cephalexin] Other (See Comments)     Leg cramps      Past Medical History:   Diagnosis Date    Anxiety     Anxiety and depression     Chronic back pain     Clostridium difficile infection 2/22/15    Hyperlipidemia     Hypertension     Insomnia disorder     Osteoarthritis     Osteoporosis 2008    Rheumatic fever     S/P insertion of spinal cord stimulator     Self-catheterizes urinary bladder     as needed 7/8 no longer catherizing     Urinary retention       Past Surgical History:   Procedure Laterality Date    BLADDER REPAIR      CARPAL TUNNEL RELEASE      COLONOSCOPY      CYSTOSCOPY  10/29/2012    bladder biopsy    CYSTOSCOPY N/A 10/19/2020    FLEXIBLE CYSTOSCOPY performed by Trent Hough MD at [address] [facility]      INTRACAPSULAR CATARACT EXTRACTION Right 7/18/2019    PHACOEMULSIFICATION WITH INTRAOCULAR LENS IMPLANT performed by Anibal Salazar MD at [address] EXTRACTION Left 7/26/2019    PHACOEMULSIFICATION WITH INTRAOCULAR LENS IMPLANT performed by Anibal Salazar MD at [place] [address]    MANDIBLE RECONSTRUCTION      OTHER SURGICAL HISTORY      spinal cord stimulator    PAIN MANAGEMENT PROCEDURE N/A 10/19/2021    CAUDAL EPIDURAL STEROID INJECTION WITH FLUOROSCOPY performed by Jodi Lambert MD at [facility] Right 10/18/13      Social History     Socioeconomic History    Marital status:      Spouse name: Not on file    Number of children: Not on file    Years of education: Not on file    Highest education level: Not on file   Occupational History    Occupation: retired   Tobacco Use    Smoking status: Never Smoker    Smokeless tobacco: Never Used   Vaping Use    Vaping Use: Never used   Substance and Sexual Activity    Alcohol use: Not Currently    Drug use: No    Sexual activity: Not on file   Other Topics Concern    Not on file   Social History Narrative    Not on file     Social Determinants of Health     Financial Resource Strain: Low Risk     Difficulty of Paying Living Expenses: Not hard at all   Food Insecurity: No Food Insecurity    Worried About [facility] in the Last Year: Never true    [facility] in the Last Year: Never true   Transportation Needs:     Lack of Transportation (Medical): Not on file    Lack of Transportation (Non-Medical):  Not on file   Physical Activity:     Days of Exercise per Week: Not on file    Minutes of Exercise per Session: Not on file   Stress:     Feeling of Stress : Not on file   Social Connections:     Frequency of Communication with Friends and Family: Not on file    Frequency of Social Gatherings with Friends and Family: Not on file    Attends Mu-ism Services: Not on file    Active Member of Clubs or Organizations: Not on file    Attends Club or Organization Meetings: Not on file    Marital Status: Not on file   Intimate Partner Violence:     Fear of Current or Ex-Partner: Not on file    Emotionally Abused: Not on file    Physically Abused: Not on file    Sexually Abused: Not on file   Housing Stability:     Unable to Pay for Housing in the Last Year: Not on file    Number of Places Lived in the Last Year: Not on file    Unstable Housing in the Last Year: Not on file     Family History   Problem Relation Age of Onset    COPD Mother     High Blood Pressure Mother     Rheum Arthritis Mother     Thyroid Disease Mother     Alcohol Abuse Father     Clotting Disorder Sister     Thyroid Disease Sister     Hypertension Brother     Diabetes Sister     Heart Disease Sister     Hypertension Sister     Thyroid Disease Sister     Cancer Brother     Heart Disease Brother     Hypertension Brother     Substance Abuse Brother     Alcohol Abuse Son     No Known Problems Daughter     Dementia Neg Hx         Outpatient Medications Prior to Visit   Medication Sig Dispense Refill    baclofen (LIORESAL) 10 MG tablet TAKE ONE TABLET BY MOUTH THREE TIMES A DAY AS NEEDED FOR PAIN AND SPASM 60 tablet 2    potassium chloride (KLOR-CON M) 20 MEQ extended release tablet TAKE 1 TABLET BY MOUTH TWICE DAILY 60 tablet 3    lisinopril (PRINIVIL;ZESTRIL) 40 MG tablet TAKE 1 TABLET BY MOUTH EVERY DAY 30 tablet 3    DULoxetine (CYMBALTA) 60 MG extended release capsule TAKE 1 CAPSULE BY MOUTH EVERY DAY 30 capsule 3    rosuvastatin (CRESTOR) 10 MG tablet TAKE 1 TABLET BY MOUTH NIGHTLY 30 tablet 3    traZODone (DESYREL) 100 MG tablet TAKE TWO (2) TABLETS BY MOUTH EVERY NIGHT 60 tablet 5    gabapentin (NEURONTIN) 600 MG tablet TAKE 1 TABLET BY MOUTH THREE TIMES DAILY 90 tablet 2    oxybutynin (DITROPAN XL) 15 MG extended release tablet TAKE 1 TABLET BY MOUTH DAILY *REPLACES OXYBUTININ ER 10 MG* 30 tablet 3    memantine (NAMENDA) 5 MG tablet Take 1 tablet daily for 1 week and then 1 tablet by mouth twice daily 60 tablet 2    nabumetone (RELAFEN) 750 MG tablet Take 1 tablet by mouth 2 times daily 180 tablet 3    hydroCHLOROthiazide (MICROZIDE) 12.5 MG capsule TAKE 1 CAPSULE BY MOUTH EVERY MORNING 30 capsule 4    busPIRone (BUSPAR) 10 MG tablet Take 1 tablet by mouth 3 times daily 180 tablet 3    diclofenac sodium (VOLTAREN) 1 % GEL       galantamine (RAZADYNE ER) 16 MG extended release capsule TAKE 1 CAPSULE BY MOUTH DAILY WITH BREAKFAST 90 capsule 1    fluocinolone (DERMA-SMOOTHE) 0.01 % external oil APPLY TOPICALLY TWICE A WEEK 1 each 3    nystatin (MYCOSTATIN) 047881 UNIT/ML suspension TAKE 5 MLS BY MOUTH FOUR TIMES A DAY FOR 10 DAYS, RETAIN IN MOUTH AS LONG AS POSSIBLE 3 Bottle 3    fluticasone (FLONASE) 50 MCG/ACT nasal spray SPRAY TWO SPRAYS IN EACH NOSTRIL ONCE DAILY 1 Bottle 0    budesonide (ENTOCORT EC) 3 MG extended release capsule Take 6 mg by mouth every morning      pseudoephedrine (DECONGESTANT) 30 MG tablet Take 1 tablet by mouth 2 times daily as needed for Congestion 30 tablet 5    ketoconazole (NIZORAL) 2 % shampoo APPLY TOPICALLY DAILY AS NEEDED 1 Bottle 1    Cyanocobalamin (B-12) 1000 MCG SUBL Place 1,000 Units under the tongue daily 90 tablet 3    hydrOXYzine (ATARAX) 50 MG tablet Take 1 tablet by mouth every 4 hours as needed for Itching 270 tablet 1    polyethylene glycol (GLYCOLAX) 17 GM/SCOOP powder 1/2-1 capful in 8 oz water or prune juice qd prn constipation.  3 Bottle 3    carBAMazepine (TEGRETOL-XR) 100 MG extended release tablet Take 1 tablet by mouth 2 times daily 60 tablet 3    acetaminophen (TYLENOL) 500 MG tablet Take 1,000 mg by mouth 3 times daily as needed for Pain      tenderness. Left Sinus: Maxillary sinus tenderness present. No frontal sinus tenderness. Mouth/Throat:      Dentition: Has dentures. Tongue: Tongue deviates from midline (not new; deviates to left initially then midline). Pharynx: No oropharyngeal exudate or posterior oropharyngeal erythema. Comments: Tongue dry, furrowed. Eyes:      General: No scleral icterus. Extraocular Movements: Extraocular movements intact. Conjunctiva/sclera: Conjunctivae normal.      Pupils: Pupils are equal, round, and reactive to light. Neck:      Thyroid: No thyroid mass or thyromegaly. Vascular: No carotid bruit. Cardiovascular:      Rate and Rhythm: Normal rate and regular rhythm. Heart sounds: Normal heart sounds. No murmur heard. Pulmonary:      Effort: Pulmonary effort is normal.      Breath sounds: Normal breath sounds. Abdominal:      Palpations: Abdomen is soft. Tenderness: There is abdominal tenderness in the suprapubic area. Musculoskeletal:      Right lower leg: No edema. Left lower leg: No edema. Lymphadenopathy:      Cervical: No cervical adenopathy. Neurological:      General: No focal deficit present. Mental Status: She is alert and oriented to person, place, and time. Psychiatric:         Mood and Affect: Mood normal.         Behavior: Behavior normal. Behavior is cooperative. ASSESSMENT/PLAN:    Problem List Items Addressed This Visit     Chronic vasomotor rhinitis (Chronic)      Astelin refilled for patient today. Relevant Medications    azelastine (ASTELIN) 0.1 % nasal spray    Class 2 severe obesity due to excess calories with serious comorbidity and body mass index (BMI) of 38.0 to 38.9 in [facility]) (Chronic)      Patient working on diet. Dry mouth      Check B12. Treat with nystatin in case she has some underlying thrush. Dysuria     UA mildly abnormal.  Will await for urine culture.   If urine culture is positive, will treat with antibiotics. Relevant Orders    POCT Urinalysis no Micro (Completed)    Culture, Urine (Completed)    Essential hypertension (Chronic)     Continue lisinopril 40 mg daily and hydrochlorothiazide 12.5 mg daily. Well-controlled. Check BMP. Fatigue     Encouraged continued compliance with CPAP. Relevant Orders    CBC with Auto Differential (Completed)    Urinalysis    Culture, Urine    TSH with Reflex (Completed)    Vitamin B12 & Folate (Completed)    Hyperlipidemia LDL goal <100      Continue Crestor 10 mg daily. Check lipid panel, CMP. Relevant Orders    Lipid Panel (Completed)    Comprehensive Metabolic Panel (Completed)    Hypoglycemia - Primary     Reviewed signs symptoms and treatment of hypoglycemia. Patient given order for home glucometer. Check hemoglobin A1c. Relevant Medications    blood glucose monitor strips    Lancets MISC    blood glucose monitor kit and supplies    Other Relevant Orders    Hemoglobin A1C (Completed)    Late onset Alzheimer's dementia with behavioral disturbance [facility])     Following with Dr. Aubrey Ulrich from neurology. Remains on Razadyne ER 8 mg daily. Recently started on Namenda. Feels relatively stable. Moderate episode of recurrent major depressive disorder (HCC) (Chronic)     Continue Cymbalta 60 mg daily and BuSpar 7.5 mg 3 times daily. Patient has seen Dr. Billie Williamson in the past that may return as needed. Vitamin D insufficiency     Check vitamin D level. Relevant Orders    Vitamin D 25 Hydroxy (Completed)          Current Outpatient Medications   Medication Sig Dispense Refill    blood glucose monitor strips Check sugars qam and prn s/s of low blood sugars 50 strip 5    Lancets MISC Check sugars qam and prn s/s of low blood sugars 50 each 5    azelastine (ASTELIN) 0.1 % nasal spray Use 2 sprays in each nostril 2 times daily.   Use fluticasone 15 minutes after the morning dose of astelin.  1 each 5    nystatin (MYCOSTATIN) 151301 UNIT/ML suspension Take 5 mLs by mouth 4 times daily 60 mL 0    blood glucose monitor kit and supplies Check sugars qam and prn s/s of low blood sugars 1 kit 0    baclofen (LIORESAL) 10 MG tablet TAKE ONE TABLET BY MOUTH THREE TIMES A DAY AS NEEDED FOR PAIN AND SPASM 60 tablet 2    potassium chloride (KLOR-CON M) 20 MEQ extended release tablet TAKE 1 TABLET BY MOUTH TWICE DAILY 60 tablet 3    lisinopril (PRINIVIL;ZESTRIL) 40 MG tablet TAKE 1 TABLET BY MOUTH EVERY DAY 30 tablet 3    DULoxetine (CYMBALTA) 60 MG extended release capsule TAKE 1 CAPSULE BY MOUTH EVERY DAY 30 capsule 3    rosuvastatin (CRESTOR) 10 MG tablet TAKE 1 TABLET BY MOUTH NIGHTLY 30 tablet 3    traZODone (DESYREL) 100 MG tablet TAKE TWO (2) TABLETS BY MOUTH EVERY NIGHT 60 tablet 5    gabapentin (NEURONTIN) 600 MG tablet TAKE 1 TABLET BY MOUTH THREE TIMES DAILY 90 tablet 2    oxybutynin (DITROPAN XL) 15 MG extended release tablet TAKE 1 TABLET BY MOUTH DAILY *REPLACES OXYBUTININ ER 10 MG* 30 tablet 3    memantine (NAMENDA) 5 MG tablet Take 1 tablet daily for 1 week and then 1 tablet by mouth twice daily 60 tablet 2    nabumetone (RELAFEN) 750 MG tablet Take 1 tablet by mouth 2 times daily 180 tablet 3    hydroCHLOROthiazide (MICROZIDE) 12.5 MG capsule TAKE 1 CAPSULE BY MOUTH EVERY MORNING 30 capsule 4    busPIRone (BUSPAR) 10 MG tablet Take 1 tablet by mouth 3 times daily 180 tablet 3    diclofenac sodium (VOLTAREN) 1 % GEL       galantamine (RAZADYNE ER) 16 MG extended release capsule TAKE 1 CAPSULE BY MOUTH DAILY WITH BREAKFAST 90 capsule 1    fluocinolone (DERMA-SMOOTHE) 0.01 % external oil APPLY TOPICALLY TWICE A WEEK 1 each 3    nystatin (MYCOSTATIN) 896877 UNIT/ML suspension TAKE 5 MLS BY MOUTH FOUR TIMES A DAY FOR 10 DAYS, RETAIN IN MOUTH AS LONG AS POSSIBLE 3 Bottle 3    fluticasone (FLONASE) 50 MCG/ACT nasal spray SPRAY TWO SPRAYS IN EACH NOSTRIL ONCE DAILY 1 Bottle 0    budesonide (ENTOCORT EC) 3 MG extended release capsule Take 6 mg by mouth every morning      pseudoephedrine (DECONGESTANT) 30 MG tablet Take 1 tablet by mouth 2 times daily as needed for Congestion 30 tablet 5    ketoconazole (NIZORAL) 2 % shampoo APPLY TOPICALLY DAILY AS NEEDED 1 Bottle 1    Cyanocobalamin (B-12) 1000 MCG SUBL Place 1,000 Units under the tongue daily 90 tablet 3    hydrOXYzine (ATARAX) 50 MG tablet Take 1 tablet by mouth every 4 hours as needed for Itching 270 tablet 1    polyethylene glycol (GLYCOLAX) 17 GM/SCOOP powder 1/2-1 capful in 8 oz water or prune juice qd prn constipation. 3 Bottle 3    carBAMazepine (TEGRETOL-XR) 100 MG extended release tablet Take 1 tablet by mouth 2 times daily 60 tablet 3    acetaminophen (TYLENOL) 500 MG tablet Take 1,000 mg by mouth 3 times daily as needed for Pain      fluocinolone (DERMOTIC) 0.01 % OIL oil Place 1 drop in ear(s) 2 times daily 1 Bottle 3    diphenoxylate-atropine (LOMOTIL) 2.5-0.025 MG per tablet Take 1 tablet by mouth as needed for Diarrhea. [facility] Cholecalciferol (VITAMIN D3) 5000 units CAPS Take 5,000 Units by mouth daily       oxymorphone (OPANA) 5 MG tablet Take 5 mg by mouth 2 times daily. Indications: per DR Terral Sicard TAKES 3 TIMES A DAY. TRYING TO CUT BACK.  zoledronic acid (RECLAST) 5 MG/100ML SOLN Infuse 5 mg intravenously once IV, yearly      Cetirizine HCl (ZYRTEC PO) Take 1 tablet by mouth daily       aspirin 81 MG chewable tablet Take 81 mg by mouth daily.  Calcium Carbonate-Vit D-Min (CALCIUM 1200 PO) Take 1 capsule by mouth 2 times daily.  Ascorbic Acid (VITAMIN C) 500 MG tablet Take 500 mg by mouth daily.          Current Facility-Administered Medications   Medication Dose Route Frequency Provider Last Rate Last Admin    zoledronic acid (RECLAST) 5 mg/100 mL infusion  5 mg IntraVENous See Admin Instructions Rita Tang  mL/hr at 04/06/16 0900 5 mg at 04/06/16 0900       Return in about 3 months (around 6/2/2022) for sooner if needed.

## 2022-03-03 LAB
A/G RATIO: 2 (ref 1.1–2.2)
ALBUMIN SERPL-MCNC: 4.6 G/DL (ref 3.4–5)
ALP BLD-CCNC: 38 U/L (ref 40–129)
ALT SERPL-CCNC: 26 U/L (ref 10–40)
ANION GAP SERPL CALCULATED.3IONS-SCNC: 14 MMOL/L (ref 3–16)
AST SERPL-CCNC: 28 U/L (ref 15–37)
BILIRUB SERPL-MCNC: 0.3 MG/DL (ref 0–1)
BUN BLDV-MCNC: 10 MG/DL (ref 7–20)
CALCIUM SERPL-MCNC: 9.6 MG/DL (ref 8.3–10.6)
CHLORIDE BLD-SCNC: 95 MMOL/L (ref 99–110)
CHOLESTEROL, TOTAL: 142 MG/DL (ref 0–199)
CO2: 25 MMOL/L (ref 21–32)
CREAT SERPL-MCNC: 0.6 MG/DL (ref 0.6–1.2)
ESTIMATED AVERAGE GLUCOSE: 99.7 MG/DL
FOLATE: 15.2 NG/ML (ref 4.78–24.2)
GFR AFRICAN AMERICAN: >60
GFR NON-AFRICAN AMERICAN: >60
GLUCOSE BLD-MCNC: 88 MG/DL (ref 70–99)
HBA1C MFR BLD: 5.1 %
HDLC SERPL-MCNC: 65 MG/DL (ref 40–60)
LDL CHOLESTEROL CALCULATED: 41 MG/DL
POTASSIUM SERPL-SCNC: 4.4 MMOL/L (ref 3.5–5.1)
SODIUM BLD-SCNC: 134 MMOL/L (ref 136–145)
TOTAL PROTEIN: 6.9 G/DL (ref 6.4–8.2)
TRIGL SERPL-MCNC: 178 MG/DL (ref 0–150)
TSH REFLEX: 1.52 UIU/ML (ref 0.27–4.2)
VITAMIN B-12: >2000 PG/ML (ref 211–911)
VITAMIN D 25-HYDROXY: 48.6 NG/ML
VLDLC SERPL CALC-MCNC: 36 MG/DL

## 2022-03-03 RX ORDER — AZELASTINE 1 MG/ML
SPRAY, METERED NASAL
Qty: 30 ML | OUTPATIENT
Start: 2022-03-03

## 2022-03-04 ENCOUNTER — TELEPHONE (OUTPATIENT)
Dept: INTERNAL MEDICINE CLINIC | Age: 72
End: 2022-03-04

## 2022-03-04 LAB — URINE CULTURE, ROUTINE: NORMAL

## 2022-03-07 NOTE — ASSESSMENT & PLAN NOTE
Reviewed signs symptoms and treatment of hypoglycemia. Patient given order for home glucometer. Check hemoglobin A1c.

## 2022-03-07 NOTE — ASSESSMENT & PLAN NOTE
UA mildly abnormal.  Will await for urine culture. If urine culture is positive, will treat with antibiotics.

## 2022-03-07 NOTE — ASSESSMENT & PLAN NOTE
Following with Dr. Aubrey Ulrich from neurology. Remains on Razadyne ER 8 mg daily. Recently started on Namenda. Feels relatively stable.

## 2022-03-07 NOTE — ASSESSMENT & PLAN NOTE
Continue Cymbalta 60 mg daily and BuSpar 7.5 mg 3 times daily. Patient has seen Dr. Lauren Lopez in the past that may return as needed.

## 2022-03-08 ENCOUNTER — TELEPHONE (OUTPATIENT)
Dept: INTERNAL MEDICINE CLINIC | Age: 72
End: 2022-03-08

## 2022-03-11 ENCOUNTER — TELEPHONE (OUTPATIENT)
Dept: INTERNAL MEDICINE CLINIC | Age: 72
End: 2022-03-11

## 2022-03-18 ENCOUNTER — TELEPHONE (OUTPATIENT)
Dept: INTERNAL MEDICINE CLINIC | Age: 72
End: 2022-03-18

## 2022-03-18 RX ORDER — LEVOCETIRIZINE DIHYDROCHLORIDE 5 MG/1
5 TABLET, FILM COATED ORAL NIGHTLY
Qty: 30 TABLET | Refills: 2 | Status: SHIPPED | OUTPATIENT
Start: 2022-03-18 | End: 2022-09-09

## 2022-03-18 RX ORDER — FLUTICASONE PROPIONATE 50 MCG
SPRAY, SUSPENSION (ML) NASAL
Qty: 1 EACH | Status: SHIPPED | OUTPATIENT
Start: 2022-03-18

## 2022-03-18 NOTE — TELEPHONE ENCOUNTER
Pt calling asking for some cough medicine ---she is coughing a lot at night keeping her awake from allergies and sinus---please let pt know if you can. Thanks.

## 2022-03-18 NOTE — TELEPHONE ENCOUNTER
Pt gets like this when the season change. If she can't get something called in she would like to know what is best OTC.

## 2022-03-18 NOTE — TELEPHONE ENCOUNTER
flonase is OTC  Use 2 sprays each nostril daily  Also get Xyzal also otc or the generic of it and take 1 at bedtime. Generics of both are fine. I alfonzo send to the pharmacy to make sure you get the correct things    May find saline nose spray or nasal rinse to be helpful also.

## 2022-03-21 ENCOUNTER — OFFICE VISIT (OUTPATIENT)
Dept: INTERNAL MEDICINE CLINIC | Age: 72
End: 2022-03-21
Payer: MEDICARE

## 2022-03-21 VITALS
HEART RATE: 71 BPM | DIASTOLIC BLOOD PRESSURE: 88 MMHG | BODY MASS INDEX: 39.82 KG/M2 | OXYGEN SATURATION: 96 % | SYSTOLIC BLOOD PRESSURE: 138 MMHG | WEIGHT: 184 LBS

## 2022-03-21 DIAGNOSIS — J30.2 SEASONAL ALLERGIES: ICD-10-CM

## 2022-03-21 DIAGNOSIS — R09.81 CHRONIC NASAL CONGESTION: ICD-10-CM

## 2022-03-21 DIAGNOSIS — R68.2 DRY MOUTH: Primary | ICD-10-CM

## 2022-03-21 PROCEDURE — 99214 OFFICE O/P EST MOD 30 MIN: CPT | Performed by: NURSE PRACTITIONER

## 2022-03-21 ASSESSMENT — ENCOUNTER SYMPTOMS
CHEST TIGHTNESS: 0
SINUS PRESSURE: 0
SINUS PAIN: 0
FACIAL SWELLING: 0
TROUBLE SWALLOWING: 0
SORE THROAT: 0
WHEEZING: 0
SHORTNESS OF BREATH: 0
RHINORRHEA: 0

## 2022-03-21 NOTE — PROGRESS NOTES
3/21/22     Chief Complaint   Patient presents with    Oral Swelling     Tongue swelling, started this weekend. \"Feels odd like it is curling up on the sides\"     HPI    Here for an acute visit   States her tongue feels \"odd\", chronic dry mouth   States it looks like it is curling up on the sides  Denies any swelling or dyspnea    Saw PCP a few weeks ago, she was treated with nystatin for underlying thrush - this did help. Nystatin did not resolve issues and they started worsening again over the weekend. States her allergies are worse this time of year   She is taking zyrtec, coricidin, sudafed, flonase and astelin   States she is \"all dried up\"   Using saline every once in awhile   Using her cpap at night. She denies any drainage, ear pressure, sinus pain, fever, chills    Allergies   Allergen Reactions    Amlodipine     Ativan [Lorazepam] Swelling     Tongue swelling    Sulfa Antibiotics Swelling    Keflex [Cephalexin] Other (See Comments)     Leg cramps     Current Outpatient Medications   Medication Sig Dispense Refill    fluticasone (FLONASE) 50 MCG/ACT nasal spray SPRAY TWO SPRAYS IN EACH NOSTRIL ONCE DAILY DURING ALLERGY SEASON 1 each prn    levocetirizine (XYZAL) 5 MG tablet Take 1 tablet by mouth nightly During allergy season 30 tablet 2    blood glucose monitor strips Check sugars qam and prn s/s of low blood sugars 50 strip 5    Lancets MISC Check sugars qam and prn s/s of low blood sugars 50 each 5    azelastine (ASTELIN) 0.1 % nasal spray Use 2 sprays in each nostril 2 times daily. Use fluticasone 15 minutes after the morning dose of astelin.  1 each 5    nystatin (MYCOSTATIN) 309875 UNIT/ML suspension Take 5 mLs by mouth 4 times daily 60 mL 0    blood glucose monitor kit and supplies Check sugars qam and prn s/s of low blood sugars 1 kit 0    baclofen (LIORESAL) 10 MG tablet TAKE ONE TABLET BY MOUTH THREE TIMES A DAY AS NEEDED FOR PAIN AND SPASM 60 tablet 2    potassium chloride prune juice qd prn constipation. 3 Bottle 3    carBAMazepine (TEGRETOL-XR) 100 MG extended release tablet Take 1 tablet by mouth 2 times daily 60 tablet 3    acetaminophen (TYLENOL) 500 MG tablet Take 1,000 mg by mouth 3 times daily as needed for Pain      fluocinolone (DERMOTIC) 0.01 % OIL oil Place 1 drop in ear(s) 2 times daily 1 Bottle 3    diphenoxylate-atropine (LOMOTIL) 2.5-0.025 MG per tablet Take 1 tablet by mouth as needed for Diarrhea. Laurie Perfect Cholecalciferol (VITAMIN D3) 5000 units CAPS Take 5,000 Units by mouth daily       oxymorphone (OPANA) 5 MG tablet Take 5 mg by mouth 2 times daily. Indications: per DR Lawrence العراقي TAKES 3 TIMES A DAY. TRYING TO CUT BACK.  zoledronic acid (RECLAST) 5 MG/100ML SOLN Infuse 5 mg intravenously once IV, yearly      aspirin 81 MG chewable tablet Take 81 mg by mouth daily.  Calcium Carbonate-Vit D-Min (CALCIUM 1200 PO) Take 1 capsule by mouth 2 times daily.  Ascorbic Acid (VITAMIN C) 500 MG tablet Take 500 mg by mouth daily. Current Facility-Administered Medications   Medication Dose Route Frequency Provider Last Rate Last Admin    zoledronic acid (RECLAST) 5 mg/100 mL infusion  5 mg IntraVENous See Admin Instructions Savannah Paez  mL/hr at 04/06/16 0900 5 mg at 04/06/16 0900     Review of Systems   Constitutional: Negative for chills, fatigue and fever. HENT: Negative for dental problem, ear pain, facial swelling, rhinorrhea, sinus pressure, sinus pain, sore throat and trouble swallowing. Respiratory: Negative for chest tightness, shortness of breath and wheezing. Using cpap    Cardiovascular: Negative for chest pain, palpitations and leg swelling. Neurological: Negative for dizziness, tremors, light-headedness and headaches. Vitals:    03/21/22 1151   BP: 138/88   Pulse: 71   SpO2: 96%   Weight: 184 lb (83.5 kg)      Physical Exam  Constitutional:       General: She is not in acute distress.      Appearance: Normal appearance. She is not ill-appearing. HENT:      Head: Normocephalic and atraumatic. Right Ear: Tympanic membrane and ear canal normal.      Left Ear: Tympanic membrane and ear canal normal.      Nose:      Right Sinus: No maxillary sinus tenderness or frontal sinus tenderness. Left Sinus: No maxillary sinus tenderness or frontal sinus tenderness. Mouth/Throat:      Mouth: Mucous membranes are dry. No oral lesions or angioedema. Pharynx: No oropharyngeal exudate or posterior oropharyngeal erythema. Comments: Tongue dry and furrowed  Cardiovascular:      Rate and Rhythm: Normal rate. Heart sounds: Normal heart sounds. Pulmonary:      Effort: Pulmonary effort is normal. No respiratory distress. Breath sounds: Normal breath sounds. Neurological:      General: No focal deficit present. Mental Status: She is alert. Mental status is at baseline. Psychiatric:         Mood and Affect: Mood normal.         Behavior: Behavior normal.       Assessment/Plan:  Dry mouth  Uncontrolled, improved some with nystatin   No swelling noted on exam   Reviewed supportive care with hydration and avoiding drying medicaitons (see below)  Use biotene mouth wash   Reviewed PCP note from 2 weeks ago with pt  Reviewed recent blood work with pt     Chronic nasal congestion/ Seasonal allergies  Allergy symptoms controlled today but continues to take lots of decongestants and antihistamines - decrease use. Reviewed medication use, risk and s/e - including dry mouth      Discussed medications with patient, who voiced understanding of their use and indications. All questions answered.     FU criteria reviewed     Electronically signed by WILLOW Michele CNP on 3/21/2022 at 12:21 PM

## 2022-03-29 ENCOUNTER — TELEPHONE (OUTPATIENT)
Dept: INTERNAL MEDICINE CLINIC | Age: 72
End: 2022-03-29

## 2022-03-29 DIAGNOSIS — G89.29 CHRONIC LEFT HIP PAIN: Primary | ICD-10-CM

## 2022-03-29 DIAGNOSIS — M25.552 CHRONIC LEFT HIP PAIN: Primary | ICD-10-CM

## 2022-03-29 NOTE — TELEPHONE ENCOUNTER
Can see ortho at [facility]. Not sure who specifically would be able to treat this problem but will place referral.     Also, how was dx made? Did she have an MRI done? Where? I cannot find anything in Epic.      Devyn Hernandez MD   [address] [place] [address], [address] [place] [address],[address]   Juan Francisco Granado, [address] [facility] [address]   Phone: [phone]

## 2022-03-29 NOTE — TELEPHONE ENCOUNTER
Spoke with pt and she states the pain doctor looked at it and told her it was torn muscle. She had no other testing. Pt was given address and telephone number.

## 2022-03-29 NOTE — TELEPHONE ENCOUNTER
Patient states she is having pain left thigh and hip. She states her pain doctor advised her that she has a torn muscle. Patient asked if Dr Rudolph Brito could refer her to a physician that can help her with the torn muscle or what she should do to treat it.

## 2022-04-07 ENCOUNTER — OFFICE VISIT (OUTPATIENT)
Dept: ORTHOPEDIC SURGERY | Age: 72
End: 2022-04-07
Payer: MEDICARE

## 2022-04-07 VITALS — BODY MASS INDEX: 37.38 KG/M2 | WEIGHT: 185.4 LBS | HEIGHT: 59 IN

## 2022-04-07 DIAGNOSIS — M54.10 RADICULAR PAIN: ICD-10-CM

## 2022-04-07 DIAGNOSIS — M54.16 LUMBAR RADICULOPATHY: Primary | ICD-10-CM

## 2022-04-07 PROCEDURE — 99204 OFFICE O/P NEW MOD 45 MIN: CPT | Performed by: ORTHOPAEDIC SURGERY

## 2022-04-07 RX ORDER — METHYLPREDNISOLONE 4 MG/1
TABLET ORAL
Qty: 1 KIT | Refills: 0 | Status: SHIPPED | OUTPATIENT
Start: 2022-04-07 | End: 2022-04-13

## 2022-04-07 NOTE — PROGRESS NOTES
CHIEF COMPLAINT: left leg pain    History:   Ms. Amber Grewal is a 67 y.o. female  here for evaluation of left leg pain. The patient was referred by Kami Alfonso DO. She reports prior history of lumbar spine surgery many years ago. She does see pain management physician and has a spinal cord stimulator. This is evaluated as a personal injury. Pain is rated as a 7/10. There was not a history of injury. Pain began approximately 2 months ago. Pain is located mostly in her buttock and goes down the back of her thigh. Pain is worse with sitting  The patient denies left-sided numbness / tingling in the Leg. The patient does note pain that radiates down the left Leg. The patient does not have any bowel or bladder incontinence. The patient has not had physical therapy. The patient has not had injections recently. The patient has tried NSAIDs, Relafen, without relief. She has tried baclofen also.       Past Medical History:   Diagnosis Date    Anxiety     Anxiety and depression     Chronic back pain     Clostridium difficile infection 2/22/15    Hyperlipidemia     Hypertension     Insomnia disorder     Osteoarthritis     Osteoporosis 2008    Rheumatic fever     S/P insertion of spinal cord stimulator     Self-catheterizes urinary bladder     as needed 7/8 no longer catherizing     Urinary retention        Past Surgical History:   Procedure Laterality Date    BLADDER REPAIR      CARPAL TUNNEL RELEASE      COLONOSCOPY      CYSTOSCOPY  10/29/2012    bladder biopsy    CYSTOSCOPY N/A 10/19/2020    FLEXIBLE CYSTOSCOPY performed by Mike Daniels MD at [facility]      INTRACAPSULAR CATARACT EXTRACTION Right 7/18/2019    PHACOEMULSIFICATION WITH INTRAOCULAR LENS IMPLANT performed by Adan Burgess MD at [facility] EXTRACTION Left 7/26/2019    PHACOEMULSIFICATION WITH INTRAOCULAR LENS IMPLANT performed by Jumana Ramirez MD at [place] [address]  2004    MANDIBLE RECONSTRUCTION      OTHER SURGICAL HISTORY      spinal cord stimulator    PAIN MANAGEMENT PROCEDURE N/A 10/19/2021    CAUDAL EPIDURAL STEROID INJECTION WITH FLUOROSCOPY performed by Joann Ruiz MD at [address] Right 10/18/13       Family History   Problem Relation Age of Onset    COPD Mother     High Blood Pressure Mother     Rheum Arthritis Mother     Thyroid Disease Mother     Alcohol Abuse Father     Clotting Disorder Sister     Thyroid Disease Sister     Hypertension Brother     Diabetes Sister     Heart Disease Sister     Hypertension Sister     Thyroid Disease Sister     Cancer Brother     Heart Disease Brother     Hypertension Brother     Substance Abuse Brother     Alcohol Abuse Son     No Known Problems Daughter     Dementia Neg Hx        Social History     Socioeconomic History    Marital status:      Spouse name: None    Number of children: None    Years of education: None    Highest education level: None   Occupational History    Occupation: retired   Tobacco Use    Smoking status: Never Smoker    Smokeless tobacco: Never Used   Vaping Use    Vaping Use: Never used   Substance and Sexual Activity    Alcohol use: Not Currently    Drug use: No    Sexual activity: None   Other Topics Concern    None   Social History Narrative    None     Social Determinants of Health     Financial Resource Strain: Low Risk     Difficulty of Paying Living Expenses: Not hard at all   Food Insecurity: No Food Insecurity    Worried About [facility] in the Last Year: Never true    [facility] in the Last Year: Never true   Transportation Needs:     Lack of Transportation (Medical): Not on file    Lack of Transportation (Non-Medical):  Not on file   Physical Activity:     Days of Exercise per Week: Not on file    Minutes of Exercise per Session: Not on file   Stress:     Feeling of Stress : Not on file   Social Connections:     Frequency of Communication with Friends and Family: Not on file    Frequency of Social Gatherings with Friends and Family: Not on file    Attends Alevism Services: Not on file    Active Member of [address] [place] [address] or Organizations: Not on file    Attends Club or Organization Meetings: Not on file    Marital Status: Not on file   Intimate Partner Violence:     Fear of Current or Ex-Partner: Not on file    Emotionally Abused: Not on file    Physically Abused: Not on file    Sexually Abused: Not on file   Housing Stability:     Unable to Pay for Housing in the Last Year: Not on file    Number of [place] in the Last Year: Not on file    Unstable Housing in the Last Year: Not on file       Current Outpatient Medications   Medication Sig Dispense Refill    methylPREDNISolone (MEDROL, ALAN,) 4 MG tablet Take by mouth. 1 kit 0    fluticasone (FLONASE) 50 MCG/ACT nasal spray SPRAY TWO SPRAYS IN EACH NOSTRIL ONCE DAILY DURING ALLERGY SEASON 1 each prn    levocetirizine (XYZAL) 5 MG tablet Take 1 tablet by mouth nightly During allergy season 30 tablet 2    blood glucose monitor strips Check sugars qam and prn s/s of low blood sugars 50 strip 5    Lancets MISC Check sugars qam and prn s/s of low blood sugars 50 each 5    azelastine (ASTELIN) 0.1 % nasal spray Use 2 sprays in each nostril 2 times daily. Use fluticasone 15 minutes after the morning dose of astelin.  1 each 5    nystatin (MYCOSTATIN) 645085 UNIT/ML suspension Take 5 mLs by mouth 4 times daily 60 mL 0    blood glucose monitor kit and supplies Check sugars qam and prn s/s of low blood sugars 1 kit 0    baclofen (LIORESAL) 10 MG tablet TAKE ONE TABLET BY MOUTH THREE TIMES A DAY AS NEEDED FOR PAIN AND SPASM 60 tablet 2    potassium chloride (KLOR-CON M) 20 MEQ extended release tablet TAKE 1 TABLET BY MOUTH TWICE DAILY 60 tablet 3    lisinopril (PRINIVIL;ZESTRIL) 40 MG tablet TAKE 1 TABLET BY MOUTH EVERY DAY 30 tablet 3    DULoxetine (CYMBALTA) 60 MG extended release capsule TAKE 1 CAPSULE BY MOUTH EVERY DAY 30 capsule 3    rosuvastatin (CRESTOR) 10 MG tablet TAKE 1 TABLET BY MOUTH NIGHTLY 30 tablet 3    traZODone (DESYREL) 100 MG tablet TAKE TWO (2) TABLETS BY MOUTH EVERY NIGHT 60 tablet 5    gabapentin (NEURONTIN) 600 MG tablet TAKE 1 TABLET BY MOUTH THREE TIMES DAILY 90 tablet 2    oxybutynin (DITROPAN XL) 15 MG extended release tablet TAKE 1 TABLET BY MOUTH DAILY *REPLACES OXYBUTININ ER 10 MG* 30 tablet 3    memantine (NAMENDA) 5 MG tablet Take 1 tablet daily for 1 week and then 1 tablet by mouth twice daily 60 tablet 2    nabumetone (RELAFEN) 750 MG tablet Take 1 tablet by mouth 2 times daily 180 tablet 3    hydroCHLOROthiazide (MICROZIDE) 12.5 MG capsule TAKE 1 CAPSULE BY MOUTH EVERY MORNING 30 capsule 4    busPIRone (BUSPAR) 10 MG tablet Take 1 tablet by mouth 3 times daily 180 tablet 3    diclofenac sodium (VOLTAREN) 1 % GEL       galantamine (RAZADYNE ER) 16 MG extended release capsule TAKE 1 CAPSULE BY MOUTH DAILY WITH BREAKFAST 90 capsule 1    fluocinolone (DERMA-SMOOTHE) 0.01 % external oil APPLY TOPICALLY TWICE A WEEK 1 each 3    nystatin (MYCOSTATIN) 438097 UNIT/ML suspension TAKE 5 MLS BY MOUTH FOUR TIMES A DAY FOR 10 DAYS, RETAIN IN MOUTH AS LONG AS POSSIBLE 3 Bottle 3    budesonide (ENTOCORT EC) 3 MG extended release capsule Take 6 mg by mouth every morning      ketoconazole (NIZORAL) 2 % shampoo APPLY TOPICALLY DAILY AS NEEDED 1 Bottle 1    Cyanocobalamin (B-12) 1000 MCG SUBL Place 1,000 Units under the tongue daily 90 tablet 3    hydrOXYzine (ATARAX) 50 MG tablet Take 1 tablet by mouth every 4 hours as needed for Itching 270 tablet 1    polyethylene glycol (GLYCOLAX) 17 GM/SCOOP powder 1/2-1 capful in 8 oz water or prune juice qd prn constipation.  3 Bottle 3    carBAMazepine (TEGRETOL-XR) 100 MG extended release tablet Take 1 tablet by mouth 2 times daily 60 tablet 3    acetaminophen (TYLENOL) 500 MG tablet Take 1,000 mg by mouth 3 times daily as needed for Pain      fluocinolone (DERMOTIC) 0.01 % OIL oil Place 1 drop in ear(s) 2 times daily 1 Bottle 3    diphenoxylate-atropine (LOMOTIL) 2.5-0.025 MG per tablet Take 1 tablet by mouth as needed for Diarrhea. Ajith Coleman Cholecalciferol (VITAMIN D3) 5000 units CAPS Take 5,000 Units by mouth daily       oxymorphone (OPANA) 5 MG tablet Take 5 mg by mouth 2 times daily. Indications: per DR Rikki Stauffer TAKES 3 TIMES A DAY. TRYING TO CUT BACK.  zoledronic acid (RECLAST) 5 MG/100ML SOLN Infuse 5 mg intravenously once IV, yearly      aspirin 81 MG chewable tablet Take 81 mg by mouth daily.  Calcium Carbonate-Vit D-Min (CALCIUM 1200 PO) Take 1 capsule by mouth 2 times daily.  Ascorbic Acid (VITAMIN C) 500 MG tablet Take 500 mg by mouth daily. Current Facility-Administered Medications   Medication Dose Route Frequency Provider Last Rate Last Admin    zoledronic acid (RECLAST) 5 mg/100 mL infusion  5 mg IntraVENous See Admin Instructions Melani Razo  mL/hr at 04/06/16 0900 5 mg at 04/06/16 0900       Allergies   Allergen Reactions    Amlodipine     Ativan [Lorazepam] Swelling     Tongue swelling    Sulfa Antibiotics Swelling    Keflex [Cephalexin] Other (See Comments)     Leg cramps        Review of Systems:  I have reviewed the clinically relevant past medical history, medications, allergies, family history, social history, and 13 point Review of Systems from the patient's recent history form & documented any details relevant to today's presenting complaints in the history above. The patient's self-reported past medical history, medications, allergies, family history, social history, and Review of Systems form from 4/7/22 have been scanned into the chart under the \"Media\" tab.       Physical Examination:     Vital signs:  Ht 4' 11\" (1.499 m)   Wt 185 lb 6.4 oz (84.1 kg)   BMI 37.45 kg/m²    General:  alert, appears stated age, cooperative and no distress   Gait:  Antalgic. Examination of the back reveals no obvious deformity. There is pain with flexion of the lumbar spine. There is pain with extension of the lumbar spine. She does have tenderness to palpation in the midline lumbar spine. She does have tenderness to palpation in the left paraspinal region of the lumbar spine. Straight leg raise/tension signs are positive in the leftlower  extremity. Manual muscle strength testing is 4/5 on the left compared to 5/5 on the right in knee flexion, knee extension, dorsiflexion, plantarflexion, and EHL. Sensation is intact to light touch in the C5-T1 and L3-S1 nerve distribution bilaterally. Normal deep tendon reflexes at the knees and no clonus. Negative Raymundo's sign. There is no pain with ROM of the hips, knees or ankles. There is no cyanosis, clubbing or edema and the vasculature is intact. Imaging:  Lumbar spine Xrays: AP, lateral views obtained and reviewed. No acute fracture or dislocation. Prior spinal hardware noted. Spinal cord stimulator noted. Assessment:     Lumbar radiculopathy  Prior lumbar spinal fusion  Status post placement spinal cord stimulator      Plan:     I discussed with the patient that I think the etiology of her symptoms is likely from her lumbar spine. She does have radicular symptoms. She also has left leg weakness, which is more concerning. She likely cannot have an MRI secondary to spinal cord stimulator. Continue baclofen. I did provide a prescription for a Medrol Dosepak. I have referred her to Dr. Cathie Cruz MD for further evaluation and treatment of her spine. Genaro Martinez. Cornell Guidry MD  Orthopaedic Surgery and Sports Medicine    Disclaimer: This note was generated with use of a verbal recognition program and an attempt was made to check for errors.   It is possible that there are still dictated errors within this office note. If so, please bring any significant errors to my attention for an addendum. All efforts were made to ensure that this office note is accurate.

## 2022-04-11 DIAGNOSIS — F02.80 LATE ONSET ALZHEIMER'S DISEASE WITHOUT BEHAVIORAL DISTURBANCE (HCC): ICD-10-CM

## 2022-04-11 DIAGNOSIS — G30.1 LATE ONSET ALZHEIMER'S DISEASE WITHOUT BEHAVIORAL DISTURBANCE (HCC): ICD-10-CM

## 2022-04-11 RX ORDER — GALANTAMINE HYDROBROMIDE 16 MG/1
CAPSULE, EXTENDED RELEASE ORAL
Qty: 30 CAPSULE | Refills: 0 | Status: SHIPPED | OUTPATIENT
Start: 2022-04-11 | End: 2022-04-13 | Stop reason: SDUPTHER

## 2022-04-13 ENCOUNTER — OFFICE VISIT (OUTPATIENT)
Dept: NEUROLOGY | Age: 72
End: 2022-04-13
Payer: MEDICARE

## 2022-04-13 VITALS
WEIGHT: 185 LBS | SYSTOLIC BLOOD PRESSURE: 141 MMHG | DIASTOLIC BLOOD PRESSURE: 82 MMHG | HEART RATE: 95 BPM | HEIGHT: 59 IN | BODY MASS INDEX: 37.29 KG/M2

## 2022-04-13 DIAGNOSIS — F02.80 LATE ONSET ALZHEIMER'S DISEASE WITHOUT BEHAVIORAL DISTURBANCE (HCC): Primary | ICD-10-CM

## 2022-04-13 DIAGNOSIS — F02.80 LATE ONSET ALZHEIMER'S DISEASE WITHOUT BEHAVIORAL DISTURBANCE (HCC): ICD-10-CM

## 2022-04-13 DIAGNOSIS — G30.1 LATE ONSET ALZHEIMER'S DISEASE WITHOUT BEHAVIORAL DISTURBANCE (HCC): Primary | ICD-10-CM

## 2022-04-13 DIAGNOSIS — G47.33 OBSTRUCTIVE SLEEP APNEA: ICD-10-CM

## 2022-04-13 DIAGNOSIS — G30.1 LATE ONSET ALZHEIMER'S DISEASE WITHOUT BEHAVIORAL DISTURBANCE (HCC): ICD-10-CM

## 2022-04-13 PROCEDURE — 99214 OFFICE O/P EST MOD 30 MIN: CPT | Performed by: PSYCHIATRY & NEUROLOGY

## 2022-04-13 RX ORDER — MEMANTINE HYDROCHLORIDE 10 MG/1
TABLET ORAL
Qty: 180 TABLET | Refills: 1 | Status: SHIPPED | OUTPATIENT
Start: 2022-04-13 | End: 2022-04-13 | Stop reason: SDUPTHER

## 2022-04-13 RX ORDER — MEMANTINE HYDROCHLORIDE 10 MG/1
TABLET ORAL
Qty: 20 TABLET | Refills: 0 | Status: SHIPPED | OUTPATIENT
Start: 2022-04-13 | End: 2022-04-29 | Stop reason: SDUPTHER

## 2022-04-13 RX ORDER — GALANTAMINE HYDROBROMIDE 16 MG/1
CAPSULE, EXTENDED RELEASE ORAL
Qty: 90 CAPSULE | Refills: 1 | Status: SHIPPED | OUTPATIENT
Start: 2022-04-13 | End: 2022-10-13

## 2022-04-13 NOTE — PROGRESS NOTES
Yonas Souza   Neurology followup    Subjective:   CC/HP  History was obtained from the patient. Patient has late onset Alzheimer's type dementia. Patient still feels that her memory continues to slowly get worse. She was started on low-dose Namenda during the last office visit. She is having word finding difficulties. She also feels that she gets confused at times. Patient is on low-dose galantamine. She initially wondered if Aricept caused her diarrhea but later found out that she had a different etiology for the diarrhea. Patient also was diagnosed with obstructive sleep apnea and started a CPAP machine. She is feeling better in that regard.     REVIEW OF SYSTEMS    Constitutional:  []   Chills   []  Fatigue   []  Fevers   []  Malaise   []  Weight loss     [x] Denies all of the above    Respiratory:   []  Cough    []  Shortness of breath         [x] Denies all of the above     Cardiovascular:   []  Chest pain    []  Exertional chest pressure/discomfort           [] Palpitations    []  Syncope     [x] Denies all of the above        Past Medical History:   Diagnosis Date    Anxiety     Anxiety and depression     Chronic back pain     Clostridium difficile infection 2/22/15    Hyperlipidemia     Hypertension     Insomnia disorder     Osteoarthritis     Osteoporosis 2008    Rheumatic fever     S/P insertion of spinal cord stimulator     Self-catheterizes urinary bladder     as needed 7/8 no longer catherizing     Urinary retention      Family History   Problem Relation Age of Onset    COPD Mother     High Blood Pressure Mother     Rheum Arthritis Mother     Thyroid Disease Mother     Alcohol Abuse Father     Clotting Disorder Sister     Thyroid Disease Sister     Hypertension Brother     Diabetes Sister     Heart Disease Sister     Hypertension Sister     Thyroid Disease Sister     Cancer Brother     Heart Disease Brother     Hypertension Brother     Substance Abuse Brother  Alcohol Abuse Son     No Known Problems Daughter     Dementia Neg Hx      Social History     Socioeconomic History    Marital status:      Spouse name: Not on file    Number of children: Not on file    Years of education: Not on file    Highest education level: Not on file   Occupational History    Occupation: retired   Tobacco Use    Smoking status: Never Smoker    Smokeless tobacco: Never Used   Vaping Use    Vaping Use: Never used   Substance and Sexual Activity    Alcohol use: Not Currently    Drug use: No    Sexual activity: Not on file   Other Topics Concern    Not on file   Social History Narrative    Not on file     Social Determinants of Health     Financial Resource Strain: Low Risk     Difficulty of Paying Living Expenses: Not hard at all   Food Insecurity: No Food Insecurity    Worried About [address] in the Last Year: Never true    [address] [employer] in the Last Year: Never true   Transportation Needs:     Lack of Transportation (Medical): Not on file    Lack of Transportation (Non-Medical):  Not on file   Physical Activity:     Days of Exercise per Week: Not on file    Minutes of Exercise per Session: Not on file   Stress:     Feeling of Stress : Not on file   Social Connections:     Frequency of Communication with Friends and Family: Not on file    Frequency of Social Gatherings with Friends and Family: Not on file    Attends Baptism Services: Not on file    Active Member of [address] [place] [address] or Organizations: Not on file    Attends Club or Organization Meetings: Not on file    Marital Status: Not on file   Intimate Partner Violence:     Fear of Current or Ex-Partner: Not on file    Emotionally Abused: Not on file    Physically Abused: Not on file    Sexually Abused: Not on file   Housing Stability:     Unable to Pay for Housing in the Last Year: Not on file    Number of [place] in the Last Year: Not on file    Unstable Housing in the Last Year: Not on file        Objective:  Exam:  BP (!) 141/82   Pulse 95   Ht 4' 11\" (1.499 m)   Wt 185 lb (83.9 kg)   BMI 37.37 kg/m²   This is a well-nourished patient in no acute distress  Patient is awake, alert and oriented x2. Speech is normal. Impaired short-term memory  Pupils are equal round reacting to light. Extraocular movements intact. Face symmetrical. Tongue midline. Motor exam shows normal symmetrical strength. Deep tendon reflexes normal. Plantar reflexes downgoing. Sensory exam normal. Coordination normal. Gait normal. No carotid bruit. No neck stiffness. Data :  LABS:  General Labs:    CBC:   Lab Results   Component Value Date    WBC 6.0 03/02/2022    RBC 4.34 03/02/2022    HGB 14.0 03/02/2022    HCT 40.8 03/02/2022    MCV 94.0 03/02/2022    MCH 32.2 03/02/2022    MCHC 34.2 03/02/2022    RDW 12.7 03/02/2022     03/02/2022    MPV 7.8 03/02/2022     BMP:    Lab Results   Component Value Date     03/02/2022    K 4.4 03/02/2022    CL 95 03/02/2022    CO2 25 03/02/2022    BUN 10 03/02/2022    LABALBU 4.6 03/02/2022    CREATININE 0.6 03/02/2022    CALCIUM 9.6 03/02/2022    GFRAA >60 03/02/2022    GFRAA >60 06/06/2013    LABGLOM >60 03/02/2022    GLUCOSE 88 03/02/2022     RADIOLOGY REVIEW:  I have reviewed radiology report(s) of: CT scan of the head    Impression :  Late onset Alzheimer's type dementia, symptoms slowly worsening  Obstructive sleep apnea, now on CPAP  TSH was normal  B12 was borderline and patient is on B12 supplementation    Plan :  Discussed with patient  Continue galantamine 60 mg daily  Increase Namenda dose to 10 mg twice daily  Side effects were discussed. Continue B12 supplementation  Continue using CPAP  I will see her back in 6 months         Please note a portion of  this chart was generated using dragon dictation software. Although every effort was made to ensure the accuracy of this automated transcription, some errors in transcription may have occurred.

## 2022-04-14 NOTE — TELEPHONE ENCOUNTER
Because of her pain medicine, I would like for her to please try to increase her trazodone to 100 mg (take 2 50 mg) before adding Ambien. If this works, I will call in the higher dose for her. If 2 do not work, try 3 and if she is still not sleeping well let me know and I will add the Ambien. Ambien does increase her risk of stopping breathing and death when taken with pain medication.  saw
Called pt and she could not hear me. Her phone was not working. If she calls back please read her the message below. I will try pt again before the end of the day.
Noted. We will not send in new script until she called the office and lets us know that it is working. If pt calls back please let her know this.
Pt c/o severe insomnia. Her current treatment, Trazodone, is not working. She was on Ambien in the past and it was very effective.
Pt returned your call   I read msg verbatim to pt and she voiced understanding      She said you could go ahead and send prescription in for the increased trazadone
Detail Level: Detailed
Quality 402: Tobacco Use And Help With Quitting Among Adolescents: Patient screened for tobacco and never smoked

## 2022-04-15 DIAGNOSIS — M79.7 FIBROMYALGIA: ICD-10-CM

## 2022-04-15 RX ORDER — HYDROCHLOROTHIAZIDE 12.5 MG/1
CAPSULE, GELATIN COATED ORAL
Qty: 30 CAPSULE | Refills: 10 | Status: SHIPPED | OUTPATIENT
Start: 2022-04-15

## 2022-04-15 RX ORDER — GABAPENTIN 600 MG/1
TABLET ORAL
Qty: 90 TABLET | Refills: 10 | Status: SHIPPED | OUTPATIENT
Start: 2022-04-15 | End: 2022-10-19

## 2022-04-25 ENCOUNTER — TELEPHONE (OUTPATIENT)
Dept: INTERNAL MEDICINE CLINIC | Age: 72
End: 2022-04-25

## 2022-04-25 NOTE — TELEPHONE ENCOUNTER
Does she feel like her pain is vaginal or related to her disc? It sounds like she will need an appointment. I can probably see her at 1:20 on Wednesday.  Thanks

## 2022-04-25 NOTE — TELEPHONE ENCOUNTER
Patient is requesting to speak to [facility]. She states she is having a lot of back pain and that she has a disc problem. She said she is waiting for appointment with neurosurgery. Patient said she uses a suppository for thinning of cervix that she has been on for a few years. She said the last couple of weeks pain has been bad and she can hardly sit. Patient said she called gynecologist who suggested she contact PCP.

## 2022-04-26 ENCOUNTER — TELEPHONE (OUTPATIENT)
Dept: NEUROLOGY | Age: 72
End: 2022-04-26

## 2022-04-26 DIAGNOSIS — F02.80 LATE ONSET ALZHEIMER'S DISEASE WITHOUT BEHAVIORAL DISTURBANCE (HCC): ICD-10-CM

## 2022-04-26 DIAGNOSIS — G30.1 LATE ONSET ALZHEIMER'S DISEASE WITHOUT BEHAVIORAL DISTURBANCE (HCC): ICD-10-CM

## 2022-04-26 NOTE — TELEPHONE ENCOUNTER
Pt states that the pain is in her vagina. She feels a lot of pressure. I did call and speak with her GYN I spoke with Cayden Cuba from their office Cayden Cuba was not very helpful and did not understand why the pt should be seen in their office for pain and pressure in her vagina area. They will call her today and get her seen by a provider. Cayden Cuba was very short and rude.

## 2022-04-26 NOTE — TELEPHONE ENCOUNTER
Pt requests :    Last office visit 4/13/2022     Last written 4/13/2022     Next office visit scheduled 10/19/2022    Requested Prescriptions     Pending Prescriptions Disp Refills    memantine (NAMENDA) 10 MG tablet 20 tablet 0     Sig: Take 1 tablet by mouth twice daily       [facility] [employer] is correct. Pt states she is out of medication.

## 2022-04-29 RX ORDER — MEMANTINE HYDROCHLORIDE 10 MG/1
TABLET ORAL
Qty: 180 TABLET | Refills: 1 | Status: SHIPPED | OUTPATIENT
Start: 2022-04-29 | End: 2022-10-19 | Stop reason: SDUPTHER

## 2022-05-13 RX ORDER — OXYBUTYNIN CHLORIDE 15 MG/1
TABLET, EXTENDED RELEASE ORAL
Qty: 30 TABLET | Refills: 10 | Status: SHIPPED | OUTPATIENT
Start: 2022-05-13

## 2022-05-17 ENCOUNTER — TELEPHONE (OUTPATIENT)
Dept: INTERNAL MEDICINE CLINIC | Age: 72
End: 2022-05-17

## 2022-05-17 DIAGNOSIS — I10 ESSENTIAL HYPERTENSION: Primary | ICD-10-CM

## 2022-05-17 NOTE — TELEPHONE ENCOUNTER
Pt calling---[facility] MRI dept needs a lab order for a Metobolic Panel sent right away to [phone]---pt having MRI in the morning and must have done before---Thanks.

## 2022-05-18 ENCOUNTER — HOSPITAL ENCOUNTER (OUTPATIENT)
Dept: MRI IMAGING | Age: 72
Discharge: HOME | End: 2022-05-18
Payer: MEDICARE

## 2022-05-18 DIAGNOSIS — I10 ESSENTIAL HYPERTENSION: ICD-10-CM

## 2022-05-18 DIAGNOSIS — M54.17 LUMBOSACRAL RADICULOPATHY: ICD-10-CM

## 2022-05-18 LAB
ANION GAP SERPL CALCULATED.3IONS-SCNC: 9 MMOL/L (ref 3–16)
BUN BLDV-MCNC: 16 MG/DL (ref 7–20)
CALCIUM SERPL-MCNC: 9.5 MG/DL (ref 8.3–10.6)
CHLORIDE BLD-SCNC: 95 MMOL/L (ref 99–110)
CO2: 26 MMOL/L (ref 21–32)
CREAT SERPL-MCNC: 0.6 MG/DL (ref 0.6–1.2)
GFR AFRICAN AMERICAN: >60
GFR NON-AFRICAN AMERICAN: >60
GLUCOSE BLD-MCNC: 101 MG/DL (ref 70–99)
POTASSIUM SERPL-SCNC: 4.7 MMOL/L (ref 3.5–5.1)
SODIUM BLD-SCNC: 130 MMOL/L (ref 136–145)

## 2022-05-18 PROCEDURE — 80048 BASIC METABOLIC PNL TOTAL CA: CPT

## 2022-05-18 PROCEDURE — 72158 MRI LUMBAR SPINE W/O & W/DYE: CPT

## 2022-05-18 PROCEDURE — 72146 MRI CHEST SPINE W/O DYE: CPT

## 2022-05-18 PROCEDURE — A9577 INJ MULTIHANCE: HCPCS | Performed by: STUDENT IN AN ORGANIZED HEALTH CARE EDUCATION/TRAINING PROGRAM

## 2022-05-18 PROCEDURE — 2580000003 HC RX 258: Performed by: STUDENT IN AN ORGANIZED HEALTH CARE EDUCATION/TRAINING PROGRAM

## 2022-05-18 PROCEDURE — 6360000004 HC RX CONTRAST MEDICATION: Performed by: STUDENT IN AN ORGANIZED HEALTH CARE EDUCATION/TRAINING PROGRAM

## 2022-05-18 PROCEDURE — 36415 COLL VENOUS BLD VENIPUNCTURE: CPT

## 2022-05-18 RX ORDER — SODIUM CHLORIDE 0.9 % (FLUSH) 0.9 %
10 SYRINGE (ML) INJECTION ONCE
Status: COMPLETED | OUTPATIENT
Start: 2022-05-18 | End: 2022-05-18

## 2022-05-18 RX ADMIN — Medication 10 ML: at 11:14

## 2022-05-18 RX ADMIN — GADOBENATE DIMEGLUMINE 20 ML: 529 INJECTION, SOLUTION INTRAVENOUS at 11:13

## 2022-05-19 ENCOUNTER — TELEPHONE (OUTPATIENT)
Dept: INTERNAL MEDICINE CLINIC | Age: 72
End: 2022-05-19

## 2022-05-19 RX ORDER — AMOXICILLIN AND CLAVULANATE POTASSIUM 875; 125 MG/1; MG/1
1 TABLET, FILM COATED ORAL 2 TIMES DAILY
Qty: 20 TABLET | Refills: 0 | Status: SHIPPED | OUTPATIENT
Start: 2022-05-19 | End: 2022-05-29

## 2022-05-19 NOTE — TELEPHONE ENCOUNTER
augmentin 875 mg BID x 10 days sent to [facility] in [facility]. If no better, will need to be seen either here or urgent care.  saw

## 2022-05-19 NOTE — TELEPHONE ENCOUNTER
Pt calling said she has sinus infection--eyes bulging- ears hurting---said she can't come in she has a disc out in her back and can't move well or drive--asking for something to be called in. Please call pt. Thanks.

## 2022-06-03 ENCOUNTER — OFFICE VISIT (OUTPATIENT)
Dept: INTERNAL MEDICINE CLINIC | Age: 72
End: 2022-06-03
Payer: MEDICARE

## 2022-06-03 VITALS
HEART RATE: 92 BPM | BODY MASS INDEX: 37.28 KG/M2 | OXYGEN SATURATION: 94 % | DIASTOLIC BLOOD PRESSURE: 60 MMHG | SYSTOLIC BLOOD PRESSURE: 118 MMHG | WEIGHT: 184.6 LBS

## 2022-06-03 DIAGNOSIS — F02.818 LATE ONSET ALZHEIMER'S DEMENTIA WITH BEHAVIORAL DISTURBANCE (HCC): ICD-10-CM

## 2022-06-03 DIAGNOSIS — F41.9 ANXIETY: ICD-10-CM

## 2022-06-03 DIAGNOSIS — N39.0 RECURRENT UTI: Chronic | ICD-10-CM

## 2022-06-03 DIAGNOSIS — G30.1 LATE ONSET ALZHEIMER'S DEMENTIA WITH BEHAVIORAL DISTURBANCE (HCC): ICD-10-CM

## 2022-06-03 DIAGNOSIS — J01.00 ACUTE NON-RECURRENT MAXILLARY SINUSITIS: ICD-10-CM

## 2022-06-03 DIAGNOSIS — R30.0 DYSURIA: Primary | ICD-10-CM

## 2022-06-03 DIAGNOSIS — I10 ESSENTIAL HYPERTENSION: Chronic | ICD-10-CM

## 2022-06-03 PROBLEM — F41.1 GENERALIZED ANXIETY DISORDER: Status: RESOLVED | Noted: 2019-06-05 | Resolved: 2022-06-03

## 2022-06-03 PROBLEM — E16.2 HYPOGLYCEMIA: Status: RESOLVED | Noted: 2018-05-21 | Resolved: 2022-06-03

## 2022-06-03 PROBLEM — L60.0 ONYCHOMYCOSIS WITH INGROWN TOENAIL: Status: RESOLVED | Noted: 2018-07-06 | Resolved: 2022-06-03

## 2022-06-03 PROBLEM — B35.1 ONYCHOMYCOSIS WITH INGROWN TOENAIL: Status: RESOLVED | Noted: 2018-07-06 | Resolved: 2022-06-03

## 2022-06-03 PROCEDURE — 1123F ACP DISCUSS/DSCN MKR DOCD: CPT | Performed by: INTERNAL MEDICINE

## 2022-06-03 PROCEDURE — 81002 URINALYSIS NONAUTO W/O SCOPE: CPT | Performed by: INTERNAL MEDICINE

## 2022-06-03 PROCEDURE — 99214 OFFICE O/P EST MOD 30 MIN: CPT | Performed by: INTERNAL MEDICINE

## 2022-06-03 RX ORDER — DULOXETIN HYDROCHLORIDE 60 MG/1
120 CAPSULE, DELAYED RELEASE ORAL DAILY
Qty: 60 CAPSULE | Refills: 5 | Status: SHIPPED | OUTPATIENT
Start: 2022-06-03 | End: 2022-09-09 | Stop reason: ALTCHOICE

## 2022-06-03 RX ORDER — BUSPIRONE HYDROCHLORIDE 15 MG/1
15 TABLET ORAL 3 TIMES DAILY
Qty: 90 TABLET | Refills: 5 | Status: SHIPPED | OUTPATIENT
Start: 2022-06-03 | End: 2022-10-21 | Stop reason: SDUPTHER

## 2022-06-03 ASSESSMENT — ENCOUNTER SYMPTOMS
CONSTIPATION: 0
CHEST TIGHTNESS: 0
SHORTNESS OF BREATH: 0
DIARRHEA: 0

## 2022-06-03 NOTE — ASSESSMENT & PLAN NOTE
Patient uses [address] which provides patient with daily Mediset. Patient does feel that she is having to concentrate more when she drives. Encourage patient to use GPS which she does have available on her phone.

## 2022-06-03 NOTE — PATIENT INSTRUCTIONS
Take Coridicidin HB when you go to bed at night when you are congested. For pills that do not come from [address], get a daily pill box (antibiotics, cold medicine, etc)  and fill that pill box. Cymbalata (duloxetine) and Buspar (busprione) have been increased to help with your anxiety. You will not make changes until your next pill set comes from [facility].

## 2022-06-03 NOTE — PROGRESS NOTES
Patient: Chad Anderson is a 67 y.o. female who presents today with the following Chief Complaint(s):  Chief Complaint   Patient presents with    Check-Up       HPI     Here today for follow up. States that she is \"not doing to good\". Notices that she gets nervous and will get shaky when this happens. Has a disc out in her back, non-operable. Is scheduled for LESI on 6/9 with Dr. Jonny Zapata at [facility]. Did have some relief with prednisone. Did go to PT for back pain and \"screwed up knees\" which has altered her gait. Did see ortho and got injections in her knee, going again next week. Was told to do PT but worries if she will be able to do so. Anxiety is worse when pain is worse. Is taking Buspar 10 mg BID (did not notice a difference when she went from TID to BID). Has noticed that her memory has been slipping- will forget if she is taking her medication so has taken to putting her pain pills in a separate pill bottle with only 3 pills. Does get her medicine from [facility] pre-packaged. Is struggling with local prescriptions (antibiotics, pain pills). Had discussed dementia with her children \"as good as that has done me\". Denies difficulties with driving but sometimes has to really think about where she is going. Does have GPS capability on her phone. Urologist has ordered a vaginal cream to help with her recurrent UTI's/vaginitis but does not know if she can afford it d/t costing $400 w/insurance. Currently having vaginal itching and burning. Just completed Augmentin for sinusitis. Is feeling better but still has a cough. Cough is worse at night. Is not taking anything for congestion.      Lab Results   Component Value Date     (L) 05/18/2022    K 4.7 05/18/2022    CL 95 (L) 05/18/2022    CO2 26 05/18/2022    BUN 16 05/18/2022    CREATININE 0.6 05/18/2022    GLUCOSE 101 (H) 05/18/2022    CALCIUM 9.5 05/18/2022    PROT 6.9 03/02/2022    LABALBU 4.6 03/02/2022    BILITOT 0.3 03/02/2022    ALKPHOS 38 (L) 03/02/2022    AST 28 03/02/2022    ALT 26 03/02/2022    LABGLOM >60 05/18/2022    GFRAA >60 05/18/2022    AGRATIO 2.0 03/02/2022    GLOB 2.8 09/20/2021       Lab Results   Component Value Date    CHOL 142 03/02/2022    CHOL 134 09/20/2021    CHOL 139 03/15/2021     Lab Results   Component Value Date    TRIG 178 (H) 03/02/2022    TRIG 121 09/20/2021    TRIG 69 03/15/2021     Lab Results   Component Value Date    HDL 65 (H) 03/02/2022    HDL 68 (H) 09/20/2021    HDL 71 (H) 03/15/2021     Lab Results   Component Value Date    LDLCALC 41 03/02/2022    LDLCALC 42 09/20/2021    LDLCALC 54 03/15/2021     Lab Results   Component Value Date    LABVLDL 36 03/02/2022    LABVLDL 24 09/20/2021    LABVLDL 14 03/15/2021     No results found for: CHOLHDLRATIO  Lab Results   Component Value Date    LABA1C 5.1 03/02/2022     Lab Results   Component Value Date    EAG 99.7 03/02/2022     Lab Results   Component Value Date    WBC 6.0 03/02/2022    HGB 14.0 03/02/2022    HCT 40.8 03/02/2022    MCV 94.0 03/02/2022     03/02/2022         Allergies   Allergen Reactions    Amlodipine     Ativan [Lorazepam] Swelling     Tongue swelling    Sulfa Antibiotics Swelling    Keflex [Cephalexin] Other (See Comments)     Leg cramps      Past Medical History:   Diagnosis Date    Anxiety     Anxiety and depression     Chronic back pain     Clostridium difficile infection 2/22/15    Hyperlipidemia     Hypertension     Insomnia disorder     Osteoarthritis     Osteoporosis 2008    Rheumatic fever     S/P insertion of spinal cord stimulator     Self-catheterizes urinary bladder     as needed 7/8 no longer catherizing     Urinary retention       Past Surgical History:   Procedure Laterality Date    BLADDER REPAIR      CARPAL TUNNEL RELEASE      COLONOSCOPY      CYSTOSCOPY  10/29/2012    bladder biopsy    CYSTOSCOPY N/A 10/19/2020    FLEXIBLE CYSTOSCOPY performed by Mike Daniels MD at [address] [address] DENTAL SURGERY      FOOT SURGERY      HYSTERECTOMY      INTRACAPSULAR CATARACT EXTRACTION Right 7/18/2019    PHACOEMULSIFICATION WITH INTRAOCULAR LENS IMPLANT performed by Josué Boone MD at [address] EXTRACTION Left 7/26/2019    PHACOEMULSIFICATION WITH INTRAOCULAR LENS IMPLANT performed by Josué Boone MD at [place] [address]  2004    MANDIBLE RECONSTRUCTION      OTHER SURGICAL HISTORY      spinal cord stimulator    PAIN MANAGEMENT PROCEDURE N/A 10/19/2021    CAUDAL EPIDURAL STEROID INJECTION WITH FLUOROSCOPY performed by Neeru Horn MD at [facility] Right 10/18/13      Social History     Socioeconomic History    Marital status:      Spouse name: Not on file    Number of children: Not on file    Years of education: Not on file    Highest education level: Not on file   Occupational History    Occupation: retired   Tobacco Use    Smoking status: Never Smoker    Smokeless tobacco: Never Used   Vaping Use    Vaping Use: Never used   Substance and Sexual Activity    Alcohol use: Not Currently    Drug use: No    Sexual activity: Not on file   Other Topics Concern    Not on file   Social History Narrative    Not on file     Social Determinants of Health     Financial Resource Strain: Low Risk     Difficulty of Paying Living Expenses: Not hard at all   Food Insecurity: No Food Insecurity    Worried About [facility] in the Last Year: Never true    [facility] in the Last Year: Never true   Transportation Needs:     Lack of Transportation (Medical): Not on file    Lack of Transportation (Non-Medical):  Not on file   Physical Activity:     Days of Exercise per Week: Not on file    Minutes of Exercise per Session: Not on file   Stress:     Feeling of Stress : Not on file   Social Connections:     Frequency of Communication with Friends and Family: Not on file    Frequency of Social Gatherings with Friends and Family: Not on file    Attends Yazidism Services: Not on file    Active Member of Clubs or Organizations: Not on file    Attends Club or Organization Meetings: Not on file    Marital Status: Not on file   Intimate Partner Violence:     Fear of Current or Ex-Partner: Not on file    Emotionally Abused: Not on file    Physically Abused: Not on file    Sexually Abused: Not on file   Housing Stability:     Unable to Pay for Housing in the Last Year: Not on file    Number of Places Lived in the Last Year: Not on file    Unstable Housing in the Last Year: Not on file     Family History   Problem Relation Age of Onset    COPD Mother     High Blood Pressure Mother     Rheum Arthritis Mother     Thyroid Disease Mother     Alcohol Abuse Father     Clotting Disorder Sister     Thyroid Disease Sister     Hypertension Brother     Diabetes Sister     Heart Disease Sister     Hypertension Sister     Thyroid Disease Sister     Cancer Brother     Heart Disease Brother     Hypertension Brother     Substance Abuse Brother     Alcohol Abuse Son     No Known Problems Daughter     Dementia Neg Hx         Outpatient Medications Prior to Visit   Medication Sig Dispense Refill    D-MANNOSE PO Take by mouth      oxybutynin (DITROPAN XL) 15 MG extended release tablet TAKE 1 TABLET BY MOUTH DAILY *REPLACES OXYBUTININ ER 10MG TABLET* 30 tablet 10    memantine (NAMENDA) 10 MG tablet Take 1 tablet by mouth twice daily 180 tablet 1    hydroCHLOROthiazide (MICROZIDE) 12.5 MG capsule TAKE 1 CAPSULE BY MOUTH EVERY MORNING 30 capsule 10    gabapentin (NEURONTIN) 600 MG tablet TAKE 1 TABLET BY MOUTH THREE TIMES DAILY 90 tablet 10    galantamine (RAZADYNE ER) 16 MG extended release capsule TAKE 1 CAPSULE BY MOUTH ONCE DAILY WITH BREAKFAST 90 capsule 1    fluticasone (FLONASE) 50 MCG/ACT nasal spray SPRAY TWO SPRAYS IN EACH NOSTRIL ONCE DAILY DURING ALLERGY SEASON 1 each prn    oz water or prune juice qd prn constipation. 3 Bottle 3    carBAMazepine (TEGRETOL-XR) 100 MG extended release tablet Take 1 tablet by mouth 2 times daily 60 tablet 3    acetaminophen (TYLENOL) 500 MG tablet Take 1,000 mg by mouth 3 times daily as needed for Pain      fluocinolone (DERMOTIC) 0.01 % OIL oil Place 1 drop in ear(s) 2 times daily 1 Bottle 3    diphenoxylate-atropine (LOMOTIL) 2.5-0.025 MG per tablet Take 1 tablet by mouth as needed for Diarrhea. Theador Jayesh Cholecalciferol (VITAMIN D3) 5000 units CAPS Take 5,000 Units by mouth daily       oxymorphone (OPANA) 5 MG tablet Take 5 mg by mouth 2 times daily. Indications: per DR Luis Rdz TAKES 3 TIMES A DAY. TRYING TO CUT BACK.  zoledronic acid (RECLAST) 5 MG/100ML SOLN Infuse 5 mg intravenously once IV, yearly      aspirin 81 MG chewable tablet Take 81 mg by mouth daily.  Calcium Carbonate-Vit D-Min (CALCIUM 1200 PO) Take 1 capsule by mouth 2 times daily.  Ascorbic Acid (VITAMIN C) 500 MG tablet Take 500 mg by mouth daily.  DULoxetine (CYMBALTA) 60 MG extended release capsule TAKE 1 CAPSULE BY MOUTH EVERY DAY 30 capsule 3    busPIRone (BUSPAR) 10 MG tablet Take 1 tablet by mouth 3 times daily 180 tablet 3     Facility-Administered Medications Prior to Visit   Medication Dose Route Frequency Provider Last Rate Last Admin    zoledronic acid (RECLAST) 5 mg/100 mL infusion  5 mg IntraVENous See Admin Instructions Angelika Niño  mL/hr at 04/06/16 0900 5 mg at 04/06/16 0900       Patient'spast medical history, surgical history, family history, medications,  and allergies  were all reviewed and updated as appropriate today. Review of Systems   Constitutional: Negative for appetite change, fatigue and fever. Respiratory: Negative for chest tightness and shortness of breath. Cardiovascular: Negative for chest pain. Gastrointestinal: Negative for constipation and diarrhea. Skin: Negative for rash.        /60 Pulse 92   Wt 184 lb 9.6 oz (83.7 kg)   SpO2 94%   BMI 37.28 kg/m²   Physical Exam  Vitals and nursing note reviewed. Constitutional:       Appearance: She is well-developed. She is not toxic-appearing. HENT:      Head: Normocephalic. Right Ear: Tympanic membrane, ear canal and external ear normal.      Left Ear: Tympanic membrane, ear canal and external ear normal.      Mouth/Throat:      Pharynx: No oropharyngeal exudate or posterior oropharyngeal erythema. Eyes:      General: No scleral icterus. Extraocular Movements: Extraocular movements intact. Conjunctiva/sclera: Conjunctivae normal.      Pupils: Pupils are equal, round, and reactive to light. Neck:      Thyroid: No thyroid mass or thyromegaly. Vascular: No carotid bruit. Cardiovascular:      Rate and Rhythm: Normal rate and regular rhythm. Heart sounds: Normal heart sounds. No murmur heard. Pulmonary:      Effort: Pulmonary effort is normal.      Breath sounds: Normal breath sounds. Musculoskeletal:      Right lower leg: No edema. Left lower leg: No edema. Lymphadenopathy:      Cervical: No cervical adenopathy. Neurological:      General: No focal deficit present. Mental Status: She is alert and oriented to person, place, and time. Psychiatric:         Mood and Affect: Mood normal.         Behavior: Behavior normal. Behavior is cooperative. ASSESSMENT/PLAN:    Problem List Items Addressed This Visit     Acute non-recurrent maxillary sinusitis      Resolving status post Augmentin. Recommend use of Coricidin HBP to help with congestion. Anxiety     Increase Cymbalta to 120 mg daily. Increase BuSpar to 15 mg 3 times daily. Relevant Medications    busPIRone (BUSPAR) 15 MG tablet    DULoxetine (CYMBALTA) 60 MG extended release capsule    Dysuria - Primary     UA mildly abnormal.  Will await for urine culture. If urine culture is positive, will treat with antibiotics. Relevant Orders    POCT Urinalysis no Micro (Completed)    Culture, Urine    Essential hypertension (Chronic)     Continue lisinopril 40 mg daily and hydrochlorothiazide 12.5 mg daily. Well-controlled. Late onset Alzheimer's dementia with behavioral disturbance [facility])     Patient uses [address] which provides patient with daily Mediset. Patient does feel that she is having to concentrate more when she drives. Encourage patient to use GPS which she does have available on her phone. Relevant Medications    busPIRone (BUSPAR) 15 MG tablet    DULoxetine (CYMBALTA) 60 MG extended release capsule    Recurrent UTI (Chronic)     Following with urology. Recently started on a vaginal cream per urology but cannot afford this.                Current Outpatient Medications   Medication Sig Dispense Refill    D-MANNOSE PO Take by mouth      busPIRone (BUSPAR) 15 MG tablet Take 15 mg by mouth 3 times daily 90 tablet 5    DULoxetine (CYMBALTA) 60 MG extended release capsule Take 2 capsules by mouth daily 60 capsule 5    oxybutynin (DITROPAN XL) 15 MG extended release tablet TAKE 1 TABLET BY MOUTH DAILY *REPLACES OXYBUTININ ER 10MG TABLET* 30 tablet 10    memantine (NAMENDA) 10 MG tablet Take 1 tablet by mouth twice daily 180 tablet 1    hydroCHLOROthiazide (MICROZIDE) 12.5 MG capsule TAKE 1 CAPSULE BY MOUTH EVERY MORNING 30 capsule 10    gabapentin (NEURONTIN) 600 MG tablet TAKE 1 TABLET BY MOUTH THREE TIMES DAILY 90 tablet 10    galantamine (RAZADYNE ER) 16 MG extended release capsule TAKE 1 CAPSULE BY MOUTH ONCE DAILY WITH BREAKFAST 90 capsule 1    fluticasone (FLONASE) 50 MCG/ACT nasal spray SPRAY TWO SPRAYS IN EACH NOSTRIL ONCE DAILY DURING ALLERGY SEASON 1 each prn    levocetirizine (XYZAL) 5 MG tablet Take 1 tablet by mouth nightly During allergy season 30 tablet 2    blood glucose monitor strips Check sugars qam and prn s/s of low blood sugars 50 strip 5    Lancets MISC Check sugars qam and prn s/s of low blood sugars 50 each 5    azelastine (ASTELIN) 0.1 % nasal spray Use 2 sprays in each nostril 2 times daily. Use fluticasone 15 minutes after the morning dose of astelin. 1 each 5    nystatin (MYCOSTATIN) 760291 UNIT/ML suspension Take 5 mLs by mouth 4 times daily 60 mL 0    blood glucose monitor kit and supplies Check sugars qam and prn s/s of low blood sugars 1 kit 0    baclofen (LIORESAL) 10 MG tablet TAKE ONE TABLET BY MOUTH THREE TIMES A DAY AS NEEDED FOR PAIN AND SPASM 60 tablet 2    potassium chloride (KLOR-CON M) 20 MEQ extended release tablet TAKE 1 TABLET BY MOUTH TWICE DAILY 60 tablet 3    lisinopril (PRINIVIL;ZESTRIL) 40 MG tablet TAKE 1 TABLET BY MOUTH EVERY DAY 30 tablet 3    rosuvastatin (CRESTOR) 10 MG tablet TAKE 1 TABLET BY MOUTH NIGHTLY 30 tablet 3    traZODone (DESYREL) 100 MG tablet TAKE TWO (2) TABLETS BY MOUTH EVERY NIGHT 60 tablet 5    nabumetone (RELAFEN) 750 MG tablet Take 1 tablet by mouth 2 times daily 180 tablet 3    diclofenac sodium (VOLTAREN) 1 % GEL       fluocinolone (DERMA-SMOOTHE) 0.01 % external oil APPLY TOPICALLY TWICE A WEEK 1 each 3    nystatin (MYCOSTATIN) 146975 UNIT/ML suspension TAKE 5 MLS BY MOUTH FOUR TIMES A DAY FOR 10 DAYS, RETAIN IN MOUTH AS LONG AS POSSIBLE 3 Bottle 3    budesonide (ENTOCORT EC) 3 MG extended release capsule Take 6 mg by mouth every morning      ketoconazole (NIZORAL) 2 % shampoo APPLY TOPICALLY DAILY AS NEEDED 1 Bottle 1    Cyanocobalamin (B-12) 1000 MCG SUBL Place 1,000 Units under the tongue daily 90 tablet 3    hydrOXYzine (ATARAX) 50 MG tablet Take 1 tablet by mouth every 4 hours as needed for Itching 270 tablet 1    polyethylene glycol (GLYCOLAX) 17 GM/SCOOP powder 1/2-1 capful in 8 oz water or prune juice qd prn constipation.  3 Bottle 3    carBAMazepine (TEGRETOL-XR) 100 MG extended release tablet Take 1 tablet by mouth 2 times daily 60 tablet 3    acetaminophen (TYLENOL) 500 MG tablet Take 1,000 mg by mouth 3 times daily as needed for Pain      fluocinolone (DERMOTIC) 0.01 % OIL oil Place 1 drop in ear(s) 2 times daily 1 Bottle 3    diphenoxylate-atropine (LOMOTIL) 2.5-0.025 MG per tablet Take 1 tablet by mouth as needed for Diarrhea. Brian Ambrosia Cholecalciferol (VITAMIN D3) 5000 units CAPS Take 5,000 Units by mouth daily       oxymorphone (OPANA) 5 MG tablet Take 5 mg by mouth 2 times daily. Indications: per DR Cira Hollis TAKES 3 TIMES A DAY. TRYING TO CUT BACK.  zoledronic acid (RECLAST) 5 MG/100ML SOLN Infuse 5 mg intravenously once IV, yearly      aspirin 81 MG chewable tablet Take 81 mg by mouth daily.  Calcium Carbonate-Vit D-Min (CALCIUM 1200 PO) Take 1 capsule by mouth 2 times daily.  Ascorbic Acid (VITAMIN C) 500 MG tablet Take 500 mg by mouth daily. Current Facility-Administered Medications   Medication Dose Route Frequency Provider Last Rate Last Admin    zoledronic acid (RECLAST) 5 mg/100 mL infusion  5 mg IntraVENous See Admin Instructions Jing Naidu  mL/hr at 04/06/16 0900 5 mg at 04/06/16 0900       Return in about 3 months (around 9/3/2022).

## 2022-06-05 LAB — URINE CULTURE, ROUTINE: NORMAL

## 2022-06-09 DIAGNOSIS — I10 ESSENTIAL HYPERTENSION: Chronic | ICD-10-CM

## 2022-06-09 RX ORDER — LISINOPRIL 40 MG/1
TABLET ORAL
Qty: 30 TABLET | Refills: 2 | Status: SHIPPED | OUTPATIENT
Start: 2022-06-09 | End: 2022-08-24

## 2022-06-09 RX ORDER — ROSUVASTATIN CALCIUM 10 MG/1
TABLET, COATED ORAL
Qty: 30 TABLET | Refills: 2 | Status: SHIPPED | OUTPATIENT
Start: 2022-06-09 | End: 2022-08-30

## 2022-06-10 RX ORDER — POTASSIUM CHLORIDE 20 MEQ/1
TABLET, EXTENDED RELEASE ORAL
Qty: 60 TABLET | Refills: 10 | Status: SHIPPED | OUTPATIENT
Start: 2022-06-10

## 2022-07-07 RX ORDER — TRAZODONE HYDROCHLORIDE 100 MG/1
TABLET ORAL
Qty: 60 TABLET | Refills: 10 | Status: SHIPPED | OUTPATIENT
Start: 2022-07-07

## 2022-07-29 ENCOUNTER — TELEPHONE (OUTPATIENT)
Dept: INTERNAL MEDICINE CLINIC | Age: 72
End: 2022-07-29

## 2022-07-29 NOTE — TELEPHONE ENCOUNTER
Pt calling having very rough time with her family and is a nervous wreck---wanting to get back on Xanax to help---please call the pt. Thanks.

## 2022-07-29 NOTE — TELEPHONE ENCOUNTER
I am so sorry that she is having a rough time. Due to her pain medications, I cannot send in Xanax without approval of her pain doctor. I can offer her a referral to Dr. Tayla Regalado if she is really struggling with anxiety.  I think this may be very helpful for her.     saw

## 2022-08-02 NOTE — TELEPHONE ENCOUNTER
Pt called back today--I did read the message below---she said she would try to work through it---Thanks.

## 2022-08-03 ENCOUNTER — TELEPHONE (OUTPATIENT)
Dept: INTERNAL MEDICINE CLINIC | Age: 72
End: 2022-08-03

## 2022-08-03 NOTE — TELEPHONE ENCOUNTER
----- Message from Zina Diaz sent at 8/3/2022 11:34 AM EDT -----  Subject: Appointment Request    Reason for Call: Established Patient Appointment needed: Routine Existing   Condition Follow Up    QUESTIONS    Reason for appointment request? Available appointments did not meet   patient need     Additional Information for Provider? Patient has an appt scheduled for   09/09/2022, she would like to be on a cancellation list to come in sooner.    She has been getting shakes real bad and hot and her cheeks get real red   Please call and advise if availability opens  ---------------------------------------------------------------------------  --------------  Kaur Swanson INFO  [phone]; OK to leave message on voicemail  ---------------------------------------------------------------------------  --------------  SCRIPT ANSWERS  COVID Screen: Jocelyn Anne

## 2022-08-08 NOTE — TELEPHONE ENCOUNTER
I did speak with pt. She is not having any SOB, chest pains, or arm pain. I spoke with Dr Jennifer Rojas and Dr Xenia Bess MA and have the pt scheduled for tomorrow with Dr Lokesh Perry. Pt is aware if she gets any symptoms that we dicussed to go the ER.

## 2022-08-08 NOTE — TELEPHONE ENCOUNTER
Patient is calling back. She states a few days ago her blood pressure was 205/113 and today it is 159/117. She is requesting an appointment with Dr Radha Gaytan sooner that scheduled appointment 9/9/22.

## 2022-08-09 ENCOUNTER — OFFICE VISIT (OUTPATIENT)
Dept: INTERNAL MEDICINE CLINIC | Age: 72
End: 2022-08-09
Payer: MEDICARE

## 2022-08-09 VITALS
DIASTOLIC BLOOD PRESSURE: 78 MMHG | SYSTOLIC BLOOD PRESSURE: 130 MMHG | HEIGHT: 59 IN | OXYGEN SATURATION: 97 % | HEART RATE: 88 BPM | WEIGHT: 181.6 LBS | BODY MASS INDEX: 36.61 KG/M2

## 2022-08-09 DIAGNOSIS — I10 ESSENTIAL HYPERTENSION: Primary | Chronic | ICD-10-CM

## 2022-08-09 PROCEDURE — 99213 OFFICE O/P EST LOW 20 MIN: CPT | Performed by: INTERNAL MEDICINE

## 2022-08-09 PROCEDURE — 1123F ACP DISCUSS/DSCN MKR DOCD: CPT | Performed by: INTERNAL MEDICINE

## 2022-08-09 ASSESSMENT — ENCOUNTER SYMPTOMS
COLOR CHANGE: 0
SHORTNESS OF BREATH: 0
WHEEZING: 0
ABDOMINAL PAIN: 0
VOMITING: 0
BACK PAIN: 0
CHEST TIGHTNESS: 0
SORE THROAT: 0
COUGH: 0
NAUSEA: 0
CONSTIPATION: 0

## 2022-08-09 NOTE — PROGRESS NOTES
ASSESSMENT/PLAN:  1. Essential hypertension  Assessment & Plan:   Blood pressure checked 3 times in office all her readings were within normal and at target goal, advised patient we will not change any of her medications however recommended she continues ambulatory blood pressure check along with continuing her current medications. She is scheduled for follow-up visit with Dr. Stephanie Fountain in 4 weeks, advised to bring her log with her however if readings become alarmingly high encouraged to call the office and not to wait until her next visit. Reinforced recommendations to watch salt intake and try to increase level of physical activity as tolerated      Return for as scheduled. SUBJECTIVE  HPI:   States she was at the pain management doctor office last week and they told her her blood pressure was running high, she started checking her blood pressure sporadically at home and brought the readings with her, 174/88, 171/87, 185/110, 132/72, 139/78, 205/113, 149/78, 179/80, 159/117. These readings are not in any order, she check it randomly throughout the day and not sure if she is running high towards the end of the day or not. She is on lisinopril 40 mg and hydrochlorothiazide 25 mg, states she gets her medications in mail and does not know which is which but she takes them all regularly. She is denying any discomfort, states she has not been feeling any different from her baseline      Review of Systems   Constitutional:  Negative for activity change, appetite change and fatigue. HENT:  Negative for congestion, hearing loss, mouth sores and sore throat. Respiratory:  Negative for cough, chest tightness, shortness of breath and wheezing. Cardiovascular:  Negative for chest pain, palpitations and leg swelling. Gastrointestinal:  Negative for abdominal pain, constipation, nausea and vomiting. Genitourinary:  Negative for difficulty urinating, dysuria, frequency, hematuria and urgency.    Musculoskeletal: Positive for myalgias. Negative for arthralgias, back pain, gait problem and joint swelling. Skin:  Negative for color change. Allergic/Immunologic: Negative for environmental allergies and immunocompromised state. Neurological:  Negative for dizziness, light-headedness and headaches. Psychiatric/Behavioral:  Negative for behavioral problems and dysphoric mood. OBJECTIVE:    /78   Pulse 88   Ht 4' 11\" (1.499 m)   Wt 181 lb 9.6 oz (82.4 kg)   SpO2 97%   BMI 36.68 kg/m²    Physical Exam  Vitals and nursing note reviewed. Constitutional:       General: She is not in acute distress. Appearance: Normal appearance. HENT:      Head: Normocephalic. Eyes:      Extraocular Movements: Extraocular movements intact. Pupils: Pupils are equal, round, and reactive to light. Cardiovascular:      Rate and Rhythm: Normal rate and regular rhythm. Heart sounds: Normal heart sounds. No murmur heard. Pulmonary:      Effort: Pulmonary effort is normal. No respiratory distress. Breath sounds: No wheezing. Abdominal:      Palpations: Abdomen is soft. Musculoskeletal:      Cervical back: Normal range of motion and neck supple. Right lower leg: No edema. Left lower leg: No edema. Skin:     General: Skin is warm and dry. Neurological:      General: No focal deficit present. Mental Status: She is alert. Electronically signed by Mainor Warner MD on 8/9/2022 at 11:56 AM.    This dictation was generated by voice recognition computer software. Although all attempts are made to edit the dictation for accuracy, there may be errors in the transcription that are not intended.

## 2022-08-09 NOTE — ASSESSMENT & PLAN NOTE
Blood pressure checked 3 times in office all her readings were within normal and at target goal, advised patient we will not change any of her medications however recommended she continues ambulatory blood pressure check along with continuing her current medications. She is scheduled for follow-up visit with Dr. Kassie Dunaway in 4 weeks, advised to bring her log with her however if readings become alarmingly high encouraged to call the office and not to wait until her next visit.   Reinforced recommendations to watch salt intake and try to increase level of physical activity as tolerated

## 2022-08-16 ENCOUNTER — TELEPHONE (OUTPATIENT)
Dept: PULMONOLOGY | Age: 72
End: 2022-08-16

## 2022-08-16 NOTE — TELEPHONE ENCOUNTER
Pt has not been seen for a 1 year 07/01/21  Dr. Lynn Arreguin is unable to send order because she has not been seen for a year & for insurance they will not cover because they need to be seen yearly. Please advice that she is able to go to [address] [place] [address] they are located in [address] [place] [address] she also is able to order online. She will have to pay out of pocket insurance will not cover until she is seen & Dr. Lynn Arreguin sends order.

## 2022-08-16 NOTE — TELEPHONE ENCOUNTER
Called patient to schedule appointment, she does not want to go to [place], I scheduled her for December in [facility]. I let her know we could not send an order for a new machine until that appointment, but I would ask if Dr. Sandeep Bean could order supplies now. Please advise and call patient back.

## 2022-08-17 NOTE — TELEPHONE ENCOUNTER
I called patient back and relayed Dorcas's message, patient scheduled for appt. Tomorrow in [place], will try to get a friend to drive her. Patient said she will call back if she is not able to make it.

## 2022-08-22 NOTE — TELEPHONE ENCOUNTER
Medication:   Requested Prescriptions     Pending Prescriptions Disp Refills    nystatin (MYCOSTATIN) [id number] UNIT/ML suspension [Pharmacy Med Name: NYSTATIN 100,000 UNIT/ML SUSP] 60 mL 0     Sig: TAKE FIVE MILLILITERS BY MOUTH FOUR TIMES A DAY       Last Filled:  3/2/22    Patient Phone Number: [phone] (home)     Last appt: 8/9/2022   Next appt: 9/9/2022

## 2022-08-23 DIAGNOSIS — I10 ESSENTIAL HYPERTENSION: Chronic | ICD-10-CM

## 2022-08-24 RX ORDER — LISINOPRIL 40 MG/1
TABLET ORAL
Qty: 30 TABLET | Refills: 10 | Status: SHIPPED | OUTPATIENT
Start: 2022-08-24 | End: 2022-09-09 | Stop reason: SDUPTHER

## 2022-08-30 RX ORDER — ROSUVASTATIN CALCIUM 10 MG/1
TABLET, COATED ORAL
Qty: 90 TABLET | Refills: 3 | Status: SHIPPED | OUTPATIENT
Start: 2022-08-30

## 2022-09-09 ENCOUNTER — OFFICE VISIT (OUTPATIENT)
Dept: INTERNAL MEDICINE CLINIC | Age: 72
End: 2022-09-09
Payer: MEDICARE

## 2022-09-09 VITALS
HEART RATE: 85 BPM | OXYGEN SATURATION: 92 % | BODY MASS INDEX: 35.16 KG/M2 | SYSTOLIC BLOOD PRESSURE: 138 MMHG | DIASTOLIC BLOOD PRESSURE: 89 MMHG | HEIGHT: 61 IN | WEIGHT: 186.2 LBS

## 2022-09-09 DIAGNOSIS — R06.02 SHORTNESS OF BREATH: Primary | ICD-10-CM

## 2022-09-09 DIAGNOSIS — R25.2 FOOT CRAMPS: ICD-10-CM

## 2022-09-09 DIAGNOSIS — N39.0 RECURRENT UTI: ICD-10-CM

## 2022-09-09 DIAGNOSIS — F32.A DEPRESSIVE DISORDER: ICD-10-CM

## 2022-09-09 DIAGNOSIS — R68.2 DRY MOUTH: ICD-10-CM

## 2022-09-09 DIAGNOSIS — Z00.00 MEDICARE ANNUAL WELLNESS VISIT, SUBSEQUENT: Primary | ICD-10-CM

## 2022-09-09 DIAGNOSIS — M81.0 OSTEOPOROSIS, POST-MENOPAUSAL: ICD-10-CM

## 2022-09-09 DIAGNOSIS — I10 ESSENTIAL HYPERTENSION: Chronic | ICD-10-CM

## 2022-09-09 DIAGNOSIS — E78.5 HYPERLIPIDEMIA LDL GOAL <100: ICD-10-CM

## 2022-09-09 DIAGNOSIS — E55.9 VITAMIN D INSUFFICIENCY: ICD-10-CM

## 2022-09-09 DIAGNOSIS — F33.1 MODERATE EPISODE OF RECURRENT MAJOR DEPRESSIVE DISORDER (HCC): ICD-10-CM

## 2022-09-09 DIAGNOSIS — F41.9 ANXIETY: ICD-10-CM

## 2022-09-09 LAB
A/G RATIO: 1.8 (ref 1.1–2.2)
ALBUMIN SERPL-MCNC: 4.1 G/DL (ref 3.4–5)
ALP BLD-CCNC: 47 U/L (ref 40–129)
ALT SERPL-CCNC: 30 U/L (ref 10–40)
ANION GAP SERPL CALCULATED.3IONS-SCNC: 14 MMOL/L (ref 3–16)
AST SERPL-CCNC: 31 U/L (ref 15–37)
BASOPHILS ABSOLUTE: 0 K/UL (ref 0–0.2)
BASOPHILS RELATIVE PERCENT: 0.4 %
BILIRUB SERPL-MCNC: 0.3 MG/DL (ref 0–1)
BUN BLDV-MCNC: 10 MG/DL (ref 7–20)
CALCIUM SERPL-MCNC: 9.2 MG/DL (ref 8.3–10.6)
CHLORIDE BLD-SCNC: 91 MMOL/L (ref 99–110)
CHOLESTEROL, TOTAL: 142 MG/DL (ref 0–199)
CO2: 25 MMOL/L (ref 21–32)
CREAT SERPL-MCNC: 0.6 MG/DL (ref 0.6–1.2)
EOSINOPHILS ABSOLUTE: 0.2 K/UL (ref 0–0.6)
EOSINOPHILS RELATIVE PERCENT: 4.1 %
GFR AFRICAN AMERICAN: >60
GFR NON-AFRICAN AMERICAN: >60
GLUCOSE BLD-MCNC: 80 MG/DL (ref 70–99)
HCT VFR BLD CALC: 36.6 % (ref 36–48)
HDLC SERPL-MCNC: 78 MG/DL (ref 40–60)
HEMOGLOBIN: 12.5 G/DL (ref 12–16)
LDL CHOLESTEROL CALCULATED: 46 MG/DL
LYMPHOCYTES ABSOLUTE: 1.5 K/UL (ref 1–5.1)
LYMPHOCYTES RELATIVE PERCENT: 28.3 %
MAGNESIUM: 1.8 MG/DL (ref 1.8–2.4)
MCH RBC QN AUTO: 32.7 PG (ref 26–34)
MCHC RBC AUTO-ENTMCNC: 34.3 G/DL (ref 31–36)
MCV RBC AUTO: 95.5 FL (ref 80–100)
MONOCYTES ABSOLUTE: 0.5 K/UL (ref 0–1.3)
MONOCYTES RELATIVE PERCENT: 9.7 %
NEUTROPHILS ABSOLUTE: 3.1 K/UL (ref 1.7–7.7)
NEUTROPHILS RELATIVE PERCENT: 57.5 %
PDW BLD-RTO: 12.2 % (ref 12.4–15.4)
PLATELET # BLD: 228 K/UL (ref 135–450)
PMV BLD AUTO: 6.9 FL (ref 5–10.5)
POTASSIUM SERPL-SCNC: 3.9 MMOL/L (ref 3.5–5.1)
RBC # BLD: 3.83 M/UL (ref 4–5.2)
SODIUM BLD-SCNC: 130 MMOL/L (ref 136–145)
TOTAL PROTEIN: 6.4 G/DL (ref 6.4–8.2)
TRIGL SERPL-MCNC: 91 MG/DL (ref 0–150)
VLDLC SERPL CALC-MCNC: 18 MG/DL
WBC # BLD: 5.4 K/UL (ref 4–11)

## 2022-09-09 PROCEDURE — 1123F ACP DISCUSS/DSCN MKR DOCD: CPT | Performed by: INTERNAL MEDICINE

## 2022-09-09 PROCEDURE — G0439 PPPS, SUBSEQ VISIT: HCPCS | Performed by: INTERNAL MEDICINE

## 2022-09-09 PROCEDURE — 93000 ELECTROCARDIOGRAM COMPLETE: CPT | Performed by: INTERNAL MEDICINE

## 2022-09-09 PROCEDURE — 99215 OFFICE O/P EST HI 40 MIN: CPT | Performed by: INTERNAL MEDICINE

## 2022-09-09 RX ORDER — TRIAMCINOLONE ACETONIDE 0.1 %
PASTE (GRAM) DENTAL
Qty: 5 G | Refills: 1 | Status: SHIPPED | OUTPATIENT
Start: 2022-09-09 | End: 2022-09-16

## 2022-09-09 RX ORDER — NABUMETONE 750 MG/1
750 TABLET, FILM COATED ORAL 2 TIMES DAILY
Qty: 180 TABLET | Refills: 3
Start: 2022-09-09

## 2022-09-09 RX ORDER — DULOXETIN HYDROCHLORIDE 30 MG/1
CAPSULE, DELAYED RELEASE ORAL
Qty: 66 CAPSULE | Refills: 0 | Status: SHIPPED | OUTPATIENT
Start: 2022-09-09 | End: 2022-10-07

## 2022-09-09 RX ORDER — ESCITALOPRAM OXALATE 10 MG/1
10 TABLET ORAL DAILY
Qty: 30 TABLET | Refills: 3 | Status: SHIPPED | OUTPATIENT
Start: 2022-09-09

## 2022-09-09 RX ORDER — LISINOPRIL 20 MG/1
20 TABLET ORAL 2 TIMES DAILY
Qty: 60 TABLET | Refills: 1 | Status: SHIPPED | OUTPATIENT
Start: 2022-09-09 | End: 2022-10-25

## 2022-09-09 RX ORDER — CETIRIZINE HYDROCHLORIDE 10 MG/1
10 TABLET ORAL DAILY
COMMUNITY

## 2022-09-09 SDOH — ECONOMIC STABILITY: FOOD INSECURITY: WITHIN THE PAST 12 MONTHS, YOU WORRIED THAT YOUR FOOD WOULD RUN OUT BEFORE YOU GOT MONEY TO BUY MORE.: NEVER TRUE

## 2022-09-09 SDOH — ECONOMIC STABILITY: FOOD INSECURITY: WITHIN THE PAST 12 MONTHS, THE FOOD YOU BOUGHT JUST DIDN'T LAST AND YOU DIDN'T HAVE MONEY TO GET MORE.: NEVER TRUE

## 2022-09-09 ASSESSMENT — LIFESTYLE VARIABLES
HOW MANY STANDARD DRINKS CONTAINING ALCOHOL DO YOU HAVE ON A TYPICAL DAY: PATIENT DOES NOT DRINK
HOW OFTEN DO YOU HAVE A DRINK CONTAINING ALCOHOL: NEVER

## 2022-09-09 ASSESSMENT — PATIENT HEALTH QUESTIONNAIRE - PHQ9
4. FEELING TIRED OR HAVING LITTLE ENERGY: 3
SUM OF ALL RESPONSES TO PHQ QUESTIONS 1-9: 6
3. TROUBLE FALLING OR STAYING ASLEEP: 1
5. POOR APPETITE OR OVEREATING: 0
6. FEELING BAD ABOUT YOURSELF - OR THAT YOU ARE A FAILURE OR HAVE LET YOURSELF OR YOUR FAMILY DOWN: 0
8. MOVING OR SPEAKING SO SLOWLY THAT OTHER PEOPLE COULD HAVE NOTICED. OR THE OPPOSITE, BEING SO FIGETY OR RESTLESS THAT YOU HAVE BEEN MOVING AROUND A LOT MORE THAN USUAL: 0
SUM OF ALL RESPONSES TO PHQ QUESTIONS 1-9: 6
SUM OF ALL RESPONSES TO PHQ QUESTIONS 1-9: 6
10. IF YOU CHECKED OFF ANY PROBLEMS, HOW DIFFICULT HAVE THESE PROBLEMS MADE IT FOR YOU TO DO YOUR WORK, TAKE CARE OF THINGS AT HOME, OR GET ALONG WITH OTHER PEOPLE: 0
SUM OF ALL RESPONSES TO PHQ QUESTIONS 1-9: 6
9. THOUGHTS THAT YOU WOULD BE BETTER OFF DEAD, OR OF HURTING YOURSELF: 0
7. TROUBLE CONCENTRATING ON THINGS, SUCH AS READING THE NEWSPAPER OR WATCHING TELEVISION: 1
2. FEELING DOWN, DEPRESSED OR HOPELESS: 1

## 2022-09-09 ASSESSMENT — SOCIAL DETERMINANTS OF HEALTH (SDOH): HOW HARD IS IT FOR YOU TO PAY FOR THE VERY BASICS LIKE FOOD, HOUSING, MEDICAL CARE, AND HEATING?: NOT VERY HARD

## 2022-09-09 NOTE — PROGRESS NOTES
Patient: Arleth Mares is a 67 y.o. female who presents today with the following Chief Complaint(s):  Chief Complaint   Patient presents with    Follow-up       HPI    Here today for follow up. Did see Dr. Shonna Craig 8/9/22 with elevated blood pressures. No changes were made at that appointment. Continues to have fluctuations in her blood pressure. Had BP 63/39 at home earlier this week. Felt tired and woozy. Later in the day was 143/65. Other readings of 104/66 and 129/92. Does take lisinopril 40 mg and HCTZ 12.5 mg in the mornings. Does get medications from [address]. Has noticed that she gets winded if she is talking, able to speak about 6 words before needing to take a breath. Has also noticed that climbing stairs she gets winded. Started about 2 weeks ago. No chest pain. Had normal echo in 2018 and normal myoview treadmill stress test in 2019. No tobacco use ever.  did smoke. Parents smoked. Gets brief episodes of dizziness if she turns. Started about 3 weeks ago. Not sure if is worse turning 1 way vs the other. Has been very anxious. Has been upsetting more easily. Has been on duloxetine since 2011. Prior to duloxetine, was on Lexapro. Worried about rash on arms, clears up. States that this just started. Has been getting severe spasms in her feet and ankles. Started about 1 month ago. Tries to drink a lot of water as long as she is not out shopping. Friend's doctor to her to take tonic water with quinine. Seems to be worse after driving. Wondering why she is no longer on Celebrex. Is on Relafen and was previously on diclofenac. Doesn't feel like she is going to be ok on Relafen. States that \"my mouth is a mess\"- has cold sores, dry mouth. Not sure how long her mouth has been dry. Has a cold sore that has been present for about 3 weeks. Has used a tube of Herpicin L topical pain relief.      Has not had any further problems with UTI-  did see gynecology who told her that her cervix is thinning. Started her on an estrogen cream. Does seem to be helping. Is frustrated by inability to lose weight. Does not want to pay a copay for weight loss. Wondering about using GoLo.      Wt Readings from Last 3 Encounters:   09/09/22 186 lb 3.2 oz (84.5 kg)   08/09/22 181 lb 9.6 oz (82.4 kg)   06/03/22 184 lb 9.6 oz (83.7 kg)     Temp Readings from Last 3 Encounters:   10/19/21 97.2 °F (36.2 °C) (Temporal)   09/16/21 97.3 °F (36.3 °C) (Temporal)   08/16/21 97.5 °F (36.4 °C) (Temporal)     BP Readings from Last 3 Encounters:   09/09/22 138/89   08/09/22 130/78   06/03/22 118/60     Pulse Readings from Last 3 Encounters:   09/09/22 85   08/09/22 88   06/03/22 92         The 10-year ASCVD risk score (Gomez Fabian et al., 2013) is: 16%    Values used to calculate the score:      Age: 67 years      Sex: Female      Is Non- : No      Diabetic: No      Tobacco smoker: No      Systolic Blood Pressure: 433 mmHg      Is BP treated: Yes      HDL Cholesterol: 78 mg/dL      Total Cholesterol: 142 mg/dL      Wt Readings from Last 3 Encounters:   09/09/22 186 lb 3.2 oz (84.5 kg)   08/09/22 181 lb 9.6 oz (82.4 kg)   06/03/22 184 lb 9.6 oz (83.7 kg)     Temp Readings from Last 3 Encounters:   10/19/21 97.2 °F (36.2 °C) (Temporal)   09/16/21 97.3 °F (36.3 °C) (Temporal)   08/16/21 97.5 °F (36.4 °C) (Temporal)     BP Readings from Last 3 Encounters:   09/09/22 138/89   08/09/22 130/78   06/03/22 118/60     Pulse Readings from Last 3 Encounters:   09/09/22 85   08/09/22 88   06/03/22 92     Lab Results   Component Value Date    WBC 5.4 09/09/2022    HGB 12.5 09/09/2022    HCT 36.6 09/09/2022    MCV 95.5 09/09/2022     09/09/2022     Lab Results   Component Value Date     (L) 09/09/2022    K 3.9 09/09/2022    CL 91 (L) 09/09/2022    CO2 25 09/09/2022    BUN 10 09/09/2022    CREATININE 0.6 09/09/2022    GLUCOSE 80 09/09/2022    CALCIUM 9.2 09/09/2022    PROT 6.4 09/09/2022    LABALBU 4.1 09/09/2022    BILITOT 0.3 09/09/2022    ALKPHOS 47 09/09/2022    AST 31 09/09/2022    ALT 30 09/09/2022    LABGLOM >60 09/09/2022    GFRAA >60 09/09/2022    AGRATIO 1.8 09/09/2022    GLOB 2.8 09/20/2021       Lab Results   Component Value Date    CHOL 142 09/09/2022    CHOL 142 03/02/2022    CHOL 134 09/20/2021     Lab Results   Component Value Date    TRIG 91 09/09/2022    TRIG 178 (H) 03/02/2022    TRIG 121 09/20/2021     Lab Results   Component Value Date    HDL 78 (H) 09/09/2022    HDL 65 (H) 03/02/2022    HDL 68 (H) 09/20/2021     Lab Results   Component Value Date    LDLCALC 46 09/09/2022    LDLCALC 41 03/02/2022    LDLCALC 42 09/20/2021     Lab Results   Component Value Date    LABVLDL 18 09/09/2022    LABVLDL 36 03/02/2022    LABVLDL 24 09/20/2021     No results found for: CHOLHDLRATIO      Allergies   Allergen Reactions    Amlodipine     Ativan [Lorazepam] Swelling     Tongue swelling    Sulfa Antibiotics Swelling    Keflex [Cephalexin] Other (See Comments)     Leg cramps      Past Medical History:   Diagnosis Date    Anxiety     Anxiety and depression     Chronic back pain     Clostridium difficile infection 2/22/15    Hyperlipidemia     Hypertension     Insomnia disorder     Osteoarthritis     Osteoporosis 2008    Rheumatic fever     S/P insertion of spinal cord stimulator     Self-catheterizes urinary bladder     as needed 7/8 no longer catherizing     Urinary retention       Past Surgical History:   Procedure Laterality Date    BLADDER REPAIR      CARPAL TUNNEL RELEASE      COLONOSCOPY      CYSTOSCOPY  10/29/2012    bladder biopsy    CYSTOSCOPY N/A 10/19/2020    FLEXIBLE CYSTOSCOPY performed by Aisha Blancas MD at [address] [facility] (CERVIX STATUS UNKNOWN)      INTRACAPSULAR CATARACT EXTRACTION Right 7/18/2019    PHACOEMULSIFICATION WITH INTRAOCULAR LENS IMPLANT performed by Cruz Reeder MD at [facility] CATARACT EXTRACTION Left 7/26/2019    PHACOEMULSIFICATION WITH INTRAOCULAR LENS IMPLANT performed by Amber Diaz MD at  [address]  2004    MANDIBLE RECONSTRUCTION      OTHER SURGICAL HISTORY      spinal cord stimulator    PAIN MANAGEMENT PROCEDURE N/A 10/19/2021    CAUDAL EPIDURAL STEROID INJECTION WITH FLUOROSCOPY performed by Darwin Nieto MD at [address] [facility] Right 10/18/13      Social History     Socioeconomic History    Marital status:       Spouse name: Not on file    Number of children: Not on file    Years of education: Not on file    Highest education level: Not on file   Occupational History    Occupation: retired   Tobacco Use    Smoking status: Never    Smokeless tobacco: Never   Vaping Use    Vaping Use: Never used   Substance and Sexual Activity    Alcohol use: Not Currently    Drug use: No    Sexual activity: Not on file   Other Topics Concern    Not on file   Social History Narrative    Not on file     Social Determinants of Health     Financial Resource Strain: Low Risk     Difficulty of Paying Living Expenses: Not very hard   Food Insecurity: No Food Insecurity    Worried About Running Out of Food in the Last Year: Never true    Ran Out of Food in the Last Year: Never true   Transportation Needs: Not on file   Physical Activity: Inactive    Days of Exercise per Week: 0 days    Minutes of Exercise per Session: 0 min   Stress: Not on file   Social Connections: Not on file   Intimate Partner Violence: Not on file   Housing Stability: Not on file     Family History   Problem Relation Age of Onset    COPD Mother     High Blood Pressure Mother     Rheum Arthritis Mother     Thyroid Disease Mother     Alcohol Abuse Father     Clotting Disorder Sister     Thyroid Disease Sister     Hypertension Brother     Diabetes Sister     Heart Disease Sister     Hypertension Sister     Thyroid Disease Sister     Cancer Brother     Heart Disease Brother Hypertension Brother     Substance Abuse Brother     Alcohol Abuse Son     No Known Problems Daughter     Dementia Neg Hx         Outpatient Medications Prior to Visit   Medication Sig Dispense Refill    rosuvastatin (CRESTOR) 10 MG tablet TAKE 1 TABLET BY MOUTH NIGHTLY 90 tablet 3    lisinopril (PRINIVIL;ZESTRIL) 40 MG tablet TAKE 1 TABLET BY MOUTH ONCE DAILY 30 tablet 10    traZODone (DESYREL) 100 MG tablet TAKE 2 TABLETS BY MOUTH EVERY NIGHT 60 tablet 10    potassium chloride (KLOR-CON M) 20 MEQ extended release tablet TAKE 1 TABLET BY MOUTH TWICE DAILY 60 tablet 10    D-MANNOSE PO Take by mouth      busPIRone (BUSPAR) 15 MG tablet Take 15 mg by mouth 3 times daily 90 tablet 5    DULoxetine (CYMBALTA) 60 MG extended release capsule Take 2 capsules by mouth daily 60 capsule 5    oxybutynin (DITROPAN XL) 15 MG extended release tablet TAKE 1 TABLET BY MOUTH DAILY *REPLACES OXYBUTININ ER 10MG TABLET* 30 tablet 10    memantine (NAMENDA) 10 MG tablet Take 1 tablet by mouth twice daily 180 tablet 1    hydroCHLOROthiazide (MICROZIDE) 12.5 MG capsule TAKE 1 CAPSULE BY MOUTH EVERY MORNING 30 capsule 10    gabapentin (NEURONTIN) 600 MG tablet TAKE 1 TABLET BY MOUTH THREE TIMES DAILY (Patient taking differently: Take 600 mg by mouth 3 times daily.) 90 tablet 10    galantamine (RAZADYNE ER) 16 MG extended release capsule TAKE 1 CAPSULE BY MOUTH ONCE DAILY WITH BREAKFAST 90 capsule 1    fluticasone (FLONASE) 50 MCG/ACT nasal spray SPRAY TWO SPRAYS IN EACH NOSTRIL ONCE DAILY DURING ALLERGY SEASON 1 each prn    blood glucose monitor strips Check sugars qam and prn s/s of low blood sugars 50 strip 5    Lancets MISC Check sugars qam and prn s/s of low blood sugars 50 each 5    azelastine (ASTELIN) 0.1 % nasal spray Use 2 sprays in each nostril 2 times daily. Use fluticasone 15 minutes after the morning dose of astelin.  1 each 5    blood glucose monitor kit and supplies Check sugars qam and prn s/s of low blood sugars 1 kit 0 baclofen (LIORESAL) 10 MG tablet TAKE ONE TABLET BY MOUTH THREE TIMES A DAY AS NEEDED FOR PAIN AND SPASM 60 tablet 2    nabumetone (RELAFEN) 750 MG tablet Take 1 tablet by mouth 2 times daily 180 tablet 3    diclofenac sodium (VOLTAREN) 1 % GEL       fluocinolone (DERMA-SMOOTHE) 0.01 % external oil APPLY TOPICALLY TWICE A WEEK 1 each 3    nystatin (MYCOSTATIN) 434983 UNIT/ML suspension TAKE 5 MLS BY MOUTH FOUR TIMES A DAY FOR 10 DAYS, RETAIN IN MOUTH AS LONG AS POSSIBLE 3 Bottle 3    budesonide (ENTOCORT EC) 3 MG extended release capsule Take 6 mg by mouth every morning      ketoconazole (NIZORAL) 2 % shampoo APPLY TOPICALLY DAILY AS NEEDED 1 Bottle 1    Cyanocobalamin (B-12) 1000 MCG SUBL Place 1,000 Units under the tongue daily 90 tablet 3    polyethylene glycol (GLYCOLAX) 17 GM/SCOOP powder 1/2-1 capful in 8 oz water or prune juice qd prn constipation. 3 Bottle 3    carBAMazepine (TEGRETOL-XR) 100 MG extended release tablet Take 1 tablet by mouth 2 times daily 60 tablet 3    acetaminophen (TYLENOL) 500 MG tablet Take 1,000 mg by mouth 3 times daily as needed for Pain      fluocinolone (DERMOTIC) 0.01 % OIL oil Place 1 drop in ear(s) 2 times daily 1 Bottle 3    diphenoxylate-atropine (LOMOTIL) 2.5-0.025 MG per tablet Take 1 tablet by mouth as needed for Diarrhea. .      Cholecalciferol (VITAMIN D3) 5000 units CAPS Take 5,000 Units by mouth daily       oxyMORphone (OPANA) 5 MG tablet Take 5 mg by mouth 2 times daily. Indications: per DR Cynthia Izaguirre TAKES 3 TIMES A DAY. TRYING TO CUT BACK.      zoledronic acid (RECLAST) 5 MG/100ML SOLN Infuse 5 mg intravenously once IV, yearly      aspirin 81 MG chewable tablet Take 81 mg by mouth daily. Calcium Carbonate-Vit D-Min (CALCIUM 1200 PO) Take 1 capsule by mouth 2 times daily. Ascorbic Acid (VITAMIN C) 500 MG tablet Take 500 mg by mouth daily.          Facility-Administered Medications Prior to Visit   Medication Dose Route Frequency Provider Last Rate Last Admin zoledronic acid (RECLAST) 5 mg/100 mL infusion  5 mg IntraVENous See Admin Instructions Vitaliy Manriquez  mL/hr at 04/06/16 0900 5 mg at 04/06/16 0900       Patient'spast medical history, surgical history, family history, medications,  and allergies  were all reviewed and updated as appropriate today. Review of Systems    There were no vitals taken for this visit. Physical Exam  Vitals and nursing note reviewed. Constitutional:       Appearance: She is well-developed. She is not toxic-appearing. HENT:      Head: Normocephalic. Right Ear: Tympanic membrane, ear canal and external ear normal.      Left Ear: Tympanic membrane, ear canal and external ear normal.      Mouth/Throat:      Pharynx: No oropharyngeal exudate or posterior oropharyngeal erythema. Comments: aphthous ulcer mandibular buccal mucosa. Canker sore on tip of tongue. Eyes:      General: No scleral icterus. Extraocular Movements: Extraocular movements intact. Conjunctiva/sclera: Conjunctivae normal.      Pupils: Pupils are equal, round, and reactive to light. Neck:      Thyroid: No thyroid mass or thyromegaly. Vascular: No carotid bruit. Cardiovascular:      Rate and Rhythm: Normal rate and regular rhythm. Heart sounds: Normal heart sounds. No murmur heard. Pulmonary:      Effort: Pulmonary effort is normal.      Breath sounds: Normal breath sounds. Musculoskeletal:      Right lower leg: No edema. Left lower leg: No edema. Lymphadenopathy:      Cervical: No cervical adenopathy. Neurological:      General: No focal deficit present. Mental Status: She is alert and oriented to person, place, and time. Psychiatric:         Mood and Affect: Mood normal.         Behavior: Behavior normal. Behavior is cooperative.        ASSESSMENT/PLAN:    Problem List Items Addressed This Visit       Essential hypertension (Chronic)     Patient's blood pressure continues to fluctuate, ranging from 63//65. Patient was symptomatic when she is at the low reading but I doubt 63/39 is an accurate reading. Encouraged patient to bring her blood pressure cuff with her to her next appointment so we can check its accuracy. We will change lisinopril from 40 mg daily to 20 mg twice daily. Continue hydrochlorothiazide 12.5 mg in the morning. Unfortunately, patient gets her medications through [facility] and just received a shipment so this change will likely not occur for several weeks. Relevant Medications    lisinopril (PRINIVIL;ZESTRIL) 20 MG tablet    Other Relevant Orders    CBC with Auto Differential (Completed)    Comprehensive Metabolic Panel (Completed)    Moderate episode of recurrent major depressive disorder (HCC) (Chronic)     Worsening. Referral offered to psychiatry which she declines. Continue Cymbalta 120 mg daily and BuSpar 15 mg 3 times daily. Relevant Medications    DULoxetine (CYMBALTA) 30 MG extended release capsule    escitalopram (LEXAPRO) 10 MG tablet    Osteoporosis, post-menopausal (Chronic)     Check vitamin D level. Has completed 5 doses of Reclast.  Consider repeating DEXA scan in the near future and adding Prolia. Recurrent UTI (Chronic)      Secondary to atrophic vaginitis. Did see urogynecology and was started on estrogen vaginal cream which she is using twice weekly. Anxiety     Continue Cymbalta 120 mg daily and BuSpar 15 mg 3 times daily. Offered referral to Dr. Do Bennett which she declines. Reviewed with patient that she is not a candidate for benzodiazepines due to her pain medicine use. Relevant Medications    DULoxetine (CYMBALTA) 30 MG extended release capsule    escitalopram (LEXAPRO) 10 MG tablet    RESOLVED: Depressive disorder    Relevant Medications    DULoxetine (CYMBALTA) 30 MG extended release capsule    escitalopram (LEXAPRO) 10 MG tablet    Dry mouth      Suspect related to medication.   Check SSA and SSB to rule out Sjogren's. Relevant Orders    Anti SSA (Completed)    Anti SSB (Completed)    Foot cramps     Check CMP and magnesium level. Most likely this is related to her lumbar spinal stenosis. Relevant Orders    Comprehensive Metabolic Panel (Completed)    Magnesium (Completed)    Hyperlipidemia LDL goal <100      Continue Crestor 10 mg daily. Check lipid panel, CMP. Relevant Medications    lisinopril (PRINIVIL;ZESTRIL) 20 MG tablet    Other Relevant Orders    Lipid Panel (Completed)    TSH with Reflex (Completed)    Shortness of breath - Primary     Patient with dyspnea on exertion. Most likely this is related to deconditioning. However, she is at high risk for heart disease. Check Myoview stress test.         Relevant Orders    XR CHEST STANDARD (2 VW)    Full PFT Study With Bronchodilator    Stress test, myoview    EKG 12 Lead - Clinic Performed (Completed)    Vitamin D insufficiency      Check vitamin D level. Current Outpatient Medications   Medication Sig Dispense Refill    lisinopril (PRINIVIL;ZESTRIL) 20 MG tablet Take 1 tablet by mouth in the morning and at bedtime Replacing lisinopril 40 mg qd 60 tablet 1    DULoxetine (CYMBALTA) 30 MG extended release capsule Take 3 capsules by mouth daily for 7 days, THEN 2 capsules daily for 7 days, THEN 1 capsule daily for 7 days, THEN 1 capsule every other day for 7 days. 66 capsule 0    escitalopram (LEXAPRO) 10 MG tablet Take 1 tablet by mouth daily 30 tablet 3    nabumetone (RELAFEN) 750 MG tablet Take 1 tablet by mouth 2 times daily 180 tablet 3    triamcinolone acetonide (KENALOG) 0.1 % paste Apply to canker sores 2 times daily until healed.  . 5 g 1    cetirizine (ZYRTEC) 10 MG tablet Take 10 mg by mouth daily      rosuvastatin (CRESTOR) 10 MG tablet TAKE 1 TABLET BY MOUTH NIGHTLY 90 tablet 3    traZODone (DESYREL) 100 MG tablet TAKE 2 TABLETS BY MOUTH EVERY NIGHT 60 tablet 10    potassium chloride (KLOR-CON M) 20 MEQ extended release tablet TAKE 1 TABLET BY MOUTH TWICE DAILY 60 tablet 10    D-MANNOSE PO Take by mouth      busPIRone (BUSPAR) 15 MG tablet Take 15 mg by mouth 3 times daily 90 tablet 5    oxybutynin (DITROPAN XL) 15 MG extended release tablet TAKE 1 TABLET BY MOUTH DAILY *REPLACES OXYBUTININ ER 10MG TABLET* 30 tablet 10    memantine (NAMENDA) 10 MG tablet Take 1 tablet by mouth twice daily 180 tablet 1    hydroCHLOROthiazide (MICROZIDE) 12.5 MG capsule TAKE 1 CAPSULE BY MOUTH EVERY MORNING 30 capsule 10    gabapentin (NEURONTIN) 600 MG tablet TAKE 1 TABLET BY MOUTH THREE TIMES DAILY (Patient taking differently: Take 600 mg by mouth 3 times daily.) 90 tablet 10    galantamine (RAZADYNE ER) 16 MG extended release capsule TAKE 1 CAPSULE BY MOUTH ONCE DAILY WITH BREAKFAST 90 capsule 1    fluticasone (FLONASE) 50 MCG/ACT nasal spray SPRAY TWO SPRAYS IN EACH NOSTRIL ONCE DAILY DURING ALLERGY SEASON 1 each prn    blood glucose monitor strips Check sugars qam and prn s/s of low blood sugars 50 strip 5    Lancets MISC Check sugars qam and prn s/s of low blood sugars 50 each 5    azelastine (ASTELIN) 0.1 % nasal spray Use 2 sprays in each nostril 2 times daily. Use fluticasone 15 minutes after the morning dose of astelin.  1 each 5    blood glucose monitor kit and supplies Check sugars qam and prn s/s of low blood sugars 1 kit 0    baclofen (LIORESAL) 10 MG tablet TAKE ONE TABLET BY MOUTH THREE TIMES A DAY AS NEEDED FOR PAIN AND SPASM 60 tablet 2    diclofenac sodium (VOLTAREN) 1 % GEL       fluocinolone (DERMA-SMOOTHE) 0.01 % external oil APPLY TOPICALLY TWICE A WEEK 1 each 3    nystatin (MYCOSTATIN) [id number] UNIT/ML suspension TAKE 5 MLS BY MOUTH FOUR TIMES A DAY FOR 10 DAYS, RETAIN IN MOUTH AS LONG AS POSSIBLE 3 Bottle 3    budesonide (ENTOCORT EC) 3 MG extended release capsule Take 6 mg by mouth every morning      ketoconazole (NIZORAL) 2 % shampoo APPLY TOPICALLY DAILY AS NEEDED 1 Bottle 1    Cyanocobalamin (B-12) 1000 MCG

## 2022-09-09 NOTE — PATIENT INSTRUCTIONS
Personalized Preventive Plan for Robby Vincent - 9/9/2022  Medicare offers a range of preventive health benefits. Some of the tests and screenings are paid in full while other may be subject to a deductible, co-insurance, and/or copay. Some of these benefits include a comprehensive review of your medical history including lifestyle, illnesses that may run in your family, and various assessments and screenings as appropriate. After reviewing your medical record and screening and assessments performed today your provider may have ordered immunizations, labs, imaging, and/or referrals for you. A list of these orders (if applicable) as well as your Preventive Care list are included within your After Visit Summary for your review. Other Preventive Recommendations:    A preventive eye exam performed by an eye specialist is recommended every 1-2 years to screen for glaucoma; cataracts, macular degeneration, and other eye disorders. A preventive dental visit is recommended every 6 months. Try to get at least 150 minutes of exercise per week or 10,000 steps per day on a pedometer . Order or download the FREE \"Exercise & Physical Activity: Your Everyday Guide\" from [employer]. Call 1-133.228.9355 or search [employer] online. You need 7219-2723 mg of calcium and 1213-3167 IU of vitamin D per day. It is possible to meet your calcium requirement with diet alone, but a vitamin D supplement is usually necessary to meet this goal.  When exposed to the sun, use a sunscreen that protects against both UVA and UVB radiation with an SPF of 30 or greater. Reapply every 2 to 3 hours or after sweating, drying off with a towel, or swimming. Always wear a seat belt when traveling in a car. Always wear a helmet when riding a bicycle or motorcycle.

## 2022-09-09 NOTE — PROGRESS NOTES
Medicare Annual Wellness Visit    Daniela Faulkner is here for Medicare AWV    Assessment & Plan   Medicare annual wellness visit, subsequent    Recommendations for Preventive Services Due: see orders and patient instructions/AVS.  Recommended screening schedule for the next 5-10 years is provided to the patient in written form: see Patient Instructions/AVS.     No follow-ups on file. Subjective       Patient's complete Health Risk Assessment and screening values have been reviewed and are found in Flowsheets. The following problems were reviewed today and where indicated follow up appointments were made and/or referrals ordered. Positive Risk Factor Screenings with Interventions:      Depression:  PHQ-9 Total Score: 6    Severity:1-4 = minimal depression, 5-9 = mild depression, 10-14 = moderate depression, 15-19 = moderately severe depression, 20-27 = severe depression  Depression Interventions: Following up with PCP. General Health and ACP:  General  In general, how would you say your health is?: Good  In the past 7 days, have you experienced any of the following: New or Increased Pain, New or Increased Fatigue, Loneliness, Social Isolation, Stress or Anger?: (!) Yes  Select all that apply: (!) New or Increased Pain, New or Increased Fatigue, Loneliness, Stress (pain in knee and back, not sure why she is fatigued will speak to PCP.  Stress over money concerns.)  Do you get the social and emotional support that you need?: Yes  Do you have a Living Will?: Yes    Advance Directives       Power of  Living Will ACP-Advance Directive ACP-Power of     Not on File Not on File Not on File Not on File          General Health Risk Interventions:  Stress: relaxation techniques discussed    Health Habits/Nutrition:  Physical Activity: Inactive    Days of Exercise per Week: 0 days    Minutes of Exercise per Session: 0 min     Have you lost any weight without trying in the past 3 months?: No  Body mass index: (!) 35.18  Have you seen the dentist within the past year?: (!) No (has upper denture but cannot afford to go see dentist for remaining teeth due to no insuance)  Health Habits/Nutrition Interventions:  Dental exam overdue:  patient encouraged to make appointment with his/her dentist    Hearing/Vision:  Do you or your family notice any trouble with your hearing that hasn't been managed with hearing aids?: No  Do you have difficulty driving, watching TV, or doing any of your daily activities because of your eyesight?: No  Have you had an eye exam within the past year?: (!) No (encouraged to see eye doctor.)  No results found. Hearing/Vision Interventions:  Hearing concerns:  patient declines any further evaluation/treatment for hearing issues     ADLs:  In the past 7 days, did you need help from others to perform any of the following everyday activities: Eating, dressing, grooming, bathing, toileting, or walking/balance?: No  In the past 7 days, did you need help from others to take care of any of the following: Laundry, housekeeping, banking/finances, shopping, telephone use, food preparation, transportation, or taking medications?: (!) Yes  Select all that apply: [employer] (children help with this task)  ADL Interventions:  Patient declines any further evaluation/treatment for this issue  Going to be speaking with  on pain issues and family helps at this time with tasks of Laundry. Objective   Vitals:    09/09/22 1033   BP: 138/89   Site: Left Upper Arm   Pulse: 85   SpO2: 92%   Weight: 186 lb 3.2 oz (84.5 kg)   Height: 5' 1\" (1.549 m)      Body mass index is 35.18 kg/m². Allergies   Allergen Reactions    Amlodipine     Ativan [Lorazepam] Swelling     Tongue swelling    Sulfa Antibiotics Swelling    Keflex [Cephalexin] Other (See Comments)     Leg cramps     Prior to Visit Medications    Medication Sig Taking?  Authorizing Provider   cetirizine (ZYRTEC) 10 MG tablet Take 10 mg by mouth daily Yes Jodee Fischer MD   rosuvastatin (CRESTOR) 10 MG tablet TAKE 1 TABLET BY MOUTH NIGHTLY Yes Liz Distad, DO   lisinopril (PRINIVIL;ZESTRIL) 40 MG tablet TAKE 1 TABLET BY MOUTH ONCE DAILY Yes Liz Distad, DO   traZODone (DESYREL) 100 MG tablet TAKE 2 TABLETS BY MOUTH EVERY NIGHT Yes Liz Distad, DO   potassium chloride (KLOR-CON M) 20 MEQ extended release tablet TAKE 1 TABLET BY MOUTH TWICE DAILY Yes Liz Distad, DO   busPIRone (BUSPAR) 15 MG tablet Take 15 mg by mouth 3 times daily Yes Liz Distad, DO   DULoxetine (CYMBALTA) 60 MG extended release capsule Take 2 capsules by mouth daily Yes Liz Distad, DO   oxybutynin (DITROPAN XL) 15 MG extended release tablet TAKE 1 TABLET BY MOUTH DAILY *REPLACES OXYBUTININ ER 10MG TABLET* Yes Liz Distad, DO   memantine (NAMENDA) 10 MG tablet Take 1 tablet by mouth twice daily Yes Wai Belle MD   hydroCHLOROthiazide (MICROZIDE) 12.5 MG capsule TAKE 1 CAPSULE BY MOUTH EVERY MORNING Yes Liz Promisead, DO   gabapentin (NEURONTIN) 600 MG tablet TAKE 1 TABLET BY MOUTH THREE TIMES DAILY  Patient taking differently: Take 600 mg by mouth 3 times daily. Yes Liz Distad, DO   galantamine (RAZADYNE ER) 16 MG extended release capsule TAKE 1 CAPSULE BY MOUTH ONCE DAILY WITH BREAKFAST Yes Wai Belle MD   fluticasone (FLONASE) 50 MCG/ACT nasal spray SPRAY TWO SPRAYS IN EACH NOSTRIL ONCE DAILY DURING ALLERGY SEASON Yes Traci Braden MD   blood glucose monitor strips Check sugars qam and prn s/s of low blood sugars Yes Liz Promisead, DO   Lancets MISC Check sugars qam and prn s/s of low blood sugars Yes Liz Promisead, DO   azelastine (ASTELIN) 0.1 % nasal spray Use 2 sprays in each nostril 2 times daily. Use fluticasone 15 minutes after the morning dose of astelin.  Yes Liz Promisead, DO   blood glucose monitor kit and supplies Check sugars qam and prn s/s of low blood sugars Yes Jasmyn Campbell Collins Fernandez,    baclofen (LIORESAL) 10 MG tablet TAKE ONE TABLET BY MOUTH THREE TIMES A DAY AS NEEDED FOR PAIN AND SPASM Yes Rosilyn Soulier, DO   nabumetone (RELAFEN) 750 MG tablet Take 1 tablet by mouth 2 times daily Yes Rosilyn Soulier, DO   diclofenac sodium (VOLTAREN) 1 % GEL  Yes Historical Provider, MD   fluocinolone (DERMA-SMOOTHE) 0.01 % external oil APPLY TOPICALLY TWICE A WEEK Yes Rosilyn Soulier, DO   nystatin (MYCOSTATIN) 606555 UNIT/ML suspension TAKE 5 MLS BY MOUTH FOUR TIMES A DAY FOR 10 DAYS, RETAIN IN MOUTH AS LONG AS POSSIBLE Yes Rosilyn Soulier, DO   budesonide (ENTOCORT EC) 3 MG extended release capsule Take 6 mg by mouth every morning Yes Historical Provider, MD   ketoconazole (NIZORAL) 2 % shampoo APPLY TOPICALLY DAILY AS NEEDED Yes Rosilyn Soulier, DO   Cyanocobalamin (B-12) 1000 MCG SUBL Place 1,000 Units under the tongue daily Yes Rosilyn Soulier, DO   polyethylene glycol (GLYCOLAX) 17 GM/SCOOP powder 1/2-1 capful in 8 oz water or prune juice qd prn constipation. Yes Rosilyn Soulier, DO   carBAMazepine (TEGRETOL-XR) 100 MG extended release tablet Take 1 tablet by mouth 2 times daily Yes Tin Atkinson MD   acetaminophen (TYLENOL) 500 MG tablet Take 1,000 mg by mouth 3 times daily as needed for Pain Yes Historical Provider, MD   fluocinolone ([facility]) 0.01 % OIL oil Place 1 drop in ear(s) 2 times daily Yes Rosilyn Soulier, DO   diphenoxylate-atropine (LOMOTIL) 2.5-0.025 MG per tablet Take 1 tablet by mouth as needed for Diarrhea. . Yes Historical Provider, MD   Cholecalciferol (VITAMIN D3) 5000 units CAPS Take 5,000 Units by mouth daily  Yes Historical Provider, MD   oxyMORphone (OPANA) 5 MG tablet Take 5 mg by mouth 2 times daily. Indications: per DR Milagro Pineda TAKES 3 TIMES A DAY. TRYING TO CUT BACK.  Yes Historical Provider, MD   zoledronic acid (RECLAST) 5 MG/100ML SOLN Infuse 5 mg intravenously once IV, yearly Yes Historical Provider, MD   aspirin 81 MG chewable tablet Take 81 mg by mouth daily. Yes Historical Provider, MD   Calcium Carbonate-Vit D-Min (CALCIUM 1200 PO) Take 1 capsule by mouth 2 times daily. Yes Historical Provider, MD   Ascorbic Acid (VITAMIN C) 500 MG tablet Take 500 mg by mouth daily. Yes Historical Provider, MD   D-MANNOSE PO Take by mouth  Historical Provider, MD Garces (Including outside providers/suppliers regularly involved in providing care):   Patient Care Team:  Erma Peters DO as PCP - General (Internal Medicine)  Erma Peters DO as PCP - [facility]ILITATION [facility]  Easton Mendez MD as Consulting Physician (General Surgery)  WILLOW Wallace CNP as Nurse Practitioner (Nurse Practitioner)     Reviewed and updated this visit:  Tobacco  Allergies  Meds  Med Hx  Surg Hx  Soc Hx  Fam Hx            I, Caden Casillas RN, 9/9/2022, performed the documented evaluation under the direct supervision of the attending physician. This encounter was performed under my, Juliann Calderon, direct supervision, 9/9/2022.

## 2022-09-09 NOTE — PATIENT INSTRUCTIONS
Please bring your blood pressure cuff with you to your next office appointment. If you know which of your morning pills is lisinopril, you may change it to bedtime OR take 1/2 pill in the morning and 1/2 pill in the evening. If you do not know which pill is lisinopril, do not make any changes. Apply triamcinolone paste to tongue and canker sores twice daily until healed. If the sore on your tongue does not heal by 10/1, please let me know and I will send you to a specialist.     To help with weight loss, please try eating within a range of  1000 to 1400 calories per day. Some days eat closer to the lower end of the range and other days the higher end of the range. This will help to keep your metabolism active. You should expect weight loss to be slow- you may only lose 1-2 pounds per month some months. Other months, you may lose more. Please keep in mind that 90% of your weight loss is based on your diet, exercise only helps a little with weight loss. Exercise is very important in maintaining your weight loss.

## 2022-09-10 LAB
ANTI-SS-A IGG: <0.2 AI (ref 0–0.9)
ANTI-SS-B IGG: <0.2 AI (ref 0–0.9)
TSH REFLEX: 1.51 UIU/ML (ref 0.27–4.2)

## 2022-09-11 PROBLEM — R25.2 FOOT CRAMPS: Status: ACTIVE | Noted: 2021-06-15

## 2022-09-11 PROBLEM — R06.02 SHORTNESS OF BREATH: Status: ACTIVE | Noted: 2019-03-15

## 2022-09-11 PROBLEM — M79.89 LEG SWELLING: Status: RESOLVED | Noted: 2018-07-06 | Resolved: 2022-09-11

## 2022-09-11 PROBLEM — J01.00 ACUTE NON-RECURRENT MAXILLARY SINUSITIS: Status: RESOLVED | Noted: 2018-09-25 | Resolved: 2022-09-11

## 2022-09-11 PROBLEM — L23.7 ALLERGIC CONTACT DERMATITIS DUE TO PLANTS, EXCEPT FOOD: Status: RESOLVED | Noted: 2019-07-05 | Resolved: 2022-09-11

## 2022-09-11 PROBLEM — F32.A DEPRESSIVE DISORDER: Status: RESOLVED | Noted: 2020-10-04 | Resolved: 2022-09-11

## 2022-09-12 NOTE — ASSESSMENT & PLAN NOTE
Patient with dyspnea on exertion. Most likely this is related to deconditioning. However, she is at high risk for heart disease.   Check Myoview stress test.

## 2022-09-12 NOTE — ASSESSMENT & PLAN NOTE
Continue Cymbalta 120 mg daily and BuSpar 15 mg 3 times daily. Offered referral to Dr. Angie Koenig which she declines. Reviewed with patient that she is not a candidate for benzodiazepines due to her pain medicine use.

## 2022-09-12 NOTE — ASSESSMENT & PLAN NOTE
Check vitamin D level. Has completed 5 doses of Reclast.  Consider repeating DEXA scan in the near future and adding Prolia.

## 2022-09-12 NOTE — ASSESSMENT & PLAN NOTE
Patient's blood pressure continues to fluctuate, ranging from 63//65. Patient was symptomatic when she is at the low reading but I doubt 63/39 is an accurate reading. Encouraged patient to bring her blood pressure cuff with her to her next appointment so we can check its accuracy. We will change lisinopril from 40 mg daily to 20 mg twice daily. Continue hydrochlorothiazide 12.5 mg in the morning. Unfortunately, patient gets her medications through [facility] and just received a shipment so this change will likely not occur for several weeks.

## 2022-09-12 NOTE — ASSESSMENT & PLAN NOTE
Worsening. Referral offered to psychiatry which she declines. Continue Cymbalta 120 mg daily and BuSpar 15 mg 3 times daily.

## 2022-09-12 NOTE — ASSESSMENT & PLAN NOTE
Secondary to atrophic vaginitis. Did see urogynecology and was started on estrogen vaginal cream which she is using twice weekly.

## 2022-09-13 ENCOUNTER — TELEPHONE (OUTPATIENT)
Dept: OTHER | Age: 72
End: 2022-09-13

## 2022-09-13 NOTE — TELEPHONE ENCOUNTER
YAZMIN-SHERIF received referral via PCP office to reach out to patient regarding medical financial strain and potential resource options. SW attempted to reach the patient on her mobile line and left a message requesting a return call.

## 2022-09-14 ENCOUNTER — CARE COORDINATION (OUTPATIENT)
Dept: CARE COORDINATION | Age: 72
End: 2022-09-14

## 2022-09-14 SDOH — ECONOMIC STABILITY: HOUSING INSECURITY
IN THE LAST 12 MONTHS, WAS THERE A TIME WHEN YOU DID NOT HAVE A STEADY PLACE TO SLEEP OR SLEPT IN A SHELTER (INCLUDING NOW)?: NO

## 2022-09-14 SDOH — ECONOMIC STABILITY: HOUSING INSECURITY: IN THE LAST 12 MONTHS, HOW MANY PLACES HAVE YOU LIVED?: 1

## 2022-09-14 SDOH — ECONOMIC STABILITY: INCOME INSECURITY: IN THE LAST 12 MONTHS, WAS THERE A TIME WHEN YOU WERE NOT ABLE TO PAY THE MORTGAGE OR RENT ON TIME?: NO

## 2022-09-14 ASSESSMENT — SOCIAL DETERMINANTS OF HEALTH (SDOH)
DO YOU BELONG TO ANY CLUBS OR ORGANIZATIONS SUCH AS CHURCH GROUPS UNIONS, FRATERNAL OR ATHLETIC GROUPS, OR SCHOOL GROUPS?: NO
HOW OFTEN DO YOU ATTEND CHURCH OR RELIGIOUS SERVICES?: NEVER
IN A TYPICAL WEEK, HOW MANY TIMES DO YOU TALK ON THE PHONE WITH FAMILY, FRIENDS, OR NEIGHBORS?: ONCE A WEEK
HOW OFTEN DO YOU ATTENT MEETINGS OF THE CLUB OR ORGANIZATION YOU BELONG TO?: NEVER
HOW OFTEN DO YOU GET TOGETHER WITH FRIENDS OR RELATIVES?: ONCE A WEEK

## 2022-09-14 NOTE — CARE COORDINATION
Ambulatory Care Coordination Note  9/14/2022    ACC: Harpreet Felix RN    Summary Note:     PCP referral to care management for financial assistance. Patient requesting assistance with obtaining dental insurance, ramp & home modifications d/t mobility issues. Patient is a  67 yr old female with pmhx of htn, diastolic HF, DDD, fibromyalgia, late onset Alzheimer's. Patient's daughter, son-in-law and grandchild live in her home - she lives in the mother in law suite. Her medications are delivered through [address] She denies issues affording her medications. She receives her medications through [employer]. She is independent with housekeeping, transportation and preparing meals. She does not have a washer & dryer in her suite so her daughter does it for her. She states she has arthritis in her knees which makes it difficult navigate steps and get in and out of her bathtub. She uses a cane occasionally. She reports a recent fall but denies injury. She denies hitting her head. She states she is fine and declined evaluation. Fall precautions reinforced. Recommended removing loose rugs, keep hallways well lit with clear pathways. She does not have a life alert and is not interested at this time; states she cannot afford another out of pocket expense. She is interested in having a ramp installed into her home. She would also like to modify her bathtub so she can get in and out easier. She may benefit from a transfer bench if she cannot afford to modify her bathtub. She is aware there would be a cost associated with modifications. She is agreeable to a referral to [facility] and [place] [address]. Clinical staff in her pcp office has already referred patient to [facility] for assistance with dental insurance resources. [employer] referral placed online by RN-CC.   RN-CC called [employer] - eligibility is based on income & number of people living in problems (risk indicators) you are unsure about that require further investigation?: Moderate to severe symptoms or problems that impact on daily life   Are the patients physical health problems impacting on their mental well-being?: Mild impact on mental well-being e.g. \"\"feeling fed-up\"\", \"\"reduced enjoyment\"\"   Are there any problems with your patients lifestyle behaviors (alcohol, drugs, diet, exercise) that are impacting on physical or mental well-being?: No identified areas of concern   Do you have any other concerns about your patients mental well-being? How would you rate their severity and impact on the patient?: No identified areas of concern   How would you rate their home environment in terms of safety and stability (including domestic violence, insecure housing, neighbor harassment)?: Safe, stable, but with some inconsistency   How do daily activities impact on the patient's well-being? (include current or anticipated unemployment, work, caregiving, access to transportation or other): Some general dissatisfaction but no concern   How would you rate their social network (family, work, friends)?: Adequate participation with social networks   How would you rate their financial resources (including ability to afford all required medical care)?: [address] insecure, some resource challenges   How wells does the patient now understand their health and well-being (symptoms, signs or risk factors) and what they need to do to manage their health?: Reasonable to good understanding and already engages in managing health or is willing to undertake better management   How well do you think your patient can engage in healthcare discussions?  (Barriers include language, deafness, aphasia, alcohol or drug problems, learning difficulties, concentration): Clear and open communication, no identified barriers   Do other services need to be involved to help this patient?: Other care/services in place but not sufficient Are current services involved with this patient well-coordinated? (Include coordination with other services you are now recommendation): Required care/services missing and/or fragmented   Suggested Interventions and Community Resources  Fall Risk Prevention: In Process Other Services or Interventions: [employer], [facility] 9/14/22 [facility]   Medication Assistance Program: Completed   Pharmacist: Declined   Senior Services: In Process   Social Work: In Process   Zone Management Tools: In Process                    Prior to Admission medications    Medication Sig Start Date End Date Taking? Authorizing Provider   cetirizine (ZYRTEC) 10 MG tablet Take 10 mg by mouth daily    Historical Provider, MD   lisinopril (PRINIVIL;ZESTRIL) 20 MG tablet Take 1 tablet by mouth in the morning and at bedtime Replacing lisinopril 40 mg qd 9/9/22   Neri Tellez, DO   DULoxetine (CYMBALTA) 30 MG extended release capsule Take 3 capsules by mouth daily for 7 days, THEN 2 capsules daily for 7 days, THEN 1 capsule daily for 7 days, THEN 1 capsule every other day for 7 days. 9/9/22 10/7/22  Neri Tellez, DO   escitalopram (LEXAPRO) 10 MG tablet Take 1 tablet by mouth daily 9/9/22   Neri Tellez, DO   nabumetone (RELAFEN) 750 MG tablet Take 1 tablet by mouth 2 times daily 9/9/22   Neri Tellez, DO   triamcinolone acetonide (KENALOG) 0.1 % paste Apply to canker sores 2 times daily until healed.  . 9/9/22 9/16/22  Neri Tellez, DO   rosuvastatin (CRESTOR) 10 MG tablet TAKE 1 TABLET BY MOUTH NIGHTLY 8/30/22   Neri Tellez, DO   traZODone (DESYREL) 100 MG tablet TAKE 2 TABLETS BY MOUTH EVERY NIGHT 7/7/22   Neri Tellez, DO   potassium chloride (KLOR-CON M) 20 MEQ extended release tablet TAKE 1 TABLET BY MOUTH TWICE DAILY 6/10/22   Neri Tellez, DO   D-MANNOSE PO Take by mouth    Historical Provider, MD   busPIRone (BUSPAR) 15 MG tablet Take 15 mg by mouth 3 times daily 6/3/22   Neri Tellez, DO   oxybutynin (DITROPAN XL) 15 MG extended release tablet TAKE 1 TABLET BY MOUTH DAILY *REPLACES OXYBUTININ ER 10MG TABLET* 5/13/22   Marisabel Dumont, DO   memantine [facility]) 10 MG tablet Take 1 tablet by mouth twice daily 4/29/22   Emiliana Arenas MD   hydroCHLOROthiazide (MICROZIDE) 12.5 MG capsule TAKE 1 CAPSULE BY MOUTH EVERY MORNING 4/15/22   Marisabel Dumont, DO   gabapentin (NEURONTIN) 600 MG tablet TAKE 1 TABLET BY MOUTH THREE TIMES DAILY  Patient taking differently: Take 600 mg by mouth 3 times daily. 4/15/22 9/9/22  Marisabel Dumont, DO   galantamine (RAZADYNE ER) 16 MG extended release capsule TAKE 1 CAPSULE BY MOUTH ONCE DAILY WITH BREAKFAST 4/13/22   Emiliana Arenas MD   fluticasone (FLONASE) 50 MCG/ACT nasal spray SPRAY TWO SPRAYS IN EACH NOSTRIL ONCE DAILY DURING ALLERGY SEASON 3/18/22   King Pnadya MD   blood glucose monitor strips Check sugars qam and prn s/s of low blood sugars 3/2/22   Marisabel Shamsawyer, DO   Lancets MISC Check sugars qam and prn s/s of low blood sugars 3/2/22   Marisabel Shamsawyer, DO   azelastine (ASTELIN) 0.1 % nasal spray Use 2 sprays in each nostril 2 times daily. Use fluticasone 15 minutes after the morning dose of astelin.  3/2/22   Marisabel Shamsawyer, DO   blood glucose monitor kit and supplies Check sugars qam and prn s/s of low blood sugars 3/2/22   Marisabel Shamsawyer, DO   baclofen (LIORESAL) 10 MG tablet TAKE ONE TABLET BY MOUTH THREE TIMES A DAY AS NEEDED FOR PAIN AND SPASM 3/1/22   Marisabel Dumont, DO   diclofenac sodium (VOLTAREN) 1 % GEL  9/26/21   Historical Provider, MD   fluocinolone (DERMA-SMOOTHE) 0.01 % external oil APPLY TOPICALLY TWICE A WEEK 9/7/21   Marisabel Dumont, DO   nystatin (MYCOSTATIN) 562138 UNIT/ML suspension TAKE 5 MLS BY MOUTH FOUR TIMES A DAY FOR 10 DAYS, RETAIN IN MOUTH AS LONG AS POSSIBLE 7/26/21   Marisabel Dumont, DO   budesonide (ENTOCORT EC) 3 MG extended release capsule Take 6 mg by mouth every morning    Historical Provider, MD   ketoconazole (NIZORAL) 2 % shampoo APPLY TOPICALLY DAILY AS NEEDED 12/17/20   Alina Lavender, DO   Cyanocobalamin (B-12) 1000 MCG SUBL Place 1,000 Units under the tongue daily 10/2/20   Alina Lavender, DO   polyethylene glycol [facility]) 17 GM/SCOOP powder 1/2-1 capful in 8 oz water or prune juice qd prn constipation. 7/22/20   Alina Lavender, DO   carBAMazepine (TEGRETOL-XR) 100 MG extended release tablet Take 1 tablet by mouth 2 times daily 7/10/20   Pam Turk MD   acetaminophen (TYLENOL) 500 MG tablet Take 1,000 mg by mouth 3 times daily as needed for Pain    Historical Provider, MD   fluocinolone ([facility]) 0.01 % OIL oil Place 1 drop in ear(s) 2 times daily 2/5/20   Alina Lavender, DO   diphenoxylate-atropine (LOMOTIL) 2.5-0.025 MG per tablet Take 1 tablet by mouth as needed for Diarrhea. .    Historical Provider, MD   Cholecalciferol (VITAMIN D3) 5000 units CAPS Take 5,000 Units by mouth daily     Historical Provider, MD   oxyMORphone (OPANA) 5 MG tablet Take 5 mg by mouth 2 times daily. Indications: per DR Arpita Ha TAKES 3 TIMES A DAY. TRYING TO CUT BACK. Historical Provider, MD   zoledronic acid (RECLAST) 5 MG/100ML SOLN Infuse 5 mg intravenously once IV, yearly    Historical Provider, MD   aspirin 81 MG chewable tablet Take 81 mg by mouth daily. Historical Provider, MD   Calcium Carbonate-Vit D-Min (CALCIUM 1200 PO) Take 1 capsule by mouth 2 times daily. Historical Provider, MD   Ascorbic Acid (VITAMIN C) 500 MG tablet Take 500 mg by mouth daily.       Historical Provider, MD       Future Appointments   Date Time Provider Sung Kovacs   9/29/2022  9:00 AM SCHEDULE, [facility]   9/29/2022 12:45 PM [facility] NM STRESS ROOM 2 [facility] STRESS [facility]   10/19/2022 10:40 AM Chaparro Angel MD FF NEURO [facility]   11/11/2022 10:40 AM Alina Lavender, DO GINA Iraheta - DYRAVI   12/12/2022 11:00 AM WILLOW Rogers SLEEP MED [facility]     ,   Congestive Heart Failure Assessment           Symptoms:          , and   General Assessment    Do you have any symptoms that are causing concern?: No

## 2022-09-15 ENCOUNTER — COMMUNITY OUTREACH (OUTPATIENT)
Dept: OTHER | Age: 72
End: 2022-09-15

## 2022-09-15 NOTE — PROGRESS NOTES
RONY spoke with the patient as referred by her PCP office on 9/13/22 and 9/15/22 regarding supportive services and health insurance optimization. Discussed current income and household circumstances with patient. The patient identifies her bathtub access as being a priority per her report. The patient reports that she has very little support via family or community agency at this time. We discussed the services of : [employer] and [address] [place] [address] [employer] as options to assist her with improving her home accessibility and provide additional support. The patient agreed to pursue both of these resources and SHERIF provided the contact numbers for both, which the patient wrote down and repeated back to SHERIF.       SW and patient discussed her health insurance benefits and areas of need. The patient reported that she could not afford any additional monthly premiums or payments, which is why she did not elect to take her [facility] dental plan and is still not interested in taking. Patient's [facility] Medicare benefits cannot be optimized beyond electing into a dental plan. SHERIF made the patient aware of the Medicare open enrollment period of Oct. 15-Dec. 7th if she would change her mind about enrolling into a dental plan. We discussed sliding scale dental services in [facility] via [address] [place] [address] and the patient wrote down the contact number and repeated it back to SHERIF.     SHERIF encouraged the patient to reach back out as needed and provided contact number, which the patient wrote down and repeated back. SHERIF aware that care coordination team is now following this patient and will make ACM aware of this SW contact.

## 2022-09-28 ENCOUNTER — TELEPHONE (OUTPATIENT)
Dept: INTERNAL MEDICINE CLINIC | Age: 72
End: 2022-09-28

## 2022-09-28 NOTE — TELEPHONE ENCOUNTER
I am sorry but I am not sure what she needs without her being seen. Can she go to urgent care today or be seen here tomorrow?    thanks

## 2022-09-28 NOTE — TELEPHONE ENCOUNTER
Pt calling wanting something called in--has puffiness under her eyes--ears are ringing--dizziness--doesn't feel well---wants something called in said she couldn't come in today---having her PFT and stress test done tomorrow but feels bad---please call the pt. Thanks.

## 2022-09-28 NOTE — TELEPHONE ENCOUNTER
I am sorry but I don't know that an antibiotic is indicated based on her symptoms. She really needs to be seen for this. Again, options are urgent care or seeing someone in office tomorrow.

## 2022-09-29 ENCOUNTER — HOSPITAL ENCOUNTER (OUTPATIENT)
Dept: PULMONOLOGY | Age: 72
Discharge: HOME | End: 2022-09-29
Payer: MEDICARE

## 2022-09-29 ENCOUNTER — HOSPITAL ENCOUNTER (OUTPATIENT)
Age: 72
Discharge: HOME | End: 2022-09-29
Payer: MEDICARE

## 2022-09-29 ENCOUNTER — HOSPITAL ENCOUNTER (OUTPATIENT)
Dept: GENERAL RADIOLOGY | Age: 72
Discharge: HOME | End: 2022-09-29
Payer: MEDICARE

## 2022-09-29 ENCOUNTER — HOSPITAL ENCOUNTER (OUTPATIENT)
Dept: NON INVASIVE DIAGNOSTICS | Age: 72
Discharge: HOME | End: 2022-09-29
Payer: MEDICARE

## 2022-09-29 DIAGNOSIS — R06.02 SHORTNESS OF BREATH: ICD-10-CM

## 2022-09-29 LAB
DLCO %PRED: 70 %
DLCO PRED: NORMAL
DLCO/VA %PRED: NORMAL
DLCO/VA PRED: NORMAL
DLCO/VA: NORMAL
DLCO: NORMAL
EXPIRATORY TIME-POST: NORMAL
EXPIRATORY TIME: NORMAL
FEF 25-75% %CHNG: NORMAL
FEF 25-75% %PRED-POST: NORMAL
FEF 25-75% %PRED-PRE: NORMAL
FEF 25-75% PRED: NORMAL
FEF 25-75%-POST: NORMAL
FEF 25-75%-PRE: NORMAL
FEV1 %PRED-POST: 0 %
FEV1 %PRED-PRE: 108 %
FEV1 PRED: NORMAL
FEV1-POST: NORMAL
FEV1-PRE: NORMAL
FEV1/FVC %PRED-POST: NORMAL
FEV1/FVC %PRED-PRE: NORMAL
FEV1/FVC PRED: NORMAL
FEV1/FVC-POST: 0 %
FEV1/FVC-PRE: 97 %
FVC %PRED-POST: NORMAL
FVC %PRED-PRE: NORMAL
FVC PRED: NORMAL
FVC-POST: NORMAL
FVC-PRE: NORMAL
GAW %PRED: NORMAL
GAW PRED: NORMAL
GAW: NORMAL
IC %PRED: NORMAL
IC PRED: NORMAL
IC: NORMAL
LV EF: 63 %
LVEF MODALITY: NORMAL
MEP: NORMAL
MIP: NORMAL
MVV %PRED-PRE: NORMAL
MVV PRED: NORMAL
MVV-PRE: NORMAL
PEF %PRED-POST: NORMAL
PEF %PRED-PRE: NORMAL
PEF PRED: NORMAL
PEF%CHNG: NORMAL
PEF-POST: NORMAL
PEF-PRE: NORMAL
RAW %PRED: NORMAL
RAW PRED: NORMAL
RAW: NORMAL
RV %PRED: NORMAL
RV PRED: NORMAL
RV: NORMAL
SVC %PRED: NORMAL
SVC PRED: NORMAL
SVC: NORMAL
TLC %PRED: 157 %
TLC PRED: NORMAL
TLC: NORMAL
VA %PRED: NORMAL
VA PRED: NORMAL
VA: NORMAL
VTG %PRED: NORMAL
VTG PRED: NORMAL
VTG: NORMAL

## 2022-09-29 PROCEDURE — 94760 N-INVAS EAR/PLS OXIMETRY 1: CPT

## 2022-09-29 PROCEDURE — 71046 X-RAY EXAM CHEST 2 VIEWS: CPT

## 2022-09-29 PROCEDURE — 3430000000 HC RX DIAGNOSTIC RADIOPHARMACEUTICAL: Performed by: INTERNAL MEDICINE

## 2022-09-29 PROCEDURE — 78452 HT MUSCLE IMAGE SPECT MULT: CPT

## 2022-09-29 PROCEDURE — 94375 RESPIRATORY FLOW VOLUME LOOP: CPT

## 2022-09-29 PROCEDURE — A9502 TC99M TETROFOSMIN: HCPCS | Performed by: INTERNAL MEDICINE

## 2022-09-29 PROCEDURE — 93017 CV STRESS TEST TRACING ONLY: CPT | Performed by: INTERNAL MEDICINE

## 2022-09-29 PROCEDURE — 94726 PLETHYSMOGRAPHY LUNG VOLUMES: CPT

## 2022-09-29 PROCEDURE — 94729 DIFFUSING CAPACITY: CPT

## 2022-09-29 PROCEDURE — 94200 LUNG FUNCTION TEST (MBC/MVV): CPT

## 2022-09-29 PROCEDURE — 94010 BREATHING CAPACITY TEST: CPT

## 2022-09-29 PROCEDURE — 6360000002 HC RX W HCPCS: Performed by: INTERNAL MEDICINE

## 2022-09-29 RX ADMIN — TETROFOSMIN 10 MILLICURIE: 1.38 INJECTION, POWDER, LYOPHILIZED, FOR SOLUTION INTRAVENOUS at 10:00

## 2022-09-29 RX ADMIN — TETROFOSMIN 30 MILLICURIE: 1.38 INJECTION, POWDER, LYOPHILIZED, FOR SOLUTION INTRAVENOUS at 11:04

## 2022-09-29 RX ADMIN — REGADENOSON 0.4 MG: 0.08 INJECTION, SOLUTION INTRAVENOUS at 11:04

## 2022-09-29 ASSESSMENT — PULMONARY FUNCTION TESTS
FEV1/FVC_POST: 0
FEV1/FVC_PRE: 97
FEV1_PERCENT_PREDICTED_PRE: 108
FEV1_PERCENT_PREDICTED_POST: 0

## 2022-09-29 NOTE — PROGRESS NOTES
Pt here for stress. Pt uses cane for ambulation d/t unsteady gait and knee pain. Unable to walk on [facility] at [facility] protocol. Instructed on Lexiscan Stress Test Procedure including possible side effects/ adverse reactions. Patient verbalizes  understanding and denies having any questions . See [address] [place] [address] Rd Cardiology

## 2022-10-02 DIAGNOSIS — I51.7 CARDIOMEGALY: Primary | ICD-10-CM

## 2022-10-03 NOTE — PROCEDURES
Pulmonary Function Testing      Patient name:  Nithya Garcia     [address] [place] [address] Unit #:   [id number]   Date of test: 9/29/2022  Date of interpretation:   10/3/2022    Ms. Nithya Garcia is a 67y.o. year-old non smoker. The spirometry data were not acceptable and not reproducible. Spirometry:  Flow volume loops were normal. The FEV-1/FVC ratio was normal. The   prebronchodilator  FEV-1 was 1.97 liters (108% of predicted), which was normal. The FVC was 2.02 liters (83% of predicted), which was normal. Response to inhaled bronchodilators (albuterol) was not performed. Lung volumes:  Lung volumes were tested by plethysmography. The total lung capacity was 6.2 liters (157% of predicted), which was increased. The residual volume was 3.93 liters (242% of predicted), which was increased. The ratio of residual volume to total lung capacity (RV/TLC) was 154, which was increased. Specific airway resistance was increased. Diffusion capacity was found to be decreased. Interpretation:  Evidence of hyperinflation and decreased diffusion capacity. No obstruction noted. Patient was unable to produce acceptable and reproducible data due to cough. Results may be underestimated.

## 2022-10-04 ENCOUNTER — HOSPITAL ENCOUNTER (OUTPATIENT)
Dept: NON INVASIVE DIAGNOSTICS | Age: 72
Discharge: HOME | End: 2022-10-04
Payer: MEDICARE

## 2022-10-04 DIAGNOSIS — I51.7 CARDIOMEGALY: ICD-10-CM

## 2022-10-04 DIAGNOSIS — R06.02 SHORTNESS OF BREATH: Primary | ICD-10-CM

## 2022-10-04 LAB
LV EF: 60 %
LVEF MODALITY: NORMAL

## 2022-10-04 PROCEDURE — 93306 TTE W/DOPPLER COMPLETE: CPT

## 2022-10-12 ENCOUNTER — CARE COORDINATION (OUTPATIENT)
Dept: CARE COORDINATION | Age: 72
End: 2022-10-12

## 2022-10-12 NOTE — CARE COORDINATION
Ambulatory Care Coordination Note  10/12/2022    ACC: Aron Spencer RN        RN-CC outreached patient for cc follow up; [facility] referral, [employer]. Patient reports she is doing good. She is now active with [facility]. They have provided her with a free life alert and are in the process of installing a ramp outside her home. She states she contacted [employer] regarding bathroom modifications but was told they could not help. She was referred to [facility] who is in the process of looking into her bathroom repairs. Denies recent falls. Pcp changed Lisinopril from 40 mg daily to 20 mg BID. Patient verbalized this change and denies s/sx of hypotension. She occasionally monitors her blood pressure at home and reports her readings \"are fine. \"  She is unable to provide specific readings. Patient reports dyspnea with exertion and at rest x3 months. She was referred to pulmonology and is scheduled on 11/8/22. Patient denies questions or concerns at this time. PLAN:    F/u on bathroom modifications  F/u on needs & concerns      Offered patient enrollment in the Remote Patient Monitoring (RPM) program for in-home monitoring: Patient is not eligible for RPM program.    Lab Results       None            Care Coordination Interventions    Referral from Primary Care Provider: Yes  Suggested Interventions and Community Resources  Fall Risk Prevention: In Process (Comment: 9/14/22 SIMON)  Medication Assistance Program: Completed (Comment: [facility] 9/14/22 SIMON)  Pharmacist: Declined (Comment: 9/14/22 SIMON)  Senior Services: In Process (Comment: referral to [facility] 9/14/22)  Social Work: In Process (Comment: [facility] 9/14/22 SIMON)  Zone Management Tools: In Process (Comment: 9/14/22 SIMON)  Other Services or Interventions: [employer], [facility] 9/14/22 [facility]          Goals Addressed    None         Prior to Admission medications    Medication Sig Start Date End Date Taking? Authorizing Provider   cetirizine (ZYRTEC) 10 MG tablet Take 10 mg by mouth daily    Historical Provider, MD   lisinopril (PRINIVIL;ZESTRIL) 20 MG tablet Take 1 tablet by mouth in the morning and at bedtime Replacing lisinopril 40 mg qd 9/9/22   Shonda Noyola DO   DULoxetine (CYMBALTA) 30 MG extended release capsule Take 3 capsules by mouth daily for 7 days, THEN 2 capsules daily for 7 days, THEN 1 capsule daily for 7 days, THEN 1 capsule every other day for 7 days. 9/9/22 10/7/22  Shonda Noyola DO   escitalopram (LEXAPRO) 10 MG tablet Take 1 tablet by mouth daily 9/9/22   Shonda Noyola DO   nabumetone (RELAFEN) 750 MG tablet Take 1 tablet by mouth 2 times daily 9/9/22   Shonda Noyola DO   rosuvastatin (CRESTOR) 10 MG tablet TAKE 1 TABLET BY MOUTH NIGHTLY 8/30/22   Shonda Noyola DO   traZODone (DESYREL) 100 MG tablet TAKE 2 TABLETS BY MOUTH EVERY NIGHT 7/7/22   Shonda Noyola DO   potassium chloride (KLOR-CON M) 20 MEQ extended release tablet TAKE 1 TABLET BY MOUTH TWICE DAILY 6/10/22   Shonda Noyola DO   D-MANNOSE PO Take by mouth    Historical Provider, MD   busPIRone (BUSPAR) 15 MG tablet Take 15 mg by mouth 3 times daily 6/3/22   Shonda Noyola DO   oxybutynin (DITROPAN XL) 15 MG extended release tablet TAKE 1 TABLET BY MOUTH DAILY *REPLACES OXYBUTININ ER 10MG TABLET* 5/13/22   Shonda Noyola DO   memantine (NAMENDA) 10 MG tablet Take 1 tablet by mouth twice daily 4/29/22   Consuelo Ledesma MD   hydroCHLOROthiazide (MICROZIDE) 12.5 MG capsule TAKE 1 CAPSULE BY MOUTH EVERY MORNING 4/15/22   Shonda Noyola DO   gabapentin (NEURONTIN) 600 MG tablet TAKE 1 TABLET BY MOUTH THREE TIMES DAILY  Patient taking differently: Take 600 mg by mouth 3 times daily.  4/15/22 9/9/22  Shonda Noyola DO   galantamine (RAZADYNE ER) 16 MG extended release capsule TAKE 1 CAPSULE BY MOUTH ONCE DAILY WITH BREAKFAST 4/13/22   Consuelo Ledesma MD   fluticasone (FLONASE) 50 MCG/ACT nasal spray SPRAY TWO SPRAYS IN EACH NOSTRIL ONCE DAILY DURING ALLERGY SEASON 3/18/22   Yara Soriano MD   blood glucose monitor strips Check sugars qam and prn s/s of low blood sugars 3/2/22   Kathlynn Severance, DO   Lancets MISC Check sugars qam and prn s/s of low blood sugars 3/2/22   Kathlynn Severance, DO   azelastine (ASTELIN) 0.1 % nasal spray Use 2 sprays in each nostril 2 times daily. Use fluticasone 15 minutes after the morning dose of astelin. 3/2/22   Kathlynn Severance, DO   blood glucose monitor kit and supplies Check sugars qam and prn s/s of low blood sugars 3/2/22   Kathlynn Severance, DO   baclofen (LIORESAL) 10 MG tablet TAKE ONE TABLET BY MOUTH THREE TIMES A DAY AS NEEDED FOR PAIN AND SPASM 3/1/22   Kathlynn Severance, DO   diclofenac sodium (VOLTAREN) 1 % GEL  9/26/21   Historical Provider, MD   fluocinolone (DERMA-SMOOTHE) 0.01 % external oil APPLY TOPICALLY TWICE A WEEK 9/7/21   Kathlynn Severance, DO   nystatin (MYCOSTATIN) 826257 UNIT/ML suspension TAKE 5 MLS BY MOUTH FOUR TIMES A DAY FOR 10 DAYS, RETAIN IN MOUTH AS LONG AS POSSIBLE 7/26/21   Kathlynn Severance, DO   budesonide (ENTOCORT EC) 3 MG extended release capsule Take 6 mg by mouth every morning    Historical Provider, MD   ketoconazole (NIZORAL) 2 % shampoo APPLY TOPICALLY DAILY AS NEEDED 12/17/20   Kathlynn Severance, DO   Cyanocobalamin (B-12) 1000 MCG SUBL Place 1,000 Units under the tongue daily 10/2/20   Kathlynn Severance, DO   polyethylene glycol (GLYCOLAX) 17 GM/SCOOP powder 1/2-1 capful in 8 oz water or prune juice qd prn constipation.  7/22/20   Kathlynn Severance, DO   carBAMazepine (TEGRETOL-XR) 100 MG extended release tablet Take 1 tablet by mouth 2 times daily 7/10/20   Merari Ramsey MD   acetaminophen (TYLENOL) 500 MG tablet Take 1,000 mg by mouth 3 times daily as needed for Pain    Historical Provider, MD   fluocinolone (DERMOTIC) 0.01 % OIL oil Place 1 drop in ear(s) 2 times daily 2/5/20   Kathlynn Severance, DO diphenoxylate-atropine (LOMOTIL) 2.5-0.025 MG per tablet Take 1 tablet by mouth as needed for Diarrhea. .    Historical Provider, MD   Cholecalciferol (VITAMIN D3) 5000 units CAPS Take 5,000 Units by mouth daily     Historical Provider, MD   oxyMORphone (OPANA) 5 MG tablet Take 5 mg by mouth 2 times daily. Indications: per DR Mira Israel TAKES 3 TIMES A DAY. TRYING TO CUT BACK. Historical Provider, MD   zoledronic acid (RECLAST) 5 MG/100ML SOLN Infuse 5 mg intravenously once IV, yearly    Historical Provider, MD   aspirin 81 MG chewable tablet Take 81 mg by mouth daily. Historical Provider, MD   Calcium Carbonate-Vit D-Min (CALCIUM 1200 PO) Take 1 capsule by mouth 2 times daily. Historical Provider, MD   Ascorbic Acid (VITAMIN C) 500 MG tablet Take 500 mg by mouth daily.       Historical Provider, MD       Future Appointments   Date Time Provider Sung Kovacs   10/19/2022 10:40 AM Dennison Jeans, MD FF NEURO [facility]   11/8/2022 10:30 AM Darrel Glez MD PULM & CC [facility]   11/11/2022 10:40 AM DO GINA Bess [facility] [place] - DYD   12/12/2022 11:00 AM WILLOW Garcia FF SLEEP MED [facility]    and   General Assessment    Do you have any symptoms that are causing concern?: No  Reported Symptoms:  (Comment: beltran x3 months)

## 2022-10-13 DIAGNOSIS — F02.80 LATE ONSET ALZHEIMER'S DISEASE WITHOUT BEHAVIORAL DISTURBANCE (HCC): ICD-10-CM

## 2022-10-13 DIAGNOSIS — G30.1 LATE ONSET ALZHEIMER'S DISEASE WITHOUT BEHAVIORAL DISTURBANCE (HCC): ICD-10-CM

## 2022-10-13 RX ORDER — GALANTAMINE HYDROBROMIDE 16 MG/1
CAPSULE, EXTENDED RELEASE ORAL
Qty: 30 CAPSULE | Refills: 0 | Status: SHIPPED | OUTPATIENT
Start: 2022-10-13 | End: 2022-10-19 | Stop reason: SDUPTHER

## 2022-10-13 NOTE — TELEPHONE ENCOUNTER
Medication:   Requested Prescriptions     Pending Prescriptions Disp Refills    galantamine (RAZADYNE ER) 16 MG extended release capsule [Pharmacy Med Name: GALANTAMINE ER 16 MG CAP 16 Capsule] 30 capsule 0     Sig: TAKE 1 CAPSULE BY MOUTH EVERY DAY WITH BREAKFAST        Last Filled:      Patient Phone Number: [phone] (home)     Last appt: 4/13/2022   Next appt: 10/19/2022    Last OARRS:   RX Monitoring 6/5/2019   Attestation -   Periodic Controlled Substance Monitoring No signs of potential drug abuse or diversion identified. Discharged

## 2022-10-19 ENCOUNTER — OFFICE VISIT (OUTPATIENT)
Dept: NEUROLOGY | Age: 72
End: 2022-10-19
Payer: MEDICARE

## 2022-10-19 VITALS
DIASTOLIC BLOOD PRESSURE: 86 MMHG | WEIGHT: 180 LBS | SYSTOLIC BLOOD PRESSURE: 132 MMHG | BODY MASS INDEX: 33.99 KG/M2 | HEIGHT: 61 IN | HEART RATE: 94 BPM

## 2022-10-19 DIAGNOSIS — F02.80 LATE ONSET ALZHEIMER'S DISEASE WITHOUT BEHAVIORAL DISTURBANCE (HCC): Primary | ICD-10-CM

## 2022-10-19 DIAGNOSIS — F32.A DEPRESSION, UNSPECIFIED DEPRESSION TYPE: ICD-10-CM

## 2022-10-19 DIAGNOSIS — G30.1 LATE ONSET ALZHEIMER'S DISEASE WITHOUT BEHAVIORAL DISTURBANCE (HCC): Primary | ICD-10-CM

## 2022-10-19 DIAGNOSIS — G47.33 OBSTRUCTIVE SLEEP APNEA: ICD-10-CM

## 2022-10-19 PROCEDURE — 1123F ACP DISCUSS/DSCN MKR DOCD: CPT | Performed by: PSYCHIATRY & NEUROLOGY

## 2022-10-19 PROCEDURE — 99214 OFFICE O/P EST MOD 30 MIN: CPT | Performed by: PSYCHIATRY & NEUROLOGY

## 2022-10-19 RX ORDER — MEMANTINE HYDROCHLORIDE 10 MG/1
TABLET ORAL
Qty: 180 TABLET | Refills: 1 | Status: SHIPPED | OUTPATIENT
Start: 2022-10-19 | End: 2022-10-28 | Stop reason: SDUPTHER

## 2022-10-19 RX ORDER — GALANTAMINE HYDROBROMIDE 16 MG/1
CAPSULE, EXTENDED RELEASE ORAL
Qty: 90 CAPSULE | Refills: 1 | Status: SHIPPED | OUTPATIENT
Start: 2022-10-19 | End: 2022-10-28 | Stop reason: SDUPTHER

## 2022-10-19 NOTE — PROGRESS NOTES
Hesham Blancas   Neurology followup    Subjective:   CC/HP  History was obtained from the patient. Patient has late onset Alzheimer's type dementia. Patient still feels that her memory continues to slowly get worse. Patient has normal Namenda 10 mg twice daily. She is having word finding difficulties. Patient gets anxious when she cannot think of something and then the anxiety makes her symptoms worse as well. She also feels that she gets confused at times. Patient is on low-dose galantamine. She initially wondered if Aricept caused her diarrhea but later found out that she had a different etiology for the diarrhea. Patient also was diagnosed with obstructive sleep apnea and started a CPAP machine. She is feeling better in that regard.     REVIEW OF SYSTEMS    Constitutional:  []   Chills   [x]  Fatigue   []  Fevers   []  Malaise   []  Weight loss     [] Denies all of the above    Respiratory:   []  Cough    [x]  Shortness of breath         [] Denies all of the above     Cardiovascular:   []  Chest pain    []  Exertional chest pressure/discomfort           [] Palpitations    []  Syncope     [x] Denies all of the above        Past Medical History:   Diagnosis Date    Anxiety     Anxiety and depression     Chronic back pain     Clostridium difficile infection 2/22/15    Hyperlipidemia     Hypertension     Insomnia disorder     Osteoarthritis     Osteoporosis 2008    Rheumatic fever     S/P insertion of spinal cord stimulator     Self-catheterizes urinary bladder     as needed 7/8 no longer catherizing     Urinary retention      Family History   Problem Relation Age of Onset    COPD Mother     High Blood Pressure Mother     Rheum Arthritis Mother     Thyroid Disease Mother     Alcohol Abuse Father     Clotting Disorder Sister     Thyroid Disease Sister     Hypertension Brother     Diabetes Sister     Heart Disease Sister     Hypertension Sister     Thyroid Disease Sister     Cancer Brother     Heart Disease Brother     Hypertension Brother     Substance Abuse Brother     Alcohol Abuse Son     No Known Problems Daughter     Dementia Neg Hx      Social History     Socioeconomic History    Marital status:      Spouse name: None    Number of children: None    Years of education: None    Highest education level: None   Occupational History    Occupation: retired   Tobacco Use    Smoking status: Never    Smokeless tobacco: Never   Vaping Use    Vaping Use: Never used   Substance and Sexual Activity    Alcohol use: Not Currently    Drug use: No     Social Determinants of Health     Financial Resource Strain: Low Risk     Difficulty of Paying Living Expenses: Not very hard   Food Insecurity: No Food Insecurity    Worried About Running Out of Food in the Last Year: Never true    Ran Out of Food in the Last Year: Never true   Transportation Needs: No Transportation Needs    Lack of Transportation (Medical): No    Lack of Transportation (Non-Medical): No   Physical Activity: Inactive    Days of Exercise per Week: 0 days    Minutes of Exercise per Session: 0 min   Stress: Stress Concern Present    Feeling of Stress : To some extent   Social Connections: Socially Isolated    Frequency of Communication with Friends and Family: Once a week    Frequency of Social Gatherings with Friends and Family: Once a week    Attends Adventism Services: Never    Active Member of Clubs or Organizations: No    Attends Club or Organization Meetings: Never    Marital Status:    Housing Stability: Low Risk     Unable to Pay for Housing in the Last Year: No    Number of Places Lived in the Last Year: 1    Unstable Housing in the Last Year: No        Objective:  Exam:  /86   Pulse 94   Ht 5' 1\" (1.549 m)   Wt 180 lb (81.6 kg)   BMI 34.01 kg/m²   This is a well-nourished patient in no acute distress  Patient is awake, alert and oriented x2. Speech is normal. Impaired short-term memory  Pupils are equal round reacting to light. Extraocular movements intact. Face symmetrical. Tongue midline. Motor exam shows normal symmetrical strength. Deep tendon reflexes normal. Plantar reflexes downgoing. Sensory exam normal. Coordination normal. Gait normal. No carotid bruit. No neck stiffness. Data :  LABS:  General Labs:    CBC:   Lab Results   Component Value Date/Time    WBC 5.4 09/09/2022 12:49 PM    RBC 3.83 09/09/2022 12:49 PM    HGB 12.5 09/09/2022 12:49 PM    HCT 36.6 09/09/2022 12:49 PM    MCV 95.5 09/09/2022 12:49 PM    MCH 32.7 09/09/2022 12:49 PM    MCHC 34.3 09/09/2022 12:49 PM    RDW 12.2 09/09/2022 12:49 PM     09/09/2022 12:49 PM    MPV 6.9 09/09/2022 12:49 PM     BMP:    Lab Results   Component Value Date/Time     09/09/2022 12:49 PM    K 3.9 09/09/2022 12:49 PM    CL 91 09/09/2022 12:49 PM    CO2 25 09/09/2022 12:49 PM    BUN 10 09/09/2022 12:49 PM    LABALBU 4.1 09/09/2022 12:49 PM    CREATININE 0.6 09/09/2022 12:49 PM    CALCIUM 9.2 09/09/2022 12:49 PM    GFRAA >60 09/09/2022 12:49 PM    GFRAA >60 06/06/2013 09:49 AM    LABGLOM >60 09/09/2022 12:49 PM    GLUCOSE 80 09/09/2022 12:49 PM     RADIOLOGY REVIEW:  I have reviewed radiology report(s) of: CT scan of the head    Impression :  Late onset Alzheimer's type dementia, symptoms slowly worsening  Obstructive sleep apnea, now on CPAP  TSH was normal  B12 was borderline and patient is on B12 supplementation  Depression, patient on Lexapro Cymbalta and trazodone    Plan :  Discussed with patient  Continue galantamine 60 mg daily  Continue Namenda 10 mg twice daily  Side effects were discussed. Continue B12 supplementation  Continue using CPAP  I will see her back in 6 months         Please note a portion of  this chart was generated using dragon dictation software. Although every effort was made to ensure the accuracy of this automated transcription, some errors in transcription may have occurred.

## 2022-10-21 ENCOUNTER — TELEPHONE (OUTPATIENT)
Dept: INTERNAL MEDICINE CLINIC | Age: 72
End: 2022-10-21

## 2022-10-21 RX ORDER — BUSPIRONE HYDROCHLORIDE 15 MG/1
15 TABLET ORAL 3 TIMES DAILY
Qty: 90 TABLET | Refills: 5 | Status: SHIPPED | OUTPATIENT
Start: 2022-10-21

## 2022-10-21 RX ORDER — BACLOFEN 10 MG/1
10 TABLET ORAL 3 TIMES DAILY PRN
Qty: 60 TABLET | Refills: 2 | Status: SHIPPED | OUTPATIENT
Start: 2022-10-21

## 2022-10-21 NOTE — TELEPHONE ENCOUNTER
----- Message from Medhat Mandel sent at 10/21/2022  9:20 AM EDT -----  Subject: Message to Provider    QUESTIONS  Information for Provider? Patient would like a medication refill for a   nerve pill (doesn't know the name and threw the bottle away) She's also   requesting a stronger muscle relaxer, because she fell(hit mouth, nose,   chin, knee) at 3:00am walking her dog today, she stated she's fine just   swollen, sore, black and blue, bleeding stopped. She also changed her   Pharmacy([facility],[id number]).  ---------------------------------------------------------------------------  --------------  Miky HOLLINGSWORTH  [phone]; OK to leave message on voicemail  ---------------------------------------------------------------------------  --------------  SCRIPT ANSWERS  Relationship to Patient?  Self

## 2022-10-21 NOTE — PROGRESS NOTES
Busprione is the nerve medicine and has been refilled. I increased the frequency of Baclofen but not the dose as I worry that baclofen may contribute to her falling.

## 2022-10-21 NOTE — TELEPHONE ENCOUNTER
----- Message from Shaun Calixto sent at 10/21/2022  9:20 AM EDT -----  Subject: Message to Provider    QUESTIONS  Information for Provider? Patient would like a medication refill for a   nerve pill (doesn't know the name and threw the bottle away) She's also   requesting a stronger muscle relaxer, because she fell(hit mouth, nose,   chin, knee) at 3:00am walking her dog today, she stated she's fine just   swollen, sore, black and blue, bleeding stopped. [id number]  ---------------------------------------------------------------------------  --------------  Berlin HOLLINGSWORTH  [phone]; OK to leave message on voicemail  ---------------------------------------------------------------------------  --------------  SCRIPT ANSWERS  Relationship to Patient?  Self

## 2022-10-24 DIAGNOSIS — I10 ESSENTIAL HYPERTENSION: Chronic | ICD-10-CM

## 2022-10-25 RX ORDER — LISINOPRIL 20 MG/1
TABLET ORAL
Qty: 60 TABLET | Refills: 10 | Status: SHIPPED | OUTPATIENT
Start: 2022-10-25 | End: 2022-11-18 | Stop reason: SINTOL

## 2022-10-28 ENCOUNTER — TELEPHONE (OUTPATIENT)
Dept: NEUROLOGY | Age: 72
End: 2022-10-28

## 2022-10-28 DIAGNOSIS — F02.80 LATE ONSET ALZHEIMER'S DISEASE WITHOUT BEHAVIORAL DISTURBANCE (HCC): ICD-10-CM

## 2022-10-28 DIAGNOSIS — G30.1 LATE ONSET ALZHEIMER'S DISEASE WITHOUT BEHAVIORAL DISTURBANCE (HCC): ICD-10-CM

## 2022-10-28 RX ORDER — MEMANTINE HYDROCHLORIDE 10 MG/1
TABLET ORAL
Qty: 180 TABLET | Refills: 1 | Status: SHIPPED | OUTPATIENT
Start: 2022-10-28

## 2022-10-28 RX ORDER — GALANTAMINE HYDROBROMIDE 16 MG/1
CAPSULE, EXTENDED RELEASE ORAL
Qty: 90 CAPSULE | Refills: 1 | Status: SHIPPED | OUTPATIENT
Start: 2022-10-28

## 2022-11-04 ENCOUNTER — APPOINTMENT (OUTPATIENT)
Dept: GENERAL RADIOLOGY | Age: 72
End: 2022-11-04
Payer: MEDICARE

## 2022-11-04 ENCOUNTER — APPOINTMENT (OUTPATIENT)
Dept: CT IMAGING | Age: 72
End: 2022-11-04
Payer: MEDICARE

## 2022-11-04 ENCOUNTER — HOSPITAL ENCOUNTER (EMERGENCY)
Age: 72
Discharge: HOME | End: 2022-11-04
Attending: EMERGENCY MEDICINE
Payer: MEDICARE

## 2022-11-04 ENCOUNTER — TELEPHONE (OUTPATIENT)
Dept: INTERNAL MEDICINE CLINIC | Age: 72
End: 2022-11-04

## 2022-11-04 VITALS
SYSTOLIC BLOOD PRESSURE: 153 MMHG | HEART RATE: 75 BPM | DIASTOLIC BLOOD PRESSURE: 73 MMHG | RESPIRATION RATE: 12 BRPM | WEIGHT: 180 LBS | TEMPERATURE: 97.7 F | BODY MASS INDEX: 34.01 KG/M2 | OXYGEN SATURATION: 94 %

## 2022-11-04 DIAGNOSIS — N30.00 ACUTE CYSTITIS WITHOUT HEMATURIA: ICD-10-CM

## 2022-11-04 DIAGNOSIS — I10 ASYMPTOMATIC HYPERTENSION: Primary | ICD-10-CM

## 2022-11-04 LAB
A/G RATIO: 1.3 (ref 1.1–2.2)
ALBUMIN SERPL-MCNC: 4 G/DL (ref 3.4–5)
ALP BLD-CCNC: 54 U/L (ref 40–129)
ALT SERPL-CCNC: 29 U/L (ref 10–40)
ANION GAP SERPL CALCULATED.3IONS-SCNC: 12 MMOL/L (ref 3–16)
AST SERPL-CCNC: 36 U/L (ref 15–37)
BACTERIA: ABNORMAL /HPF
BASOPHILS ABSOLUTE: 0.1 K/UL (ref 0–0.2)
BASOPHILS RELATIVE PERCENT: 0.9 %
BILIRUB SERPL-MCNC: <0.2 MG/DL (ref 0–1)
BILIRUBIN URINE: NEGATIVE
BLOOD, URINE: NEGATIVE
BUN BLDV-MCNC: 8 MG/DL (ref 7–20)
CALCIUM SERPL-MCNC: 9.4 MG/DL (ref 8.3–10.6)
CHLORIDE BLD-SCNC: 96 MMOL/L (ref 99–110)
CLARITY: CLEAR
CO2: 27 MMOL/L (ref 21–32)
COLOR: ABNORMAL
CREAT SERPL-MCNC: 0.5 MG/DL (ref 0.6–1.2)
EOSINOPHILS ABSOLUTE: 0.2 K/UL (ref 0–0.6)
EOSINOPHILS RELATIVE PERCENT: 3 %
EPITHELIAL CELLS, UA: 1 /HPF (ref 0–5)
GFR SERPL CREATININE-BSD FRML MDRD: >60 ML/MIN/{1.73_M2}
GLUCOSE BLD-MCNC: 95 MG/DL (ref 70–99)
GLUCOSE URINE: NEGATIVE MG/DL
HCT VFR BLD CALC: 37.8 % (ref 36–48)
HEMOGLOBIN: 12.9 G/DL (ref 12–16)
HYALINE CASTS: 0 /LPF (ref 0–8)
KETONES, URINE: NEGATIVE MG/DL
LEUKOCYTE ESTERASE, URINE: ABNORMAL
LYMPHOCYTES ABSOLUTE: 2.4 K/UL (ref 1–5.1)
LYMPHOCYTES RELATIVE PERCENT: 33.1 %
MCH RBC QN AUTO: 32.3 PG (ref 26–34)
MCHC RBC AUTO-ENTMCNC: 34.1 G/DL (ref 31–36)
MCV RBC AUTO: 94.7 FL (ref 80–100)
MICROSCOPIC EXAMINATION: YES
MONOCYTES ABSOLUTE: 0.7 K/UL (ref 0–1.3)
MONOCYTES RELATIVE PERCENT: 9.5 %
NEUTROPHILS ABSOLUTE: 3.8 K/UL (ref 1.7–7.7)
NEUTROPHILS RELATIVE PERCENT: 53.5 %
NITRITE, URINE: POSITIVE
PDW BLD-RTO: 12.3 % (ref 12.4–15.4)
PH UA: 7.5 (ref 5–8)
PLATELET # BLD: 269 K/UL (ref 135–450)
PMV BLD AUTO: 7.5 FL (ref 5–10.5)
POTASSIUM REFLEX MAGNESIUM: 3.9 MMOL/L (ref 3.5–5.1)
PRO-BNP: 61 PG/ML (ref 0–124)
PROTEIN UA: NEGATIVE MG/DL
RBC # BLD: 3.99 M/UL (ref 4–5.2)
RBC UA: 3 /HPF (ref 0–4)
SODIUM BLD-SCNC: 135 MMOL/L (ref 136–145)
SPECIFIC GRAVITY UA: <=1.005 (ref 1–1.03)
TOTAL PROTEIN: 7.2 G/DL (ref 6.4–8.2)
TROPONIN: <0.01 NG/ML
URINE REFLEX TO CULTURE: ABNORMAL
URINE TYPE: ABNORMAL
UROBILINOGEN, URINE: 1 E.U./DL
WBC # BLD: 7.2 K/UL (ref 4–11)
WBC UA: 6 /HPF (ref 0–5)

## 2022-11-04 PROCEDURE — 81001 URINALYSIS AUTO W/SCOPE: CPT

## 2022-11-04 PROCEDURE — 85025 COMPLETE CBC W/AUTO DIFF WBC: CPT

## 2022-11-04 PROCEDURE — 6370000000 HC RX 637 (ALT 250 FOR IP): Performed by: EMERGENCY MEDICINE

## 2022-11-04 PROCEDURE — 83880 ASSAY OF NATRIURETIC PEPTIDE: CPT

## 2022-11-04 PROCEDURE — 84484 ASSAY OF TROPONIN QUANT: CPT

## 2022-11-04 PROCEDURE — 6370000000 HC RX 637 (ALT 250 FOR IP): Performed by: PHYSICIAN ASSISTANT

## 2022-11-04 PROCEDURE — 93005 ELECTROCARDIOGRAM TRACING: CPT | Performed by: PHYSICIAN ASSISTANT

## 2022-11-04 PROCEDURE — 70450 CT HEAD/BRAIN W/O DYE: CPT

## 2022-11-04 PROCEDURE — 80053 COMPREHEN METABOLIC PANEL: CPT

## 2022-11-04 PROCEDURE — 36415 COLL VENOUS BLD VENIPUNCTURE: CPT

## 2022-11-04 PROCEDURE — 99285 EMERGENCY DEPT VISIT HI MDM: CPT

## 2022-11-04 PROCEDURE — 71045 X-RAY EXAM CHEST 1 VIEW: CPT

## 2022-11-04 RX ORDER — OXYCODONE HYDROCHLORIDE 5 MG/1
10 TABLET ORAL ONCE
Status: COMPLETED | OUTPATIENT
Start: 2022-11-04 | End: 2022-11-04

## 2022-11-04 RX ORDER — NITROFURANTOIN 25; 75 MG/1; MG/1
100 CAPSULE ORAL 2 TIMES DAILY
Qty: 10 CAPSULE | Refills: 0 | Status: SHIPPED | OUTPATIENT
Start: 2022-11-04 | End: 2022-11-09

## 2022-11-04 RX ORDER — ACETAMINOPHEN 325 MG/1
650 TABLET ORAL ONCE
Status: COMPLETED | OUTPATIENT
Start: 2022-11-04 | End: 2022-11-04

## 2022-11-04 RX ORDER — OXYMORPHONE HYDROCHLORIDE 5 MG/1
5 TABLET ORAL ONCE
Status: DISCONTINUED | OUTPATIENT
Start: 2022-11-04 | End: 2022-11-04

## 2022-11-04 RX ADMIN — OXYCODONE 10 MG: 5 TABLET ORAL at 16:28

## 2022-11-04 RX ADMIN — ACETAMINOPHEN 650 MG: 325 TABLET ORAL at 19:27

## 2022-11-04 ASSESSMENT — PAIN SCALES - GENERAL
PAINLEVEL_OUTOF10: 8
PAINLEVEL_OUTOF10: 4
PAINLEVEL_OUTOF10: 6

## 2022-11-04 ASSESSMENT — ENCOUNTER SYMPTOMS
SHORTNESS OF BREATH: 0
BACK PAIN: 0
DIARRHEA: 0
COLOR CHANGE: 0
NAUSEA: 0
COUGH: 0
RESPIRATORY NEGATIVE: 1
VOMITING: 0
ABDOMINAL PAIN: 0
CHEST TIGHTNESS: 0
PHOTOPHOBIA: 0
CONSTIPATION: 0

## 2022-11-04 ASSESSMENT — PAIN - FUNCTIONAL ASSESSMENT: PAIN_FUNCTIONAL_ASSESSMENT: NONE - DENIES PAIN

## 2022-11-04 ASSESSMENT — PAIN DESCRIPTION - LOCATION: LOCATION: BACK;KNEE

## 2022-11-04 NOTE — DISCHARGE INSTRUCTIONS
Please follow-up with your primary care physician within the next 2 to 4 days for reevaluation of your blood pressure regimen. Begin taking Macrobid for UTI as prescribed. Drink plenty of fluids. Return to the emergency department if you have worsens or changing symptoms, severe headaches, vision changes, chest pain or trouble breathing. Return if you have other new or changing symptoms that concern you and you wish to be reevaluated.

## 2022-11-04 NOTE — TELEPHONE ENCOUNTER
Pt calling her BP is 203/99 --feeling lousy--for a week--hurting all over-headache---her pain medicine isn't helping today either---please call pt. Thanks.

## 2022-11-04 NOTE — TELEPHONE ENCOUNTER
Spoke with pt and sent her to the ER. Told her she would need to have someone either take her or to the call the squad. Pt is making arrangements and is going now.

## 2022-11-04 NOTE — ED TRIAGE NOTES
Pt in via [address], Pt states she has been having BP issues, states it has been running high the past week or so. She states she called her pcp when it read 204/170 and the dr advised her to come to the ER. Pt denies other symptom at this time. Denies SOB or CP. Takes lisinopril 20mg in evening.

## 2022-11-05 LAB
EKG ATRIAL RATE: 70 BPM
EKG DIAGNOSIS: NORMAL
EKG P AXIS: 31 DEGREES
EKG P-R INTERVAL: 162 MS
EKG Q-T INTERVAL: 398 MS
EKG QRS DURATION: 90 MS
EKG QTC CALCULATION (BAZETT): 429 MS
EKG R AXIS: -17 DEGREES
EKG T AXIS: 53 DEGREES
EKG VENTRICULAR RATE: 70 BPM

## 2022-11-05 PROCEDURE — 93010 ELECTROCARDIOGRAM REPORT: CPT | Performed by: INTERNAL MEDICINE

## 2022-11-05 NOTE — ED PROVIDER NOTES
In addition to the advanced practice provider, I personally saw Natalie Phillips and performed a substantive portion of the visit including all aspects of the medical decision making. Briefly, this is a 67 y.o. female here for high blood pressure. Patient states her blood pressure has been running high at home with her last systolic over 873. She has been adherent to all her blood pressure medications. States she has felt generally fatigued over the past several days, however otherwise without symptoms. Called her PCP and was told to come to the emergency department for evaluation. .    On exam, patient afebrile and nontoxic. No distress. Heart RRR. Lungs CTAB. Abdomen soft, nondistended, nontender to palpation in all quadrants. A&Ox4. Speech clear, face symmetric. CN 2-12 intact. 5/5 motor and sensation grossly intact all extremities. No pronator drift. Normal finger to nose, normal heel to shin. Normal gait observed . EKG  EKG was reviewed by emergency department physician in the absence of a cardiologist    Narrow complex sinus rhythm, rate 70, normal axis, normal TN and QRS intervals, normal Qtc, no ST elevations or depressions, normal t-wave morphology, impression NSR, no STEMI      Screenings          Is this patient to be included in the SEP-1 Core Measure due to severe sepsis or septic shock? No   Exclusion criteria - the patient is NOT to be included for SEP-1 Core Measure due to:  2+ SIRS criteria are not met      MDM    Patient afebrile and nontoxic. No distress. Neuro exam nonfocal, nothing to suggest CVA/TIA. No headaches, no red flags for SAH/ICH. Laboratory work-up is reassuring with no evidence of acute endorgan dysfunction or clinically significant electrolyte derangement. Urinalysis is consistent with infection, nothing to suggest pyelonephritis. Patient is not septic. Patient's blood pressure improved without intervention.   Given hypertension is asymptomatic, no indication for intravenous antihypertensive therapy at this time. Patient felt safe for discharge to self-care with close PCP follow-up. Discussed anticipated need for adjustments to her blood pressure medication regimen if her pressure continues to run high at home and she verbalized her understanding. Strict return precautions discussed. I Dr. Charisse Putnam am the primary clinician of record. Patient Referrals:  Corinne Costa, DO  [address]  [phone]    In 2 days        Discharge Medications:  Discharge Medication List as of 11/4/2022  8:07 PM        START taking these medications    Details   nitrofurantoin, macrocrystal-monohydrate, (MACROBID) 100 MG capsule Take 1 capsule by mouth 2 times daily for 5 days, Disp-10 capsule, R-0Print             FINAL IMPRESSION  1. Asymptomatic hypertension    2. Acute cystitis without hematuria        Blood pressure (!) 153/73, pulse 75, temperature 97.7 °F (36.5 °C), temperature source Oral, resp. rate 12, weight 180 lb (81.6 kg), SpO2 94 %, not currently breastfeeding. For further details of [facility] emergency department encounter, please see documentation by advanced practice provider, HE Navarrete.     Tere Godfrey DO (electronically signed)  Attending Emergency Physician       Tere Godfrey DO  11/05/22 0987

## 2022-11-05 NOTE — ED PROVIDER NOTES
[facility]        Pt Name: Pauly Gleason  MRN: [id number]  [place] 1950  Date of evaluation: 11/4/2022  Provider: HE Foster  PCP: Harsh Stewart DO  Note Started: 8:18 PM EDT        I have seen and evaluated this patient with my supervising physician Marylin Kearney, [address] [place] [address]       Chief Complaint   Patient presents with    Hypertension     Pt in via [address], Pt states she has been having BP issues, states it has been running high the past week or so. She states she called her pcp when it read 204/170 and the dr advised her to come to the ER. Pt denies other symptom at this time. Denies SOB or CP. Takes lisinopril 20mg in evening. HISTORY OF PRESENT ILLNESS   (Location, Timing/Onset, Context/Setting, Quality, Duration, Modifying Factors, Severity, Associated Signs and Symptoms)  Note limiting factors. Chief Complaint: HTN     Pauly Gleason is a 67 y.o. female with past medical history of anxiety, depression, hyperlipidemia, hypertension who presents the ED with complaint of elevated blood pressure. Patient arrives by EMS from home with complaint of elevated blood pressure. Patient that she is less than history of hypertension. She takes lisinopril she states every night. Last took it last night. Patient states for the past week she has had issues with her blood pressure and states that been running high. States blood pressure today was 204/170. Called PCP and they recommended she come to the ED for further evaluation and treatment. Patient states she did feel slightly foggy and confused earlier but states now she feels fine. She denies any headache, visual changes, speech disturbances or numbness/tingling. Denies lightheadedness or dizziness. Denies chest pain or shortness of breath. Denies abdominal pain, nausea/vomiting, urinary symptoms or changes in bowel movements. Denies fever or chills. Denies any rashes or lesions. Nursing Notes were all reviewed and agreed with or any disagreements were addressed in the HPI. REVIEW OF SYSTEMS    (2-9 systems for level 4, 10 or more for level 5)     Review of Systems   Constitutional:  Negative for activity change, appetite change, chills and fever. Eyes:  Negative for photophobia and visual disturbance. Respiratory: Negative. Negative for cough, chest tightness and shortness of breath. Cardiovascular: Negative. Negative for chest pain, palpitations and leg swelling. Gastrointestinal:  Negative for abdominal pain, constipation, diarrhea, nausea and vomiting. Genitourinary:  Negative for decreased urine volume, difficulty urinating, dysuria, flank pain, frequency, hematuria, urgency, vaginal bleeding, vaginal discharge and vaginal pain. Musculoskeletal:  Negative for arthralgias, back pain, myalgias, neck pain and neck stiffness. Skin:  Negative for color change, pallor, rash and wound. Neurological:  Negative for dizziness, tremors, seizures, syncope, facial asymmetry, speech difficulty, weakness, light-headedness, numbness and headaches. Positives and Pertinent negatives as per HPI. Except as noted above in the ROS, all other systems were reviewed and negative.        PAST MEDICAL HISTORY     Past Medical History:   Diagnosis Date    Anxiety     Anxiety and depression     Chronic back pain     Clostridium difficile infection 2/22/15    Hyperlipidemia     Hypertension     Insomnia disorder     Osteoarthritis     Osteoporosis 2008    Rheumatic fever     S/P insertion of spinal cord stimulator     Self-catheterizes urinary bladder     as needed 7/8 no longer catherizing     Urinary retention          SURGICAL HISTORY     Past Surgical History:   Procedure Laterality Date    BLADDER REPAIR      CARPAL TUNNEL RELEASE      COLONOSCOPY      CYSTOSCOPY  10/29/2012    bladder biopsy    CYSTOSCOPY N/A 10/19/2020 FLEXIBLE CYSTOSCOPY performed by Kervin Chong MD at [address] [facility] (CERVIX STATUS UNKNOWN)      INTRACAPSULAR CATARACT EXTRACTION Right 7/18/2019    PHACOEMULSIFICATION WITH INTRAOCULAR LENS IMPLANT performed by Jackie Campbell MD at [address] Left 7/26/2019    PHACOEMULSIFICATION WITH INTRAOCULAR LENS IMPLANT performed by Jackie Campbell MD at [address], [address]  2004    MANDIBLE RECONSTRUCTION      OTHER SURGICAL HISTORY      spinal cord stimulator    PAIN MANAGEMENT PROCEDURE N/A 10/19/2021    CAUDAL EPIDURAL STEROID INJECTION WITH FLUOROSCOPY performed by Shantel Teague MD at [facility] Right 10/18/13         CURRENTMEDICATIONS       Previous Medications    ACETAMINOPHEN (TYLENOL) 500 MG TABLET    Take 1,000 mg by mouth 3 times daily as needed for Pain    ASCORBIC ACID (VITAMIN C) 500 MG TABLET    Take 500 mg by mouth daily. ASPIRIN 81 MG CHEWABLE TABLET    Take 81 mg by mouth daily. AZELASTINE (ASTELIN) 0.1 % NASAL SPRAY    Use 2 sprays in each nostril 2 times daily. Use fluticasone 15 minutes after the morning dose of astelin. BACLOFEN (LIORESAL) 10 MG TABLET    Take 1 tablet by mouth 3 times daily as needed (pain)    BLOOD GLUCOSE MONITOR KIT AND SUPPLIES    Check sugars qam and prn s/s of low blood sugars    BLOOD GLUCOSE MONITOR STRIPS    Check sugars qam and prn s/s of low blood sugars    BUDESONIDE (ENTOCORT EC) 3 MG EXTENDED RELEASE CAPSULE    Take 6 mg by mouth every morning    BUSPIRONE (BUSPAR) 15 MG TABLET    Take 15 mg by mouth 3 times daily    CALCIUM CARBONATE-VIT D-MIN (CALCIUM 1200 PO)    Take 1 capsule by mouth 2 times daily.       CARBAMAZEPINE (TEGRETOL-XR) 100 MG EXTENDED RELEASE TABLET    Take 1 tablet by mouth 2 times daily    CETIRIZINE (ZYRTEC) 10 MG TABLET    Take 10 mg by mouth daily    CHOLECALCIFEROL (VITAMIN D3) 5000 UNITS CAPS Take 5,000 Units by mouth daily     CYANOCOBALAMIN (B-12) 1000 MCG SUBL    Place 1,000 Units under the tongue daily    D-MANNOSE PO    Take by mouth    DICLOFENAC SODIUM (VOLTAREN) 1 % GEL        DIPHENOXYLATE-ATROPINE (LOMOTIL) 2.5-0.025 MG PER TABLET    Take 1 tablet by mouth as needed for Diarrhea. .    DULOXETINE (CYMBALTA) 30 MG EXTENDED RELEASE CAPSULE    Take 3 capsules by mouth daily for 7 days, THEN 2 capsules daily for 7 days, THEN 1 capsule daily for 7 days, THEN 1 capsule every other day for 7 days. ESCITALOPRAM (LEXAPRO) 10 MG TABLET    Take 1 tablet by mouth daily    FLUOCINOLONE (DERMA-SMOOTHE) 0.01 % EXTERNAL OIL    APPLY TOPICALLY TWICE A WEEK    FLUOCINOLONE (DERMOTIC) 0.01 % OIL OIL    Place 1 drop in ear(s) 2 times daily    FLUTICASONE (FLONASE) 50 MCG/ACT NASAL SPRAY    SPRAY TWO SPRAYS IN EACH NOSTRIL ONCE DAILY DURING ALLERGY SEASON    GABAPENTIN (NEURONTIN) 600 MG TABLET    TAKE 1 TABLET BY MOUTH THREE TIMES DAILY    GALANTAMINE (RAZADYNE ER) 16 MG EXTENDED RELEASE CAPSULE    TAKE 1 CAPSULE BY MOUTH EVERY DAY WITH BREAKFAST    HYDROCHLOROTHIAZIDE (MICROZIDE) 12.5 MG CAPSULE    TAKE 1 CAPSULE BY MOUTH EVERY MORNING    KETOCONAZOLE (NIZORAL) 2 % SHAMPOO    APPLY TOPICALLY DAILY AS NEEDED    LANCETS MISC    Check sugars qam and prn s/s of low blood sugars    LISINOPRIL (PRINIVIL;ZESTRIL) 20 MG TABLET    TAKE 1 TABLET BY MOUTH IN THE MORNING AND AT BEDTIME *REPLACING LISINOPRIL 40 MG ONCE DAILY*    MEMANTINE (NAMENDA) 10 MG TABLET    Take 1 tablet by mouth twice daily    NABUMETONE (RELAFEN) 750 MG TABLET    Take 1 tablet by mouth 2 times daily    NYSTATIN (MYCOSTATIN) 682246 UNIT/ML SUSPENSION    TAKE 5 MLS BY MOUTH FOUR TIMES A DAY FOR 10 DAYS, RETAIN IN MOUTH AS LONG AS POSSIBLE    OXYBUTYNIN (DITROPAN XL) 15 MG EXTENDED RELEASE TABLET    TAKE 1 TABLET BY MOUTH DAILY *REPLACES OXYBUTININ ER 10MG TABLET*    OXYMORPHONE (OPANA) 5 MG TABLET    Take 5 mg by mouth 2 times daily.  Indications: per DR Hernández Manus TAKES 3 TIMES A DAY. TRYING TO CUT BACK. POLYETHYLENE GLYCOL (GLYCOLAX) 17 GM/SCOOP POWDER    1/2-1 capful in 8 oz water or prune juice qd prn constipation. POTASSIUM CHLORIDE (KLOR-CON M) 20 MEQ EXTENDED RELEASE TABLET    TAKE 1 TABLET BY MOUTH TWICE DAILY    ROSUVASTATIN (CRESTOR) 10 MG TABLET    TAKE 1 TABLET BY MOUTH NIGHTLY    TRAZODONE (DESYREL) 100 MG TABLET    TAKE 2 TABLETS BY MOUTH EVERY NIGHT    ZOLEDRONIC ACID (RECLAST) 5 MG/100ML SOLN    Infuse 5 mg intravenously once IV, yearly         ALLERGIES     Amlodipine, Ativan [lorazepam], Sulfa antibiotics, and Keflex [cephalexin]    FAMILYHISTORY       Family History   Problem Relation Age of Onset    COPD Mother     High Blood Pressure Mother     Rheum Arthritis Mother     Thyroid Disease Mother     Alcohol Abuse Father     Clotting Disorder Sister     Thyroid Disease Sister     Hypertension Brother     Diabetes Sister     Heart Disease Sister     Hypertension Sister     Thyroid Disease Sister     Cancer Brother     Heart Disease Brother     Hypertension Brother     Substance Abuse Brother     Alcohol Abuse Son     No Known Problems Daughter     Dementia Neg Hx           SOCIAL HISTORY       Social History     Tobacco Use    Smoking status: Never    Smokeless tobacco: Never   Vaping Use    Vaping Use: Never used   Substance Use Topics    Alcohol use: Not Currently    Drug use: No       SCREENINGS             PHYSICAL EXAM    (up to 7 for level 4, 8 or more for level 5)     ED Triage Vitals [11/04/22 1523]   BP Temp Temp Source Heart Rate Resp SpO2 Height Weight   (!) 189/90 97.7 °F (36.5 °C) Oral 76 21 95 % -- 180 lb (81.6 kg)       Physical Exam  Constitutional:       General: She is not in acute distress. Appearance: Normal appearance. She is well-developed. She is not ill-appearing, toxic-appearing or diaphoretic. HENT:      Head: Normocephalic and atraumatic.       Right Ear: External ear normal.      Left Ear: External ear normal.   Eyes:      General:         Right eye: No discharge. Left eye: No discharge. Extraocular Movements: Extraocular movements intact. Conjunctiva/sclera: Conjunctivae normal.      Pupils: Pupils are equal, round, and reactive to light. Cardiovascular:      Rate and Rhythm: Normal rate and regular rhythm. Pulses: Normal pulses. Heart sounds: Normal heart sounds. No murmur heard. No friction rub. No gallop. Comments: 2+ radial pulses bilaterally. No pedal edema. No calf tenderness. No JVD. Pulmonary:      Effort: Pulmonary effort is normal. No respiratory distress. Breath sounds: Normal breath sounds. No stridor. No wheezing, rhonchi or rales. Chest:      Chest wall: No tenderness. Abdominal:      General: Abdomen is flat. Bowel sounds are normal. There is no distension. Palpations: Abdomen is soft. There is no mass. Tenderness: There is no abdominal tenderness. There is no right CVA tenderness, left CVA tenderness, guarding or rebound. Hernia: No hernia is present. Musculoskeletal:         General: Normal range of motion. Cervical back: Normal range of motion and neck supple. No rigidity or tenderness. Lymphadenopathy:      Cervical: No cervical adenopathy. Skin:     General: Skin is warm and dry. Coloration: Skin is not pale. Findings: No erythema or rash. Neurological:      General: No focal deficit present. Mental Status: She is alert and oriented to person, place, and time. GCS: GCS eye subscore is 4. GCS verbal subscore is 5. GCS motor subscore is 6. Cranial Nerves: Cranial nerves 2-12 are intact. No cranial nerve deficit, dysarthria or facial asymmetry. Sensory: Sensation is intact. No sensory deficit. Motor: Motor function is intact. No weakness. Comments: Gait deferred.    Psychiatric:         Behavior: Behavior normal.       DIAGNOSTIC RESULTS   LABS:    Labs Reviewed   URINALYSIS WITH REFLEX TO CULTURE - Abnormal; Notable for the following components:       Result Value    Color, UA DARK YELLOW (*)     Nitrite, Urine POSITIVE (*)     Leukocyte Esterase, Urine MODERATE (*)     All other components within normal limits   CBC WITH AUTO DIFFERENTIAL - Abnormal; Notable for the following components:    RBC 3.99 (*)     RDW 12.3 (*)     All other components within normal limits   COMPREHENSIVE METABOLIC PANEL W/ REFLEX TO MG FOR LOW K - Abnormal; Notable for the following components:    Sodium 135 (*)     Chloride 96 (*)     Creatinine 0.5 (*)     All other components within normal limits   MICROSCOPIC URINALYSIS - Abnormal; Notable for the following components:    Bacteria, UA 4+ (*)     WBC, UA 6 (*)     All other components within normal limits   TROPONIN   BRAIN NATRIURETIC PEPTIDE       When ordered only abnormal lab results are displayed. All other labs were within normal range or not returned as of this dictation. EKG: When ordered, EKG's are interpreted by the Emergency Department Physician in the absence of a cardiologist.  Please see their note for interpretation of EKG. RADIOLOGY:   Non-plain film images such as CT, Ultrasound and MRI are read by the radiologist. Plain radiographic images are visualized and preliminarily interpreted by the ED Provider with the below findings:        Interpretation per the Radiologist below, if available at the time of this note:    XR CHEST PORTABLE   Final Result   No acute abnormality. CT HEAD WO CONTRAST   Final Result   No acute intracranial abnormality. CT HEAD WO CONTRAST    Result Date: 11/4/2022  EXAMINATION: CT OF THE HEAD WITHOUT CONTRAST  11/4/2022 4:42 pm TECHNIQUE: CT of the head was performed without the administration of intravenous contrast. Automated exposure control, iterative reconstruction, and/or weight based adjustment of the mA/kV was utilized to reduce the radiation dose to as low as reasonably achievable. COMPARISON: Head CT dated 10/09/2020 HISTORY: ORDERING SYSTEM PROVIDED HISTORY: htn - HA TECHNOLOGIST PROVIDED HISTORY: Has a \"code stroke\" or \"stroke alert\" been called? ->No Reason for exam:->htn - HA Decision Support Exception - unselect if not a suspected or confirmed emergency medical condition->Emergency Medical Condition (MA) Reason for Exam: Hypertension (Pt in via [address], Pt states she has been having BP issues, states it has been running high the past week or so. She states she called her pcp when it read 204/170 and the dr advised her to come to the ER. Pt denies other symptom at this time. Denies SOB or CP. Takes lisinopril 20mg in evening. ) FINDINGS: BRAIN/VENTRICLES: There is no acute intracranial hemorrhage, mass effect or midline shift. No abnormal extra-axial fluid collection. The gray-white differentiation is maintained without evidence of an acute infarct. There is prominence of the ventricles and sulci due to global parenchymal volume loss. There are nonspecific areas of hypoattenuation within the periventricular and subcortical white matter, which likely represent chronic microvascular ischemic change. ORBITS: The visualized portion of the orbits demonstrate no acute abnormality. SINUSES: The visualized paranasal sinuses and mastoid air cells demonstrate no acute abnormality. SOFT TISSUES/SKULL: No acute abnormality of the visualized skull or soft tissues. No acute intracranial abnormality. XR CHEST PORTABLE    Result Date: 11/4/2022  EXAMINATION: ONE XRAY VIEW OF THE CHEST 11/4/2022 4:12 pm COMPARISON: 09/29/2022 HISTORY: Hypertension. FINDINGS: Cardiomediastinal silhouette and pulmonary vasculature are normal.  No consolidation, pleural effusion, or pneumothorax. No acute abnormality.            PROCEDURES   Unless otherwise noted below, none     Procedures    CRITICAL CARE TIME       CONSULTS:  None      EMERGENCY DEPARTMENT COURSE and DIFFERENTIAL DIAGNOSIS/MDM: Vitals:    Vitals:    11/04/22 1600 11/04/22 1715 11/04/22 1830 11/04/22 1906   BP: (!) 163/74 (!) 172/66 (!) 161/72 (!) 153/73   Pulse: 74 75  75   Resp: 12      Temp:       TempSrc:       SpO2: 92% 95%  94%   Weight:           Patient was given the following medications:  Medications   oxyCODONE (ROXICODONE) immediate release tablet 10 mg (10 mg Oral Given 11/4/22 1628)   acetaminophen (TYLENOL) tablet 650 mg (650 mg Oral Given 11/4/22 1927)         Is this patient to be included in the SEP-1 Core Measure due to severe sepsis or septic shock? No   Exclusion criteria - the patient is NOT to be included for SEP-1 Core Measure due to: Infection is not suspected    Patient is a 55-year-old female who presents to the ED with complaint of elevated blood pressure. Patient states she has had elevated blood pressure for the past week. Pressure upon arrival noted be around 160. Most recent blood pressure 153/73. She denies any symptoms at this time. She has reassuring neurologic examination. There is no evidence of endorgan damage. Patient states she did not take her dose of Opana since she arrived to the emergency department requesting dose of her pain medication which she takes for her chronic pain. Unfortunately do not carry Opana here in the emergency department and pharmacy recommended Roxicodone for substitution. Was ordered Roxicodone 10 mg here in the emergency department. Urinalysis showed 4+ bacteria with 6 white blood cells. Positive for nitrite. We will treat empirically for potential underlying acute cystitis. Will give Macrobid for home. CBC showed normal white count, hemoglobin and platelets. CMP relatively unremarkable. Troponin normal.  BMP unremarkable. Chest x-ray unremarkable. CT of the head unremarkable. EKG inter by attending. There is no evidence of endorgan damage. Appears to be suffering from asymptomatic hypertension. Believe can be safely discharged home.   Call PCP for further evaluation and treatment. Close outpatient follow-up with PCP. Return precautions discussed. Low sufficient for hypertensive emergency/urgency, CVA, TIA, subarachnoid hemorrhage, subdural hematoma, meningitis, cephalitis, intracranial mass, intracranial hemorrhage, AAA, dissection, ACS, kidney injury or other emergent etiology at this time. FINAL IMPRESSION      1. Asymptomatic hypertension    2.  Acute cystitis without hematuria          DISPOSITION/PLAN   DISPOSITION Decision To Discharge 11/04/2022 07:56:45 PM      PATIENT REFERRED TO:  Emeterio Day DO  [address] [place] [address]  [phone]    In 2 days        DISCHARGE MEDICATIONS:  New Prescriptions    NITROFURANTOIN, MACROCRYSTAL-MONOHYDRATE, (MACROBID) 100 MG CAPSULE    Take 1 capsule by mouth 2 times daily for 5 days       DISCONTINUED MEDICATIONS:  Discontinued Medications    No medications on file              (Please note that portions of this note were completed with a voice recognition program.  Efforts were made to edit the dictations but occasionally words are mis-transcribed.)    HE Panda (electronically signed)           HE Beauchamp  11/04/22 2023

## 2022-11-07 ENCOUNTER — CARE COORDINATION (OUTPATIENT)
Dept: CARE COORDINATION | Age: 72
End: 2022-11-07

## 2022-11-07 NOTE — CARE COORDINATION
Ambulatory Care Coordination  ED Follow up Call    Reason for ED visit:  [facility] ED 11/4; htn, uti. Denies dysuria or urinary frequency. Increased water intake. Appetite fair. Taking Macrobid as directed. Reports fatigue after showering today. Intermittent headaches - denies chest pain, sob, dizziness, n/v or fever. Did have blurred vision yesterday when reading her Bible. Blood pressure yesterday was 83/57 with repeat reading 148/66. Blood pressure today was 185/118. I had her recheck her blood pressure while on the phone with me. She is using a wrist cuff - educated on correct placement. Repeat b/p 183/98 - taking lisinopril as ordered, denies missed doses. She is checking her blood pressure twice daily and prn. She is scheduled to see pulmonology tomorrow. Gynecology 11/10. Also has eye and dental appointment this week. Scheduled to see Dr. Sudhir Garay on 11/18. RN-CC recommended sooner appointment but patient declined. She is not receptive to scheduling ED f/u with another provider in the practice. Educated on red flag sx and when to return to the ER. Patient will call RN-CC tomorrow to update on blood pressure and sx. Status:     not changed    Did you call your PCP prior to going to the ED? Yes      Did you receive a discharge instructions from the Emergency Room? Yes  Review of Instructions:     Understands what to report/when to return?:  Yes   Understands discharge instructions?:  Yes   Following discharge instructions?:  Yes   If not why? Are there any new complaints of pain? Chronic pain  New Pain Meds? No    Constipation prophylaxis needed? No    If you have a wound is the dressing clean, dry, and intact? N/A  Understands wound care regimen? N/A    Are there any other complaints/concerns that you wish to tell your provider?    No    FU appts/Provider:    Future Appointments   Date Time Provider Sung Kovacs   11/8/2022 10:30 AM Micheal Orantes MD PULM & CC [facility] 11/18/2022  9:40 AM DO GINA Duarte IM [place] - D[facility]   12/12/2022 11:00 AM WILLOW Romo FF SLEEP MED [facility]   4/19/2023 10:10 AM Jaguar Rehman MD FF NEURO Neurology -           New Medications?:   Yes      Medication Reconciliation by phone - Yes  Understands Medications? Yes  Taking Medications? Yes  Can you swallow your pills? Yes    Any further needs in the home i.e. Equipment?   No    Link to services in community?:  Active with [facility]   Which services:

## 2022-11-08 ENCOUNTER — OFFICE VISIT (OUTPATIENT)
Dept: PULMONOLOGY | Age: 72
End: 2022-11-08
Payer: MEDICARE

## 2022-11-08 ENCOUNTER — TELEPHONE (OUTPATIENT)
Dept: PULMONOLOGY | Age: 72
End: 2022-11-08

## 2022-11-08 VITALS
BODY MASS INDEX: 33.42 KG/M2 | DIASTOLIC BLOOD PRESSURE: 74 MMHG | HEIGHT: 61 IN | SYSTOLIC BLOOD PRESSURE: 138 MMHG | WEIGHT: 177 LBS | HEART RATE: 94 BPM | OXYGEN SATURATION: 93 %

## 2022-11-08 DIAGNOSIS — G47.33 OSA ON CPAP: ICD-10-CM

## 2022-11-08 DIAGNOSIS — Z99.89 OSA ON CPAP: ICD-10-CM

## 2022-11-08 DIAGNOSIS — R06.09 DOE (DYSPNEA ON EXERTION): Primary | ICD-10-CM

## 2022-11-08 PROCEDURE — 99204 OFFICE O/P NEW MOD 45 MIN: CPT | Performed by: INTERNAL MEDICINE

## 2022-11-08 PROCEDURE — 3078F DIAST BP <80 MM HG: CPT | Performed by: INTERNAL MEDICINE

## 2022-11-08 PROCEDURE — 3074F SYST BP LT 130 MM HG: CPT | Performed by: INTERNAL MEDICINE

## 2022-11-08 ASSESSMENT — ENCOUNTER SYMPTOMS
COUGH: 0
RHINORRHEA: 0
BLOOD IN STOOL: 0
SORE THROAT: 0
APNEA: 0
ABDOMINAL DISTENTION: 0
WHEEZING: 0
SINUS PRESSURE: 0
SHORTNESS OF BREATH: 1
BACK PAIN: 0
DIARRHEA: 0
STRIDOR: 0
CONSTIPATION: 0
ABDOMINAL PAIN: 0
CHEST TIGHTNESS: 0
ANAL BLEEDING: 0
CHOKING: 0
VOICE CHANGE: 0

## 2022-11-08 NOTE — PROGRESS NOTES
Emely Peterson    YOB: 1950     Date of Service:  11/8/2022     Chief Complaint   Patient presents with    New Patient     Ref by Dr Quinones Wilian of Breath     Pft 09/29/2022         HPI patient has been referred for consultation by Dr. Meenu Bridges MD for evaluation of shortness of breath. Patient states that she has been short of breath for approximately 6 months. This has particularly been noticeable with activity and sometimes she has to stop between sentences. Dry cough noted, no chest pain or leg edema. Denies orthopnea. Patient noted to have abnormal PFT study and hence a pulmonary consultation has been requested. History of mild COVID-19 infection on 2 occasions in 2021, did not require hospitalization. Has mobility related issues related to osteoarthritis. History of BEBE on CPAP. Never smoked. No prior history of asthma or allergies. No prior history of coronary artery disease. Recent ER visit for hypertensive urgency and UTI on 11/4.     Allergies   Allergen Reactions    Amlodipine     Ativan [Lorazepam] Swelling     Tongue swelling    Sulfa Antibiotics Swelling    Keflex [Cephalexin] Other (See Comments)     Leg cramps     Outpatient Medications Marked as Taking for the 11/8/22 encounter (Office Visit) with Micheal Orantes MD   Medication Sig Dispense Refill    nitrofurantoin, macrocrystal-monohydrate, (MACROBID) 100 MG capsule Take 1 capsule by mouth 2 times daily for 5 days 10 capsule 0    galantamine (RAZADYNE ER) 16 MG extended release capsule TAKE 1 CAPSULE BY MOUTH EVERY DAY WITH BREAKFAST 90 capsule 1    memantine (NAMENDA) 10 MG tablet Take 1 tablet by mouth twice daily 180 tablet 1    lisinopril (PRINIVIL;ZESTRIL) 20 MG tablet TAKE 1 TABLET BY MOUTH IN THE MORNING AND AT BEDTIME *REPLACING LISINOPRIL 40 MG ONCE DAILY* 60 tablet 10    busPIRone (BUSPAR) 15 MG tablet Take 15 mg by mouth 3 times daily 90 tablet 5    baclofen (LIORESAL) 10 MG tablet Take 1 tablet by mouth 3 times daily as needed (pain) 60 tablet 2    cetirizine (ZYRTEC) 10 MG tablet Take 10 mg by mouth daily      escitalopram (LEXAPRO) 10 MG tablet Take 1 tablet by mouth daily 30 tablet 3    nabumetone (RELAFEN) 750 MG tablet Take 1 tablet by mouth 2 times daily 180 tablet 3    rosuvastatin (CRESTOR) 10 MG tablet TAKE 1 TABLET BY MOUTH NIGHTLY 90 tablet 3    traZODone (DESYREL) 100 MG tablet TAKE 2 TABLETS BY MOUTH EVERY NIGHT 60 tablet 10    potassium chloride (KLOR-CON M) 20 MEQ extended release tablet TAKE 1 TABLET BY MOUTH TWICE DAILY 60 tablet 10    D-MANNOSE PO Take by mouth      oxybutynin (DITROPAN XL) 15 MG extended release tablet TAKE 1 TABLET BY MOUTH DAILY *REPLACES OXYBUTININ ER 10MG TABLET* 30 tablet 10    hydroCHLOROthiazide (MICROZIDE) 12.5 MG capsule TAKE 1 CAPSULE BY MOUTH EVERY MORNING 30 capsule 10    fluticasone (FLONASE) 50 MCG/ACT nasal spray SPRAY TWO SPRAYS IN EACH NOSTRIL ONCE DAILY DURING ALLERGY SEASON 1 each prn    blood glucose monitor strips Check sugars qam and prn s/s of low blood sugars 50 strip 5    Lancets MISC Check sugars qam and prn s/s of low blood sugars 50 each 5    azelastine (ASTELIN) 0.1 % nasal spray Use 2 sprays in each nostril 2 times daily. Use fluticasone 15 minutes after the morning dose of astelin.  1 each 5    blood glucose monitor kit and supplies Check sugars qam and prn s/s of low blood sugars 1 kit 0    diclofenac sodium (VOLTAREN) 1 % GEL       fluocinolone (DERMA-SMOOTHE) 0.01 % external oil APPLY TOPICALLY TWICE A WEEK 1 each 3    nystatin (MYCOSTATIN) 525325 UNIT/ML suspension TAKE 5 MLS BY MOUTH FOUR TIMES A DAY FOR 10 DAYS, RETAIN IN MOUTH AS LONG AS POSSIBLE 3 Bottle 3    budesonide (ENTOCORT EC) 3 MG extended release capsule Take 6 mg by mouth every morning      ketoconazole (NIZORAL) 2 % shampoo APPLY TOPICALLY DAILY AS NEEDED 1 Bottle 1    Cyanocobalamin (B-12) 1000 MCG SUBL Place 1,000 Units under the tongue daily 90 tablet 3 polyethylene glycol (GLYCOLAX) 17 GM/SCOOP powder 1/2-1 capful in 8 oz water or prune juice qd prn constipation. 3 Bottle 3    carBAMazepine (TEGRETOL-XR) 100 MG extended release tablet Take 1 tablet by mouth 2 times daily 60 tablet 3    acetaminophen (TYLENOL) 500 MG tablet Take 1,000 mg by mouth 3 times daily as needed for Pain      fluocinolone (DERMOTIC) 0.01 % OIL oil Place 1 drop in ear(s) 2 times daily 1 Bottle 3    diphenoxylate-atropine (LOMOTIL) 2.5-0.025 MG per tablet Take 1 tablet by mouth as needed for Diarrhea. .      Cholecalciferol (VITAMIN D3) 5000 units CAPS Take 5,000 Units by mouth daily       oxyMORphone (OPANA) 5 MG tablet Take 5 mg by mouth 2 times daily. Indications: per DR Katarzyna Aguilera TAKES 3 TIMES A DAY. TRYING TO CUT BACK.      zoledronic acid (RECLAST) 5 MG/100ML SOLN Infuse 5 mg intravenously once IV, yearly      aspirin 81 MG chewable tablet Take 81 mg by mouth daily. Calcium Carbonate-Vit D-Min (CALCIUM 1200 PO) Take 1 capsule by mouth 2 times daily. Ascorbic Acid (VITAMIN C) 500 MG tablet Take 500 mg by mouth daily.            Immunization History   Administered Date(s) Administered    COVID-19, MODERNA BLUE border, Primary or Immunocompromised, (age 12y+), IM, 100 mcg/0.5mL 04/06/2021, 05/04/2021, 12/24/2021    Influenza 09/15/2011, 09/18/2012, 09/24/2013    Influenza Vaccine, unspecified formulation 09/18/2012, 09/24/2013, 09/20/2015    Influenza Virus Vaccine 09/18/2014    Influenza Whole 10/09/2015    Influenza, FLUAD, (age 72 y+), Adjuvanted, 0.5mL 09/30/2020, 09/20/2021    Influenza, High Dose (Fluzone 65 yrs and older) 09/18/2012, 09/24/2013, 09/20/2015, 10/19/2017, 11/09/2018, 10/03/2019    Pneumococcal Conjugate 13-valent ([id number]) 08/23/2019    Pneumococcal Polysaccharide ([id number]) 09/29/2020    Tdap (Boostrix, Adacel) 08/21/2020    Zoster Recombinant (Shingrix) 12/19/2020, 11/15/2021       Past Medical History:   Diagnosis Date    Anxiety     Anxiety and depression     Chronic back pain     Clostridium difficile infection 2/22/15    Hyperlipidemia     Hypertension     Insomnia disorder     Osteoarthritis     Osteoporosis 2008    Rheumatic fever     S/P insertion of spinal cord stimulator     Self-catheterizes urinary bladder     as needed 7/8 no longer catherizing     Urinary retention      Past Surgical History:   Procedure Laterality Date    BLADDER REPAIR      CARPAL TUNNEL RELEASE      COLONOSCOPY      CYSTOSCOPY  10/29/2012    bladder biopsy    CYSTOSCOPY N/A 10/19/2020    FLEXIBLE CYSTOSCOPY performed by Asia Kyle MD at [address] [facility] ([address] [facility]3])      INTRACAPSULAR CATARACT EXTRACTION Right 7/18/2019    PHACOEMULSIFICATION WITH INTRAOCULAR LENS IMPLANT performed by Estuardo Hawkins MD at [address] EXTRACTION Left 7/26/2019    PHACOEMULSIFICATION WITH INTRAOCULAR LENS IMPLANT performed by Estuardo Hawkins MD at  [address], [address]  2004    MANDIBLE RECONSTRUCTION      OTHER SURGICAL HISTORY      spinal cord stimulator    PAIN MANAGEMENT PROCEDURE N/A 10/19/2021    CAUDAL EPIDURAL STEROID INJECTION WITH FLUOROSCOPY performed by Lakeshia Bryant MD at [address] [facility] Right 10/18/13     Family History   Problem Relation Age of Onset    COPD Mother     High Blood Pressure Mother     Rheum Arthritis Mother     Thyroid Disease Mother     Alcohol Abuse Father     Clotting Disorder Sister     Thyroid Disease Sister     Hypertension Brother     Diabetes Sister     Heart Disease Sister     Hypertension Sister     Thyroid Disease Sister     Cancer Brother     Heart Disease Brother     Hypertension Brother     Substance Abuse Brother     Alcohol Abuse Son     No Known Problems Daughter     Dementia Neg Hx        Review of Systems:  Review of Systems   Constitutional:  Positive for fatigue.  Negative for activity change, appetite change and fever. HENT:  Negative for congestion, ear discharge, ear pain, postnasal drip, rhinorrhea, sinus pressure, sneezing, sore throat, tinnitus and voice change. Respiratory:  Positive for shortness of breath. Negative for apnea, cough, choking, chest tightness, wheezing and stridor. Cardiovascular:  Negative for chest pain, palpitations and leg swelling. Gastrointestinal:  Negative for abdominal distention, abdominal pain, anal bleeding, blood in stool, constipation and diarrhea. Musculoskeletal:  Negative for arthralgias, back pain and gait problem. Skin:  Negative for pallor and rash. Allergic/Immunologic: Negative for environmental allergies. Neurological:  Negative for dizziness, tremors, seizures, syncope, speech difficulty, weakness, light-headedness, numbness and headaches. Hematological:  Negative for adenopathy. Does not bruise/bleed easily. Psychiatric/Behavioral:  Positive for sleep disturbance. Vitals:    11/08/22 1020   BP: 138/74   Pulse: 94   SpO2: 93%   Weight: 177 lb (80.3 kg)   Height: 5' 1\" (1.549 m)     Patient-Reported Vitals 7/1/2021   Patient-Reported Weight 172lb   Patient-Reported Height 4'9\"   Patient-Reported Systolic -   Patient-Reported Diastolic -   Patient-Reported Pulse -      Body mass index is 33.44 kg/m². Wt Readings from Last 3 Encounters:   11/08/22 177 lb (80.3 kg)   11/04/22 180 lb (81.6 kg)   10/19/22 180 lb (81.6 kg)     BP Readings from Last 3 Encounters:   11/08/22 138/74   11/04/22 (!) 153/73   10/19/22 132/86         Physical Exam  Constitutional:       General: She is not in acute distress. Appearance: She is well-developed. She is not ill-appearing or diaphoretic. HENT:      Mouth/Throat:      Pharynx: No oropharyngeal exudate. Cardiovascular:      Rate and Rhythm: Normal rate and regular rhythm. Heart sounds: Normal heart sounds. No murmur heard. No friction rub. Pulmonary:      Effort: No respiratory distress. Breath sounds: Normal breath sounds. No wheezing, rhonchi or rales. Chest:      Chest wall: No tenderness. Abdominal:      General: There is no distension. Palpations: There is no mass. Tenderness: There is no abdominal tenderness. There is no guarding or rebound. Musculoskeletal:         General: No swelling or tenderness. Skin:     Coloration: Skin is not pale. Findings: No erythema or rash. Neurological:      Mental Status: She is alert and oriented to person, place, and time. Cranial Nerves: No cranial nerve deficit. Motor: No abnormal muscle tone. Coordination: Coordination normal.      Deep Tendon Reflexes: Reflexes normal.           Health Maintenance   Topic Date Due    Breast cancer screen  01/15/2022    COVID-19 Vaccine (4 - Booster for Garth Early series) 02/18/2022    Lipids  09/09/2023    Depression Monitoring  09/09/2023    Annual Wellness Visit (AWV)  09/10/2023    Colorectal Cancer Screen  06/17/2025    DTaP/Tdap/Td vaccine (2 - Td or Tdap) 08/21/2030    DEXA (modify frequency per FRAX score)  Completed    Flu vaccine  Completed    Shingles vaccine  Completed    Pneumococcal 65+ years Vaccine  Completed    Hepatitis C screen  Completed    Hepatitis A vaccine  Aged Out    Hib vaccine  Aged Out    Meningococcal (ACWY) vaccine  Aged Out          Assessment/Plan:  Personally reviewed PFT study, which shows signs of hyperinflation only with no obvious evidence of obstruction. Patient is a lifelong non-smoker. Doubt patient would benefit from inhalers. Chest x-ray performed on 9/29 was personally reviewed-no acute infiltrates are opacities of concern. Doubt value of CT imaging. Myocardial perfusion scan/stress test performed on 9/29 was normal.    BEBE on CPAP, recent download between August and September revealed good compliance, 100%, average usage of 7 hours 15 minutes, AutoPap 6 to 16 centimeters of water. However AHI is poorly controlled at 11.   Would recommend titration study based on these results. Patient follows with Dr. Jo Ann Dennis. Patient's symptoms most likely related to deconditioning, doubt value of inhaler therapy. Suggest regular exercises/diet and weight loss. Follow-up with pulmonary only as needed. No follow-ups on file.

## 2022-11-08 NOTE — TELEPHONE ENCOUNTER
Reviewed cpap download, poor AHI control. Would recommend sleep titration study. Please organize for this and let the patient know. She follows with Dr Glenn Brewer for BEBE.

## 2022-11-10 ENCOUNTER — CARE COORDINATION (OUTPATIENT)
Dept: CARE COORDINATION | Age: 72
End: 2022-11-10

## 2022-11-10 ENCOUNTER — TELEPHONE (OUTPATIENT)
Dept: INTERNAL MEDICINE CLINIC | Age: 72
End: 2022-11-10

## 2022-11-10 NOTE — CARE COORDINATION
RN-CC outreached patient for cc follow up; hypertension. Patient states she is working in her yard at the time of my call. She prefers to call me back tomorrow.

## 2022-11-10 NOTE — TELEPHONE ENCOUNTER
Pt calling requesting refill of  Hydroxyzine HCL 50 mg every 4 hrs    Last written  9/4/20 ?   Wanting for her nerves   Last OV  9/9/22  Next OV  11/18/22  Last recommended OV  NA    Please send to   Mic Perez

## 2022-11-11 ENCOUNTER — CARE COORDINATION (OUTPATIENT)
Dept: CARE COORDINATION | Age: 72
End: 2022-11-11

## 2022-11-11 NOTE — CARE COORDINATION
Ambulatory Care Coordination Note  11/11/2022    ACC: Tuan Avila RN        RN-CC received call return from patient for cc follow up. Patient reports she is tired today from working in her yard yesterday, but overall she is feeling better. Denies s/sx related to UTI. Fatigue improving. Educated on s/sx of UTI and prevention. Patient provided me with 2 b/p readings:    11/10 - 157/77  11/11 - 133/60    She is scheduled to follow up with Dr. Britney Castillo on 11/18/22. She was seen by pulmonology who feels her sob is due to deconditioning. Patient relived there appears to be no issues with her lungs. Pulmonology organizing sleep titration study for cpap. She is active with [employer] - they have 4-5 bids out for bathroom modifications. Patient is waiting on an update. She is working with [employer] - they will help winterize her help and repair her deck. They are also providing financial assistance with her propane heating bill. She had an eye and dental appointments this week. She was fitted for new eyeglasses and dentures. She rescheduled her gynecology appointment for the end of the week. Offered patient enrollment in the Remote Patient Monitoring (RPM) program for in-home monitoring: Patient declined. Lab Results       None            Care Coordination Interventions    Referral from Primary Care Provider: Yes  Suggested Interventions and Community Resources  Fall Risk Prevention: In Process (Comment: 9/14/22 [facility])  Medication Assistance Program: Completed (Comment: [facility] 9/14/22 [facility])  Pharmacist: Declined (Comment: 9/14/22 [facility])  Senior Services: Completed (Comment: referral to [facility] 10/12/22)  Social Work: Completed (Comment: [facility] 10/12/22)  Zone Management Tools:  In Process (Comment: 9/14/22 [facility])  Other Services or Interventions: [employer], [facility] 9/14/22 [facility]          Goals Addressed                   This Visit's Progress     Community Resource Goal        I will complete referral to [address] [place] [address] on Aging (Senior Services) for assistance. Referral placed to [facility] or assistance with obtaining dental insurance. Patient requesting assistance with home modifications due to mobility issues; specifically her bathtub. Would also like ramp. Barriers: lack of support and overwhelmed by complexity of regimen  Plan for overcoming my barriers: referral to [facility]. Referral to [facility]. RN-CC will outreach [employer]  Confidence: 7/10  Anticipated Goal Completion Date: 12/14/2022 9/14/22 - referral placed to [facility] by pcp office. RN-CC will place referral to [facility]. RN-CC will outreach [email or web address]. 9/14/22 contact information to [address] [place] [address] provided to patient who is agreeable to call to complete telephone application. 10/12/22-patient now active with [employer]. Received life alert and ramp outside home. Patient working with [employer] regarding bathroom modifications. 11/11/22 -patient active with [employer] - they have 4-5 bids out for bathroom modifications. [employer]F assisting with home winterization, deck repairs and will provide financial support with her propane bill this winter. Prior to Admission medications    Medication Sig Start Date End Date Taking?  Authorizing Provider   galantamine (RAZADYNE ER) 16 MG extended release capsule TAKE 1 CAPSULE BY MOUTH EVERY DAY WITH BREAKFAST 10/28/22   Ciro Tabor MD   memantine (NAMENDA) 10 MG tablet Take 1 tablet by mouth twice daily 10/28/22   Ciro Tabor MD   lisinopril (PRINIVIL;ZESTRIL) 20 MG tablet TAKE 1 TABLET BY MOUTH IN THE MORNING AND AT BEDTIME *REPLACING LISINOPRIL 40 MG ONCE DAILY* 10/25/22   Fatoumata Maxwell, DO   busPIRone (BUSPAR) 15 MG tablet Take 15 mg by mouth 3 times daily 10/21/22   Fatoumata Maxwell, DO   baclofen (LIORESAL) 10 MG tablet Take 1 tablet by mouth 3 times daily as needed (pain) 10/21/22   Fatoumata Maxwell, DO   cetirizine (ZYRTEC) 10 MG tablet Take 10 mg by mouth daily    Historical Provider, MD   DULoxetine (CYMBALTA) 30 MG extended release capsule Take 3 capsules by mouth daily for 7 days, THEN 2 capsules daily for 7 days, THEN 1 capsule daily for 7 days, THEN 1 capsule every other day for 7 days. 9/9/22 10/7/22  Santos Sanchez, DO   escitalopram (LEXAPRO) 10 MG tablet Take 1 tablet by mouth daily 9/9/22   Santos Sanchez, DO   nabumetone (RELAFEN) 750 MG tablet Take 1 tablet by mouth 2 times daily 9/9/22   Santos Sanchez, DO   rosuvastatin (CRESTOR) 10 MG tablet TAKE 1 TABLET BY MOUTH NIGHTLY 8/30/22   Santos Sanchez, DO   traZODone (DESYREL) 100 MG tablet TAKE 2 TABLETS BY MOUTH EVERY NIGHT 7/7/22   Santos Sanchez, DO   potassium chloride (KLOR-CON M) 20 MEQ extended release tablet TAKE 1 TABLET BY MOUTH TWICE DAILY 6/10/22   Santos Sanchez, DO   D-MANNOSE PO Take by mouth    Historical Provider, MD   oxybutynin (DITROPAN XL) 15 MG extended release tablet TAKE 1 TABLET BY MOUTH DAILY *REPLACES OXYBUTININ ER 10MG TABLET* 5/13/22   Santos Sanchez, DO   hydroCHLOROthiazide (MICROZIDE) 12.5 MG capsule TAKE 1 CAPSULE BY MOUTH EVERY MORNING 4/15/22   Santos Sanchez, DO   gabapentin (NEURONTIN) 600 MG tablet TAKE 1 TABLET BY MOUTH THREE TIMES DAILY  Patient taking differently: Take 600 mg by mouth 3 times daily. 4/15/22 10/19/22  Santos Sanchez, DO   fluticasone (FLONASE) 50 MCG/ACT nasal spray SPRAY TWO SPRAYS IN [facility] NOSTRIL ONCE DAILY DURING ALLERGY SEASON 3/18/22   Leland Ponce MD   blood glucose monitor strips Check sugars qam and prn s/s of low blood sugars 3/2/22   Santos Sanchez, DO   Lancets MISC Check sugars qam and prn s/s of low blood sugars 3/2/22   Santos Sanchez, DO   azelastine (ASTELIN) 0.1 % nasal spray Use 2 sprays in each nostril 2 times daily. Use fluticasone 15 minutes after the morning dose of astelin.  3/2/22   Santos Sanchez, DO   blood glucose monitor kit and supplies Check sugars qam and prn s/s of low blood sugars 3/2/22   Ally Chavez DO   diclofenac sodium (VOLTAREN) 1 % GEL  9/26/21   Historical Provider, MD   fluocinolone (DERMA-SMOOTHE) 0.01 % external oil APPLY TOPICALLY TWICE A WEEK 9/7/21   Ally Chavez DO   nystatin (MYCOSTATIN) 621414 UNIT/ML suspension TAKE 5 MLS BY MOUTH FOUR TIMES A DAY FOR 10 DAYS, RETAIN IN MOUTH AS LONG AS POSSIBLE 7/26/21   Ally Chavez DO   budesonide (ENTOCORT EC) 3 MG extended release capsule Take 6 mg by mouth every morning    Historical Provider, MD   ketoconazole (NIZORAL) 2 % shampoo APPLY TOPICALLY DAILY AS NEEDED 12/17/20   Ally Chavez DO   Cyanocobalamin (B-12) 1000 MCG SUBL Place 1,000 Units under the tongue daily 10/2/20   Ally Chavez DO   polyethylene glycol (GLYCOLAX) 17 GM/SCOOP powder 1/2-1 capful in 8 oz water or prune juice qd prn constipation. 7/22/20   Ally Chavez DO   carBAMazepine (TEGRETOL-XR) 100 MG extended release tablet Take 1 tablet by mouth 2 times daily 7/10/20   Bryanna Mejia MD   acetaminophen (TYLENOL) 500 MG tablet Take 1,000 mg by mouth 3 times daily as needed for Pain    Historical Provider, MD   fluocinolone ([address] [facility]) 0.01 % OIL oil Place 1 drop in ear(s) 2 times daily 2/5/20   Ally Chavez DO   diphenoxylate-atropine (LOMOTIL) 2.5-0.025 MG per tablet Take 1 tablet by mouth as needed for Diarrhea. .    Historical Provider, MD   Cholecalciferol (VITAMIN D3) 5000 units CAPS Take 5,000 Units by mouth daily     Historical Provider, MD   oxyMORphone (OPANA) 5 MG tablet Take 5 mg by mouth 2 times daily. Indications: per DR Jarad Godoy TAKES 3 TIMES A DAY. TRYING TO CUT BACK. Historical Provider, MD   zoledronic acid (RECLAST) 5 MG/100ML SOLN Infuse 5 mg intravenously once IV, yearly    Historical Provider, MD   aspirin 81 MG chewable tablet Take 81 mg by mouth daily. Historical Provider, MD   Calcium Carbonate-Vit D-Min (CALCIUM 1200 PO) Take 1 capsule by mouth 2 times daily.       Historical Provider, MD   Ascorbic Acid (VITAMIN C) 500 MG tablet Take 500 mg by mouth daily.       Historical Provider, MD       Future Appointments   Date Time Provider Sung Crowleyi   11/18/2022  9:40 AM DO GINA Silver  [place] - DYD   12/12/2022 11:00 AM WILLOW Kaplan [facility] SLEEP MED [facility]   4/19/2023 10:10 AM Mariano Cassidy MD FF NEURO Neurology -    and   General Assessment    Do you have any symptoms that are causing concern?: No

## 2022-11-14 RX ORDER — HYDROXYZINE 50 MG/1
50 TABLET, FILM COATED ORAL 4 TIMES DAILY PRN
Qty: 120 TABLET | Refills: 1 | Status: SHIPPED | OUTPATIENT
Start: 2022-11-14 | End: 2022-11-18 | Stop reason: SDUPTHER

## 2022-11-14 RX ORDER — HYDROXYZINE 50 MG/1
50 TABLET, FILM COATED ORAL 4 TIMES DAILY PRN
Qty: 120 TABLET | Refills: 1 | Status: SHIPPED | OUTPATIENT
Start: 2022-11-14 | End: 2022-11-14

## 2022-11-15 ENCOUNTER — TELEPHONE (OUTPATIENT)
Dept: ADMINISTRATIVE | Age: 72
End: 2022-11-15

## 2022-11-15 NOTE — TELEPHONE ENCOUNTER
Submitted PA for hydrOXYzine HCl  Via CMM Key: [id number] STATUS: NOT SENT TO PLAN. Please contact patient to verify pharmacy benefit with demographic information. Please advise. Thank you. If this requires a response please respond to the pool. [facility] [address] [place] [address]).

## 2022-11-18 ENCOUNTER — OFFICE VISIT (OUTPATIENT)
Dept: INTERNAL MEDICINE CLINIC | Age: 72
End: 2022-11-18
Payer: MEDICARE

## 2022-11-18 VITALS
SYSTOLIC BLOOD PRESSURE: 120 MMHG | BODY MASS INDEX: 34.12 KG/M2 | OXYGEN SATURATION: 95 % | WEIGHT: 180.6 LBS | DIASTOLIC BLOOD PRESSURE: 80 MMHG | HEART RATE: 84 BPM

## 2022-11-18 DIAGNOSIS — F41.9 ANXIETY: ICD-10-CM

## 2022-11-18 DIAGNOSIS — F02.818 LATE ONSET ALZHEIMER'S DEMENTIA WITH BEHAVIORAL DISTURBANCE (HCC): ICD-10-CM

## 2022-11-18 DIAGNOSIS — G30.1 LATE ONSET ALZHEIMER'S DEMENTIA WITH BEHAVIORAL DISTURBANCE (HCC): ICD-10-CM

## 2022-11-18 DIAGNOSIS — F33.1 MODERATE EPISODE OF RECURRENT MAJOR DEPRESSIVE DISORDER (HCC): Chronic | ICD-10-CM

## 2022-11-18 DIAGNOSIS — M81.0 OSTEOPOROSIS, POST-MENOPAUSAL: Primary | ICD-10-CM

## 2022-11-18 DIAGNOSIS — K59.03 DRUG INDUCED CONSTIPATION: ICD-10-CM

## 2022-11-18 DIAGNOSIS — I10 ESSENTIAL HYPERTENSION: ICD-10-CM

## 2022-11-18 DIAGNOSIS — R06.02 SHORTNESS OF BREATH: ICD-10-CM

## 2022-11-18 PROCEDURE — 99215 OFFICE O/P EST HI 40 MIN: CPT | Performed by: INTERNAL MEDICINE

## 2022-11-18 PROCEDURE — 3078F DIAST BP <80 MM HG: CPT | Performed by: INTERNAL MEDICINE

## 2022-11-18 PROCEDURE — 3074F SYST BP LT 130 MM HG: CPT | Performed by: INTERNAL MEDICINE

## 2022-11-18 PROCEDURE — 1123F ACP DISCUSS/DSCN MKR DOCD: CPT | Performed by: INTERNAL MEDICINE

## 2022-11-18 RX ORDER — ESCITALOPRAM OXALATE 20 MG/1
20 TABLET ORAL DAILY
Qty: 30 TABLET | Refills: 5 | Status: SHIPPED | OUTPATIENT
Start: 2022-11-18

## 2022-11-18 RX ORDER — HYDROXYZINE 50 MG/1
50 TABLET, FILM COATED ORAL 4 TIMES DAILY PRN
Qty: 120 TABLET | Refills: 1 | Status: SHIPPED | OUTPATIENT
Start: 2022-11-18

## 2022-11-18 RX ORDER — OLMESARTAN MEDOXOMIL 20 MG/1
20 TABLET ORAL DAILY
Qty: 30 TABLET | Refills: 5 | Status: SHIPPED | OUTPATIENT
Start: 2022-11-18

## 2022-11-18 NOTE — PATIENT INSTRUCTIONS
For high blood pressure:  Symptoms of high blood pressure: Headache, lightheadedness, dizziness, chest pain, shortness of breath, numbness and/or weakness on one side of your body or face, slurred speech, changes in your vision. If your blood pressure is over 180 on top or 110 on the bottom and you DO NOT have any of the above symptoms, lay down and rest and re-check your blood pressure again in 30 minutes. If you blood pressure DOES NOT come down, please call the office. If you have any of the above symptoms and your blood pressure is over 180 on the top or 110 on the bottom, call 9-1-1. If you have chest pain, shortness of breath, numbness/tingling, and/or weakness, slurred speech, or vision changes you will need to call 9-1-1 regardless of your blood pressure. Goal blood pressure is under 135/85, ideal is in the 120's/70's and below. I suspect that your cough is caused by lisinopril. It will take about 6 weeks for your cough to subside after you stop lisinopril. Continue lisinopril until change is made by pharmacy. Your lisinopril has been changed to olmestartan but will not be changed until you get your next shipment from [email or web address]. Try taking Linzess every day. If you get diarrhea, try taking Linzess every other day. If you do not get relief with Linzess 1 daily after taking it daily for 2 weeks, please let me know. Use saline lotion in your nose twice daily- morning and bedtime.

## 2022-11-18 NOTE — Clinical Note
Can you please reach out to Osiel Crandall- she has a lot of financial concerns, is lonely, is confused about options for insurance. She is not going to psychology appointments d/t cost concerns.   Thanks

## 2022-11-22 ENCOUNTER — NURSE ONLY (OUTPATIENT)
Dept: INTERNAL MEDICINE CLINIC | Age: 72
End: 2022-11-22
Payer: MEDICARE

## 2022-11-22 ENCOUNTER — TELEPHONE (OUTPATIENT)
Dept: INTERNAL MEDICINE CLINIC | Age: 72
End: 2022-11-22

## 2022-11-22 DIAGNOSIS — R30.0 DYSURIA: Primary | ICD-10-CM

## 2022-11-22 LAB
BILIRUBIN, POC: NORMAL
BLOOD URINE, POC: NORMAL
CLARITY, POC: NORMAL
COLOR, POC: NORMAL
GLUCOSE URINE, POC: NORMAL
KETONES, POC: NORMAL
LEUKOCYTE EST, POC: NORMAL
NITRITE, POC: NORMAL
PH, POC: 5.5
PROTEIN, POC: NORMAL
SPECIFIC GRAVITY, POC: 1.01
UROBILINOGEN, POC: 1

## 2022-11-22 PROCEDURE — 81002 URINALYSIS NONAUTO W/O SCOPE: CPT | Performed by: INTERNAL MEDICINE

## 2022-11-22 NOTE — TELEPHONE ENCOUNTER
Component Ref Range & Units 11/22/22 1657 11/4/22 1808 6/3/22 1654 3/2/22 1605 11/19/21 1546 9/23/21 1002 9/20/21 1648   Color, UA               Clarity, UA               Glucose, UA POC  100 mg   neg  neg  neg   neg    Bilirubin, UA  neg   neg  neg  neg   neg    Ketones, UA  neg   neg  neg  neg   neg    Spec Grav, UA  1.015   1.020  1.010  1.020   1.015    Blood, UA POC  neg   neg  trace-intact  neg   neg    pH, UA  5.5   6.0  6.0  6.5   7.0    Protein, UA POC  neg  Negative R  neg  neg  neg  Negative R  neg    Urobilinogen, UA  1.0   0.2  0.2  0.2   0.2    Leukocytes, UA  pos   neg  trace  neg   small    Nitrite, UA  neg   pos  neg  neg   neg    Color, UA

## 2022-11-23 ENCOUNTER — CARE COORDINATION (OUTPATIENT)
Dept: CARE COORDINATION | Age: 72
End: 2022-11-23

## 2022-11-23 NOTE — CARE COORDINATION
RN-CC attempted to outreach pt for cc follow up; financial concerns, cough, htn, psychiatry, SELF, needs & concerns  No answer; HIPPA compliant message left through  requesting call return. RN-CC will follow up at later date & time.

## 2022-11-24 LAB
ORGANISM: ABNORMAL
URINE CULTURE, ROUTINE: ABNORMAL
URINE CULTURE, ROUTINE: ABNORMAL

## 2022-11-26 ENCOUNTER — TELEPHONE (OUTPATIENT)
Dept: INTERNAL MEDICINE CLINIC | Age: 72
End: 2022-11-26

## 2022-11-26 RX ORDER — CIPROFLOXACIN 500 MG/1
500 TABLET, FILM COATED ORAL 2 TIMES DAILY
Qty: 6 TABLET | Refills: 0 | Status: SHIPPED | OUTPATIENT
Start: 2022-11-26 | End: 2022-11-29

## 2022-11-26 NOTE — TELEPHONE ENCOUNTER
On call page:    Feels weak. \"Can hardly get up\"    Saw on mychart she may have a UTI and wants treatment. She has trouble urinating. Malodorous urine. Recent culture showed:    Organism Aerococcus species Abnormal     Urine Culture, Routine >100,000 CFU/ml      Gram + Aerococcus, 100k+  Contaminate from skin? Discussed with patient. Not convincing for UTI cause for symptoms. Given urinary symptoms will send in Cipro for her but advised her she needs physical examination if she is weak and unable to ambulate. She declines ER for now but agreeable to go if worsening.     Rx: cipro x3d

## 2022-11-27 PROBLEM — K59.03 DRUG INDUCED CONSTIPATION: Status: ACTIVE | Noted: 2022-11-27

## 2022-11-27 NOTE — ASSESSMENT & PLAN NOTE
Increase Lexapro to 20 mg daily. Patient lacks good family support. Previously followed with Dr. Daly Leal but is not currently following with her. Consider referral to Dr. Raquel Riley but patient is leery of adding more co-pays due to financial burden. She has hydroxyzine which she takes as needed. Is not able to take Xanax because of pain medication.

## 2022-11-27 NOTE — ASSESSMENT & PLAN NOTE
Stable. Patient lacks good family support. Discussed finding senior housing that would be affordable and may be more beneficial to her as there may be more social interactions available. Medications are sent through [employer] but sets up patient with a daily metastatic. Patient denies difficulties with driving and remains independent of IADLs.

## 2022-11-27 NOTE — ASSESSMENT & PLAN NOTE
Recently in emergency department for elevated blood pressure. Reviewed with patient signs and symptoms of hypertensive urgency. Advised patient that if she is asymptomatic to rest and repeat blood pressure after 30 minutes of blood pressure is elevated. She is also complaining of dry cough. May be related to lisinopril. Discontinue lisinopril. Start olmesartan 20 mg daily. Advised patient may take up to 6 weeks after changing blood pressure medication for cough to resolve. As she gets her medications through [address], it will likely be several weeks before this change actually is made.

## 2022-11-27 NOTE — ASSESSMENT & PLAN NOTE
Worsening. Increase Lexapro to 20 mg daily. Previously on Cymbalta but discontinued this did not seem to be helpful. Referral offered to psychiatry but patient declines, citing financial strain.

## 2022-11-27 NOTE — ASSESSMENT & PLAN NOTE
Patient did see pulmonology. Work-up was unremarkable and was told shortness of breath was likely related to deconditioning. Has not had improvement with inhalers.

## 2022-11-28 ENCOUNTER — TELEPHONE (OUTPATIENT)
Dept: ORTHOPEDIC SURGERY | Age: 72
End: 2022-11-28

## 2022-11-28 NOTE — TELEPHONE ENCOUNTER
Submitted PA for hydrOXYzine HCl 50MG tablets  Via CMM Key: [id number] STATUS: APPROVED  Coverage Start Date:10/29/2022  Coverage End Date:11/28/2023    If this requires a response please respond to the pool ( P MHCX [facility]). Thank you please advise patient.

## 2022-11-30 RX ORDER — BUSPIRONE HYDROCHLORIDE 15 MG/1
TABLET ORAL
Qty: 90 TABLET | Refills: 10 | Status: SHIPPED | OUTPATIENT
Start: 2022-11-30

## 2022-12-06 DIAGNOSIS — K52.9 CHRONIC DIARRHEA: Primary | ICD-10-CM

## 2022-12-06 RX ORDER — DIPHENOXYLATE HYDROCHLORIDE AND ATROPINE SULFATE 2.5; .025 MG/1; MG/1
1 TABLET ORAL 4 TIMES DAILY PRN
Qty: 60 TABLET | Refills: 1 | Status: CANCELLED | OUTPATIENT
Start: 2022-12-06 | End: 2023-06-04

## 2022-12-06 NOTE — TELEPHONE ENCOUNTER
Pt calling to see if we can refill her medication Diphenoxylate-Atropine. Says it was originally prescribe with  with GI, can't get ahold of office to get refill. 2.5mg-0.025mg PRN for diarrhea.

## 2022-12-06 NOTE — TELEPHONE ENCOUNTER
How often does she take this medicine? Is she taking the constipation medication (linzess)? She has medicine to help with diarrhea and to help with constipation. If she is taking Linzess, she should stop taking it and may not need Lomotil.

## 2022-12-07 ENCOUNTER — TELEPHONE (OUTPATIENT)
Dept: INTERNAL MEDICINE CLINIC | Age: 72
End: 2022-12-07

## 2022-12-07 NOTE — TELEPHONE ENCOUNTER
She has so many complaints it really sounds like she needs to be seen for an appointment. Is there someone who can see her tomorrow?

## 2022-12-07 NOTE — TELEPHONE ENCOUNTER
Patient says sinuses are very dried up, cant breath through her nose, eyes are puffy, ears are ringing. Legs are painful, couldn't hardly stand up, they hurt from  her knees to her feet. Her feet feel swollen. Patient has taken zyrtec every day. Coricidin and saline solution has also been used. Patient states she does not think the CIPRO worked, made her have nightmares.

## 2022-12-12 ENCOUNTER — OFFICE VISIT (OUTPATIENT)
Dept: PULMONOLOGY | Age: 72
End: 2022-12-12
Payer: MEDICARE

## 2022-12-12 VITALS
SYSTOLIC BLOOD PRESSURE: 130 MMHG | BODY MASS INDEX: 34.17 KG/M2 | HEIGHT: 61 IN | WEIGHT: 181 LBS | HEART RATE: 77 BPM | DIASTOLIC BLOOD PRESSURE: 62 MMHG | OXYGEN SATURATION: 94 %

## 2022-12-12 DIAGNOSIS — G47.33 OSA (OBSTRUCTIVE SLEEP APNEA): Primary | ICD-10-CM

## 2022-12-12 DIAGNOSIS — F51.04 CHRONIC INSOMNIA: ICD-10-CM

## 2022-12-12 DIAGNOSIS — E66.09 CLASS 1 OBESITY DUE TO EXCESS CALORIES WITH SERIOUS COMORBIDITY AND BODY MASS INDEX (BMI) OF 34.0 TO 34.9 IN ADULT: ICD-10-CM

## 2022-12-12 DIAGNOSIS — I10 ESSENTIAL HYPERTENSION: Chronic | ICD-10-CM

## 2022-12-12 PROBLEM — E66.811 CLASS 1 OBESITY DUE TO EXCESS CALORIES WITH SERIOUS COMORBIDITY AND BODY MASS INDEX (BMI) OF 34.0 TO 34.9 IN ADULT: Status: ACTIVE | Noted: 2019-03-15

## 2022-12-12 PROCEDURE — 3074F SYST BP LT 130 MM HG: CPT | Performed by: NURSE PRACTITIONER

## 2022-12-12 PROCEDURE — 3078F DIAST BP <80 MM HG: CPT | Performed by: NURSE PRACTITIONER

## 2022-12-12 PROCEDURE — 99214 OFFICE O/P EST MOD 30 MIN: CPT | Performed by: NURSE PRACTITIONER

## 2022-12-12 PROCEDURE — 1123F ACP DISCUSS/DSCN MKR DOCD: CPT | Performed by: NURSE PRACTITIONER

## 2022-12-12 ASSESSMENT — SLEEP AND FATIGUE QUESTIONNAIRES
HOW LIKELY ARE YOU TO NOD OFF OR FALL ASLEEP WHILE SITTING INACTIVE IN A PUBLIC PLACE: 0
HOW LIKELY ARE YOU TO NOD OFF OR FALL ASLEEP WHILE WATCHING TV: 1
HOW LIKELY ARE YOU TO NOD OFF OR FALL ASLEEP IN A CAR, WHILE STOPPED FOR A FEW MINUTES IN TRAFFIC: 0
HOW LIKELY ARE YOU TO NOD OFF OR FALL ASLEEP WHILE SITTING AND TALKING TO SOMEONE: 0
ESS TOTAL SCORE: 4
HOW LIKELY ARE YOU TO NOD OFF OR FALL ASLEEP WHEN YOU ARE A PASSENGER IN A CAR FOR AN HOUR WITHOUT A BREAK: 0
HOW LIKELY ARE YOU TO NOD OFF OR FALL ASLEEP WHILE SITTING QUIETLY AFTER LUNCH WITHOUT ALCOHOL: 0
HOW LIKELY ARE YOU TO NOD OFF OR FALL ASLEEP WHILE LYING DOWN TO REST IN THE AFTERNOON WHEN CIRCUMSTANCES PERMIT: 2
HOW LIKELY ARE YOU TO NOD OFF OR FALL ASLEEP WHILE SITTING AND READING: 1

## 2022-12-12 NOTE — PROGRESS NOTES
Katherine Cota CNP [address]  [address] [facility]  [address], [address] [facility]- [phone]   [facility] [address] Diamante Joshi. [facility]. [address] [phone]     [address] [address] [phone]      Assessment/Plan:      1. BEBE (obstructive sleep apnea)  Assessment & Plan:  Chronic-with progression/exacerbation: Reviewed and analyzed results of physiologic download from patient's machine and reviewed with patient. Supplies and parts as needed for her machine. These are medically necessary. Limit caffeine use after 3pm. Based on the analyzed data will change following settings: P min increased to 10, P max increased to 20, Humidity increased to 5. Discussed if no improvement, may need to consider an in lab titration to fully assess control of BEBE. Discussed how to adjust the moisture settings on her machine. Encouraged her to contact her equipment company to obtain a heated tube to help with the dryness, and work with them on mask fit and comfort. Discussed considering a chinstrap or switching to a full facemask to help control oral leak and dryness. I would like to see her back in 3 months to monitor adjustments to her machine, AHI, and symptoms. Patient declines at this time due to limited finances. She would like to schedule a follow-up in 1 year. I encouraged her to contact the office with any questions or concerns. Discussed alternative treatments for BEBE including inspire. Discussed gold standard treatment for any severity of sleep apnea is PAP therapy. Patient is interested in inspire and would like to discuss with Dr. Emilee Lacey to see if she would be a candidate. Will place consult to Dr. Emilee Lacey for further evaluation. Orders:  -     Tierra Chong MD, Sleep Medicine [facility]  2.  Essential hypertension  Assessment & Plan:   Chronic- Stable. Discussed the importance of treating obstructive sleep apnea as part of the management of this disorder. Cont any meds per PCP and other physicians. 3. Chronic insomnia  Assessment & Plan:   Chronic- Stable. Discussed the importance of treating obstructive sleep apnea as part of the management of this disorder. She continues to take trazodone 200 mg each night to help her initiate maintain sleep. She reports her symptoms are controlled at the current dose and she denies side effects. Medication is currently being managed by her PCP. 4. Class 1 obesity due to excess calories with serious comorbidity and body mass index (BMI) of 34.0 to 34.9 in adult  Assessment & Plan:   Chronic-not stable:  Discussed importance of treating obstructive sleep apnea and getting sufficient sleep to assist with weight control. Encouraged her to work on weight loss through diet and exercise. Recommended DASH or Mediterranean diets. Reviewed, analyzed, and documented physiologic data from patient's PAP machine. This information was analyzed to assess complexity and medical decision making in regards to further testing and management. The primary encounter diagnosis was BEBE (obstructive sleep apnea). Diagnoses of Essential hypertension, Chronic insomnia, and Class 1 obesity due to excess calories with serious comorbidity and body mass index (BMI) of 34.0 to 34.9 in adult were also pertinent to this visit. The chronic medical conditions listed are directly related to the primary diagnosis listed above. The management of the primary diagnosis affects the secondary diagnosis and vice versa. Subjective:   Subjective   Patient ID: Vicky Day is a 67 y.o. female.     Chief Complaint   Patient presents with    Sleep Apnea       HPI:  Machine Modem/Download Info:  Compliance (hours/night): 7 hrs/night  % of nights >= 4 hrs: 94.4 %  Download AHI (/hour): 12.9 /HR  Average CPAP Pressure : 11.7 cmH2O      APAP - Settings  Pressure Min: 6 cmH2O  Pressure Max: 16 cmH2O                 Comfort Settings  Humidity Level (0-8): 3  Flex/EPR (0-3): 3 PAP Mask  Mask Type: Nasal mask     Parminder España presents today for follow-up for sleep apnea. She reports she is doing well with her machine. She has received her replacement machine for the  recall. Recently, she has been feeling short of breath when using her machine and will wake in the night feeling she is being smothered. She has had some trouble with nasal congestion and thinks this might be related. She is also having trouble with nasal and oral dryness. She has not tried adjusting the moisture settings on her machine. she denies headaches, nosebleeds, aerophagia, or drowsiness while driving. Her mask is comfortable and is fitting well. Due to these troubles with her machine she is interested in alternative treatments for sleep apnea, particularly in [facility]. Insomnia: She continues to take trazodone 200 mg each night to help her initiate maintain sleep. She reports her symptoms are controlled at the current dose and she denies side effects. Medication is currently being managed by her PCP. [address]    [place] Sleepiness Score: 4    Social History     Socioeconomic History    Marital status:       Spouse name: Not on file    Number of children: Not on file    Years of education: Not on file    Highest education level: Not on file   Occupational History    Occupation: retired   Tobacco Use    Smoking status: Never    Smokeless tobacco: Never   Vaping Use    Vaping Use: Never used   Substance and Sexual Activity    Alcohol use: Not Currently    Drug use: No    Sexual activity: Not on file   Other Topics Concern    Not on file   Social History Narrative    Not on file     Social Determinants of Health     Financial Resource Strain: Low Risk     Difficulty of Paying Living Expenses: Not very hard   Food Insecurity: No Food Insecurity    Worried About [facility] in the Last Year: Never true    Ran Out of Food in the Last Year: Never true   Transportation Needs: No Transportation Needs    Lack of Transportation (Medical): No    Lack of Transportation (Non-Medical): No   Physical Activity: Inactive    Days of Exercise per Week: 0 days    Minutes of Exercise per Session: 0 min   Stress: Stress Concern Present    Feeling of Stress : To some extent   Social Connections: Socially Isolated    Frequency of Communication with Friends and Family: Once a week    Frequency of Social Gatherings with Friends and Family: Once a week    Attends Buddhist Services: Never    Active Member of Clubs or Organizations: No    Attends Club or Organization Meetings: Never    Marital Status:    Intimate Partner Violence: Not on file   Housing Stability: Low Risk     Unable to Pay for Housing in the Last Year: No    Number of Places Lived in the Last Year: 1    Unstable Housing in the Last Year: No       Current Outpatient Medications   Medication Instructions    acetaminophen (TYLENOL) 1,000 mg, Oral, 3 TIMES DAILY PRN    aspirin 81 mg, DAILY    azelastine (ASTELIN) 0.1 % nasal spray Use 2 sprays in each nostril 2 times daily. Use fluticasone 15 minutes after the morning dose of astelin.     B-12 1,000 Units, SubLINGual, DAILY    baclofen (LIORESAL) 10 mg, Oral, 3 TIMES DAILY PRN    blood glucose monitor kit and supplies Check sugars qam and prn s/s of low blood sugars    blood glucose monitor strips Check sugars qam and prn s/s of low blood sugars    budesonide (ENTOCORT EC) 6 mg, Oral, EVERY MORNING    busPIRone (BUSPAR) 15 MG tablet TAKE 1 TABLET BY MOUTH THREE TIMES DAILY    Calcium Carbonate-Vit D-Min (CALCIUM 1200 PO) 1 capsule, 2 TIMES DAILY    carBAMazepine (TEGRETOL-XR) 100 mg, Oral, 2 TIMES DAILY    cetirizine (ZYRTEC) 10 mg, Oral, DAILY    D-MANNOSE PO Oral    diclofenac sodium (VOLTAREN) 1 % GEL No dose, route, or frequency recorded. diphenoxylate-atropine (LOMOTIL) 2.5-0.025 MG per tablet 1 tablet, Oral, PRN    escitalopram (LEXAPRO) 20 mg, Oral, DAILY    fluocinolone (DERMA-SMOOTHE) 0.01 % external oil APPLY TOPICALLY TWICE A WEEK    fluocinolone (DERMOTIC) 0.01 % OIL oil 1 drop, Otic, 2 TIMES DAILY    fluticasone (FLONASE) 50 MCG/ACT nasal spray SPRAY TWO SPRAYS IN EACH NOSTRIL ONCE DAILY DURING ALLERGY SEASON    gabapentin (NEURONTIN) 600 MG tablet TAKE 1 TABLET BY MOUTH THREE TIMES DAILY    galantamine (RAZADYNE ER) 16 MG extended release capsule TAKE 1 CAPSULE BY MOUTH EVERY DAY WITH BREAKFAST    hydroCHLOROthiazide (MICROZIDE) 12.5 MG capsule TAKE 1 CAPSULE BY MOUTH EVERY MORNING    hydrOXYzine HCl (ATARAX) 50 mg, Oral, 4 TIMES DAILY PRN    ketoconazole (NIZORAL) 2 % shampoo APPLY TOPICALLY DAILY AS NEEDED    Lancets MISC Check sugars qam and prn s/s of low blood sugars    linaCLOtide (LINZESS) 72 mcg, Oral, DAILY BEFORE BREAKFAST    memantine (NAMENDA) 10 MG tablet Take 1 tablet by mouth twice daily    nabumetone (RELAFEN) 750 mg, Oral, 2 TIMES DAILY    nystatin (MYCOSTATIN) 173925 UNIT/ML suspension TAKE 5 MLS BY MOUTH FOUR TIMES A DAY FOR 10 DAYS, RETAIN IN MOUTH AS LONG AS POSSIBLE    olmesartan (BENICAR) 20 mg, Oral, DAILY    oxybutynin (DITROPAN XL) 15 MG extended release tablet TAKE 1 TABLET BY MOUTH DAILY *REPLACES OXYBUTININ ER 10MG TABLET*    oxyMORphone (OPANA) 5 mg, Oral, 2 TIMES DAILY, TAKES 3 TIMES A DAY. TRYING TO CUT BACK.    polyethylene glycol (GLYCOLAX) 17 GM/SCOOP powder 1/2-1 capful in 8 oz water or prune juice qd prn constipation.     potassium chloride (KLOR-CON M) 20 MEQ extended release tablet TAKE 1 TABLET BY MOUTH TWICE DAILY    rosuvastatin (CRESTOR) 10 MG tablet TAKE 1 TABLET BY MOUTH NIGHTLY    traZODone (DESYREL) 100 MG tablet TAKE 2 TABLETS BY MOUTH EVERY NIGHT    vitamin C (ASCORBIC ACID) 500 mg, DAILY    vitamin D3 5,000 Units, Oral, DAILY zoledronic acid (RECLAST) 5 mg, IntraVENous, ONCE, IV, yearly           Vitals:  Weight BMI   Wt Readings from Last 3 Encounters:   12/12/22 181 lb (82.1 kg)   11/18/22 180 lb 9.6 oz (81.9 kg)   11/08/22 177 lb (80.3 kg)    Body mass index is 34.2 kg/m².      BP HR SaO2   BP Readings from Last 3 Encounters:   12/12/22 130/62   11/18/22 120/80   11/08/22 138/74    Pulse Readings from Last 3 Encounters:   12/12/22 77   11/18/22 84   11/08/22 94    SpO2 Readings from Last 3 Encounters:   12/12/22 94%   11/18/22 95%   11/08/22 93%        Electronically signed by WILLOW Tam on 12/12/2022 at 12:41 PM

## 2022-12-12 NOTE — LETTER
[facility] Sleep [facility]  [address] [facility]  [address] [address]  Phone: [phone]  Fax: [phone]    December 12, 2022       Patient: Vito Torres   MR Number: [id number]   YOB: 1950   Date of Visit: 12/12/2022       Charlotte Wong was seen for a follow up visit today. Here is my assessment and plan as well as an attached copy of her visit today:    Essential hypertension   Chronic- Stable. Discussed the importance of treating obstructive sleep apnea as part of the management of this disorder. Cont any meds per PCP and other physicians. Class 1 obesity due to excess calories with serious comorbidity and body mass index (BMI) of 34.0 to 34.9 in adult   Chronic-not stable:  Discussed importance of treating obstructive sleep apnea and getting sufficient sleep to assist with weight control. Encouraged her to work on weight loss through diet and exercise. Recommended DASH or Mediterranean diets. BEBE (obstructive sleep apnea)  Chronic-with progression/exacerbation: Reviewed and analyzed results of physiologic download from patient's machine and reviewed with patient. Supplies and parts as needed for her machine. These are medically necessary. Limit caffeine use after 3pm. Based on the analyzed data will change following settings: P min increased to 10, P max increased to 20, Humidity increased to 5. Discussed if no improvement, may need to consider an in lab titration to fully assess control of BEBE. Discussed how to adjust the moisture settings on her machine. Encouraged her to contact her equipment company to obtain a heated tube to help with the dryness, and work with them on mask fit and comfort. Discussed considering a chinstrap or switching to a full facemask to help control oral leak and dryness. I would like to see her back in 3 months to monitor adjustments to her machine, AHI, and symptoms. Patient declines at this time due to limited finances. She would like to schedule a follow-up in 1 year. I encouraged her to contact the office with any questions or concerns. Discussed alternative treatments for BEBE including inspire. Discussed gold standard treatment for any severity of sleep apnea is PAP therapy. Patient is interested in inspire and would like to discuss with Dr. Tabby Carbajal to see if she would be a candidate. Will place consult to Dr. Tabby Carbajal for further evaluation. Chronic insomnia   Chronic- Stable. Discussed the importance of treating obstructive sleep apnea as part of the management of this disorder. She continues to take trazodone 200 mg each night to help her initiate maintain sleep. She reports her symptoms are controlled at the current dose and she denies side effects. Medication is currently being managed by her PCP. If you have questions or concerns, please do not hesitate to call me. I look forward to following Danny Luu along with you.     Sincerely,    WILLOW Muro providers:  Meghan Church DO  [address] [address]  Via In [facility]

## 2022-12-12 NOTE — PROGRESS NOTES
Diagnosis: [x] BEBE (G47.33) [] CSA (G47.31) [] Apnea (G47.30)   Length of Need: [x] 15 Months [] 99 Months [] Other:   Machine (VICTOR HUGO!): [] Respironics Dream Station      Auto [] ResMed AirSense     Auto [] Other:     []  CPAP () [] Bilevel ()   Mode: [] Auto [] Spontaneous    Mode: [] Auto [] Spontaneous            Comfort Settings:      Humidifier: [] Heated ()        [x] Water chamber replacement ()/ 1 per 6 months        Mask:   [x] Nasal () /1 per 3 months [] Full Face () /1 per 3 months   [x] Patient choice -Size and fit mask [] Patient Choice - Size and fit mask   [] Dispense: [] Dispense:   [x] Headgear () / 1 per 3 months [] Headgear () / 1 per 3 months   [x] Replacement Nasal Cushion ()/2 per month [] Interface Replacement ()/1 per month   [] Replacement Nasal Pillows ()/2 per month         Tubing: [x] Heated ()/1 per 3 months    [] Standard ()/1 per 3 months [] Other:           Filters: [x] Non-disposable ()/1 per 6 months     [x] Ultra-Fine, Disposable ()/2 per month        Miscellaneous: [x] Chin Strap ()/ 1 per 6 months [] O2 bleed-in:        LPM   [] Oxymetry on CPAP/Bilevel []  Other:         Start Order Date: 12/12/22    MEDICAL JUSTIFICATION:  I, the undersigned, certify that the above prescribed supplies are medically necessary for this patients wellbeing. In my opinion, the supplies are both reasonable and necessary in reference to accepted standards of medicalpractice in treatment of this patients condition. Paula Boyle NP    NPI: [id number]       Order Signed Date: 12/12/22  [address] [facility]  Pulmonary, Sleep, and Critical Care    Pulmonary, Sleep, and Critical Care  [facility] [place] [address].  [address] [facility], [address] [facility] , [address] [facility]  Phone: [phone]    Fax: [address],[place] [facility]  1950  [address]  [phone] (home)   [phone] (mobile)      Insurance Info (confirm with patient if correct):  Payer/Plan Subscr  Sex Relation Sub.  Ins. ID Effective Group Num

## 2022-12-12 NOTE — ASSESSMENT & PLAN NOTE
Chronic- Stable. Discussed the importance of treating obstructive sleep apnea as part of the management of this disorder. She continues to take trazodone 200 mg each night to help her initiate maintain sleep. She reports her symptoms are controlled at the current dose and she denies side effects. Medication is currently being managed by her PCP.

## 2022-12-12 NOTE — ASSESSMENT & PLAN NOTE
Chronic-with progression/exacerbation: Reviewed and analyzed results of physiologic download from patient's machine and reviewed with patient. Supplies and parts as needed for her machine. These are medically necessary. Limit caffeine use after 3pm. Based on the analyzed data will change following settings: P min increased to 10, P max increased to 20, Humidity increased to 5. Discussed if no improvement, may need to consider an in lab titration to fully assess control of BEBE. Discussed how to adjust the moisture settings on her machine. Encouraged her to contact her equipment company to obtain a heated tube to help with the dryness, and work with them on mask fit and comfort. Discussed considering a chinstrap or switching to a full facemask to help control oral leak and dryness. I would like to see her back in 3 months to monitor adjustments to her machine, AHI, and symptoms. Patient declines at this time due to limited finances. She would like to schedule a follow-up in 1 year. I encouraged her to contact the office with any questions or concerns. Discussed alternative treatments for BEBE including inspire. Discussed gold standard treatment for any severity of sleep apnea is PAP therapy. Patient is interested in inspire and would like to discuss with Dr. Faraz Olson to see if she would be a candidate. Will place consult to Dr. Faraz Olson for further evaluation.

## 2022-12-13 ENCOUNTER — CARE COORDINATION (OUTPATIENT)
Dept: CARE COORDINATION | Age: 72
End: 2022-12-13

## 2022-12-13 RX ORDER — BUSPIRONE HYDROCHLORIDE 15 MG/1
TABLET ORAL
Qty: 90 TABLET | Refills: 5 | Status: SHIPPED | OUTPATIENT
Start: 2022-12-13

## 2022-12-13 NOTE — CARE COORDINATION
Ambulatory Care Coordination Note  12/13/2022    ACC: Adele Conde, RN        RN-CC outreached patient for cc follow up. Patient reports she is doing ok. She completed f/u with gynecologist last week. She was referred to urology, Dr. Najma Harvey, for difficulty initiating urine flow, doesn't feel like she is emptying her bladder. She prefers to wait until after the holidays to schedule an appointment. RN-CC confirmed she does have Dr. Iglesias Shows contact information to schedule an appointment when ready. Patient is interested in inspire - referral placed to Dr. Kiel Mccray. She isn't monitoring her blood pressure consistently. Denies s/sx of hyper/hypotension. Patient encouraged to monitor blood pressure daily and record findings. She still drives. Bathroom modifications will cost $2,000 - she cannot afford this. Grab bars installed in bathroom. COA installed a new deck which helps her get in and out of her home. SELF is helping with heating bill. Patient is working on weight loss - down 5 lbs. Information on low salt and Mediterranean diet sent through [address] Patient is requesting a refill of diphenoxylate atropine 2.5mg - 0.025 mg 1 tab after loose stools - up to 8 pills per day. Dr. Denita Rodriguez is the prescribing physician but patient states she is overdue for an appt and cannot afford the co-pay. Patient would like to know if her pcp will fill the medication. Patient aware RN-CC will route message to her pcp. Medication reviewed - patient is not taking Linzess. She states her insurance would not cover this medication. She is not interested in applying for a patient assistance program or a replacement. Offered patient enrollment in the Remote Patient Monitoring (RPM) program for in-home monitoring: NA. Patient concerned regarding oop expense.        Lab Results       None            Care Coordination Interventions    Referral from Primary Care Provider: Yes  Suggested Interventions and Community Resources  Fall Risk Prevention: In Process (Comment: 9/14/22 [facility])  Medication Assistance Program: Completed (Comment: [employer] 9/14/22 [facility])  Pharmacist: Declined (Comment: 9/14/22 [facility])  Senior Services: Completed (Comment: referral to [facility] 10/12/22)  Social Work: Completed (Comment: [facility] 10/12/22)  Zone Management Tools: In Process (Comment: 9/14/22 [facility])  Other Services or Interventions: PEOPLE WORKING COOPERATIVELY, [facility] 9/14/22 [facility]          Goals Addressed    None         Prior to Admission medications    Medication Sig Start Date End Date Taking?  Authorizing Provider   busPIRone (BUSPAR) 15 MG tablet TAKE 1 TABLET BY MOUTH 3 TIMES DAILY 12/13/22   Hadley Poster, DO   olmesartan [facility]) 20 MG tablet Take 1 tablet by mouth daily 11/18/22   Clifford Poster, DO   hydrOXYzine HCl (ATARAX) 50 MG tablet Take 1 tablet by mouth 4 times daily as needed for Anxiety 11/18/22   Hadley Poster, DO   escitalopram (LEXAPRO) 20 MG tablet Take 1 tablet by mouth daily 11/18/22   Hadley Poster, DO   linaCLOtide [facility]) 72 MCG CAPS capsule Take 1 capsule by mouth every morning (before breakfast) 11/18/22   Clifford Poster, DO   galantamine (RAZADYNE ER) 16 MG extended release capsule TAKE 1 CAPSULE BY MOUTH EVERY DAY WITH BREAKFAST 10/28/22   Jovanna Gonsalez MD   memantine (NAMENDA) 10 MG tablet Take 1 tablet by mouth twice daily 10/28/22   Jovanna Gonsalez MD   baclofen (LIORESAL) 10 MG tablet Take 1 tablet by mouth 3 times daily as needed (pain) 10/21/22   Clifford Poster, DO   cetirizine (ZYRTEC) 10 MG tablet Take 10 mg by mouth daily    Historical Provider, MD   nabumetone (RELAFEN) 750 MG tablet Take 1 tablet by mouth 2 times daily 9/9/22   Hadley Poster, DO   rosuvastatin (CRESTOR) 10 MG tablet TAKE 1 TABLET BY MOUTH NIGHTLY 8/30/22   Hadley Poster, DO   traZODone (DESYREL) 100 MG tablet TAKE 2 TABLETS BY MOUTH EVERY NIGHT 7/7/22   Clifford Poster, DO   potassium chloride (KLOR-CON M) 20 MEQ extended release tablet TAKE 1 TABLET BY MOUTH TWICE DAILY 6/10/22   Shonda Noyola DO   D-MANNOSE PO Take by mouth    Historical Provider, MD   oxybutynin (DITROPAN XL) 15 MG extended release tablet TAKE 1 TABLET BY MOUTH DAILY *REPLACES OXYBUTININ ER 10MG TABLET* 5/13/22   Shonda Noyola DO   hydroCHLOROthiazide (MICROZIDE) 12.5 MG capsule TAKE 1 CAPSULE BY MOUTH EVERY MORNING 4/15/22   Shonda Noyola DO   gabapentin (NEURONTIN) 600 MG tablet TAKE 1 TABLET BY MOUTH THREE TIMES DAILY  Patient taking differently: Take 600 mg by mouth 3 times daily. 4/15/22 10/19/22  Shonda Noyola DO   fluticasone (FLONASE) 50 MCG/ACT nasal spray SPRAY TWO SPRAYS IN [facility] NOSTRIL ONCE DAILY DURING ALLERGY SEASON 3/18/22   Dwayne Blount MD   blood glucose monitor strips Check sugars qam and prn s/s of low blood sugars 3/2/22   Shonda Noyola DO   Lancets MISC Check sugars qam and prn s/s of low blood sugars 3/2/22   Shonda Noyola DO   azelastine (ASTELIN) 0.1 % nasal spray Use 2 sprays in each nostril 2 times daily. Use fluticasone 15 minutes after the morning dose of astelin.  3/2/22   Shonda Noyola DO   blood glucose monitor kit and supplies Check sugars qam and prn s/s of low blood sugars 3/2/22   Shonda Noyola DO   diclofenac sodium (VOLTAREN) 1 % GEL  9/26/21   Historical Provider, MD   fluocinolone (DERMA-SMOOTHE) 0.01 % external oil APPLY TOPICALLY TWICE A WEEK 9/7/21   Shonda Noyola DO   nystatin (MYCOSTATIN) 867837 UNIT/ML suspension TAKE 5 MLS BY MOUTH FOUR TIMES A DAY FOR 10 DAYS, RETAIN IN MOUTH AS LONG AS POSSIBLE 7/26/21   Shonda Noyola DO   budesonide (ENTOCORT EC) 3 MG extended release capsule Take 6 mg by mouth every morning    Historical Provider, MD   ketoconazole (NIZORAL) 2 % shampoo APPLY TOPICALLY DAILY AS NEEDED 12/17/20   Shonda Noyola DO   Cyanocobalamin (B-12) 1000 MCG SUBL Place 1,000 Units under the tongue daily 10/2/20   Shonda Noyola, DO polyethylene glycol (GLYCOLAX) 17 GM/SCOOP powder 1/2-1 capful in 8 oz water or prune juice qd prn constipation. 7/22/20   Emeterio Day DO   carBAMazepine (TEGRETOL-XR) 100 MG extended release tablet Take 1 tablet by mouth 2 times daily 7/10/20   Vergia MD Glenn   acetaminophen (TYLENOL) 500 MG tablet Take 1,000 mg by mouth 3 times daily as needed for Pain    Historical Provider, MD   fluocinolone ([address]) 0.01 % OIL oil Place 1 drop in ear(s) 2 times daily 2/5/20   Emeterio Day DO   diphenoxylate-atropine (LOMOTIL) 2.5-0.025 MG per tablet Take 1 tablet by mouth as needed for Diarrhea. .    Historical Provider, MD   Cholecalciferol (VITAMIN D3) 5000 units CAPS Take 5,000 Units by mouth daily     Historical Provider, MD   oxyMORphone (OPANA) 5 MG tablet Take 5 mg by mouth 2 times daily. Indications: per DR Calixto Folds TAKES 3 TIMES A DAY. TRYING TO CUT BACK. Historical Provider, MD   zoledronic acid (RECLAST) 5 MG/100ML SOLN Infuse 5 mg intravenously once IV, yearly    Historical Provider, MD   aspirin 81 MG chewable tablet Take 81 mg by mouth daily. Historical Provider, MD   Calcium Carbonate-Vit D-Min (CALCIUM 1200 PO) Take 1 capsule by mouth 2 times daily. Historical Provider, MD   Ascorbic Acid (VITAMIN C) 500 MG tablet Take 500 mg by mouth daily.       Historical Provider, MD       Future Appointments   Date Time Provider Sung Kovacs   12/22/2022 10:30 AM [facility]   2/1/2023 10:20 AM DO GOLDY VieraFIELD IM [place] - DYD   4/19/2023 10:10 AM Izzy Gilbert MD FF NEURO Neurology -   12/18/2023 11:00 AM Rexie Bosworth, APRN FF SLEEP MED MMA    and   General Assessment

## 2022-12-15 ENCOUNTER — CARE COORDINATION (OUTPATIENT)
Dept: CARE COORDINATION | Age: 72
End: 2022-12-15

## 2022-12-15 NOTE — CARE COORDINATION
Ambulatory Care Coordination Note  12/15/2022    ACC: Adele Conde, RN      RN-CC outreached patient for cc follow up; IBS, living will, covid-19 booster, medicaid, needs & concerns. Patient states IBS sx are stable - denies issues with constipation but does have occasional bouts of diarrhea. Is not taking Linzess. RN-CC educated on IBS with diarrhea and benefits of taking a fiber or probiotic supplement, eating high-fiber foods, fruits and vegetables, pt vu.     Patient states she cannot afford office co-pays at this time to discuss IBS sx. She would like a refill of diphenoxylate atropine 2.5mg - 0.025 mg 1 tab after loose stools - up to 8 pills per day. Offered patient enrollment in the Remote Patient Monitoring (RPM) program for in-home monitoring: Patient declined. Lab Results       None            Care Coordination Interventions    Referral from Primary Care Provider: Yes  Suggested Interventions and Community Resources  Fall Risk Prevention: In Process (Comment: 9/14/22 [facility])  Medication Assistance Program: Completed (Comment: [employer] 9/14/22 [facility])  Pharmacist: Declined (Comment: 9/14/22 [facility])  Senior Services: Completed (Comment: referral to [facility] 10/12/22)  Social Work: Completed (Comment: [facility] 10/12/22)  Zone Management Tools: In Process (Comment: 9/14/22 [facility])  Other Services or Interventions: PEOPLE WORKING COOPERATIVELY, [facility] 9/14/22 [facility]          Goals Addressed    None         Prior to Admission medications    Medication Sig Start Date End Date Taking?  Authorizing Provider   busPIRone (BUSPAR) 15 MG tablet TAKE 1 TABLET BY MOUTH 3 TIMES DAILY 12/13/22   Edward Madden,    olmesartan [facility]) 20 MG tablet Take 1 tablet by mouth daily 11/18/22   Edward Madden,    hydrOXYzine HCl (ATARAX) 50 MG tablet Take 1 tablet by mouth 4 times daily as needed for Anxiety 11/18/22   Edward Madden DO   escitalopram (LEXAPRO) 20 MG tablet Take 1 tablet by mouth daily 11/18/22   Daphine Lime Tash Mata DO   linaCLOtide [facility]) 72 MCG CAPS capsule Take 1 capsule by mouth every morning (before breakfast)  Patient not taking: Reported on 12/13/2022 11/18/22   Maldonado Denny DO   galantamine (RAZADYNE ER) 16 MG extended release capsule TAKE 1 CAPSULE BY MOUTH EVERY DAY WITH BREAKFAST 10/28/22   Feli Portillo MD   memantine (NAMENDA) 10 MG tablet Take 1 tablet by mouth twice daily 10/28/22   Feli Portillo MD   baclofen (LIORESAL) 10 MG tablet Take 1 tablet by mouth 3 times daily as needed (pain) 10/21/22   Maldonado Denny DO   cetirizine (ZYRTEC) 10 MG tablet Take 10 mg by mouth daily    Historical Provider, MD   nabumetone (RELAFEN) 750 MG tablet Take 1 tablet by mouth 2 times daily 9/9/22   Maldonado Denny DO   rosuvastatin (CRESTOR) 10 MG tablet TAKE 1 TABLET BY MOUTH NIGHTLY 8/30/22   Maldonado Denny DO   traZODone (DESYREL) 100 MG tablet TAKE 2 TABLETS BY MOUTH EVERY NIGHT 7/7/22   Maldonado Denny DO   potassium chloride (KLOR-CON M) 20 MEQ extended release tablet TAKE 1 TABLET BY MOUTH TWICE DAILY 6/10/22   Maldonado Denny DO   D-MANNOSE PO Take by mouth    Historical Provider, MD   oxybutynin (DITROPAN XL) 15 MG extended release tablet TAKE 1 TABLET BY MOUTH DAILY *REPLACES OXYBUTININ ER 10MG TABLET* 5/13/22   Maldonado Denny DO   hydroCHLOROthiazide (MICROZIDE) 12.5 MG capsule TAKE 1 CAPSULE BY MOUTH EVERY MORNING 4/15/22   Maldonado Denny DO   gabapentin (NEURONTIN) 600 MG tablet TAKE 1 TABLET BY MOUTH THREE TIMES DAILY  Patient taking differently: Take 600 mg by mouth 3 times daily.  4/15/22 10/19/22  Maldonado Denny DO   fluticasone (FLONASE) 50 MCG/ACT nasal spray SPRAY TWO SPRAYS IN [facility] NOSTRIL ONCE DAILY DURING ALLERGY SEASON 3/18/22   Titi Braxton MD   blood glucose monitor strips Check sugars qam and prn s/s of low blood sugars 3/2/22   Maldonado Denny DO   Lancets MISC Check sugars qam and prn s/s of low blood sugars 3/2/22   Maldonado Denny DO azelastine (ASTELIN) 0.1 % nasal spray Use 2 sprays in each nostril 2 times daily. Use fluticasone 15 minutes after the morning dose of astelin. 3/2/22   [facility], DO   blood glucose monitor kit and supplies Check sugars qam and prn s/s of low blood sugars 3/2/22   [facility], DO   diclofenac sodium (VOLTAREN) 1 % GEL  9/26/21   Historical Provider, MD   fluocinolone (DERMA-SMOOTHE) 0.01 % external oil APPLY TOPICALLY TWICE A WEEK 9/7/21   [facility], DO   nystatin (MYCOSTATIN) 912283 UNIT/ML suspension TAKE 5 MLS BY MOUTH FOUR TIMES A DAY FOR 10 DAYS, RETAIN IN MOUTH AS LONG AS POSSIBLE 7/26/21   [facility], DO   budesonide (ENTOCORT EC) 3 MG extended release capsule Take 6 mg by mouth every morning    Historical Provider, MD   ketoconazole (NIZORAL) 2 % shampoo APPLY TOPICALLY DAILY AS NEEDED 12/17/20   [facility], DO   Cyanocobalamin (B-12) 1000 MCG SUBL Place 1,000 Units under the tongue daily 10/2/20   [facility], DO   polyethylene glycol (GLYCOLAX) 17 GM/SCOOP powder 1/2-1 capful in 8 oz water or prune juice qd prn constipation. 7/22/20   [facility], DO   carBAMazepine (TEGRETOL-XR) 100 MG extended release tablet Take 1 tablet by mouth 2 times daily 7/10/20   Alexia Madden MD   acetaminophen (TYLENOL) 500 MG tablet Take 1,000 mg by mouth 3 times daily as needed for Pain    Historical Provider, MD   fluocinolone ([address]) 0.01 % OIL oil Place 1 drop in ear(s) 2 times daily 2/5/20   [facility], DO   diphenoxylate-atropine (LOMOTIL) 2.5-0.025 MG per tablet Take 1 tablet by mouth as needed for Diarrhea. .    Historical Provider, MD   Cholecalciferol (VITAMIN D3) 5000 units CAPS Take 5,000 Units by mouth daily     Historical Provider, MD   oxyMORphone (OPANA) 5 MG tablet Take 5 mg by mouth 2 times daily. Indications: per DR Michelle Farias TAKES 3 TIMES A DAY. TRYING TO CUT BACK.     Historical Provider, MD   zoledronic acid (RECLAST) 5 MG/100ML SOLN Infuse 5 mg intravenously once IV, yearly    Historical Provider, MD   aspirin 81 MG chewable tablet Take 81 mg by mouth daily. Historical Provider, MD   Calcium Carbonate-Vit D-Min (CALCIUM 1200 PO) Take 1 capsule by mouth 2 times daily. Historical Provider, MD   Ascorbic Acid (VITAMIN C) 500 MG tablet Take 500 mg by mouth daily.       Historical Provider, MD       Future Appointments   Date Time Provider Sung Bethanie   12/22/2022 10:30 AM [facility] DEXA  1 [facility]   2/1/2023 10:20 AM DO GOLDY ToureFIELD IM [place] - DYD   4/19/2023 10:10 AM Fariha Talbot MD FF NEURO Neurology -   12/18/2023 11:00 AM WILLOW Mendoza FF SLEEP MED MMA    and   General Assessment

## 2022-12-20 ENCOUNTER — TELEPHONE (OUTPATIENT)
Dept: PULMONOLOGY | Age: 72
End: 2022-12-20

## 2022-12-20 ENCOUNTER — CARE COORDINATION (OUTPATIENT)
Dept: CARE COORDINATION | Age: 72
End: 2022-12-20

## 2022-12-20 RX ORDER — WHEAT DEXTRIN 3 G/3.8 G
4 POWDER (GRAM) ORAL DAILY
COMMUNITY

## 2022-12-20 NOTE — CARE COORDINATION
RN-CC outreached patient to completed medication review. Medications reviewed in depth; patient not taking Linzess, Miralax, Gabapentin, Budesonide. She is currently out of Lomotil. Pharmacies reviewed and updated - [facility] is her main pharmacy but she will occasionally use [facility] or [facility] in [facility]. Per recommendation of Dr. Parikh Sessions, pt was advised to continue to take Benefiber daily and Imodium prn for diarrhea. Patient prefers to use Lomotil to control her IBS sx - states she thinks she has tried Imodium in the past w no relief but is willing to try again.

## 2022-12-20 NOTE — TELEPHONE ENCOUNTER
BMI is 34    I called and left a message if she is interested for the inspire if she does not call back please schedule for an appointment

## 2022-12-20 NOTE — CARE COORDINATION
Ambulatory Care Coordination Note  12/20/2022    ACC: Adele Conde, RN    RN-CC outreached patient for cc follow up; medicaid, living will, IBS, covid-19 booster. IBS - patient denies issues with constipation, does rider diarrhea. Is not taking Linzess or miralax. Is requesting a refill on Lomotil. Takes benefiber daily. Education provided on IBS. Covid-19 booster - did receive at her local [facility]. She is now up to date on covid-19 vaccine. Living will - she updated her living will. She will provide a copy to [facility] next time she is in the office. Medicaid - she is not interested in applying at this time. She does have a savings and is concerned this would keep her from qualifying. I did offer a referral to [facility] but she declined. Htn - she is not checking her blood pressure at home. She does have home b/p monitor. Patient states she will set her monitor out as a reminder to check her blood pressure. PLAN:    F/u on refill of lomotil  F/u on htn  F/u on needs & concerns      Offered patient enrollment in the Remote Patient Monitoring (RPM) program for in-home monitoring: Patient declined. Lab Results       None            Care Coordination Interventions    Referral from Primary Care Provider: Yes  Suggested Interventions and Community Resources  Fall Risk Prevention: In Process (Comment: 9/14/22 [facility])  Medication Assistance Program: Completed (Comment: [employer] 9/14/22 [facility])  Pharmacist: Declined (Comment: 9/14/22 [facility])  Senior Services: Completed (Comment: referral to [facility] 10/12/22)  Social Work: Completed (Comment: [facility] 10/12/22)  Zone Management Tools: In Process (Comment: 9/14/22 [facility])  Other Services or Interventions: PEOPLE WORKING COOPERATIVELY, [facility] 9/14/22 [facility]          Goals Addressed    None         Prior to Admission medications    Medication Sig Start Date End Date Taking?  Authorizing Provider   busPIRone (BUSPAR) 15 MG tablet TAKE 1 TABLET BY MOUTH 3 TIMES DAILY 12/13/22 Shonda Noyola DO   olmesartan [facility]) 20 MG tablet Take 1 tablet by mouth daily 11/18/22   Shonda Noyola DO   hydrOXYzine HCl (ATARAX) 50 MG tablet Take 1 tablet by mouth 4 times daily as needed for Anxiety 11/18/22   Shonda Noyola DO   escitalopram (LEXAPRO) 20 MG tablet Take 1 tablet by mouth daily 11/18/22   Shonda Noyola DO   linaCLOtide [facility]) 72 MCG CAPS capsule Take 1 capsule by mouth every morning (before breakfast)  Patient not taking: No sig reported 11/18/22   Shonda Noyola DO   galantamine (RAZADYNE ER) 16 MG extended release capsule TAKE 1 CAPSULE BY MOUTH EVERY DAY WITH BREAKFAST 10/28/22   Consuelo Ledesma MD   memantine (NAMENDA) 10 MG tablet Take 1 tablet by mouth twice daily 10/28/22   Consuelo Ledsema MD   baclofen (LIORESAL) 10 MG tablet Take 1 tablet by mouth 3 times daily as needed (pain) 10/21/22   Shonda Noyola DO   cetirizine (ZYRTEC) 10 MG tablet Take 10 mg by mouth daily    Historical Provider, MD   nabumetone (RELAFEN) 750 MG tablet Take 1 tablet by mouth 2 times daily 9/9/22   Shonda Noyola DO   rosuvastatin (CRESTOR) 10 MG tablet TAKE 1 TABLET BY MOUTH NIGHTLY 8/30/22   Shonda Noyola DO   traZODone (DESYREL) 100 MG tablet TAKE 2 TABLETS BY MOUTH EVERY NIGHT 7/7/22   Shonda Noyola DO   potassium chloride (KLOR-CON M) 20 MEQ extended release tablet TAKE 1 TABLET BY MOUTH TWICE DAILY 6/10/22   Shonda Noyola DO   D-MANNOSE PO Take by mouth    Historical Provider, MD   oxybutynin (DITROPAN XL) 15 MG extended release tablet TAKE 1 TABLET BY MOUTH DAILY *REPLACES OXYBUTININ ER 10MG TABLET* 5/13/22   Shonda Noyola DO   hydroCHLOROthiazide (MICROZIDE) 12.5 MG capsule TAKE 1 CAPSULE BY MOUTH EVERY MORNING 4/15/22   Shonda Noyola DO   gabapentin (NEURONTIN) 600 MG tablet TAKE 1 TABLET BY MOUTH THREE TIMES DAILY  Patient taking differently: Take 600 mg by mouth 3 times daily.  4/15/22 10/19/22  Shonda Noyola DO   fluticasone (FLONASE) 50 MCG/ACT nasal spray SPRAY TWO SPRAYS IN EACH NOSTRIL ONCE DAILY DURING ALLERGY SEASON 3/18/22   Titi Braxton MD   blood glucose monitor strips Check sugars qam and prn s/s of low blood sugars 3/2/22   Maldonado Denny DO   Lancets MISC Check sugars qam and prn s/s of low blood sugars 3/2/22   Maldonado Denny DO   azelastine (ASTELIN) 0.1 % nasal spray Use 2 sprays in each nostril 2 times daily. Use fluticasone 15 minutes after the morning dose of astelin. 3/2/22   Maldonado Denny DO   blood glucose monitor kit and supplies Check sugars qam and prn s/s of low blood sugars 3/2/22   Maldonado Denny DO   diclofenac sodium (VOLTAREN) 1 % GEL  9/26/21   Historical Provider, MD   fluocinolone (DERMA-SMOOTHE) 0.01 % external oil APPLY TOPICALLY TWICE A WEEK 9/7/21   Maldonado Denny DO   nystatin (MYCOSTATIN) 184806 UNIT/ML suspension TAKE 5 MLS BY MOUTH FOUR TIMES A DAY FOR 10 DAYS, RETAIN IN MOUTH AS LONG AS POSSIBLE 7/26/21   Maldonado Denny DO   budesonide (ENTOCORT EC) 3 MG extended release capsule Take 6 mg by mouth every morning    Historical Provider, MD   ketoconazole (NIZORAL) 2 % shampoo APPLY TOPICALLY DAILY AS NEEDED 12/17/20   Maldonado Denny DO   Cyanocobalamin (B-12) 1000 MCG SUBL Place 1,000 Units under the tongue daily 10/2/20   Maldonado Denny DO   polyethylene glycol (GLYCOLAX) 17 GM/SCOOP powder 1/2-1 capful in 8 oz water or prune juice qd prn constipation.   Patient not taking: Reported on 12/20/2022 7/22/20   Maldonado Denny DO   carBAMazepine (TEGRETOL-XR) 100 MG extended release tablet Take 1 tablet by mouth 2 times daily 7/10/20   Marizol Mckenna MD   acetaminophen (TYLENOL) 500 MG tablet Take 1,000 mg by mouth 3 times daily as needed for Pain    Historical Provider, MD   fluocinolone ([address] [place] [address]) 0.01 % OIL oil Place 1 drop in ear(s) 2 times daily 2/5/20   Maldonado Denny DO   diphenoxylate-atropine (LOMOTIL) 2.5-0.025 MG per tablet Take 1 tablet by mouth as needed for Diarrhea. .    Historical Provider, MD   Cholecalciferol (VITAMIN D3) 5000 units CAPS Take 5,000 Units by mouth daily     Historical Provider, MD   oxyMORphone (OPANA) 5 MG tablet Take 5 mg by mouth 2 times daily. Indications: per DR Bernie Li TAKES 3 TIMES A DAY. TRYING TO CUT BACK. Historical Provider, MD   zoledronic acid (RECLAST) 5 MG/100ML SOLN Infuse 5 mg intravenously once IV, yearly    Historical Provider, MD   aspirin 81 MG chewable tablet Take 81 mg by mouth daily. Historical Provider, MD   Calcium Carbonate-Vit D-Min (CALCIUM 1200 PO) Take 1 capsule by mouth 2 times daily. Historical Provider, MD   Ascorbic Acid (VITAMIN C) 500 MG tablet Take 500 mg by mouth daily.       Historical Provider, MD       Future Appointments   Date Time Provider Sung Kovacs   12/22/2022 10:30 AM [facility] DEXA RM 1 [facility]   2/1/2023 10:20 AM Edward Montgomery DO [facility] [place] - DYD   4/19/2023 10:10 AM Charissa Vasquez MD FF NEURO Neurology -   12/18/2023 11:00 AM WILLOW Pitts FF SLEEP MED MMA    and   General Assessment

## 2022-12-27 RX ORDER — HYDROXYZINE 50 MG/1
50 TABLET, FILM COATED ORAL 4 TIMES DAILY PRN
Qty: 120 TABLET | Refills: 1 | Status: SHIPPED | OUTPATIENT
Start: 2022-12-27

## 2023-01-03 ENCOUNTER — CARE COORDINATION (OUTPATIENT)
Dept: CARE COORDINATION | Age: 73
End: 2023-01-03

## 2023-01-03 NOTE — CARE COORDINATION
Ambulatory Care Coordination Note  1/3/2023    ACC: Emeka Soriano RN    RN-CC outreached patient for cc follow up; IBS, htn, chf, psychiatry, urology, social support, needs & concerns. Patient reports she is doing well. Had 1 episode of diarrhea over the holidays. Patient contacted GI regarding refill on Lomotil - patient's request approved. She has an appointment with GI next month. She is taking benefiber daily, is not taking Imodium. Takes lomotil prn. Education provided on IBS. Psychiatry - patient is not interested in seeing a psychiatrist at this time. Urology - patient has not scheduled an appointment with Dr. Saurabh Bates. I did offer to her assist her with scheduling but she declined at this time. Social isolation - patient declines resources on social support. She states her daughter is more supportive and pleasant to be around - has been calling more to check on her and bringing her things. Htn - patient checking blood pressure sporadically. She reports her blood pressure used to run 170s/90s but now is averaging 130s/60s. She is wondering if this is too low. RN-CC informed patient that her blood pressure is in an acceptable range. Patient reports fatigue x3 weeks and is wondering if it is related to her lower blood pressure readings, denies lightheadedness or dizziness or sob. Resources on blood pressure sent to patients tanner Berger. She does have an upcoming appointment with her pcp on 2/1/23. PLAN:    F/u on fatigue  F/u on blood pressure readings and resources  Appointment with Dr. Saurabh Bates? Prep for discharge    Offered patient enrollment in the Remote Patient Monitoring (RPM) program for in-home monitoring: Patient declined. Lab Results       None            Care Coordination Interventions    Referral from Primary Care Provider: Yes  Suggested Interventions and Community Resources  Fall Risk Prevention:  In Process (Comment: 9/14/22 [facility])  Medication Assistance Program: Completed (Comment: [employer] [facility] 9/14/22 [facility])  Pharmacist: Declined (Comment: 9/14/22 [facility])  Senior Services: Completed (Comment: referral to [facility] 10/12/22)  Social Work: Completed (Comment: [facility] 10/12/22)  Zone Management Tools: In Process (Comment: 9/14/22 [facility])  Other Services or Interventions: PEOPLE WORKING COOPERATIVELY, [facility] 9/14/22 [facility]          Goals Addressed    None         Prior to Admission medications    Medication Sig Start Date End Date Taking? Authorizing Provider   hydrOXYzine HCl (ATARAX) 50 MG tablet TAKE 1 TABLET BY MOUTH 4 TIMES DAILY AS NEEDED FOR ANXIETY.  12/27/22   Anca Edge DO   Wheat Dextrin (BENEFIBER) POWD Take 4 g by mouth daily    Historical Provider, MD   busPIRone (BUSPAR) 15 MG tablet TAKE 1 TABLET BY MOUTH 3 TIMES DAILY 12/13/22   Anca Edge DO   olmesartan [facility]) 20 MG tablet Take 1 tablet by mouth daily 11/18/22   Anca Edge DO   escitalopram (LEXAPRO) 20 MG tablet Take 1 tablet by mouth daily 11/18/22   Anca Edge DO   galantamine (RAZADYNE ER) 16 MG extended release capsule TAKE 1 CAPSULE BY MOUTH EVERY DAY WITH BREAKFAST 10/28/22   Charmayne Pebbles, MD   memantine (NAMENDA) 10 MG tablet Take 1 tablet by mouth twice daily 10/28/22   Charmayne Pebbles, MD   baclofen (LIORESAL) 10 MG tablet Take 1 tablet by mouth 3 times daily as needed (pain) 10/21/22   Anca Edge DO   cetirizine (ZYRTEC) 10 MG tablet Take 10 mg by mouth daily    Historical Provider, MD   nabumetone (RELAFEN) 750 MG tablet Take 1 tablet by mouth 2 times daily 9/9/22   Anca Edge DO   rosuvastatin (CRESTOR) 10 MG tablet TAKE 1 TABLET BY MOUTH NIGHTLY 8/30/22   Anca Edge DO   traZODone (DESYREL) 100 MG tablet TAKE 2 TABLETS BY MOUTH EVERY NIGHT 7/7/22   Anca Edge DO   potassium chloride (KLOR-CON M) 20 MEQ extended release tablet TAKE 1 TABLET BY MOUTH TWICE DAILY 6/10/22   Anca Edge DO   D-MANNOSE PO Take by mouth    Historical Provider, MD   oxybutynin (DITROPAN XL) 15 MG extended release tablet TAKE 1 TABLET BY MOUTH DAILY *REPLACES OXYBUTININ ER 10MG TABLET* 5/13/22   Codie Bridges DO   hydroCHLOROthiazide (MICROZIDE) 12.5 MG capsule TAKE 1 CAPSULE BY MOUTH EVERY MORNING 4/15/22   Codie Bridges DO   gabapentin (NEURONTIN) 600 MG tablet TAKE 1 TABLET BY MOUTH THREE TIMES DAILY  Patient taking differently: Take 600 mg by mouth 3 times daily. 4/15/22 10/19/22  Codie Bridges DO   fluticasone (FLONASE) 50 MCG/ACT nasal spray SPRAY TWO SPRAYS IN [facility] NOSTRIL ONCE DAILY DURING ALLERGY SEASON 3/18/22   Kiera Luis MD   blood glucose monitor strips Check sugars qam and prn s/s of low blood sugars 3/2/22   Codie Bridges DO   Lancets MISC Check sugars qam and prn s/s of low blood sugars 3/2/22   Codie Bridges DO   azelastine (ASTELIN) 0.1 % nasal spray Use 2 sprays in each nostril 2 times daily. Use fluticasone 15 minutes after the morning dose of astelin.  3/2/22   Codie Bridges DO   blood glucose monitor kit and supplies Check sugars qam and prn s/s of low blood sugars 3/2/22   Codie Bridges DO   diclofenac sodium (VOLTAREN) 1 % GEL  9/26/21   Historical Provider, MD   fluocinolone (DERMA-SMOOTHE) 0.01 % external oil APPLY TOPICALLY TWICE A WEEK 9/7/21   Codie Bridges DO   budesonide (ENTOCORT EC) 3 MG extended release capsule Take 6 mg by mouth every morning    Historical Provider, MD   ketoconazole (NIZORAL) 2 % shampoo APPLY TOPICALLY DAILY AS NEEDED 12/17/20   Codie Bridges DO   Cyanocobalamin (B-12) 1000 MCG SUBL Place 1,000 Units under the tongue daily 10/2/20   Codie Bridges DO   carBAMazepine (TEGRETOL-XR) 100 MG extended release tablet Take 1 tablet by mouth 2 times daily 7/10/20   Ai Alfaro MD   acetaminophen (TYLENOL) 500 MG tablet Take 1,000 mg by mouth 3 times daily as needed for Pain    Historical Provider, MD   fluocinolone (DERMOTIC) 0.01 % OIL oil Place 1 drop in ear(s) 2 times daily 2/5/20   Jenny Concepcion DO   diphenoxylate-atropine (LOMOTIL) 2.5-0.025 MG per tablet Take 1 tablet by mouth as needed for Diarrhea. .  Patient not taking: Reported on 12/20/2022    Historical Provider, MD   Cholecalciferol (VITAMIN D3) 5000 units CAPS Take 5,000 Units by mouth daily     Historical Provider, MD   oxyMORphone (OPANA) 5 MG tablet Take 5 mg by mouth 2 times daily. Indications: per DR Jason Seo TAKES 3 TIMES A DAY. TRYING TO CUT BACK. Historical Provider, MD   zoledronic acid (RECLAST) 5 MG/100ML SOLN Infuse 5 mg intravenously once IV, yearly    Historical Provider, MD   aspirin 81 MG chewable tablet Take 81 mg by mouth daily. Historical Provider, MD   Calcium Carbonate-Vit D-Min (CALCIUM 1200 PO) Take 1 capsule by mouth 2 times daily. Historical Provider, MD   Ascorbic Acid (VITAMIN C) 500 MG tablet Take 500 mg by mouth daily.       Historical Provider, MD       Future Appointments   Date Time Provider Sung Kovacs   1/11/2023 10:30 AM [facility] DEXA  1 [facility]   2/1/2023 10:20 AM DO GINA Sims IM [place] - DYD   4/19/2023 10:10 AM Nabeel Prince MD FF NEURO Neurology -   12/18/2023 11:00 AM WILOLW Matamoros FF SLEEP MED MMA   ,   Congestive Heart Failure Assessment           Symptoms:          , and   General Assessment

## 2023-01-04 ENCOUNTER — TELEPHONE (OUTPATIENT)
Dept: PULMONOLOGY | Age: 73
End: 2023-01-04

## 2023-01-09 ENCOUNTER — OFFICE VISIT (OUTPATIENT)
Dept: INTERNAL MEDICINE CLINIC | Age: 73
End: 2023-01-09
Payer: MEDICARE

## 2023-01-09 VITALS
SYSTOLIC BLOOD PRESSURE: 130 MMHG | DIASTOLIC BLOOD PRESSURE: 74 MMHG | HEIGHT: 61 IN | HEART RATE: 68 BPM | BODY MASS INDEX: 34.14 KG/M2 | WEIGHT: 180.8 LBS | OXYGEN SATURATION: 96 %

## 2023-01-09 DIAGNOSIS — N30.00 ACUTE CYSTITIS WITHOUT HEMATURIA: Primary | ICD-10-CM

## 2023-01-09 DIAGNOSIS — R35.0 FREQUENCY OF URINATION: ICD-10-CM

## 2023-01-09 DIAGNOSIS — I10 ESSENTIAL HYPERTENSION: Chronic | ICD-10-CM

## 2023-01-09 LAB
BILIRUBIN, POC: NORMAL
BLOOD URINE, POC: NORMAL
CLARITY, POC: NORMAL
COLOR, POC: NORMAL
GLUCOSE URINE, POC: NORMAL
KETONES, POC: NORMAL
LEUKOCYTE EST, POC: NORMAL
NITRITE, POC: POSITIVE
PH, POC: 5
PROTEIN, POC: NORMAL
SPECIFIC GRAVITY, POC: 1.01
UROBILINOGEN, POC: NORMAL

## 2023-01-09 PROCEDURE — 1123F ACP DISCUSS/DSCN MKR DOCD: CPT | Performed by: INTERNAL MEDICINE

## 2023-01-09 PROCEDURE — 3075F SYST BP GE 130 - 139MM HG: CPT | Performed by: INTERNAL MEDICINE

## 2023-01-09 PROCEDURE — 99214 OFFICE O/P EST MOD 30 MIN: CPT | Performed by: INTERNAL MEDICINE

## 2023-01-09 PROCEDURE — 81002 URINALYSIS NONAUTO W/O SCOPE: CPT | Performed by: INTERNAL MEDICINE

## 2023-01-09 PROCEDURE — 3078F DIAST BP <80 MM HG: CPT | Performed by: INTERNAL MEDICINE

## 2023-01-09 RX ORDER — NITROFURANTOIN 25; 75 MG/1; MG/1
100 CAPSULE ORAL 2 TIMES DAILY
Qty: 5 CAPSULE | Refills: 0 | Status: SHIPPED | OUTPATIENT
Start: 2023-01-09 | End: 2023-01-14

## 2023-01-09 ASSESSMENT — ENCOUNTER SYMPTOMS
VOMITING: 0
ABDOMINAL PAIN: 0
SORE THROAT: 0
COUGH: 0
DIARRHEA: 0
RHINORRHEA: 0

## 2023-01-09 NOTE — PROGRESS NOTES
Patient is probably  Alan Fletcher (:  1950) is a 67 y.o. female,Established patient, here for evaluation of the following chief complaint(s):  Urinary Tract Infection and Tinnitus (R ear)         ASSESSMENT/PLAN:  1. Acute cystitis without hematuria  -     nitrofurantoin, macrocrystal-monohydrate, (MACROBID) 100 MG capsule; Take 1 capsule by mouth 2 times daily for 5 days, Disp-5 capsule, R-0Normal  2. Frequency of urination  -     POCT Urinalysis no Micro  3. Essential hypertension  Reviewed with patient her findings her urinalysis is very consistent with a urinary tract infection at this point we will get a start the patient on oral antibiotics and her urine to be cultured to decide if any adjustment is needed patient is advised to increase her fluid intake and continue the Ajovy    The patient also have some pressure in her right ear she seems to be having a serous otitis media she is advised to use the cortisone no spray as well as an antihistamine    Patient has been fatigued and tired after she was switched to a new antihypertensive according to her I think there is more than that to it , he has not been drinking enough fluids which is making her on losartan all that effective so we will defer modification of her medication patient is advised to drink more fluids on regular basis should she continue to feeds that weight in the potential for relative hypotension should be taken to account and her dose should be adjusted downward to allow her brain to adjust to the new blood pressure  Return if symptoms worsen or fail to improve, for As scheduled.          Subjective   SUBJECTIVE/OBJECTIVE:    Lab Review   Lab Results   Component Value Date/Time     2022 04:33 PM     2022 12:49 PM     2022 08:05 AM    K 3.9 2022 04:33 PM    K 3.9 2022 12:49 PM    K 4.7 2022 08:05 AM    K 4.4 2022 12:20 PM    CO2 27 2022 04:33 PM    CO2 25 2022 12:49 PM    CO2 26 05/18/2022 08:05 AM    BUN 8 11/04/2022 04:33 PM    BUN 10 09/09/2022 12:49 PM    BUN 16 05/18/2022 08:05 AM    CREATININE 0.5 11/04/2022 04:33 PM    CREATININE 0.6 09/09/2022 12:49 PM    CREATININE 0.6 05/18/2022 08:05 AM    GLUCOSE 95 11/04/2022 04:33 PM    GLUCOSE 80 09/09/2022 12:49 PM    GLUCOSE 101 05/18/2022 08:05 AM    CALCIUM 9.4 11/04/2022 04:33 PM    CALCIUM 9.2 09/09/2022 12:49 PM    CALCIUM 9.5 05/18/2022 08:05 AM     Lab Results   Component Value Date/Time    WBC 7.2 11/04/2022 04:33 PM    WBC 5.4 09/09/2022 12:49 PM    WBC 6.0 03/02/2022 12:20 PM    HGB 12.9 11/04/2022 04:33 PM    HGB 12.5 09/09/2022 12:49 PM    HGB 14.0 03/02/2022 12:20 PM    HCT 37.8 11/04/2022 04:33 PM    HCT 36.6 09/09/2022 12:49 PM    HCT 40.8 03/02/2022 12:20 PM    MCV 94.7 11/04/2022 04:33 PM    MCV 95.5 09/09/2022 12:49 PM    MCV 94.0 03/02/2022 12:20 PM     11/04/2022 04:33 PM     09/09/2022 12:49 PM     03/02/2022 12:20 PM     Lab Results   Component Value Date/Time    CHOL 142 09/09/2022 12:49 PM    CHOL 142 03/02/2022 12:20 PM    CHOL 134 09/20/2021 12:15 PM    TRIG 91 09/09/2022 12:49 PM    TRIG 178 03/02/2022 12:20 PM    TRIG 121 09/20/2021 12:15 PM    HDL 78 09/09/2022 12:49 PM    HDL 65 03/02/2022 12:20 PM    HDL 68 09/20/2021 12:15 PM       Vitals 1/9/2023 12/12/2022 27/27/6635   SYSTOLIC 682 638 821   DIASTOLIC 74 62 80   Site - Left Upper Arm -   Position - Sitting -   Cuff Size - Large Adult -   Pulse 68 77 84   Temp - - -   Resp - - -   SpO2 96 94 95   Weight 180 lb 12.8 oz 181 lb 180 lb 9.6 oz   Height 5' 1\" 5' 1\" -   Body mass index 34.16 kg/m2 34.2 kg/m2 -   Pain Level - - -   Some recent data might be hidden       Urinary Tract Infection  This is a new problem. The current episode started in the past 7 days. The problem is unchanged. Otalgia   There is pain in the right ear. This is a new problem. The current episode started in the past 7 days. There has been no fever. Pertinent negatives include no abdominal pain, coughing, diarrhea, ear discharge, headaches, hearing loss, neck pain, rash, rhinorrhea, sore throat or vomiting. Review of Systems   HENT:  Positive for ear pain. Negative for ear discharge, hearing loss, rhinorrhea and sore throat. Respiratory:  Negative for cough. Gastrointestinal:  Negative for abdominal pain, diarrhea and vomiting. Musculoskeletal:  Negative for neck pain. Skin:  Negative for rash. Neurological:  Negative for headaches. Objective   Physical Exam  Vitals and nursing note reviewed. Constitutional:       General: She is not in acute distress. Appearance: Normal appearance. HENT:      Head: Normocephalic and atraumatic. Right Ear: Tympanic membrane normal.      Left Ear: Tympanic membrane normal.      Nose: Nose normal.   Eyes:      Extraocular Movements: Extraocular movements intact. Conjunctiva/sclera: Conjunctivae normal.      Pupils: Pupils are equal, round, and reactive to light. Neck:      Vascular: No carotid bruit. Cardiovascular:      Rate and Rhythm: Normal rate and regular rhythm. Pulses: Normal pulses. Heart sounds: No murmur heard. Pulmonary:      Effort: Pulmonary effort is normal. No respiratory distress. Breath sounds: Normal breath sounds. Abdominal:      General: Abdomen is flat. Bowel sounds are normal. There is no distension. Palpations: Abdomen is soft. Tenderness: There is no abdominal tenderness. Musculoskeletal:         General: No swelling, tenderness or deformity. Cervical back: Normal range of motion and neck supple. No rigidity or tenderness. Right lower leg: No edema. Left lower leg: No edema. Lymphadenopathy:      Cervical: No cervical adenopathy. Skin:     Coloration: Skin is not jaundiced. Findings: No bruising, erythema or lesion. Neurological:      General: No focal deficit present.       Mental Status: She is alert and oriented to person, place, and time. Cranial Nerves: No cranial nerve deficit. Motor: No weakness. Gait: Gait normal.          This dictation was generated by voice recognition computer software. Although all attempts are made to edit the dictation for accuracy, there may be errors in the transcription that are not intended. An electronic signature was used to authenticate this note.     --Aranza Starks MD

## 2023-01-10 ENCOUNTER — CARE COORDINATION (OUTPATIENT)
Dept: CARE COORDINATION | Age: 73
End: 2023-01-10

## 2023-01-10 NOTE — CARE COORDINATION
Ambulatory Care Coordination Note  1/10/2023    ACC: Adele Conde, RN  RN-CC outreached patient for cc follow up; UTI, blood pressure, IBS, needs & concerns. Patient evaluated in office at [facility] on 1/9/23 for weakness & fatigue. Dx and tx for UTI - prescribed Macrobid; confirmed patient is taking as directed. Patient reports back pain and weakness slightly improved today. Fluid intake increased, drinking a lot of water. Urinating more today; denies dysuria. RN-CC educated on UTI prevention; stay hydrated, empty bladder regularly, wipe from front to back. Patient reports stress incontinence - advised on amy care - keep skin clean and dry, change incontinent pads or briefs frequently. Patient has appointment with urology next week. Patient received refill of Lomotil. Has an appointment with GI at the end of the month    Patient continues to monitor blood pressure daily. Blood pressure averaging 130-140/60s. Per OV note dated 1/9/23: Patient has been fatigued and tired after she was switched to a new antihypertensive according to her I think there is more than that to it , he has not been drinking enough fluids which is making her on losartan all that effective so we will defer modification of her medication patient is advised to drink more fluids on regular basis should she continue to feeds that weight in the potential for relative hypotension should be taken to account and her dose should be adjusted downward to allow her brain to adjust to the new blood pressure. S/sx of hyper/hypotension have been discussed. Resources sent through [address] Patient will follow up with pcp if sx worsen. PLAN:    F/u on blood pressure  F/u on urinary sx - appt with urology  F/u on appt with GI    Offered patient enrollment in the Remote Patient Monitoring (RPM) program for in-home monitoring: Patient declined.     Lab Results       None            Care Coordination Interventions    Referral from Primary Care Provider: Yes  Suggested Interventions and Community Resources  Fall Risk Prevention: In Process (Comment: 9/14/22 [facility])  Medication Assistance Program: Completed (Comment: [facility] 9/14/22 [facility])  Pharmacist: Declined (Comment: 9/14/22 [facility])  Senior Services: Completed (Comment: referral to [facility] 10/12/22)  Social Work: Completed (Comment: [facility] 10/12/22)  Zone Management Tools: In Process (Comment: 9/14/22 [facility])  Other Services or Interventions: PEOPLE WORKING COOPERATIVELY, [facility] 9/14/22 [facility]          Goals Addressed    None         Prior to Admission medications    Medication Sig Start Date End Date Taking? Authorizing Provider   nitrofurantoin, macrocrystal-monohydrate, (MACROBID) 100 MG capsule Take 1 capsule by mouth 2 times daily for 5 days 1/9/23 1/14/23  Leydi Pisano MD   hydrOXYzine HCl (ATARAX) 50 MG tablet TAKE 1 TABLET BY MOUTH 4 TIMES DAILY AS NEEDED FOR ANXIETY.  12/27/22   Roxanna Dotson DO   Wheat Dextrin (BENEFIBER) POWD Take 4 g by mouth daily    Historical Provider, MD   busPIRone (BUSPAR) 15 MG tablet TAKE 1 TABLET BY MOUTH 3 TIMES DAILY 12/13/22   Roxanna Dotson DO   olmesartan [facility]) 20 MG tablet Take 1 tablet by mouth daily 11/18/22   Roxanna Dotson DO   escitalopram (LEXAPRO) 20 MG tablet Take 1 tablet by mouth daily 11/18/22   Roxanna Dotson DO   galantamine (RAZADYNE ER) 16 MG extended release capsule TAKE 1 CAPSULE BY MOUTH EVERY DAY WITH BREAKFAST 10/28/22   Kaye Alves MD   memantine (NAMENDA) 10 MG tablet Take 1 tablet by mouth twice daily 10/28/22   Kaye Alves MD   baclofen (LIORESAL) 10 MG tablet Take 1 tablet by mouth 3 times daily as needed (pain) 10/21/22   Roxanna Dotson DO   cetirizine (ZYRTEC) 10 MG tablet Take 10 mg by mouth daily    Historical Provider, MD   nabumetone (RELAFEN) 750 MG tablet Take 1 tablet by mouth 2 times daily 9/9/22   Roxanna Dotson DO   rosuvastatin (CRESTOR) 10 MG tablet TAKE 1 TABLET BY MOUTH NIGHTLY 8/30/22   Ashanti Regan Jeferson, DO   traZODone (DESYREL) 100 MG tablet TAKE 2 TABLETS BY MOUTH EVERY NIGHT 7/7/22   Fairmount Poster, DO   potassium chloride (KLOR-CON M) 20 MEQ extended release tablet TAKE 1 TABLET BY MOUTH TWICE DAILY 6/10/22   Fairmount Poster, DO   D-MANNOSE PO Take by mouth    Historical Provider, MD   oxybutynin (DITROPAN XL) 15 MG extended release tablet TAKE 1 TABLET BY MOUTH DAILY *REPLACES OXYBUTININ ER 10MG TABLET* 5/13/22   Fairmount Poster, DO   hydroCHLOROthiazide (MICROZIDE) 12.5 MG capsule TAKE 1 CAPSULE BY MOUTH EVERY MORNING 4/15/22   Hadley Poster, DO   gabapentin (NEURONTIN) 600 MG tablet TAKE 1 TABLET BY MOUTH THREE TIMES DAILY  Patient taking differently: Take 600 mg by mouth 3 times daily. 4/15/22 10/19/22  Hadley Poster, DO   fluticasone (FLONASE) 50 MCG/ACT nasal spray SPRAY TWO SPRAYS IN [facility] NOSTRIL ONCE DAILY DURING ALLERGY SEASON 3/18/22   Wisam Zapata MD   blood glucose monitor strips Check sugars qam and prn s/s of low blood sugars 3/2/22   Fairmount Poster, DO   Lancets MISC Check sugars qam and prn s/s of low blood sugars 3/2/22   Fairmount Poster, DO   azelastine (ASTELIN) 0.1 % nasal spray Use 2 sprays in each nostril 2 times daily. Use fluticasone 15 minutes after the morning dose of astelin.  3/2/22   Hadley Poster, DO   blood glucose monitor kit and supplies Check sugars qam and prn s/s of low blood sugars 3/2/22   Fairmount Poster, DO   diclofenac sodium (VOLTAREN) 1 % GEL  9/26/21   Historical Provider, MD   fluocinolone (DERMA-SMOOTHE) 0.01 % external oil APPLY TOPICALLY TWICE A WEEK 9/7/21   Fairmount Poster, DO   budesonide (ENTOCORT EC) 3 MG extended release capsule Take 6 mg by mouth every morning    Historical Provider, MD   ketoconazole (NIZORAL) 2 % shampoo APPLY TOPICALLY DAILY AS NEEDED 12/17/20   Fairmount Poster, DO   Cyanocobalamin (B-12) 1000 MCG SUBL Place 1,000 Units under the tongue daily 10/2/20   Fairmount Poster, DO   carBAMazepine (TEGRETOL-XR) 100 MG extended release tablet Take 1 tablet by mouth 2 times daily 7/10/20   Sharyle Quan, MD   acetaminophen (TYLENOL) 500 MG tablet Take 1,000 mg by mouth 3 times daily as needed for Pain    Historical Provider, MD   fluocinolone ([address]) 0.01 % OIL oil Place 1 drop in ear(s) 2 times daily 2/5/20   Dennis Tang DO   diphenoxylate-atropine (LOMOTIL) 2.5-0.025 MG per tablet Take 1 tablet by mouth as needed for Diarrhea. Historical Provider, MD   Cholecalciferol (VITAMIN D3) 5000 units CAPS Take 5,000 Units by mouth daily     Historical Provider, MD   oxyMORphone (OPANA) 5 MG tablet Take 5 mg by mouth 2 times daily. Indications: per DR Phylicia Cervantes TAKES 3 TIMES A DAY. TRYING TO CUT BACK. Historical Provider, MD   zoledronic acid (RECLAST) 5 MG/100ML SOLN Infuse 5 mg intravenously once IV, yearly    Historical Provider, MD   aspirin 81 MG chewable tablet Take 81 mg by mouth daily. Historical Provider, MD   Calcium Carbonate-Vit D-Min (CALCIUM 1200 PO) Take 1 capsule by mouth 2 times daily. Historical Provider, MD   Ascorbic Acid (VITAMIN C) 500 MG tablet Take 500 mg by mouth daily. Historical Provider, MD       Future Appointments   Date Time Provider Sung Kovacs   1/11/2023 10:30 AM [facility] DEXA RM 1 [facility] [place]   1/24/2023 10:15 AM Florecita Christian MD AND BARI RIOS   2/1/2023 10:20 AM DO GINA Singleton IM [place] - DYD   4/19/2023 10:10 AM Libby Silva MD FF NEURO Neurology -   12/18/2023 11:00 AM WILLOW Haynes FF SLEEP MED MMA   ,   Diabetes Assessment    Medic Alert ID: No  How often do you test your blood sugar?: No Testing (Comment: 11/11/22 [facility])   Do you have barriers with adherence to non-pharmacologic self-management interventions?  (Nutrition/Exercise/Self-Monitoring): No   Have you ever had to go to the ED for symptoms of low blood sugar?: No            , and   General Assessment

## 2023-01-13 ENCOUNTER — CARE COORDINATION (OUTPATIENT)
Dept: CARE COORDINATION | Age: 73
End: 2023-01-13

## 2023-01-13 NOTE — CARE COORDINATION
Ambulatory Care Coordination Note  1/13/2023    ACC: Adele Conde, URI    RN-CC received call from patient. Patient reports intermittent lower abdominal discomfort. Has difficulty initiating urine flow - has had this problem off and on x 1 year. Denies dysuria, malodorous odor, cloudy urine, fever. She has an appointment with urology next Thursday. She has increased her fluid intake - is drinking more water. Appetite ok. Treated for UTI on 1/9/23 - she is taking Macrobid as prescribed. Blood pressure - she is checking blood pressure daily. Patient reports blood pressure has been good. It was 143/63 today. Patient reports increase in fatigue since starting Olmesartan at the end of November. She doesn't have blood pressure readings to give me - she states threw the paper with readings away. RN-CC advised her to continue to check blood pressure daily, log readings and call RN-CC next week to review, pt agreeable. Pressure in her right ear resolved. Is not using cortisone nasal spray or antihistamine. Reports improvement since using a humidifier. Overall, she does feel that her fatigue is slowly improving. She was spending majority of the day in bed but is up moving around more. Denies dizziness. RN-CC informed patient she could be experiencing an increase in fatigue d/t recent dx of UTI. RN-CC recommended she continue to monitor her sx over the weekend and follow up with RN-CC next week via telephone, pt massiel. She has been without a working cpap for a few weeks. The hose broke to her cpap and she is waiting on a replacement. RN-CC informed her that this could be causing increase fatigue. Patient states she didn't even think about that but did agree this may be contributing to her feeling more tired. She is scheduled to with sleep medicine on 1/24/23 to discuss Inspire. Red flag sx reviewed. RN-CC will notify pcp of patient's complaints. Patient is scheduled to see her pcp on 2/1/23. PLAN:  Patient will outreach RN-CC next week to discuss fatigue, blood pressure, needs & concerns. Offered patient enrollment in the Remote Patient Monitoring (RPM) program for in-home monitoring: Patient declined. Lab Results       None            Care Coordination Interventions    Referral from Primary Care Provider: Yes  Suggested Interventions and Community Resources  Fall Risk Prevention: In Process (Comment: 9/14/22 SIMON)  Medication Assistance Program: Completed (Comment: [employer] 9/14/22 SIMON)  Pharmacist: Declined (Comment: 9/14/22 SIMON)  Senior Services: Completed (Comment: referral to [facility] 10/12/22)  Social Work: Completed (Comment: [facility] 10/12/22)  Zone Management Tools: In Process (Comment: 9/14/22 SIMON)  Other Services or Interventions: PEOPLE WORKING COOPERATIVELY, [facility] 9/14/22 [facility]          Goals Addressed    None         Prior to Admission medications    Medication Sig Start Date End Date Taking? Authorizing Provider   nitrofurantoin, macrocrystal-monohydrate, (MACROBID) 100 MG capsule Take 1 capsule by mouth 2 times daily for 5 days 1/9/23 1/14/23  Ramon Burroughs MD   hydrOXYzine HCl (ATARAX) 50 MG tablet TAKE 1 TABLET BY MOUTH 4 TIMES DAILY AS NEEDED FOR ANXIETY.  12/27/22   Bowen Pierson DO   Wheat Dextrin (BENEFIBER) POWD Take 4 g by mouth daily    Historical Provider, MD   busPIRone (BUSPAR) 15 MG tablet TAKE 1 TABLET BY MOUTH 3 TIMES DAILY 12/13/22   Bowen Pierson DO   olmesartan [facility]) 20 MG tablet Take 1 tablet by mouth daily 11/18/22   Bowen Pierson DO   escitalopram (LEXAPRO) 20 MG tablet Take 1 tablet by mouth daily 11/18/22   Bowen Pierson DO   galantamine (RAZADYNE ER) 16 MG extended release capsule TAKE 1 CAPSULE BY MOUTH EVERY DAY WITH BREAKFAST 10/28/22   Ronnie Gilbert MD   memantine (NAMENDA) 10 MG tablet Take 1 tablet by mouth twice daily 10/28/22   Ronnie Gilbert MD   baclofen (LIORESAL) 10 MG tablet Take 1 tablet by mouth 3 times daily as needed (pain) 10/21/22   Yumiko Govea DO   cetirizine (ZYRTEC) 10 MG tablet Take 10 mg by mouth daily    Historical Provider, MD   nabumetone (RELAFEN) 750 MG tablet Take 1 tablet by mouth 2 times daily 9/9/22   Yumiko Govea DO   rosuvastatin (CRESTOR) 10 MG tablet TAKE 1 TABLET BY MOUTH NIGHTLY 8/30/22   Yumiko Govea DO   traZODone (DESYREL) 100 MG tablet TAKE 2 TABLETS BY MOUTH EVERY NIGHT 7/7/22   Yumiko Govea DO   potassium chloride (KLOR-CON M) 20 MEQ extended release tablet TAKE 1 TABLET BY MOUTH TWICE DAILY 6/10/22   Yumiko Govea DO   D-MANNOSE PO Take by mouth    Historical Provider, MD   oxybutynin (DITROPAN XL) 15 MG extended release tablet TAKE 1 TABLET BY MOUTH DAILY *REPLACES OXYBUTININ ER 10MG TABLET* 5/13/22   Yumiko Govea DO   hydroCHLOROthiazide (MICROZIDE) 12.5 MG capsule TAKE 1 CAPSULE BY MOUTH EVERY MORNING 4/15/22   Yumiko Govea DO   gabapentin (NEURONTIN) 600 MG tablet TAKE 1 TABLET BY MOUTH THREE TIMES DAILY  Patient taking differently: Take 600 mg by mouth 3 times daily. 4/15/22 10/19/22  Yumiko Govea DO   fluticasone (FLONASE) 50 MCG/ACT nasal spray SPRAY TWO SPRAYS IN EACH NOSTRIL ONCE DAILY DURING ALLERGY SEASON 3/18/22   Rica Bush MD   blood glucose monitor strips Check sugars qam and prn s/s of low blood sugars 3/2/22   Yumiko Govea DO   Lancets MISC Check sugars qam and prn s/s of low blood sugars 3/2/22   Yumiko Govea DO   azelastine (ASTELIN) 0.1 % nasal spray Use 2 sprays in each nostril 2 times daily.  Use fluticasone 15 minutes after the morning dose of astelin. 3/2/22   Yumiko Govea DO   blood glucose monitor kit and supplies Check sugars qam and prn s/s of low blood sugars 3/2/22   Yumiko Govea DO   diclofenac sodium (VOLTAREN) 1 % GEL  9/26/21   Historical Provider, MD   fluocinolone (DERMA-SMOOTHE) 0.01 % external oil APPLY TOPICALLY TWICE A WEEK 9/7/21   Yumiko Govea DO   budesonide (ENTOCORT EC) 3  MG extended release capsule Take 6 mg by mouth every morning    Historical Provider, MD   ketoconazole (NIZORAL) 2 % shampoo APPLY TOPICALLY DAILY AS NEEDED 12/17/20   Shante Jiménez DO   Cyanocobalamin (B-12) 1000 MCG SUBL Place 1,000 Units under the tongue daily 10/2/20   Shante Jiménez DO   carBAMazepine (TEGRETOL-XR) 100 MG extended release tablet Take 1 tablet by mouth 2 times daily 7/10/20   London Laird MD   acetaminophen (TYLENOL) 500 MG tablet Take 1,000 mg by mouth 3 times daily as needed for Pain    Historical Provider, MD   fluocinolone ([facility]) 0.01 % OIL oil Place 1 drop in ear(s) 2 times daily 2/5/20   Shante Jiménez DO   diphenoxylate-atropine (LOMOTIL) 2.5-0.025 MG per tablet Take 1 tablet by mouth as needed for Diarrhea. Historical Provider, MD   Cholecalciferol (VITAMIN D3) 5000 units CAPS Take 5,000 Units by mouth daily     Historical Provider, MD   oxyMORphone (OPANA) 5 MG tablet Take 5 mg by mouth 2 times daily. Indications: per DR Alice Bella TAKES 3 TIMES A DAY. TRYING TO CUT BACK. Historical Provider, MD   zoledronic acid (RECLAST) 5 MG/100ML SOLN Infuse 5 mg intravenously once IV, yearly    Historical Provider, MD   aspirin 81 MG chewable tablet Take 81 mg by mouth daily. Historical Provider, MD   Calcium Carbonate-Vit D-Min (CALCIUM 1200 PO) Take 1 capsule by mouth 2 times daily. Historical Provider, MD   Ascorbic Acid (VITAMIN C) 500 MG tablet Take 500 mg by mouth daily.       Historical Provider, MD       Future Appointments   Date Time Provider Sung Kovacs   1/24/2023 10:15 AM Wood Hurt MD AND PULM MMA   2/1/2023 10:20 AM Shante Jiménez DO [facility] [place] - DYD   4/19/2023 10:10 AM Lorraine Florentino MD  [facility] Neurology -   12/18/2023 11:00 AM WILLOW Bergeron FF SLEEP MED MMA    and   General Assessment

## 2023-01-17 ENCOUNTER — CARE COORDINATION (OUTPATIENT)
Dept: CARE COORDINATION | Age: 73
End: 2023-01-17

## 2023-01-17 NOTE — PROGRESS NOTES
Diagnosis: [x] BEBE (G47.33) [] CSA (G47.31) [] Apnea (G47.30)   Length of Need: [x] 15 Months [] 99 Months [] Other:   Machine (VICTOR HUGO!): [] Respironics Dream Station      Auto [] ResMed AirSense     Auto [] Other:     []  CPAP () [] Bilevel ()   Mode: [] Auto [] Spontaneous    Mode: [] Auto [] Spontaneous            Comfort Settings:      Humidifier: [] Heated ()        [x] Water chamber replacement ()/ 1 per 6 months        Mask:   [x] Nasal () /1 per 3 months [] Full Face () /1 per 3 months   [x] Patient choice -Size and fit mask [] Patient Choice - Size and fit mask   [] Dispense: [] Dispense:   [x] Headgear () / 1 per 3 months [] Headgear () / 1 per 3 months   [x] Replacement Nasal Cushion ()/2 per month [] Interface Replacement ()/1 per month   [x] Replacement Nasal Pillows ()/2 per month         Tubing: [x] Heated ()/1 per 3 months    [] Standard ()/1 per 3 months [] Other:           Filters: [x] Non-disposable ()/1 per 6 months     [x] Ultra-Fine, Disposable ()/2 per month        Miscellaneous: [] Chin Strap ()/ 1 per 6 months [] O2 bleed-in:        LPM   [] Oxymetry on CPAP/Bilevel []  Other:         Start Order Date: 01/17/23    MEDICAL JUSTIFICATION:  I, the undersigned, certify that the above prescribed supplies are medically necessary for this patients wellbeing. In my opinion, the supplies are both reasonable and necessary in reference to accepted standards of medicalpractice in treatment of this patients condition. Cheryl Ramirez NP    NPI: [id number]       Order Signed Date: 01/17/23  [facility]  Pulmonary, Sleep, and Critical Care    Pulmonary, Sleep, and Critical Care  [address] [place] [address].  [facility], [facility] , 800 [facility]                                    [facility]  Phone: [phone]    Fax: [address] Vanessa Cords  1950  [address]  [phone] (home)   [phone] (mobile)      Insurance Info (confirm with patient if correct):  Payer/Plan Subscr  Sex Relation Sub.  Ins. ID Effective Group Num

## 2023-01-17 NOTE — PROGRESS NOTES
Diagnosis: [x] BEBE (G47.33) [] CSA (G47.31) [] Apnea (G47.30)   Length of Need: [x] 15 Months [] 99 Months [] Other:   Machine (VICTOR HUGO!): [] Respironics Dream Station      Auto [] ResMed AirSense     Auto [] Other:     []  CPAP () [] Bilevel ()   Mode: [] Auto [] Spontaneous    Mode: [] Auto [] Spontaneous             Comfort Settings:      Humidifier: [] Heated ()        [x] Water chamber replacement ()/ 1 per 6 months        Mask:   [] Nasal () /1 per 3 months [x] Full Face () /1 per 3 months   [] Patient choice -Size and fit mask [x] Patient Choice - Size and fit mask   [] Dispense: [] Dispense:   [] Headgear () / 1 per 3 months [x] Headgear () / 1 per 3 months   [] Replacement Nasal Cushion ()/2 per month [x] Interface Replacement ()/1 per month   [] Replacement Nasal Pillows ()/2 per month         Tubing: [x] Heated ()/1 per 3 months    [] Standard ()/1 per 3 months [] Other:           Filters: [x] Non-disposable ()/1 per 6 months     [x] Ultra-Fine, Disposable ()/2 per month        Miscellaneous: [] Chin Strap ()/ 1 per 6 months [] O2 bleed-in:        LPM   [] Oxymetry on CPAP/Bilevel []  Other:         Start Order Date: 01/17/23    MEDICAL JUSTIFICATION:  I, the undersigned, certify that the above prescribed supplies are medically necessary for this patients wellbeing. In my opinion, the supplies are both reasonable and necessary in reference to accepted standards of medicalpractice in treatment of this patients condition. Qing Mask NP    NPI: [id number]       Order Signed Date: 01/17/23  350 [facility]  Pulmonary, Sleep, and Critical Care    Pulmonary, Sleep, and Critical Care  [facility] [place] [address].  [facility], 152 [facility] , 800 [facility]  Phone: [phone]    Fax: [address] Jaxson Ly  1950  [address] [address] [address]  [phone] (home)   [phone] (mobile)      Insurance Info (confirm with patient if correct):  Payer/Plan Subscr  Sex Relation Sub.  Ins. ID Effective Group Num

## 2023-01-17 NOTE — CARE COORDINATION
Ambulatory Care Coordination Note  1/17/2023    ACC: Vadim Ramirez, RN  Call received from patient. Patient continues to report fatigue. She received new tubing for her cpap on Saturday but thinks it is the wrong tubing. Hose connects to machine but doesn't fit on mask. She orders her cpap supplies through [employer]. She is supposed to be evaluated for [facility] next week. She is considering cancelling the appointment, patient states Dr. Anastasiia Lara office is over 50 minutes away and she doesn't want to drive that far. She has a friend that offered to take her but doesn't want to them to drive that far either. Patient was told there is only one location. She wants to know if there is a provider that is closer. Call out to [facility]; spoke with Keanu Ramirez regarding tubing. Per Keanu Ramirez, there is a quick release piece on the mask that may need to be removed so the new hose can connect. Keanu Ramirez states she will outreach the patient to troubleshoot. Keanu Ramirez is also requested an updated cpap order. Fax no. [phone]. Patient provided RN-CC with blood pressure readings  120/59, 166/88, 134/82, 169/78, 149/73, 145/79. RN-CC encouraged patient to continue monitoring. RN-CC outreached Dr. Anastasiia Lara office. RN-CC was informed that he goes to a [employer] location twice a month but [facility] testing is only done at the [facility] address at this time. Patient notified and encouraged to keep the appointment    PLAN:    F/u on cpap hose - Inspire? F/u on fatigue, blood pressure  F/u on gi & urology    Offered patient enrollment in the Remote Patient Monitoring (RPM) program for in-home monitoring: Patient declined. Lab Results       None            Care Coordination Interventions    Referral from Primary Care Provider: Yes  Suggested Interventions and Community Resources  Fall Risk Prevention:  In Process (Comment: 9/14/22 SIMON)  Medication Assistance Program: Completed (Comment: [employer] 9/14/22 SIMON)  Pharmacist: Nemesio Kendall (Comment: 9/14/22 [facility])  Senior Services: Completed (Comment: referral to [facility] 10/12/22)  Social Work: Completed (Comment: [facility] 10/12/22)  Zone Management Tools: In Process (Comment: 9/14/22 [facility])  Other Services or Interventions: PEOPLE WORKING COOPERATIVELY, [facility] 9/14/22 [facility]          Goals Addressed    None         Prior to Admission medications    Medication Sig Start Date End Date Taking? Authorizing Provider   hydrOXYzine HCl (ATARAX) 50 MG tablet TAKE 1 TABLET BY MOUTH 4 TIMES DAILY AS NEEDED FOR ANXIETY.  12/27/22   Troy Villeda,    Wheat Dextrin (BENEFIBER) POWD Take 4 g by mouth daily    Historical Provider, MD   busPIRone (BUSPAR) 15 MG tablet TAKE 1 TABLET BY MOUTH 3 TIMES DAILY 12/13/22   Troy Villeda DO   olmesartan [facility]) 20 MG tablet Take 1 tablet by mouth daily 11/18/22   Troy Villeda,    escitalopram (LEXAPRO) 20 MG tablet Take 1 tablet by mouth daily 11/18/22   Troy Villeda, DO   galantamine (RAZADYNE ER) 16 MG extended release capsule TAKE 1 CAPSULE BY MOUTH EVERY DAY WITH BREAKFAST 10/28/22   Ofelia Yip MD   memantine (NAMENDA) 10 MG tablet Take 1 tablet by mouth twice daily 10/28/22   Ofelia Yip MD   baclofen (LIORESAL) 10 MG tablet Take 1 tablet by mouth 3 times daily as needed (pain) 10/21/22   Troy Villeda DO   cetirizine (ZYRTEC) 10 MG tablet Take 10 mg by mouth daily    Historical Provider, MD   nabumetone (RELAFEN) 750 MG tablet Take 1 tablet by mouth 2 times daily 9/9/22   Troy Villeda,    rosuvastatin (CRESTOR) 10 MG tablet TAKE 1 TABLET BY MOUTH NIGHTLY 8/30/22   Troy Villeda, DO   traZODone (DESYREL) 100 MG tablet TAKE 2 TABLETS BY MOUTH EVERY NIGHT 7/7/22   Troy Villeda, DO   potassium chloride (KLOR-CON M) 20 MEQ extended release tablet TAKE 1 TABLET BY MOUTH TWICE DAILY 6/10/22   Troy Villeda, DO   D-MANNOSE PO Take by mouth    Historical Provider, MD   oxybutynin (DITROPAN XL) 15 MG extended release tablet TAKE 1 TABLET BY MOUTH DAILY *REPLACES OXYBUTININ ER 10MG TABLET* 5/13/22   Chelle Izaguirre DO   hydroCHLOROthiazide (MICROZIDE) 12.5 MG capsule TAKE 1 CAPSULE BY MOUTH EVERY MORNING 4/15/22   Chelle Izaguirre DO   gabapentin (NEURONTIN) 600 MG tablet TAKE 1 TABLET BY MOUTH THREE TIMES DAILY  Patient taking differently: Take 600 mg by mouth 3 times daily. 4/15/22 10/19/22  Chelle Izaguirre DO   fluticasone (FLONASE) 50 MCG/ACT nasal spray SPRAY TWO SPRAYS IN [facility] NOSTRIL ONCE DAILY DURING ALLERGY SEASON 3/18/22   Steven Kyle MD   blood glucose monitor strips Check sugars qam and prn s/s of low blood sugars 3/2/22   Chelle Izaguirre DO   Lancets MISC Check sugars qam and prn s/s of low blood sugars 3/2/22   Chelle Izaguirre DO   azelastine (ASTELIN) 0.1 % nasal spray Use 2 sprays in each nostril 2 times daily. Use fluticasone 15 minutes after the morning dose of astelin.  3/2/22   Chelle Izaguirre DO   blood glucose monitor kit and supplies Check sugars qam and prn s/s of low blood sugars 3/2/22   Chelle Izaguirre DO   diclofenac sodium (VOLTAREN) 1 % GEL  9/26/21   Historical Provider, MD   fluocinolone (DERMA-SMOOTHE) 0.01 % external oil APPLY TOPICALLY TWICE A WEEK 9/7/21   Chelle Izaguirre DO   budesonide (ENTOCORT EC) 3 MG extended release capsule Take 6 mg by mouth every morning    Historical Provider, MD   ketoconazole (NIZORAL) 2 % shampoo APPLY TOPICALLY DAILY AS NEEDED 12/17/20   Chelle Izaguirre DO   Cyanocobalamin (B-12) 1000 MCG SUBL Place 1,000 Units under the tongue daily 10/2/20   Chelle Izaguirre DO   carBAMazepine (TEGRETOL-XR) 100 MG extended release tablet Take 1 tablet by mouth 2 times daily 7/10/20   lAba Johnson MD   acetaminophen (TYLENOL) 500 MG tablet Take 1,000 mg by mouth 3 times daily as needed for Pain    Historical Provider, MD   fluocinolone ([address] [place] [address]) 0.01 % OIL oil Place 1 drop in ear(s) 2 times daily 2/5/20   Chelle Izaguirre DO   diphenoxylate-atropine (LOMOTIL) 2.5-0.025 MG per tablet Take 1 tablet by mouth as needed for Diarrhea. Historical Provider, MD   Cholecalciferol (VITAMIN D3) 5000 units CAPS Take 5,000 Units by mouth daily     Historical Provider, MD   oxyMORphone (OPANA) 5 MG tablet Take 5 mg by mouth 2 times daily. Indications: per DR Vicki Wells TAKES 3 TIMES A DAY. TRYING TO CUT BACK. Historical Provider, MD   zoledronic acid (RECLAST) 5 MG/100ML SOLN Infuse 5 mg intravenously once IV, yearly    Historical Provider, MD   aspirin 81 MG chewable tablet Take 81 mg by mouth daily. Historical Provider, MD   Calcium Carbonate-Vit D-Min (CALCIUM 1200 PO) Take 1 capsule by mouth 2 times daily. Historical Provider, MD   Ascorbic Acid (VITAMIN C) 500 MG tablet Take 500 mg by mouth daily.       Historical Provider, MD       Future Appointments   Date Time Provider Sung Kovacs   1/24/2023 10:15 AM Judson Sanchez MD AND BARI RIOS   2/1/2023 10:20 AM DO GINA Delacruz IM [place] - DYD   4/19/2023 10:10 AM Elvis Segura MD  [facility] Neurology -   12/18/2023 11:00 AM WILLOW Alonso SLEEP MED MMA    and   General Assessment

## 2023-01-18 NOTE — PROGRESS NOTES
Diagnosis: [x] BEBE (G47.33) [] CSA (G47.31) [] Apnea (G47.30)   Length of Need: [x] 15 Months [] 99 Months [] Other:   Machine (VICTOR HUGO!): [] Respironics Dream Station      Auto [] ResMed AirSense     Auto [] Other:     []  CPAP () [] Bilevel ()   Mode: [] Auto [] Spontaneous    Mode: [] Auto [] Spontaneous            Comfort Settings:      Humidifier: [] Heated ()        [x] Water chamber replacement ()/ 1 per 6 months        Mask:   [x] Nasal () /1 per 3 months [] Full Face () /1 per 3 months   [x] Patient choice -Size and fit mask [] Patient Choice - Size and fit mask   [] Dispense: [] Dispense:   [x] Headgear () / 1 per 3 months [] Headgear () / 1 per 3 months   [x] Replacement Nasal Cushion ()/2 per month [] Interface Replacement ()/1 per month   [x] Replacement Nasal Pillows ()/2 per month         Tubing: [x] Heated ()/1 per 3 months    [] Standard ()/1 per 3 months [] Other:           Filters: [x] Non-disposable ()/1 per 6 months     [x] Ultra-Fine, Disposable ()/2 per month        Miscellaneous: [x] Chin Strap ()/ 1 per 6 months [] O2 bleed-in:        LPM   [] Oxymetry on CPAP/Bilevel []  Other:         Start Order Date: 01/18/23    MEDICAL JUSTIFICATION:  I, the undersigned, certify that the above prescribed supplies are medically necessary for this patients wellbeing. In my opinion, the supplies are both reasonable and necessary in reference to accepted standards of medicalpractice in treatment of this patients condition. Sasha Viramontes NP    NPI: [id number]       Order Signed Date: 01/18/23  350 [facility]  Pulmonary, Sleep, and Critical Care    Pulmonary, Sleep, and Critical Care  [address] [place] [address]  [facility], 152 [facility] , 800 [facility]  Phone: [phone]    Fax: [address] Carlotta Zamudio  1950  [address]  [phone] (home)   [phone] (mobile)      Insurance Info (confirm with patient if correct):  Payer/Plan Subscr  Sex Relation Sub.  Ins. ID Effective Group Num

## 2023-01-18 NOTE — PROGRESS NOTES
Diagnosis: [x] BEBE (G47.33) [] CSA (G47.31) [] Apnea (G47.30)   Length of Need: [x] 15 Months [] 99 Months [] Other:   Machine (VICTOR HUGO!): [] Respironics Dream Station      Auto [] ResMed AirSense     Auto [] Other:     []  CPAP () [] Bilevel ()   Mode: [] Auto [] Spontaneous    Mode: [] Auto [] Spontaneous             Comfort Settings:      Humidifier: [] Heated ()        [x] Water chamber replacement ()/ 1 per 6 months        Mask:   [] Nasal () /1 per 3 months [x] Full Face () /1 per 3 months   [] Patient choice -Size and fit mask [x] Patient Choice - Size and fit mask   [] Dispense: [] Dispense:   [] Headgear () / 1 per 3 months [x] Headgear () / 1 per 3 months   [] Replacement Nasal Cushion ()/2 per month [x] Interface Replacement ()/1 per month   [] Replacement Nasal Pillows ()/2 per month         Tubing: [x] Heated ()/1 per 3 months    [] Standard ()/1 per 3 months [] Other:           Filters: [x] Non-disposable ()/1 per 6 months     [x] Ultra-Fine, Disposable ()/2 per month        Miscellaneous:  Chin Strap ()/ 1 per 6 months [] O2 bleed-in:        LPM   [] Oxymetry on CPAP/Bilevel []  Other:         Start Order Date: 01/18/23    MEDICAL JUSTIFICATIO[]N:  I, the undersigned, certify that the above prescribed supplies are medically necessary for this patients wellbeing. In my opinion, the supplies are both reasonable and necessary in reference to accepted standards of medicalpractice in treatment of this patients condition. Liz Putnam NP    NPI: [id number]       Order Signed Date: 01/18/23  350 [facility]  Pulmonary, Sleep, and Critical Care    Pulmonary, Sleep, and Critical Care  Critical access hospital5 [address] [place] [address].  [facility], 152 [facility] , 800 [facility]  Phone: [phone]    Fax: [address] [facility]  1950  [address]  [phone] (home)   [phone] (mobile)      Insurance Info (confirm with patient if correct):  Payer/Plan Subscr  Sex Relation Sub.  Ins. ID Effective Group Num

## 2023-02-01 ENCOUNTER — OFFICE VISIT (OUTPATIENT)
Dept: INTERNAL MEDICINE CLINIC | Age: 73
End: 2023-02-01

## 2023-02-01 VITALS
BODY MASS INDEX: 34.2 KG/M2 | DIASTOLIC BLOOD PRESSURE: 60 MMHG | OXYGEN SATURATION: 93 % | HEART RATE: 80 BPM | SYSTOLIC BLOOD PRESSURE: 102 MMHG | WEIGHT: 181 LBS

## 2023-02-01 DIAGNOSIS — M81.0 OSTEOPOROSIS, POST-MENOPAUSAL: Chronic | ICD-10-CM

## 2023-02-01 DIAGNOSIS — N39.0 RECURRENT UTI: Chronic | ICD-10-CM

## 2023-02-01 DIAGNOSIS — G30.1 LATE ONSET ALZHEIMER'S DEMENTIA WITH BEHAVIORAL DISTURBANCE (HCC): ICD-10-CM

## 2023-02-01 DIAGNOSIS — I51.89 DIASTOLIC DYSFUNCTION: ICD-10-CM

## 2023-02-01 DIAGNOSIS — E78.5 HYPERLIPIDEMIA LDL GOAL <100: ICD-10-CM

## 2023-02-01 DIAGNOSIS — G47.33 OSA (OBSTRUCTIVE SLEEP APNEA): ICD-10-CM

## 2023-02-01 DIAGNOSIS — I10 ESSENTIAL HYPERTENSION: Primary | ICD-10-CM

## 2023-02-01 DIAGNOSIS — E55.9 VITAMIN D INSUFFICIENCY: ICD-10-CM

## 2023-02-01 DIAGNOSIS — R20.2 PARESTHESIA OF BOTH FEET: ICD-10-CM

## 2023-02-01 DIAGNOSIS — M51.35 DDD (DEGENERATIVE DISC DISEASE), THORACOLUMBAR: ICD-10-CM

## 2023-02-01 DIAGNOSIS — E16.2 HYPOGLYCEMIA: ICD-10-CM

## 2023-02-01 DIAGNOSIS — F33.1 MODERATE EPISODE OF RECURRENT MAJOR DEPRESSIVE DISORDER (HCC): ICD-10-CM

## 2023-02-01 DIAGNOSIS — F02.818 LATE ONSET ALZHEIMER'S DEMENTIA WITH BEHAVIORAL DISTURBANCE (HCC): ICD-10-CM

## 2023-02-01 DIAGNOSIS — M51.36 DEGENERATIVE DISC DISEASE, LUMBAR: ICD-10-CM

## 2023-02-01 NOTE — PATIENT INSTRUCTIONS
Please bring pill packs to your appointments. Please check you blood pressure 2-3 times per week at different times of the day. Please bring the readings and your blood pressure cuff with you to your next appointment. Goal blood pressure is under 135/85, ideal is in the 120's/80's and below. Normal blood sugar (non-diabetic) is under 100 before breakfast and under 120 after meal, goal blood sugar with diabetes is under 150 (ideal under 120) before breakfast and after meals. Please call [phone]  to schedule your bone density test.     I think that the tingling in your feet is related to a pinched nerve in your back, causing neuropathy. There is no cure for neuropathy. If you are bothered by the tingling in your feet, you may try rubbing Aspercreme with Lidocaine on them at night AS NEEDED to help with the discomfort.

## 2023-02-01 NOTE — PROGRESS NOTES
Patient: Parminder España is a 67 y.o. female who presents today with the following Chief Complaint(s):  Chief Complaint   Patient presents with    Fatigue     -has been slightly better, not a whole lot        HPI    Here today for follow up. Did see Dr. Molly Castañeda 1/9 for UTI, treated with Macrobid. Has been checking her BP at different times and they have been running 118//56, most have been in the 140's or above. HR has been in the 50's. On olmesartan 20 mg daily. Not sure of time of day that she takes medication. Not sure if she feels poorly on days when BP is higher or lower. Was feeling shaky when she arrived. Checked blood sugar and was 107, given apple sauce which she ate about half and felt better. Arthritis is worse in the cooler weather. Not sure that she is more tired on days that she is hurting more. Depression feels like it is \"pretty good\". Daughter has been more helpful lately. Daughter and SEBASTIAN live next door to patient. Sleeping well at night with trazodone. Uses her CPAP but having trouble with her machine. Has appointment to be evaluated for Inspire at the end of February. Has appointment with urology to check for urinary retention. Last Reclast dose June 2021. Missed doses in 2020 and 2022. Only other c[facility]      Allergies   Allergen Reactions    Amlodipine     Ativan [Lorazepam] Swelling     Tongue swelling    Sulfa Antibiotics Swelling    Keflex [Cephalexin] Other (See Comments)     Leg cramps      Past Medical History:   Diagnosis Date    Anxiety     Anxiety and depression     Chronic back pain     Clostridium difficile infection 2/22/15    Hyperlipidemia     Hypertension     Insomnia disorder     Osteoarthritis     Osteoporosis 2008    Rheumatic fever     S/P insertion of spinal cord stimulator     Self-catheterizes urinary bladder     as needed 7/8 no longer catherizing     Urinary retention       Past Surgical History:   Procedure Laterality Date    BLADDER REPAIR      CARPAL TUNNEL RELEASE      COLONOSCOPY      CYSTOSCOPY  10/29/2012    bladder biopsy    CYSTOSCOPY N/A 10/19/2020    FLEXIBLE CYSTOSCOPY performed by Maru Menchaca MD at [address] [facility] [address] (CERVIX STATUS UNKNOWN)      INTRACAPSULAR CATARACT EXTRACTION Right 7/18/2019    PHACOEMULSIFICATION WITH INTRAOCULAR LENS IMPLANT performed by Dorian Velasco MD at [address] Left 7/26/2019    PHACOEMULSIFICATION WITH INTRAOCULAR LENS IMPLANT performed by Dorian Velasco MD at  [address]  2004    MANDIBLE RECONSTRUCTION      OTHER SURGICAL HISTORY      spinal cord stimulator    PAIN MANAGEMENT PROCEDURE N/A 10/19/2021    CAUDAL EPIDURAL STEROID INJECTION WITH FLUOROSCOPY performed by Sp Yin MD at [place] [address] Right 10/18/13      Social History     Socioeconomic History    Marital status:      Spouse name: Not on file    Number of children: Not on file    Years of education: Not on file    Highest education level: Not on file   Occupational History    Occupation: retired   Tobacco Use    Smoking status: Never    Smokeless tobacco: Never   Vaping Use    Vaping Use: Never used   Substance and Sexual Activity    Alcohol use: Not Currently    Drug use: No    Sexual activity: Not on file   Other Topics Concern    Not on file   Social History Narrative    Not on file     Social Determinants of Health     Financial Resource Strain: Low Risk     Difficulty of Paying Living Expenses: Not very hard   Food Insecurity: No Food Insecurity    Worried About Running Out of Food in the Last Year: Never true    Ran Out of Food in the Last Year: Never true   Transportation Needs: No Transportation Needs    Lack of Transportation (Medical): No    Lack of Transportation (Non-Medical):  No   Physical Activity: Inactive    Days of Exercise per Week: 0 days    Minutes of Exercise per Session: 0 min   Stress: Stress Concern Present    Feeling of Stress : To some extent   Social Connections: Socially Isolated    Frequency of Communication with Friends and Family: Once a week    Frequency of Social Gatherings with Friends and Family: Once a week    Attends Quaker Services: Never    Active Member of Clubs or Organizations: No    Attends Club or Organization Meetings: Never    Marital Status:     Intimate Partner Violence: Not on file   Housing Stability: Low Risk     Unable to Pay for Housing in the Last Year: No    Number of Places Lived in the Last Year: 1    Unstable Housing in the Last Year: No     Family History   Problem Relation Age of Onset    COPD Mother     High Blood Pressure Mother     Rheum Arthritis Mother     Thyroid Disease Mother     Alcohol Abuse Father     Clotting Disorder Sister     Thyroid Disease Sister     Hypertension Brother     Diabetes Sister     Heart Disease Sister     Hypertension Sister     Thyroid Disease Sister     Cancer Brother     Heart Disease Brother     Hypertension Brother     Substance Abuse Brother     Alcohol Abuse Son     No Known Problems Daughter     Dementia Neg Hx         Outpatient Medications Prior to Visit   Medication Sig Dispense Refill    Wheat Dextrin (BENEFIBER) POWD Take 4 g by mouth daily      busPIRone (BUSPAR) 15 MG tablet TAKE 1 TABLET BY MOUTH 3 TIMES DAILY 90 tablet 5    olmesartan (BENICAR) 20 MG tablet Take 1 tablet by mouth daily 30 tablet 5    escitalopram (LEXAPRO) 20 MG tablet Take 1 tablet by mouth daily 30 tablet 5    galantamine (RAZADYNE ER) 16 MG extended release capsule TAKE 1 CAPSULE BY MOUTH EVERY DAY WITH BREAKFAST 90 capsule 1    memantine (NAMENDA) 10 MG tablet Take 1 tablet by mouth twice daily 180 tablet 1    cetirizine (ZYRTEC) 10 MG tablet Take 10 mg by mouth daily      nabumetone (RELAFEN) 750 MG tablet Take 1 tablet by mouth 2 times daily 180 tablet 3 rosuvastatin (CRESTOR) 10 MG tablet TAKE 1 TABLET BY MOUTH NIGHTLY 90 tablet 3    traZODone (DESYREL) 100 MG tablet TAKE 2 TABLETS BY MOUTH EVERY NIGHT 60 tablet 10    potassium chloride (KLOR-CON M) 20 MEQ extended release tablet TAKE 1 TABLET BY MOUTH TWICE DAILY 60 tablet 10    D-MANNOSE PO Take by mouth      oxybutynin (DITROPAN XL) 15 MG extended release tablet TAKE 1 TABLET BY MOUTH DAILY *REPLACES OXYBUTININ ER 10MG TABLET* 30 tablet 10    hydroCHLOROthiazide (MICROZIDE) 12.5 MG capsule TAKE 1 CAPSULE BY MOUTH EVERY MORNING 30 capsule 10    azelastine (ASTELIN) 0.1 % nasal spray Use 2 sprays in each nostril 2 times daily. Use fluticasone 15 minutes after the morning dose of astelin. 1 each 5    diclofenac sodium (VOLTAREN) 1 % GEL       fluocinolone (DERMA-SMOOTHE) 0.01 % external oil APPLY TOPICALLY TWICE A WEEK 1 each 3    budesonide (ENTOCORT EC) 3 MG extended release capsule Take 6 mg by mouth every morning      Cyanocobalamin (B-12) 1000 MCG SUBL Place 1,000 Units under the tongue daily 90 tablet 3    carBAMazepine (TEGRETOL-XR) 100 MG extended release tablet Take 1 tablet by mouth 2 times daily 60 tablet 3    fluocinolone (DERMOTIC) 0.01 % OIL oil Place 1 drop in ear(s) 2 times daily 1 Bottle 3    Cholecalciferol (VITAMIN D3) 5000 units CAPS Take 5,000 Units by mouth daily       oxyMORphone (OPANA) 5 MG tablet Take 5 mg by mouth 2 times daily. Indications: per DR Сергей Meyer TAKES 3 TIMES A DAY. TRYING TO CUT BACK.      zoledronic acid (RECLAST) 5 MG/100ML SOLN Infuse 5 mg intravenously once IV, yearly      aspirin 81 MG chewable tablet Take 81 mg by mouth daily. Calcium Carbonate-Vit D-Min (CALCIUM 1200 PO) Take 1 capsule by mouth 2 times daily. Ascorbic Acid (VITAMIN C) 500 MG tablet Take 500 mg by mouth daily. hydrOXYzine HCl (ATARAX) 50 MG tablet TAKE 1 TABLET BY MOUTH 4 TIMES DAILY AS NEEDED FOR ANXIETY.  120 tablet 1    baclofen (LIORESAL) 10 MG tablet Take 1 tablet by mouth 3 times daily as needed (pain) 60 tablet 2    gabapentin (NEURONTIN) 600 MG tablet TAKE 1 TABLET BY MOUTH THREE TIMES DAILY (Patient taking differently: Take 600 mg by mouth 3 times daily.) 90 tablet 10    fluticasone (FLONASE) 50 MCG/ACT nasal spray SPRAY TWO SPRAYS IN EACH NOSTRIL ONCE DAILY DURING ALLERGY SEASON 1 each prn    blood glucose monitor strips Check sugars qam and prn s/s of low blood sugars 50 strip 5    Lancets MISC Check sugars qam and prn s/s of low blood sugars 50 each 5    blood glucose monitor kit and supplies Check sugars qam and prn s/s of low blood sugars 1 kit 0    ketoconazole (NIZORAL) 2 % shampoo APPLY TOPICALLY DAILY AS NEEDED 1 Bottle 1    acetaminophen (TYLENOL) 500 MG tablet Take 1,000 mg by mouth 3 times daily as needed for Pain      diphenoxylate-atropine (LOMOTIL) 2.5-0.025 MG per tablet Take 1 tablet by mouth as needed for Diarrhea. Facility-Administered Medications Prior to Visit   Medication Dose Route Frequency Provider Last Rate Last Admin    zoledronic acid (RECLAST) 5 mg/100 mL infusion  5 mg IntraVENous See Admin Instructions Vasquez Degroot  mL/hr at 04/06/16 0900 5 mg at 04/06/16 0900       Patient'spast medical history, surgical history, family history, medications,  and allergies  were all reviewed and updated as appropriate today. Review of Systems    /60 (Site: Right Upper Arm, Position: Sitting, Cuff Size: Medium Adult)   Pulse 80   Wt 181 lb (82.1 kg)   SpO2 93%   BMI 34.20 kg/m²   Physical Exam  Vitals and nursing note reviewed. Constitutional:       Appearance: She is well-developed. She is not toxic-appearing. HENT:      Head: Normocephalic. Right Ear: Tympanic membrane, ear canal and external ear normal.      Left Ear: Tympanic membrane, ear canal and external ear normal.      Mouth/Throat:      Pharynx: No oropharyngeal exudate or posterior oropharyngeal erythema. Eyes:      General: No scleral icterus.      Extraocular Movements: Extraocular movements intact. Conjunctiva/sclera: Conjunctivae normal.      Pupils: Pupils are equal, round, and reactive to light. Neck:      Thyroid: No thyroid mass or thyromegaly. Vascular: No carotid bruit. Cardiovascular:      Rate and Rhythm: Normal rate and regular rhythm. Pulses:           Dorsalis pedis pulses are 2+ on the right side and 2+ on the left side. Posterior tibial pulses are 2+ on the right side and 2+ on the left side. Heart sounds: Normal heart sounds. No murmur heard. Pulmonary:      Effort: Pulmonary effort is normal.      Breath sounds: Normal breath sounds. Musculoskeletal:      Right lower leg: No edema. Left lower leg: No edema. Right foot: Normal range of motion. No deformity, bunion, Charcot foot, foot drop or prominent metatarsal heads. Left foot: Normal range of motion. No deformity, bunion, Charcot foot, foot drop or prominent metatarsal heads. Feet:      Right foot:      Protective Sensation: 10 sites tested. 10 sites sensed. Skin integrity: Skin integrity normal.      Toenail Condition: Right toenails are abnormally thick. Left foot:      Protective Sensation: 10 sites tested. 10 sites sensed. Skin integrity: Skin integrity normal.      Toenail Condition: Left toenails are abnormally thick. Lymphadenopathy:      Cervical: No cervical adenopathy. Neurological:      General: No focal deficit present. Mental Status: She is alert and oriented to person, place, and time. Psychiatric:         Mood and Affect: Mood normal.         Behavior: Behavior normal. Behavior is cooperative. ASSESSMENT/PLAN:    Problem List Items Addressed This Visit       Essential hypertension - Primary (Chronic)     Well-controlled. Continue olmesartan 20 mg daily and hydrochlorothiazide 12.5 mg daily.          Relevant Orders    Comprehensive Metabolic Panel (Completed)    CBC with Auto Differential (Completed) Moderate episode of recurrent major depressive disorder (HCC) (Chronic)     Stable. Continue Lexapro 20 mg daily. Reports increased family support which she states has helped with her depression. Osteoporosis, post-menopausal (Chronic)     DEXA ordered in November 2022. Order reprinted for patient today and encouraged patient to follow through with scheduling. Has inconsistently been getting Reclast since 2011. Likely need to switch to Prolia. Recurrent UTI (Chronic)     Has upcoming appointment with urology. DDD (degenerative disc disease), thoracolumbar      Follows with Dr. Kiana Suero for pain management. Degenerative disc disease, lumbar       Follows with Dr. Kiana Suero for pain management. Diastolic dysfunction      Asymptomatic. Remains on olmesartan 20 mg daily and hydrochlorothiazide 12.5 mg daily. Compliant with CPAP. Hyperlipidemia LDL goal <100      Continue Crestor 10 mg daily. Check lipid panel, CMP. Relevant Orders    Comprehensive Metabolic Panel (Completed)    TSH with Reflex (Completed)    Lipid Panel (Completed)    Hypoglycemia     Patient was hypoglycemic when she presented to the office today. Sugar was checked and was 107. Given applesauce with resolution of symptoms. Patient was going to get food to eat as soon as she left the office. Check hemoglobin A1c. Relevant Orders    Hemoglobin A1C (Completed)    Late onset Alzheimer's dementia with behavioral disturbance ([facility] [address]      Relatively stable. Has improved family support currently. Continues to get medications through [facility]. She is active with care coordination. Remains independent with driving and IADLs. BEBE (obstructive sleep apnea)      Reports compliance with CPAP but is having difficulty with her machine. Has appointment in February to be evaluated for Inspire. Paresthesia of both feet     Suspect secondary to peripheral neuropathy. Recommend use of Aspercreme with lidocaine as needed. Relevant Orders    Vitamin B12 & Folate (Completed)    Vitamin D insufficiency     Check vitamin D level. Relevant Orders    Vitamin D 25 Hydroxy (Completed)       Current Outpatient Medications   Medication Sig Dispense Refill    Wheat Dextrin (BENEFIBER) POWD Take 4 g by mouth daily      busPIRone (BUSPAR) 15 MG tablet TAKE 1 TABLET BY MOUTH 3 TIMES DAILY 90 tablet 5    olmesartan (BENICAR) 20 MG tablet Take 1 tablet by mouth daily 30 tablet 5    escitalopram (LEXAPRO) 20 MG tablet Take 1 tablet by mouth daily 30 tablet 5    galantamine (RAZADYNE ER) 16 MG extended release capsule TAKE 1 CAPSULE BY MOUTH EVERY DAY WITH BREAKFAST 90 capsule 1    memantine (NAMENDA) 10 MG tablet Take 1 tablet by mouth twice daily 180 tablet 1    cetirizine (ZYRTEC) 10 MG tablet Take 10 mg by mouth daily      nabumetone (RELAFEN) 750 MG tablet Take 1 tablet by mouth 2 times daily 180 tablet 3    rosuvastatin (CRESTOR) 10 MG tablet TAKE 1 TABLET BY MOUTH NIGHTLY 90 tablet 3    traZODone (DESYREL) 100 MG tablet TAKE 2 TABLETS BY MOUTH EVERY NIGHT 60 tablet 10    potassium chloride (KLOR-CON M) 20 MEQ extended release tablet TAKE 1 TABLET BY MOUTH TWICE DAILY 60 tablet 10    D-MANNOSE PO Take by mouth      oxybutynin (DITROPAN XL) 15 MG extended release tablet TAKE 1 TABLET BY MOUTH DAILY *REPLACES OXYBUTININ ER 10MG TABLET* 30 tablet 10    hydroCHLOROthiazide (MICROZIDE) 12.5 MG capsule TAKE 1 CAPSULE BY MOUTH EVERY MORNING 30 capsule 10    azelastine (ASTELIN) 0.1 % nasal spray Use 2 sprays in each nostril 2 times daily. Use fluticasone 15 minutes after the morning dose of astelin.  1 each 5    diclofenac sodium (VOLTAREN) 1 % GEL       fluocinolone (DERMA-SMOOTHE) 0.01 % external oil APPLY TOPICALLY TWICE A WEEK 1 each 3    budesonide (ENTOCORT EC) 3 MG extended release capsule Take 6 mg by mouth every morning      Cyanocobalamin (B-12) 1000 MCG SUBL Place 1,000 Units under the tongue daily 90 tablet 3    carBAMazepine (TEGRETOL-XR) 100 MG extended release tablet Take 1 tablet by mouth 2 times daily 60 tablet 3    fluocinolone (DERMOTIC) 0.01 % OIL oil Place 1 drop in ear(s) 2 times daily 1 Bottle 3    Cholecalciferol (VITAMIN D3) 5000 units CAPS Take 5,000 Units by mouth daily       oxyMORphone (OPANA) 5 MG tablet Take 5 mg by mouth 2 times daily. Indications: per DR Javier Chandler TAKES 3 TIMES A DAY. TRYING TO CUT BACK.      zoledronic acid (RECLAST) 5 MG/100ML SOLN Infuse 5 mg intravenously once IV, yearly      aspirin 81 MG chewable tablet Take 81 mg by mouth daily. Calcium Carbonate-Vit D-Min (CALCIUM 1200 PO) Take 1 capsule by mouth 2 times daily. Ascorbic Acid (VITAMIN C) 500 MG tablet Take 500 mg by mouth daily. hydrOXYzine HCl (ATARAX) 50 MG tablet TAKE 1 TABLET BY MOUTH 4 TIMES DAILY AS NEEDED FOR ANXIETY. 120 tablet 1    baclofen (LIORESAL) 10 MG tablet Take 1 tablet by mouth 3 times daily as needed (pain) 60 tablet 2    gabapentin (NEURONTIN) 600 MG tablet TAKE 1 TABLET BY MOUTH THREE TIMES DAILY (Patient taking differently: Take 600 mg by mouth 3 times daily.) 90 tablet 10    fluticasone (FLONASE) 50 MCG/ACT nasal spray SPRAY TWO SPRAYS IN EACH NOSTRIL ONCE DAILY DURING ALLERGY SEASON 1 each prn    blood glucose monitor strips Check sugars qam and prn s/s of low blood sugars 50 strip 5    Lancets MISC Check sugars qam and prn s/s of low blood sugars 50 each 5    blood glucose monitor kit and supplies Check sugars qam and prn s/s of low blood sugars 1 kit 0    ketoconazole (NIZORAL) 2 % shampoo APPLY TOPICALLY DAILY AS NEEDED 1 Bottle 1    acetaminophen (TYLENOL) 500 MG tablet Take 1,000 mg by mouth 3 times daily as needed for Pain      diphenoxylate-atropine (LOMOTIL) 2.5-0.025 MG per tablet Take 1 tablet by mouth as needed for Diarrhea.        Current Facility-Administered Medications   Medication Dose Route Frequency Provider Last Rate Last Admin zoledronic acid (RECLAST) 5 mg/100 mL infusion  5 mg IntraVENous See Admin Instructions Dilma Chicas  mL/hr at 04/06/16 0900 5 mg at 04/06/16 0900       Return in about 3 months (around 5/1/2023).

## 2023-02-02 DIAGNOSIS — E55.9 VITAMIN D INSUFFICIENCY: ICD-10-CM

## 2023-02-02 DIAGNOSIS — E16.2 HYPOGLYCEMIA: ICD-10-CM

## 2023-02-02 DIAGNOSIS — R20.2 PARESTHESIA OF BOTH FEET: ICD-10-CM

## 2023-02-02 DIAGNOSIS — I10 ESSENTIAL HYPERTENSION: ICD-10-CM

## 2023-02-02 DIAGNOSIS — E78.5 HYPERLIPIDEMIA LDL GOAL <100: ICD-10-CM

## 2023-02-02 LAB
A/G RATIO: 1.9 (ref 1.1–2.2)
ALBUMIN SERPL-MCNC: 4 G/DL (ref 3.4–5)
ALP BLD-CCNC: 42 U/L (ref 40–129)
ALT SERPL-CCNC: 15 U/L (ref 10–40)
ANION GAP SERPL CALCULATED.3IONS-SCNC: 13 MMOL/L (ref 3–16)
AST SERPL-CCNC: 23 U/L (ref 15–37)
BASOPHILS ABSOLUTE: 0 K/UL (ref 0–0.2)
BASOPHILS RELATIVE PERCENT: 0.6 %
BILIRUB SERPL-MCNC: 0.5 MG/DL (ref 0–1)
BUN BLDV-MCNC: 9 MG/DL (ref 7–20)
CALCIUM SERPL-MCNC: 9.4 MG/DL (ref 8.3–10.6)
CHLORIDE BLD-SCNC: 96 MMOL/L (ref 99–110)
CHOLESTEROL, TOTAL: 121 MG/DL (ref 0–199)
CO2: 26 MMOL/L (ref 21–32)
CREAT SERPL-MCNC: 0.6 MG/DL (ref 0.6–1.2)
EOSINOPHILS ABSOLUTE: 0.2 K/UL (ref 0–0.6)
EOSINOPHILS RELATIVE PERCENT: 3.9 %
FOLATE: 11.22 NG/ML (ref 4.78–24.2)
GFR SERPL CREATININE-BSD FRML MDRD: >60 ML/MIN/{1.73_M2}
GLUCOSE BLD-MCNC: 69 MG/DL (ref 70–99)
HCT VFR BLD CALC: 37.5 % (ref 36–48)
HDLC SERPL-MCNC: 59 MG/DL (ref 40–60)
HEMOGLOBIN: 12.7 G/DL (ref 12–16)
LDL CHOLESTEROL CALCULATED: 28 MG/DL
LYMPHOCYTES ABSOLUTE: 1.7 K/UL (ref 1–5.1)
LYMPHOCYTES RELATIVE PERCENT: 30.5 %
MCH RBC QN AUTO: 31.3 PG (ref 26–34)
MCHC RBC AUTO-ENTMCNC: 33.8 G/DL (ref 31–36)
MCV RBC AUTO: 92.8 FL (ref 80–100)
MONOCYTES ABSOLUTE: 0.5 K/UL (ref 0–1.3)
MONOCYTES RELATIVE PERCENT: 8.9 %
NEUTROPHILS ABSOLUTE: 3.1 K/UL (ref 1.7–7.7)
NEUTROPHILS RELATIVE PERCENT: 56.1 %
PDW BLD-RTO: 12.3 % (ref 12.4–15.4)
PLATELET # BLD: 186 K/UL (ref 135–450)
PMV BLD AUTO: 8.4 FL (ref 5–10.5)
POTASSIUM SERPL-SCNC: 4 MMOL/L (ref 3.5–5.1)
RBC # BLD: 4.05 M/UL (ref 4–5.2)
SODIUM BLD-SCNC: 135 MMOL/L (ref 136–145)
TOTAL PROTEIN: 6.1 G/DL (ref 6.4–8.2)
TRIGL SERPL-MCNC: 171 MG/DL (ref 0–150)
TSH REFLEX: 0.83 UIU/ML (ref 0.27–4.2)
VITAMIN B-12: 1759 PG/ML (ref 211–911)
VITAMIN D 25-HYDROXY: 46.5 NG/ML
VLDLC SERPL CALC-MCNC: 34 MG/DL
WBC # BLD: 5.4 K/UL (ref 4–11)

## 2023-02-03 LAB
ESTIMATED AVERAGE GLUCOSE: 96.8 MG/DL
HBA1C MFR BLD: 5 %

## 2023-02-04 PROBLEM — R20.2 PARESTHESIA OF BOTH FEET: Status: ACTIVE | Noted: 2023-02-04

## 2023-02-05 NOTE — ASSESSMENT & PLAN NOTE
Asymptomatic. Remains on olmesartan 20 mg daily and hydrochlorothiazide 12.5 mg daily. Compliant with CPAP.

## 2023-02-05 NOTE — ASSESSMENT & PLAN NOTE
DEXA ordered in November 2022. Order reprinted for patient today and encouraged patient to follow through with scheduling. Has inconsistently been getting Reclast since 2011. Likely need to switch to Prolia.

## 2023-02-05 NOTE — ASSESSMENT & PLAN NOTE
Stable. Continue Lexapro 20 mg daily. Reports increased family support which she states has helped with her depression.

## 2023-02-05 NOTE — ASSESSMENT & PLAN NOTE
Reports compliance with CPAP but is having difficulty with her machine. Has appointment in February to be evaluated for Inspire.

## 2023-02-05 NOTE — ASSESSMENT & PLAN NOTE
Relatively stable. Has improved family support currently. Continues to get medications through [facility]. She is active with care coordination. Remains independent with driving and IADLs.

## 2023-02-05 NOTE — ASSESSMENT & PLAN NOTE
Patient was hypoglycemic when she presented to the office today. Sugar was checked and was 107. Given applesauce with resolution of symptoms. Patient was going to get food to eat as soon as she left the office. Check hemoglobin A1c.

## 2023-02-06 ENCOUNTER — TELEPHONE (OUTPATIENT)
Dept: INTERNAL MEDICINE CLINIC | Age: 73
End: 2023-02-06

## 2023-02-14 ENCOUNTER — CARE COORDINATION (OUTPATIENT)
Dept: CARE COORDINATION | Age: 73
End: 2023-02-14

## 2023-02-14 NOTE — CARE COORDINATION
Ambulatory Care Coordination Note  2023    Patient Current Location: [facility]     ACM contacted the patient by telephone. Verified name and  with patient as identifiers. Provided introduction to self, and explanation of the ACM role. Challenges to be reviewed by the provider   Additional needs identified to be addressed with provider: No  none               Method of communication with provider: none. ACM: Adele Conde, RN    RN-CC outreached patient for cc follow up; gi, urology, Inspire, cpap hose. Rescheduled appointment for Inspire. Will go next  for an evaluation. Did receive new hose for her cpap but it is still not working. She has an appointment with Darrian tomorrow. Appointment with Dr. Lauren Mcdaniels in 2 weeks. GI appointment on 23    She continues to monitor her blood pressure almost daily. Sbp ranges 108-168. She is taking her blood pressure medication as prescribed. Denies missed doses. Fatigue/weakness improving. No questions or concerns reported. RN-CC will follow up in 2 weeks for discharge if stable. Offered patient enrollment in the Remote Patient Monitoring (RPM) program for in-home monitoring: NA. Lab Results       None            Care Coordination Interventions    Referral from Primary Care Provider: Yes  Suggested Interventions and Community Resources  Fall Risk Prevention: In Process (Comment: 22 SIMON)  Medication Assistance Program: Completed (Comment: [employer] [facility])  Pharmacist: Declined (Comment: 22 SIMON)  Senior Services: Completed (Comment: referral to [facility] 10/12/22)  Social Work: Completed (Comment: [facility] 10/12/22)  Zone Management Tools:  In Process (Comment: 22 SIMON)  Other Services or Interventions: PEOPLE WORKING COOPERATIVELY, [facility] 22 [facility]ON          Goals Addressed    None         Future Appointments   Date Time Provider Sung Kovacs   2023 11:15 AM Christie Anthony MD AND BARI RIOS   2023 10:10 AM Aldo Ayon MD FF NEURO Neurology -   5/3/2023 10:40 AM DO GINA Ohara IM [place] - DYD   12/18/2023 11:00 AM WILLOW Lee FF SLEEP MED MMA    and   General Assessment    Do you have any symptoms that are causing concern?: No

## 2023-02-20 DIAGNOSIS — M79.7 FIBROMYALGIA: ICD-10-CM

## 2023-02-20 RX ORDER — HYDROCHLOROTHIAZIDE 12.5 MG/1
CAPSULE, GELATIN COATED ORAL
Qty: 30 CAPSULE | Refills: 10 | Status: SHIPPED | OUTPATIENT
Start: 2023-02-20

## 2023-02-20 RX ORDER — GABAPENTIN 600 MG/1
TABLET ORAL
Qty: 90 TABLET | Refills: 10 | Status: SHIPPED | OUTPATIENT
Start: 2023-02-20 | End: 2023-05-21

## 2023-02-21 ENCOUNTER — OFFICE VISIT (OUTPATIENT)
Dept: PULMONOLOGY | Age: 73
End: 2023-02-21
Payer: MEDICARE

## 2023-02-21 VITALS
DIASTOLIC BLOOD PRESSURE: 62 MMHG | BODY MASS INDEX: 34.17 KG/M2 | OXYGEN SATURATION: 91 % | HEIGHT: 61 IN | SYSTOLIC BLOOD PRESSURE: 118 MMHG | HEART RATE: 63 BPM | TEMPERATURE: 97.7 F | RESPIRATION RATE: 20 BRPM | WEIGHT: 181 LBS

## 2023-02-21 DIAGNOSIS — G47.33 OSA (OBSTRUCTIVE SLEEP APNEA): Primary | ICD-10-CM

## 2023-02-21 DIAGNOSIS — F51.04 CHRONIC INSOMNIA: ICD-10-CM

## 2023-02-21 PROCEDURE — 99204 OFFICE O/P NEW MOD 45 MIN: CPT | Performed by: INTERNAL MEDICINE

## 2023-02-21 PROCEDURE — 3078F DIAST BP <80 MM HG: CPT | Performed by: INTERNAL MEDICINE

## 2023-02-21 PROCEDURE — 3074F SYST BP LT 130 MM HG: CPT | Performed by: INTERNAL MEDICINE

## 2023-02-21 PROCEDURE — 1123F ACP DISCUSS/DSCN MKR DOCD: CPT | Performed by: INTERNAL MEDICINE

## 2023-02-21 RX ORDER — LISINOPRIL 40 MG/1
40 TABLET ORAL DAILY
COMMUNITY

## 2023-02-21 RX ORDER — DULOXETIN HYDROCHLORIDE 60 MG/1
60 CAPSULE, DELAYED RELEASE ORAL DAILY
COMMUNITY

## 2023-02-21 RX ORDER — PSEUDOEPHEDRINE HCL 30 MG
30 TABLET ORAL EVERY 4 HOURS PRN
COMMUNITY

## 2023-02-21 ASSESSMENT — ENCOUNTER SYMPTOMS
ALLERGIC/IMMUNOLOGIC NEGATIVE: 1
GASTROINTESTINAL NEGATIVE: 1
RESPIRATORY NEGATIVE: 1
EYES NEGATIVE: 1

## 2023-02-21 ASSESSMENT — SLEEP AND FATIGUE QUESTIONNAIRES
HOW LIKELY ARE YOU TO NOD OFF OR FALL ASLEEP WHILE WATCHING TV: 2
HOW LIKELY ARE YOU TO NOD OFF OR FALL ASLEEP WHILE LYING DOWN TO REST IN THE AFTERNOON WHEN CIRCUMSTANCES PERMIT: 2
HOW LIKELY ARE YOU TO NOD OFF OR FALL ASLEEP IN A CAR, WHILE STOPPED FOR A FEW MINUTES IN TRAFFIC: 0
HOW LIKELY ARE YOU TO NOD OFF OR FALL ASLEEP WHILE SITTING INACTIVE IN A PUBLIC PLACE: 0
HOW LIKELY ARE YOU TO NOD OFF OR FALL ASLEEP WHILE SITTING AND TALKING TO SOMEONE: 0
NECK CIRCUMFERENCE (INCHES): 15.75
HOW LIKELY ARE YOU TO NOD OFF OR FALL ASLEEP WHILE SITTING AND READING: 1
HOW LIKELY ARE YOU TO NOD OFF OR FALL ASLEEP WHILE SITTING QUIETLY AFTER LUNCH WITHOUT ALCOHOL: 0
ESS TOTAL SCORE: 5
HOW LIKELY ARE YOU TO NOD OFF OR FALL ASLEEP WHEN YOU ARE A PASSENGER IN A CAR FOR AN HOUR WITHOUT A BREAK: 0

## 2023-02-21 NOTE — LETTER
[address] [facility] Pulmonary Critical Care and Sleep  [address] [facility] [address] [address]  Phone: [phone]  Fax: [phone]    Danny Villegas MD        February 21, 2023     Patient: Rona Patterson   YOB: 1950   Date of Visit: 2/21/2023 2/21/23        Rona Patterson      I have seen this patient in the office today and wanted to communicate my findings and recommendations. Patient Instructions         ASSESSMENT/PLAN:   Diagnosis Orders   1. BEBE (obstructive sleep apnea)  Ambulatory referral to ENT      2. Chronic insomnia  Ambulatory referral to ENT          Sleep study done 1/20/2021        Patient has been on CPAP machine since 2021, feels benefit from it however has been unable to get used to the hose and the mask despite various mask changes and pressure changes  She is looking for an alternative and wants to consider inspire  Went over the process for inspire including the drug-induced sleep endoscopy and referral to Dr. Pattie Dobbs or Bhaskar and the further work-up and implantation which is required for the inspire. She is agreeable and wants to proceed.   We will refer to Dr. Valle/Bhaskar                     Thank you for allowing me to assist in the care of the MD Danny Carver MD

## 2023-02-21 NOTE — PROGRESS NOTES
MA Communication:   The following orders are received by verbal communication from Dinah Desouza MD    Orders include:  Referral for Dr. Geneva Mathews given to pt       FU after INSPIRE sx

## 2023-02-21 NOTE — PATIENT INSTRUCTIONS
ASSESSMENT/PLAN:   Diagnosis Orders   1. BEBE (obstructive sleep apnea)  Ambulatory referral to ENT      2. Chronic insomnia  Ambulatory referral to ENT          Sleep study done 1/20/2021        Patient has been on CPAP machine since 2021, feels benefit from it however has been unable to get used to the hose and the mask despite various mask changes and pressure changes  She is looking for an alternative and wants to consider inspire  Went over the process for inspire including the drug-induced sleep endoscopy and referral to Dr. Shantell Haney or Bhaskar and the further work-up and implantation which is required for the inspire. She is agreeable and wants to proceed.   We will refer to Dr. Shakira Smith

## 2023-02-21 NOTE — PROGRESS NOTES
Agustina Arevalo (: 1950 ) is a 68 y.o. female here for an evaluation of   Chief Complaint   Patient presents with    Sleep Apnea     INSPIRE Evaluation         ASSESSMENT/PLAN:   Diagnosis Orders   1. BEBE (obstructive sleep apnea)  Ambulatory referral to ENT      2. Chronic insomnia  Ambulatory referral to ENT          Sleep study done 2021        Patient has been on CPAP machine since , feels benefit from it however has been unable to get used to the hose and the mask despite various mask changes and pressure changes  She is looking for an alternative and wants to consider inspire  Went over the process for inspire including the drug-induced sleep endoscopy and referral to Dr. Gill Diaz or Bhaskar and the further work-up and implantation which is required for the inspire. She is agreeable and wants to proceed. We will refer to Dr. Valle/Bhaskar            No follow-ups on file. SUBJECTIVE/OBJECTIVE:    Consult from WILLOW Soriano  For inspire for sleep apnea  Initially seen at 2023      Has been on cpap since   And been using the cpap  And not able to tolerate  And has been having issues with the mask and hose and machine    Overall has been issues with the machine      Felt better with cpap   Slept better  Less sleepy      Was looking alternative      Patient has been frustrated with trying to use a CPAP machine and getting used to it  Also been having some issues with the DME and getting supplies correctly  Overall she is looking for an alternative  She does feel benefit when she is able to use the CPAP machine and would like to have this experience again without using a CPAP machine        Review of Systems   Constitutional: Negative. HENT: Negative. Eyes: Negative. Respiratory: Negative. Cardiovascular: Negative. Gastrointestinal: Negative. Endocrine: Negative. Genitourinary: Negative. Musculoskeletal: Negative. Skin: Negative.     Allergic/Immunologic: Negative.    Neurological: Negative.    Hematological: Negative.    Psychiatric/Behavioral: Negative.         Vitals:    02/21/23 1044   BP: 118/62   Site: Left Upper Arm   Position: Sitting   Cuff Size: Medium Adult   Pulse: 63   Resp: 20   Temp: 97.7 °F (36.5 °C)   TempSrc: Temporal   SpO2: 91%   Weight: 181 lb (82.1 kg)   Height: 5' 1\" (1.549 m)        Physical Exam  Vitals and nursing note reviewed.   Constitutional:       General: She is not in acute distress.     Appearance: Normal appearance. She is obese. She is not ill-appearing.   HENT:      Head: Normocephalic and atraumatic.      Right Ear: External ear normal.      Left Ear: External ear normal.      Nose: Nose normal.      Mouth/Throat:      Mouth: Mucous membranes are moist.      Pharynx: Oropharynx is clear.      Comments: Mallampati 3  Eyes:      General: No scleral icterus.     Extraocular Movements: Extraocular movements intact.      Conjunctiva/sclera: Conjunctivae normal.      Pupils: Pupils are equal, round, and reactive to light.   Cardiovascular:      Rate and Rhythm: Normal rate and regular rhythm.      Pulses: Normal pulses.      Heart sounds: Normal heart sounds. No murmur heard.    No friction rub.   Pulmonary:      Effort: No respiratory distress.      Breath sounds: No stridor. No wheezing, rhonchi or rales.   Abdominal:      General: Abdomen is flat. Bowel sounds are normal. There is no distension.      Tenderness: There is no abdominal tenderness. There is no guarding.   Musculoskeletal:         General: No swelling or tenderness. Normal range of motion.      Cervical back: Normal range of motion and neck supple. No rigidity.   Skin:     General: Skin is warm and dry.      Coloration: Skin is not jaundiced.   Neurological:      General: No focal deficit present.      Mental Status: She is alert and oriented to person, place, and time. Mental status is at baseline.      Cranial Nerves: No cranial nerve deficit.      Sensory: No sensory  deficit. Motor: No weakness. Gait: Gait normal.   Psychiatric:         Mood and Affect: Mood normal.         Thought Content:  Thought content normal.         Judgment: Judgment normal.            Jeffrey Ovalles MD

## 2023-02-28 ENCOUNTER — CARE COORDINATION (OUTPATIENT)
Dept: CARE COORDINATION | Age: 73
End: 2023-02-28

## 2023-02-28 NOTE — CARE COORDINATION
RN-CC outreached patient for cc follow up; Inspire, GI, Urology, cpap hose, needs & concerns. Patient states she is at her urology appointment and will call RN-CC back at later time.

## 2023-03-02 ENCOUNTER — TELEPHONE (OUTPATIENT)
Dept: PULMONOLOGY | Age: 73
End: 2023-03-02

## 2023-03-02 NOTE — TELEPHONE ENCOUNTER
Pt called stating Dr. Bernie Mcallister states she is a candidate for the inspire and in order for her to get it they need a pre authorization from us.  Please advise,    Ph. [phone]

## 2023-03-03 ENCOUNTER — CARE COORDINATION (OUTPATIENT)
Dept: CARE COORDINATION | Age: 73
End: 2023-03-03

## 2023-03-03 DIAGNOSIS — I10 ESSENTIAL HYPERTENSION: Primary | ICD-10-CM

## 2023-03-03 RX ORDER — SPIRONOLACTONE 25 MG/1
12.5 TABLET ORAL DAILY
Qty: 15 TABLET | Refills: 5 | Status: SHIPPED | OUTPATIENT
Start: 2023-03-03

## 2023-03-03 RX ORDER — NITROFURANTOIN 25; 75 MG/1; MG/1
100 CAPSULE ORAL 2 TIMES DAILY
COMMUNITY
Start: 2023-02-28 | End: 2023-03-04

## 2023-03-03 RX ORDER — BETHANECHOL CHLORIDE 50 MG/1
50 TABLET ORAL 3 TIMES DAILY
COMMUNITY

## 2023-03-03 NOTE — CARE COORDINATION
Ambulatory Care Coordination Note  3/3/2023    Patient Current Location: [facility]     ACM contacted the patient by telephone. Verified name and  with patient as identifiers. Provided introduction to self, and explanation of the ACM role. Challenges to be reviewed by the provider   Additional needs identified to be addressed with provider: Yes  Patient requesting 90 day supply of maintenance medications. States it is cheaper this way. Blood pressure readings sent to pcp               Method of communication with provider: chart routing. ACM: Adele Conde RN    RN-CC returned call to patient for cc follow up; blood pressure, urology, GI, inspire, needs & concerns. Patient was seen by Urology last week. Dx and treated for UTI. Started on Macrobid - she is taking as directed with improvement in sx. Has 1 day left of treatment. Patient states she was told she isn't emptying her bladder. She was started on Bethanechol TID but is only taking BID - she states she isn't taking the evening dose because it causes her to be incontinent of urine t/o the night. RN-CC educated on tips UTI prevention; increase water daily, drink cranberry juice, hygiene - wipe front to back, empty bladder when the need arises, avoid caffeine. Patient acknowledges she doesn't drink enough water but has recently started drinking more. Reviewed s/sx of UTI and when to notify her doctor. Inspire - she was referred to ENT for evaluation. She has not scheduled an appointment to date. RN-CC offered to assist with scheduling but she declined. She states she has the contact information and will call today to schedule. She is still having difficulty with her cpap hose. She has an appointment with Yesika Tiwari on 2/15 to troubleshoot her hose. She states she wasn't aware she had to take the whole machine with her, so they were unable to assist. She has not called to reschedule to date. She declined assistance from RN-CC with scheduling.  She reports fatigue. RN-CC informed patient that this could be caused by her sleep apnea and encouraged her to schedule with [facility] and ENT asap, pt agreeable. She states she spoke with [facility] and was told her co-pay's are lower if her maintenance medication is ordered in 90 day supplies. RN-CC informed patient I will notify her pcp. Patient provided RN-CC with blood pressure readings: She is taking Olmesartan and HCTZ as ordered, denies missed doses. She has a follow up with Dr. Noris Chaidez on 5/3/23.    142/72  172/68  156/74  148/63  153/64  148/85  161/40  131/56    RN-CC informed patient that sleep apnea can affect blood pressure and again encouraged her to follow up with ENT and [place]. RN-CC will send resources on low sodium heart healthy diet through [employer]. She did experience 1 episode last week where she felt like her blood pressure was low. Her sx improved immediately with food. Patient states she eats peanut butter and crackers if she feels shaky. RN-CC educated on eating evenly spaced meals. Advised patient avoid skipping meals to prevent a drop in blood sugar. RN-CC recommended drinking 4 oz of fruit juice followed by a complex carb for s/sx of hypoglycemia. Patient questioning if she should keep her blood pressure readings. RN-CC advised her to keep all of her readings and bring to her next appointment with Dr. Noris Chaidez. PLAN:    Route message to pcp regarding 90 day supply of maintenance medications and blood pressure readings   F/u on ENT/[facility] appt  F/u on urology  F/u on blood pressure  Needs & concerns    Offered patient enrollment in the Remote Patient Monitoring (RPM) program for in-home monitoring: Patient declined. Patient feels overwhelmed with medical appointments and does not want to add another thing to her daily routine.      Lab Results       None            Care Coordination Interventions    Referral from Primary Care Provider: Yes  Suggested Interventions and Community Resources  Fall Risk Prevention: In Process (Comment: 9/14/22 SIMON)  Medication Assistance Program: Completed (Comment: [facility] 9/14/22 SIMON)  Pharmacist: Declined (Comment: 9/14/22 SIMON)  Senior Services: Completed (Comment: referral to [facility] 10/12/22)  Social Work: Completed (Comment: [facility] 10/12/22)  Zone Management Tools:  In Process (Comment: 9/14/22 SIMON)  Other Services or Interventions: PEOPLE WORKING COOPERATIVELY, [facility] 9/14/22 [facility]          Goals Addressed    None         Future Appointments   Date Time Provider Sung Kovacs   4/19/2023 10:10 AM Charmayne Pebbles, MD FF NEURO Neurology -   5/3/2023 10:40 AM DO GINA Garcia  [place] - DYD   12/18/2023 11:00 AM WILLOW Cabrera FF SLEEP MED MMA    and   General Assessment    Do you have any symptoms that are causing concern?: No

## 2023-03-03 NOTE — TELEPHONE ENCOUNTER
Spoke with patient. She needs to reach out to Dr. Francesco Vicente office for next steps in the process and then they will do the PA. Patient was given number for Dr. Mccallum [place] office.

## 2023-03-07 ENCOUNTER — CARE COORDINATION (OUTPATIENT)
Dept: CARE COORDINATION | Age: 73
End: 2023-03-07

## 2023-03-07 NOTE — CARE COORDINATION
RN-CC outreached patient; advised on Spironolactone, including dose, frequency and side effects. Patient will pickup from pharmacy this week. RN-CC advised patient to continue to monitor blood pressure daily. RN-CC will follow up in 1 week for readings, pt massiel.

## 2023-03-13 ENCOUNTER — CARE COORDINATION (OUTPATIENT)
Dept: CARE COORDINATION | Age: 73
End: 2023-03-13

## 2023-03-13 NOTE — CARE COORDINATION
Ambulatory Care Coordination Note  3/13/2023    Patient Current Location: [facility]     ACM contacted the patient by telephone. Verified name and  with patient as identifiers. Provided introduction to self, and explanation of the ACM role. Challenges to be reviewed by the provider   Additional needs identified to be addressed with provider: Yes - Dr. Demarco URIASx of UTI               Method of communication with provider: phone. ACM: Adele Conde RN  RN-CC received incoming call from patient. She recently completed tx that initiated by Dr Demarco Acuña for UTI. She reports her sx improved on Macrobid but never resolved. She reports sx worsened after she completed the antibiotic. She reports malodorous urine, pelvic pain, dysuria, frequent urination and fatigue. She is requesting an order for repeat urine sent to [facility] in [place]. RN-CC informed patient I will outreach Dr. Demarco Acuña to discuss sx and repeating testing. RN-CC outreached the [facility]; spoke with Jose Rafael Buckner regarding worsening sx. Jose Rafael Buckner states she will notify Dr. Demarco Acuña of patients complaints and fax an order for a repeat urine to [facility] in [place]. RN-CC requested they follow up with the patient when the order has been faxed. RN-CC outreached patient; updated on conversation with tanner Armendariz. RN-CC reinforced drinking water to flush her bladder as well as red flag sx, pt massiel. Offered patient enrollment in the Remote Patient Monitoring (RPM) program for in-home monitoring: Patient declined. Lab Results       None            Care Coordination Interventions    Referral from Primary Care Provider: Yes  Suggested Interventions and Community Resources  Fall Risk Prevention:  In Process (Comment: 22 SIMON)  Medication Assistance Program: Completed (Comment: [facility] 22 SIMON)  Pharmacist: Declined (Comment: 22 SIMON)  Senior Services: Completed (Comment: referral to [facility] 10/12/22)  Social Work: Completed (Comment: [facility] 10/12/22)  Zone Management Tools: In Process (Comment: 9/14/22 SIMON)  Other Services or Interventions: PEOPLE WORKING COOPERATIVELY, [facility] 9/14/22 SIMON          Goals Addressed    None         Future Appointments   Date Time Provider Sung Kovacs   3/20/2023 11:10 AM Almyra Crigler, DO [address] ENT MMA   4/19/2023 10:10 AM Say Edward MD FF NEURO Neurology -   5/3/2023 10:40 AM Edward Madden DO [facility] [place] - DYD   12/18/2023 11:00 AM WILLOW Avalos FF SLEEP MED MMA   ,   Diabetes Assessment    Medic Alert ID: No  How often do you test your blood sugar?: No Testing (Comment: 3/3/23 [facility])   Do you have barriers with adherence to non-pharmacologic self-management interventions?  (Nutrition/Exercise/Self-Monitoring): No   Have you ever had to go to the ED for symptoms of low blood sugar?: No            , and   General Assessment

## 2023-03-15 ENCOUNTER — CARE COORDINATION (OUTPATIENT)
Dept: CARE COORDINATION | Age: 73
End: 2023-03-15

## 2023-03-15 DIAGNOSIS — I10 ESSENTIAL HYPERTENSION: Primary | ICD-10-CM

## 2023-03-15 DIAGNOSIS — G30.1 LATE ONSET ALZHEIMER'S DEMENTIA WITH BEHAVIORAL DISTURBANCE (HCC): ICD-10-CM

## 2023-03-15 DIAGNOSIS — Z79.899 POLYPHARMACY: ICD-10-CM

## 2023-03-15 DIAGNOSIS — F02.818 LATE ONSET ALZHEIMER'S DEMENTIA WITH BEHAVIORAL DISTURBANCE (HCC): ICD-10-CM

## 2023-03-15 NOTE — CARE COORDINATION
Ambulatory Care Coordination Note  3/15/2023    Patient Current Location: [facility]     ACM contacted the patient by telephone. Verified name and  with patient as identifiers. Provided introduction to self, and explanation of the ACM role. Challenges to be reviewed by the provider   Additional needs identified to be addressed with provider: Yes  Medication refills               Method of communication with provider: chart routing. ACM: Adele Conde RN    RN-CC received call from patient stating she needs help with her medications. Patient states she received a shipment from [address] but is missing Lexapro, Olmesartan and Buspar. Patient states she prefers to have all of her medications filled at [address] RN-CC offered to assist with transferring her medications from [facility] to [facility] and patient is agreeable. Patient then changed her mind stating she does not want to transfer her medications. RN-CC encouraged patient to reconsider to streamline her medications. After reviewing her medications, it appears Buspar was sent to [facility] on 22 90 tab + 5 refills. RN-CC outreached [facility] and was informed that the patient has not filled this medication since 2022. A refill was requested by the patient and filled in 2023 but the patient never picked up the medication. It appears lexapro, olmesartan and hydroxyzine were discontinued by Bisi Ferrell MA on 23? I will outreach pcp to confirm the patient should still be taking. The patient did confirm that she has approximately 10 days left of Olmesartan and Lexapro as well as a full bottle of Buspar. She is requesting Olmesartan and Lexapro sent to [facility] and Hydroxyzine sent to [facility] on [facility]. RN-CC outreached [facility] and confirmed Olmesartan and Lexapro were discontinued on 23. RN-CC confirmed they do have a prescription for Buspar but they haven't been able to fill it because the patient is getting it from another pharmacy.

## 2023-03-16 ENCOUNTER — TELEPHONE (OUTPATIENT)
Dept: INTERNAL MEDICINE CLINIC | Age: 73
End: 2023-03-16

## 2023-03-16 DIAGNOSIS — F02.818 LATE ONSET ALZHEIMER'S DEMENTIA WITH BEHAVIORAL DISTURBANCE (HCC): ICD-10-CM

## 2023-03-16 DIAGNOSIS — F02.80 ALZHEIMER'S DISEASE WITH LATE ONSET (HCC): ICD-10-CM

## 2023-03-16 DIAGNOSIS — G30.1 ALZHEIMER'S DISEASE WITH LATE ONSET (HCC): ICD-10-CM

## 2023-03-16 DIAGNOSIS — I10 HYPERTENSION, ESSENTIAL: Primary | ICD-10-CM

## 2023-03-16 DIAGNOSIS — G30.1 LATE ONSET ALZHEIMER'S DEMENTIA WITH BEHAVIORAL DISTURBANCE (HCC): ICD-10-CM

## 2023-03-16 RX ORDER — OLMESARTAN MEDOXOMIL 20 MG/1
20 TABLET ORAL DAILY
Qty: 30 TABLET | Refills: 5 | Status: SHIPPED | OUTPATIENT
Start: 2023-03-16

## 2023-03-16 RX ORDER — ESCITALOPRAM OXALATE 20 MG/1
20 TABLET ORAL DAILY
Qty: 30 TABLET | Refills: 5 | Status: SHIPPED | OUTPATIENT
Start: 2023-03-16

## 2023-03-16 RX ORDER — HYDROXYZINE 50 MG/1
50 TABLET, FILM COATED ORAL 4 TIMES DAILY PRN
Qty: 120 TABLET | Refills: 1 | Status: SHIPPED | OUTPATIENT
Start: 2023-03-16

## 2023-03-16 NOTE — TELEPHONE ENCOUNTER
Catherine Almodovar from [facility] calling---due to the volume of pts in her area they will not be able to take her on as a pt ----thanks.

## 2023-03-17 ENCOUNTER — CARE COORDINATION (OUTPATIENT)
Dept: CARE COORDINATION | Age: 73
End: 2023-03-17

## 2023-03-17 NOTE — CARE COORDINATION
Ambulatory Care Coordination Note  3/17/2023    Patient Current Location: [address] [place] [address] Dr CROFT contacted the patient by telephone. Verified name and  with patient as identifiers. Provided introduction to self, and explanation of the ACM role. Challenges to be reviewed by the provider   Additional needs identified to be addressed with provider: No  none               Method of communication with provider: none. ACM: Adele Conde RN    RN-CC outreached patient for cc follow up. [address] referral for medication management and supporting documents faxed to [address] [place] [address],[address]. RN-CC informed patient that someone from [facility] will call her to setup an evaluation, pt vu. She is aware they will assist her with sorting through and organizing her medications. She received a call from Urology stating she has another UTI. Her son in law will pickup the antibiotic from the pharmacy today. She will follow up with Urology on 23. RN-CC educated on red flag sx, pt vu. PLAN:  Urology   [facility] - sn medication management  Needs & concerns      Offered patient enrollment in the Remote Patient Monitoring (RPM) program for in-home monitoring: Patient declined. Lab Results       None            Care Coordination Interventions    Referral from Primary Care Provider: Yes  Suggested Interventions and Community Resources  Fall Risk Prevention: In Process (Comment: 22 SIMON)  Medication Assistance Program: Completed (Comment: [employer] [facility] 22 SIMON)  Pharmacist: Declined (Comment: 22 SIMON)  Senior Services: Completed (Comment: referral to [facility] 10/12/22)  Social Work: Completed (Comment: [facility] 10/12/22)  Zone Management Tools:  In Process (Comment: 22 SIMON)  Other Services or Interventions: PEOPLE WORKING COOPERATIVELY, [facility] 22 SIMON          Goals Addressed    None         Future Appointments   Date Time Provider Sung Kovacs   3/20/2023 11:10 AM David Grissom DO [address] [facility]   2023 10:10 AM Daphney Clancy MD FF NEURO Neurology -   5/3/2023 10:40 AM DO GINA Viera [facility] [place] - DYD   12/18/2023 11:00 AM Rexie Bosworth, APRN FF SLEEP MED MMA    and   General Assessment

## 2023-03-19 NOTE — PROGRESS NOTES
Spouse name: Not on file    Number of children: Not on file    Years of education: Not on file    Highest education level: Not on file   Occupational History    Occupation: retired   Tobacco Use    Smoking status: Never    Smokeless tobacco: Never   Vaping Use    Vaping Use: Never used   Substance and Sexual Activity    Alcohol use: Not Currently    Drug use: No    Sexual activity: Not on file   Other Topics Concern    Not on file   Social History Narrative    Not on file     Social Determinants of Health     Financial Resource Strain: Low Risk     Difficulty of Paying Living Expenses: Not very hard   Food Insecurity: No Food Insecurity    Worried About Running Out of Food in the Last Year: Never true    Ran Out of Food in the Last Year: Never true   Transportation Needs: No Transportation Needs    Lack of Transportation (Medical): No    Lack of Transportation (Non-Medical): No   Physical Activity: Inactive    Days of Exercise per Week: 0 days    Minutes of Exercise per Session: 0 min   Stress: Stress Concern Present    Feeling of Stress : To some extent   Social Connections: Socially Isolated    Frequency of Communication with Friends and Family: Once a week    Frequency of Social Gatherings with Friends and Family: Once a week    Attends [employer] Services: Never    Active Member of Clubs or Organizations: No    Attends Club or Organization Meetings: Never    Marital Status:    Intimate Partner Violence: Not on file   Housing Stability: Low Risk     Unable to Pay for Housing in the Last Year: No    Number of Places Lived in the Last Year: 1    Unstable Housing in the Last Year: No       DRUG/FOOD ALLERGIES: Amlodipine, Ativan [lorazepam], Sulfa antibiotics, and Keflex [cephalexin]    CURRENT MEDICATIONS  Prior to Admission medications    Medication Sig Start Date End Date Taking?  Authorizing Provider   trimethoprim (TRIMPEX) 100 MG tablet TAKE 1 TABLET BY MOUTH TWICE A DAY 3/17/23  Yes

## 2023-03-20 ENCOUNTER — OFFICE VISIT (OUTPATIENT)
Dept: ENT CLINIC | Age: 73
End: 2023-03-20
Payer: MEDICARE

## 2023-03-20 VITALS — WEIGHT: 180 LBS | BODY MASS INDEX: 35.34 KG/M2 | TEMPERATURE: 96.6 F | HEIGHT: 60 IN

## 2023-03-20 DIAGNOSIS — G47.33 OBSTRUCTIVE SLEEP APNEA: Primary | ICD-10-CM

## 2023-03-20 DIAGNOSIS — J30.2 SEASONAL ALLERGIES: ICD-10-CM

## 2023-03-20 PROCEDURE — 1123F ACP DISCUSS/DSCN MKR DOCD: CPT | Performed by: STUDENT IN AN ORGANIZED HEALTH CARE EDUCATION/TRAINING PROGRAM

## 2023-03-20 PROCEDURE — 99204 OFFICE O/P NEW MOD 45 MIN: CPT | Performed by: STUDENT IN AN ORGANIZED HEALTH CARE EDUCATION/TRAINING PROGRAM

## 2023-03-20 RX ORDER — TRIMETHOPRIM 100 MG/1
TABLET ORAL
COMMUNITY
Start: 2023-03-17

## 2023-03-20 ASSESSMENT — ENCOUNTER SYMPTOMS
NAUSEA: 0
COUGH: 0
SHORTNESS OF BREATH: 0
SINUS PRESSURE: 1
EYE PAIN: 0
VOMITING: 0
RHINORRHEA: 1
SINUS PAIN: 1

## 2023-03-20 NOTE — LETTER
[facility]    Surgery Schedule Request Form: 03/20/23        Franck Roman  DATE OF SURGERY: 4/18/23  TIME OF SURGERY:  8:30             CONF #: ____________________   Patient Information:  Patient name: Liz KAHN  YOB: 1950 Age/Sex:73 y.o./female    SS #:[id number]    Wt Readings from Last 1 Encounters:   03/20/23 180 lb (81.6 kg)       Telephone Information:   Mobile [phone]                                  Pt uses [employer]    Surgeon & Procedure Information:   Lead surgeon: Jing Kyle Co-Surgeon: no  Phone: [phone] Fax: [phone]  PCP: Sushant Rojas DO    Diagnosis: obstructive sleep apnea  G47.33    Location: [address]    Procedure name/CPT: DISE    Procedure length: 30 minutes Anesthesia: MAC    Special Equipment: yes - NP scope    Patient Status: SDS (OP)    COVID Vacs: yes / no     Primary Payor Plan: [employer]  Member ID: [id number]   Subscriber name: Man [employer]     [] Implement attached clinical orders for patient.       Electronically signed by Isatu Heaton DO on 3/20/2023 at 11:35 AM

## 2023-03-21 ENCOUNTER — CARE COORDINATION (OUTPATIENT)
Dept: CARE COORDINATION | Age: 73
End: 2023-03-21

## 2023-03-21 RX ORDER — OXYBUTYNIN CHLORIDE 15 MG/1
TABLET, EXTENDED RELEASE ORAL
Qty: 30 TABLET | Refills: 10 | Status: SHIPPED | OUTPATIENT
Start: 2023-03-21

## 2023-03-21 NOTE — CARE COORDINATION
Ambulatory Care Coordination Note  3/21/2023    Patient Current Location: [facility]     ACM contacted the patient by telephone. Verified name and  with patient as identifiers. Provided introduction to self, and explanation of the ACM role. Challenges to be reviewed by the provider   Additional needs identified to be addressed with provider: No  none               Method of communication with provider: none. ACM: Adele Conde RN  Patient started on Trimpex for UTI last Friday. Patient reports urinary sx improving. She was scheduled to follow up with urology today but missed her appointment. She did provide a urine sample and was instructed to call the office Friday for the results. She reports she still feels fatigued. She is monitoring her blood pressure at home. 3/21 117/81N  3/17  161/64  3/16 103/74   3/15 149/68  3/14 122/57  3/13 137/56  3/12 113/56    She is waiting on a call back from sleep medicine regarding Inspire. She is unable to wear her cpap - she says she is getting an error stating there is a blockage. She states she just checked her filter last week but will check again. She took her cpap into [facility] times regarding various issues. She states she is tired of messing with her cpap and isn't interested in troubleshooting. RNROBINA confirmed she has the phone number for [facility] in the event she needs to call. She is requesting a refill on Ciclopirox shampoo that was prescribed by her dermatologist. She isn't sure if she has refills? The script was originally sent to [facility] but she states they no longer take her insurance. RN-CC [employer]; spoke with [facility]. Unfortunately, there were refills but the prescription  2023. RN-CC outreached Dr. Joanne Suarez office and requested a refill sent to [facility]; awaiting response. Patient is aware she may need to schedule a telehealth visit with Dr. Maria Guadalupe a[place]blo since it has been over 1 year.      She is also requesting a refill on

## 2023-03-23 ENCOUNTER — CARE COORDINATION (OUTPATIENT)
Dept: CARE COORDINATION | Age: 73
End: 2023-03-23

## 2023-03-23 DIAGNOSIS — F02.80 LATE ONSET ALZHEIMER'S DISEASE WITHOUT BEHAVIORAL DISTURBANCE (HCC): ICD-10-CM

## 2023-03-23 DIAGNOSIS — G30.1 LATE ONSET ALZHEIMER'S DISEASE WITHOUT BEHAVIORAL DISTURBANCE (HCC): ICD-10-CM

## 2023-03-23 RX ORDER — GALANTAMINE HYDROBROMIDE 16 MG/1
CAPSULE, EXTENDED RELEASE ORAL
Qty: 90 CAPSULE | Refills: 0 | Status: SHIPPED | OUTPATIENT
Start: 2023-03-23

## 2023-03-23 NOTE — CARE COORDINATION
Call received from patient stating she has not received a call from [facility]. RN-CC informed patient that I will outreach [facility] to follow up on the referral.    RN-CC outreached [facility]; spoke with [place]. [place] requested I refax the referral to [phone]. Referral and supporting documents faxed. [place] states she will call me back tomorrow morning to confirm faxed was received. Patient notified and vu.

## 2023-03-27 ENCOUNTER — CARE COORDINATION (OUTPATIENT)
Dept: CARE COORDINATION | Age: 73
End: 2023-03-27

## 2023-03-27 NOTE — CARE COORDINATION
Ambulatory Care Coordination Note  3/27/2023    Patient Current Location: [facility]     ACM contacted the patient by telephone. Verified name and  with patient as identifiers. Provided introduction to self, and explanation of the ACM role. Challenges to be reviewed by the provider   Additional needs identified to be addressed with provider: No  none               Method of communication with provider: none. ACM: Adele Conde RN    RN-CC [facility] [address] Intake dept on 3/24/23 @ [phone] PM confirming the referral has been received. Sagar Felipe states they are working on the referral and will be contacting the patient soon. RN-CC outreached patient for cc follow up; shampoo, UTI, [facility] medication management, needs & concerns. Medicated shampoo was refilled by Dr. Shyam Urias. Patient states she will pick it up from [facility] pharmacy. She is requesting a refill of Flonase. I did inform her that Dr. Leonila Schwarz was out of the office last week so there may be a delay in her refill request, pt massiel. She reports sx of UTI are improving but now she has difficulty initiating urine flow. She is scheduled to see Urology on 3/29/23. She has 1 day left of her antibiotic. She is taking as prescribed and denies questions or concerns. She states she has not received a call from [facility] to date. RN-CC informed patient that she should be receiving a call to setup an evaluation. RN-CC advised patient to call me if she does not receive a call from [facility] by the end of the day on 3/28/23, pt massiel. PLAN:  F/u on urology appt  F/u on flonase  F/u on [facility] [address] concerns        Offered patient enrollment in the Remote Patient Monitoring (RPM) program for in-home monitoring: Patient declined. Lab Results       None            Care Coordination Interventions    Referral from Primary Care Provider: Yes  Suggested Interventions and Community Resources  Fall Risk Prevention:  In Process (Comment:

## 2023-03-28 ENCOUNTER — TELEPHONE (OUTPATIENT)
Dept: INTERNAL MEDICINE CLINIC | Age: 73
End: 2023-03-28

## 2023-03-28 RX ORDER — SPIRONOLACTONE 25 MG/1
12.5 TABLET ORAL DAILY
Qty: 45 TABLET | Refills: 1 | Status: SHIPPED | OUTPATIENT
Start: 2023-03-28

## 2023-03-28 RX ORDER — ASPIRIN 81 MG/1
81 TABLET, CHEWABLE ORAL DAILY
Qty: 90 TABLET | Refills: 1 | Status: SHIPPED | OUTPATIENT
Start: 2023-03-28

## 2023-03-28 RX ORDER — MAGNESIUM 200 MG
1000 TABLET ORAL DAILY
Qty: 90 TABLET | Refills: 1 | Status: SHIPPED | OUTPATIENT
Start: 2023-03-28

## 2023-03-28 RX ORDER — CETIRIZINE HYDROCHLORIDE 10 MG/1
10 TABLET ORAL DAILY
Qty: 90 TABLET | Refills: 1 | Status: SHIPPED | OUTPATIENT
Start: 2023-03-28

## 2023-03-28 NOTE — TELEPHONE ENCOUNTER
Stephen calling from [facility]. Pt needs refills of   - spironolactone (ALDACTONE) 25 MG tablet  - aspirin 81 MG  - cetirizine (ZYRTEC) 10 MG tablet  - Cyanocobalamin (B-12) 1000 MCG SUBL    Would like medications sent to [facility] ([facility]).

## 2023-03-29 RX ORDER — FLUTICASONE PROPIONATE 50 MCG
SPRAY, SUSPENSION (ML) NASAL
Qty: 1 EACH | Refills: 0 | Status: SHIPPED | OUTPATIENT
Start: 2023-03-29

## 2023-03-30 ENCOUNTER — CARE COORDINATION (OUTPATIENT)
Dept: CARE COORDINATION | Age: 73
End: 2023-03-30

## 2023-03-30 RX ORDER — SPIRONOLACTONE 25 MG/1
TABLET ORAL
Qty: 15 TABLET | Refills: 5 | OUTPATIENT
Start: 2023-03-30

## 2023-03-30 NOTE — CARE COORDINATION
RN-CC attempted to outreach patient for cc follow up; [facility], urology appt, flonase, needs & concerns. No answer; HIPPA compliant message left through  requesting call return. RN-CC will follow up at later date & time.

## 2023-04-04 ENCOUNTER — CARE COORDINATION (OUTPATIENT)
Dept: CARE COORDINATION | Age: 73
End: 2023-04-04

## 2023-04-04 NOTE — CARE COORDINATION
Ambulatory Care Coordination Note  2023    Patient Current Location: [facility]     ACM contacted the patient by telephone. Verified name and  with patient as identifiers. Provided introduction to self, and explanation of the ACM role. Challenges to be reviewed by the provider   Additional needs identified to be addressed with provider: Yes  Arthritis pain               Method of communication with provider: chart routing. ACM: Adele Conde RN    RN-CC outreached patient for cc follow up, [facility] - medication management, UTI, Inspire, urology appt, flonase, needs & concerns. Patient aware Flonase has been sent to [facility] pharmacy. Patient reports she was seen by Dr. Mendoza Egan for recurring UTI related to urinary retention. Patient was instructed to begin straight catheretization and was given a limited supply of straight cath kits. She states she was instructed to call Dr. Mendoza Egan after approximately 1 week with an update - she states she will call today. Patient is now active with [facility] - she has had 2 nursing visits to date for medication management. Patient told [facility] nurse she didn't need to come back but eventually agreed to allow her to return next week. Sangita Jarrett to request medication refills on Spironolactone, Asa, cetrizine and B-12. Refills have been sent to [facility] delivery pharmacy, pt justyn and massiel. Patient states the [facility] nurse will return next week to help her organize her medications. Patient was instructed to bring all of her medications, including otc supplements to her appointment with Dr. eBtty Jarrett on 5/3/23, pt massiel.     Patient reports an increase in generalized arthritic pain. She reports she is taking morphine and Ibuprofen without relief in pain. She feels her pain is worse due to the weather. She is scheduled to follow up with pain management tomorrow. She is requesting additional treatment for pain.  RN-CC informed patient I will route a message to her pcp

## 2023-04-05 NOTE — CARE COORDINATION
Patient will address worsening generalized arthritic pain with pain management during tomorrow's appointment.

## 2023-04-07 ENCOUNTER — TELEPHONE (OUTPATIENT)
Dept: PULMONOLOGY | Age: 73
End: 2023-04-07

## 2023-04-07 RX ORDER — HYDROXYZINE 50 MG/1
50 TABLET, FILM COATED ORAL 4 TIMES DAILY PRN
Qty: 120 TABLET | Refills: 1 | Status: SHIPPED | OUTPATIENT
Start: 2023-04-07

## 2023-04-07 NOTE — TELEPHONE ENCOUNTER
Future Appointments    Encounter Information    Provider Department Appt Notes   5/3/2023 Maldonado Denny, [address] Internal Medicine Return in about 3 months (around 5/1/2023).      Past Visits    Date Provider Specialty Visit Type Primary Dx   02/01/2023 Maldonado Denny DO Internal Medicine Office Visit Essential hypertension

## 2023-04-07 NOTE — TELEPHONE ENCOUNTER
Patient called in confused about upcoming Inspire procedure  Stated her insurance told her she needs a PA completed   She did not have much facts about this and was calling our office to talk to Dr. Toy Corea her when MA will reach out to her

## 2023-04-17 ENCOUNTER — CARE COORDINATION (OUTPATIENT)
Dept: CARE COORDINATION | Age: 73
End: 2023-04-17

## 2023-04-17 NOTE — CARE COORDINATION
10/12/22)  Zone Management Tools:  In Process (Comment: 9/14/22 SIMON)  Other Services or Interventions: PEOPLE WORKING COOPERATIVELY, [facility] 9/14/22 SIMON          Goals Addressed    None         Future Appointments   Date Time Provider Sung Bethanie   4/19/2023 10:10 AM Laci Hicks MD FF NEURO Neurology -   5/3/2023 10:40 AM DO GINA Dupree IM [place] - DYD   12/18/2023 11:00 AM WILLOW Mohr FF SLEEP MED MMA    and   General Assessment

## 2023-04-20 DIAGNOSIS — I10 ESSENTIAL HYPERTENSION: Chronic | ICD-10-CM

## 2023-04-20 RX ORDER — POTASSIUM CHLORIDE 20 MEQ/1
TABLET, EXTENDED RELEASE ORAL
Qty: 60 TABLET | Refills: 5 | Status: SHIPPED | OUTPATIENT
Start: 2023-04-20

## 2023-04-21 ENCOUNTER — CARE COORDINATION (OUTPATIENT)
Dept: CARE COORDINATION | Age: 73
End: 2023-04-21

## 2023-04-21 DIAGNOSIS — I10 ESSENTIAL HYPERTENSION: Chronic | ICD-10-CM

## 2023-04-21 RX ORDER — POTASSIUM CHLORIDE 20 MEQ/1
TABLET, EXTENDED RELEASE ORAL
Qty: 60 TABLET | Refills: 10 | OUTPATIENT
Start: 2023-04-21

## 2023-04-21 NOTE — CARE COORDINATION
upcoming appointment with Dr. Bruna Campbell on 5/3 for review, pt agreeable. She has not been checking her blood pressure. RN-CC advised patient to restart - check daily in the morning and prn. PLAN:    F/u on medication  Remind patient of upcoming appts  Nees & concerns    Offered patient enrollment in the Remote Patient Monitoring (RPM) program for in-home monitoring: NA. Lab Results       None            Care Coordination Interventions    Referral from Primary Care Provider: Yes  Suggested Interventions and Community Resources  Fall Risk Prevention: In Process (Comment: 9/14/22 SIMON)  Medication Assistance Program: Completed (Comment: [employer] 9/14/22 SIMON)  Pharmacist: Declined (Comment: 9/14/22 SIMON)  Senior Services: Completed (Comment: referral to [facility] 10/12/22)  Social Work: Completed (Comment: [facility] 10/12/22)  Zone Management Tools:  In Process (Comment: 9/14/22 SIMON)  Other Services or Interventions: PEOPLE WORKING COOPERATIVELY, [facility] 9/14/22 [facility]          Goals Addressed    None         Future Appointments   Date Time Provider Sung Kovacs   4/26/2023 11:20 AM Roger Basurto MD  [facility] Neurology -   5/3/2023 10:40 AM [facility] [place] [facility]   12/18/2023 11:00 AM WILLOW Steven FF SLEEP MED MMA    and   General Assessment

## 2023-04-26 ENCOUNTER — SCHEDULED TELEPHONE ENCOUNTER (OUTPATIENT)
Dept: NEUROLOGY | Age: 73
End: 2023-04-26
Payer: MEDICARE

## 2023-04-26 DIAGNOSIS — G30.1 LATE ONSET ALZHEIMER'S DISEASE WITHOUT BEHAVIORAL DISTURBANCE (HCC): Primary | ICD-10-CM

## 2023-04-26 DIAGNOSIS — F02.80 LATE ONSET ALZHEIMER'S DISEASE WITHOUT BEHAVIORAL DISTURBANCE (HCC): Primary | ICD-10-CM

## 2023-04-26 DIAGNOSIS — G47.33 OBSTRUCTIVE SLEEP APNEA: ICD-10-CM

## 2023-04-26 PROCEDURE — 99442 PR PHYS/QHP TELEPHONE EVALUATION 11-20 MIN: CPT | Performed by: PSYCHIATRY & NEUROLOGY

## 2023-04-26 RX ORDER — MEMANTINE HYDROCHLORIDE 10 MG/1
TABLET ORAL
Qty: 180 TABLET | Refills: 1 | Status: SHIPPED | OUTPATIENT
Start: 2023-04-26

## 2023-04-26 RX ORDER — GALANTAMINE HYDROBROMIDE 16 MG/1
CAPSULE, EXTENDED RELEASE ORAL
Qty: 90 CAPSULE | Refills: 1 | Status: SHIPPED | OUTPATIENT
Start: 2023-04-26

## 2023-04-26 NOTE — PROGRESS NOTES
TELEHEALTH EVALUATION -- Audio/telephone evaluation (During SIXON-59 public health emergency)    Due to COVID 19 outbreak, patient's office visit was converted to a virtual visit. Patient was contacted and agreed to proceed with a virtual visit via   Telephone Visit   The risks and benefits of converting to a virtual visit were discussed in light of the current infectious disease epidemic. Patient also understood that insurance coverage and co-pays are up to their individual insurance plans. [facility]   Neurology followup    Subjective:   CC/HP  History was obtained from the patient. Patient states that she is doing reasonably well. She continues to have some memory impairment but it is not a whole lot different from before. Patient is on galantamine 16 mg daily as well as memantine 10 mg twice daily. No side effects from medication. Patient has been using her CPAP machine fairly regularly recently. No other new neurological symptoms. Detailed history:  Patient has late onset Alzheimer's type dementia. She is having word finding difficulties. She also feels that she gets confused at times. Patient is on low-dose galantamine. She initially wondered if Aricept caused her diarrhea but later found out that she had a different etiology for the diarrhea. Patient also was diagnosed with obstructive sleep apnea and started a CPAP machine. She is feeling better in that regard.   REVIEW OF SYSTEMS    Constitutional:  []   Chills   []  Fatigue   []  Fevers   []  Malaise   []  Weight loss     [x] Denies all of the above    Respiratory:   []  Cough    []  Shortness of breath         [x] Denies all of the above     Cardiovascular:   []  Chest pain    []  Exertional chest pressure/discomfort           [] Palpitations    []  Syncope     [x] Denies all of the above        Past Medical History:   Diagnosis Date    Anxiety     Anxiety and depression     Chronic back pain     Clostridium difficile infection

## 2023-05-02 RX ORDER — HYDROXYZINE 50 MG/1
50 TABLET, FILM COATED ORAL 4 TIMES DAILY PRN
Qty: 120 TABLET | Refills: 1 | Status: SHIPPED | OUTPATIENT
Start: 2023-05-02

## 2023-05-03 ENCOUNTER — OFFICE VISIT (OUTPATIENT)
Dept: INTERNAL MEDICINE CLINIC | Age: 73
End: 2023-05-03

## 2023-05-03 VITALS
DIASTOLIC BLOOD PRESSURE: 76 MMHG | HEART RATE: 96 BPM | SYSTOLIC BLOOD PRESSURE: 118 MMHG | OXYGEN SATURATION: 97 % | BODY MASS INDEX: 34.72 KG/M2 | WEIGHT: 177.8 LBS

## 2023-05-03 DIAGNOSIS — G47.33 OSA (OBSTRUCTIVE SLEEP APNEA): ICD-10-CM

## 2023-05-03 DIAGNOSIS — N39.0 RECURRENT UTI: Chronic | ICD-10-CM

## 2023-05-03 DIAGNOSIS — Z79.899 MEDICATION MANAGEMENT: ICD-10-CM

## 2023-05-03 DIAGNOSIS — F33.1 MODERATE EPISODE OF RECURRENT MAJOR DEPRESSIVE DISORDER (HCC): Chronic | ICD-10-CM

## 2023-05-03 DIAGNOSIS — I10 ESSENTIAL HYPERTENSION: Primary | Chronic | ICD-10-CM

## 2023-05-03 DIAGNOSIS — E66.09 CLASS 1 OBESITY DUE TO EXCESS CALORIES WITH SERIOUS COMORBIDITY AND BODY MASS INDEX (BMI) OF 34.0 TO 34.9 IN ADULT: ICD-10-CM

## 2023-05-03 DIAGNOSIS — I10 ESSENTIAL HYPERTENSION: Chronic | ICD-10-CM

## 2023-05-03 DIAGNOSIS — F02.818 LATE ONSET ALZHEIMER'S DEMENTIA WITH BEHAVIORAL DISTURBANCE (HCC): ICD-10-CM

## 2023-05-03 DIAGNOSIS — G30.1 LATE ONSET ALZHEIMER'S DEMENTIA WITH BEHAVIORAL DISTURBANCE (HCC): ICD-10-CM

## 2023-05-03 RX ORDER — HYDROCHLOROTHIAZIDE 12.5 MG/1
12.5 CAPSULE, GELATIN COATED ORAL EVERY MORNING
Qty: 30 CAPSULE | Refills: 10 | Status: SHIPPED | OUTPATIENT
Start: 2023-05-03

## 2023-05-03 ASSESSMENT — PATIENT HEALTH QUESTIONNAIRE - PHQ9
1. LITTLE INTEREST OR PLEASURE IN DOING THINGS: 0
3. TROUBLE FALLING OR STAYING ASLEEP: 0
SUM OF ALL RESPONSES TO PHQ QUESTIONS 1-9: 2
8. MOVING OR SPEAKING SO SLOWLY THAT OTHER PEOPLE COULD HAVE NOTICED. OR THE OPPOSITE, BEING SO FIGETY OR RESTLESS THAT YOU HAVE BEEN MOVING AROUND A LOT MORE THAN USUAL: 0
SUM OF ALL RESPONSES TO PHQ QUESTIONS 1-9: 2
4. FEELING TIRED OR HAVING LITTLE ENERGY: 1
9. THOUGHTS THAT YOU WOULD BE BETTER OFF DEAD, OR OF HURTING YOURSELF: 0
5. POOR APPETITE OR OVEREATING: 0
SUM OF ALL RESPONSES TO PHQ QUESTIONS 1-9: 2
SUM OF ALL RESPONSES TO PHQ QUESTIONS 1-9: 2
10. IF YOU CHECKED OFF ANY PROBLEMS, HOW DIFFICULT HAVE THESE PROBLEMS MADE IT FOR YOU TO DO YOUR WORK, TAKE CARE OF THINGS AT HOME, OR GET ALONG WITH OTHER PEOPLE: 0
SUM OF ALL RESPONSES TO PHQ9 QUESTIONS 1 & 2: 0
7. TROUBLE CONCENTRATING ON THINGS, SUCH AS READING THE NEWSPAPER OR WATCHING TELEVISION: 1
2. FEELING DOWN, DEPRESSED OR HOPELESS: 0
6. FEELING BAD ABOUT YOURSELF - OR THAT YOU ARE A FAILURE OR HAVE LET YOURSELF OR YOUR FAMILY DOWN: 0

## 2023-05-03 NOTE — PROGRESS NOTES
Patient: Sharon Arzate is a 68 y.o. female who presents today with the following Chief Complaint(s):  No chief complaint on file. HPI    Here today for follow up. Keke Ling this morning- knee gave out on her (right knee) and could not bend it to get up. Was rushed getting here- ran into a detour. Cancelled evaluation for Inspire - was not feeling well d/t UTI and was \"messed up\" on medicines. Did have [facility] nurse come out and help with med set up. Was able to \"find the problem\" (was taking too much BP medicine). Asked nurse not to come back as she felt like she would be doing ok once she discovered the issue. Does feel like she is getting enough help at home. Is feeling ok for the most part. Does not feel like she needs additional assistance. Started Aldactone 12.5 mg qd 3/3/23 d/t elevated home BP readings. Did see Dr. Kathy Tracy for recurrent UTI, is going to start on low-dose abx daily for 3 months then follow up. Home medication pill bottles reviewed.          Allergies   Allergen Reactions    Amlodipine     Ativan [Lorazepam] Swelling     Tongue swelling    Sulfa Antibiotics Swelling    Keflex [Cephalexin] Other (See Comments)     Leg cramps      Past Medical History:   Diagnosis Date    Anxiety     Anxiety and depression     Chronic back pain     Clostridium difficile infection 2/22/15    Hyperlipidemia     Hypertension     Insomnia disorder     Osteoarthritis     Osteoporosis 2008    Rheumatic fever     S/P insertion of spinal cord stimulator     Self-catheterizes urinary bladder     as needed 7/8 no longer catherizing     Urinary retention       Past Surgical History:   Procedure Laterality Date    BLADDER REPAIR      CARPAL TUNNEL RELEASE      COLONOSCOPY      CYSTOSCOPY  10/29/2012    bladder biopsy    CYSTOSCOPY N/A 10/19/2020    FLEXIBLE CYSTOSCOPY performed by Tiesha Terry MD at [address] [facility] ([facility]

## 2023-05-04 PROBLEM — Z79.899 MEDICATION MANAGEMENT: Status: ACTIVE | Noted: 2023-05-04

## 2023-05-04 LAB
ANION GAP SERPL CALCULATED.3IONS-SCNC: 12 MMOL/L (ref 3–16)
BUN SERPL-MCNC: 17 MG/DL (ref 7–20)
CALCIUM SERPL-MCNC: 9.3 MG/DL (ref 8.3–10.6)
CHLORIDE SERPL-SCNC: 97 MMOL/L (ref 99–110)
CO2 SERPL-SCNC: 23 MMOL/L (ref 21–32)
CREAT SERPL-MCNC: 0.9 MG/DL (ref 0.6–1.2)
GFR SERPLBLD CREATININE-BSD FMLA CKD-EPI: >60 ML/MIN/{1.73_M2}
GLUCOSE SERPL-MCNC: 84 MG/DL (ref 70–99)
POTASSIUM SERPL-SCNC: 5 MMOL/L (ref 3.5–5.1)
SODIUM SERPL-SCNC: 132 MMOL/L (ref 136–145)

## 2023-05-05 NOTE — ASSESSMENT & PLAN NOTE
Relatively stable. Recently allowed home health did not help organize medications. Unfortunately, she is asked home health did not come back as she feels she has better control. Family, unfortunately, does not cover patient's medications either. She does get medicines through [facility] which is helpful. Will have low threshold for sending home health back to her home.

## 2023-05-05 NOTE — ASSESSMENT & PLAN NOTE
Patient did bring her medication bottles with her to her appointment today. Medications reviewed and compared to medication list.  Home health nurse has been out to her house to help manage her medications. Unfortunately, she told the home health nurse that she did not feel like she needed to come back as she has had recurrent problems for gout. We will need to see how she does with medications but I encouraged her to allow home health to continue to go out and help with her medications as unfortunately her family is not active in her care.

## 2023-05-05 NOTE — ASSESSMENT & PLAN NOTE
Did see Dr. Kortney Wyatt who is starting her on the daily low-dose antibiotic for prophylaxis. She will take this for 3 months and then follow-up with him. Standing order placed for UA and culture.

## 2023-05-05 NOTE — ASSESSMENT & PLAN NOTE
Patient encouraged to reach out to Dr. Carlos Coleman to reschedule sleep endoscopy to evaluate appropriateness for Inspire therapy for sleep apnea.

## 2023-05-05 NOTE — ASSESSMENT & PLAN NOTE
Blood pressure have been running high but has improved with the addition of Aldactone 12.5 mg daily. Continue Aldactone 12.5 mg daily, hydrochlorothiazide 12.5 mg daily, and olmesartan 20 mg daily. Check BMP.

## 2023-05-10 ENCOUNTER — TELEPHONE (OUTPATIENT)
Dept: INTERNAL MEDICINE CLINIC | Age: 73
End: 2023-05-10

## 2023-05-10 ENCOUNTER — CARE COORDINATION (OUTPATIENT)
Dept: CARE COORDINATION | Age: 73
End: 2023-05-10

## 2023-05-10 NOTE — CARE COORDINATION
Ambulatory Care Coordination Note  5/10/2023    Patient Current Location: [facility]    ACM contacted the patient by telephone. Verified name and  with patient as identifiers. Provided introduction to self, and explanation of the ACM role. Challenges to be reviewed by the provider   Additional needs identified to be addressed with provider: Yes  Swelling to bilateral le. No redness/warmth. No sob, cough, chest pain               Method of communication with provider: chart routing. ACM: Adele Conde RN    RN-CC received call from patient regarding bilateral edema that started  after sitting for a long time at a baby shower. Denies redness/warmth, chest pain or sob. She elevates them when she can and it does help but swelling returns t/o the day when she is on her feet. She does not have compression stockings. She is taking HCTZ and spironolactone as directed. She has been on her feet a lot, she is working outside in her yard work. A telephone encounter has already been created regarding bilateral swelling has already been created and routed to Dr. Mita Pastrana. Patient is aware someone from the office will follow up after Dr. Mita Pastrana responds. RN-CC reinforced elevation. Recurring UTI - she f/u with Urology and was started low dose antibiotic for 3 months then will follow up. She is taking AZO 1 daily - RN-CC updated med list. Attempted to add antibiotic but Cayden Clara could not find the bottle at the time of my call. Standing order for UA and culture ordered. RN-CC reminded patient that if she starts experiencing sx of UTI, she should go to a [facility] lab and give a urine sample, pt vu. PLAN:    F/u on swelling  F/u on antibiotic - add to medication list  Blood pressure  Needs & concerns      Offered patient enrollment in the Remote Patient Monitoring (RPM) program for in-home monitoring: Patient declined.     Lab Results       None            Care Coordination Interventions    Referral from Primary Care Provider:

## 2023-05-10 NOTE — TELEPHONE ENCOUNTER
Keep legs elevated and see if that helps. May also take extra dose of HCTZ x 3 days to see if that helps.

## 2023-05-11 ENCOUNTER — TRANSCRIBE ORDERS (OUTPATIENT)
Dept: ADMINISTRATIVE | Age: 73
End: 2023-05-11

## 2023-05-11 DIAGNOSIS — M96.1 POSTLAMINECTOMY SYNDROME, CERVICAL REGION: Primary | ICD-10-CM

## 2023-05-12 ENCOUNTER — CARE COORDINATION (OUTPATIENT)
Dept: CARE COORDINATION | Age: 73
End: 2023-05-12

## 2023-05-12 NOTE — CARE COORDINATION
RN-CC attempted to outreach patient for cc follow up; medication review, bilateral swelling, htn, needs & concerns. No answer; HIPPA compliant message left through  requesting call return. RN-CC will follow up at later date & time.

## 2023-05-19 RX ORDER — TRAZODONE HYDROCHLORIDE 100 MG/1
TABLET ORAL
Qty: 60 TABLET | Refills: 10 | Status: SHIPPED | OUTPATIENT
Start: 2023-05-19

## 2023-05-19 NOTE — TELEPHONE ENCOUNTER
Last OV: 5/3/2023  Next OV: 8/4/2023    Next appointment due:around 8/3/2023    Last fill:7/7/22  Refills:10

## 2023-05-24 ENCOUNTER — CARE COORDINATION (OUTPATIENT)
Dept: CARE COORDINATION | Age: 73
End: 2023-05-24

## 2023-05-24 NOTE — CARE COORDINATION
Ambulatory Care Coordination Note  2023    Patient Current Location: [facility]     ACM contacted the patient by telephone. Verified name and  with patient as identifiers. Provided introduction to self, and explanation of the ACM role. Challenges to be reviewed by the provider   Additional needs identified to be addressed with provider: No  none               Method of communication with provider: none. ACM: Adele Conde, RN    RN-CC outreached patient for cc follow up. Chon Chacon reports she fell today while watering her flowers. The hose was long and she tried pulling it when she lost her balance and fell. She hit her knees and back. Denies hitting her head. Declined evaluation. Her grandson was home and helped pick her up. She had her life alert on her at the time of her fall. RN-CC reviewed fall precautions; pt vu. She is scheduled to follow up with Dr. Gabino Mccain next month. She denies urinary sx since starting maintenance antibiotic. Reports she has been drinking more water. She now has standing labs in place in the event she would start experiencing sx related to a UTI. Chon Chacon is no longer receiving home health care services for medication management. She states she told them she no longer was in need of their services. She does feel they helped her organize her medications. RN-CC has recommended she consolidate all of her medications to one pharmacy to avoid confusion and potential medication errors but Chon Chacon refuses to do this. Bilateral swelling comes and goes. RN-CC recommended wearing compression stockings; on during the day, off at night. Chon Chacon states she will consider this. PLAN:    F/u on urology  F/u on falls  Needs & concerns  Prep for discharge      Offered patient enrollment in the Remote Patient Monitoring (RPM) program for in-home monitoring: Patient declined.     Lab Results       None            Care Coordination Interventions    Referral from Primary Care Provider:

## 2023-05-26 ENCOUNTER — CARE COORDINATION (OUTPATIENT)
Dept: CARE COORDINATION | Age: 73
End: 2023-05-26

## 2023-05-26 NOTE — CARE COORDINATION
Ambulatory Care Coordination Note  2023    Patient Current Location: [facility]     ACM contacted the patient by telephone. Verified name and  with patient as identifiers. Provided introduction to self, and explanation of the ACM role. Challenges to be reviewed by the provider   Additional needs identified to be addressed with provider: Yes  Patient reports elevated blood pressure, chills - teeth chattering. Denies chest pain, sob or s/sx related to UTI. Has not checked her temperature, does not think she is running a fever. Method of communication with provider: chart routing. ACM: Adele Conde RN    RN-CC received call from patient. Macrina Noyola reports she has been cold, teeth chattering, hands cold for 2 days. She states she is not running a fever but has not checked her temperature. RN-CC advised she check her temperature, patient states she will check and call me back. RN-CC informed Macrina Noyola that anything over 100.5 is considered a fever, pt vu. She denies new or worsening sx. She denies urinary sx related to UTI. She also reports her blood pressure was 79/44 yesterday - she states she was asymptomatic with this blood pressure. She states she is drinking plenty of water and is staying hydrated. She reports her blood pressure today was 178/79 - she checked her blood pressure while in bed. RN-CC had Macrina Noyola get oob, sit in a chair with legs uncrossed and guided her on applying her cuff, prior to rechecking her blood pressure. Her blood pressure was 158/90. She states she took her blood pressure medication this morning at 0500. When RN-CC asked her specifically what medications she took, she was unable to recall. She admits her memory is worsening but is resistant to additional support. RN-CC advised patient to continue to monitor her blood pressure daily and log readings. RN-CC educated on red flag sx and when to go to the ER, pt vu. She is asking for an order for a dexa scan.  RN-CC

## 2023-06-01 ENCOUNTER — HOSPITAL ENCOUNTER (OUTPATIENT)
Dept: CT IMAGING | Age: 73
Discharge: HOME | End: 2023-06-01
Payer: MEDICARE

## 2023-06-01 DIAGNOSIS — M96.1 POSTLAMINECTOMY SYNDROME, CERVICAL REGION: ICD-10-CM

## 2023-06-01 PROCEDURE — 72131 CT LUMBAR SPINE W/O DYE: CPT

## 2023-06-05 ENCOUNTER — CARE COORDINATION (OUTPATIENT)
Dept: CARE COORDINATION | Age: 73
End: 2023-06-05

## 2023-06-05 NOTE — CARE COORDINATION
Ambulatory Care Coordination Note  2023    Patient Current Location: [facility]     ACM contacted the patient by telephone. Verified name and  with patient as identifiers. Provided introduction to self, and explanation of the ACM role. Challenges to be reviewed by the provider   Additional needs identified to be addressed with provider: No  none               Method of communication with provider: none. ACM: Adele Conde RN    RN-CC outreached patient for cc follow up; chills resolved. Taina Show denies s/sx of UTI. She remains on prophylactic antibiotic. Nima Pineda helped her organize her medications; no questions or concerns reported. She was advised to transfer her medications to one pharmacy to avoid medication errors but she refuses - prefers to use mail order and [facility].    She continues to monitor her blood pressure daily - today's reading was 155/65. I did inform Taina Show that wrist cuffs have a tendency to run high. RN-CC educated Taina Show on how to check her blood pressure correctly using her wrist cuff: While in sitting position with legs uncrossed and feet flat on floor, apply cuff to left wrist with display on the inside of your wrist. Rest your arm and wrist at heart level - place arm over your chest so your elbow is bent. Press start and remain still until your blood pressure reading has been obtained. She is scheduled for an endoscopy on 23 related to [facility]. PLAN:    F/u on blood pressure  Needs & concerns  Prep for discharge                  Offered patient enrollment in the Remote Patient Monitoring (RPM) program for in-home monitoring: Patient declined. Lab Results       None            Care Coordination Interventions    Referral from Primary Care Provider: Yes  Suggested Interventions and Community Resources  Fall Risk Prevention:  In Process (Comment: 22 SIMON)  Medication Assistance Program: Completed (Comment: [employer] 22 SIMON)  Pharmacist: Declined (Comment:

## 2023-06-16 RX ORDER — MORPHINE SULFATE 15 MG/1
15 TABLET ORAL 2 TIMES DAILY
COMMUNITY

## 2023-06-16 RX ORDER — GABAPENTIN 600 MG/1
600 TABLET ORAL 3 TIMES DAILY
COMMUNITY

## 2023-06-16 RX ORDER — IBUPROFEN 800 MG/1
800 TABLET ORAL 3 TIMES DAILY
COMMUNITY

## 2023-06-19 ENCOUNTER — TELEPHONE (OUTPATIENT)
Dept: ENT CLINIC | Age: 73
End: 2023-06-19

## 2023-06-19 NOTE — TELEPHONE ENCOUNTER
Patient is very adamant about needing to take her \"pain\" medicine before her DISE next week. States she needs it every AM and can't wait until after the procedure. Please advise.  Ty

## 2023-06-20 ENCOUNTER — CARE COORDINATION (OUTPATIENT)
Dept: CARE COORDINATION | Age: 73
End: 2023-06-20

## 2023-06-20 NOTE — CARE COORDINATION
Ambulatory Care Coordination Note  2023    Patient Current Location: [facility]     ACM contacted the patient by telephone. Verified name and  with patient as identifiers. Provided introduction to self, and explanation of the ACM role. Challenges to be reviewed by the provider   Additional needs identified to be addressed with provider: Yes  Worsening arthritis pain, sinus pressure/congestion               Method of communication with provider: chart routing. ACM: Adele Conde RN    RN-CC received call from patient who reports worsening arthritis pain. She is established with pain management Dr. Gladis Rubin and follow up with them monthly. She states they are currently working with her insurance to get approval for another injection in her back. She reports worsening arthritis pain, \"all over\" and is questioning if Dr. Melba Wade can prescribe something. She currently takes morphine and ibuprofen with no relief in pain. She also states her allergies are terrible. She reports sinus pressure/congestion and non-productive despite taking Zyrtec and flonase as directed. She is requesting additional treatment for her allergies. She denies fever. Patient advised that she may need to be seen. I informed her that I will route a message to [facility] for recommendations, pt massiel. PLAN:    Route message to [facility]    Offered patient enrollment in the Remote Patient Monitoring (RPM) program for in-home monitoring: NA. Lab Results       None            Care Coordination Interventions    Referral from Primary Care Provider: Yes  Suggested Interventions and Community Resources  Fall Risk Prevention: In Process (Comment: 22 SIMON)  Medication Assistance Program: Completed (Comment: [employer] 22 SIMON)  Pharmacist: Declined (Comment: 22 SIMON)  Senior Services: Completed (Comment: referral to [facility] 10/12/22)  Social Work: Completed (Comment: [facility] 10/12/22)  Zone Management Tools:  In Process (Comment: 22 SIMON)  Other

## 2023-06-21 ENCOUNTER — CARE COORDINATION (OUTPATIENT)
Dept: CARE COORDINATION | Age: 73
End: 2023-06-21

## 2023-06-21 NOTE — CARE COORDINATION
RN-CC spoke with Jordyn Raymundo and confirmed [facility] scheduling will be outreaching Juliette Barber to schedule an appointment. RN-CC notified Juliette Barber who massiel.

## 2023-06-23 ENCOUNTER — ANESTHESIA EVENT (OUTPATIENT)
Dept: OPERATING ROOM | Age: 73
End: 2023-06-23
Payer: MEDICARE

## 2023-06-24 DIAGNOSIS — F02.80 LATE ONSET ALZHEIMER'S DISEASE WITHOUT BEHAVIORAL DISTURBANCE (HCC): ICD-10-CM

## 2023-06-24 DIAGNOSIS — G30.1 LATE ONSET ALZHEIMER'S DISEASE WITHOUT BEHAVIORAL DISTURBANCE (HCC): ICD-10-CM

## 2023-06-26 ENCOUNTER — ANESTHESIA (OUTPATIENT)
Dept: OPERATING ROOM | Age: 73
End: 2023-06-26
Payer: MEDICARE

## 2023-06-26 ENCOUNTER — HOSPITAL ENCOUNTER (OUTPATIENT)
Age: 73
Setting detail: OUTPATIENT SURGERY
Discharge: HOME | End: 2023-06-26
Attending: OTOLARYNGOLOGY | Admitting: OTOLARYNGOLOGY
Payer: MEDICARE

## 2023-06-26 VITALS
DIASTOLIC BLOOD PRESSURE: 60 MMHG | HEIGHT: 60 IN | TEMPERATURE: 97.2 F | BODY MASS INDEX: 35.14 KG/M2 | HEART RATE: 66 BPM | RESPIRATION RATE: 16 BRPM | SYSTOLIC BLOOD PRESSURE: 129 MMHG | WEIGHT: 179 LBS | OXYGEN SATURATION: 97 %

## 2023-06-26 PROCEDURE — 3700000000 HC ANESTHESIA ATTENDED CARE: Performed by: OTOLARYNGOLOGY

## 2023-06-26 PROCEDURE — 3600000003 HC SURGERY LEVEL 3 BASE: Performed by: OTOLARYNGOLOGY

## 2023-06-26 PROCEDURE — 6370000000 HC RX 637 (ALT 250 FOR IP): Performed by: OTOLARYNGOLOGY

## 2023-06-26 PROCEDURE — 6360000002 HC RX W HCPCS: Performed by: NURSE ANESTHETIST, CERTIFIED REGISTERED

## 2023-06-26 PROCEDURE — 2580000003 HC RX 258: Performed by: ANESTHESIOLOGY

## 2023-06-26 PROCEDURE — 2500000003 HC RX 250 WO HCPCS: Performed by: OTOLARYNGOLOGY

## 2023-06-26 PROCEDURE — 2720000010 HC SURG SUPPLY STERILE: Performed by: OTOLARYNGOLOGY

## 2023-06-26 PROCEDURE — 7100000011 HC PHASE II RECOVERY - ADDTL 15 MIN: Performed by: OTOLARYNGOLOGY

## 2023-06-26 PROCEDURE — 42975 DISE EVAL SLP DO BRTH FLX DX: CPT | Performed by: OTOLARYNGOLOGY

## 2023-06-26 PROCEDURE — 2709999900 HC NON-CHARGEABLE SUPPLY: Performed by: OTOLARYNGOLOGY

## 2023-06-26 PROCEDURE — 7100000010 HC PHASE II RECOVERY - FIRST 15 MIN: Performed by: OTOLARYNGOLOGY

## 2023-06-26 RX ORDER — PROPOFOL 10 MG/ML
INJECTION, EMULSION INTRAVENOUS PRN
Status: DISCONTINUED | OUTPATIENT
Start: 2023-06-26 | End: 2023-06-26 | Stop reason: SDUPTHER

## 2023-06-26 RX ORDER — OXYMETAZOLINE HYDROCHLORIDE 0.05 G/100ML
SPRAY NASAL PRN
Status: DISCONTINUED | OUTPATIENT
Start: 2023-06-26 | End: 2023-06-26 | Stop reason: ALTCHOICE

## 2023-06-26 RX ORDER — LIDOCAINE HYDROCHLORIDE 10 MG/ML
1 INJECTION, SOLUTION EPIDURAL; INFILTRATION; INTRACAUDAL; PERINEURAL
Status: DISCONTINUED | OUTPATIENT
Start: 2023-06-26 | End: 2023-06-26 | Stop reason: HOSPADM

## 2023-06-26 RX ORDER — LABETALOL HYDROCHLORIDE 5 MG/ML
5 INJECTION, SOLUTION INTRAVENOUS EVERY 10 MIN PRN
Status: DISCONTINUED | OUTPATIENT
Start: 2023-06-26 | End: 2023-06-26 | Stop reason: HOSPADM

## 2023-06-26 RX ORDER — SODIUM CHLORIDE 0.9 % (FLUSH) 0.9 %
5-40 SYRINGE (ML) INJECTION PRN
Status: DISCONTINUED | OUTPATIENT
Start: 2023-06-26 | End: 2023-06-26 | Stop reason: HOSPADM

## 2023-06-26 RX ORDER — ONDANSETRON 2 MG/ML
4 INJECTION INTRAMUSCULAR; INTRAVENOUS
Status: DISCONTINUED | OUTPATIENT
Start: 2023-06-26 | End: 2023-06-26 | Stop reason: HOSPADM

## 2023-06-26 RX ORDER — SODIUM CHLORIDE 9 MG/ML
INJECTION, SOLUTION INTRAVENOUS PRN
Status: DISCONTINUED | OUTPATIENT
Start: 2023-06-26 | End: 2023-06-26 | Stop reason: HOSPADM

## 2023-06-26 RX ORDER — OXYCODONE HYDROCHLORIDE 5 MG/1
5 TABLET ORAL PRN
Status: DISCONTINUED | OUTPATIENT
Start: 2023-06-26 | End: 2023-06-26 | Stop reason: HOSPADM

## 2023-06-26 RX ORDER — DIPHENHYDRAMINE HYDROCHLORIDE 50 MG/ML
12.5 INJECTION INTRAMUSCULAR; INTRAVENOUS
Status: DISCONTINUED | OUTPATIENT
Start: 2023-06-26 | End: 2023-06-26 | Stop reason: HOSPADM

## 2023-06-26 RX ORDER — SODIUM CHLORIDE 0.9 % (FLUSH) 0.9 %
5-40 SYRINGE (ML) INJECTION EVERY 12 HOURS SCHEDULED
Status: DISCONTINUED | OUTPATIENT
Start: 2023-06-26 | End: 2023-06-26 | Stop reason: HOSPADM

## 2023-06-26 RX ORDER — LIDOCAINE HYDROCHLORIDE 40 MG/ML
INJECTION, SOLUTION RETROBULBAR; TOPICAL PRN
Status: DISCONTINUED | OUTPATIENT
Start: 2023-06-26 | End: 2023-06-26 | Stop reason: ALTCHOICE

## 2023-06-26 RX ORDER — OXYCODONE HYDROCHLORIDE 5 MG/1
10 TABLET ORAL PRN
Status: DISCONTINUED | OUTPATIENT
Start: 2023-06-26 | End: 2023-06-26 | Stop reason: HOSPADM

## 2023-06-26 RX ORDER — PROPOFOL 10 MG/ML
INJECTION, EMULSION INTRAVENOUS CONTINUOUS PRN
Status: DISCONTINUED | OUTPATIENT
Start: 2023-06-26 | End: 2023-06-26 | Stop reason: SDUPTHER

## 2023-06-26 RX ORDER — SODIUM CHLORIDE, SODIUM LACTATE, POTASSIUM CHLORIDE, CALCIUM CHLORIDE 600; 310; 30; 20 MG/100ML; MG/100ML; MG/100ML; MG/100ML
INJECTION, SOLUTION INTRAVENOUS CONTINUOUS
Status: DISCONTINUED | OUTPATIENT
Start: 2023-06-26 | End: 2023-06-26 | Stop reason: HOSPADM

## 2023-06-26 RX ORDER — GALANTAMINE HYDROBROMIDE 16 MG/1
CAPSULE, EXTENDED RELEASE ORAL
Qty: 90 CAPSULE | Refills: 1 | OUTPATIENT
Start: 2023-06-26

## 2023-06-26 RX ADMIN — SODIUM CHLORIDE, POTASSIUM CHLORIDE, SODIUM LACTATE AND CALCIUM CHLORIDE: 600; 310; 30; 20 INJECTION, SOLUTION INTRAVENOUS at 08:00

## 2023-06-26 RX ADMIN — PROPOFOL 10 MG: 10 INJECTION, EMULSION INTRAVENOUS at 08:23

## 2023-06-26 RX ADMIN — PROPOFOL 100 MCG/KG/MIN: 10 INJECTION, EMULSION INTRAVENOUS at 08:23

## 2023-06-26 ASSESSMENT — PAIN SCALES - GENERAL
PAINLEVEL_OUTOF10: 0

## 2023-06-26 ASSESSMENT — PAIN - FUNCTIONAL ASSESSMENT
PAIN_FUNCTIONAL_ASSESSMENT: INTOLERABLE, UNABLE TO DO ANY ACTIVE OR PASSIVE ACTIVITIES
PAIN_FUNCTIONAL_ASSESSMENT: 0-10

## 2023-06-26 ASSESSMENT — PAIN DESCRIPTION - DESCRIPTORS: DESCRIPTORS: ACHING

## 2023-06-29 ENCOUNTER — CARE COORDINATION (OUTPATIENT)
Dept: CARE COORDINATION | Age: 73
End: 2023-06-29

## 2023-06-29 ENCOUNTER — TELEPHONE (OUTPATIENT)
Dept: INTERNAL MEDICINE CLINIC | Age: 73
End: 2023-06-29

## 2023-06-30 ENCOUNTER — APPOINTMENT (OUTPATIENT)
Dept: GENERAL RADIOLOGY | Age: 73
End: 2023-06-30
Payer: MEDICARE

## 2023-06-30 ENCOUNTER — HOSPITAL ENCOUNTER (EMERGENCY)
Age: 73
Discharge: HOME | End: 2023-06-30
Attending: EMERGENCY MEDICINE
Payer: MEDICARE

## 2023-06-30 VITALS
HEART RATE: 57 BPM | SYSTOLIC BLOOD PRESSURE: 160 MMHG | RESPIRATION RATE: 19 BRPM | HEIGHT: 60 IN | WEIGHT: 179.45 LBS | TEMPERATURE: 97.9 F | OXYGEN SATURATION: 96 % | DIASTOLIC BLOOD PRESSURE: 59 MMHG | BODY MASS INDEX: 35.23 KG/M2

## 2023-06-30 DIAGNOSIS — E87.1 HYPONATREMIA: ICD-10-CM

## 2023-06-30 DIAGNOSIS — R06.89 DYSPNEA AND RESPIRATORY ABNORMALITIES: ICD-10-CM

## 2023-06-30 DIAGNOSIS — T73.2XXA FATIGUE DUE TO EXPOSURE, INITIAL ENCOUNTER: Primary | ICD-10-CM

## 2023-06-30 DIAGNOSIS — R06.00 DYSPNEA AND RESPIRATORY ABNORMALITIES: ICD-10-CM

## 2023-06-30 LAB
ALBUMIN SERPL-MCNC: 4.1 G/DL (ref 3.4–5)
ALBUMIN/GLOB SERPL: 1.8 {RATIO} (ref 1.1–2.2)
ALP SERPL-CCNC: 37 U/L (ref 40–129)
ALT SERPL-CCNC: 19 U/L (ref 10–40)
ANION GAP SERPL CALCULATED.3IONS-SCNC: 7 MMOL/L (ref 3–16)
AST SERPL-CCNC: 24 U/L (ref 15–37)
BASOPHILS # BLD: 0 K/UL (ref 0–0.2)
BASOPHILS NFR BLD: 0.4 %
BILIRUB SERPL-MCNC: 0.3 MG/DL (ref 0–1)
BILIRUB UR QL STRIP.AUTO: ABNORMAL
BUN SERPL-MCNC: 11 MG/DL (ref 7–20)
CALCIUM SERPL-MCNC: 9.3 MG/DL (ref 8.3–10.6)
CHLORIDE SERPL-SCNC: 92 MMOL/L (ref 99–110)
CLARITY UR: CLEAR
CO2 SERPL-SCNC: 27 MMOL/L (ref 21–32)
COLOR UR: ABNORMAL
CREAT SERPL-MCNC: 0.8 MG/DL (ref 0.6–1.2)
DEPRECATED RDW RBC AUTO: 11.8 % (ref 12.4–15.4)
EKG ATRIAL RATE: 54 BPM
EKG DIAGNOSIS: NORMAL
EKG P AXIS: 40 DEGREES
EKG P-R INTERVAL: 168 MS
EKG Q-T INTERVAL: 446 MS
EKG QRS DURATION: 82 MS
EKG QTC CALCULATION (BAZETT): 422 MS
EKG R AXIS: 0 DEGREES
EKG T AXIS: 49 DEGREES
EKG VENTRICULAR RATE: 54 BPM
EOSINOPHIL # BLD: 0.1 K/UL (ref 0–0.6)
EOSINOPHIL NFR BLD: 3 %
GFR SERPLBLD CREATININE-BSD FMLA CKD-EPI: >60 ML/MIN/{1.73_M2}
GLUCOSE SERPL-MCNC: 81 MG/DL (ref 70–99)
GLUCOSE UR STRIP.AUTO-MCNC: ABNORMAL MG/DL
HCT VFR BLD AUTO: 32 % (ref 36–48)
HGB BLD-MCNC: 10.8 G/DL (ref 12–16)
HGB UR QL STRIP.AUTO: ABNORMAL
IMM GRANULOCYTES # BLD: 0 K/UL (ref 0–0.2)
IMM GRANULOCYTES NFR BLD: 0.2 %
KETONES UR STRIP.AUTO-MCNC: ABNORMAL MG/DL
LEUKOCYTE ESTERASE UR QL STRIP.AUTO: ABNORMAL
LYMPHOCYTES # BLD: 1.4 K/UL (ref 1–5.1)
LYMPHOCYTES NFR BLD: 30.7 %
MCH RBC QN AUTO: 30.8 PG (ref 26–34)
MCHC RBC AUTO-ENTMCNC: 33.8 G/DL (ref 32–36.4)
MCV RBC AUTO: 91.2 FL (ref 80–100)
MONOCYTES # BLD: 0.5 K/UL (ref 0–1.3)
MONOCYTES NFR BLD: 9.8 %
NEUTROPHILS # BLD: 2.6 K/UL (ref 1.7–7.7)
NEUTROPHILS NFR BLD: 55.9 %
NITRITE UR QL STRIP.AUTO: ABNORMAL
NT-PROBNP SERPL-MCNC: 216 PG/ML (ref 0–124)
PH UR STRIP.AUTO: ABNORMAL [PH] (ref 5–8)
PLATELET # BLD AUTO: 171 K/UL (ref 135–450)
PMV BLD AUTO: 8.9 FL (ref 5–10.5)
POTASSIUM SERPL-SCNC: 4.7 MMOL/L (ref 3.5–5.1)
PROT SERPL-MCNC: 6.4 G/DL (ref 6.4–8.2)
PROT UR STRIP.AUTO-MCNC: ABNORMAL MG/DL
RBC # BLD AUTO: 3.51 M/UL (ref 4–5.2)
RBC #/AREA URNS HPF: NORMAL /HPF (ref 0–4)
SODIUM SERPL-SCNC: 126 MMOL/L (ref 136–145)
SP GR UR STRIP.AUTO: <=1.005 (ref 1–1.03)
TROPONIN, HIGH SENSITIVITY: 15 NG/L (ref 0–14)
TROPONIN, HIGH SENSITIVITY: 15 NG/L (ref 0–14)
UA COMPLETE W REFLEX CULTURE PNL UR: ABNORMAL
UA DIPSTICK W REFLEX MICRO PNL UR: YES
URN SPEC COLLECT METH UR: ABNORMAL
UROBILINOGEN UR STRIP-ACNC: ABNORMAL E.U./DL
WBC # BLD AUTO: 4.6 K/UL (ref 4–11)
WBC #/AREA URNS HPF: NORMAL /HPF (ref 0–5)

## 2023-06-30 PROCEDURE — 99285 EMERGENCY DEPT VISIT HI MDM: CPT

## 2023-06-30 PROCEDURE — 93005 ELECTROCARDIOGRAM TRACING: CPT | Performed by: EMERGENCY MEDICINE

## 2023-06-30 PROCEDURE — 71046 X-RAY EXAM CHEST 2 VIEWS: CPT

## 2023-06-30 PROCEDURE — 81001 URINALYSIS AUTO W/SCOPE: CPT

## 2023-06-30 PROCEDURE — 85025 COMPLETE CBC W/AUTO DIFF WBC: CPT

## 2023-06-30 PROCEDURE — 93010 ELECTROCARDIOGRAM REPORT: CPT | Performed by: INTERNAL MEDICINE

## 2023-06-30 PROCEDURE — 36415 COLL VENOUS BLD VENIPUNCTURE: CPT

## 2023-06-30 PROCEDURE — 83880 ASSAY OF NATRIURETIC PEPTIDE: CPT

## 2023-06-30 PROCEDURE — 80053 COMPREHEN METABOLIC PANEL: CPT

## 2023-06-30 PROCEDURE — 84484 ASSAY OF TROPONIN QUANT: CPT

## 2023-06-30 RX ORDER — BACLOFEN 10 MG/1
10 TABLET ORAL 3 TIMES DAILY PRN
Qty: 60 TABLET | Refills: 2 | OUTPATIENT
Start: 2023-06-30

## 2023-06-30 ASSESSMENT — PAIN DESCRIPTION - DESCRIPTORS
DESCRIPTORS: ACHING

## 2023-06-30 ASSESSMENT — PAIN - FUNCTIONAL ASSESSMENT
PAIN_FUNCTIONAL_ASSESSMENT: PREVENTS OR INTERFERES SOME ACTIVE ACTIVITIES AND ADLS
PAIN_FUNCTIONAL_ASSESSMENT: 0-10

## 2023-06-30 ASSESSMENT — LIFESTYLE VARIABLES: HOW OFTEN DO YOU HAVE A DRINK CONTAINING ALCOHOL: NEVER

## 2023-06-30 ASSESSMENT — PAIN SCALES - GENERAL
PAINLEVEL_OUTOF10: 7
PAINLEVEL_OUTOF10: 7
PAINLEVEL_OUTOF10: 5

## 2023-06-30 ASSESSMENT — PAIN DESCRIPTION - LOCATION: LOCATION: BACK;KNEE;FOOT

## 2023-06-30 ASSESSMENT — PAIN DESCRIPTION - PAIN TYPE
TYPE: CHRONIC PAIN
TYPE: CHRONIC PAIN

## 2023-06-30 ASSESSMENT — PAIN DESCRIPTION - FREQUENCY
FREQUENCY: CONTINUOUS
FREQUENCY: CONTINUOUS

## 2023-07-03 ENCOUNTER — CARE COORDINATION (OUTPATIENT)
Dept: CARE COORDINATION | Age: 73
End: 2023-07-03

## 2023-07-03 ENCOUNTER — OFFICE VISIT (OUTPATIENT)
Dept: INTERNAL MEDICINE CLINIC | Age: 73
End: 2023-07-03

## 2023-07-03 VITALS
OXYGEN SATURATION: 94 % | WEIGHT: 180 LBS | DIASTOLIC BLOOD PRESSURE: 68 MMHG | HEART RATE: 58 BPM | BODY MASS INDEX: 35.34 KG/M2 | SYSTOLIC BLOOD PRESSURE: 110 MMHG | HEIGHT: 60 IN

## 2023-07-03 DIAGNOSIS — G30.1 LATE ONSET ALZHEIMER'S DEMENTIA WITH BEHAVIORAL DISTURBANCE (HCC): ICD-10-CM

## 2023-07-03 DIAGNOSIS — R25.1 TREMOR: ICD-10-CM

## 2023-07-03 DIAGNOSIS — M79.671 RIGHT FOOT PAIN: ICD-10-CM

## 2023-07-03 DIAGNOSIS — J30.0 CHRONIC VASOMOTOR RHINITIS: Chronic | ICD-10-CM

## 2023-07-03 DIAGNOSIS — E87.1 HYPONATREMIA: Primary | ICD-10-CM

## 2023-07-03 DIAGNOSIS — G47.33 OSA (OBSTRUCTIVE SLEEP APNEA): ICD-10-CM

## 2023-07-03 DIAGNOSIS — E87.1 HYPONATREMIA: ICD-10-CM

## 2023-07-03 DIAGNOSIS — R55 SYNCOPE, UNSPECIFIED SYNCOPE TYPE: ICD-10-CM

## 2023-07-03 DIAGNOSIS — R41.0 CONFUSION: ICD-10-CM

## 2023-07-03 DIAGNOSIS — R06.02 SHORTNESS OF BREATH: ICD-10-CM

## 2023-07-03 DIAGNOSIS — F02.818 LATE ONSET ALZHEIMER'S DEMENTIA WITH BEHAVIORAL DISTURBANCE (HCC): ICD-10-CM

## 2023-07-03 DIAGNOSIS — F33.1 MODERATE EPISODE OF RECURRENT MAJOR DEPRESSIVE DISORDER (HCC): Chronic | ICD-10-CM

## 2023-07-03 DIAGNOSIS — J30.2 SEASONAL ALLERGIES: ICD-10-CM

## 2023-07-03 DIAGNOSIS — E66.01 SEVERE OBESITY (BMI 35.0-39.9) WITH COMORBIDITY (HCC): ICD-10-CM

## 2023-07-03 LAB
ANION GAP SERPL CALCULATED.3IONS-SCNC: 12 MMOL/L (ref 3–16)
BACTERIA URNS QL MICRO: ABNORMAL /HPF
BILIRUB UR QL STRIP.AUTO: NEGATIVE
BUN SERPL-MCNC: 15 MG/DL (ref 7–20)
CALCIUM SERPL-MCNC: 9.6 MG/DL (ref 8.3–10.6)
CHARACTER UR: ABNORMAL
CHLORIDE SERPL-SCNC: 91 MMOL/L (ref 99–110)
CLARITY UR: ABNORMAL
CO2 SERPL-SCNC: 24 MMOL/L (ref 21–32)
COLOR UR: ABNORMAL
CREAT SERPL-MCNC: 1 MG/DL (ref 0.6–1.2)
EPI CELLS #/AREA URNS AUTO: 6 /HPF (ref 0–5)
GFR SERPLBLD CREATININE-BSD FMLA CKD-EPI: 59 ML/MIN/{1.73_M2}
GLUCOSE SERPL-MCNC: 78 MG/DL (ref 70–99)
GLUCOSE UR STRIP.AUTO-MCNC: NEGATIVE MG/DL
HGB UR QL STRIP.AUTO: NEGATIVE
HYALINE CASTS #/AREA URNS AUTO: 2 /LPF (ref 0–8)
KETONES UR STRIP.AUTO-MCNC: NEGATIVE MG/DL
LEUKOCYTE ESTERASE UR QL STRIP.AUTO: ABNORMAL
NITRITE UR QL STRIP.AUTO: POSITIVE
PH UR STRIP.AUTO: 6 [PH] (ref 5–8)
POTASSIUM SERPL-SCNC: 5.2 MMOL/L (ref 3.5–5.1)
PROT UR STRIP.AUTO-MCNC: NEGATIVE MG/DL
RBC #/AREA URNS HPF: ABNORMAL /HPF (ref 0–4)
SODIUM SERPL-SCNC: 127 MMOL/L (ref 136–145)
SODIUM UR-SCNC: 35 MMOL/L
SP GR UR STRIP.AUTO: 1.01 (ref 1–1.03)
UA COMPLETE W REFLEX CULTURE PNL UR: ABNORMAL
UA DIPSTICK W REFLEX MICRO PNL UR: YES
URN SPEC COLLECT METH UR: ABNORMAL
UROBILINOGEN UR STRIP-ACNC: 1 E.U./DL
WBC #/AREA URNS AUTO: 3 /HPF (ref 0–5)

## 2023-07-03 RX ORDER — FLUTICASONE PROPIONATE 50 MCG
2 SPRAY, SUSPENSION (ML) NASAL DAILY
Qty: 1 EACH | Refills: 5 | Status: SHIPPED | OUTPATIENT
Start: 2023-07-03

## 2023-07-03 RX ORDER — ALBUTEROL SULFATE 90 UG/1
2 AEROSOL, METERED RESPIRATORY (INHALATION) 4 TIMES DAILY PRN
Qty: 1 EACH | Refills: 0 | Status: SHIPPED | OUTPATIENT
Start: 2023-07-03

## 2023-07-03 RX ORDER — HYDROCHLOROTHIAZIDE 12.5 MG/1
12.5 CAPSULE, GELATIN COATED ORAL EVERY MORNING
Qty: 30 CAPSULE | Refills: 10
Start: 2023-07-03

## 2023-07-03 RX ORDER — CICLOPIROX 1 G/100ML
SHAMPOO TOPICAL
Qty: 120 ML | Refills: 1 | Status: SHIPPED | OUTPATIENT
Start: 2023-07-03
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
## 2023-07-03 NOTE — CARE COORDINATION
RN-CC outreached patient for cc follow up - patient states she is on her way to a Urology appointment. RN-CC informed patient I will follow up at later date & time.

## 2023-07-03 NOTE — PATIENT INSTRUCTIONS
Please make an appointment to see Dr. Zenon Bloom about your tremors. You are tired because of your sleep apnea. I think that your headache is from allergies and the weather. You do not have a sinus infection. You may try over the counter Coricidin HB to see if that helps alleviate some of your congestion. I think that your shortness of breath is related to allergies. I have given you a prescription for an inhaler called albuterol. You will use it up to once every 6 hours AS NEEDED for shortness of breath. Albuterol may make you feel shaky for a few minutes. This is normal. You will use the inhaler with a spacer which will make it easier for you to get the medication. Please ask your pharmacist to show you how to use it. You should not drive until we know why you passed out. I have ordered a heart monitor to check for abnormal heart rhythms that may have caused you to pass out. If you do not have another passing out spell for 3 months, you may drive again. Please call [facility] for more transportation help. I have ordered an xray of your right foot. I suspect that your bruised the bone in your foot and will take about 6-8 weeks to fully heal.   You do not need an xray of your left foot. You are developing hammer toe in your second toe. You only need to see a foot doctor if it is painful. I think that your confusion is from your low salt level. We have re-checked it today. If it remains low, I will refer you to a nephrologist (kidney specialist, not a urologist who works on your bladder) to help treat this problem. I think that your anesthesia from last Friday may also be some of your confusion/slowed thinking.
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
## 2023-07-03 NOTE — CARE COORDINATION
[facility] ED Follow up Call    Reason for ED visit:  fatigue  Status:     improved    Did you call your PCP prior to going to the ED? Yes      Did you receive a discharge instructions from the Emergency Room? Yes  Review of Instructions:     Understands what to report/when to return?:  Yes   Understands discharge instructions?:  Yes   Following discharge instructions?:  Yes   If not why? N/A    Are there any new complaints of pain? No  New Pain Meds? No    Constipation prophylaxis needed? N/A    If you have a wound is the dressing clean, dry, and intact? No  Understands wound care regimen? No    Are there any other complaints/concerns that you wish to tell your provider? Patient followed up with her PCP today and had no additional questions or concerns. ACM said Adele who works with PCP and primary ACM would follow up with patient. FU appts/Provider:    Future Appointments   Date Time Provider [address]   7/5/2023 11:15 AM DO AKASH Su [facility]   8/2/2023  4:20 PM Ethan Moser, DO [facility] [place] [facility]   8/4/2023 11:00 AM Ethan Moser, DO [facility] [place] [facility]   12/18/2023 11:00 AM WILLOW Goldberg [facility]           New Medications?:   Nothing new in ER but new Rx at PCP follow up      Medication Reconciliation by phone - Yes  Understands Medications? Yes  Taking Medications? Yes  Can you swallow your pills? Yes    Any further needs in the home i.e. Equipment?   Not Applicable    Link to services in community?:  No   Which services:  N/A

## 2023-07-04 PROBLEM — R41.0 CONFUSION: Status: ACTIVE | Noted: 2023-07-04

## 2023-07-04 PROBLEM — E87.1 HYPONATREMIA: Status: ACTIVE | Noted: 2023-07-04

## 2023-07-04 RX ORDER — NITROFURANTOIN 25; 75 MG/1; MG/1
100 CAPSULE ORAL 2 TIMES DAILY
Qty: 28 CAPSULE | Refills: 0 | Status: SHIPPED | OUTPATIENT
Start: 2023-07-04 | End: 2023-07-18

## 2023-07-04 NOTE — ASSESSMENT & PLAN NOTE
Patient with sodium 126 in the emergency department over the weekend. Suspect this is contribute to her confusion. She does have chronic hyponatremia but she is usually in the low 130s. May need to see nephrology. Check BMP and urine for sodium today. Suspect hyponatremia is related to hydrochlorothiazide use.

## 2023-07-04 NOTE — ASSESSMENT & PLAN NOTE
Continue Lexapro 20 mg daily. Patient became very tearful during office visit today we discussed not driving.
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
## 2023-07-04 NOTE — ASSESSMENT & PLAN NOTE
Recently had a sleep endoscopy to check for appropriateness for Inspire. She does not use CPAP nightly and when she does use her CPAP she often takes it off prior to 4 hours. Reviewed with patient that noncompliance with CPAP results in poor sleep and also impacts her cognition. Hopefully she is a candidate for Inspire.

## 2023-07-04 NOTE — ASSESSMENT & PLAN NOTE
Suspect in part related to hyponatremia. We will also check UA to rule out UTI. Patient still feels that she is able to live independently. Advised against driving for the time being while working up syncope. The benefit from behind the wheel  evaluation after syncope is work-up.

## 2023-07-04 NOTE — ASSESSMENT & PLAN NOTE
Slightly worse currently. Check UA to rule out UTI. Possibly related to hyponatremia. Patient still feels that she is able to live independently. Advised against driving for the time being while working up syncope. The benefit from behind the wheel  evaluation after syncope is work-up. May need to see Dr. Molly Torres sooner than December. Patient also needs monthly appointments.

## 2023-07-04 NOTE — ASSESSMENT & PLAN NOTE
Lungs are clear on exam.  Reviewed work-up from emergency department and chest x-ray was clear. Patient also reports wheezing. Suspect she has some degree of asthma or reactive airways related to poor air quality and increase in allergens recently. Add albuterol as needed. Recommend using albuterol with spacer. Recommend patient ask pharmacist to demonstrate albuterol use when she picks up her prescriptions.

## 2023-07-05 ENCOUNTER — OFFICE VISIT (OUTPATIENT)
Dept: ENT CLINIC | Age: 73
End: 2023-07-05

## 2023-07-05 VITALS — BODY MASS INDEX: 35.34 KG/M2 | HEIGHT: 60 IN | OXYGEN SATURATION: 95 % | TEMPERATURE: 98 F | WEIGHT: 180 LBS

## 2023-07-05 DIAGNOSIS — G47.33 OSA (OBSTRUCTIVE SLEEP APNEA): Primary | ICD-10-CM

## 2023-07-05 ASSESSMENT — ENCOUNTER SYMPTOMS
COUGH: 0
FACIAL SWELLING: 0
EYE ITCHING: 0
VOICE CHANGE: 0
SINUS PRESSURE: 0
TROUBLE SWALLOWING: 0
SORE THROAT: 0
SHORTNESS OF BREATH: 0
APNEA: 0

## 2023-07-05 NOTE — PROGRESS NOTES
tablet Take 1 tablet by mouth 3 times daily as needed (pain) 60 tablet 2    rosuvastatin (CRESTOR) 10 MG tablet TAKE 1 TABLET BY MOUTH NIGHTLY 90 tablet 3    blood glucose monitor strips Check sugars qam and prn s/s of low blood sugars 50 strip 5    Lancets MISC Check sugars qam and prn s/s of low blood sugars 50 each 5    blood glucose monitor kit and supplies Check sugars qam and prn s/s of low blood sugars 1 kit 0    diclofenac sodium (VOLTAREN) 1 % GEL as needed      carBAMazepine (TEGRETOL-XR) 100 MG extended release tablet Take 1 tablet by mouth 2 times daily 60 tablet 3    diphenoxylate-atropine (LOMOTIL) 2.5-0.025 MG per tablet Take 1 tablet by mouth as needed for Diarrhea. Cholecalciferol (VITAMIN D3) 5000 units CAPS Take 1 capsule by mouth daily      zoledronic acid (RECLAST) 5 MG/100ML SOLN Infuse 100 mLs intravenously once IV, yearly      Calcium Carbonate-Vit D-Min (CALCIUM 1200 PO) Take 1 capsule by mouth 2 times daily. Ascorbic Acid (VITAMIN C) 500 MG tablet Take 1 tablet by mouth daily       Current Facility-Administered Medications   Medication Dose Route Frequency Provider Last Rate Last Admin    zoledronic acid (RECLAST) 5 mg/100 mL infusion  5 mg IntraVENous See Admin Instructions Tonja Rodriguez  mL/hr at 04/06/16 0900 5 mg at 04/06/16 0900       Review of Systems     Review of Systems   Constitutional:  Negative for appetite change, chills, fatigue, fever and unexpected weight change. HENT:  Negative for congestion, ear discharge, ear pain, facial swelling, hearing loss, nosebleeds, postnasal drip, sinus pressure, sneezing, sore throat, tinnitus, trouble swallowing and voice change. Eyes:  Negative for itching. Respiratory:  Negative for apnea, cough and shortness of breath. Endocrine: Negative for cold intolerance and heat intolerance. Musculoskeletal:  Negative for myalgias and neck pain. Skin:  Negative for rash.    Allergic/Immunologic: Negative for

## 2023-07-12 ENCOUNTER — HOSPITAL ENCOUNTER (OUTPATIENT)
Age: 73
Discharge: HOME | End: 2023-07-12
Payer: MEDICARE

## 2023-07-12 ENCOUNTER — HOSPITAL ENCOUNTER (OUTPATIENT)
Dept: GENERAL RADIOLOGY | Age: 73
Discharge: HOME | End: 2023-07-12
Payer: MEDICARE

## 2023-07-12 ENCOUNTER — NURSE ONLY (OUTPATIENT)
Dept: CARDIOLOGY CLINIC | Age: 73
End: 2023-07-12

## 2023-07-12 DIAGNOSIS — M79.671 RIGHT FOOT PAIN: ICD-10-CM

## 2023-07-12 PROCEDURE — 73630 X-RAY EXAM OF FOOT: CPT

## 2023-07-12 RX ORDER — OLMESARTAN MEDOXOMIL 20 MG/1
TABLET ORAL
Qty: 90 TABLET | Refills: 1 | Status: SHIPPED | OUTPATIENT
Start: 2023-07-12

## 2023-07-12 RX ORDER — ESCITALOPRAM OXALATE 20 MG/1
TABLET ORAL
Qty: 90 TABLET | Refills: 1 | Status: SHIPPED | OUTPATIENT
Start: 2023-07-12

## 2023-07-12 NOTE — PROGRESS NOTES
30 Day MCOT requested for pt. Device was registered and placed. Tutorial given, pt verbalized understanding.  Date 07/12/23  Time 0800 am  S/N MT [id number]  [email or web address]

## 2023-07-14 RX ORDER — BACLOFEN 10 MG/1
10 TABLET ORAL 3 TIMES DAILY PRN
Qty: 60 TABLET | Refills: 2 | Status: SHIPPED | OUTPATIENT
Start: 2023-07-14

## 2023-07-18 ENCOUNTER — HOSPITAL ENCOUNTER (OUTPATIENT)
Age: 73
Setting detail: OUTPATIENT SURGERY
Discharge: HOME | End: 2023-07-18
Attending: ANESTHESIOLOGY | Admitting: ANESTHESIOLOGY
Payer: MEDICARE

## 2023-07-18 ENCOUNTER — APPOINTMENT (OUTPATIENT)
Dept: GENERAL RADIOLOGY | Age: 73
End: 2023-07-18
Attending: ANESTHESIOLOGY
Payer: MEDICARE

## 2023-07-18 VITALS
TEMPERATURE: 96.8 F | WEIGHT: 177 LBS | DIASTOLIC BLOOD PRESSURE: 56 MMHG | BODY MASS INDEX: 34.75 KG/M2 | SYSTOLIC BLOOD PRESSURE: 107 MMHG | OXYGEN SATURATION: 95 % | RESPIRATION RATE: 16 BRPM | HEART RATE: 56 BPM | HEIGHT: 60 IN

## 2023-07-18 LAB
GLUCOSE BLD-MCNC: 100 MG/DL (ref 70–99)
PERFORMED ON: ABNORMAL

## 2023-07-18 PROCEDURE — 2709999900 HC NON-CHARGEABLE SUPPLY: Performed by: ANESTHESIOLOGY

## 2023-07-18 PROCEDURE — 99152 MOD SED SAME PHYS/QHP 5/>YRS: CPT | Performed by: ANESTHESIOLOGY

## 2023-07-18 PROCEDURE — 77003 FLUOROGUIDE FOR SPINE INJECT: CPT

## 2023-07-18 PROCEDURE — 3610000054 HC PAIN LEVEL 3 BASE (NON-OR): Performed by: ANESTHESIOLOGY

## 2023-07-18 PROCEDURE — 6360000002 HC RX W HCPCS: Performed by: ANESTHESIOLOGY

## 2023-07-18 PROCEDURE — 2500000003 HC RX 250 WO HCPCS: Performed by: ANESTHESIOLOGY

## 2023-07-18 RX ORDER — LIDOCAINE HYDROCHLORIDE 10 MG/ML
INJECTION, SOLUTION EPIDURAL; INFILTRATION; INTRACAUDAL; PERINEURAL
Status: COMPLETED | OUTPATIENT
Start: 2023-07-18 | End: 2023-07-18

## 2023-07-18 RX ORDER — METHYLPREDNISOLONE ACETATE 80 MG/ML
INJECTION, SUSPENSION INTRA-ARTICULAR; INTRALESIONAL; INTRAMUSCULAR; SOFT TISSUE
Status: COMPLETED | OUTPATIENT
Start: 2023-07-18 | End: 2023-07-18

## 2023-07-18 RX ORDER — FENTANYL CITRATE 50 UG/ML
INJECTION, SOLUTION INTRAMUSCULAR; INTRAVENOUS
Status: COMPLETED | OUTPATIENT
Start: 2023-07-18 | End: 2023-07-18

## 2023-07-18 RX ORDER — MIDAZOLAM HYDROCHLORIDE 1 MG/ML
INJECTION INTRAMUSCULAR; INTRAVENOUS
Status: COMPLETED | OUTPATIENT
Start: 2023-07-18 | End: 2023-07-18

## 2023-07-18 ASSESSMENT — PAIN DESCRIPTION - DESCRIPTORS: DESCRIPTORS: PINS AND NEEDLES

## 2023-07-18 NOTE — H&P
Update History & Physical    The patient's History and Physical of July 12, 2023 was reviewed with the patient and I examined the patient. There was no change. The surgical site was confirmed by the patient and me. Plan: The risks, benefits, expected outcome, and alternative to the recommended procedure have been discussed with the patient. Patient understands and wants to proceed with the procedure.      Electronically signed by Clark Alston MD on 7/18/2023 at 8:47 AM

## 2023-07-18 NOTE — OP NOTE
Operative Note      Patient: Rachael Resendiz  YOB: 1950  MRN: [id number]    Date of Procedure: 7/18/2023    Pre-Op Diagnosis Codes:     * Postlaminectomy syndrome [M96.1]    Post-Op Diagnosis: Same       Procedure(s):  CAUDAL EPIDURAL STEROID INJECTION WITH FLUOROSCOPY    Surgeon(s):  Raciel Ferrell MD    Assistant:   * No surgical staff found *    Anesthesia: IV Sedation    Estimated Blood Loss (mL): Minimal    Complications: None    Specimens:   * No specimens in log *    Implants:  * No implants in log *      Drains: * No LDAs found *    Findings:         Detailed Description of Procedure:   Procedure in Detail:   The patient's chart was reviewed. After obtaining written informed consent, the patient had a 20 g IV placed, the patient was placed in the prone position with the leg slightly flexed. Versed and Fentanyl was given to the patient intravenous, and monitors attached. MEDICAL NECESSITY: This patient has chronic pain that has failed to respond to conservative measures as outlined in the original history and last physical exam.   The patient's symptoms include Pain and disability of a moderate to severe degree with intermittent refereed pain. The goals of treatment are to   1) Achieve optimal pain control, recognizing that a pain-free state may not be achievable;   2) Minimize adverse outcomes;   3) Enhance functional abilities, and physical and psychological well-being; and   4) Enhance the quality of life for patients with chronic pain. The patient has documented pathology through radiographic imaging, the patient has the same or similar procedure in the past which resulted in significant improvement more than 50% reduction in the pain, as well improvement in their Activity of daily living and quality of life, and this would be the goal for the first time procedure. PROCEDURE:   The patient was placed in the prone position on fluoroscopy table.   The patient was placed in the

## 2023-07-18 NOTE — DISCHARGE INSTRUCTIONS
1. Keep injection /surgical site dry until the band-aid/ dressing is removed. Please remove band-aids from the injection site 12-24 hours after the procedure. 2. If the injection site is sore, you may apply an ice pack to that area for twenty minutes every two hours for the initial twenty four hours. Do not use heat. 3. Occasionally you may notice slight increase in pain after the procedure. This should start to improve  within the next twenty four to forty eight hours. 4. Remember, it may take as long as forty eight hours before you notice a gradual improvement in your pain and other symptoms. 5. You may continue to take your pain medication as needed. 6. DO NOT stop any other previously prescribed medications. You may resume blood thinning medications the day after the procedure. 7. In general, you should avoid any prolonged standing, walking, or repeated bending or heavy use of your upper extremities for twenty four to forty eight hours but may resume light      Normal activity when you leave the facility     8. If you are currently going to physical therapy, continue to do so unless your doctor instructs you not to.    9. If you develop any other symptoms such fever, rash, unusual drainage from the site, severe headache or weakness please call [phone]. 10. Other: If you had sedation ; Do not drink alcohol or sign any legal or financial contracts for 24 hours. Also do not drive or operate other types of machinery         for 24 hours. You should have a responsible adult available to assist you for the rest of the day. Side effects include lightheadedness, dizziness and sleepiness.     11. Side effects of steroids can include: (these may last a few days then will go away on their own)           - facial flushing           - hot flashes           - nervous energy and difficulty sleeping at night           - muscle spasm or twitching           - fluid retention            - elevated blood

## 2023-07-18 NOTE — BRIEF OP NOTE
Brief Postoperative Note      Patient: William Puente  YOB: 1950  MRN: [id number]    Date of Procedure: 7/18/2023    Pre-Op Diagnosis Codes:     * Postlaminectomy syndrome [M96.1]    Post-Op Diagnosis: Same       Procedure(s):  CAUDAL EPIDURAL STEROID INJECTION WITH FLUOROSCOPY    Surgeon(s):  Geena Haas MD    Assistant:  * No surgical staff found *    Anesthesia: IV Sedation    Estimated Blood Loss (mL): Minimal    Complications: None    Specimens:   * No specimens in log *    Implants:  * No implants in log *      Drains: * No LDAs found *    Findings:       Electronically signed by Geena Haas MD on 7/18/2023 at 8:54 AM

## 2023-07-20 ENCOUNTER — HOSPITAL ENCOUNTER (OUTPATIENT)
Dept: ULTRASOUND IMAGING | Age: 73
Discharge: HOME | End: 2023-07-20
Payer: MEDICARE

## 2023-07-20 ENCOUNTER — TELEPHONE (OUTPATIENT)
Dept: INTERNAL MEDICINE CLINIC | Age: 73
End: 2023-07-20

## 2023-07-20 ENCOUNTER — HOSPITAL ENCOUNTER (OUTPATIENT)
Age: 73
Discharge: HOME | End: 2023-07-20
Payer: MEDICARE

## 2023-07-20 DIAGNOSIS — R39.14 FEELING OF INCOMPLETE BLADDER EMPTYING: ICD-10-CM

## 2023-07-20 DIAGNOSIS — N36.2 URETHRAL CARUNCLE: ICD-10-CM

## 2023-07-20 DIAGNOSIS — I10 ESSENTIAL (PRIMARY) HYPERTENSION: ICD-10-CM

## 2023-07-20 DIAGNOSIS — N30.21 OTHER CHRONIC CYSTITIS WITH HEMATURIA: ICD-10-CM

## 2023-07-20 DIAGNOSIS — N30.01 ACUTE CYSTITIS WITH HEMATURIA: ICD-10-CM

## 2023-07-20 DIAGNOSIS — R33.9 RETENTION OF URINE, UNSPECIFIED: ICD-10-CM

## 2023-07-20 DIAGNOSIS — N34.3 URETHRAL SYNDROME, UNSPECIFIED: ICD-10-CM

## 2023-07-20 DIAGNOSIS — N31.9 NEUROMUSCULAR DYSFUNCTION OF BLADDER, UNSPECIFIED: ICD-10-CM

## 2023-07-20 LAB
ANION GAP SERPL CALCULATED.3IONS-SCNC: 10 MMOL/L (ref 3–16)
BUN SERPL-MCNC: 16 MG/DL (ref 7–20)
CALCIUM SERPL-MCNC: 9.5 MG/DL (ref 8.3–10.6)
CHLORIDE SERPL-SCNC: 95 MMOL/L (ref 99–110)
CO2 SERPL-SCNC: 27 MMOL/L (ref 21–32)
CREAT SERPL-MCNC: 0.7 MG/DL (ref 0.6–1.2)
GFR SERPLBLD CREATININE-BSD FMLA CKD-EPI: >60 ML/MIN/{1.73_M2}
GLUCOSE SERPL-MCNC: 84 MG/DL (ref 70–99)
POTASSIUM SERPL-SCNC: 4.5 MMOL/L (ref 3.5–5.1)
SODIUM SERPL-SCNC: 132 MMOL/L (ref 136–145)

## 2023-07-20 PROCEDURE — 76770 US EXAM ABDO BACK WALL COMP: CPT

## 2023-07-20 PROCEDURE — 36415 COLL VENOUS BLD VENIPUNCTURE: CPT

## 2023-07-20 PROCEDURE — 80048 BASIC METABOLIC PNL TOTAL CA: CPT

## 2023-07-20 NOTE — TELEPHONE ENCOUNTER
Pt came in to weigh herself. States that she's been trying to lose weight to be cleared for surgery. States that she needed this recorded in her chart. She weighed 173.0lbs. Please advise.

## 2023-07-20 NOTE — TELEPHONE ENCOUNTER
Pt returning call---please call pt back. Thanks. Minoxidil Counseling: Minoxidil is a topical medication which can increase blood flow where it is applied. It is uncertain how this medication increases hair growth. Side effects are uncommon and include stinging and allergic reactions.

## 2023-07-25 ENCOUNTER — TELEPHONE (OUTPATIENT)
Dept: ENT CLINIC | Age: 73
End: 2023-07-25

## 2023-07-25 RX ORDER — FLUTICASONE PROPIONATE 50 MCG
2 SPRAY, SUSPENSION (ML) NASAL DAILY
Refills: 5 | OUTPATIENT
Start: 2023-07-25

## 2023-07-27 ENCOUNTER — TELEPHONE (OUTPATIENT)
Dept: ENT CLINIC | Age: 73
End: 2023-07-27

## 2023-07-27 NOTE — TELEPHONE ENCOUNTER
ERLINDAM advising patient that she needs a new sleep study before insurance will approve Inspire.  Gave # for Dr. Lopez Brochure office [phone]

## 2023-07-28 ENCOUNTER — TELEPHONE (OUTPATIENT)
Dept: PULMONOLOGY | Age: 73
End: 2023-07-28

## 2023-07-28 DIAGNOSIS — G47.33 OSA (OBSTRUCTIVE SLEEP APNEA): Primary | ICD-10-CM

## 2023-07-28 NOTE — TELEPHONE ENCOUNTER
Pt called in stating she needs a sleep study done for her Inspire evaluation. I informed her that based on the notes in here, it looks like Dr Lauren Hermosillo office will be ordering that. Pt understood and will wait to hear from Dr Lauren Hermosillo office.

## 2023-07-28 NOTE — TELEPHONE ENCOUNTER
Dr. Hiwot Bledsoe office emailed me stating that patient needs a new sleep study before they can evaluation for sleep apnea. Will you please order one?

## 2023-07-31 ENCOUNTER — CARE COORDINATION (OUTPATIENT)
Dept: CARE COORDINATION | Age: 73
End: 2023-07-31

## 2023-07-31 NOTE — CARE COORDINATION
Ambulatory Care Coordination Note  2023    Patient Current Location: [place]     ACM contacted the patient by telephone. Verified name and  with patient as identifiers. Provided introduction to self, and explanation of the ACM role. Challenges to be reviewed by the provider   Additional needs identified to be addressed with provider: No  none               Method of communication with provider: chart routing. ACM: Adele Conde, RN    RN-CC outreached patient for cc follow up    Patient was evaluated in [address] [place] [address] ED on  for fatigue, found to be hyponatremic. F/u with Dr. Bello Perez on 7/3 - repeat sodium was 35. No additional tx ordered. Patient scheduled to f/u with Dr. Bello Perez on 23. Shabnam Torres states she \"feels great. \" She states she can't get over how well she feels. She states she is no longer experiencing tremors and does not feel a referral to neurology is necessary. No s/sx of hyponatremia reported. She did utilize transportation assistance through [employer] following he ED visit but states she feels good and has since resumed driving. She did confirm she has the phone number for transportation assistance w/[employer] and will utilize this service if needed in the future. We discussed additional in home assistance, such as a home health aide, but Shabnam Torres states she feels great and declines additional help. Her daughter currently does her laundry. She was encouraged to contact me if needs change. She confirmed she is using her abuterol inhaler prn w improvement in sob. No questions or concerns voiced. RN-CC sent instructions on [facility]erol use [facility] tanner rankin.     Pain in right foot resolved. She states she followed up with Urology last week and was told she is not emptying her bladder. She states it was recommended that she self cath but she told Urology she cannot bend over due to chronic back issues. She states she was advised to f/u in 6 months. No s/sx of uti reported at this time.      She is waiting

## 2023-08-03 NOTE — TELEPHONE ENCOUNTER
Pt informed. Stated understanding that she is not to use her machine for at least a week before her sleep study.

## 2023-08-03 NOTE — TELEPHONE ENCOUNTER
I will order the sleep study, in lab  Make sure she does not use her CPAP machine at least 1 week prior to the sleep study

## 2023-08-04 ENCOUNTER — TELEPHONE (OUTPATIENT)
Dept: SLEEP CENTER | Age: 73
End: 2023-08-04

## 2023-08-04 ENCOUNTER — OFFICE VISIT (OUTPATIENT)
Dept: INTERNAL MEDICINE CLINIC | Age: 73
End: 2023-08-04
Payer: MEDICARE

## 2023-08-04 VITALS
BODY MASS INDEX: 34.02 KG/M2 | OXYGEN SATURATION: 92 % | DIASTOLIC BLOOD PRESSURE: 58 MMHG | WEIGHT: 174.2 LBS | SYSTOLIC BLOOD PRESSURE: 108 MMHG | HEART RATE: 56 BPM

## 2023-08-04 DIAGNOSIS — E87.1 HYPONATREMIA: ICD-10-CM

## 2023-08-04 DIAGNOSIS — G30.1 LATE ONSET ALZHEIMER'S DEMENTIA WITH BEHAVIORAL DISTURBANCE (HCC): ICD-10-CM

## 2023-08-04 DIAGNOSIS — J45.30 MILD PERSISTENT ASTHMA WITHOUT COMPLICATION: Primary | ICD-10-CM

## 2023-08-04 DIAGNOSIS — E78.5 HYPERLIPIDEMIA LDL GOAL <100: ICD-10-CM

## 2023-08-04 DIAGNOSIS — E55.9 VITAMIN D INSUFFICIENCY: ICD-10-CM

## 2023-08-04 DIAGNOSIS — N39.0 RECURRENT UTI: Chronic | ICD-10-CM

## 2023-08-04 DIAGNOSIS — R06.02 SHORTNESS OF BREATH: ICD-10-CM

## 2023-08-04 DIAGNOSIS — F02.818 LATE ONSET ALZHEIMER'S DEMENTIA WITH BEHAVIORAL DISTURBANCE (HCC): ICD-10-CM

## 2023-08-04 DIAGNOSIS — G47.33 OSA (OBSTRUCTIVE SLEEP APNEA): ICD-10-CM

## 2023-08-04 DIAGNOSIS — I10 ESSENTIAL HYPERTENSION: Chronic | ICD-10-CM

## 2023-08-04 PROCEDURE — 3074F SYST BP LT 130 MM HG: CPT | Performed by: INTERNAL MEDICINE

## 2023-08-04 PROCEDURE — 99214 OFFICE O/P EST MOD 30 MIN: CPT | Performed by: INTERNAL MEDICINE

## 2023-08-04 PROCEDURE — 3078F DIAST BP <80 MM HG: CPT | Performed by: INTERNAL MEDICINE

## 2023-08-04 PROCEDURE — 1123F ACP DISCUSS/DSCN MKR DOCD: CPT | Performed by: INTERNAL MEDICINE

## 2023-08-04 RX ORDER — BUDESONIDE AND FORMOTEROL FUMARATE DIHYDRATE 80; 4.5 UG/1; UG/1
2 AEROSOL RESPIRATORY (INHALATION) 2 TIMES DAILY
Qty: 1 G | Refills: 5 | Status: SHIPPED | OUTPATIENT
Start: 2023-08-04

## 2023-08-04 NOTE — TELEPHONE ENCOUNTER
Called to schedule a PSG per Sharmila Aguilar    I called twice and it rings -someone picks up but then hangs up.      Will try again later       [facility]

## 2023-08-04 NOTE — PATIENT INSTRUCTIONS
I think that you do have asthma. Use Symbicort 1 puff twice daily until you come back to see me. Use with the spacer. Rinse your mouth out after you use Symbicort. Symbicort should work to keep your lungs open and help you not feel short of breath of tight in your chest.   Continue to use albuterol AS NEEDED for shortness of breath.

## 2023-08-05 LAB
25(OH)D3 SERPL-MCNC: 43.1 NG/ML
ALBUMIN SERPL-MCNC: 4.3 G/DL (ref 3.4–5)
ALBUMIN/GLOB SERPL: 2 {RATIO} (ref 1.1–2.2)
ALP SERPL-CCNC: 37 U/L (ref 40–129)
ALT SERPL-CCNC: 30 U/L (ref 10–40)
ANION GAP SERPL CALCULATED.3IONS-SCNC: 11 MMOL/L (ref 3–16)
AST SERPL-CCNC: 28 U/L (ref 15–37)
BASOPHILS # BLD: 0 K/UL (ref 0–0.2)
BASOPHILS NFR BLD: 0.5 %
BILIRUB SERPL-MCNC: 0.4 MG/DL (ref 0–1)
BUN SERPL-MCNC: 14 MG/DL (ref 7–20)
CALCIUM SERPL-MCNC: 9 MG/DL (ref 8.3–10.6)
CHLORIDE SERPL-SCNC: 93 MMOL/L (ref 99–110)
CHOLEST SERPL-MCNC: 131 MG/DL (ref 0–199)
CO2 SERPL-SCNC: 25 MMOL/L (ref 21–32)
CREAT SERPL-MCNC: 0.7 MG/DL (ref 0.6–1.2)
DEPRECATED RDW RBC AUTO: 12.9 % (ref 12.4–15.4)
EOSINOPHIL # BLD: 0.3 K/UL (ref 0–0.6)
EOSINOPHIL NFR BLD: 3.7 %
GFR SERPLBLD CREATININE-BSD FMLA CKD-EPI: >60 ML/MIN/{1.73_M2}
GLUCOSE SERPL-MCNC: 94 MG/DL (ref 70–99)
HCT VFR BLD AUTO: 33.3 % (ref 36–48)
HDLC SERPL-MCNC: 69 MG/DL (ref 40–60)
HGB BLD-MCNC: 11.3 G/DL (ref 12–16)
LDLC SERPL CALC-MCNC: 42 MG/DL
LYMPHOCYTES # BLD: 2.5 K/UL (ref 1–5.1)
LYMPHOCYTES NFR BLD: 33.5 %
MCH RBC QN AUTO: 32 PG (ref 26–34)
MCHC RBC AUTO-ENTMCNC: 34.1 G/DL (ref 31–36)
MCV RBC AUTO: 94 FL (ref 80–100)
MONOCYTES # BLD: 0.5 K/UL (ref 0–1.3)
MONOCYTES NFR BLD: 7 %
NEUTROPHILS # BLD: 4.1 K/UL (ref 1.7–7.7)
NEUTROPHILS NFR BLD: 55.3 %
PLATELET # BLD AUTO: 179 K/UL (ref 135–450)
PMV BLD AUTO: 8.2 FL (ref 5–10.5)
POTASSIUM SERPL-SCNC: 4.3 MMOL/L (ref 3.5–5.1)
PROT SERPL-MCNC: 6.4 G/DL (ref 6.4–8.2)
RBC # BLD AUTO: 3.54 M/UL (ref 4–5.2)
SODIUM SERPL-SCNC: 129 MMOL/L (ref 136–145)
TRIGL SERPL-MCNC: 101 MG/DL (ref 0–150)
TSH SERPL DL<=0.005 MIU/L-ACNC: 1.25 UIU/ML (ref 0.27–4.2)
VLDLC SERPL CALC-MCNC: 20 MG/DL
WBC # BLD AUTO: 7.4 K/UL (ref 4–11)

## 2023-08-06 DIAGNOSIS — E87.1 HYPONATREMIA: Primary | ICD-10-CM

## 2023-08-07 ENCOUNTER — CARE COORDINATION (OUTPATIENT)
Dept: CARE COORDINATION | Age: 73
End: 2023-08-07

## 2023-08-07 PROBLEM — R25.2 FOOT CRAMPS: Status: RESOLVED | Noted: 2021-06-15 | Resolved: 2023-08-07

## 2023-08-07 PROBLEM — R41.0 CONFUSION: Status: RESOLVED | Noted: 2023-07-04 | Resolved: 2023-08-07

## 2023-08-07 PROBLEM — E16.2 HYPOGLYCEMIA: Status: RESOLVED | Noted: 2018-05-21 | Resolved: 2023-08-07

## 2023-08-07 PROBLEM — M79.671 RIGHT FOOT PAIN: Status: RESOLVED | Noted: 2023-07-03 | Resolved: 2023-08-07

## 2023-08-07 PROBLEM — R55 SYNCOPE: Status: RESOLVED | Noted: 2023-07-03 | Resolved: 2023-08-07

## 2023-08-07 NOTE — ASSESSMENT & PLAN NOTE
Significantly improved mental status today. Increase confusion at last visit was likely result of UTI and hyponatremia. She is continuing to drive but is doing so safely and seems much more alert and organized today. Does follow with Dr. Meagan Acosta for neurology.

## 2023-08-07 NOTE — ASSESSMENT & PLAN NOTE
Blood pressure is actually running a little bit low. Continue Aldactone 12.5 mg daily, hydrochlorothiazide 12.5 mg daily, and olmesartan 20 mg daily. Check BMP. May need to discontinue hydrochlorothiazide if hyponatremia persists.

## 2023-08-07 NOTE — ASSESSMENT & PLAN NOTE
Following with Dr. Leta Reynolds who feels that she does not fully empty her bladder to muscle relaxers. As recommended self-catheterization patient is unable to do so due to her low back pain and decreased mobility.

## 2023-08-07 NOTE — CARE COORDINATION
Ambulatory Care Coordination Note  2023    Patient Current Location: [place]     ACM contacted the patient by telephone. Verified name and  with patient as identifiers. Provided introduction to self, and explanation of the ACM role. Challenges to be reviewed by the provider   Additional needs identified to be addressed with provider: Yes  Patient states she is removing heart monitor d/t adhesive irritating her skin. Method of communication with provider: chart routing. ACM: Adele Conde RN    Call received from patient - she reviewed her labs and recommendations from 23 via [address] [place] [address] and is questioning if she needs to stop HCTZ d/t low sodium levels. RN-CC reviewed lab results and pcp recommendations dated 23 and advised patient discontinue HCTZ, pt massiel. Patient receives pre-packaged medications via [facility] mail order pharmacy and is unsure which pill she needs to remove from the package. RN-CC offered to assist patient with scheduling a new patient appointment with nephrology but she declined at this time. She states medical co-pays add up and she prefers to see if stopping the hctz improves her sodium levels. RN-CC outreached [employer]; call merged with patient. Per [facility] pharmacy, hctz is a blue oblong capsule labeled  located in the 0900 package. Patient was able to identify the capsule and remove from the appropriate pack. She is able to identify the pill and remove from all 0900 packages moving forward. No questions or concerns voiced. She is still in the process of getting Inspire. Sleep study is scheduled for 23. Patient states tremors are resolved. She declined assistance from [facility] w/scheduling neurology appointment. Heart monitor is currently in place. Patient states the adhesive is causing her to itch. She reached out to the supplier and informed them she is discontinuing her heart monitor tomorrow d/t a reaction to the adhesive.

## 2023-08-07 NOTE — ASSESSMENT & PLAN NOTE
Patient is still in the process of getting Inspire. Insurance is requesting a follow-up sleep study before they will approve it.

## 2023-08-07 NOTE — ASSESSMENT & PLAN NOTE
Patient has asthma. Trial of Symbicort 80/4.5 mcg 2 puffs twice daily until her return visit with albuterol as needed. She should use her spacer with Symbicort.

## 2023-08-07 NOTE — ASSESSMENT & PLAN NOTE
Hyponatremia and UTI were likely contributing to patient's confusion. She is much better today. Check BMP. Consider referral to nephrology.

## 2023-08-08 ENCOUNTER — TELEPHONE (OUTPATIENT)
Dept: PULMONOLOGY | Age: 73
End: 2023-08-08

## 2023-08-08 NOTE — TELEPHONE ENCOUNTER
Received call from [address] [place] [address] that her sleep study was approved through 2/3/24.   Approval # [id number]

## 2023-08-09 RX ORDER — ALBUTEROL SULFATE 90 UG/1
2 AEROSOL, METERED RESPIRATORY (INHALATION) 4 TIMES DAILY PRN
Qty: 8.5 EACH | Refills: 2 | Status: SHIPPED | OUTPATIENT
Start: 2023-08-09

## 2023-08-10 RX ORDER — ROSUVASTATIN CALCIUM 10 MG/1
TABLET, COATED ORAL
Qty: 90 TABLET | Refills: 1 | Status: SHIPPED | OUTPATIENT
Start: 2023-08-10

## 2023-08-10 RX ORDER — SPIRONOLACTONE 25 MG/1
TABLET ORAL
Qty: 45 TABLET | Refills: 1 | Status: SHIPPED | OUTPATIENT
Start: 2023-08-10 | End: 2023-08-24 | Stop reason: ALTCHOICE

## 2023-08-14 ENCOUNTER — CARE COORDINATION (OUTPATIENT)
Dept: CARE COORDINATION | Age: 73
End: 2023-08-14

## 2023-08-14 NOTE — CARE COORDINATION
Per recommendation of pcp, patient will contact [facility]. Contact information provided to patient. RN-CC offered to assist with scheduling but patient states she can call on her own. Patient states she will f/u with RN-CC after the appointment with [address] has been scheduled.

## 2023-08-14 NOTE — CARE COORDINATION
RN-CC received incoming call from patient. Patient reports increase fatigue, weakness, urinary sx. She is unable to describe urinary sx, she just states \"I am not peeing right. \"  She states her mind feels foggy. RN-CC confirmed she has discontinued hctz. RN-CC outreached [facility]; spoke w/Nighat. Per Regis Espinosa, there is no standing order for urine in place. Next appointment with Dr. Melly Carlson is January 25, 2024 at 1040 AM. She will need to schedule a renal ultrasound prior to the appointment and BMP prior this appointment. Regis Espinosa offered a same day appointment at 3 pm but unfortunately [facility] does not have afternoon transportation. RN-CC attempted to assist patient with scheduling nephrology appt. RN-CC outreached [facility] [address]; confirmed the referral was received but is still in process. [facility] will outreach the patient when the referral has been approved for scheduling, pt aware and vu.     RN-CC will outreach pcp regarding sx.

## 2023-08-14 NOTE — CARE COORDINATION
Call return received from patient. She is scheduled with [facility] on 8/15/23 between 10 am - 2 pm. Patient will f/u with RN-CC tomorrow after her appointment with [facility].

## 2023-08-15 ENCOUNTER — CARE COORDINATION (OUTPATIENT)
Dept: CARE COORDINATION | Age: 73
End: 2023-08-15

## 2023-08-15 NOTE — CARE COORDINATION
Ambulatory Care Coordination Note  8/15/2023    Patient Current Location: [place]     ACM contacted the patient by telephone. Verified name and  with patient as identifiers. Provided introduction to self, and explanation of the ACM role. Challenges to be reviewed by the provider   Additional needs identified to be addressed with provider: Yes  Patient was seen by [facility] today - unable to review report in Care Everywhere. Patient states the NP corby labs but was unable to get a urine specimen. Patient was unable to void, NP attempted to straight cath but was unable to get a specimen d/t patients anatomy. Patient states her sodium was improved from previous lab draw. No treatment was warranted at this time and patient was advised to f/u with her pcp. Patient reports she feels much better today. She denies urinary sx and feels her confusion is improving. She is scheduled to f/u with Dr. Merry Treviño on 23. Is she ok to keep her appointment with you on 10/4/23 - or do you prefer she schedule a sooner appointment? Method of communication with provider: chart routing. ACM: Adele Conde RN    RN-CC received call from patient for follow up on [facility] appointment. Patient was seen by [facility] today. RN-CC is unable to review report in Care Everywhere at this time. Patient states the NP corby labs but was unable to get a urine specimen. Patient states she was unable to void during the visit; NP attempted to straight cath but was unable to get a specimen d/t patients anatomy. Patient states her sodium was improved from previous lab draw and all other labs were wnl. She states she has been trying to increase her salt intake. Patient states the NP w/ instructed her to take a urine specimen to her pcp office. Patient states [facility] did not give her an order for a urinalysis. RN-CC informed patient that an order to test her urine has not been ordered at this time.

## 2023-08-16 ENCOUNTER — NURSE ONLY (OUTPATIENT)
Dept: INTERNAL MEDICINE CLINIC | Age: 73
End: 2023-08-16
Payer: MEDICARE

## 2023-08-16 ENCOUNTER — TELEPHONE (OUTPATIENT)
Dept: INTERNAL MEDICINE CLINIC | Age: 73
End: 2023-08-16

## 2023-08-16 DIAGNOSIS — R35.0 FREQUENT URINATION: Primary | ICD-10-CM

## 2023-08-16 LAB
BILIRUBIN, POC: NORMAL
BLOOD URINE, POC: NORMAL
CLARITY, POC: NORMAL
COLOR, POC: NORMAL
GLUCOSE URINE, POC: NORMAL
KETONES, POC: NORMAL
LEUKOCYTE EST, POC: NORMAL
NITRITE, POC: NORMAL
PH, POC: 6.5
PROTEIN, POC: NORMAL
SPECIFIC GRAVITY, POC: 1.01
UROBILINOGEN, POC: 0.2

## 2023-08-16 PROCEDURE — 81002 URINALYSIS NONAUTO W/O SCOPE: CPT | Performed by: INTERNAL MEDICINE

## 2023-08-16 RX ORDER — NITROFURANTOIN 25; 75 MG/1; MG/1
100 CAPSULE ORAL 2 TIMES DAILY
Qty: 20 CAPSULE | Refills: 0 | Status: SHIPPED | OUTPATIENT
Start: 2023-08-16 | End: 2023-08-26

## 2023-08-16 NOTE — TELEPHONE ENCOUNTER
Pt dropped off urine sample. Has been sent for a culture.     Glu Neg  Gilberto Neg  Ket Neg  SG 1.015  Blo Trace-intact   Ph 6.5  PRO neg  URO 0.2  Jj Small  NIT Pos

## 2023-08-17 ENCOUNTER — CARE COORDINATION (OUTPATIENT)
Dept: CARE COORDINATION | Age: 73
End: 2023-08-17

## 2023-08-18 LAB — BACTERIA UR CULT: NORMAL

## 2023-08-21 ENCOUNTER — TELEPHONE (OUTPATIENT)
Dept: NEUROLOGY | Age: 73
End: 2023-08-21

## 2023-08-21 NOTE — TELEPHONE ENCOUNTER
Pt phoned she just received a letter for jury duty. She has dementia and needs to be exempted. [place]  Juror #[id number]  Please call pt back and let her know if we are able to do it.

## 2023-08-22 ENCOUNTER — HOSPITAL ENCOUNTER (OUTPATIENT)
Dept: SLEEP CENTER | Age: 73
Discharge: HOME | End: 2023-08-22
Payer: MEDICARE

## 2023-08-22 DIAGNOSIS — G47.33 OSA (OBSTRUCTIVE SLEEP APNEA): ICD-10-CM

## 2023-08-22 PROCEDURE — 95810 POLYSOM 6/> YRS 4/> PARAM: CPT

## 2023-08-23 ENCOUNTER — TELEPHONE (OUTPATIENT)
Dept: PULMONOLOGY | Age: 73
End: 2023-08-23

## 2023-08-26 ENCOUNTER — ENROLLMENT (OUTPATIENT)
Dept: CARE COORDINATION | Age: 73
End: 2023-08-26

## 2023-08-28 ENCOUNTER — TELEPHONE (OUTPATIENT)
Dept: PULMONOLOGY | Age: 73
End: 2023-08-28

## 2023-08-29 NOTE — TELEPHONE ENCOUNTER
Central events are 47% of the total AHI    Unfortunately based on this the patient would not qualify for the inspire    I have talked to the patient and informed her that at this point the inspire would not be an option and that probably CPAP is probably the best option for her  We will refer back to her primary sleep physician Dr. Smooth Carey

## 2023-09-01 ENCOUNTER — OFFICE VISIT (OUTPATIENT)
Dept: INTERNAL MEDICINE CLINIC | Age: 73
End: 2023-09-01
Payer: MEDICARE

## 2023-09-01 VITALS
WEIGHT: 175.8 LBS | HEART RATE: 72 BPM | OXYGEN SATURATION: 94 % | DIASTOLIC BLOOD PRESSURE: 68 MMHG | SYSTOLIC BLOOD PRESSURE: 110 MMHG | BODY MASS INDEX: 34.33 KG/M2

## 2023-09-01 DIAGNOSIS — E87.1 HYPONATREMIA: Primary | ICD-10-CM

## 2023-09-01 DIAGNOSIS — I10 ESSENTIAL HYPERTENSION: Chronic | ICD-10-CM

## 2023-09-01 DIAGNOSIS — R68.2 DRY MOUTH: ICD-10-CM

## 2023-09-01 DIAGNOSIS — G47.39 OTHER SLEEP APNEA: ICD-10-CM

## 2023-09-01 DIAGNOSIS — N39.0 RECURRENT UTI: Chronic | ICD-10-CM

## 2023-09-01 DIAGNOSIS — G30.1 LATE ONSET ALZHEIMER'S DEMENTIA WITH BEHAVIORAL DISTURBANCE (HCC): ICD-10-CM

## 2023-09-01 DIAGNOSIS — F02.818 LATE ONSET ALZHEIMER'S DEMENTIA WITH BEHAVIORAL DISTURBANCE (HCC): ICD-10-CM

## 2023-09-01 DIAGNOSIS — R53.83 FATIGUE, UNSPECIFIED TYPE: ICD-10-CM

## 2023-09-01 PROCEDURE — 1036F TOBACCO NON-USER: CPT | Performed by: INTERNAL MEDICINE

## 2023-09-01 PROCEDURE — 3017F COLORECTAL CA SCREEN DOC REV: CPT | Performed by: INTERNAL MEDICINE

## 2023-09-01 PROCEDURE — G8417 CALC BMI ABV UP PARAM F/U: HCPCS | Performed by: INTERNAL MEDICINE

## 2023-09-01 PROCEDURE — G8399 PT W/DXA RESULTS DOCUMENT: HCPCS | Performed by: INTERNAL MEDICINE

## 2023-09-01 PROCEDURE — 3078F DIAST BP <80 MM HG: CPT | Performed by: INTERNAL MEDICINE

## 2023-09-01 PROCEDURE — 1090F PRES/ABSN URINE INCON ASSESS: CPT | Performed by: INTERNAL MEDICINE

## 2023-09-01 PROCEDURE — 1123F ACP DISCUSS/DSCN MKR DOCD: CPT | Performed by: INTERNAL MEDICINE

## 2023-09-01 PROCEDURE — 3074F SYST BP LT 130 MM HG: CPT | Performed by: INTERNAL MEDICINE

## 2023-09-01 PROCEDURE — G8427 DOCREV CUR MEDS BY ELIG CLIN: HCPCS | Performed by: INTERNAL MEDICINE

## 2023-09-01 PROCEDURE — 99215 OFFICE O/P EST HI 40 MIN: CPT | Performed by: INTERNAL MEDICINE

## 2023-09-01 NOTE — PATIENT INSTRUCTIONS
Please follow the recommendations from Dr. Puneet Cartagena regarding stopping Hydrochlorothiazide and spironolactone and starting furosemide (Lasix). With regards to your concerns for furosemide: Your sulfa allergy is likely fine with furosemide. Hydrochlorothiazide also carries a warning to not take with sulfa allergy and you have not had an allergic reaction to it. MOST people with sulfa allergies are able to take furosemide without difficulty. Dry mouth- furosemide is just as likely to cause dry mouth as hydrochlorothiazide and spironolactone. Unable to urinate- this is also true for Hydrochlorothiazide and spironolactone. None of these medications make it hard for you to urinate. They increase the amount of urine produced by your kidneys. Muscle spasms indicate that your potassium is low. In your case, muscle spasms are likely related to your spinal stenosis. High blood sugar is very rarely caused by furosemide. Furosemide will work with Miljoshua Crow to lower your blood pressure    It is safe to continue getting injections in your low back. This is not iv dye. You need to see both Dr. Puneet Cartagena and Dr. Dwayne Stearns. Dr. Puneet Cartagena is a KIDNEY specialist (NEPHROLOGIST). His job is to make sure that your KIDNEYS are working appropriately. He helps with problems like making sure that your salt level stays where it needs to stay. He is a medical specialist and not a surgical specialist.   Dr. Dwayne Stearns is a BLADDER specialist (UROLOGIST). His job it to help your BLADDER function better. He is a surgical specialist and not a medical specialist.     You need to continue to see Dr. Leigh Martinez who is a NEUROLOGIST who specializes in your brain. You have 2 types of sleep apnea  Obstructive sleep apnea- this occurs when your airway gets blocked off while you are sleeping and you stop breathing. This is easier to treat with the sleep mask which keeps your airway open. This accounts for about 53% of your sleep apnea.    Central

## 2023-09-01 NOTE — PROGRESS NOTES
discontinued hydrochlorothiazide and Aldactone and start patient on Lasix. Patient is very concerned about potential side effects with Lasix as opposed to hydrochlorothiazide and Aldactone. 1 discussion with patient regarding similarities and differences of these medications and as she tolerated hydrochlorothiazide in spironolactone, she should however furosemide without difficulty. Patient states that she feels more comfortable with these changes. Discussed with patient risks of hyponatremia in the past hyponatremia has on her cognition. Relevant Orders    Basic Metabolic Panel    Late onset Alzheimer's dementia with behavioral disturbance ([address])      Discussed with patient compounding factors of dementia, including hyponatremia and untreated sleep apnea. Patient has an upcoming appointment with Dr. Celeste Rosenthal in September. Remains on Razadyne and Namenda. Sleep apnea      Recent sleep study shows mixed obstructive and central sleep apnea. Because of this she is not a candidate for Inspire. Discussed difference between obstructive and central apnea. Discussed with patient that central sleep apnea is likely related to her pain medication and this is why she is unable to have benzodiazepines for her anxiety which she has requested on multiple occasions. Discussed with patient impact that untreated obstructive sleep apnea has on her cognition. We will reach out to Dr. Lillian Ramachandran, patient's sleep specialist, regarding diagnosis of central obstructive sleep apnea as this diagnosis may alter her therapy. Current Outpatient Medications   Medication Sig Dispense Refill    furosemide (LASIX) 20 MG tablet Take 1 tablet by mouth daily 60 tablet 3    rosuvastatin (CRESTOR) 10 MG tablet TAKE 1 TABLET BY MOUTH NIGHTLY 90 tablet 1    albuterol sulfate HFA (PROVENTIL;VENTOLIN;PROAIR) 108 (90 Base) MCG/ACT inhaler INHALE 2 PUFFS INTO THE LUNGS 4 TIMES DAILY AS NEEDED FOR WHEEZING.  8.5 each 2

## 2023-09-04 PROBLEM — G47.30 SLEEP APNEA: Status: ACTIVE | Noted: 2018-07-06

## 2023-09-04 NOTE — ASSESSMENT & PLAN NOTE
Patient does follow with Dr. Marly Delarosa for urology. Has been told that she has abnormal anatomy and home nurses were unable to insert a catheter due to her anatomy. Discussed with patient difference in specialty between urology and nephrology and that she needs to see both Dr. Harish Delarosa and Dr. Teena Lefort.

## 2023-09-04 NOTE — ASSESSMENT & PLAN NOTE
Discussed with patient compounding factors of dementia, including hyponatremia and untreated sleep apnea. Patient has an upcoming appointment with Dr. Kellee Mendoza in September. Remains on Razadyne and Namenda.

## 2023-09-04 NOTE — ASSESSMENT & PLAN NOTE
Recent sleep study shows mixed obstructive and central sleep apnea. Because of this she is not a candidate for Inspire. Discussed difference between obstructive and central apnea. Discussed with patient that central sleep apnea is likely related to her pain medication and this is why she is unable to have benzodiazepines for her anxiety which she has requested on multiple occasions. Discussed with patient impact that untreated obstructive sleep apnea has on her cognition. We will reach out to Dr. Gloria Villareal, patient's sleep specialist, regarding diagnosis of central obstructive sleep apnea as this diagnosis may alter her therapy.

## 2023-09-04 NOTE — ASSESSMENT & PLAN NOTE
Patient established with Dr. Michell Brittle discontinued hydrochlorothiazide and Aldactone and start patient on Lasix. Patient is very concerned about potential side effects with Lasix as opposed to hydrochlorothiazide and Aldactone. 1 discussion with patient regarding similarities and differences of these medications and as she tolerated hydrochlorothiazide in spironolactone, she should however furosemide without difficulty. Patient states that she feels more comfortable with these changes.

## 2023-09-11 RX ORDER — CETIRIZINE HYDROCHLORIDE 10 MG/1
10 TABLET ORAL DAILY
Qty: 90 TABLET | Refills: 1 | Status: SHIPPED | OUTPATIENT
Start: 2023-09-11

## 2023-09-11 RX ORDER — CHOLECALCIFEROL (VITAMIN D3) 125 MCG
CAPSULE ORAL
Qty: 90 TABLET | Refills: 1 | Status: SHIPPED | OUTPATIENT
Start: 2023-09-11

## 2023-09-11 RX ORDER — ASPIRIN 81 MG
TABLET,CHEWABLE ORAL
Qty: 90 TABLET | Refills: 1 | Status: SHIPPED | OUTPATIENT
Start: 2023-09-11

## 2023-09-19 ENCOUNTER — OFFICE VISIT (OUTPATIENT)
Dept: NEUROLOGY | Age: 73
End: 2023-09-19
Payer: MEDICARE

## 2023-09-19 VITALS
SYSTOLIC BLOOD PRESSURE: 115 MMHG | BODY MASS INDEX: 34.18 KG/M2 | HEART RATE: 59 BPM | DIASTOLIC BLOOD PRESSURE: 58 MMHG | WEIGHT: 175 LBS

## 2023-09-19 DIAGNOSIS — G30.1 LATE ONSET ALZHEIMER'S DISEASE WITHOUT BEHAVIORAL DISTURBANCE (HCC): Primary | ICD-10-CM

## 2023-09-19 DIAGNOSIS — F02.80 LATE ONSET ALZHEIMER'S DISEASE WITHOUT BEHAVIORAL DISTURBANCE (HCC): Primary | ICD-10-CM

## 2023-09-19 DIAGNOSIS — F32.A DEPRESSION, UNSPECIFIED DEPRESSION TYPE: ICD-10-CM

## 2023-09-19 DIAGNOSIS — G47.33 OBSTRUCTIVE SLEEP APNEA: ICD-10-CM

## 2023-09-19 PROCEDURE — 1123F ACP DISCUSS/DSCN MKR DOCD: CPT | Performed by: PSYCHIATRY & NEUROLOGY

## 2023-09-19 PROCEDURE — 3074F SYST BP LT 130 MM HG: CPT | Performed by: PSYCHIATRY & NEUROLOGY

## 2023-09-19 PROCEDURE — 3078F DIAST BP <80 MM HG: CPT | Performed by: PSYCHIATRY & NEUROLOGY

## 2023-09-19 PROCEDURE — 99214 OFFICE O/P EST MOD 30 MIN: CPT | Performed by: PSYCHIATRY & NEUROLOGY

## 2023-09-19 RX ORDER — GALANTAMINE HYDROBROMIDE 16 MG/1
CAPSULE, EXTENDED RELEASE ORAL
Qty: 90 CAPSULE | Refills: 1 | Status: SHIPPED | OUTPATIENT
Start: 2023-09-19

## 2023-09-19 RX ORDER — MEMANTINE HYDROCHLORIDE 10 MG/1
TABLET ORAL
Qty: 180 TABLET | Refills: 1 | Status: SHIPPED | OUTPATIENT
Start: 2023-09-19

## 2023-09-19 NOTE — PROGRESS NOTES
PATIENT REACHED   YES_X___NO____        PREOP INSTRUCTIONS LEFT ON VM NUMBER_______________      Date: 9/26/2023      Arrival Time: 9:30 am    Procedure Time: 10:30 am      Location: [address] [place] [address] LEONOR Hurd, [address]      Nothing to eat or drink after MIDNIGHT the evening before your procedure. You will need a responsible adulte 18 years or older to stay on site, and take you home after the procedure. Please bring a complete list of medications and supplements you currently take, with dosing. Wear loose comfortable clothes. Please follow any and all instructions the office has given you regarding medications that need to be stopped prior to procedure. Please verify with the office regarding blood thinners. The goal of blood sugar is to be under >200, the day of the procedure. If it is elevated you may be cancelled. ANY QUESTIONS CALL YOUR DOCTOR. ALSO,PLEASE READ THE INSTRUCTION PACKET FROM YOUR DR IF YOU RECEIVED ONE.    DR. Zamora Govern > [phone]      Additional Information:      VISITOR POLICY(subject to change)    Current policy is 2 visitors per patient. No children. Masks are required.

## 2023-09-19 NOTE — PROGRESS NOTES
Matias Cheney   Neurology followup    Subjective:   CC/HP  History was obtained from the patient. Patient has late onset Alzheimer's type dementia. Patient still feels that her memory continues to slowly get worse. Patient has normal Namenda 10 mg twice daily. She is having word finding difficulties. Patient gets anxious when she cannot think of something and then the anxiety makes her symptoms worse as well. She also feels that she gets confused at times. Patient is on low-dose galantamine. She initially wondered if Aricept caused her diarrhea but later found out that she had a different etiology for the diarrhea. Patient also was diagnosed with obstructive sleep apnea and started a CPAP machine. She is feeling better in that regard.     REVIEW OF SYSTEMS    Constitutional:  []   Chills   [x]  Fatigue   []  Fevers   []  Malaise   []  Weight loss     [] Denies all of the above    Respiratory:   []  Cough    [x]  Shortness of breath         [] Denies all of the above     Cardiovascular:   []  Chest pain    []  Exertional chest pressure/discomfort           [] Palpitations    []  Syncope     [x] Denies all of the above        Past Medical History:   Diagnosis Date    Anxiety     Anxiety and depression     Chronic back pain     Claustrophobia     Clostridium difficile infection 02/22/2015    Hyperlipidemia     Hypertension     Insomnia disorder     Osteoarthritis     Osteoporosis 2008    Rheumatic fever     S/P insertion of spinal cord stimulator     Self-catheterizes urinary bladder     as needed 7/8 no longer catherizing     Sleep apnea     uses CPAP    Urinary retention      Family History   Problem Relation Age of Onset    COPD Mother     High Blood Pressure Mother     Rheum Arthritis Mother     Thyroid Disease Mother     Alcohol Abuse Father     Clotting Disorder Sister     Thyroid Disease Sister     Hypertension Brother     Diabetes Sister     Heart Disease Sister     Hypertension Sister     Thyroid

## 2023-09-21 ENCOUNTER — HOSPITAL ENCOUNTER (OUTPATIENT)
Age: 73
Discharge: HOME | End: 2023-09-21
Payer: MEDICARE

## 2023-09-21 DIAGNOSIS — E87.1 HYPONATREMIA: ICD-10-CM

## 2023-09-21 DIAGNOSIS — I10 ESSENTIAL HYPERTENSION: ICD-10-CM

## 2023-09-21 LAB
ALBUMIN SERPL-MCNC: 4.1 G/DL (ref 3.4–5)
ANION GAP SERPL CALCULATED.3IONS-SCNC: 10 MMOL/L (ref 3–16)
BUN SERPL-MCNC: 12 MG/DL (ref 7–20)
CALCIUM SERPL-MCNC: 9 MG/DL (ref 8.3–10.6)
CHLORIDE SERPL-SCNC: 96 MMOL/L (ref 99–110)
CO2 SERPL-SCNC: 29 MMOL/L (ref 21–32)
CREAT SERPL-MCNC: 0.8 MG/DL (ref 0.6–1.2)
DEPRECATED RDW RBC AUTO: 12.5 % (ref 12.4–15.4)
GFR SERPLBLD CREATININE-BSD FMLA CKD-EPI: >60 ML/MIN/{1.73_M2}
GLUCOSE SERPL-MCNC: 83 MG/DL (ref 70–99)
HCT VFR BLD AUTO: 32.5 % (ref 36–48)
HGB BLD-MCNC: 11.3 G/DL (ref 12–16)
MAGNESIUM SERPL-MCNC: 1.7 MG/DL (ref 1.8–2.4)
MCH RBC QN AUTO: 32.8 PG (ref 26–34)
MCHC RBC AUTO-ENTMCNC: 34.7 G/DL (ref 31–36)
MCV RBC AUTO: 94.6 FL (ref 80–100)
OSMOLALITY UR: 256 MOSM/KG (ref 390–1070)
PHOSPHATE SERPL-MCNC: 3.7 MG/DL (ref 2.5–4.9)
PLATELET # BLD AUTO: 184 K/UL (ref 135–450)
PMV BLD AUTO: 8.5 FL (ref 5–10.5)
POTASSIUM SERPL-SCNC: 3.8 MMOL/L (ref 3.5–5.1)
RBC # BLD AUTO: 3.44 M/UL (ref 4–5.2)
SODIUM SERPL-SCNC: 135 MMOL/L (ref 136–145)
URATE SERPL-MCNC: 5.3 MG/DL (ref 2.6–6)
WBC # BLD AUTO: 6.1 K/UL (ref 4–11)

## 2023-09-21 PROCEDURE — 84300 ASSAY OF URINE SODIUM: CPT

## 2023-09-21 PROCEDURE — 83935 ASSAY OF URINE OSMOLALITY: CPT

## 2023-09-21 PROCEDURE — 82570 ASSAY OF URINE CREATININE: CPT

## 2023-09-21 PROCEDURE — 80069 RENAL FUNCTION PANEL: CPT

## 2023-09-21 PROCEDURE — 36415 COLL VENOUS BLD VENIPUNCTURE: CPT

## 2023-09-21 PROCEDURE — 83930 ASSAY OF BLOOD OSMOLALITY: CPT

## 2023-09-21 PROCEDURE — 85027 COMPLETE CBC AUTOMATED: CPT

## 2023-09-21 PROCEDURE — 84550 ASSAY OF BLOOD/URIC ACID: CPT

## 2023-09-21 PROCEDURE — 83735 ASSAY OF MAGNESIUM: CPT

## 2023-09-22 LAB
CREAT UR-MCNC: 56.5 MG/DL (ref 28–259)
OSMOLALITY SERPL: 288 MOSM/KG (ref 280–301)
SODIUM UR-SCNC: 30 MMOL/L

## 2023-09-25 ENCOUNTER — CARE COORDINATION (OUTPATIENT)
Dept: CARE COORDINATION | Age: 73
End: 2023-09-25

## 2023-09-25 DIAGNOSIS — N39.0 RECURRENT UTI: Primary | ICD-10-CM

## 2023-09-25 NOTE — CARE COORDINATION
UA reflex to culture ordered by Dr. Alison Peters. RN-CC attempted to outreach patient for follow up. No answer; HIPPA compliant message left through  requesting call return. Call returned from patient on 9/26/23 at 1247 pm. Patient aware a standing order for Urinalysis with cx has been placed. Patient agreeable to go to a [facility] lab today. RN-CC will follow up on results in 1-2 days.

## 2023-09-25 NOTE — CARE COORDINATION
Tyson Angulo-  Thank you for your assistance. I have placed a standing order for UA with reflex to culture.

## 2023-09-25 NOTE — CARE COORDINATION
Ambulatory Care Coordination Note  2023  /  Patient Current Location: [place] /    [facility] contacted the patient by telephone. Verified name and  with patient as identifiers. Provided introduction to self, and explanation of the ACM role. Challenges to be reviewed by the provider   Additional needs identified to be addressed with provider: Yes  Chills, dysuria, lower abd discomfort, fatigue x1 week. Hx of recurring uti. Method of communication with provider: chart routing. ACM: Adele Coned RN    RN-CC returned call to patient who reports dysuria, lower abdominal discomfort, fatigue and chills x1 week. Patient has hx of recurring UTI and is scheduled to follow up with Urology in 2024. Patient dropped off a urine sample to [facility] on 23 to complete lab work ordered by Nephrology - patient is scheduled to f/u with nephrology on 10/2/23. Patient now reports urinary sx x1 week; dysuria, lower abdominal discomfort, fatigue, chills. RN-CC outreached [facility] lab to see if additional testing could be added to this sample. [facility] states the urine was sent to [facility] for testing. RN-CC was transferred to [facility] lab, spoke with Breanna Boogie who stated additional testing can not be added to the sample that was dropped off on 23 d/t sample being over 67 hours old. Patient aware RN-CC will route message to pcp to advise. Offered patient enrollment in the Remote Patient Monitoring (RPM) program for in-home monitoring: Patient declined.     Lab Results       None                 Goals Addressed    None         Future Appointments   Date Time Provider [address] [place] [address]   10/2/2023 11:15 AM Eboni Hinton PA-C [facility]   10/4/2023 11:00 AM DO GINA Candelario [facility] [place] [facility]   2023  2:40 PM DO GINA Candelario [facility] [place] [facility]   2023 11:00 AM WILLOW Low [facility]   2024 11:30 AM Polo Reid MD FF

## 2023-09-26 ENCOUNTER — APPOINTMENT (OUTPATIENT)
Dept: GENERAL RADIOLOGY | Age: 73
End: 2023-09-26
Attending: ANESTHESIOLOGY
Payer: MEDICARE

## 2023-09-26 ENCOUNTER — HOSPITAL ENCOUNTER (OUTPATIENT)
Age: 73
Setting detail: OUTPATIENT SURGERY
Discharge: HOME | End: 2023-09-26
Attending: ANESTHESIOLOGY | Admitting: ANESTHESIOLOGY
Payer: MEDICARE

## 2023-09-26 VITALS
TEMPERATURE: 97.6 F | BODY MASS INDEX: 37.76 KG/M2 | SYSTOLIC BLOOD PRESSURE: 116 MMHG | WEIGHT: 175 LBS | RESPIRATION RATE: 16 BRPM | OXYGEN SATURATION: 93 % | HEIGHT: 57 IN | HEART RATE: 51 BPM | DIASTOLIC BLOOD PRESSURE: 65 MMHG

## 2023-09-26 PROCEDURE — 3610000056 HC PAIN LEVEL 4 BASE (NON-OR): Performed by: ANESTHESIOLOGY

## 2023-09-26 PROCEDURE — 77003 FLUOROGUIDE FOR SPINE INJECT: CPT

## 2023-09-26 PROCEDURE — 99152 MOD SED SAME PHYS/QHP 5/>YRS: CPT | Performed by: ANESTHESIOLOGY

## 2023-09-26 PROCEDURE — 6360000002 HC RX W HCPCS: Performed by: ANESTHESIOLOGY

## 2023-09-26 PROCEDURE — 2500000003 HC RX 250 WO HCPCS: Performed by: ANESTHESIOLOGY

## 2023-09-26 PROCEDURE — 2709999900 HC NON-CHARGEABLE SUPPLY: Performed by: ANESTHESIOLOGY

## 2023-09-26 RX ORDER — BUPIVACAINE HYDROCHLORIDE 2.5 MG/ML
INJECTION, SOLUTION EPIDURAL; INFILTRATION; INTRACAUDAL
Status: COMPLETED | OUTPATIENT
Start: 2023-09-26 | End: 2023-09-26

## 2023-09-26 RX ORDER — FENTANYL CITRATE 50 UG/ML
INJECTION, SOLUTION INTRAMUSCULAR; INTRAVENOUS
Status: COMPLETED | OUTPATIENT
Start: 2023-09-26 | End: 2023-09-26

## 2023-09-26 RX ORDER — LIDOCAINE HYDROCHLORIDE 10 MG/ML
INJECTION, SOLUTION INFILTRATION; PERINEURAL
Status: COMPLETED | OUTPATIENT
Start: 2023-09-26 | End: 2023-09-26

## 2023-09-26 RX ORDER — METHYLPREDNISOLONE ACETATE 80 MG/ML
INJECTION, SUSPENSION INTRA-ARTICULAR; INTRALESIONAL; INTRAMUSCULAR; SOFT TISSUE
Status: COMPLETED | OUTPATIENT
Start: 2023-09-26 | End: 2023-09-26

## 2023-09-26 RX ORDER — MIDAZOLAM HYDROCHLORIDE 1 MG/ML
INJECTION INTRAMUSCULAR; INTRAVENOUS
Status: COMPLETED | OUTPATIENT
Start: 2023-09-26 | End: 2023-09-26

## 2023-09-26 ASSESSMENT — PAIN DESCRIPTION - DESCRIPTORS
DESCRIPTORS: ACHING
DESCRIPTORS: ACHING

## 2023-09-26 ASSESSMENT — PAIN - FUNCTIONAL ASSESSMENT
PAIN_FUNCTIONAL_ASSESSMENT: PREVENTS OR INTERFERES SOME ACTIVE ACTIVITIES AND ADLS
PAIN_FUNCTIONAL_ASSESSMENT: 0-10
PAIN_FUNCTIONAL_ASSESSMENT: PREVENTS OR INTERFERES SOME ACTIVE ACTIVITIES AND ADLS

## 2023-09-26 ASSESSMENT — PAIN DESCRIPTION - ORIENTATION
ORIENTATION: LOWER
ORIENTATION: LOWER

## 2023-09-26 ASSESSMENT — PAIN DESCRIPTION - FREQUENCY
FREQUENCY: CONTINUOUS
FREQUENCY: CONTINUOUS

## 2023-09-26 ASSESSMENT — PAIN DESCRIPTION - LOCATION
LOCATION: BACK
LOCATION: BACK

## 2023-09-26 ASSESSMENT — PAIN SCALES - GENERAL
PAINLEVEL_OUTOF10: 5
PAINLEVEL_OUTOF10: 5

## 2023-09-26 ASSESSMENT — PAIN DESCRIPTION - PAIN TYPE
TYPE: CHRONIC PAIN
TYPE: CHRONIC PAIN

## 2023-09-26 NOTE — PROGRESS NOTES
IV discontinued, catheter intact, and dressing applied. Procedural dressing dry and intact. Bilateral lower extremities equal in strength. Discharge instructions reviewed with patient or responsible adult, signed and copy given. All home medications have been reviewed. All questions answered and patient or responsible adult verbalized understanding.   PAIN LEVEL AT DISCHARGE   5

## 2023-09-26 NOTE — H&P
Update History & Physical    The patient's History and Physical was completed on a paper form on 9-. The surgical site was confirmed by the patient and me. Plan: The risks, benefits, expected outcome, and alternative to the recommended procedure have been discussed with the patient. Patient understands and wants to proceed with the procedure.      Electronically signed by Terre Boxer, MD on 9/26/2023 at 11:13 AM

## 2023-09-26 NOTE — TELEPHONE ENCOUNTER
Call return received from patient who states she will go to a [facility] lab today

## 2023-09-26 NOTE — OP NOTE
Pre-procedure blood pressure and pulse were stable and recorded in patients clinic chart. The patient was placed in a prone position and the lower back was prepped with ChloraPrep and draped in the usual sterile fashion. The skin over the right _L 1 facet joint was infiltrated with 1% Lidocaine for local anesthesia. A 22-gauge, 3.5-inch needle was inserted into the lumbar medial branch under radiographic guidance. Both AP and lateral views were obtained. Following placement of the needle and negative aspiration, 1 cc of Bupivacaine 0.25% with Depomedrol 20 mg. was injected. The identical procedure was performed on facet joint levels Right L2, and Left L1 and L2 . During the procedure there was no evidence of CSF, paresthesias or heme. After the procedure the needles were flushed with preservative free local anesthetic and removed. Skin was cleaned and a sterile dressing was applied. Following the procedure the patient's vital signs were stable. The patient was discharged home in good condition after being given discharge instructions.     Electronically signed by Ella Desouza MD on 9/26/2023 at 5:56 PM

## 2023-09-26 NOTE — DISCHARGE INSTRUCTIONS
1. Keep injection /surgical site dry until the band-aid/ dressing is removed. Please remove band-aids from the injection site 12-24 hours after the procedure. 2. If the injection site is sore, you may apply an ice pack to that area for twenty minutes every two hours for the initial twenty four hours. Do not use heat. 3. Occasionally you may notice slight increase in pain after the procedure. This should start to improve  within the next twenty four to forty eight hours. 4. Remember, it may take as long as forty eight hours before you notice a gradual improvement in your pain and other symptoms. 5. You may continue to take your pain medication as needed. 6. DO NOT stop any other previously prescribed medications. You may resume blood thinning medications the day after the procedure. 7. In general, you should avoid any prolonged standing, walking, or repeated bending or heavy use of your upper extremities for twenty four to forty eight hours but may resume light      Normal activity when you leave the facility     8. If you are currently going to physical therapy, continue to do so unless your doctor instructs you not to.    9. If you develop any other symptoms such fever, rash, unusual drainage from the site, severe headache or weakness please call [phone]. 10. Other: If you had sedation ; Do not drink alcohol or sign any legal or financial contracts for 24 hours. Also do not drive or        Or operate other types of machinery for 24 hours. You should have a responsible adult available to assist you for the rest of the day. 11. Side effects of steroids can include: (these may last a few days then will go away on their own)           - facial flushing           - hot flashes           - nervous energy and difficulty sleeping at night           - muscle spasm or twitching           - fluid retention            - elevated blood sugar levels in diabetics      12.  Please follow up with the

## 2023-09-26 NOTE — PROGRESS NOTES
Pt ready for discharge - pt discharged in stable condition. Grandson waiting at home to watch over pt.

## 2023-09-26 NOTE — BRIEF OP NOTE
Brief Postoperative Note      Patient: Elan Ren  YOB: 1950  MRN: [id number]    Date of Procedure: 9/26/2023    Pre-Op Diagnosis Codes:     * Other intervertebral disc degeneration, lumbar region [M51.36]    Post-Op Diagnosis: Same       Procedure(s):  BILATERAL L1-L2 MEDIAL BRANCH BLOCK WITH FLUOROSCOPY    Surgeon(s):  Angela Tijerina MD    Assistant:  * No surgical staff found *    Anesthesia: IV Sedation    Estimated Blood Loss (mL): Minimal    Complications: None    Specimens:   * No specimens in log *    Implants:  * No implants in log *      Drains: * No LDAs found *    Findings:       Electronically signed by Angela Tijerina MD on 9/26/2023 at 11:13 AM

## 2023-09-27 ENCOUNTER — CARE COORDINATION (OUTPATIENT)
Dept: CARE COORDINATION | Age: 73
End: 2023-09-27

## 2023-09-27 DIAGNOSIS — N39.0 RECURRENT UTI: ICD-10-CM

## 2023-09-27 LAB
BACTERIA URNS QL MICRO: ABNORMAL /HPF
BILIRUB UR QL STRIP.AUTO: NEGATIVE
CLARITY UR: CLEAR
COLOR UR: YELLOW
EPI CELLS #/AREA URNS AUTO: 2 /HPF (ref 0–5)
GLUCOSE UR STRIP.AUTO-MCNC: NEGATIVE MG/DL
HGB UR QL STRIP.AUTO: NEGATIVE
HYALINE CASTS #/AREA URNS AUTO: 1 /LPF (ref 0–8)
KETONES UR STRIP.AUTO-MCNC: NEGATIVE MG/DL
LEUKOCYTE ESTERASE UR QL STRIP.AUTO: ABNORMAL
NITRITE UR QL STRIP.AUTO: POSITIVE
PH UR STRIP.AUTO: 6 [PH] (ref 5–8)
PROT UR STRIP.AUTO-MCNC: NEGATIVE MG/DL
RBC CLUMPS #/AREA URNS AUTO: 2 /HPF (ref 0–4)
SP GR UR STRIP.AUTO: 1.01 (ref 1–1.03)
UA DIPSTICK W REFLEX MICRO PNL UR: YES
URN SPEC COLLECT METH UR: ABNORMAL
UROBILINOGEN UR STRIP-ACNC: 0.2 E.U./DL
WBC #/AREA URNS AUTO: 79 /HPF (ref 0–5)

## 2023-09-27 RX ORDER — CIPROFLOXACIN 250 MG/1
250 TABLET, FILM COATED ORAL 2 TIMES DAILY
Qty: 14 TABLET | Refills: 0 | Status: SHIPPED | OUTPATIENT
Start: 2023-09-27 | End: 2023-10-04

## 2023-09-28 ENCOUNTER — CARE COORDINATION (OUTPATIENT)
Dept: CARE COORDINATION | Age: 73
End: 2023-09-28

## 2023-09-28 NOTE — CARE COORDINATION
RN-CC outreached patient, she is at a podiatry appointment at the time of my call. Patient notified of results of urinalysis - she states she will pickup her antibiotic today. Red flag sx reviewed.

## 2023-10-02 ENCOUNTER — CARE COORDINATION (OUTPATIENT)
Dept: CARE COORDINATION | Age: 73
End: 2023-10-02

## 2023-10-02 NOTE — CARE COORDINATION
Ambulatory Care Coordination Note  10/2/2023    Patient Current Location: [address] [place] [address]     ACM contacted the patient by telephone. Verified name and  with patient as identifiers. Provided introduction to self, and explanation of the ACM role. Challenges to be reviewed by the provider   Additional needs identified to be addressed with provider: No  none               Method of communication with provider: none. ACM: Adele Conde, RN    RN-CC outreached patient for cc follow up; UTI. Patient reports significant improvement in urinary sx since starting Cipro. Hx of recurring UTI - She does not fully empty her bladder d/t her muscle relaxers. Urology recommended self-cath but cannot d/t her low back pain. She is to repeat urodynamics in 2024. She is aware of s/sx of UTI and when to notify her pcp.  outreached Dr. Jeremias Wilkerson, patient's sleep specialist, regarding diagnosis of central obstructive sleep apnea as this diagnosis may alter her therapy. Patient was seen by Podiatry last week for infection in her right second toe. Patient states podiatry told her the Cirpo she is taking for her UTI will treat the infection. Patient reports improvement in redness and pain, no drainage reported. Patient is scheduled to follow up with podiatry in 2 weeks. Patient educated on red flag sx. RN-CC reviewed nephrology note dated 10/2/23    Patient Instructions   PLEASE CONSIDER TO:   - OFF aldactone, Hydro-chloro-thiazide (\"HCTZ\")      -Continue lasix 20mg a day      -monitoring hydration, goal ~ 60-70 oz a day , half of that water, other half electrolytes, or protein shake     -stop ibuprofen -- try tylenol instead     Get labs done ~3-4 days before the next visit. - Diabetes: A1C goal <7%,   - BP goal: <130s/80s,     Patient instructions reviewed, praneeth rankin. Rox Doe has a home b/p monitor but does not check her b/p at home.  She states she her b/p is always good at medical appointments and doesn't feel the need to

## 2023-10-04 ENCOUNTER — OFFICE VISIT (OUTPATIENT)
Dept: INTERNAL MEDICINE CLINIC | Age: 73
End: 2023-10-04

## 2023-10-04 VITALS
WEIGHT: 172 LBS | DIASTOLIC BLOOD PRESSURE: 62 MMHG | BODY MASS INDEX: 37.11 KG/M2 | HEART RATE: 67 BPM | SYSTOLIC BLOOD PRESSURE: 102 MMHG | OXYGEN SATURATION: 97 % | HEIGHT: 57 IN

## 2023-10-04 DIAGNOSIS — I10 ESSENTIAL HYPERTENSION: Primary | Chronic | ICD-10-CM

## 2023-10-04 DIAGNOSIS — G30.1 LATE ONSET ALZHEIMER'S DEMENTIA WITH BEHAVIORAL DISTURBANCE (HCC): ICD-10-CM

## 2023-10-04 DIAGNOSIS — F33.1 MODERATE EPISODE OF RECURRENT MAJOR DEPRESSIVE DISORDER (HCC): Chronic | ICD-10-CM

## 2023-10-04 DIAGNOSIS — F02.818 LATE ONSET ALZHEIMER'S DEMENTIA WITH BEHAVIORAL DISTURBANCE (HCC): ICD-10-CM

## 2023-10-04 DIAGNOSIS — N39.0 RECURRENT UTI: Chronic | ICD-10-CM

## 2023-10-04 DIAGNOSIS — I10 ESSENTIAL HYPERTENSION: Chronic | ICD-10-CM

## 2023-10-04 DIAGNOSIS — E87.1 HYPONATREMIA: ICD-10-CM

## 2023-10-04 DIAGNOSIS — F41.9 ANXIETY: ICD-10-CM

## 2023-10-04 DIAGNOSIS — E87.6 HYPOKALEMIA: ICD-10-CM

## 2023-10-04 LAB
ANION GAP SERPL CALCULATED.3IONS-SCNC: 9 MMOL/L (ref 3–16)
BUN SERPL-MCNC: 24 MG/DL (ref 7–20)
CALCIUM SERPL-MCNC: 9.3 MG/DL (ref 8.3–10.6)
CHLORIDE SERPL-SCNC: 99 MMOL/L (ref 99–110)
CO2 SERPL-SCNC: 29 MMOL/L (ref 21–32)
CREAT SERPL-MCNC: 0.8 MG/DL (ref 0.6–1.2)
GFR SERPLBLD CREATININE-BSD FMLA CKD-EPI: >60 ML/MIN/{1.73_M2}
GLUCOSE SERPL-MCNC: 85 MG/DL (ref 70–99)
POTASSIUM SERPL-SCNC: 4.5 MMOL/L (ref 3.5–5.1)
SODIUM SERPL-SCNC: 137 MMOL/L (ref 136–145)

## 2023-10-04 SDOH — ECONOMIC STABILITY: INCOME INSECURITY: HOW HARD IS IT FOR YOU TO PAY FOR THE VERY BASICS LIKE FOOD, HOUSING, MEDICAL CARE, AND HEATING?: NOT HARD AT ALL

## 2023-10-04 SDOH — ECONOMIC STABILITY: FOOD INSECURITY: WITHIN THE PAST 12 MONTHS, YOU WORRIED THAT YOUR FOOD WOULD RUN OUT BEFORE YOU GOT MONEY TO BUY MORE.: NEVER TRUE

## 2023-10-04 SDOH — ECONOMIC STABILITY: FOOD INSECURITY: WITHIN THE PAST 12 MONTHS, THE FOOD YOU BOUGHT JUST DIDN'T LAST AND YOU DIDN'T HAVE MONEY TO GET MORE.: NEVER TRUE

## 2023-10-04 ASSESSMENT — ENCOUNTER SYMPTOMS
CHEST TIGHTNESS: 0
CONSTIPATION: 0
DIARRHEA: 0
SHORTNESS OF BREATH: 0

## 2023-10-04 NOTE — ASSESSMENT & PLAN NOTE
Potassium has decreased since discontinuing spironolactone. Currently on potassium 20 meq twice daily. Most recent potassium dropped from 4.3-3.8 with discontinuation of spironolactone and starting Lasix. Repeat BMP today. May need to increase potassium supplement.

## 2023-10-04 NOTE — ASSESSMENT & PLAN NOTE
Cognitively, patient has improved. Suspect hyponatremia, recurrent urinary tract infections, and untreated sleep apnea were making her dementia worse. She is now being more compliant with CPAP, hyponatremia has significantly improved, and she does not currently have a UTI. She does follow with Dr. Natalia Sierra whom she most recently saw in September. Remains on Razadyne and Namenda.

## 2023-10-04 NOTE — ASSESSMENT & PLAN NOTE
Well-controlled. Continue olmesartan 20 mg daily and furosemide 20 mg daily. Blood pressure is actually running on the lower range of normal and may need to consider either stopping furosemide or olmesartan. Will defer to nephrology for electrolyte balance on which medication should be discontinued.

## 2023-10-04 NOTE — ASSESSMENT & PLAN NOTE
Follows with Dr. Ronald Couch for nephrology. Most recent labs showed an increase in sodium from 129-135 since discontinuing hydrochlorothiazide and spironolactone and starting Lasix. Cognitively, patient seems to be doing much better with improvement in her sodium. Only concern is that her potassium seems to be decreasing. We will repeat a BMP today.

## 2023-10-04 NOTE — ASSESSMENT & PLAN NOTE
Reviewed with patient that she does have standing orders at the lab for urinalysis with reflex to culture that she may have done as needed. Reviewed with patient that her cognition worsens when she does have urinary tract infections. Patient does not fully empty her bladder and urology does want her to self catheterize but because of her chronic back issues she is unable to physically self catheterize.

## 2023-10-04 NOTE — ASSESSMENT & PLAN NOTE
Doing very well on Lexapro 20 mg daily. Does not want to decrease dose as she tends to do worse over the winter months.

## 2023-10-09 DIAGNOSIS — I10 ESSENTIAL HYPERTENSION: Chronic | ICD-10-CM

## 2023-10-10 RX ORDER — POTASSIUM CHLORIDE 20 MEQ/1
TABLET, EXTENDED RELEASE ORAL
Qty: 60 TABLET | Refills: 5 | Status: SHIPPED | OUTPATIENT
Start: 2023-10-10

## 2023-10-11 ENCOUNTER — CARE COORDINATION (OUTPATIENT)
Dept: CARE COORDINATION | Age: 73
End: 2023-10-11

## 2023-10-11 NOTE — CARE COORDINATION
RN-CC outreached patient for cc follow up. Patient states she is on her way to an eye appointment. Patient states urinary sx resolved, toe infections improving. Patient is scheduled to follow up with podiatry next week. Patient states she will call RN-CC back at later date. Will await call return.

## 2023-10-20 NOTE — PROGRESS NOTES
PATIENT REACHED   YES_X___NO____        PREOP INSTRUCTIONS LEFT ON VM NUMBER_______________      Date: 10/24/2023      Arrival Time: 6:30 am    Procedure Time: 7:30 am      Location: [address] [place] [address] LEONOR Hernandez, [address]      Nothing to eat or drink after MIDNIGHT the evening before your procedure. You will need a responsible adulte 18 years or older to stay on site, and take you home after the procedure. Please bring a complete list of medications and supplements you currently take, with dosing. Wear loose comfortable clothes. Please follow any and all instructions the office has given you regarding medications that need to be stopped prior to procedure. Please verify with the office regarding blood thinners. The goal of blood sugar is to be under >200, the day of the procedure. If it is elevated you may be cancelled. ANY QUESTIONS CALL YOUR DOCTOR. ALSO,PLEASE READ THE INSTRUCTION PACKET FROM YOUR DR IF YOU RECEIVED ONE.    DR. Dorina Carr > [phone]      Additional Information:      VISITOR POLICY(subject to change)    Current policy is 2 visitors per patient. No children. Masks are required.

## 2023-10-23 ENCOUNTER — CARE COORDINATION (OUTPATIENT)
Dept: CARE COORDINATION | Age: 73
End: 2023-10-23

## 2023-10-23 NOTE — CARE COORDINATION
RN-CC outreached patient for cc follow up; urinary sx, toe infections, b/p, needs & concerns. No answer; HIPPA compliant message left through  requesting call return. RN-CC will follow up at later date & time.

## 2023-10-24 ENCOUNTER — APPOINTMENT (OUTPATIENT)
Dept: GENERAL RADIOLOGY | Age: 73
End: 2023-10-24
Attending: ANESTHESIOLOGY
Payer: MEDICARE

## 2023-10-24 ENCOUNTER — HOSPITAL ENCOUNTER (OUTPATIENT)
Age: 73
Setting detail: OUTPATIENT SURGERY
Discharge: HOME | End: 2023-10-24
Attending: ANESTHESIOLOGY | Admitting: ANESTHESIOLOGY
Payer: MEDICARE

## 2023-10-24 VITALS
TEMPERATURE: 96.8 F | WEIGHT: 173 LBS | DIASTOLIC BLOOD PRESSURE: 69 MMHG | SYSTOLIC BLOOD PRESSURE: 111 MMHG | HEART RATE: 58 BPM | OXYGEN SATURATION: 100 % | BODY MASS INDEX: 37.32 KG/M2 | HEIGHT: 57 IN | RESPIRATION RATE: 14 BRPM

## 2023-10-24 PROCEDURE — 6360000002 HC RX W HCPCS: Performed by: ANESTHESIOLOGY

## 2023-10-24 PROCEDURE — 99152 MOD SED SAME PHYS/QHP 5/>YRS: CPT | Performed by: ANESTHESIOLOGY

## 2023-10-24 PROCEDURE — 3610000056 HC PAIN LEVEL 4 BASE (NON-OR): Performed by: ANESTHESIOLOGY

## 2023-10-24 PROCEDURE — 77003 FLUOROGUIDE FOR SPINE INJECT: CPT

## 2023-10-24 PROCEDURE — 2709999900 HC NON-CHARGEABLE SUPPLY: Performed by: ANESTHESIOLOGY

## 2023-10-24 PROCEDURE — 2500000003 HC RX 250 WO HCPCS: Performed by: ANESTHESIOLOGY

## 2023-10-24 RX ORDER — MIDAZOLAM HYDROCHLORIDE 1 MG/ML
INJECTION INTRAMUSCULAR; INTRAVENOUS
Status: COMPLETED | OUTPATIENT
Start: 2023-10-24 | End: 2023-10-24

## 2023-10-24 RX ORDER — LIDOCAINE HYDROCHLORIDE 10 MG/ML
INJECTION, SOLUTION INFILTRATION; PERINEURAL
Status: COMPLETED | OUTPATIENT
Start: 2023-10-24 | End: 2023-10-24

## 2023-10-24 RX ORDER — FENTANYL CITRATE 50 UG/ML
INJECTION, SOLUTION INTRAMUSCULAR; INTRAVENOUS
Status: COMPLETED | OUTPATIENT
Start: 2023-10-24 | End: 2023-10-24

## 2023-10-24 RX ORDER — METHYLPREDNISOLONE ACETATE 80 MG/ML
INJECTION, SUSPENSION INTRA-ARTICULAR; INTRALESIONAL; INTRAMUSCULAR; SOFT TISSUE
Status: COMPLETED | OUTPATIENT
Start: 2023-10-24 | End: 2023-10-24

## 2023-10-24 RX ORDER — BUPIVACAINE HYDROCHLORIDE 2.5 MG/ML
INJECTION, SOLUTION EPIDURAL; INFILTRATION; INTRACAUDAL
Status: COMPLETED | OUTPATIENT
Start: 2023-10-24 | End: 2023-10-24

## 2023-10-24 ASSESSMENT — PAIN - FUNCTIONAL ASSESSMENT: PAIN_FUNCTIONAL_ASSESSMENT: 0-10

## 2023-10-24 ASSESSMENT — PAIN SCALES - GENERAL
PAINLEVEL_OUTOF10: 6
PAINLEVEL_OUTOF10: 6

## 2023-10-24 ASSESSMENT — PAIN DESCRIPTION - DESCRIPTORS: DESCRIPTORS: SHOOTING;ACHING

## 2023-10-24 NOTE — H&P
Update History & Physical    The patient's History and Physical of October 10, 2023 was reviewed with the patient and I examined the patient. There was no change. The surgical site was confirmed by the patient and me. Plan: The risks, benefits, expected outcome, and alternative to the recommended procedure have been discussed with the patient. Patient understands and wants to proceed with the procedure.      Electronically signed by Francisco Sanders MD on 10/24/2023 at 7:58 AM

## 2023-10-24 NOTE — DISCHARGE INSTRUCTIONS
1. Keep injection /surgical site dry until the band-aid/ dressing is removed. Please remove band-aids from the injection site 12-24 hours after the procedure. 2. If the injection site is sore, you may apply an ice pack to that area for twenty minutes every two hours for the initial twenty four hours. Do not use heat. 3. Occasionally you may notice slight increase in pain after the procedure. This should start to improve  within the next twenty four to forty eight hours. 4. Remember, it may take as long as forty eight hours before you notice a gradual improvement in your pain and other symptoms. 5. You may continue to take your pain medication as needed. 6. DO NOT stop any other previously prescribed medications. You may resume blood thinning medications the day after the procedure. 7. In general, you should avoid any prolonged standing, walking, or repeated bending or heavy use of your upper extremities for twenty four to forty eight hours but may resume light      Normal activity when you leave the facility     8. If you are currently going to physical therapy, continue to do so unless your doctor instructs you not to.    9. If you develop any other symptoms such fever, rash, unusual drainage from the site, severe headache or weakness please call [phone]. 10. Other: If you had sedation ; Do not drink alcohol or sign any legal or financial contracts for 24 hours. Also do not drive or        Or operate other types of machinery for 24 hours. You should have a responsible adult available to assist you for the rest of the day. 11. Side effects of steroids can include: (these may last a few days then will go away on their own)           - facial flushing           - hot flashes           - nervous energy and difficulty sleeping at night           - muscle spasm or twitching           - fluid retention            - elevated blood sugar levels in diabetics      12.  Please follow up with the

## 2023-10-24 NOTE — BRIEF OP NOTE
Brief Postoperative Note      Patient: Da Lockett  YOB: 1950  MRN: [id number]    Date of Procedure: 10/24/2023    Pre-Op Diagnosis Codes:      * Spondylosis of lumbosacral region without myelopathy or radiculopathy [M47.817]    Post-Op Diagnosis: Same       Procedure(s):  BILATERAL L1-L2 MEDIAL BRANCH BLOCK WITH FLUOROSCOPY    Surgeon(s):  Hiro Da Silva MD    Assistant:  * No surgical staff found *    Anesthesia: IV Sedation    Estimated Blood Loss (mL): Minimal    Complications: None    Specimens:   * No specimens in log *    Implants:  * No implants in log *      Drains: * No LDAs found *    Findings:       Electronically signed by Hiro Da Silva MD on 10/24/2023 at 7:59 AM

## 2023-10-25 DIAGNOSIS — F02.80 LATE ONSET ALZHEIMER'S DISEASE WITHOUT BEHAVIORAL DISTURBANCE (HCC): ICD-10-CM

## 2023-10-25 DIAGNOSIS — G30.1 LATE ONSET ALZHEIMER'S DISEASE WITHOUT BEHAVIORAL DISTURBANCE (HCC): ICD-10-CM

## 2023-10-25 NOTE — OP NOTE
procedure room. Pre-procedure blood pressure and pulse were stable and recorded in patients clinic chart. The patient was placed in a prone position and the lower back was prepped with ChloraPrep and draped in the usual sterile fashion. The skin over the right _L 1 facet joint was infiltrated with 1% Lidocaine for local anesthesia. A 22-gauge, 3.5-inch needle was inserted into the lumbar medial branch under radiographic guidance. Both AP and lateral views were obtained. Following placement of the needle and negative aspiration, 1 cc of Bupivacaine 0.25% with Depomedrol 20 mg. was injected. The identical procedure was performed on facet joint levels Right L2, and Left L1 and L2 . During the procedure there was no evidence of CSF, paresthesias or heme. After the procedure the needles were flushed with preservative free local anesthetic and removed. Skin was cleaned and a sterile dressing was applied. Following the procedure the patient's vital signs were stable. The patient was discharged home in good condition after being given discharge instructions.     Electronically signed by Lieutenant Ciaran MD on 10/25/2023 at 5:44 PM

## 2023-10-26 RX ORDER — GALANTAMINE HYDROBROMIDE 16 MG/1
CAPSULE, EXTENDED RELEASE ORAL
Qty: 90 CAPSULE | Refills: 1 | OUTPATIENT
Start: 2023-10-26

## 2023-11-01 ENCOUNTER — CARE COORDINATION (OUTPATIENT)
Dept: CARE COORDINATION | Age: 73
End: 2023-11-01

## 2023-11-01 NOTE — CARE COORDINATION
Incoming call received from patient. She states [employer] is still placing Aldactone in her pill packs. Patient states she has to remove Aldactone 1/2 tab from her daily packs. Aldactone was dc'd on 8/10/23. RN-CC informed patient that I will contact [employer] to make sure the medication is removed from future packs.

## 2023-11-01 NOTE — CARE COORDINATION
RNClairCC outreached [employer]; spoke with Dimitrios Keita. Per Dimitrios Keita, Aldactone is still showing as an active medication. RNROBINA requested Aldactone be dc'd as of 8/10/23. Dimitrios Keita confirmed medication has been dc'd and will not be in the next shipment expected to release the week of 11/24. Patient notified and vu. Patient reports toe infection resolved, no urinary sx reported.      Plan:    F/u on med pack  discharge

## 2023-11-22 NOTE — PROGRESS NOTES
PATIENT REACHED   YES_X___NO____        PREOP INSTRUCTIONS LEFT ON VM NUMBER_______________      Date: 11/28/2023      Arrival Time: 6:30 am    Procedure Time: 7:30 am      Location: [address] [place] [address] RULASolitario Marcelo, [address]      Nothing to eat or drink after MIDNIGHT the evening before your procedure. You will need a responsible adulte 18 years or older to stay on site, and take you home after the procedure. Please bring a complete list of medications and supplements you currently take, with dosing. Wear loose comfortable clothes. Please follow any and all instructions the office has given you regarding medications that need to be stopped prior to procedure. Please verify with the office regarding blood thinners. The goal of blood sugar is to be under >200, the day of the procedure. If it is elevated you may be cancelled. ANY QUESTIONS CALL YOUR DOCTOR. ALSO,PLEASE READ THE INSTRUCTION PACKET FROM YOUR DR IF YOU RECEIVED ONE.    DR. Oumou Hernandez > [phone]      Additional Information:      VISITOR POLICY(subject to change)    Current policy is 2 visitors per patient. No children. Masks are required.

## 2023-11-27 ENCOUNTER — CARE COORDINATION (OUTPATIENT)
Dept: CARE COORDINATION | Age: 73
End: 2023-11-27

## 2023-11-28 ENCOUNTER — HOSPITAL ENCOUNTER (OUTPATIENT)
Age: 73
Setting detail: OUTPATIENT SURGERY
Discharge: HOME | End: 2023-11-28
Attending: ANESTHESIOLOGY | Admitting: ANESTHESIOLOGY
Payer: MEDICARE

## 2023-11-28 ENCOUNTER — APPOINTMENT (OUTPATIENT)
Dept: GENERAL RADIOLOGY | Age: 73
End: 2023-11-28
Attending: ANESTHESIOLOGY
Payer: MEDICARE

## 2023-11-28 VITALS
SYSTOLIC BLOOD PRESSURE: 133 MMHG | WEIGHT: 175 LBS | BODY MASS INDEX: 37.76 KG/M2 | HEART RATE: 58 BPM | TEMPERATURE: 97.6 F | HEIGHT: 57 IN | DIASTOLIC BLOOD PRESSURE: 60 MMHG | OXYGEN SATURATION: 96 % | RESPIRATION RATE: 18 BRPM

## 2023-11-28 PROCEDURE — 99153 MOD SED SAME PHYS/QHP EA: CPT | Performed by: ANESTHESIOLOGY

## 2023-11-28 PROCEDURE — 2709999900 HC NON-CHARGEABLE SUPPLY: Performed by: ANESTHESIOLOGY

## 2023-11-28 PROCEDURE — 2500000003 HC RX 250 WO HCPCS: Performed by: ANESTHESIOLOGY

## 2023-11-28 PROCEDURE — 3610000059 HC PAIN LEVEL 5 ADDL 15 MIN (NON-OR): Performed by: ANESTHESIOLOGY

## 2023-11-28 PROCEDURE — 77003 FLUOROGUIDE FOR SPINE INJECT: CPT

## 2023-11-28 PROCEDURE — 3610000058 HC PAIN LEVEL 5 BASE (NON-OR): Performed by: ANESTHESIOLOGY

## 2023-11-28 PROCEDURE — 6360000002 HC RX W HCPCS: Performed by: ANESTHESIOLOGY

## 2023-11-28 PROCEDURE — 99152 MOD SED SAME PHYS/QHP 5/>YRS: CPT | Performed by: ANESTHESIOLOGY

## 2023-11-28 RX ORDER — BUPIVACAINE HYDROCHLORIDE 2.5 MG/ML
INJECTION, SOLUTION INFILTRATION; PERINEURAL
Status: COMPLETED | OUTPATIENT
Start: 2023-11-28 | End: 2023-11-28

## 2023-11-28 RX ORDER — FENTANYL CITRATE 50 UG/ML
INJECTION, SOLUTION INTRAMUSCULAR; INTRAVENOUS
Status: COMPLETED | OUTPATIENT
Start: 2023-11-28 | End: 2023-11-28

## 2023-11-28 RX ORDER — MIDAZOLAM HYDROCHLORIDE 1 MG/ML
INJECTION INTRAMUSCULAR; INTRAVENOUS
Status: COMPLETED | OUTPATIENT
Start: 2023-11-28 | End: 2023-11-28

## 2023-11-28 RX ORDER — LIDOCAINE HYDROCHLORIDE 10 MG/ML
INJECTION, SOLUTION EPIDURAL; INFILTRATION; INTRACAUDAL; PERINEURAL
Status: COMPLETED | OUTPATIENT
Start: 2023-11-28 | End: 2023-11-28

## 2023-11-28 ASSESSMENT — PAIN DESCRIPTION - DESCRIPTORS
DESCRIPTORS: ACHING

## 2023-11-28 ASSESSMENT — PAIN DESCRIPTION - LOCATION
LOCATION: BACK
LOCATION: BACK

## 2023-11-28 ASSESSMENT — PAIN SCALES - GENERAL
PAINLEVEL_OUTOF10: 6
PAINLEVEL_OUTOF10: 6

## 2023-11-28 ASSESSMENT — PAIN DESCRIPTION - ORIENTATION
ORIENTATION: OUTER
ORIENTATION: OUTER

## 2023-11-28 NOTE — PROGRESS NOTES
Brought to phase 2 recovery via chair. States pain level 6/10 at present. To lower back. Is awake and alert and follows directions well.  Band aids in place which are cdi to the lower back

## 2023-11-28 NOTE — H&P
Update History & Physical    The patient's History and Physical of November 7, 2023 was reviewed with the patient and I examined the patient. There was no change. The surgical site was confirmed by the patient and me. Plan: The risks, benefits, expected outcome, and alternative to the recommended procedure have been discussed with the patient. Patient understands and wants to proceed with the procedure.      Electronically signed by Angela Tijerina MD on 11/28/2023 at 7:55 AM

## 2023-11-28 NOTE — BRIEF OP NOTE
Brief Postoperative Note      Patient: Kristian Cardona  YOB: 1950  MRN: [id number]    Date of Procedure: 11/28/2023    Pre-Op Diagnosis Codes:      * Spondylosis without myelopathy or radiculopathy, lumbosacral region [M47.817]    Post-Op Diagnosis: Same       Procedure(s):  BILATERAL L1-L2 RADIOFREQUENCY ABLATION WITH FLUOROSCOPY    Surgeon(s):  Jacquelyn Alevs MD    Assistant:  * No surgical staff found *    Anesthesia: IV Sedation    Estimated Blood Loss (mL): Minimal    Complications: None    Specimens:   * No specimens in log *    Implants:  * No implants in log *      Drains: * No LDAs found *    Findings:       Electronically signed by Jacquelyn Alves MD on 11/28/2023 at 8:09 AM

## 2023-11-28 NOTE — DISCHARGE INSTRUCTIONS
1. KEEP INJECTION SITE DRY FOR 12 -24 HOURS AFTER PROCEDURE. PLEASE REMOVE THE BANDAIDS AT THAT TIME. 2. IF INJECTION SITE IS SORE YOU MAY APPLY ICE PACK TO THAT AREA FOR 20 -30- MINUTES EVERY TWO HOURS FOR INITIAL 24  HOURS. AVOID HEAT for 48 HOURS     3. YOU MAY NOTICE A SLIGHT INCREASE IN PAIN AFTER THE       PROCEDURE. THIS SHOULD IMPROVE OVER THE NEXT 2 WEEKS. 4. REMEMBER, IT MAY TAKE 2 WEEKS BEFORE YOU NOTICE A       GRADUAL DECREASE IN YOUR PAIN. 5. YOU MAY CONTINUE TO TAKE YOUR PAIN MEDICATION AS NEEDED. 6. DO NOT STOP ANY OTHER MEDICATION THAT WAS PREVIOUSLY        PRESCRIBED. 7. IN GENERAL, YOU SHOULD AVOID ANY PROLONG STANDING,WALKING,      OR REPEAT BENDING FOR 24-48 HOURS. 8. IF YOU DEVELOP ANY OTHER SYMPTONS SUCH AS FEVER,RASH      OR UNUSUAL DRAINAGE FROM INJECTION SITE SEVERE HEADACHE      OR WEAKNESS CALL Doctor whom referred you for the procedure. 9. If you had sedation: Do not drink alcohol or sign any legal or financial       contracts for 24 hours. Also do not drive or operate other types of machinery for 24 hours. You should have a responsible adult available to assist you for the rest of the day. You may experience light-headedness, dizziness or drowsiness.

## 2023-12-07 DIAGNOSIS — G30.1 LATE ONSET ALZHEIMER'S DISEASE WITHOUT BEHAVIORAL DISTURBANCE (HCC): ICD-10-CM

## 2023-12-07 DIAGNOSIS — F02.80 LATE ONSET ALZHEIMER'S DISEASE WITHOUT BEHAVIORAL DISTURBANCE (HCC): ICD-10-CM

## 2023-12-08 RX ORDER — GALANTAMINE HYDROBROMIDE 16 MG/1
CAPSULE, EXTENDED RELEASE ORAL
Qty: 90 CAPSULE | Refills: 1 | OUTPATIENT
Start: 2023-12-08

## 2023-12-11 ENCOUNTER — TELEMEDICINE (OUTPATIENT)
Dept: INTERNAL MEDICINE CLINIC | Age: 73
End: 2023-12-11

## 2023-12-11 DIAGNOSIS — Z00.00 MEDICARE ANNUAL WELLNESS VISIT, SUBSEQUENT: Primary | ICD-10-CM

## 2023-12-11 ASSESSMENT — PATIENT HEALTH QUESTIONNAIRE - PHQ9
SUM OF ALL RESPONSES TO PHQ QUESTIONS 1-9: 3
4. FEELING TIRED OR HAVING LITTLE ENERGY: 1
2. FEELING DOWN, DEPRESSED OR HOPELESS: 1
7. TROUBLE CONCENTRATING ON THINGS, SUCH AS READING THE NEWSPAPER OR WATCHING TELEVISION: 0
6. FEELING BAD ABOUT YOURSELF - OR THAT YOU ARE A FAILURE OR HAVE LET YOURSELF OR YOUR FAMILY DOWN: 1
1. LITTLE INTEREST OR PLEASURE IN DOING THINGS: 0
8. MOVING OR SPEAKING SO SLOWLY THAT OTHER PEOPLE COULD HAVE NOTICED. OR THE OPPOSITE, BEING SO FIGETY OR RESTLESS THAT YOU HAVE BEEN MOVING AROUND A LOT MORE THAN USUAL: 0
9. THOUGHTS THAT YOU WOULD BE BETTER OFF DEAD, OR OF HURTING YOURSELF: 0
SUM OF ALL RESPONSES TO PHQ QUESTIONS 1-9: 3
SUM OF ALL RESPONSES TO PHQ QUESTIONS 1-9: 3
10. IF YOU CHECKED OFF ANY PROBLEMS, HOW DIFFICULT HAVE THESE PROBLEMS MADE IT FOR YOU TO DO YOUR WORK, TAKE CARE OF THINGS AT HOME, OR GET ALONG WITH OTHER PEOPLE: 0
3. TROUBLE FALLING OR STAYING ASLEEP: 0
SUM OF ALL RESPONSES TO PHQ9 QUESTIONS 1 & 2: 1
5. POOR APPETITE OR OVEREATING: 0
SUM OF ALL RESPONSES TO PHQ QUESTIONS 1-9: 3

## 2023-12-11 NOTE — TELEPHONE ENCOUNTER
Last OV: 10/4/2023  Next OV: 01/19/2024    Last fill: 12/13/2022  Refills: 90 tablets   5 refills Left message through  services to call back to schedule her 6 week fuv on either 9/11 or 9/14 in a new patient slot per [facility].

## 2023-12-11 NOTE — PROGRESS NOTES
MD Jodee   busPIRone (BUSPAR) 15 MG tablet TAKE 1 TABLET BY MOUTH 3 TIMES DAILY Yes Deedee Bullock, DO   blood glucose monitor strips Check sugars qam and prn s/s of low blood sugars Yes Deedee Bullock, DO   Lancets MISC Check sugars qam and prn s/s of low blood sugars Yes Deedee Bullock, DO   blood glucose monitor kit and supplies Check sugars qam and prn s/s of low blood sugars Yes Deedee Bullock DO   diclofenac sodium (VOLTAREN) 1 % GEL as needed Yes Jodee Fischer MD   carBAMazepine (TEGRETOL-XR) 100 MG extended release tablet Take 1 tablet by mouth 2 times daily Yes Shanique Weinberg MD   diphenoxylate-atropine (LOMOTIL) 2.5-0.025 MG per tablet Take 1 tablet by mouth as needed for Diarrhea. Yes Jodee Fischer MD   Cholecalciferol (VITAMIN D3) 5000 units CAPS Take 1 capsule by mouth daily Yes Jodee Fischer MD   zoledronic acid (RECLAST) 5 MG/100ML SOLN Infuse 100 mLs intravenously once IV, yearly Yes Jodee Fischer MD   Calcium Carbonate-Vit D-Min (CALCIUM 1200 PO) Take 1 capsule by mouth 2 times daily. Yes Jodee Fischer MD   Ascorbic Acid (VITAMIN C) 500 MG tablet Take 1 tablet by mouth daily Yes Provider, Historical, MD       CareTeam (Including outside providers/suppliers regularly involved in providing care):   Patient Care Team:  Deedee Bullock DO as PCP - General (Internal Medicine)  Deedee Bullock DO as PCP - Empaneled Provider  Mackenzie Hdz MD as Consulting Physician (General Surgery)  WILLOW Linn - CNP as Nurse Practitioner (Nurse Practitioner)  Tramaine Modi RN as Ambulatory Care Manager     Reviewed and updated this visit:  Tobacco  Allergies  Meds  Med Hx  Surg Hx  Soc Hx  Fam Hx      I, Yue Morillo RN, 12/11/2023, performed the documented evaluation under the direct supervision of the attending physician. Tamela Palma, was evaluated through a synchronous (real-time) audio encounter.  The

## 2023-12-12 RX ORDER — BUSPIRONE HYDROCHLORIDE 15 MG/1
TABLET ORAL
Qty: 90 TABLET | Refills: 5 | Status: SHIPPED | OUTPATIENT
Start: 2023-12-12

## 2023-12-16 DIAGNOSIS — N39.0 RECURRENT UTI: ICD-10-CM

## 2023-12-16 DIAGNOSIS — I10 ESSENTIAL HYPERTENSION: ICD-10-CM

## 2023-12-16 LAB
ANION GAP SERPL CALCULATED.3IONS-SCNC: 5 MMOL/L (ref 3–16)
BILIRUB UR QL STRIP.AUTO: NEGATIVE
BUN SERPL-MCNC: 16 MG/DL (ref 7–20)
CALCIUM SERPL-MCNC: 8.9 MG/DL (ref 8.3–10.6)
CHARACTER UR: ABNORMAL
CHLORIDE SERPL-SCNC: 100 MMOL/L (ref 99–110)
CLARITY UR: ABNORMAL
CO2 SERPL-SCNC: 31 MMOL/L (ref 21–32)
COLOR UR: ABNORMAL
CREAT SERPL-MCNC: 0.7 MG/DL (ref 0.6–1.2)
EPI CELLS #/AREA URNS AUTO: 7 /HPF (ref 0–5)
GFR SERPLBLD CREATININE-BSD FMLA CKD-EPI: >60 ML/MIN/{1.73_M2}
GLUCOSE SERPL-MCNC: 91 MG/DL (ref 70–99)
GLUCOSE UR STRIP.AUTO-MCNC: NEGATIVE MG/DL
HGB UR QL STRIP.AUTO: NEGATIVE
HYALINE CASTS #/AREA URNS AUTO: 0 /LPF (ref 0–8)
KETONES UR STRIP.AUTO-MCNC: NEGATIVE MG/DL
LEUKOCYTE ESTERASE UR QL STRIP.AUTO: ABNORMAL
NITRITE UR QL STRIP.AUTO: POSITIVE
PH UR STRIP.AUTO: 6.5 [PH] (ref 5–8)
POTASSIUM SERPL-SCNC: 4.3 MMOL/L (ref 3.5–5.1)
PROT UR STRIP.AUTO-MCNC: NEGATIVE MG/DL
RBC #/AREA URNS HPF: ABNORMAL /HPF (ref 0–4)
SODIUM SERPL-SCNC: 136 MMOL/L (ref 136–145)
SP GR UR STRIP.AUTO: 1.01 (ref 1–1.03)
UA COMPLETE W REFLEX CULTURE PNL UR: ABNORMAL
UA DIPSTICK W REFLEX MICRO PNL UR: YES
URN SPEC COLLECT METH UR: ABNORMAL
UROBILINOGEN UR STRIP-ACNC: 1 E.U./DL
WBC #/AREA URNS HPF: ABNORMAL /HPF (ref 0–5)

## 2023-12-16 RX ORDER — NITROFURANTOIN 25; 75 MG/1; MG/1
100 CAPSULE ORAL 2 TIMES DAILY
Qty: 20 CAPSULE | Refills: 0 | Status: SHIPPED | OUTPATIENT
Start: 2023-12-16 | End: 2023-12-26

## 2023-12-18 ENCOUNTER — TELEPHONE (OUTPATIENT)
Dept: CARE COORDINATION | Age: 73
End: 2023-12-18

## 2023-12-19 ENCOUNTER — TELEPHONE (OUTPATIENT)
Dept: INTERNAL MEDICINE CLINIC | Age: 73
End: 2023-12-19

## 2023-12-19 DIAGNOSIS — R19.7 DIARRHEA, UNSPECIFIED TYPE: Primary | ICD-10-CM

## 2023-12-19 RX ORDER — DIPHENOXYLATE HYDROCHLORIDE AND ATROPINE SULFATE 2.5; .025 MG/1; MG/1
1 TABLET ORAL PRN
Qty: 20 TABLET | Refills: 1 | Status: SHIPPED | OUTPATIENT
Start: 2023-12-19 | End: 2024-03-18

## 2023-12-19 NOTE — TELEPHONE ENCOUNTER
Pt is completely out and she like to have it on hand incase she has diarrhea. I have pended the medication to the pharmacy.

## 2023-12-26 ENCOUNTER — TELEPHONE (OUTPATIENT)
Dept: INTERNAL MEDICINE CLINIC | Age: 73
End: 2023-12-26

## 2023-12-26 NOTE — TELEPHONE ENCOUNTER
Patient requesting refill of diphenoxylate/atropine 2.5-0.025 sent to [facility] on file. Last refill on record was 2018. Patient states her pcp stated she was agreeable to refill medication.      Last OV 12/11/24   Next OV 1/19/24

## 2023-12-29 NOTE — CARE COORDINATION
Anesthesia Pre-Procedure Evaluation    Patient: Amor Zavaleta   MRN: [id number] : 1945                  Past Medical History:   Diagnosis Date    Diabetes (H)     Sleep apnea       Past Surgical History:   Procedure Laterality Date    INCISION AND DRAINAGE UPPER EXTREMITY, COMBINED Right 2023    Procedure: INCISION AND DRAINAGE, RIGHT UPPER FOREARM;  Surgeon: Leah Leyva MD;  Location: [facility] [place] OR    INCISION AND DRAINAGE UPPER EXTREMITY, COMBINED Right 2023    Procedure: INCISION AND DRAINAGE, UPPER EXTREMITY;  Surgeon: Leah Leyva MD;  Location: [facility] [place] OR    IRRIGATION AND DEBRIDEMENT UPPER EXTREMITY, COMBINED Right 2023    Procedure: IRRIGATION AND DEBRIDEMENT, RIGHT WRIST;  Surgeon: Carla Austin MD;  Location: [facility] OR    IRRIGATION AND DEBRIDEMENT UPPER EXTREMITY, COMBINED Right 2023    Procedure: IRRIGATION AND DEBRIDEMENT, RIGHT WRIST;  Surgeon: Carla Austin MD;  Location: [facility] OR    IRRIGATION AND DEBRIDEMENT UPPER EXTREMITY, COMBINED Right 2023    Procedure: IRRIGATION AND DEBRIDEMENT, RIGHT WRIST;  Surgeon: Erik Burkett MD;  Location: [facility] OR      Allergies   Allergen Reactions    Sulfa Antibiotics Hives      Social History     Tobacco Use    Smoking status: Never    Smokeless tobacco: Never   Substance Use Topics    Alcohol use: Not Currently      Wt Readings from Last 1 Encounters:   23 94.3 kg (208 lb)        Anesthesia Evaluation   Pt has had prior anesthetic.     No history of anesthetic complications       ROS/MED HX  ENT/Pulmonary:     (+) sleep apnea,                                    (-) tobacco use   Neurologic:  - neg neurologic ROS   (+)    peripheral neuropathy,                            Cardiovascular: Comment: LBBB.    (+)  hypertension- -   -  - -                          Irregular Heartbeat/Palpitations,            METS/Exercise Tolerance:     Hematologic:     (+)       "anemia,          Musculoskeletal:  - neg musculoskeletal ROS     GI/Hepatic:     (+) GERD, Asymptomatic on medication,               (-) hiatal hernia and esophageal disease   Renal/Genitourinary:     (+) renal disease, type: CRI,            Endo:     (+)  type II DM,       Diabetic complications: nephropathy neuropathy retinopathy.             Psychiatric/Substance Use:  - neg psychiatric ROS     Infectious Disease: Comment: UE infection.    (+) Recent Fever,           Malignancy:  - neg malignancy ROS     Other:  - neg other ROS          Physical Exam    Airway        Mallampati: I   TM distance: > 3 FB   Neck ROM: full   Mouth opening: > 3 cm    Respiratory Devices and Support         Dental       (+) Multiple visibly decayed, broken teeth      Cardiovascular   cardiovascular exam normal       Rhythm and rate: regular and normal     Pulmonary   pulmonary exam normal        breath sounds clear to auscultation           OUTSIDE LABS:  CBC:   Lab Results   Component Value Date    WBC 7.7 12/25/2023    WBC 12.5 (H) 12/23/2023    HGB 10.7 (L) 12/25/2023    HGB 10.5 (L) 12/23/2023    HCT 32.7 (L) 12/25/2023    HCT 32.3 (L) 12/23/2023     12/28/2023     12/25/2023     BMP:   Lab Results   Component Value Date     12/23/2023     12/22/2023    POTASSIUM 4.3 12/23/2023    POTASSIUM 4.0 12/22/2023    CHLORIDE 106 12/23/2023    CHLORIDE 102 12/22/2023    CO2 23 12/23/2023    CO2 23 12/22/2023    BUN 23.2 (H) 12/23/2023    BUN 32.6 (H) 12/22/2023    CR 1.25 (H) 12/28/2023    CR 1.27 (H) 12/25/2023     (H) 12/29/2023     (H) 12/29/2023     COAGS: No results found for: \"PTT\", \"INR\", \"FIBR\"  POC: No results found for: \"BGM\", \"HCG\", \"HCGS\"  HEPATIC:   Lab Results   Component Value Date    ALBUMIN 2.5 (L) 12/23/2023    PROTTOTAL 6.1 (L) 12/23/2023    ALT 9 12/23/2023    AST 23 12/23/2023    ALKPHOS 41 12/23/2023    BILITOTAL 0.5 12/23/2023     OTHER:   Lab Results   Component Value Date    " outreach myself and [facility] if future care coordination needs arise. Offered patient enrollment in the Remote Patient Monitoring (RPM) program for in-home monitoring: Patient declined. Lab Results       None            Care Coordination Interventions    Referral from Primary Care Provider: Yes  Suggested Interventions and Community Resources  Fall Risk Prevention: In Process (Comment: 9/14/22 SIMON)  Medication Assistance Program: Completed (Comment: [employer] 9/14/22 SIMON)  Pharmacist: Declined (Comment: 9/14/22 SIMON)  Senior Services: Completed (Comment: referral to [facility] 10/12/22)  Social Work: Completed (Comment: [facility] 10/12/22)  Zone Management Tools:  In Process (Comment: 9/14/22 SIMON)  Other Services or Interventions: PEOPLE WORKING COOPERATIVELY, [facility] 9/14/22 [facility]          Goals Addressed    None         Future Appointments   Date Time Provider [address]   11/30/2023 11:00 AM DO GINA Moore [facility] [place] - [facility]   12/18/2023 11:00 AM WILLOW Langston CNP [facility] SLE[facility]   1/19/2024  9:40 AM DO GINA Moore [facility] [place] - [facility]   1/29/2024  1:00 PM Wyatt Gallardo PA-C [facility]   4/2/2024 11:30 AM Lionel ARIAS MD FF NEURO Neurology -    and   General Assessment    Do you have any symptoms that are causing concern?: No PH 7.46 (H) 12/11/2023    LACT 1.2 12/12/2023    A1C 8.9 (H) 12/11/2023    VLADIMIR 8.0 (L) 12/23/2023    PHOS 2.7 07/07/2023    MAG 1.9 12/19/2023    SED 61 (H) 12/11/2023       Anesthesia Plan    ASA Status:  3    NPO Status:  NPO Appropriate (had breakfast >8 hrs ago)    Anesthesia Type: General.     - Airway: Mask Only      Maintenance: TIVA.        Consents    Anesthesia Plan(s) and associated risks, benefits, and realistic alternatives discussed. Questions answered and patient/representative(s) expressed understanding.     - Discussed:     - Discussed with:  Patient      - Extended Intubation/Ventilatory Support Discussed: No.      - Patient is DNR/DNI Status: No     Use of blood products discussed: No .     Postoperative Care    Pain management: Peripheral nerve block (Single Shot), Multi-modal analgesia.   PONV prophylaxis: Ondansetron (or other 5HT-3)     Comments:               Darrian Haney MD    I have reviewed the pertinent notes and labs in the chart from the past 30 days and (re)examined the patient.  Any updates or changes from those notes are reflected in this note.

## 2024-01-10 RX ORDER — OLMESARTAN MEDOXOMIL 20 MG/1
TABLET ORAL
Qty: 30 TABLET | Refills: 0 | Status: SHIPPED | OUTPATIENT
Start: 2024-01-10

## 2024-01-10 RX ORDER — GABAPENTIN 600 MG/1
600 TABLET ORAL 3 TIMES DAILY
Qty: 90 TABLET | Refills: 0 | Status: SHIPPED | OUTPATIENT
Start: 2024-01-10 | End: 2024-02-09

## 2024-01-10 RX ORDER — ESCITALOPRAM OXALATE 20 MG/1
TABLET ORAL
Qty: 30 TABLET | Refills: 0 | Status: SHIPPED | OUTPATIENT
Start: 2024-01-10

## 2024-01-11 ENCOUNTER — HOSPITAL ENCOUNTER (OUTPATIENT)
Age: 74
Discharge: HOME | End: 2024-01-11
Payer: MEDICARE

## 2024-01-11 DIAGNOSIS — I10 ESSENTIAL HYPERTENSION: ICD-10-CM

## 2024-01-11 LAB
CREAT UR-MCNC: 47.5 MG/DL (ref 28–259)
DEPRECATED RDW RBC AUTO: 12.5 % (ref 12.4–15.4)
HCT VFR BLD AUTO: 34.9 % (ref 36–48)
HGB BLD-MCNC: 11.8 G/DL (ref 12–16)
MCH RBC QN AUTO: 32.9 PG (ref 26–34)
MCHC RBC AUTO-ENTMCNC: 33.9 G/DL (ref 31–36)
MCV RBC AUTO: 97 FL (ref 80–100)
PLATELET # BLD AUTO: 177 K/UL (ref 135–450)
PMV BLD AUTO: 8.1 FL (ref 5–10.5)
PROT UR-MCNC: <4 MG/DL
PROT/CREAT UR-RTO: NORMAL MG/DL
RBC # BLD AUTO: 3.6 M/UL (ref 4–5.2)
WBC # BLD AUTO: 5.5 K/UL (ref 4–11)

## 2024-01-11 PROCEDURE — 36415 COLL VENOUS BLD VENIPUNCTURE: CPT

## 2024-01-11 PROCEDURE — 80069 RENAL FUNCTION PANEL: CPT

## 2024-01-11 PROCEDURE — 82570 ASSAY OF URINE CREATININE: CPT

## 2024-01-11 PROCEDURE — 84156 ASSAY OF PROTEIN URINE: CPT

## 2024-01-11 PROCEDURE — 85027 COMPLETE CBC AUTOMATED: CPT

## 2024-01-12 LAB
ALBUMIN SERPL-MCNC: 4.2 G/DL (ref 3.4–5)
ANION GAP SERPL CALCULATED.3IONS-SCNC: 12 MMOL/L (ref 3–16)
BUN SERPL-MCNC: 12 MG/DL (ref 7–20)
CALCIUM SERPL-MCNC: 8.8 MG/DL (ref 8.3–10.6)
CHLORIDE SERPL-SCNC: 101 MMOL/L (ref 99–110)
CO2 SERPL-SCNC: 25 MMOL/L (ref 21–32)
CREAT SERPL-MCNC: 1 MG/DL (ref 0.6–1.2)
GFR SERPLBLD CREATININE-BSD FMLA CKD-EPI: 59 ML/MIN/{1.73_M2}
GLUCOSE SERPL-MCNC: 84 MG/DL (ref 70–99)
PHOSPHATE SERPL-MCNC: 3.8 MG/DL (ref 2.5–4.9)
POTASSIUM SERPL-SCNC: 4.3 MMOL/L (ref 3.5–5.1)
SODIUM SERPL-SCNC: 138 MMOL/L (ref 136–145)

## 2024-01-15 ENCOUNTER — TRANSCRIBE ORDERS (OUTPATIENT)
Dept: ADMINISTRATIVE | Age: 74
End: 2024-01-15

## 2024-01-15 DIAGNOSIS — N30.11 INTERSTITIAL CYSTITIS (CHRONIC) WITH HEMATURIA: ICD-10-CM

## 2024-01-15 DIAGNOSIS — N34.3 URETHRAL SYNDROME, UNSPECIFIED: ICD-10-CM

## 2024-01-15 DIAGNOSIS — I10 ESSENTIAL (PRIMARY) HYPERTENSION: Primary | ICD-10-CM

## 2024-01-18 PROBLEM — E87.6 HYPOKALEMIA: Status: RESOLVED | Noted: 2023-10-04 | Resolved: 2024-01-18

## 2024-01-18 PROBLEM — R53.83 FATIGUE: Status: RESOLVED | Noted: 2022-03-02 | Resolved: 2024-01-18

## 2024-01-18 PROBLEM — E87.1 HYPONATREMIA: Status: RESOLVED | Noted: 2023-07-04 | Resolved: 2024-01-18

## 2024-01-18 PROBLEM — J34.2 DEVIATED SEPTUM: Status: RESOLVED | Noted: 2021-06-15 | Resolved: 2024-01-18

## 2024-01-18 PROBLEM — R25.1 TREMOR: Status: RESOLVED | Noted: 2021-11-21 | Resolved: 2024-01-18

## 2024-01-18 PROBLEM — R09.81 CHRONIC NASAL CONGESTION: Status: RESOLVED | Noted: 2020-02-05 | Resolved: 2024-01-18

## 2024-01-18 PROBLEM — R06.02 SHORTNESS OF BREATH: Status: RESOLVED | Noted: 2019-03-15 | Resolved: 2024-01-18

## 2024-01-18 PROBLEM — R93.1 ABNORMAL ECHOCARDIOGRAM: Status: RESOLVED | Noted: 2018-08-14 | Resolved: 2024-01-18

## 2024-01-31 DIAGNOSIS — N39.0 RECURRENT UTI: ICD-10-CM

## 2024-01-31 LAB
BILIRUB UR QL STRIP.AUTO: ABNORMAL
CHARACTER UR: NORMAL
CLARITY UR: CLEAR
COLOR UR: ABNORMAL
GLUCOSE UR STRIP.AUTO-MCNC: ABNORMAL MG/DL
HGB UR QL STRIP.AUTO: ABNORMAL
KETONES UR STRIP.AUTO-MCNC: ABNORMAL MG/DL
LEUKOCYTE ESTERASE UR QL STRIP.AUTO: ABNORMAL
NITRITE UR QL STRIP.AUTO: ABNORMAL
PH UR STRIP.AUTO: ABNORMAL [PH] (ref 5–8)
PROT UR STRIP.AUTO-MCNC: ABNORMAL MG/DL
RBC #/AREA URNS HPF: NORMAL /HPF (ref 0–4)
SP GR UR STRIP.AUTO: ABNORMAL (ref 1–1.03)
UA COMPLETE W REFLEX CULTURE PNL UR: ABNORMAL
UA DIPSTICK W REFLEX MICRO PNL UR: YES
URN SPEC COLLECT METH UR: ABNORMAL
UROBILINOGEN UR STRIP-ACNC: ABNORMAL E.U./DL
WBC #/AREA URNS HPF: NORMAL /HPF (ref 0–5)

## 2024-02-01 ENCOUNTER — HOSPITAL ENCOUNTER (OUTPATIENT)
Dept: ULTRASOUND IMAGING | Age: 74
Discharge: HOME | End: 2024-02-01
Payer: MEDICARE

## 2024-02-01 DIAGNOSIS — I10 ESSENTIAL (PRIMARY) HYPERTENSION: ICD-10-CM

## 2024-02-01 PROCEDURE — 76770 US EXAM ABDO BACK WALL COMP: CPT

## 2024-02-05 ENCOUNTER — TELEPHONE (OUTPATIENT)
Dept: INTERNAL MEDICINE CLINIC | Age: 74
End: 2024-02-05

## 2024-02-05 ENCOUNTER — TELEPHONE (OUTPATIENT)
Dept: CARE COORDINATION | Age: 74
End: 2024-02-05

## 2024-02-05 DIAGNOSIS — N39.0 RECURRENT UTI: Primary | Chronic | ICD-10-CM

## 2024-02-05 NOTE — TELEPHONE ENCOUNTER
Please advise.   EXAM:  CT CHEST                        PROCEDURE DATE:  03/29/2021    IMPRESSION: Small to moderate size loculated left pleural effusion.    EXAM:  DUPLEX SCAN EXT VEINS LOWER BI                        PROCEDURE DATE:  03/29/2021    IMPRESSION:  No evidence of deep venous thrombosis in either lower extremity.    EXAM:  CT BRAIN                        PROCEDURE DATE:  03/26/2021    IMPRESSION:  There is no acute intracranial hemorrhage, mass effect, midline shift or extra axial collections. Age-appropriate involutional and ischemic gliotic changes without change from 2/17/2021. EXAM:  CT CHEST                        PROCEDURE DATE:  03/29/2021    IMPRESSION: Small to moderate size loculated left pleural effusion.    EXAM:  DUPLEX SCAN EXT VEINS LOWER BI                        PROCEDURE DATE:  03/29/2021    IMPRESSION:  No evidence of deep venous thrombosis in either lower extremity.    EXAM:  CT BRAIN                        PROCEDURE DATE:  03/26/2021    IMPRESSION:  There is no acute intracranial hemorrhage, mass effect, midline shift or extra axial collections. Age-appropriate involutional and ischemic gliotic changes without change from 2/17/2021. EXAM:  CT CHEST                        PROCEDURE DATE:  03/29/2021    IMPRESSION: Small to moderate size loculated left pleural effusion.    EXAM:  DUPLEX SCAN EXT VEINS LOWER BI                        PROCEDURE DATE:  03/29/2021    IMPRESSION:  No evidence of deep venous thrombosis in either lower extremity.    EXAM:  CT BRAIN                        PROCEDURE DATE:  03/26/2021    IMPRESSION:  There is no acute intracranial hemorrhage, mass effect, midline shift or extra axial collections. Age-appropriate involutional and ischemic gliotic changes without change from 2/17/2021.

## 2024-02-05 NOTE — TELEPHONE ENCOUNTER
Incoming call received from patient requesting results of urinalysis. Please follow up with patient to discuss.     Thank you,     Adele Conde RN  Ambulatory Care Manager  [facility]  [phone]

## 2024-02-05 NOTE — TELEPHONE ENCOUNTER
Urine cx was not done d/t taking Azo. If she is still having sx, she needs to stop Azo and repeat the urine test. I have re-ordered it.     I have placed a standing order for urine with reflex to cx so she can just go to lab to leave a sample prn. Should not take Azo within 12 hours of leaving a sample.

## 2024-02-08 RX ORDER — ESCITALOPRAM OXALATE 20 MG/1
TABLET ORAL
Qty: 30 TABLET | Refills: 2 | Status: SHIPPED | OUTPATIENT
Start: 2024-02-08

## 2024-02-08 RX ORDER — ROSUVASTATIN CALCIUM 10 MG/1
TABLET, COATED ORAL
Qty: 30 TABLET | Refills: 2 | Status: SHIPPED | OUTPATIENT
Start: 2024-02-08

## 2024-02-08 RX ORDER — GABAPENTIN 600 MG/1
600 TABLET ORAL 3 TIMES DAILY
Qty: 90 TABLET | Refills: 2 | Status: SHIPPED | OUTPATIENT
Start: 2024-02-08 | End: 2024-05-08

## 2024-02-08 RX ORDER — OXYBUTYNIN CHLORIDE 15 MG/1
TABLET, EXTENDED RELEASE ORAL
Qty: 30 TABLET | Refills: 2 | Status: SHIPPED | OUTPATIENT
Start: 2024-02-08

## 2024-02-08 RX ORDER — OLMESARTAN MEDOXOMIL 20 MG/1
TABLET ORAL
Qty: 30 TABLET | Refills: 2 | Status: SHIPPED | OUTPATIENT
Start: 2024-02-08

## 2024-02-14 ENCOUNTER — TELEPHONE (OUTPATIENT)
Dept: CARE COORDINATION | Age: 74
End: 2024-02-14

## 2024-02-14 NOTE — TELEPHONE ENCOUNTER
Incoming VM from patient received. Patient would like know what her last sodium level was. Patient states she has not felt good for 4 weeks, did not specify sx. Please call patient to advise.     Thank you

## 2024-03-08 RX ORDER — CETIRIZINE HYDROCHLORIDE 10 MG/1
10 TABLET ORAL DAILY
Qty: 30 TABLET | Refills: 0 | Status: SHIPPED | OUTPATIENT
Start: 2024-03-08

## 2024-03-08 RX ORDER — ASPIRIN 81 MG
TABLET,CHEWABLE ORAL
Qty: 30 TABLET | Refills: 0 | Status: SHIPPED | OUTPATIENT
Start: 2024-03-08

## 2024-04-02 RX ORDER — MAGNESIUM 200 MG
1000 TABLET ORAL DAILY
Qty: 90 TABLET | Refills: 1 | Status: SHIPPED | OUTPATIENT
Start: 2024-04-02

## 2024-04-02 RX ORDER — ESTRADIOL 0.1 MG/G
2 CREAM VAGINAL
Qty: 42.5 G | Refills: 3 | Status: SHIPPED | OUTPATIENT
Start: 2024-04-04

## 2024-04-02 NOTE — TELEPHONE ENCOUNTER
As far as I can see, Estradiol cream has not been order since [id number].     Patient is asking for some to be sent over

## 2024-04-02 NOTE — TELEPHONE ENCOUNTER
Incoming call received from patient. Patient requesting refill of Estradiol vaginal cream sent to [facility] in [place] ( I don't see this on her medication list) and Vitamin B12. Patient advised a prescriptions for Vitamin B12 was sent to [facility] on 3/28/24, pt massiel. Please follow up with patient regarding Estradiol. Thank you.      LOV 12/11/23  NOV 4/17/24

## 2024-04-05 DIAGNOSIS — I10 ESSENTIAL HYPERTENSION: Chronic | ICD-10-CM

## 2024-04-08 RX ORDER — TRAZODONE HYDROCHLORIDE 100 MG/1
TABLET ORAL
Qty: 60 TABLET | Refills: 0 | Status: SHIPPED | OUTPATIENT
Start: 2024-04-08

## 2024-04-08 RX ORDER — ASPIRIN 81 MG
81 TABLET,CHEWABLE ORAL
Qty: 30 TABLET | Refills: 0 | Status: SHIPPED | OUTPATIENT
Start: 2024-04-08

## 2024-04-08 RX ORDER — CETIRIZINE HYDROCHLORIDE 10 MG/1
10 TABLET ORAL DAILY
Qty: 30 TABLET | Refills: 0 | Status: SHIPPED | OUTPATIENT
Start: 2024-04-08

## 2024-04-08 RX ORDER — POTASSIUM CHLORIDE 20 MEQ/1
20 TABLET, EXTENDED RELEASE ORAL 2 TIMES DAILY
Qty: 60 TABLET | Refills: 0 | Status: SHIPPED | OUTPATIENT
Start: 2024-04-08

## 2024-04-09 ENCOUNTER — OFFICE VISIT (OUTPATIENT)
Dept: NEUROLOGY | Age: 74
End: 2024-04-09
Payer: MEDICARE

## 2024-04-09 VITALS
DIASTOLIC BLOOD PRESSURE: 73 MMHG | HEART RATE: 60 BPM | BODY MASS INDEX: 38.3 KG/M2 | WEIGHT: 177 LBS | SYSTOLIC BLOOD PRESSURE: 116 MMHG

## 2024-04-09 DIAGNOSIS — G47.33 OBSTRUCTIVE SLEEP APNEA: ICD-10-CM

## 2024-04-09 DIAGNOSIS — F02.80 LATE ONSET ALZHEIMER'S DISEASE WITHOUT BEHAVIORAL DISTURBANCE (HCC): Primary | ICD-10-CM

## 2024-04-09 DIAGNOSIS — G30.1 LATE ONSET ALZHEIMER'S DEMENTIA WITH BEHAVIORAL DISTURBANCE (HCC): ICD-10-CM

## 2024-04-09 DIAGNOSIS — G30.1 LATE ONSET ALZHEIMER'S DISEASE WITHOUT BEHAVIORAL DISTURBANCE (HCC): Primary | ICD-10-CM

## 2024-04-09 DIAGNOSIS — F02.818 LATE ONSET ALZHEIMER'S DEMENTIA WITH BEHAVIORAL DISTURBANCE (HCC): ICD-10-CM

## 2024-04-09 PROCEDURE — 3074F SYST BP LT 130 MM HG: CPT

## 2024-04-09 PROCEDURE — 99213 OFFICE O/P EST LOW 20 MIN: CPT

## 2024-04-09 PROCEDURE — 3078F DIAST BP <80 MM HG: CPT

## 2024-04-09 PROCEDURE — 1123F ACP DISCUSS/DSCN MKR DOCD: CPT

## 2024-04-09 NOTE — PROGRESS NOTES
(any 2 for high and any 1 for moderate)  [] Discussed management of the case with:    [] Imaging personally reviewed and interpreted, includes:    [x] Data Review (any 3)  [] Collateral history obtained from:    [x] All available Consultant notes were reviewed  [x] All current labs were reviewed and interpreted for clinical significance   [x] Appropriate follow-up test and labs were ordered    I personally reviewed and updated social history, past medical history, medications, allergy, surgical history, and family history as documented in the patient's electronic health records.   Provided patient education regarding risk, benefits and treatment options as well as adherence to medication regimen and side effect from these medications.           Assessment:   Diagnosis Orders   1. Late onset Alzheimer's disease without behavioral disturbance (HCC)        2. Late onset Alzheimer's dementia with behavioral disturbance (HCC)        3. Obstructive sleep apnea        4. Depression, unspecified depression type            Continue galantamine 16 mg daily  Continue Namenda 10 mg twice daily  Medication side effects discussed  Continue vitamin B12 supplementation  Encourage compliance with her CPAP  Return to clinic 8 months, Dr. Mathew

## 2024-04-09 NOTE — PATIENT INSTRUCTIONS
YOU MUST CONFIRM YOUR APPOINTMENT 1 DAY PRIOR OR IT WILL BE CANCELLED!!   Our office will call you 3 times the day prior to your appointment in an attempt to confirm.  Please return our call ASAP or confirm your appt through [employer] no later than 3 pm the day before your appointment.  If we do not hear back from you by 3 pm to confirm, your appointment will be cancelled & someone will be added into that slot from our wait list.

## 2024-04-17 ENCOUNTER — OFFICE VISIT (OUTPATIENT)
Dept: INTERNAL MEDICINE CLINIC | Age: 74
End: 2024-04-17

## 2024-04-17 VITALS
OXYGEN SATURATION: 90 % | BODY MASS INDEX: 37.97 KG/M2 | SYSTOLIC BLOOD PRESSURE: 122 MMHG | HEIGHT: 57 IN | HEART RATE: 66 BPM | DIASTOLIC BLOOD PRESSURE: 70 MMHG | WEIGHT: 176 LBS

## 2024-04-17 DIAGNOSIS — I10 ESSENTIAL HYPERTENSION: Chronic | ICD-10-CM

## 2024-04-17 DIAGNOSIS — I51.89 DIASTOLIC DYSFUNCTION: ICD-10-CM

## 2024-04-17 DIAGNOSIS — G47.39 OTHER SLEEP APNEA: ICD-10-CM

## 2024-04-17 DIAGNOSIS — R73.9 HYPERGLYCEMIA: ICD-10-CM

## 2024-04-17 DIAGNOSIS — M81.0 OSTEOPOROSIS, POST-MENOPAUSAL: Chronic | ICD-10-CM

## 2024-04-17 DIAGNOSIS — F02.80 LATE ONSET ALZHEIMER'S DISEASE WITHOUT BEHAVIORAL DISTURBANCE (HCC): ICD-10-CM

## 2024-04-17 DIAGNOSIS — R53.81 PHYSICAL DECONDITIONING: ICD-10-CM

## 2024-04-17 DIAGNOSIS — Z79.899 MEDICATION MANAGEMENT: ICD-10-CM

## 2024-04-17 DIAGNOSIS — F02.818 LATE ONSET ALZHEIMER'S DEMENTIA WITH BEHAVIORAL DISTURBANCE (HCC): ICD-10-CM

## 2024-04-17 DIAGNOSIS — F33.1 MODERATE EPISODE OF RECURRENT MAJOR DEPRESSIVE DISORDER (HCC): Chronic | ICD-10-CM

## 2024-04-17 DIAGNOSIS — F51.04 CHRONIC INSOMNIA: ICD-10-CM

## 2024-04-17 DIAGNOSIS — G30.1 LATE ONSET ALZHEIMER'S DISEASE WITHOUT BEHAVIORAL DISTURBANCE (HCC): ICD-10-CM

## 2024-04-17 DIAGNOSIS — E55.9 VITAMIN D INSUFFICIENCY: Primary | ICD-10-CM

## 2024-04-17 DIAGNOSIS — E78.5 HYPERLIPIDEMIA LDL GOAL <100: ICD-10-CM

## 2024-04-17 DIAGNOSIS — G30.1 LATE ONSET ALZHEIMER'S DEMENTIA WITH BEHAVIORAL DISTURBANCE (HCC): ICD-10-CM

## 2024-04-17 DIAGNOSIS — E66.09 CLASS 1 OBESITY DUE TO EXCESS CALORIES WITH SERIOUS COMORBIDITY AND BODY MASS INDEX (BMI) OF 34.0 TO 34.9 IN ADULT: ICD-10-CM

## 2024-04-17 DIAGNOSIS — K21.9 GASTROESOPHAGEAL REFLUX DISEASE WITHOUT ESOPHAGITIS: Chronic | ICD-10-CM

## 2024-04-17 DIAGNOSIS — F41.9 ANXIETY: ICD-10-CM

## 2024-04-17 DIAGNOSIS — E55.9 VITAMIN D INSUFFICIENCY: ICD-10-CM

## 2024-04-17 DIAGNOSIS — N39.0 RECURRENT UTI: Chronic | ICD-10-CM

## 2024-04-17 LAB
25(OH)D3 SERPL-MCNC: 33.9 NG/ML
BASOPHILS # BLD: 0 K/UL (ref 0–0.2)
BASOPHILS NFR BLD: 0.4 %
BILIRUBIN, POC: ABNORMAL
BLOOD URINE, POC: ABNORMAL
CLARITY, POC: ABNORMAL
COLOR, POC: ABNORMAL
DEPRECATED RDW RBC AUTO: 12.3 % (ref 12.4–15.4)
EOSINOPHIL # BLD: 0.2 K/UL (ref 0–0.6)
EOSINOPHIL NFR BLD: 2.8 %
GLUCOSE URINE, POC: ABNORMAL
HCT VFR BLD AUTO: 36.7 % (ref 36–48)
HGB BLD-MCNC: 12.4 G/DL (ref 12–16)
KETONES, POC: ABNORMAL
LEUKOCYTE EST, POC: ABNORMAL
LYMPHOCYTES # BLD: 2.3 K/UL (ref 1–5.1)
LYMPHOCYTES NFR BLD: 39.3 %
MCH RBC QN AUTO: 32.8 PG (ref 26–34)
MCHC RBC AUTO-ENTMCNC: 33.7 G/DL (ref 31–36)
MCV RBC AUTO: 97.4 FL (ref 80–100)
MONOCYTES # BLD: 0.4 K/UL (ref 0–1.3)
MONOCYTES NFR BLD: 7.3 %
NEUTROPHILS # BLD: 2.9 K/UL (ref 1.7–7.7)
NEUTROPHILS NFR BLD: 50.2 %
NITRITE, POC: ABNORMAL
PH, POC: 6
PLATELET # BLD AUTO: 181 K/UL (ref 135–450)
PMV BLD AUTO: 8.9 FL (ref 5–10.5)
PROTEIN, POC: ABNORMAL
RBC # BLD AUTO: 3.76 M/UL (ref 4–5.2)
SPECIFIC GRAVITY, POC: 1.02
TSH SERPL DL<=0.005 MIU/L-ACNC: 1.6 UIU/ML (ref 0.27–4.2)
UROBILINOGEN, POC: ABNORMAL
WBC # BLD AUTO: 5.8 K/UL (ref 4–11)

## 2024-04-17 RX ORDER — BUSPIRONE HYDROCHLORIDE 15 MG/1
15 TABLET ORAL 3 TIMES DAILY
Qty: 90 TABLET | Refills: 5 | Status: SHIPPED | OUTPATIENT
Start: 2024-04-17

## 2024-04-17 RX ORDER — HYDROXYZINE 50 MG/1
50 TABLET, FILM COATED ORAL 4 TIMES DAILY PRN
Qty: 120 TABLET | Refills: 5 | Status: SHIPPED | OUTPATIENT
Start: 2024-04-17

## 2024-04-17 RX ORDER — POTASSIUM CHLORIDE 20 MEQ/1
20 TABLET, EXTENDED RELEASE ORAL DAILY
Qty: 30 TABLET | Refills: 5 | Status: SHIPPED | OUTPATIENT
Start: 2024-04-17

## 2024-04-17 RX ORDER — CETIRIZINE HYDROCHLORIDE 10 MG/1
10 TABLET ORAL DAILY
Qty: 30 TABLET | Refills: 5 | Status: SHIPPED | OUTPATIENT
Start: 2024-04-17

## 2024-04-17 RX ORDER — ESCITALOPRAM OXALATE 20 MG/1
20 TABLET ORAL DAILY
Qty: 30 TABLET | Refills: 5 | Status: SHIPPED | OUTPATIENT
Start: 2024-04-17

## 2024-04-17 RX ORDER — GALANTAMINE HYDROBROMIDE 16 MG/1
16 CAPSULE, EXTENDED RELEASE ORAL
Qty: 30 CAPSULE | Refills: 5 | Status: SHIPPED | OUTPATIENT
Start: 2024-04-17

## 2024-04-17 RX ORDER — MAGNESIUM 200 MG
1000 TABLET ORAL DAILY
Qty: 30 TABLET | Refills: 5 | Status: SHIPPED | OUTPATIENT
Start: 2024-04-17

## 2024-04-17 RX ORDER — ASCORBIC ACID 500 MG
500 TABLET ORAL DAILY
Qty: 30 TABLET | Refills: 5 | Status: SHIPPED | OUTPATIENT
Start: 2024-04-17

## 2024-04-17 RX ORDER — GABAPENTIN 600 MG/1
600 TABLET ORAL 3 TIMES DAILY
Qty: 90 TABLET | Refills: 2 | Status: SHIPPED | OUTPATIENT
Start: 2024-04-17 | End: 2024-07-16

## 2024-04-17 RX ORDER — OXYBUTYNIN CHLORIDE 15 MG/1
15 TABLET, EXTENDED RELEASE ORAL DAILY
Qty: 30 TABLET | Refills: 5 | Status: SHIPPED | OUTPATIENT
Start: 2024-04-17

## 2024-04-17 RX ORDER — ASPIRIN 81 MG/1
81 TABLET, CHEWABLE ORAL DAILY
Qty: 30 TABLET | Refills: 5 | Status: SHIPPED | OUTPATIENT
Start: 2024-04-17

## 2024-04-17 RX ORDER — MEMANTINE HYDROCHLORIDE 10 MG/1
10 TABLET ORAL 2 TIMES DAILY
Qty: 60 TABLET | Refills: 5 | Status: SHIPPED | OUTPATIENT
Start: 2024-04-17

## 2024-04-17 RX ORDER — TRAZODONE HYDROCHLORIDE 100 MG/1
200 TABLET ORAL NIGHTLY
Qty: 60 TABLET | Refills: 5 | Status: SHIPPED | OUTPATIENT
Start: 2024-04-17

## 2024-04-17 RX ORDER — ROSUVASTATIN CALCIUM 10 MG/1
10 TABLET, COATED ORAL NIGHTLY
Qty: 30 TABLET | Refills: 5 | Status: SHIPPED | OUTPATIENT
Start: 2024-04-17

## 2024-04-17 RX ORDER — OLMESARTAN MEDOXOMIL 20 MG/1
20 TABLET ORAL DAILY
Qty: 30 TABLET | Refills: 5 | Status: SHIPPED | OUTPATIENT
Start: 2024-04-17

## 2024-04-17 ASSESSMENT — PATIENT HEALTH QUESTIONNAIRE - PHQ9
1. LITTLE INTEREST OR PLEASURE IN DOING THINGS: NOT AT ALL
SUM OF ALL RESPONSES TO PHQ QUESTIONS 1-9: 7
SUM OF ALL RESPONSES TO PHQ QUESTIONS 1-9: 7
5. POOR APPETITE OR OVEREATING: SEVERAL DAYS
SUM OF ALL RESPONSES TO PHQ9 QUESTIONS 1 & 2: 0
4. FEELING TIRED OR HAVING LITTLE ENERGY: NEARLY EVERY DAY
SUM OF ALL RESPONSES TO PHQ QUESTIONS 1-9: 7
6. FEELING BAD ABOUT YOURSELF - OR THAT YOU ARE A FAILURE OR HAVE LET YOURSELF OR YOUR FAMILY DOWN: NOT AT ALL
8. MOVING OR SPEAKING SO SLOWLY THAT OTHER PEOPLE COULD HAVE NOTICED. OR THE OPPOSITE, BEING SO FIGETY OR RESTLESS THAT YOU HAVE BEEN MOVING AROUND A LOT MORE THAN USUAL: NOT AT ALL
3. TROUBLE FALLING OR STAYING ASLEEP: SEVERAL DAYS
2. FEELING DOWN, DEPRESSED OR HOPELESS: NOT AT ALL
SUM OF ALL RESPONSES TO PHQ QUESTIONS 1-9: 7
7. TROUBLE CONCENTRATING ON THINGS, SUCH AS READING THE NEWSPAPER OR WATCHING TELEVISION: MORE THAN HALF THE DAYS
9. THOUGHTS THAT YOU WOULD BE BETTER OFF DEAD, OR OF HURTING YOURSELF: NOT AT ALL
10. IF YOU CHECKED OFF ANY PROBLEMS, HOW DIFFICULT HAVE THESE PROBLEMS MADE IT FOR YOU TO DO YOUR WORK, TAKE CARE OF THINGS AT HOME, OR GET ALONG WITH OTHER PEOPLE: SOMEWHAT DIFFICULT

## 2024-04-17 NOTE — ASSESSMENT & PLAN NOTE
Remains on olmesartan 20 mg daily.  No longer on hydrochlorothiazide or spironolactone due to hyponatremia.  Has Lasix to take as needed but she does not use it.  Reports improved compliance with CPAP.

## 2024-04-17 NOTE — ASSESSMENT & PLAN NOTE
Patient is struggling with day-to-day tasks such as following a recipe.  She does know that she needs to look back at the steps frequently.  She is still living independently and continues to drive.  She is not getting lost.  She does not report any near miss accidents.  She has been more compliant with her CPAP. SLUMS today was 26/30.  Medications are through pill packs which helps with medication compliance.  Following with neurology.

## 2024-04-17 NOTE — ASSESSMENT & PLAN NOTE
Overall improved.  Continue Lexapro 20 mg daily and BuSpar 15 mg 3 times daily with hydroxyzine as needed.

## 2024-04-17 NOTE — ASSESSMENT & PLAN NOTE
Continue Lexapro 20 mg daily.  Does states she thinks she is affected by the the lack of sunlight over the winter.  She is feeling better with increased sunlight during the spring.  Pain also impacts her depression and her pain is worse during the colder months.

## 2024-04-17 NOTE — ASSESSMENT & PLAN NOTE
Well-controlled.  Continue olmesartan 20 mg daily.  Has furosemide 20 mg daily as needed for swelling but she has not had swelling for quite some time and does not take it.

## 2024-04-17 NOTE — ASSESSMENT & PLAN NOTE
Reports compliance with CPAP but states that she struggles with use due to congestion.  Recommend resuming nasal saline.

## 2024-04-17 NOTE — ASSESSMENT & PLAN NOTE
Continues to get her medications from [facility] pharmacy.  She did bring in her morning pill pack but not her afternoon pill pack.  Medication list has been updated to the best of my ability.  I did confirm with her that she is only to take potassium once daily and I did confirm with her which pill was her potassium pill.  Per Dr. Chen's note from February, she is to take Lasix as needed.  She thinks she has Lasix at home.  It does not appear to be in her pill packs.  She will bring all of her pill packs with her to her next appointment.  My only question was Tegretol which appears to have only been filled in 2020 and has not been filled since then.  Tegretol removed from medication list.

## 2024-04-17 NOTE — PROGRESS NOTES
April's note from February, she is to take Lasix as needed.  She thinks she has Lasix at home.  It does not appear to be in her pill packs.  She will bring all of her pill packs with her to her next appointment.  My only question was Tegretol which appears to have only been filled in 2020 and has not been filled since then.  Tegretol removed from medication list.         Relevant Orders    Vitamin B12 & Folate    Magnesium     Other Visit Diagnoses       Hyperglycemia        Relevant Orders    Hemoglobin A1C            Current Outpatient Medications   Medication Sig Dispense Refill    traZODone (DESYREL) 100 MG tablet Take 2 tablets by mouth nightly 60 tablet 5    rosuvastatin (CRESTOR) 10 MG tablet Take 1 tablet by mouth nightly 30 tablet 5    potassium chloride (KLOR-CON M) 20 MEQ extended release tablet Take 1 tablet by mouth daily 30 tablet 5    oxyBUTYnin (DITROPAN XL) 15 MG extended release tablet Take 1 tablet by mouth daily 30 tablet 5    olmesartan (BENICAR) 20 MG tablet Take 1 tablet by mouth daily 30 tablet 5    memantine (NAMENDA) 10 MG tablet Take 1 tablet by mouth 2 times daily Take 1 tablet by mouth twice daily 60 tablet 5    hydrOXYzine HCl (ATARAX) 50 MG tablet Take 1 tablet by mouth 4 times daily as needed for Anxiety 120 tablet 5    galantamine (RAZADYNE ER) 16 MG extended release capsule Take 1 capsule by mouth daily (with breakfast) 30 capsule 5    gabapentin (NEURONTIN) 600 MG tablet Take 1 tablet by mouth 3 times daily for 90 days. 90 tablet 2    escitalopram (LEXAPRO) 20 MG tablet Take 1 tablet by mouth daily 30 tablet 5    Cyanocobalamin (B-12) 1000 MCG SUBL Place 1,000 Units under the tongue daily 30 tablet 5    vitamin D (VITAMIN D3) 125 MCG (5000 UT) CAPS capsule Take 1 capsule by mouth daily 30 capsule 5    cetirizine (ZYRTEC) 10 MG tablet Take 1 tablet by mouth daily 30 tablet 5    aspirin (ASPIRIN LOW DOSE) 81 MG chewable tablet Take 1 tablet by mouth daily 30 tablet 5

## 2024-04-17 NOTE — ASSESSMENT & PLAN NOTE
Follows with Dr. Palacios for urology.  Dr. Palacios has recommended intermittent self-catheterization which she declines that she is unable to do, chronic indwelling Castro which she does not want, InterStim, and suprapubic catheter-all of which she does not want as she does not want surgery.  Patient denies recent UTI.  Her granddaughter is getting  next week and would like to leave a urine sample today which is reasonable.  Will not treat without urine culture.  She does have neurogenic bladder which drives recurrent UTIs.

## 2024-04-18 LAB
ALBUMIN SERPL-MCNC: 4.5 G/DL (ref 3.4–5)
ALBUMIN/GLOB SERPL: 2 {RATIO} (ref 1.1–2.2)
ALP SERPL-CCNC: 40 U/L (ref 40–129)
ALT SERPL-CCNC: 19 U/L (ref 10–40)
ANION GAP SERPL CALCULATED.3IONS-SCNC: 13 MMOL/L (ref 3–16)
AST SERPL-CCNC: 26 U/L (ref 15–37)
BACTERIA UR CULT: ABNORMAL
BILIRUB SERPL-MCNC: <0.2 MG/DL (ref 0–1)
BUN SERPL-MCNC: 12 MG/DL (ref 7–20)
CALCIUM SERPL-MCNC: 9.3 MG/DL (ref 8.3–10.6)
CHLORIDE SERPL-SCNC: 106 MMOL/L (ref 99–110)
CHOLEST SERPL-MCNC: 110 MG/DL (ref 0–199)
CO2 SERPL-SCNC: 24 MMOL/L (ref 21–32)
CREAT SERPL-MCNC: 0.6 MG/DL (ref 0.6–1.2)
EST. AVERAGE GLUCOSE BLD GHB EST-MCNC: 62.4 MG/DL
FOLATE SERPL-MCNC: 12.22 NG/ML (ref 4.78–24.2)
GFR SERPLBLD CREATININE-BSD FMLA CKD-EPI: >90 ML/MIN/{1.73_M2}
GLUCOSE SERPL-MCNC: 83 MG/DL (ref 70–99)
HBA1C MFR BLD: 3.8 %
HDLC SERPL-MCNC: 52 MG/DL (ref 40–60)
LDLC SERPL CALC-MCNC: 27 MG/DL
MAGNESIUM SERPL-MCNC: 2 MG/DL (ref 1.8–2.4)
ORGANISM: ABNORMAL
POTASSIUM SERPL-SCNC: 4.2 MMOL/L (ref 3.5–5.1)
PROT SERPL-MCNC: 6.8 G/DL (ref 6.4–8.2)
SODIUM SERPL-SCNC: 143 MMOL/L (ref 136–145)
TRIGL SERPL-MCNC: 156 MG/DL (ref 0–150)
VIT B12 SERPL-MCNC: 960 PG/ML (ref 211–911)
VLDLC SERPL CALC-MCNC: 31 MG/DL

## 2024-05-01 DIAGNOSIS — N39.0 RECURRENT UTI: Chronic | ICD-10-CM

## 2024-05-01 LAB
BACTERIA URNS QL MICRO: ABNORMAL /HPF
BILIRUB UR QL STRIP.AUTO: NEGATIVE
CHARACTER UR: ABNORMAL
CLARITY UR: CLEAR
COLOR UR: ABNORMAL
EPI CELLS #/AREA URNS AUTO: 1 /HPF (ref 0–5)
GLUCOSE UR STRIP.AUTO-MCNC: NEGATIVE MG/DL
HGB UR QL STRIP.AUTO: NEGATIVE
HYALINE CASTS #/AREA URNS AUTO: 0 /LPF (ref 0–8)
KETONES UR STRIP.AUTO-MCNC: NEGATIVE MG/DL
LEUKOCYTE ESTERASE UR QL STRIP.AUTO: NEGATIVE
NITRITE UR QL STRIP.AUTO: POSITIVE
PH UR STRIP.AUTO: 6.5 [PH] (ref 5–8)
PROT UR STRIP.AUTO-MCNC: NEGATIVE MG/DL
RBC CLUMPS #/AREA URNS AUTO: 1 /HPF (ref 0–4)
SP GR UR STRIP.AUTO: 1.01 (ref 1–1.03)
UA COMPLETE W REFLEX CULTURE PNL UR: ABNORMAL
UA DIPSTICK W REFLEX MICRO PNL UR: YES
URN SPEC COLLECT METH UR: ABNORMAL
UROBILINOGEN UR STRIP-ACNC: 1 E.U./DL
WBC #/AREA URNS AUTO: 0 /HPF (ref 0–5)

## 2024-05-03 ENCOUNTER — TELEPHONE (OUTPATIENT)
Dept: INTERNAL MEDICINE CLINIC | Age: 74
End: 2024-05-03

## 2024-05-03 DIAGNOSIS — R35.0 URINARY FREQUENCY: Primary | ICD-10-CM

## 2024-05-03 RX ORDER — AMOXICILLIN 875 MG/1
875 TABLET, COATED ORAL 2 TIMES DAILY
Qty: 14 TABLET | Refills: 0 | Status: SHIPPED | OUTPATIENT
Start: 2024-05-03 | End: 2024-05-10

## 2024-05-03 NOTE — TELEPHONE ENCOUNTER
Verbal for PCP  over the phone Okay to send in Rx for patient and patient will come in to complete culture

## 2024-05-03 NOTE — TELEPHONE ENCOUNTER
Pt is calling to get her urine results from yesterday. They are in epic just not reviewed. Pt is still feeling bad and wanted to know what the results are. Please advise.

## 2024-05-03 NOTE — TELEPHONE ENCOUNTER
Urine results not indicative of UTI therefore urine culture was not completed.  No need for antibiotic therapy at this point

## 2024-05-05 LAB — BACTERIA UR CULT: NORMAL

## 2024-05-08 ENCOUNTER — HOSPITAL ENCOUNTER (OUTPATIENT)
Dept: ULTRASOUND IMAGING | Age: 74
Discharge: HOME | End: 2024-05-08
Attending: UROLOGY
Payer: MEDICARE

## 2024-05-08 DIAGNOSIS — N30.21 OTHER CHRONIC CYSTITIS WITH HEMATURIA: ICD-10-CM

## 2024-05-08 PROCEDURE — 76770 US EXAM ABDO BACK WALL COMP: CPT

## 2024-05-10 ENCOUNTER — TELEPHONE (OUTPATIENT)
Dept: CARE COORDINATION | Age: 74
End: 2024-05-10

## 2024-05-10 ENCOUNTER — TELEPHONE (OUTPATIENT)
Dept: ADMINISTRATIVE | Age: 74
End: 2024-05-10

## 2024-05-10 NOTE — TELEPHONE ENCOUNTER
Patient would like you to know she was seen by Dr. Palacios and was dx with a UTI. Patient states he started her on Macrobid and urine is out for culture.     Patient also requesting a refill on Baclofen.   Last refill 7/14/23 60 tab x2 refills

## 2024-05-10 NOTE — TELEPHONE ENCOUNTER
The medication is APPROVED thru 05/09/2025    If this requires a response please respond to the pool ( P [facility]X [facility] MEDICATION PRE-AUTH).      Thank you please advise patient.

## 2024-05-10 NOTE — TELEPHONE ENCOUNTER
Submitted PA for hydrOXYzine HCl 50MG tablets   Via CM  (Key: [id number]) STATUS: PENDING.    Follow up done daily; if no decision with in three days we will refax.  If another three days goes by with no decision will call the insurance for status.

## 2024-05-14 ENCOUNTER — TELEPHONE (OUTPATIENT)
Dept: INTERNAL MEDICINE CLINIC | Age: 74
End: 2024-05-14

## 2024-05-14 RX ORDER — BACLOFEN 10 MG/1
10 TABLET ORAL 3 TIMES DAILY PRN
Qty: 60 TABLET | Refills: 2 | Status: SHIPPED | OUTPATIENT
Start: 2024-05-14

## 2024-05-15 ENCOUNTER — HOSPITAL ENCOUNTER (OUTPATIENT)
Age: 74
Discharge: HOME | End: 2024-05-15
Payer: MEDICARE

## 2024-05-15 DIAGNOSIS — N18.2 HYPERTENSIVE KIDNEY DISEASE WITH CKD (CHRONIC KIDNEY DISEASE) STAGE II: ICD-10-CM

## 2024-05-15 DIAGNOSIS — I10 ESSENTIAL HYPERTENSION: Chronic | ICD-10-CM

## 2024-05-15 DIAGNOSIS — E87.1 HYPONATREMIA: ICD-10-CM

## 2024-05-15 DIAGNOSIS — N18.9 ACUTE KIDNEY INJURY SUPERIMPOSED ON CHRONIC KIDNEY DISEASE (HCC): ICD-10-CM

## 2024-05-15 DIAGNOSIS — I12.9 HYPERTENSIVE KIDNEY DISEASE WITH CKD (CHRONIC KIDNEY DISEASE) STAGE II: ICD-10-CM

## 2024-05-15 DIAGNOSIS — N17.9 ACUTE KIDNEY INJURY SUPERIMPOSED ON CHRONIC KIDNEY DISEASE (HCC): ICD-10-CM

## 2024-05-15 DIAGNOSIS — D64.9 ANEMIA, UNSPECIFIED TYPE: ICD-10-CM

## 2024-05-15 DIAGNOSIS — E66.9 OBESITY (BMI 30-39.9): ICD-10-CM

## 2024-05-15 LAB
ALBUMIN SERPL-MCNC: 4.2 G/DL (ref 3.4–5)
ANION GAP SERPL CALCULATED.3IONS-SCNC: 13 MMOL/L (ref 3–16)
BACTERIA URNS QL MICRO: ABNORMAL /HPF
BILIRUB UR QL STRIP.AUTO: NEGATIVE
BUN SERPL-MCNC: 13 MG/DL (ref 7–20)
CALCIUM SERPL-MCNC: 9.3 MG/DL (ref 8.3–10.6)
CHLORIDE SERPL-SCNC: 103 MMOL/L (ref 99–110)
CLARITY UR: ABNORMAL
CO2 SERPL-SCNC: 23 MMOL/L (ref 21–32)
COLOR UR: YELLOW
CREAT SERPL-MCNC: 0.6 MG/DL (ref 0.6–1.2)
CREAT UR-MCNC: 64.6 MG/DL (ref 28–259)
DEPRECATED RDW RBC AUTO: 12.8 % (ref 12.4–15.4)
EPI CELLS #/AREA URNS AUTO: 2 /HPF (ref 0–5)
GFR SERPLBLD CREATININE-BSD FMLA CKD-EPI: >90 ML/MIN/{1.73_M2}
GLUCOSE SERPL-MCNC: 107 MG/DL (ref 70–99)
GLUCOSE UR STRIP.AUTO-MCNC: NEGATIVE MG/DL
HCT VFR BLD AUTO: 37.5 % (ref 36–48)
HGB BLD-MCNC: 12.6 G/DL (ref 12–16)
HGB UR QL STRIP.AUTO: ABNORMAL
HYALINE CASTS #/AREA URNS AUTO: 0 /LPF (ref 0–8)
KETONES UR STRIP.AUTO-MCNC: NEGATIVE MG/DL
LEUKOCYTE ESTERASE UR QL STRIP.AUTO: ABNORMAL
MCH RBC QN AUTO: 31.9 PG (ref 26–34)
MCHC RBC AUTO-ENTMCNC: 33.5 G/DL (ref 31–36)
MCV RBC AUTO: 95.2 FL (ref 80–100)
NITRITE UR QL STRIP.AUTO: POSITIVE
PH UR STRIP.AUTO: 6 [PH] (ref 5–8)
PHOSPHATE SERPL-MCNC: 3.5 MG/DL (ref 2.5–4.9)
PLATELET # BLD AUTO: 196 K/UL (ref 135–450)
PMV BLD AUTO: 8.7 FL (ref 5–10.5)
POTASSIUM SERPL-SCNC: 4.3 MMOL/L (ref 3.5–5.1)
PROT UR STRIP.AUTO-MCNC: NEGATIVE MG/DL
PROT UR-MCNC: 13 MG/DL
PROT/CREAT UR-RTO: 0.2 MG/DL
RBC # BLD AUTO: 3.94 M/UL (ref 4–5.2)
RBC CLUMPS #/AREA URNS AUTO: 5 /HPF (ref 0–4)
SODIUM SERPL-SCNC: 139 MMOL/L (ref 136–145)
SP GR UR STRIP.AUTO: 1.01 (ref 1–1.03)
UA COMPLETE W REFLEX CULTURE PNL UR: YES
UA DIPSTICK W REFLEX MICRO PNL UR: YES
URN SPEC COLLECT METH UR: ABNORMAL
UROBILINOGEN UR STRIP-ACNC: 1 E.U./DL
WBC # BLD AUTO: 5.9 K/UL (ref 4–11)
WBC #/AREA URNS AUTO: 649 /HPF (ref 0–5)

## 2024-05-15 PROCEDURE — 85027 COMPLETE CBC AUTOMATED: CPT

## 2024-05-15 PROCEDURE — 87077 CULTURE AEROBIC IDENTIFY: CPT

## 2024-05-15 PROCEDURE — 81001 URINALYSIS AUTO W/SCOPE: CPT

## 2024-05-15 PROCEDURE — 82570 ASSAY OF URINE CREATININE: CPT

## 2024-05-15 PROCEDURE — 80069 RENAL FUNCTION PANEL: CPT

## 2024-05-15 PROCEDURE — 36415 COLL VENOUS BLD VENIPUNCTURE: CPT

## 2024-05-15 PROCEDURE — 84156 ASSAY OF PROTEIN URINE: CPT

## 2024-05-15 PROCEDURE — 87186 SC STD MICRODIL/AGAR DIL: CPT

## 2024-05-15 PROCEDURE — 87086 URINE CULTURE/COLONY COUNT: CPT

## 2024-05-16 LAB
BACTERIA UR CULT: ABNORMAL
ORGANISM: ABNORMAL

## 2024-06-03 ENCOUNTER — HOSPITAL ENCOUNTER (OUTPATIENT)
Dept: GENERAL RADIOLOGY | Age: 74
Discharge: HOME | End: 2024-06-03
Attending: INTERNAL MEDICINE
Payer: MEDICARE

## 2024-06-03 DIAGNOSIS — M81.0 OSTEOPOROSIS, POST-MENOPAUSAL: Chronic | ICD-10-CM

## 2024-06-03 PROCEDURE — 77080 DXA BONE DENSITY AXIAL: CPT

## 2024-06-28 ENCOUNTER — HOSPITAL ENCOUNTER (OUTPATIENT)
Age: 74
Discharge: HOME | End: 2024-06-28
Attending: UROLOGY
Payer: MEDICARE

## 2024-06-28 ENCOUNTER — HOSPITAL ENCOUNTER (OUTPATIENT)
Dept: CT IMAGING | Age: 74
Discharge: HOME | End: 2024-06-28
Attending: UROLOGY
Payer: MEDICARE

## 2024-06-28 DIAGNOSIS — N30.01 ACUTE CYSTITIS WITH HEMATURIA: ICD-10-CM

## 2024-06-28 DIAGNOSIS — N30.21 OTHER CHRONIC CYSTITIS WITH HEMATURIA: ICD-10-CM

## 2024-06-28 DIAGNOSIS — N34.3 URETHRAL SYNDROME: ICD-10-CM

## 2024-06-28 DIAGNOSIS — N36.2 URETHRAL CARUNCLE: ICD-10-CM

## 2024-06-28 DIAGNOSIS — N31.9 NEUROMUSCULAR DYSFUNCTION OF BLADDER, UNSPECIFIED: ICD-10-CM

## 2024-06-28 DIAGNOSIS — R33.9 RETENTION OF URINE, UNSPECIFIED: ICD-10-CM

## 2024-06-28 DIAGNOSIS — R39.14 FEELING OF INCOMPLETE BLADDER EMPTYING: ICD-10-CM

## 2024-06-28 PROCEDURE — 74176 CT ABD & PELVIS W/O CONTRAST: CPT

## 2024-07-23 ENCOUNTER — OFFICE VISIT (OUTPATIENT)
Dept: INTERNAL MEDICINE CLINIC | Age: 74
End: 2024-07-23

## 2024-07-23 VITALS
WEIGHT: 175.4 LBS | DIASTOLIC BLOOD PRESSURE: 70 MMHG | SYSTOLIC BLOOD PRESSURE: 114 MMHG | BODY MASS INDEX: 37.96 KG/M2 | HEART RATE: 62 BPM | OXYGEN SATURATION: 96 %

## 2024-07-23 VITALS
SYSTOLIC BLOOD PRESSURE: 114 MMHG | BODY MASS INDEX: 37.96 KG/M2 | OXYGEN SATURATION: 96 % | HEART RATE: 62 BPM | DIASTOLIC BLOOD PRESSURE: 70 MMHG | WEIGHT: 175.4 LBS

## 2024-07-23 DIAGNOSIS — N39.0 RECURRENT UTI: Primary | ICD-10-CM

## 2024-07-23 DIAGNOSIS — R73.9 HYPERGLYCEMIA: ICD-10-CM

## 2024-07-23 DIAGNOSIS — E66.01 SEVERE OBESITY (BMI 35.0-39.9) WITH COMORBIDITY (HCC): ICD-10-CM

## 2024-07-23 DIAGNOSIS — E55.9 VITAMIN D INSUFFICIENCY: ICD-10-CM

## 2024-07-23 DIAGNOSIS — I10 ESSENTIAL HYPERTENSION: Chronic | ICD-10-CM

## 2024-07-23 DIAGNOSIS — G30.1 LATE ONSET ALZHEIMER'S DISEASE WITHOUT BEHAVIORAL DISTURBANCE (HCC): ICD-10-CM

## 2024-07-23 DIAGNOSIS — M51.36 DEGENERATIVE DISC DISEASE, LUMBAR: ICD-10-CM

## 2024-07-23 DIAGNOSIS — F02.80 LATE ONSET ALZHEIMER'S DISEASE WITHOUT BEHAVIORAL DISTURBANCE (HCC): ICD-10-CM

## 2024-07-23 DIAGNOSIS — Z00.00 MEDICARE ANNUAL WELLNESS VISIT, SUBSEQUENT: ICD-10-CM

## 2024-07-23 DIAGNOSIS — F33.1 MODERATE EPISODE OF RECURRENT MAJOR DEPRESSIVE DISORDER (HCC): Chronic | ICD-10-CM

## 2024-07-23 DIAGNOSIS — E78.5 HYPERLIPIDEMIA LDL GOAL <100: ICD-10-CM

## 2024-07-23 DIAGNOSIS — J30.0 CHRONIC VASOMOTOR RHINITIS: Chronic | ICD-10-CM

## 2024-07-23 DIAGNOSIS — Z12.31 ENCOUNTER FOR SCREENING MAMMOGRAM FOR MALIGNANT NEOPLASM OF BREAST: Primary | ICD-10-CM

## 2024-07-23 DIAGNOSIS — K59.03 DRUG INDUCED CONSTIPATION: ICD-10-CM

## 2024-07-23 LAB
BACTERIA URNS QL MICRO: ABNORMAL /HPF
BILIRUB UR QL STRIP.AUTO: NEGATIVE
CLARITY UR: CLEAR
COLOR UR: YELLOW
EPI CELLS #/AREA URNS AUTO: 2 /HPF (ref 0–5)
GLUCOSE UR STRIP.AUTO-MCNC: NEGATIVE MG/DL
HGB UR QL STRIP.AUTO: NEGATIVE
HYALINE CASTS #/AREA URNS AUTO: 0 /LPF (ref 0–8)
KETONES UR STRIP.AUTO-MCNC: NEGATIVE MG/DL
LEUKOCYTE ESTERASE UR QL STRIP.AUTO: ABNORMAL
NITRITE UR QL STRIP.AUTO: NEGATIVE
PH UR STRIP.AUTO: 6.5 [PH] (ref 5–8)
PROT UR STRIP.AUTO-MCNC: NEGATIVE MG/DL
RBC CLUMPS #/AREA URNS AUTO: 1 /HPF (ref 0–4)
SP GR UR STRIP.AUTO: 1.01 (ref 1–1.03)
UA COMPLETE W REFLEX CULTURE PNL UR: YES
UA DIPSTICK W REFLEX MICRO PNL UR: YES
URN SPEC COLLECT METH UR: ABNORMAL
UROBILINOGEN UR STRIP-ACNC: 1 E.U./DL
WBC #/AREA URNS AUTO: 31 /HPF (ref 0–5)

## 2024-07-23 RX ORDER — FLUTICASONE PROPIONATE 50 MCG
2 SPRAY, SUSPENSION (ML) NASAL DAILY
Qty: 1 EACH | Refills: 5 | Status: SHIPPED | OUTPATIENT
Start: 2024-07-23

## 2024-07-23 RX ORDER — MORPHINE SULFATE 15 MG/1
15 TABLET ORAL EVERY 4 HOURS PRN
COMMUNITY

## 2024-07-23 RX ORDER — BETHANECHOL CHLORIDE 25 MG/1
25 TABLET ORAL 3 TIMES DAILY
COMMUNITY

## 2024-07-23 NOTE — PROGRESS NOTES
Cephalosporins      Other reaction(s): Unknown    Estradiol      Other reaction(s): Not available    Sulfa Antibiotics Swelling    Keflex [Cephalexin] Other (See Comments)     Leg cramps     Prior to Visit Medications    Medication Sig Taking? Authorizing Provider   baclofen (LIORESAL) 10 MG tablet Take 1 tablet by mouth 3 times daily as needed (pain) Yes Yumiko Govea DO   traZODone (DESYREL) 100 MG tablet Take 2 tablets by mouth nightly Yes Yumiko Govea DO   rosuvastatin (CRESTOR) 10 MG tablet Take 1 tablet by mouth nightly Yes Yumiko Govea DO   potassium chloride (KLOR-CON M) 20 MEQ extended release tablet Take 1 tablet by mouth daily Yes Yumiko Govea DO   oxyBUTYnin (DITROPAN XL) 15 MG extended release tablet Take 1 tablet by mouth daily Yes Yumiko Govea DO   olmesartan (BENICAR) 20 MG tablet Take 1 tablet by mouth daily Yes Yumiko Govea DO   memantine (NAMENDA) 10 MG tablet Take 1 tablet by mouth 2 times daily Take 1 tablet by mouth twice daily Yes Yumiko Govea DO   hydrOXYzine HCl (ATARAX) 50 MG tablet Take 1 tablet by mouth 4 times daily as needed for Anxiety Yes Yumiko Govea DO   galantamine (RAZADYNE ER) 16 MG extended release capsule Take 1 capsule by mouth daily (with breakfast) Yes Yumiko Govea DO   escitalopram (LEXAPRO) 20 MG tablet Take 1 tablet by mouth daily Yes Yumiko Govea DO   Cyanocobalamin (B-12) 1000 MCG SUBL Place 1,000 Units under the tongue daily Yes Yumiko Govea DO   vitamin D (VITAMIN D3) 125 MCG (5000 UT) CAPS capsule Take 1 capsule by mouth daily Yes Yumiko Govea DO   cetirizine (ZYRTEC) 10 MG tablet Take 1 tablet by mouth daily Yes Yumiko Govea DO   aspirin (ASPIRIN LOW DOSE) 81 MG chewable tablet Take 1 tablet by mouth daily Yes Yumiko Govea DO   busPIRone (BUSPAR) 15 MG tablet Take 15 mg by mouth 3 times daily Yes Yumiko Govea DO   vitamin C (ASCORBIC ACID) 500 MG tablet Take 1 
no

## 2024-07-23 NOTE — PATIENT INSTRUCTIONS
If you need to take ibuprofen, please take it. Try not to take it 3 times every day.     Mix 1 teaspoon of Benefiber with 1/2-1 capful of Miralax in 1 large glass of water daily.   - start with 1/2 capful of Miralax but if you do not have good, full BM's within 2 weeks, increase to 1 capful daily.   - if your bowels start to move better, that will help to reduce your gas.

## 2024-07-23 NOTE — PROGRESS NOTES
tablet by mouth nightly 30 tablet 5    potassium chloride (KLOR-CON M) 20 MEQ extended release tablet Take 1 tablet by mouth daily 30 tablet 5    oxyBUTYnin (DITROPAN XL) 15 MG extended release tablet Take 1 tablet by mouth daily 30 tablet 5    olmesartan (BENICAR) 20 MG tablet Take 1 tablet by mouth daily 30 tablet 5    memantine (NAMENDA) 10 MG tablet Take 1 tablet by mouth 2 times daily Take 1 tablet by mouth twice daily 60 tablet 5    hydrOXYzine HCl (ATARAX) 50 MG tablet Take 1 tablet by mouth 4 times daily as needed for Anxiety 120 tablet 5    galantamine (RAZADYNE ER) 16 MG extended release capsule Take 1 capsule by mouth daily (with breakfast) 30 capsule 5    escitalopram (LEXAPRO) 20 MG tablet Take 1 tablet by mouth daily 30 tablet 5    Cyanocobalamin (B-12) 1000 MCG SUBL Place 1,000 Units under the tongue daily 30 tablet 5    vitamin D (VITAMIN D3) 125 MCG (5000 UT) CAPS capsule Take 1 capsule by mouth daily 30 capsule 5    cetirizine (ZYRTEC) 10 MG tablet Take 1 tablet by mouth daily 30 tablet 5    aspirin (ASPIRIN LOW DOSE) 81 MG chewable tablet Take 1 tablet by mouth daily 30 tablet 5    busPIRone (BUSPAR) 15 MG tablet Take 15 mg by mouth 3 times daily 90 tablet 5    vitamin C (ASCORBIC ACID) 500 MG tablet Take 1 tablet by mouth daily 30 tablet 5    estradiol (ESTRACE VAGINAL) 0.1 MG/GM vaginal cream Place 2 g vaginally Twice a Week 42.5 g 3    albuterol sulfate HFA (PROVENTIL;VENTOLIN;PROAIR) 108 (90 Base) MCG/ACT inhaler INHALE 2 PUFFS INTO THE LUNGS 4 TIMES DAILY AS NEEDED FOR WHEEZING. 8.5 each 2    Ciclopirox 1 % SHAM Use twice weekly 120 mL 1    morphine (MSIR) 15 MG tablet Take 1 tablet by mouth in the morning and at bedtime.      Wheat Dextrin (BENEFIBER) POWD Take 4 g by mouth daily      blood glucose monitor strips Check sugars qam and prn s/s of low blood sugars 50 strip 5    Lancets MISC Check sugars qam and prn s/s of low blood sugars 50 each 5    blood glucose monitor kit and supplies

## 2024-07-24 LAB
BACTERIA UR CULT: ABNORMAL
ORGANISM: ABNORMAL

## 2024-07-28 PROBLEM — E66.811 CLASS 1 OBESITY DUE TO EXCESS CALORIES WITH SERIOUS COMORBIDITY AND BODY MASS INDEX (BMI) OF 34.0 TO 34.9 IN ADULT: Status: RESOLVED | Noted: 2019-03-15 | Resolved: 2024-07-28

## 2024-07-28 PROBLEM — R73.9 HYPERGLYCEMIA: Status: ACTIVE | Noted: 2024-07-28

## 2024-07-28 PROBLEM — E66.09 CLASS 1 OBESITY DUE TO EXCESS CALORIES WITH SERIOUS COMORBIDITY AND BODY MASS INDEX (BMI) OF 34.0 TO 34.9 IN ADULT: Status: RESOLVED | Noted: 2019-03-15 | Resolved: 2024-07-28

## 2024-07-28 RX ORDER — AMOXICILLIN 875 MG/1
875 TABLET, COATED ORAL 2 TIMES DAILY
Qty: 20 TABLET | Refills: 0 | Status: SHIPPED | OUTPATIENT
Start: 2024-07-28 | End: 2024-08-07

## 2024-08-06 RX ORDER — GABAPENTIN 600 MG/1
600 TABLET ORAL 3 TIMES DAILY
Qty: 90 TABLET | Refills: 0 | Status: SHIPPED | OUTPATIENT
Start: 2024-08-06 | End: 2024-09-05

## 2024-09-06 ENCOUNTER — TELEPHONE (OUTPATIENT)
Dept: INTERNAL MEDICINE CLINIC | Age: 74
End: 2024-09-06

## 2024-09-06 DIAGNOSIS — R26.81 UNSTABLE GAIT: Primary | ICD-10-CM

## 2024-09-06 RX ORDER — GABAPENTIN 600 MG/1
600 TABLET ORAL 3 TIMES DAILY
Qty: 90 TABLET | Refills: 5 | Status: SHIPPED | OUTPATIENT
Start: 2024-09-06 | End: 2025-03-05

## 2024-10-03 DIAGNOSIS — G30.1 LATE ONSET ALZHEIMER'S DISEASE WITHOUT BEHAVIORAL DISTURBANCE (HCC): ICD-10-CM

## 2024-10-03 DIAGNOSIS — F02.80 LATE ONSET ALZHEIMER'S DISEASE WITHOUT BEHAVIORAL DISTURBANCE (HCC): ICD-10-CM

## 2024-10-04 DIAGNOSIS — F02.80 LATE ONSET ALZHEIMER'S DISEASE WITHOUT BEHAVIORAL DISTURBANCE (HCC): ICD-10-CM

## 2024-10-04 DIAGNOSIS — G30.1 LATE ONSET ALZHEIMER'S DISEASE WITHOUT BEHAVIORAL DISTURBANCE (HCC): ICD-10-CM

## 2024-10-04 RX ORDER — MEMANTINE HYDROCHLORIDE 10 MG/1
10 TABLET ORAL 2 TIMES DAILY
Qty: 60 TABLET | Refills: 10 | Status: SHIPPED | OUTPATIENT
Start: 2024-10-04

## 2024-10-04 RX ORDER — GALANTAMINE HYDROBROMIDE 16 MG/1
CAPSULE, EXTENDED RELEASE ORAL
Qty: 30 CAPSULE | Refills: 10 | Status: SHIPPED | OUTPATIENT
Start: 2024-10-04

## 2024-10-29 ENCOUNTER — HOSPITAL ENCOUNTER (OUTPATIENT)
Age: 74
Discharge: HOME | End: 2024-10-29
Attending: UROLOGY
Payer: MEDICARE

## 2024-10-29 ENCOUNTER — HOSPITAL ENCOUNTER (OUTPATIENT)
Dept: ULTRASOUND IMAGING | Age: 74
Discharge: HOME | End: 2024-10-29
Attending: UROLOGY
Payer: MEDICARE

## 2024-10-29 DIAGNOSIS — N34.3 URETHRAL SYNDROME: ICD-10-CM

## 2024-10-29 DIAGNOSIS — N36.2 URETHRAL CARUNCLE: ICD-10-CM

## 2024-10-29 DIAGNOSIS — E55.9 VITAMIN D INSUFFICIENCY: ICD-10-CM

## 2024-10-29 DIAGNOSIS — R39.14 FEELING OF INCOMPLETE BLADDER EMPTYING: ICD-10-CM

## 2024-10-29 DIAGNOSIS — E78.5 HYPERLIPIDEMIA LDL GOAL <100: ICD-10-CM

## 2024-10-29 DIAGNOSIS — R73.9 HYPERGLYCEMIA: ICD-10-CM

## 2024-10-29 DIAGNOSIS — R33.9 RETENTION OF URINE, UNSPECIFIED: ICD-10-CM

## 2024-10-29 DIAGNOSIS — N31.9 NEUROMUSCULAR DYSFUNCTION OF BLADDER, UNSPECIFIED: ICD-10-CM

## 2024-10-29 DIAGNOSIS — I10 ESSENTIAL HYPERTENSION: Chronic | ICD-10-CM

## 2024-10-29 DIAGNOSIS — N30.21 OTHER CHRONIC CYSTITIS WITH HEMATURIA: ICD-10-CM

## 2024-10-29 DIAGNOSIS — I10 ESSENTIAL (PRIMARY) HYPERTENSION: ICD-10-CM

## 2024-10-29 DIAGNOSIS — N30.01 ACUTE CYSTITIS WITH HEMATURIA: ICD-10-CM

## 2024-10-29 LAB
25(OH)D3 SERPL-MCNC: 42.3 NG/ML
ALBUMIN SERPL-MCNC: 4.1 G/DL (ref 3.4–5)
ALBUMIN/GLOB SERPL: 1.5 {RATIO} (ref 1.1–2.2)
ALP SERPL-CCNC: 44 U/L (ref 40–129)
ALT SERPL-CCNC: 22 U/L (ref 10–40)
ANION GAP SERPL CALCULATED.3IONS-SCNC: 9 MMOL/L (ref 3–16)
AST SERPL-CCNC: 32 U/L (ref 15–37)
BACTERIA URNS QL MICRO: ABNORMAL /HPF
BASOPHILS # BLD: 0 K/UL (ref 0–0.2)
BASOPHILS NFR BLD: 0.4 %
BILIRUB SERPL-MCNC: 0.4 MG/DL (ref 0–1)
BILIRUB UR QL STRIP.AUTO: NEGATIVE
BUN SERPL-MCNC: 12 MG/DL (ref 7–20)
CALCIUM SERPL-MCNC: 9 MG/DL (ref 8.3–10.6)
CHLORIDE SERPL-SCNC: 100 MMOL/L (ref 99–110)
CHOLEST SERPL-MCNC: 109 MG/DL (ref 0–199)
CLARITY UR: CLEAR
CO2 SERPL-SCNC: 26 MMOL/L (ref 21–32)
COLOR UR: YELLOW
CREAT SERPL-MCNC: 0.7 MG/DL (ref 0.6–1.2)
DEPRECATED RDW RBC AUTO: 12.9 % (ref 12.4–15.4)
EOSINOPHIL # BLD: 0.2 K/UL (ref 0–0.6)
EOSINOPHIL NFR BLD: 2.5 %
EPI CELLS #/AREA URNS AUTO: 4 /HPF (ref 0–5)
GFR SERPLBLD CREATININE-BSD FMLA CKD-EPI: >90 ML/MIN/{1.73_M2}
GLUCOSE SERPL-MCNC: 85 MG/DL (ref 70–99)
GLUCOSE UR STRIP.AUTO-MCNC: NEGATIVE MG/DL
HCT VFR BLD AUTO: 39.3 % (ref 36–48)
HDLC SERPL-MCNC: 46 MG/DL (ref 40–60)
HGB BLD-MCNC: 13.6 G/DL (ref 12–16)
HGB UR QL STRIP.AUTO: NEGATIVE
HYALINE CASTS #/AREA URNS AUTO: 0 /LPF (ref 0–8)
KETONES UR STRIP.AUTO-MCNC: NEGATIVE MG/DL
LDLC SERPL CALC-MCNC: 25 MG/DL
LEUKOCYTE ESTERASE UR QL STRIP.AUTO: ABNORMAL
LYMPHOCYTES # BLD: 2.2 K/UL (ref 1–5.1)
LYMPHOCYTES NFR BLD: 31.2 %
MAGNESIUM SERPL-MCNC: 1.95 MG/DL (ref 1.8–2.4)
MCH RBC QN AUTO: 32 PG (ref 26–34)
MCHC RBC AUTO-ENTMCNC: 34.7 G/DL (ref 31–36)
MCV RBC AUTO: 92.5 FL (ref 80–100)
MONOCYTES # BLD: 0.5 K/UL (ref 0–1.3)
MONOCYTES NFR BLD: 7 %
NEUTROPHILS # BLD: 4.2 K/UL (ref 1.7–7.7)
NEUTROPHILS NFR BLD: 58.9 %
NITRITE UR QL STRIP.AUTO: NEGATIVE
PH UR STRIP.AUTO: 6 [PH] (ref 5–8)
PHOSPHATE SERPL-MCNC: 3.3 MG/DL (ref 2.5–4.9)
PLATELET # BLD AUTO: 212 K/UL (ref 135–450)
PMV BLD AUTO: 8.8 FL (ref 5–10.5)
POTASSIUM SERPL-SCNC: 4 MMOL/L (ref 3.5–5.1)
PROT SERPL-MCNC: 6.9 G/DL (ref 6.4–8.2)
PROT UR STRIP.AUTO-MCNC: NEGATIVE MG/DL
RBC # BLD AUTO: 4.25 M/UL (ref 4–5.2)
RBC CLUMPS #/AREA URNS AUTO: 3 /HPF (ref 0–4)
SODIUM SERPL-SCNC: 135 MMOL/L (ref 136–145)
SP GR UR STRIP.AUTO: 1.01 (ref 1–1.03)
TRIGL SERPL-MCNC: 192 MG/DL (ref 0–150)
UA DIPSTICK W REFLEX MICRO PNL UR: YES
URN SPEC COLLECT METH UR: ABNORMAL
UROBILINOGEN UR STRIP-ACNC: 0.2 E.U./DL
VLDLC SERPL CALC-MCNC: 38 MG/DL
WBC # BLD AUTO: 7.2 K/UL (ref 4–11)
WBC #/AREA URNS AUTO: 3 /HPF (ref 0–5)

## 2024-10-29 PROCEDURE — 80053 COMPREHEN METABOLIC PANEL: CPT

## 2024-10-29 PROCEDURE — 76770 US EXAM ABDO BACK WALL COMP: CPT

## 2024-10-29 PROCEDURE — 85025 COMPLETE CBC W/AUTO DIFF WBC: CPT

## 2024-10-29 PROCEDURE — 83036 HEMOGLOBIN GLYCOSYLATED A1C: CPT

## 2024-10-29 PROCEDURE — 36415 COLL VENOUS BLD VENIPUNCTURE: CPT

## 2024-10-29 PROCEDURE — 82306 VITAMIN D 25 HYDROXY: CPT

## 2024-10-29 PROCEDURE — 84100 ASSAY OF PHOSPHORUS: CPT

## 2024-10-29 PROCEDURE — 80061 LIPID PANEL: CPT

## 2024-10-29 PROCEDURE — 83735 ASSAY OF MAGNESIUM: CPT

## 2024-10-29 PROCEDURE — 81001 URINALYSIS AUTO W/SCOPE: CPT

## 2024-10-30 LAB
EST. AVERAGE GLUCOSE BLD GHB EST-MCNC: 99.7 MG/DL
HBA1C MFR BLD: 5.1 %

## 2024-11-01 DIAGNOSIS — I10 ESSENTIAL HYPERTENSION: Chronic | ICD-10-CM

## 2024-11-04 ENCOUNTER — OFFICE VISIT (OUTPATIENT)
Dept: INTERNAL MEDICINE CLINIC | Age: 74
End: 2024-11-04

## 2024-11-04 VITALS
DIASTOLIC BLOOD PRESSURE: 70 MMHG | BODY MASS INDEX: 38.66 KG/M2 | OXYGEN SATURATION: 91 % | HEIGHT: 57 IN | WEIGHT: 179.2 LBS | SYSTOLIC BLOOD PRESSURE: 128 MMHG | HEART RATE: 58 BPM

## 2024-11-04 DIAGNOSIS — F41.9 ANXIETY: ICD-10-CM

## 2024-11-04 DIAGNOSIS — G30.1 LATE ONSET ALZHEIMER'S DISEASE WITHOUT BEHAVIORAL DISTURBANCE (HCC): ICD-10-CM

## 2024-11-04 DIAGNOSIS — E53.8 VITAMIN B12 DEFICIENCY: ICD-10-CM

## 2024-11-04 DIAGNOSIS — E55.9 VITAMIN D INSUFFICIENCY: ICD-10-CM

## 2024-11-04 DIAGNOSIS — M81.0 OSTEOPOROSIS, POST-MENOPAUSAL: Primary | Chronic | ICD-10-CM

## 2024-11-04 DIAGNOSIS — F33.1 MODERATE EPISODE OF RECURRENT MAJOR DEPRESSIVE DISORDER (HCC): Chronic | ICD-10-CM

## 2024-11-04 DIAGNOSIS — I10 ESSENTIAL HYPERTENSION: Chronic | ICD-10-CM

## 2024-11-04 DIAGNOSIS — R73.9 HYPERGLYCEMIA: ICD-10-CM

## 2024-11-04 DIAGNOSIS — F51.04 CHRONIC INSOMNIA: ICD-10-CM

## 2024-11-04 DIAGNOSIS — G47.39 OTHER SLEEP APNEA: ICD-10-CM

## 2024-11-04 DIAGNOSIS — N39.0 RECURRENT UTI: Chronic | ICD-10-CM

## 2024-11-04 DIAGNOSIS — N32.81 OAB (OVERACTIVE BLADDER): ICD-10-CM

## 2024-11-04 DIAGNOSIS — F02.80 LATE ONSET ALZHEIMER'S DISEASE WITHOUT BEHAVIORAL DISTURBANCE (HCC): ICD-10-CM

## 2024-11-04 DIAGNOSIS — E78.5 HYPERLIPIDEMIA LDL GOAL <100: ICD-10-CM

## 2024-11-04 RX ORDER — TRAZODONE HYDROCHLORIDE 100 MG/1
200 TABLET ORAL NIGHTLY
Qty: 60 TABLET | Refills: 5 | Status: SHIPPED | OUTPATIENT
Start: 2024-11-04

## 2024-11-04 RX ORDER — BUSPIRONE HYDROCHLORIDE 15 MG/1
15 TABLET ORAL 3 TIMES DAILY
Qty: 90 TABLET | Refills: 5 | Status: SHIPPED | OUTPATIENT
Start: 2024-11-04

## 2024-11-04 RX ORDER — MEMANTINE HYDROCHLORIDE 10 MG/1
10 TABLET ORAL 2 TIMES DAILY
Qty: 60 TABLET | Refills: 10 | Status: SHIPPED | OUTPATIENT
Start: 2024-11-04

## 2024-11-04 RX ORDER — ROSUVASTATIN CALCIUM 10 MG/1
10 TABLET, COATED ORAL NIGHTLY
Qty: 30 TABLET | Refills: 10 | Status: SHIPPED | OUTPATIENT
Start: 2024-11-04

## 2024-11-04 RX ORDER — ESCITALOPRAM OXALATE 20 MG/1
20 TABLET ORAL DAILY
Qty: 30 TABLET | Refills: 10 | OUTPATIENT
Start: 2024-11-04

## 2024-11-04 RX ORDER — ROSUVASTATIN CALCIUM 10 MG/1
10 TABLET, COATED ORAL NIGHTLY
Qty: 30 TABLET | Refills: 10 | Status: SHIPPED | OUTPATIENT
Start: 2024-11-04 | End: 2024-11-04 | Stop reason: SDUPTHER

## 2024-11-04 RX ORDER — VIT C/B6/B5/MAGNESIUM/HERB 173 50-5-6-5MG
500 CAPSULE ORAL DAILY
COMMUNITY

## 2024-11-04 RX ORDER — ASPIRIN 81 MG/1
81 TABLET, CHEWABLE ORAL DAILY
Qty: 30 TABLET | Refills: 5 | Status: SHIPPED | OUTPATIENT
Start: 2024-11-04

## 2024-11-04 RX ORDER — CETIRIZINE HYDROCHLORIDE 10 MG/1
10 TABLET ORAL DAILY
Qty: 30 TABLET | Refills: 10 | OUTPATIENT
Start: 2024-11-04

## 2024-11-04 RX ORDER — OXYBUTYNIN CHLORIDE 15 MG/1
15 TABLET, EXTENDED RELEASE ORAL DAILY
Qty: 30 TABLET | Refills: 10 | OUTPATIENT
Start: 2024-11-04

## 2024-11-04 RX ORDER — TRAZODONE HYDROCHLORIDE 100 MG/1
200 TABLET ORAL NIGHTLY
Qty: 60 TABLET | Refills: 10 | OUTPATIENT
Start: 2024-11-04

## 2024-11-04 RX ORDER — ALBUTEROL SULFATE 90 UG/1
2 INHALANT RESPIRATORY (INHALATION) 4 TIMES DAILY PRN
Qty: 8.5 EACH | Refills: 2 | Status: SHIPPED | OUTPATIENT
Start: 2024-11-04

## 2024-11-04 RX ORDER — OLMESARTAN MEDOXOMIL 20 MG/1
20 TABLET ORAL DAILY
Qty: 30 TABLET | Refills: 10 | OUTPATIENT
Start: 2024-11-04

## 2024-11-04 RX ORDER — GABAPENTIN 600 MG/1
600 TABLET ORAL 3 TIMES DAILY
Qty: 90 TABLET | Refills: 5 | Status: SHIPPED | OUTPATIENT
Start: 2024-11-04 | End: 2025-05-03

## 2024-11-04 RX ORDER — FLUTICASONE PROPIONATE 50 MCG
2 SPRAY, SUSPENSION (ML) NASAL DAILY
Qty: 1 EACH | Refills: 5 | Status: SHIPPED | OUTPATIENT
Start: 2024-11-04

## 2024-11-04 RX ORDER — GALANTAMINE HYDROBROMIDE 16 MG/1
16 CAPSULE, EXTENDED RELEASE ORAL
Qty: 30 CAPSULE | Refills: 5 | Status: SHIPPED | OUTPATIENT
Start: 2024-11-04

## 2024-11-04 RX ORDER — OXYBUTYNIN CHLORIDE 15 MG/1
15 TABLET, EXTENDED RELEASE ORAL DAILY
Qty: 30 TABLET | Refills: 5 | Status: SHIPPED | OUTPATIENT
Start: 2024-11-04

## 2024-11-04 RX ORDER — POTASSIUM CHLORIDE 1500 MG/1
20 TABLET, EXTENDED RELEASE ORAL DAILY
Qty: 30 TABLET | Refills: 5 | Status: SHIPPED | OUTPATIENT
Start: 2024-11-04

## 2024-11-04 RX ORDER — ESCITALOPRAM OXALATE 20 MG/1
20 TABLET ORAL DAILY
Qty: 30 TABLET | Refills: 5 | Status: SHIPPED | OUTPATIENT
Start: 2024-11-04

## 2024-11-04 RX ORDER — ASPIRIN 81 MG
81 TABLET,CHEWABLE ORAL
Qty: 30 TABLET | Refills: 10 | OUTPATIENT
Start: 2024-11-04

## 2024-11-04 RX ORDER — CETIRIZINE HYDROCHLORIDE 10 MG/1
10 TABLET ORAL DAILY
Qty: 30 TABLET | Refills: 5 | Status: SHIPPED | OUTPATIENT
Start: 2024-11-04

## 2024-11-04 RX ORDER — POTASSIUM CHLORIDE 1500 MG/1
20 TABLET, EXTENDED RELEASE ORAL DAILY
Qty: 30 TABLET | Refills: 10 | OUTPATIENT
Start: 2024-11-04

## 2024-11-04 RX ORDER — HYDROXYZINE HYDROCHLORIDE 50 MG/1
50 TABLET, FILM COATED ORAL 4 TIMES DAILY PRN
Qty: 120 TABLET | Refills: 5 | Status: SHIPPED | OUTPATIENT
Start: 2024-11-04

## 2024-11-04 RX ORDER — ROSUVASTATIN CALCIUM 10 MG/1
10 TABLET, COATED ORAL NIGHTLY
Qty: 30 TABLET | Refills: 10 | OUTPATIENT
Start: 2024-11-04

## 2024-11-04 RX ORDER — OLMESARTAN MEDOXOMIL 20 MG/1
20 TABLET ORAL DAILY
Qty: 30 TABLET | Refills: 5 | Status: SHIPPED | OUTPATIENT
Start: 2024-11-04

## 2024-11-04 SDOH — ECONOMIC STABILITY: FOOD INSECURITY: WITHIN THE PAST 12 MONTHS, YOU WORRIED THAT YOUR FOOD WOULD RUN OUT BEFORE YOU GOT MONEY TO BUY MORE.: NEVER TRUE

## 2024-11-04 SDOH — ECONOMIC STABILITY: INCOME INSECURITY: HOW HARD IS IT FOR YOU TO PAY FOR THE VERY BASICS LIKE FOOD, HOUSING, MEDICAL CARE, AND HEATING?: NOT HARD AT ALL

## 2024-11-04 SDOH — ECONOMIC STABILITY: FOOD INSECURITY: WITHIN THE PAST 12 MONTHS, THE FOOD YOU BOUGHT JUST DIDN'T LAST AND YOU DIDN'T HAVE MONEY TO GET MORE.: NEVER TRUE

## 2024-11-04 ASSESSMENT — ENCOUNTER SYMPTOMS
SHORTNESS OF BREATH: 0
CHEST TIGHTNESS: 0
DIARRHEA: 0
CONSTIPATION: 0

## 2024-11-04 NOTE — ASSESSMENT & PLAN NOTE
Follows with Dr. Palacios for urology.  Dr. Palacios has recommended intermittent self-catheterization which she declines that she is unable to do, chronic indwelling Castro which she does not want, InterStim, and suprapubic catheter-all of which she does not want as she does not want surgery.

## 2024-11-04 NOTE — ASSESSMENT & PLAN NOTE
Stable.  Remains on Lexapro 20 mg daily.  Depression does tend to worsen with pain and winter months.

## 2024-11-04 NOTE — ASSESSMENT & PLAN NOTE
Patient completed Reclast.  DEXA in June 2024 with decrease in bone density.  Start Prolia.  Will need to obtain insurance approval before starting Prolia.

## 2024-11-04 NOTE — PROGRESS NOTES
Patient: Mary Jane Dickey is a 74 y.o. female who presents today with the following Chief Complaint(s):  Chief Complaint   Patient presents with    Follow-up     3 months    Constipation     Requesting a stool softener    Sleep Apnea     To discuss issues       HPI    Here today for follow up.     Does use CPAP machine every night- goes to bed with it on but may not wake up with it. Does have days where she just wants to sleep all day.   - no changes to her pain medicines.   - no recent follow ups with Dr. Mckeon's office.   - was evaluated for Inspire but is not a candidate.     Hyponatremia- has appointment with nephrology later this month.     Recurrent UTI- did see urology. Not able to self-cath d/t her chronic back pain. Last seen in October. On vaginal estrogen and d-mannose.    Osteoporosis- off of Reclast. Started in 2016.     Does need flu vaccine.    HLD- no issues with BP.     Dementia- does notice some difficulties with managing her finances. Medications come pre-packaged. No problems with driving. Daughter lives next door in [facility] but is not super helpful. has neurology appointment in December.     Has found that taking 2 Turmeric supplements per day has been helpful. New bottle tells her to take 1 which is not as helpful. Wondering how much to take.     Results for orders placed or performed during the hospital encounter of 10/29/24   Vitamin D 25 Hydroxy   Result Value Ref Range    Vit D, 25-Hydroxy 42.3 >=30 ng/mL   Lipid Panel   Result Value Ref Range    Cholesterol, Total 109 0 - 199 mg/dL    Triglycerides 192 (H) 0 - 150 mg/dL    HDL 46 40 - 60 mg/dL    LDL Cholesterol 25 <100 mg/dL    VLDL Cholesterol Calculated 38 Not Established mg/dL   Comprehensive Metabolic Panel   Result Value Ref Range    Sodium 135 (L) 136 - 145 mmol/L    Potassium 4.0 3.5 - 5.1 mmol/L    Chloride 100 99 - 110 mmol/L    CO2 26 21 - 32 mmol/L    Anion Gap 9 3 - 16    Glucose 85 70 - 99 mg/dL    BUN 12 7 - 20 mg/dL

## 2024-11-04 NOTE — ASSESSMENT & PLAN NOTE
Continues to live independently and drive without incident.  Does get medication through pill packs which helps with medication compliance. Having some difficulties with managing finances. Does not feel like she has good familial support. Has upcoming appointment with neurology in December.  Remains on Razadyne ER 16 mg daily and Namenda 10 mg twice daily.  Suspect sleep apnea that is not fully treated and pain medications are contributing to patient's underlying dementia as well.

## 2024-11-04 NOTE — ASSESSMENT & PLAN NOTE
Reports compliance with CPAP but states that she struggles with continued use overnight.  Recommend follow-up with sleep medicine as she has not been seen in quite some time.  Number provided to patient for Dr. Mckeon's office.

## 2024-12-27 LAB
BACTERIA URNS QL MICRO: ABNORMAL /HPF
BILIRUB UR QL STRIP.AUTO: NEGATIVE
CLARITY UR: CLEAR
COLOR UR: YELLOW
EPI CELLS #/AREA URNS AUTO: 1 /HPF (ref 0–5)
GLUCOSE UR STRIP.AUTO-MCNC: NEGATIVE MG/DL
HGB UR QL STRIP.AUTO: NEGATIVE
HYALINE CASTS #/AREA URNS AUTO: 0 /LPF (ref 0–8)
KETONES UR STRIP.AUTO-MCNC: NEGATIVE MG/DL
LEUKOCYTE ESTERASE UR QL STRIP.AUTO: ABNORMAL
NITRITE UR QL STRIP.AUTO: NEGATIVE
PH UR STRIP.AUTO: 6.5 [PH] (ref 5–8)
PROT UR STRIP.AUTO-MCNC: NEGATIVE MG/DL
RBC CLUMPS #/AREA URNS AUTO: 1 /HPF (ref 0–4)
SP GR UR STRIP.AUTO: 1.01 (ref 1–1.03)
UA DIPSTICK W REFLEX MICRO PNL UR: YES
URN SPEC COLLECT METH UR: ABNORMAL
UROBILINOGEN UR STRIP-ACNC: 0.2 E.U./DL
WBC #/AREA URNS AUTO: 1 /HPF (ref 0–5)

## 2025-01-20 ENCOUNTER — CARE COORDINATION (OUTPATIENT)
Dept: CARE COORDINATION | Age: 75
End: 2025-01-20

## 2025-01-20 NOTE — CARE COORDINATION
Ambulatory Care Coordination Note     2025 1:32 PM     Patient Current Location:  [place]     This patient was received as a referral from the patient (self-referred).    Patient contacted the ACM by telephone. Verified name and  with patient as identifiers. Provided introduction to self, and explanation of the ACM role.   Patient accepted care management services at this time.          ACM: Adele Conde RN     Challenges to be reviewed by the provider   Additional needs identified to be addressed with provider Yes  Patient received a letter stating as of 24 [facility] is no longer in network               Method of communication with provider: chart routing.    Utilization: Initial Call - N/A    Care Summary Note:     Incoming call received from patient. Patient states she received a letter from [facility] stating as of 24 they will no longer be in with [facility]. ACM informed patient that she uses [facility], not [facility] as her mail order pharmacy. Patient vu and states she will outreach [facility] to make sure this change won't effect future refills. Patient states she will follow back up with ACM after speaking with [facility]. No other needs voiced at this time.     PCP/Specialist follow up:   Future Appointments         Provider Specialty Dept Phone    3/19/2025 10:20 AM Yumiko Govea DO Internal Medicine [phone]    2025 10:00 AM Aman Callaway PA Nephrology [phone]            Follow Up:   Plan for next ACM outreach in approximately 1 week to complete:  - pharmacy concerns .   Patient  is agreeable to this plan.

## 2025-01-20 NOTE — CARE COORDINATION
Incoming message received from patient.   Patient states she spoke to [facility] and was told to disregard the letter.   No other needs voiced at this time. ACM will sign off and remain available as needed.

## 2025-02-03 ENCOUNTER — HOSPITAL ENCOUNTER (EMERGENCY)
Age: 75
Discharge: HOME | End: 2025-02-03
Attending: EMERGENCY MEDICINE
Payer: MEDICARE

## 2025-02-03 ENCOUNTER — OFFICE VISIT (OUTPATIENT)
Dept: INTERNAL MEDICINE CLINIC | Age: 75
End: 2025-02-03
Payer: MEDICARE

## 2025-02-03 ENCOUNTER — APPOINTMENT (OUTPATIENT)
Dept: GENERAL RADIOLOGY | Age: 75
End: 2025-02-03
Payer: MEDICARE

## 2025-02-03 VITALS
BODY MASS INDEX: 38.4 KG/M2 | SYSTOLIC BLOOD PRESSURE: 166 MMHG | TEMPERATURE: 98.7 F | HEART RATE: 98 BPM | HEIGHT: 57 IN | DIASTOLIC BLOOD PRESSURE: 82 MMHG | WEIGHT: 178 LBS | OXYGEN SATURATION: 94 % | RESPIRATION RATE: 16 BRPM

## 2025-02-03 VITALS
DIASTOLIC BLOOD PRESSURE: 92 MMHG | HEIGHT: 57 IN | WEIGHT: 178 LBS | SYSTOLIC BLOOD PRESSURE: 200 MMHG | BODY MASS INDEX: 38.4 KG/M2

## 2025-02-03 DIAGNOSIS — I16.0 HYPERTENSIVE URGENCY: Primary | ICD-10-CM

## 2025-02-03 DIAGNOSIS — G30.1 LATE ONSET ALZHEIMER'S DISEASE WITHOUT BEHAVIORAL DISTURBANCE (HCC): ICD-10-CM

## 2025-02-03 DIAGNOSIS — I10 ASYMPTOMATIC HYPERTENSION: Primary | ICD-10-CM

## 2025-02-03 DIAGNOSIS — F02.80 LATE ONSET ALZHEIMER'S DISEASE WITHOUT BEHAVIORAL DISTURBANCE (HCC): ICD-10-CM

## 2025-02-03 DIAGNOSIS — N30.00 ACUTE CYSTITIS WITHOUT HEMATURIA: ICD-10-CM

## 2025-02-03 LAB
ALBUMIN SERPL-MCNC: 4 G/DL (ref 3.4–5)
ALBUMIN/GLOB SERPL: 1.4 {RATIO} (ref 1.1–2.2)
ALP SERPL-CCNC: 47 U/L (ref 40–129)
ALT SERPL-CCNC: 27 U/L (ref 10–40)
ANION GAP SERPL CALCULATED.3IONS-SCNC: 9 MMOL/L (ref 3–16)
AST SERPL-CCNC: 36 U/L (ref 15–37)
BACTERIA URNS QL MICRO: ABNORMAL /HPF
BASOPHILS # BLD: 0 K/UL (ref 0–0.2)
BASOPHILS NFR BLD: 0.6 %
BILIRUB SERPL-MCNC: 0.3 MG/DL (ref 0–1)
BILIRUB UR QL STRIP.AUTO: NEGATIVE
BUN SERPL-MCNC: 12 MG/DL (ref 7–20)
CALCIUM SERPL-MCNC: 9.3 MG/DL (ref 8.3–10.6)
CHLORIDE SERPL-SCNC: 101 MMOL/L (ref 99–110)
CLARITY UR: CLEAR
CO2 SERPL-SCNC: 27 MMOL/L (ref 21–32)
COLOR UR: YELLOW
CREAT SERPL-MCNC: 0.6 MG/DL (ref 0.6–1.2)
DEPRECATED RDW RBC AUTO: 13 % (ref 12.4–15.4)
EOSINOPHIL # BLD: 0.3 K/UL (ref 0–0.6)
EOSINOPHIL NFR BLD: 4.7 %
EPI CELLS #/AREA URNS AUTO: 1 /HPF (ref 0–5)
GFR SERPLBLD CREATININE-BSD FMLA CKD-EPI: >90 ML/MIN/{1.73_M2}
GLUCOSE SERPL-MCNC: 100 MG/DL (ref 70–99)
GLUCOSE UR STRIP.AUTO-MCNC: NEGATIVE MG/DL
HCT VFR BLD AUTO: 39.5 % (ref 36–48)
HGB BLD-MCNC: 13.3 G/DL (ref 12–16)
HGB UR QL STRIP.AUTO: NEGATIVE
HYALINE CASTS #/AREA URNS AUTO: 0 /LPF (ref 0–8)
KETONES UR STRIP.AUTO-MCNC: NEGATIVE MG/DL
LEUKOCYTE ESTERASE UR QL STRIP.AUTO: ABNORMAL
LYMPHOCYTES # BLD: 2.2 K/UL (ref 1–5.1)
LYMPHOCYTES NFR BLD: 40.5 %
MCH RBC QN AUTO: 30.2 PG (ref 26–34)
MCHC RBC AUTO-ENTMCNC: 33.5 G/DL (ref 31–36)
MCV RBC AUTO: 90 FL (ref 80–100)
MONOCYTES # BLD: 0.5 K/UL (ref 0–1.3)
MONOCYTES NFR BLD: 10 %
NEUTROPHILS # BLD: 2.4 K/UL (ref 1.7–7.7)
NEUTROPHILS NFR BLD: 44.2 %
NITRITE UR QL STRIP.AUTO: NEGATIVE
NT-PROBNP SERPL-MCNC: 61 PG/ML (ref 0–449)
PH UR STRIP.AUTO: 6.5 [PH] (ref 5–8)
PLATELET # BLD AUTO: 165 K/UL (ref 135–450)
PMV BLD AUTO: 7.8 FL (ref 5–10.5)
POTASSIUM SERPL-SCNC: 4 MMOL/L (ref 3.5–5.1)
PROT SERPL-MCNC: 6.8 G/DL (ref 6.4–8.2)
PROT UR STRIP.AUTO-MCNC: NEGATIVE MG/DL
RBC # BLD AUTO: 4.39 M/UL (ref 4–5.2)
RBC CLUMPS #/AREA URNS AUTO: 1 /HPF (ref 0–4)
SODIUM SERPL-SCNC: 137 MMOL/L (ref 136–145)
SP GR UR STRIP.AUTO: <=1.005 (ref 1–1.03)
TROPONIN, HIGH SENSITIVITY: 15 NG/L (ref 0–14)
TROPONIN, HIGH SENSITIVITY: 18 NG/L (ref 0–14)
UA COMPLETE W REFLEX CULTURE PNL UR: ABNORMAL
UA DIPSTICK W REFLEX MICRO PNL UR: YES
URN SPEC COLLECT METH UR: ABNORMAL
UROBILINOGEN UR STRIP-ACNC: 1 E.U./DL
WBC # BLD AUTO: 5.4 K/UL (ref 4–11)
WBC #/AREA URNS AUTO: 8 /HPF (ref 0–5)

## 2025-02-03 PROCEDURE — 6370000000 HC RX 637 (ALT 250 FOR IP): Performed by: EMERGENCY MEDICINE

## 2025-02-03 PROCEDURE — 3077F SYST BP >= 140 MM HG: CPT

## 2025-02-03 PROCEDURE — 85025 COMPLETE CBC W/AUTO DIFF WBC: CPT

## 2025-02-03 PROCEDURE — 80053 COMPREHEN METABOLIC PANEL: CPT

## 2025-02-03 PROCEDURE — 36415 COLL VENOUS BLD VENIPUNCTURE: CPT

## 2025-02-03 PROCEDURE — 93005 ELECTROCARDIOGRAM TRACING: CPT | Performed by: EMERGENCY MEDICINE

## 2025-02-03 PROCEDURE — 84484 ASSAY OF TROPONIN QUANT: CPT

## 2025-02-03 PROCEDURE — 1123F ACP DISCUSS/DSCN MKR DOCD: CPT

## 2025-02-03 PROCEDURE — 83880 ASSAY OF NATRIURETIC PEPTIDE: CPT

## 2025-02-03 PROCEDURE — 99215 OFFICE O/P EST HI 40 MIN: CPT

## 2025-02-03 PROCEDURE — 71045 X-RAY EXAM CHEST 1 VIEW: CPT

## 2025-02-03 PROCEDURE — 81001 URINALYSIS AUTO W/SCOPE: CPT

## 2025-02-03 PROCEDURE — 99285 EMERGENCY DEPT VISIT HI MDM: CPT

## 2025-02-03 PROCEDURE — 3080F DIAST BP >= 90 MM HG: CPT

## 2025-02-03 RX ORDER — NITROFURANTOIN 25; 75 MG/1; MG/1
100 CAPSULE ORAL ONCE
Status: COMPLETED | OUTPATIENT
Start: 2025-02-03 | End: 2025-02-03

## 2025-02-03 RX ORDER — NITROFURANTOIN 25; 75 MG/1; MG/1
100 CAPSULE ORAL 2 TIMES DAILY
Qty: 14 CAPSULE | Refills: 0 | Status: SHIPPED | OUTPATIENT
Start: 2025-02-03 | End: 2025-02-10

## 2025-02-03 RX ADMIN — NITROFURANTOIN MONOHYDRATE/MACROCRYSTALS 100 MG: 75; 25 CAPSULE ORAL at 20:11

## 2025-02-03 ASSESSMENT — PAIN - FUNCTIONAL ASSESSMENT: PAIN_FUNCTIONAL_ASSESSMENT: 0-10

## 2025-02-03 ASSESSMENT — PAIN SCALES - GENERAL: PAINLEVEL_OUTOF10: 4

## 2025-02-03 ASSESSMENT — PAIN DESCRIPTION - LOCATION: LOCATION: BACK

## 2025-02-03 ASSESSMENT — PAIN DESCRIPTION - DESCRIPTORS: DESCRIPTORS: DISCOMFORT

## 2025-02-03 NOTE — PROGRESS NOTES
Mary Jane Dickey (:  1950) is a 74 y.o. female,Established patient, here for evaluation of the following chief complaint(s):  Hypertension (Elevated bp)      Assessment & Plan   ASSESSMENT/PLAN:  Assessment & Plan  Hypertensive urgency  Patient presents office today for acute visit.  States that her blood pressure at home was greater than 230 systolic.  She has a blood pressure log with her, her readings have been elevated with readings of greater than 180-200 systolic for the last few days.  Denies any acute complaints today, has chronic back pain.  She has been compliant with her olmesartan 20 mg daily.    However given her elevated blood pressure in our office today of 200/92, have advised patient to go to the ED. due to her blood pressure being elevated for greater than 72 hours at home, it is unsafe to drop her blood pressure too quickly in the outpatient setting.     Patient advised that his medical problem was beyond the scope of what I could address in an ambulatory care setting. Risks and benefits of seeking further treatment was discussed at length with the patient at bedside. Patient verbalized his understanding of the risks and benefits to myself. Patient was advised to go directly to the emergency department for further evaluation. EMS transfer to the emergency department was initiated.       No follow-ups on file.         Subjective   SUBJECTIVE/OBJECTIVE:    Lab Review   Lab Results   Component Value Date/Time     10/29/2024 10:45 AM     05/15/2024 01:17 PM     2024 12:13 PM    K 4.0 10/29/2024 10:45 AM    K 4.3 05/15/2024 01:17 PM    K 4.2 2024 12:13 PM    K 4.7 2023 01:35 PM    K 3.9 2022 04:33 PM    CO2 26 10/29/2024 10:45 AM    CO2 23 05/15/2024 01:17 PM    CO2 24 2024 12:13 PM    BUN 12 10/29/2024 10:45 AM    BUN 13 05/15/2024 01:17 PM    BUN 12 2024 12:13 PM    CREATININE 0.7 10/29/2024 10:45 AM    CREATININE 0.6 05/15/2024 01:17 PM

## 2025-02-03 NOTE — ED PROVIDER NOTES
Stability: Unknown (11/4/2024)    Housing Stability Vital Sign     Unable to Pay for Housing in the Last Year: Not on file     Number of Times Moved in the Last Year: Not on file     Homeless in the Last Year: No     No current facility-administered medications for this encounter.     Current Outpatient Medications   Medication Sig Dispense Refill    nitrofurantoin, macrocrystal-monohydrate, (MACROBID) 100 MG capsule Take 1 capsule by mouth 2 times daily for 7 days 14 capsule 0    turmeric 500 MG CAPS Take 1 capsule by mouth daily      olmesartan (BENICAR) 20 MG tablet Take 1 tablet by mouth daily 30 tablet 5    albuterol sulfate HFA (PROVENTIL;VENTOLIN;PROAIR) 108 (90 Base) MCG/ACT inhaler Inhale 2 puffs into the lungs 4 times daily as needed for Wheezing 8.5 each 2    aspirin (ASPIRIN LOW DOSE) 81 MG chewable tablet Take 1 tablet by mouth daily 30 tablet 5    busPIRone (BUSPAR) 15 MG tablet Take 15 mg by mouth 3 times daily 90 tablet 5    cetirizine (ZYRTEC) 10 MG tablet Take 1 tablet by mouth daily 30 tablet 5    escitalopram (LEXAPRO) 20 MG tablet Take 1 tablet by mouth daily 30 tablet 5    gabapentin (NEURONTIN) 600 MG tablet Take 1 tablet by mouth 3 times daily for 180 days. 90 tablet 5    hydrOXYzine HCl (ATARAX) 50 MG tablet Take 1 tablet by mouth 4 times daily as needed for Anxiety 120 tablet 5    memantine (NAMENDA) 10 MG tablet Take 1 tablet by mouth 2 times daily 60 tablet 10    potassium chloride (KLOR-CON M) 20 MEQ extended release tablet Take 1 tablet by mouth daily 30 tablet 5    rosuvastatin (CRESTOR) 10 MG tablet Take 1 tablet by mouth nightly 30 tablet 10    traZODone (DESYREL) 100 MG tablet Take 2 tablets by mouth nightly 60 tablet 5    denosumab (PROLIA) 60 MG/ML SOSY SC injection Inject 1 mL into the skin every 6 months 180 mL 1    VITAMIN D3 125 MCG (5000 UT) CAPS capsule TAKE 1 CAPSULE BY MOUTH DAILY 30 capsule 10    bethanechol (URECHOLINE) 25 MG tablet Take 1 tablet by mouth 3 times daily

## 2025-02-04 LAB
EKG ATRIAL RATE: 64 BPM
EKG DIAGNOSIS: NORMAL
EKG P AXIS: 49 DEGREES
EKG P-R INTERVAL: 162 MS
EKG Q-T INTERVAL: 420 MS
EKG QRS DURATION: 80 MS
EKG QTC CALCULATION (BAZETT): 433 MS
EKG R AXIS: -16 DEGREES
EKG T AXIS: 60 DEGREES
EKG VENTRICULAR RATE: 64 BPM

## 2025-02-04 PROCEDURE — 93010 ELECTROCARDIOGRAM REPORT: CPT | Performed by: INTERNAL MEDICINE

## 2025-02-04 RX ORDER — GALANTAMINE HYDROBROMIDE 16 MG/1
CAPSULE, EXTENDED RELEASE ORAL
Qty: 30 CAPSULE | Refills: 10 | Status: SHIPPED | OUTPATIENT
Start: 2025-02-04

## 2025-02-04 NOTE — ASSESSMENT & PLAN NOTE
SUBJECTIVE:  Hayden Seth is a 57 y.o. who presents today for Annual Exam and Health Maintenance (Pt confirmed that after new referral was placed per [facility] GI request that he has been contacted for scheduling. )      HPI    Hayden presents today for annual,  exam.   Defers immunizations  Colonoscopy scheduled following + cologuard  PSA UTD WNL    Type 2 DM with DPN, improved. Managed with diet and exercise as well as otc supplements.   Nerve pain was unimproved with improvement in glucose.   Failed b12.  States after starting B1 nerve pain improved. Denies alcohol use.     Continues with tinnitus both ears. Unchanged.   Nerve pain better so did not see neurology.     Is taking high amounts of vitamin D, Mg, Zinc and B vitamins. Taking vitamin K as well.  Joint pain improved      Past Medical History:   Diagnosis Date    Chronic back pain     Hyperglycemia      Past Surgical History:   Procedure Laterality Date    ELBOW FRACTURE SURGERY Right      Family History   Problem Relation Age of Onset    No Known Problems Mother     COPD Father      Social History     Tobacco Use    Smoking status: Former     Current packs/day: 0.00     Types: Cigarettes     Quit date: 2023     Years since quittin.0    Smokeless tobacco: Current   Substance Use Topics    Alcohol use: No     Current Outpatient Medications   Medication Sig Dispense Refill    FREESTYLE LITE strip Use one strip daily to check blood sugar (Patient not taking: Reported on 2025) 50 strip 5    glucose monitoring kit 1 kit by Does not apply route daily With test strips and lancets for once daily dosing (Patient not taking: Reported on 2025) 1 kit 0     No current facility-administered medications for this visit.     No Known Allergies    Review of Systems   Constitutional:  Negative for appetite change, chills, diaphoresis, fatigue and fever.   HENT:  Positive for tinnitus. Negative for congestion, ear pain, hearing loss, postnasal drip, sinus  Suspect related to medication. Check SSA and SSB to rule out Sjogren's.

## 2025-02-06 ENCOUNTER — CARE COORDINATION (OUTPATIENT)
Dept: CARE COORDINATION | Age: 75
End: 2025-02-06

## 2025-02-06 NOTE — CARE COORDINATION
Ambulatory Care Coordination Note     2025 2:12 PM     Patient Current Location:  [place]     This patient was received as a referral from Ambulatory Care Manager .    Patient contacted the patient by telephone. Verified name and  with patient as identifiers. Provided introduction to self, and explanation of the ACM role.   Patient accepted care management services at this time.          ACM: Adele Conde RN     Challenges to be reviewed by the provider   Additional needs identified to be addressed with provider No  none               Method of communication with provider: none.    Utilization: Initial Call - Discharge Date: 2/3/25   Discharge Facility: [facility]  Reason for ED Visit: high blood pressure  Visit Diagnosis: hypertension    Number of ED visits in the last 6 months: 1      Do you have any ongoing symptoms? No  Did you call your PCP prior to going to the ED?  Sent to ED from [facility]    Review of Discharge Instructions:   [x] AVS discharge instructions  [x] Right Care, Right Place, Right Time document  [x] Medication changes  [x] Follow up appointments - Declined ED follow up  [] Referral follow up   []        Care Summary Note:     Sent to [facility] ED on 2/3 from pcp office for hypertensive urgency. ACM outreached patient; introduced self and role of care coordinator.     ED workup was notable for asymptomatic hypertension.  upon arrival and 160 upon recheck with no intervention. Out of abundance of caution she was started on Macrobid for UTI d/t hx of recurring. ACM confirmed she is taking Macrobid as prescribed. No new or worsening urinary sx reported. Patient is able to identify s/sx of UTI and when to notify her pcp. Hydration encouraged.     Patient declined ED follow up with pcp at this time. Patient states she will continue to monitor BP at home and follow up with pcp if readings remain elevated. Patient is currently using a wrist BP cuff and has been advised to purchase

## 2025-02-10 ENCOUNTER — CARE COORDINATION (OUTPATIENT)
Dept: CARE COORDINATION | Age: 75
End: 2025-02-10

## 2025-02-10 ENCOUNTER — OFFICE VISIT (OUTPATIENT)
Dept: INTERNAL MEDICINE CLINIC | Age: 75
End: 2025-02-10

## 2025-02-10 VITALS
OXYGEN SATURATION: 94 % | TEMPERATURE: 98.9 F | SYSTOLIC BLOOD PRESSURE: 160 MMHG | BODY MASS INDEX: 38.13 KG/M2 | WEIGHT: 176.2 LBS | HEART RATE: 74 BPM | DIASTOLIC BLOOD PRESSURE: 88 MMHG

## 2025-02-10 DIAGNOSIS — I10 ESSENTIAL HYPERTENSION: Chronic | ICD-10-CM

## 2025-02-10 DIAGNOSIS — N39.0 RECURRENT UTI: Chronic | ICD-10-CM

## 2025-02-10 DIAGNOSIS — M54.50 ACUTE ON CHRONIC LOW BACK PAIN: ICD-10-CM

## 2025-02-10 DIAGNOSIS — G89.29 ACUTE ON CHRONIC LOW BACK PAIN: ICD-10-CM

## 2025-02-10 DIAGNOSIS — U07.1 COVID: Primary | ICD-10-CM

## 2025-02-10 LAB
INFLUENZA A ANTIBODY: NORMAL
INFLUENZA B ANTIBODY: NORMAL
Lab: ABNORMAL
QC PASS/FAIL: ABNORMAL
SARS-COV-2 RDRP RESP QL NAA+PROBE: POSITIVE

## 2025-02-10 RX ORDER — OLMESARTAN MEDOXOMIL 40 MG/1
40 TABLET ORAL DAILY
Qty: 90 TABLET | Refills: 1 | Status: SHIPPED | OUTPATIENT
Start: 2025-02-10

## 2025-02-10 RX ORDER — OLMESARTAN MEDOXOMIL 40 MG/1
40 TABLET ORAL DAILY
Qty: 14 TABLET | Refills: 0 | Status: SHIPPED | OUTPATIENT
Start: 2025-02-10 | End: 2025-02-24

## 2025-02-10 ASSESSMENT — ENCOUNTER SYMPTOMS
VOICE CHANGE: 0
SORE THROAT: 1
SINUS PAIN: 0
COUGH: 1
RHINORRHEA: 0
EYE DISCHARGE: 0
DIARRHEA: 0
WHEEZING: 0
EYE ITCHING: 0
CHEST TIGHTNESS: 0
NAUSEA: 0
SHORTNESS OF BREATH: 0
VOMITING: 0
SINUS PRESSURE: 0
TROUBLE SWALLOWING: 0

## 2025-02-10 NOTE — PATIENT INSTRUCTIONS
Increase olmesartan to 40 mg daily.   - I sent a 14 day supply to [facility] and a new order to [facility].   - please call [facility] so that you know which pill to take out of your sets.     Your home blood pressure cuff is very off. Please try getting new batteries and see if that works better

## 2025-02-10 NOTE — PROGRESS NOTES
Patient: Mary Jane Dickey is a 75 y.o. female who presents today with the following Chief Complaint(s):  Chief Complaint   Patient presents with   • Cough     Head congestion and chest congestion        History of Present Illness  The patient presents for an acute visit to discuss elevated blood pressure, cough and congestion, bladder infection treated in ED with Macrobid.    Cough and Congestion  - Persistent dry cough and nasal congestion since Thursday 2/6/2025  - No shortness of breath or chest tightness  - Symptoms have improved since the weekend  - Reports headaches attributed to sinus issues, starting Thursday 2/6/2025 after her ER visit  - Pressure across her forehead, nasal drainage, and sore throat  - Tenderness in cheeks and resolved right ear pain noted  - Managing symptoms with Coricidin HBP    Elevated Blood Pressure and Urinary Retention  - Visited the ER on Monday, 2/3/2025 for elevated blood pressure and urinary retention  - Felt unwell last week with fatigue and excessive sleepiness  - No chest pain or visual disturbances  - On olmesartan 20 mg daily    Bladder Infection  - Prescribed Macrobid during ER visit on Monday last week, with one dose remaining  - Believes it improved bladder function  - Previously on muscle relaxants for lower buttock pain, which she believes caused urinary retention and bladder infection  - Urinary function has improved    Lower Buttock Pain  - Describes severe lower buttock pain, feeling like being slammed on cement  - Requires two canes for mobility  - Pain has started to subside  - Heat application worsened discomfort    MEDICATIONS  Current: Olmesartan, Coricidin HBP, Macrobid.    Results for orders placed or performed in visit on 02/10/25   POCT Influenza A/B   Result Value Ref Range    Influenza A Ab neg     Influenza B Ab neg    POCT COVID-19 Rapid, NAAT   Result Value Ref Range    SARS-COV-2, RdRp gene Positive (A) Negative    Lot Number [id number]     QC Pass/Fail

## 2025-02-10 NOTE — CARE COORDINATION
In office appointment scheduled for today with Orin Cm  
previous call     Advance Care Planning:   Not reviewed during this call     Care Planning:   Education Documentation  No documentation found.  Education Comments  No comments found.         PCP/Specialist follow up:   Future Appointments         Provider Specialty Dept Phone    3/19/2025 10:20 AM Yumiko Govea DO Internal Medicine [phone]    6/12/2025 10:00 AM Aman Callaway PA Nephrology [phone]            Follow Up:   Plan for next AC outreach in approximately 1-2 days  to complete:  - CC Protocol assessments  - disease specific assessments  - SDOH assessments  - education   - BP  -establish goals  .   Patient  is agreeable to this plan.

## 2025-02-10 NOTE — ASSESSMENT & PLAN NOTE
Symptom day 5.  Slowly improving.  Recommend supportive care only.  Continue with Coricidin HBP.  Advised on masking and public.  Rest and increase fluids as needed.

## 2025-02-10 NOTE — ASSESSMENT & PLAN NOTE
Blood pressure is elevated.  Her home blood pressure cuff is inaccurate but her office blood pressures are elevated as well.  May reflect COVID infection but elevated blood pressures sound to predate COVID.  Increase olmesartan to 40 mg daily.  [facility] will [facility] to see what the olmesartan pills look like so that she may remove them from her pill pack and  the olmesartan 40 mg that were sent to [facility] as well as [facility] for her next pill sets.

## 2025-02-11 SDOH — ECONOMIC STABILITY: FOOD INSECURITY
WITHIN THE PAST 12 MONTHS, THE FOOD YOU BOUGHT JUST DIDN'T LAST AND YOU DIDN'T HAVE MONEY TO GET MORE.: PATIENT UNABLE TO ANSWER

## 2025-02-11 SDOH — ECONOMIC STABILITY: FOOD INSECURITY
WITHIN THE PAST 12 MONTHS, YOU WORRIED THAT YOUR FOOD WOULD RUN OUT BEFORE YOU GOT MONEY TO BUY MORE.: PATIENT UNABLE TO ANSWER

## 2025-02-11 ASSESSMENT — PATIENT HEALTH QUESTIONNAIRE - PHQ9: DEPRESSION UNABLE TO ASSESS: URGENT/EMERGENT SITUATION

## 2025-02-12 ENCOUNTER — CARE COORDINATION (OUTPATIENT)
Dept: CARE COORDINATION | Age: 75
End: 2025-02-12

## 2025-02-12 NOTE — CARE COORDINATION
Ambulatory Care Coordination Note     2/12/2025 3:12 PM     Patient outreach attempt by this ACM today to perform care management follow up . ACM was unable to reach the patient by telephone today;   left voice message requesting a return phone call to this ACM.     ACM: Adele Conde RN     Care Summary Note:     PCP/Specialist follow up:   Future Appointments         Provider Specialty Dept Phone    3/19/2025 10:20 AM Yumiko Govea DO Internal Medicine [phone]    6/12/2025 10:00 AM Aman Callaway PA Nephrology [phone]            Follow Up:   Plan for next ACM outreach in approximately  3-5 days  to complete:  - disease specific assessments  - medication review  - RPM  - follow-up appointment with Dr Govea 2/10  -BP

## 2025-02-17 ENCOUNTER — CARE COORDINATION (OUTPATIENT)
Dept: CARE COORDINATION | Age: 75
End: 2025-02-17

## 2025-02-17 SDOH — ECONOMIC STABILITY: INCOME INSECURITY: HOW HARD IS IT FOR YOU TO PAY FOR THE VERY BASICS LIKE FOOD, HOUSING, MEDICAL CARE, AND HEATING?: NOT VERY HARD

## 2025-02-17 SDOH — SOCIAL STABILITY: SOCIAL NETWORK: HOW OFTEN DO YOU ATTEND CHURCH OR RELIGIOUS SERVICES?: NEVER

## 2025-02-17 SDOH — HEALTH STABILITY: PHYSICAL HEALTH: ON AVERAGE, HOW MANY DAYS PER WEEK DO YOU ENGAGE IN MODERATE TO STRENUOUS EXERCISE (LIKE A BRISK WALK)?: 0 DAYS

## 2025-02-17 SDOH — SOCIAL STABILITY: SOCIAL NETWORK: ARE YOU MARRIED, WIDOWED, DIVORCED, SEPARATED, NEVER MARRIED, OR LIVING WITH A PARTNER?: WIDOWED

## 2025-02-17 SDOH — HEALTH STABILITY: PHYSICAL HEALTH: ON AVERAGE, HOW MANY MINUTES DO YOU ENGAGE IN EXERCISE AT THIS LEVEL?: 0 MIN

## 2025-02-17 SDOH — ECONOMIC STABILITY: INCOME INSECURITY: IN THE LAST 12 MONTHS, WAS THERE A TIME WHEN YOU WERE NOT ABLE TO PAY THE MORTGAGE OR RENT ON TIME?: NO

## 2025-02-17 SDOH — HEALTH STABILITY: MENTAL HEALTH
STRESS IS WHEN SOMEONE FEELS TENSE, NERVOUS, ANXIOUS, OR CAN'T SLEEP AT NIGHT BECAUSE THEIR MIND IS TROUBLED. HOW STRESSED ARE YOU?: ONLY A LITTLE

## 2025-02-17 SDOH — SOCIAL STABILITY: SOCIAL NETWORK: IN A TYPICAL WEEK, HOW MANY TIMES DO YOU TALK ON THE PHONE WITH FAMILY, FRIENDS, OR NEIGHBORS?: TWICE A WEEK

## 2025-02-17 SDOH — SOCIAL STABILITY: SOCIAL NETWORK: HOW OFTEN DO YOU ATTENT MEETINGS OF THE CLUB OR ORGANIZATION YOU BELONG TO?: NEVER

## 2025-02-17 SDOH — HEALTH STABILITY: MENTAL HEALTH: HOW MANY STANDARD DRINKS CONTAINING ALCOHOL DO YOU HAVE ON A TYPICAL DAY?: PATIENT DOES NOT DRINK

## 2025-02-17 SDOH — SOCIAL STABILITY: SOCIAL NETWORK
DO YOU BELONG TO ANY CLUBS OR ORGANIZATIONS SUCH AS CHURCH GROUPS UNIONS, FRATERNAL OR ATHLETIC GROUPS, OR SCHOOL GROUPS?: NO

## 2025-02-17 SDOH — ECONOMIC STABILITY: FOOD INSECURITY: WITHIN THE PAST 12 MONTHS, THE FOOD YOU BOUGHT JUST DIDN'T LAST AND YOU DIDN'T HAVE MONEY TO GET MORE.: NEVER TRUE

## 2025-02-17 SDOH — HEALTH STABILITY: MENTAL HEALTH: HOW OFTEN DO YOU HAVE A DRINK CONTAINING ALCOHOL?: NEVER

## 2025-02-17 SDOH — ECONOMIC STABILITY: TRANSPORTATION INSECURITY
IN THE PAST 12 MONTHS, HAS THE LACK OF TRANSPORTATION KEPT YOU FROM MEDICAL APPOINTMENTS OR FROM GETTING MEDICATIONS?: YES

## 2025-02-17 SDOH — ECONOMIC STABILITY: FOOD INSECURITY: WITHIN THE PAST 12 MONTHS, YOU WORRIED THAT YOUR FOOD WOULD RUN OUT BEFORE YOU GOT MONEY TO BUY MORE.: NEVER TRUE

## 2025-02-17 NOTE — CARE COORDINATION
Ambulatory Care Coordination Note     2025 10:07 AM     Patient Current Location:  [place]     ACM contacted the patient by telephone. Verified name and  with patient as identifiers.         ACM: Adele Conde RN     Challenges to be reviewed by the provider   Additional needs identified to be addressed with provider No  none               Method of communication with provider: none.    Utilization: Patient has not had any utilization since our last call.     Care Summary Note:     ACM outreached patient for cc follow up. Patient is no longer using wrist BP cuff, obtained upper arm monitor. Checking BP daily, today's reading was 128/61. Advised patient continue monitoring BP daily and report abnormal readings to ACM or pcp, patient vu. Patient reports feeling much better.     Medication review completed: confirmed she has removed Olmesartan 20 mg tablets from pill packs and is now taking Olmesartan 40 mg. HTN resources sent via [employer]. No questions or concerns voiced.     HTN resources sent via [employer].    Offered patient enrollment in the Remote Patient Monitoring (RPM) program for in-home monitoring: Yes, but did not enroll at this time: already monitoring with home equipment. Patient states she will reconsider if BP trends back up.     Assessments Completed:   Hypertension - Encounter Level    Symptom course: improving (Comment: 25 harshil)      ,   General Assessment    Do you have any symptoms that are causing concern?: No          Medications Reviewed:   Completed during this call  Patient has removed Olmesartan 20 mg from pill pack, now taking Olmesartan 40 mg daily with improvement in BP    Advance Care Planning:   Reviewed and current     Care Planning:   Not completed during this call    PCP/Specialist follow up:   Future Appointments         Provider Specialty Dept Phone    3/19/2025 10:20 AM Yumiko Govea DO Internal Medicine [phone]    2025 10:00 AM Aman Callaway PA Nephrology

## 2025-02-25 ENCOUNTER — CARE COORDINATION (OUTPATIENT)
Dept: CARE COORDINATION | Age: 75
End: 2025-02-25

## 2025-02-25 NOTE — CARE COORDINATION
Ambulatory Care Coordination Note     2025 8:24 AM     Patient Current Location:  [place]     ACM contacted the patient by telephone. Verified name and  with patient as identifiers.         ACM: Adele Conde RN     Challenges to be reviewed by the provider   Additional needs identified to be addressed with provider No  none               Method of communication with provider: none.    Utilization: Patient has not had any utilization since our last call.     Care Summary Note:     ACM outreached patient for cc follow up. Patient has 3 days left of Olmesartan 40 mg tablets, refill sent to [facility].   Patient states if she runs out of 40 mg tablets, she will take (2) 20 mg tablets of Olmesartan until she receives her refill through mail order.   Patient obtained new BP monitor. Patient monitoring and recording BP daily:    156/77  142/72  127/53  123/59  146/74  131/75    She takes her bp medication in the morning and waits 1 hour before checking her BP.     Completed antibiotic for UTI. No urinary sx reported.     Patient advised to continue monitoring BP  home and report abnormal findings to ACM or pcp, patient massiel.     Offered patient enrollment in the Remote Patient Monitoring (RPM) program for in-home monitoring: Yes, but did not enroll at this time: already monitoring with home equipment.     Assessments Completed:   Hypertension - Encounter Level    Symptom course: stable          Medications Reviewed:   Completed during this call    Advance Care Planning:   Reviewed and current     Care Planning:   Education Documentation  No documentation found.  Education Comments  No comments found.         PCP/Specialist follow up:   Future Appointments         Provider Specialty Dept Phone    3/19/2025 10:20 AM Yumiko Govea DO Internal Medicine [phone]    2025 10:00 AM Aman Callaway PA Nephrology [phone]            Follow Up:   Plan for next AC outreach in approximately 2 weeks to complete:  -

## 2025-03-10 ENCOUNTER — CARE COORDINATION (OUTPATIENT)
Dept: CARE COORDINATION | Age: 75
End: 2025-03-10

## 2025-03-10 NOTE — CARE COORDINATION
Ambulatory Care Coordination Note     3/10/2025 9:28 AM     Patient Current Location:  [place]     ACM contacted the patient by telephone. Verified name and  with patient as identifiers.         ACM: Adele Conde RN     Challenges to be reviewed by the provider   Additional needs identified to be addressed with provider Yes  Takes Olmersartan 40 mg every morning between 3-4 AM. Denies missed doses  171/76 repeat /76  169/119  160/76  167/83  148/75  156/77  142/72               Method of communication with provider: chart routing.    Utilization: Patient has not had any utilization since our last call.     Care Summary Note:     ACM outreached patient for cc follow up.     Patient taking Olmesartan 40 mg daily and monitoring blood pressure daily. Patient states she doesn't monitor her sodium intake because she was told to increase it due to hypokalemia. Patient will discuss a low sodium diet with Dr. Govea at her upcoming appointment on 3/19. ACM educated on proper technique to monitor blood pressure. Patient states she always takes it at least 1 hour after taking her bp medication and on her left arm, while sitting with her feet flat on the floor. Patient aware ACM will route readings to pcp and notify her if any changes are recommended.     Patient has chronic lower back pain and admits this may be contributing to higher readings. Patient has been advised to continue monitoring blood pressure daily and bring readings to upcoming appt with Dr. Govea.    146/76  169/119  160/76  167/83  148/75  156/77  142/72      Offered patient enrollment in the Remote Patient Monitoring (RPM) program for in-home monitoring: Yes, but did not enroll at this time: already enrolled in another RPM program.     Assessments Completed:   Hypertension - Encounter Level    Symptom course: stable      ,   General Assessment    Do you have any symptoms that are causing concern?: No          Medications Reviewed:   Completed during this

## 2025-03-19 ENCOUNTER — OFFICE VISIT (OUTPATIENT)
Dept: INTERNAL MEDICINE CLINIC | Age: 75
End: 2025-03-19

## 2025-03-19 VITALS
WEIGHT: 180 LBS | HEART RATE: 68 BPM | DIASTOLIC BLOOD PRESSURE: 80 MMHG | OXYGEN SATURATION: 94 % | SYSTOLIC BLOOD PRESSURE: 136 MMHG | HEIGHT: 57 IN | BODY MASS INDEX: 38.83 KG/M2

## 2025-03-19 DIAGNOSIS — G47.10 HYPERSOMNOLENT: Primary | ICD-10-CM

## 2025-03-19 DIAGNOSIS — F02.80 LATE ONSET ALZHEIMER'S DISEASE WITHOUT BEHAVIORAL DISTURBANCE (HCC): ICD-10-CM

## 2025-03-19 DIAGNOSIS — G30.1 LATE ONSET ALZHEIMER'S DISEASE WITHOUT BEHAVIORAL DISTURBANCE (HCC): ICD-10-CM

## 2025-03-19 DIAGNOSIS — Z12.83 SKIN CANCER SCREENING: ICD-10-CM

## 2025-03-19 DIAGNOSIS — K59.03 DRUG INDUCED CONSTIPATION: ICD-10-CM

## 2025-03-19 DIAGNOSIS — G47.33 OBSTRUCTIVE SLEEP APNEA ON CPAP: ICD-10-CM

## 2025-03-19 DIAGNOSIS — R73.9 HYPERGLYCEMIA: ICD-10-CM

## 2025-03-19 DIAGNOSIS — I51.89 DIASTOLIC DYSFUNCTION: ICD-10-CM

## 2025-03-19 DIAGNOSIS — E78.5 HYPERLIPIDEMIA LDL GOAL <100: ICD-10-CM

## 2025-03-19 DIAGNOSIS — F51.04 CHRONIC INSOMNIA: ICD-10-CM

## 2025-03-19 DIAGNOSIS — N39.0 RECURRENT UTI: Chronic | ICD-10-CM

## 2025-03-19 DIAGNOSIS — N32.81 OAB (OVERACTIVE BLADDER): ICD-10-CM

## 2025-03-19 DIAGNOSIS — I10 ESSENTIAL HYPERTENSION: Chronic | ICD-10-CM

## 2025-03-19 DIAGNOSIS — F41.9 ANXIETY: ICD-10-CM

## 2025-03-19 DIAGNOSIS — F33.1 MODERATE EPISODE OF RECURRENT MAJOR DEPRESSIVE DISORDER (HCC): ICD-10-CM

## 2025-03-19 DIAGNOSIS — M81.0 OSTEOPOROSIS, POST-MENOPAUSAL: Chronic | ICD-10-CM

## 2025-03-19 DIAGNOSIS — G47.37 CENTRAL SLEEP APNEA DUE TO MEDICAL CONDITION: ICD-10-CM

## 2025-03-19 PROBLEM — M54.50 ACUTE ON CHRONIC LOW BACK PAIN: Status: RESOLVED | Noted: 2025-02-10 | Resolved: 2025-03-19

## 2025-03-19 PROBLEM — U07.1 COVID: Status: RESOLVED | Noted: 2025-02-10 | Resolved: 2025-03-19

## 2025-03-19 PROBLEM — G89.29 ACUTE ON CHRONIC LOW BACK PAIN: Status: RESOLVED | Noted: 2025-02-10 | Resolved: 2025-03-19

## 2025-03-19 ASSESSMENT — PATIENT HEALTH QUESTIONNAIRE - PHQ9
SUM OF ALL RESPONSES TO PHQ QUESTIONS 1-9: 6
8. MOVING OR SPEAKING SO SLOWLY THAT OTHER PEOPLE COULD HAVE NOTICED. OR THE OPPOSITE, BEING SO FIGETY OR RESTLESS THAT YOU HAVE BEEN MOVING AROUND A LOT MORE THAN USUAL: NOT AT ALL
SUM OF ALL RESPONSES TO PHQ QUESTIONS 1-9: 6
3. TROUBLE FALLING OR STAYING ASLEEP: NEARLY EVERY DAY
1. LITTLE INTEREST OR PLEASURE IN DOING THINGS: NOT AT ALL
5. POOR APPETITE OR OVEREATING: NOT AT ALL
7. TROUBLE CONCENTRATING ON THINGS, SUCH AS READING THE NEWSPAPER OR WATCHING TELEVISION: NOT AT ALL
4. FEELING TIRED OR HAVING LITTLE ENERGY: NEARLY EVERY DAY
6. FEELING BAD ABOUT YOURSELF - OR THAT YOU ARE A FAILURE OR HAVE LET YOURSELF OR YOUR FAMILY DOWN: NOT AT ALL
SUM OF ALL RESPONSES TO PHQ QUESTIONS 1-9: 6
10. IF YOU CHECKED OFF ANY PROBLEMS, HOW DIFFICULT HAVE THESE PROBLEMS MADE IT FOR YOU TO DO YOUR WORK, TAKE CARE OF THINGS AT HOME, OR GET ALONG WITH OTHER PEOPLE: NOT DIFFICULT AT ALL
9. THOUGHTS THAT YOU WOULD BE BETTER OFF DEAD, OR OF HURTING YOURSELF: NOT AT ALL
SUM OF ALL RESPONSES TO PHQ QUESTIONS 1-9: 6
2. FEELING DOWN, DEPRESSED OR HOPELESS: NOT AT ALL

## 2025-03-19 NOTE — PROGRESS NOTES
Number of children: Not on file    Years of education: Not on file    Highest education level: Not on file   Occupational History    Occupation: retired   Tobacco Use    Smoking status: Never    Smokeless tobacco: Never   Vaping Use    Vaping status: Never Used   Substance and Sexual Activity    Alcohol use: Not Currently    Drug use: No    Sexual activity: Not on file   Other Topics Concern    Not on file   Social History Narrative    Not on file     Social Drivers of Health     Financial Resource Strain: Low Risk  (2/17/2025)    Overall Financial Resource Strain (CARDIA)     Difficulty of Paying Living Expenses: Not very hard   Food Insecurity: No Food Insecurity (2/17/2025)    Hunger Vital Sign     Worried About Running Out of Food in the Last Year: Never true     Ran Out of Food in the Last Year: Never true   Transportation Needs: Unmet Transportation Needs (2/17/2025)    PRAPARE - Transportation     Lack of Transportation (Medical): Yes     Lack of Transportation (Non-Medical): No   Physical Activity: Inactive (2/17/2025)    Exercise Vital Sign     Days of Exercise per Week: 0 days     Minutes of Exercise per Session: 0 min   Stress: No Stress Concern Present (2/17/2025)    [employer] [place] [employer] - Occupational Stress Questionnaire     Feeling of Stress : Only a little   Social Connections: Unknown (2/17/2025)    Social Connection and Isolation Panel [NHANES]     Frequency of Communication with Friends and Family: Twice a week     Frequency of Social Gatherings with Friends and Family: Not on file     Attends Worship Services: Never     Active Member of Clubs or Organizations: No     Attends Club or Organization Meetings: Never     Marital Status:    Intimate Partner Violence: Unknown (1/20/2024)    Received from [employer] [facility] [employer], [facility] and [employer]    Interpersonal Safety     Feel physically or emotionally unsafe where currently live:

## 2025-03-19 NOTE — PATIENT INSTRUCTIONS
Please call and make an appointment with   Dr. Mckeon for sleep medicine.  Dr. Leiva for Dermatology (new referral), she is located in [place].      Please have your labs drawn about 1 week prior to your next appointment in June . You may get your labs drawn in our office, Monday-Friday from 7:30 am-4:00 pm. You do not need an appointment. If this does not work for you, you may also have your labs drawn at [facility] earlier during the week or on weekends. If you prefer to have your labs draw elsewhere ([facility], [facility]), please allow a little more time for me to get your results and please call the office ahead of your appointment so that we may make sure that we have your labs at the time of your appointment. We sometimes need to call the lab directly (when not done at a  [facility]) to get your results.   Your labs are fasting.

## 2025-03-20 ENCOUNTER — CARE COORDINATION (OUTPATIENT)
Dept: CARE COORDINATION | Age: 75
End: 2025-03-20

## 2025-03-22 PROBLEM — M62.08 DIASTASIS OF RECTUS ABDOMINIS: Status: RESOLVED | Noted: 2018-03-07 | Resolved: 2025-03-22

## 2025-03-22 PROBLEM — R68.2 DRY MOUTH: Status: RESOLVED | Noted: 2021-11-21 | Resolved: 2025-03-22

## 2025-03-22 PROBLEM — R42 INTERMITTENT LIGHTHEADEDNESS: Status: RESOLVED | Noted: 2021-11-21 | Resolved: 2025-03-22

## 2025-03-22 ASSESSMENT — ENCOUNTER SYMPTOMS
CONSTIPATION: 0
SHORTNESS OF BREATH: 0
CHEST TIGHTNESS: 0
DIARRHEA: 0

## 2025-03-22 NOTE — ASSESSMENT & PLAN NOTE
Likely secondary to pain medication.  Reports compliance with CPAP.  Did undergo sleep endoscopy and was found to have 47% central apneic events and not a candidate for Inspire.  Patient has not seen sleep medicine since before her sleep endoscopy.  Patient needs to return to sleep medicine and may need another sleep study and possibly a different type of PAP treatment.  Will reach out to Dr. Mckeon, patient's sleep specialist, and ask him to get her scheduled.

## 2025-03-22 NOTE — ASSESSMENT & PLAN NOTE
Following with urology.  There are discussions for a bladder stimulator.  Patient does have a neurogenic bladder which is the reason for her frequent UTIs.  No recent infections.

## 2025-03-22 NOTE — ASSESSMENT & PLAN NOTE
Reports compliance with CPAP.  Did undergo sleep endoscopy and was found to have 47% central apneic events and not a candidate for Inspire.  Patient has not seen sleep medicine since before her sleep endoscopy.  Patient needs to return to sleep medicine and may need another sleep study and possibly a different type of PAP treatment.  Will reach out to Dr. Mckeon, patient's sleep specialist, and ask him to get her scheduled.

## 2025-03-22 NOTE — ASSESSMENT & PLAN NOTE
Most recent DEXA in June 2024 with osteopenia.  Not currently on treatment.  Will need to get approval for Prolia as she has previously been on Reclast which was completed in 2011.

## 2025-03-22 NOTE — ASSESSMENT & PLAN NOTE
Improved.  Likely increased earlier this year due to COVID infection.  Continue olmesartan 40 mg daily

## 2025-03-22 NOTE — ASSESSMENT & PLAN NOTE
Stable.  Remains on olmesartan 40 mg daily.  No longer on hydrochlorothiazide or spironolactone due to hyponatremia.  Has Lasix to take as needed but she does not use it.  Reports compliance with CPAP.

## 2025-03-25 ENCOUNTER — CARE COORDINATION (OUTPATIENT)
Dept: CARE COORDINATION | Age: 75
End: 2025-03-25

## 2025-03-25 RX ORDER — NYSTATIN 100000 [USP'U]/G
POWDER TOPICAL
Qty: 60 G | Refills: 2 | Status: SHIPPED | OUTPATIENT
Start: 2025-03-25

## 2025-03-25 NOTE — CARE COORDINATION
Incoming VM received from patient stating Dr. Govea was going to call in \"some kind of statin powder for an irritation.\" ACM reviewed OV note but don't see where this was mentioned during her appointment. ACM will outreach Dr. Govea to discuss. ACM attempted to return call to patient. No answer, lvm requesting call return to ACM.

## 2025-03-25 NOTE — CARE COORDINATION
Ambulatory Care Coordination Note     3/25/2025 1:08 PM     Patient outreach attempt by this ACM today to perform care management follow up . ACM was unable to reach the patient by telephone today;   left voice message requesting a return phone call to this ACM.     ACM: Adele Conde RN     Care Summary Note:     PCP/Specialist follow up:   Future Appointments         Provider Specialty Dept Phone    6/5/2025 11:00 AM Romeo Duff MD Pulmonology [phone]    6/12/2025 10:00 AM Aman Callaway PA Nephrology [phone]    7/11/2025 11:00 AM Yumiko Govea,  Internal Medicine [phone]            Follow Up:   Plan for next AC outreach in approximately 1 week to complete:  - disease specific assessments  - education   - follow up appointment with Dr. Govea 3/19

## 2025-03-26 ENCOUNTER — CARE COORDINATION (OUTPATIENT)
Dept: CARE COORDINATION | Age: 75
End: 2025-03-26

## 2025-03-26 NOTE — CARE COORDINATION
Ambulatory Care Coordination Note     3/26/2025 9:47 AM     Patient Current Location:  Home: [address] [place], [place] [id number]     ACM contacted the patient by telephone. Verified name and  with patient as identifiers.         ACM: Adele Conde RN     Challenges to be reviewed by the provider   Additional needs identified to be addressed with provider No  none               Method of communication with provider: none.    Utilization: Patient has not had any utilization since our last call.     Care Summary Note:     ACM outreached patient for cc follow up.     Patient states she is doing well. F/u with Dr. Govea completed on 3/19. ACM reviewed OV note.     Sleep medicine appointment scheduled in 2025.     Dermatology - patient intends to call and schedule.     BP stable. Patient checks blood pressure at home and reports readings are WNL. Patient advised to continue monitoring blood pressure and notify Dr. Govea if readings begin trending up.     Nystatin power  prescription called into [facility], patient advised and vu. Patient reports yeast under skin folds. ACM educated on washing skin and keeping skin folds clean and dry, wearing loose fitting clothing and avoid excessive moisture or sweating.     Community resources - patient is active with [facility] [employer], receives [employer]. States she has an overabundance of food, is considering going to every other month delivery. Life alert active and in place. No new falls reported. Patient reports difficulty cleaning her home and is interested in obtaining a home health aide through [facility]. ACM offered to outreach [employer] to inquire about aide eligibility, patient agreeable.     No other needs voiced at this time.     Offered patient enrollment in the Remote Patient Monitoring (RPM) program for in-home monitoring: Yes, but did not enroll at this time: already monitoring with home equipment.     Assessments Completed:   General Assessment    Do you

## 2025-03-26 NOTE — CARE COORDINATION
ACM outreached [facility]; spoke with Chani. Confirmed patient is enrolled in [facility].   Her CM is Azeb Sandy [phone]. ACM outreached Azeb to discuss hha eligibility. No answer, lvm requesting call return.   Patient notified and vu.

## 2025-04-08 ENCOUNTER — CARE COORDINATION (OUTPATIENT)
Dept: CARE COORDINATION | Age: 75
End: 2025-04-08

## 2025-04-08 RX ORDER — AMLODIPINE BESYLATE 5 MG/1
5 TABLET ORAL DAILY
Qty: 30 TABLET | Refills: 0 | Status: SHIPPED | OUTPATIENT
Start: 2025-04-08

## 2025-04-08 NOTE — CARE COORDINATION
Ambulatory Care Coordination Note     2025 10:39 AM     Patient Current Location:  Home: [address] [place] [address]     ACM contacted the patient by telephone. Verified name and  with patient as identifiers.         ACM: Adele Conde RN     Challenges to be reviewed by the provider   Additional needs identified to be addressed with provider No  none               Method of communication with provider: none.    Utilization: Patient has not had any utilization since our last call.     Care Summary Note:     Patient reports fatigue. She has been referred to sleep medicine and has an appointment in .     Patient reports increase urination, frequency and burning. Patient states she has a standing order for a urinalysis at the [facility] and intends on dropping off a urine specimen tomorrow when she goes into town.     BP has been trending up. Patient reports compliance with medication. Takes Olmesartan 40 mg 1-2 hours prior to checking blood pressure. Tries to limit sodium in diet. Unable to exercise due to chronic generalized pain secondary to arthritis. Patient obtained her bp with ACM on the phone - 174/104. Denies chest pain, sob or blurred vision. Red flag sx reviewed. Advised on proper blood pressure technique to ensure accurate readings.      167/79  166/110  171/73  174/119  171/73  146/71  174/104 (today)    Patient states she did not receive a call from her CM with [facility] regarding aide. [facility] will place second call to Azeb Sandy [phone]. No answer, m requesting call return.     Offered patient enrollment in the Remote Patient Monitoring (RPM) program for in-home monitoring: Yes, but did not enroll at this time: already monitoring with home equipment.     Assessments Completed:   Hypertension - Encounter Level    Symptom course: worsening      ,   General Assessment    Do you have any symptoms that are causing concern?: No          Medications Reviewed:   Patient

## 2025-04-08 NOTE — CARE COORDINATION
Patient notified of new prescription: amlodipine 5 mg daily.   ACM will f/u with patient in 1 week. Encouraged sooner outreach if urgent needs arise.

## 2025-04-09 DIAGNOSIS — N39.0 RECURRENT UTI: ICD-10-CM

## 2025-04-09 DIAGNOSIS — R30.0 DYSURIA: Primary | ICD-10-CM

## 2025-04-09 DIAGNOSIS — R30.0 DYSURIA: ICD-10-CM

## 2025-04-09 LAB
BACTERIA URNS QL MICRO: NORMAL /HPF
BILIRUB UR QL STRIP.AUTO: NEGATIVE
CLARITY UR: CLEAR
COLOR UR: YELLOW
EPI CELLS #/AREA URNS AUTO: 1 /HPF (ref 0–5)
GLUCOSE UR STRIP.AUTO-MCNC: NEGATIVE MG/DL
HGB UR QL STRIP.AUTO: NEGATIVE
HYALINE CASTS #/AREA URNS AUTO: 0 /LPF (ref 0–8)
KETONES UR STRIP.AUTO-MCNC: NEGATIVE MG/DL
LEUKOCYTE ESTERASE UR QL STRIP.AUTO: ABNORMAL
NITRITE UR QL STRIP.AUTO: NEGATIVE
PH UR STRIP.AUTO: 6.5 [PH] (ref 5–8)
PROT UR STRIP.AUTO-MCNC: NEGATIVE MG/DL
RBC CLUMPS #/AREA URNS AUTO: 1 /HPF (ref 0–4)
SP GR UR STRIP.AUTO: 1.01 (ref 1–1.03)
UA DIPSTICK W REFLEX MICRO PNL UR: YES
URN SPEC COLLECT METH UR: ABNORMAL
UROBILINOGEN UR STRIP-ACNC: 0.2 E.U./DL
WBC #/AREA URNS AUTO: 3 /HPF (ref 0–5)

## 2025-04-10 ENCOUNTER — RESULTS FOLLOW-UP (OUTPATIENT)
Dept: INTERNAL MEDICINE CLINIC | Age: 75
End: 2025-04-10

## 2025-04-10 DIAGNOSIS — N39.0 RECURRENT UTI: Primary | Chronic | ICD-10-CM

## 2025-04-10 LAB — BACTERIA UR CULT: NORMAL

## 2025-04-11 ENCOUNTER — TRANSCRIBE ORDERS (OUTPATIENT)
Dept: ADMINISTRATIVE | Age: 75
End: 2025-04-11

## 2025-04-11 DIAGNOSIS — R30.0 DYSURIA: ICD-10-CM

## 2025-04-11 DIAGNOSIS — N30.01 ACUTE CYSTITIS WITH HEMATURIA: ICD-10-CM

## 2025-04-11 DIAGNOSIS — N31.9 NEUROMUSCULAR DYSFUNCTION OF BLADDER, UNSPECIFIED: ICD-10-CM

## 2025-04-11 DIAGNOSIS — N30.21 OTHER CHRONIC CYSTITIS WITH HEMATURIA: Primary | ICD-10-CM

## 2025-04-11 DIAGNOSIS — R33.9 RETENTION OF URINE, UNSPECIFIED: ICD-10-CM

## 2025-04-11 DIAGNOSIS — N36.2 URETHRAL CARUNCLE: ICD-10-CM

## 2025-04-11 DIAGNOSIS — R39.14 FEELING OF INCOMPLETE BLADDER EMPTYING: ICD-10-CM

## 2025-04-11 DIAGNOSIS — I10 ESSENTIAL HYPERTENSION: ICD-10-CM

## 2025-04-11 DIAGNOSIS — N34.3 URETHRAL SYNDROME, UNSPECIFIED: ICD-10-CM

## 2025-04-15 ENCOUNTER — CARE COORDINATION (OUTPATIENT)
Dept: CARE COORDINATION | Age: 75
End: 2025-04-15

## 2025-04-15 NOTE — CARE COORDINATION
Ambulatory Care Coordination Note     4/15/2025 11:14 AM     Patient Current Location:  Home: [address] [place] [address]     ACM contacted the patient by telephone. Verified name and  with patient as identifiers.         ACM: Adele Conde RN     Challenges to be reviewed by the provider   Additional needs identified to be addressed with provider No  none               Method of communication with provider: none.    Utilization: Patient has not had any utilization since our last call.     Care Summary Note:     ACM outreached patient for cc follow up. Patient started Amlodipine on Saturday. Patient monitoring blood pressure with home monitor and recording findings. Patient reported blood pressures: 162/62, 150/68. ACM educated patient on proper technique when obtaining blood pressure. Low sodium diet reinforced. Patient states she primarily eats food delivered by [employer]. Educated on keeping sodium intake under 3 grams per day and limit canned and processed foods. Patient advised to continue monitoring blood pressure daily approximately 1 hour after taking bp medication, patient vu.     Patient scheduled for an epidural with Dr. Zelaya on Thursday.    Patient agreeable to f/u from [facility] in approximately 1 week    Offered patient enrollment in the Remote Patient Monitoring (RPM) program for in-home monitoring: Yes, but did not enroll at this time: already monitoring with home equipment.     Assessments Completed:   Hypertension - Encounter Level    Symptom course: stable      ,   General Assessment    Do you have any symptoms that are causing concern?: No          Medications Reviewed:   Completed during this call    Advance Care Planning:   Reviewed and current     Care Planning:   Education Documentation  Teach reporting changes in condition, taught by Adlee Conde, RN at 4/15/2025 11:25 AM.  Learner: Patient  Readiness: Acceptance  Method: Explanation  Response: Verbalizes Understanding    Teach

## 2025-04-23 ENCOUNTER — HOSPITAL ENCOUNTER (OUTPATIENT)
Dept: ULTRASOUND IMAGING | Age: 75
Discharge: HOME | End: 2025-04-23
Attending: UROLOGY
Payer: MEDICARE

## 2025-04-23 ENCOUNTER — CARE COORDINATION (OUTPATIENT)
Dept: CARE COORDINATION | Age: 75
End: 2025-04-23

## 2025-04-23 DIAGNOSIS — N31.9 NEUROMUSCULAR DYSFUNCTION OF BLADDER, UNSPECIFIED: ICD-10-CM

## 2025-04-23 DIAGNOSIS — R30.0 DYSURIA: ICD-10-CM

## 2025-04-23 DIAGNOSIS — N36.2 URETHRAL CARUNCLE: ICD-10-CM

## 2025-04-23 DIAGNOSIS — N34.3 URETHRAL SYNDROME, UNSPECIFIED: ICD-10-CM

## 2025-04-23 DIAGNOSIS — N30.01 ACUTE CYSTITIS WITH HEMATURIA: ICD-10-CM

## 2025-04-23 DIAGNOSIS — R39.14 FEELING OF INCOMPLETE BLADDER EMPTYING: ICD-10-CM

## 2025-04-23 DIAGNOSIS — N30.21 OTHER CHRONIC CYSTITIS WITH HEMATURIA: ICD-10-CM

## 2025-04-23 DIAGNOSIS — I10 ESSENTIAL HYPERTENSION: ICD-10-CM

## 2025-04-23 DIAGNOSIS — R33.9 RETENTION OF URINE, UNSPECIFIED: ICD-10-CM

## 2025-04-23 PROCEDURE — 76770 US EXAM ABDO BACK WALL COMP: CPT

## 2025-04-23 NOTE — CARE COORDINATION
Ambulatory Care Coordination Note     4/23/2025 9:24 AM     Patient outreach attempt by this ACM today to perform care management follow up . ACM was unable to reach the patient by telephone today;   left voice message requesting a return phone call to this ACM.    Appears patient is scheduled for testing today. ACM will f/u at later time.      ACM: Adele Conde RN     Care Summary Note:     PCP/Specialist follow up:   Future Appointments         Provider Specialty Dept Phone    4/23/2025 10:30 AM (Arrive by 10:00 AM) [facility] 1 Radiology [phone]    6/5/2025 11:00 AM Romeo Duff MD Pulmonology [phone]    6/12/2025 10:00 AM Aman Callaway PA Nephrology [phone]    7/11/2025 11:00 AM Yumiko Govea DO Internal Medicine [phone]            Follow Up:   Plan for next ACM outreach in approximately 1-2 days  to complete:  - disease specific assessments  - education   -bp

## 2025-04-24 ENCOUNTER — CARE COORDINATION (OUTPATIENT)
Dept: CARE COORDINATION | Age: 75
End: 2025-04-24

## 2025-04-24 SDOH — HEALTH STABILITY: PHYSICAL HEALTH: ON AVERAGE, HOW MANY MINUTES DO YOU ENGAGE IN EXERCISE AT THIS LEVEL?: 0 MIN

## 2025-04-24 SDOH — SOCIAL STABILITY: SOCIAL NETWORK: HOW OFTEN DO YOU ATTEND MEETINGS OF THE CLUBS OR ORGANIZATIONS YOU BELONG TO?: NEVER

## 2025-04-24 SDOH — ECONOMIC STABILITY: FOOD INSECURITY: WITHIN THE PAST 12 MONTHS, THE FOOD YOU BOUGHT JUST DIDN'T LAST AND YOU DIDN'T HAVE MONEY TO GET MORE.: NEVER TRUE

## 2025-04-24 SDOH — HEALTH STABILITY: MENTAL HEALTH
DO YOU FEEL STRESS - TENSE, RESTLESS, NERVOUS, OR ANXIOUS, OR UNABLE TO SLEEP AT NIGHT BECAUSE YOUR MIND IS TROUBLED ALL THE TIME - THESE DAYS?: ONLY A LITTLE

## 2025-04-24 SDOH — SOCIAL STABILITY: SOCIAL NETWORK: HOW OFTEN DO YOU GET TOGETHER WITH FRIENDS OR RELATIVES?: TWICE A WEEK

## 2025-04-24 SDOH — HEALTH STABILITY: MENTAL HEALTH: HOW OFTEN DO YOU HAVE A DRINK CONTAINING ALCOHOL?: NEVER

## 2025-04-24 SDOH — ECONOMIC STABILITY: FOOD INSECURITY: WITHIN THE PAST 12 MONTHS, YOU WORRIED THAT YOUR FOOD WOULD RUN OUT BEFORE YOU GOT THE MONEY TO BUY MORE.: NEVER TRUE

## 2025-04-24 SDOH — SOCIAL STABILITY: SOCIAL INSECURITY: ARE YOU MARRIED, WIDOWED, DIVORCED, SEPARATED, NEVER MARRIED, OR LIVING WITH A PARTNER?: WIDOWED

## 2025-04-24 SDOH — SOCIAL STABILITY: SOCIAL NETWORK
DO YOU BELONG TO ANY CLUBS OR ORGANIZATIONS SUCH AS CHURCH GROUPS, UNIONS, FRATERNAL OR ATHLETIC GROUPS, OR SCHOOL GROUPS?: NO

## 2025-04-24 SDOH — SOCIAL STABILITY: SOCIAL NETWORK: HOW OFTEN DO YOU ATTEND CHURCH OR RELIGIOUS SERVICES?: MORE THAN 4 TIMES PER YEAR

## 2025-04-24 SDOH — SOCIAL STABILITY: SOCIAL NETWORK: IN A TYPICAL WEEK, HOW MANY TIMES DO YOU TALK ON THE PHONE WITH FAMILY, FRIENDS, OR NEIGHBORS?: TWICE A WEEK

## 2025-04-24 SDOH — ECONOMIC STABILITY: FOOD INSECURITY: HOW HARD IS IT FOR YOU TO PAY FOR THE VERY BASICS LIKE FOOD, HOUSING, MEDICAL CARE, AND HEATING?: NOT VERY HARD

## 2025-04-24 SDOH — HEALTH STABILITY: MENTAL HEALTH: HOW MANY DRINKS CONTAINING ALCOHOL DO YOU HAVE ON A TYPICAL DAY WHEN YOU ARE DRINKING?: PATIENT DOES NOT DRINK

## 2025-04-24 SDOH — HEALTH STABILITY: PHYSICAL HEALTH: ON AVERAGE, HOW MANY DAYS PER WEEK DO YOU ENGAGE IN MODERATE TO STRENUOUS EXERCISE (LIKE A BRISK WALK)?: 0 DAYS

## 2025-04-24 SDOH — ECONOMIC STABILITY: TRANSPORTATION INSECURITY: IN THE PAST 12 MONTHS, HAS LACK OF TRANSPORTATION KEPT YOU FROM MEDICAL APPOINTMENTS OR FROM GETTING MEDICATIONS?: NO

## 2025-04-24 ASSESSMENT — ACTIVITIES OF DAILY LIVING (ADL): LACK_OF_TRANSPORTATION: NO

## 2025-04-24 NOTE — CARE COORDINATION
Ambulatory Care Coordination Note     2025 8:52 AM     Patient Current Location:  Home: [address] [place] [address]     ACM contacted the patient by telephone. Verified name and  with patient as identifiers.         ACM: Adele Conde RN     Challenges to be reviewed by the provider   Additional needs identified to be addressed with provider Yes  Patient reports leg cramps since starting Amlodipine (she had the same sx last time she was on it.)  Taking Amlodipine 5 mg daily and Benicar 40 mg daily and checking BP 1 hour after, denies missed doses. 166/72, 172/81, 160/78, 164/69, 143/72, 154/68, 156/74. Please advise.                Method of communication with provider: chart routing.    Utilization: Patient has not had any utilization since our last call.     Care Summary Note:     ACM outreached patient for cc follow up.     Patient completed renal ultrasound yesterday r/t recurring UTI. Patient is scheduled to f/u with Dr. Palacios early next month. Patient is able to identify s/sx of UTI and when to notify her pcp or Urologist. Patient has standing order for UA.     Patient had steroid injection for her back last week. Patient reports improvement in lower back pain initially but overdid it yesterday doing yard work.     Patient continues to monitor blood pressure daily. Reports leg cramps since starting Amlodipine. ACM confirmed patient is checking her blood pressure approximately 1 hour after taking Amlodipine and Benicar, denies missed doses. Patient primary eats food from [employer]. ACM outreached [employer] and confirmed patients supplier, [employer] sends heart healthy, well balanced meals. ACM informed patient I will send BP readings to Dr. Govea and inform her of new onset leg cramps after starting Amlodipine.     Patient is active with [facility] and on the waiting list for a home health aide. SDOH completed.     No other immediate needs voiced.     Offered patient enrollment in the

## 2025-04-25 RX ORDER — HYDRALAZINE HYDROCHLORIDE 25 MG/1
25 TABLET, FILM COATED ORAL 2 TIMES DAILY
Qty: 60 TABLET | Refills: 0 | Status: SHIPPED | OUTPATIENT
Start: 2025-04-25

## 2025-04-25 NOTE — PROGRESS NOTES
Cannot take amlodipine d/t leg cramps    Cannot take HCTZ or spironolactone d/t hyponatremia    Cannot take Lasix. Has urinary incontinence.     Avoiding BB d/t HR 50's-90's but several HR 50-60's.     Add hydralazine 25 mg BID.

## 2025-04-28 DIAGNOSIS — R19.7 DIARRHEA, UNSPECIFIED TYPE: ICD-10-CM

## 2025-04-28 NOTE — TELEPHONE ENCOUNTER
Last OV: 3/19/2025  Next OV: 2025    Next appointment due:2025    Last fill:2023  Refills:1     as of 3/18/2024, does pt need appt to have this reordered?

## 2025-05-01 ENCOUNTER — CARE COORDINATION (OUTPATIENT)
Dept: CARE COORDINATION | Age: 75
End: 2025-05-01

## 2025-05-01 DIAGNOSIS — I10 ESSENTIAL HYPERTENSION: Chronic | ICD-10-CM

## 2025-05-01 NOTE — CARE COORDINATION
Ambulatory Care Coordination Note     2025 10:44 AM     Patient Current Location:  Home: [address] [place] [address]     ACM contacted the patient by telephone. Verified name and  with patient as identifiers.         ACM: Adele Conde RN     Challenges to be reviewed by the provider   Additional needs identified to be addressed with provider No  none               Method of communication with provider: none.    Utilization: Patient has not had any utilization since our last call.     Care Summary Note:     ACM outreached patient for cc follow up. Patient reports she just got over having diarrhea x4 days. BM have returned to normal, hydration reinforced. Patient requesting refill of  Diphenoxylate-Atropine, refill pending. Hx of diarrhea, patient states she requested a refill of this medication \"just in case.\"     Taking hydralazine as prescribed with improvement in blood pressure: 148/72, 138/66, 144/74. Patient will continue to monitor blood pressure daily at least 1 hour after taking bp medications and report findings back to [facility]. Patient has been educated on proper technique for obtaining blood pressure with home monitor and vu. If BP remains improved on new medication, Dr. Govea will send a refill to mail order pharmacy, patient aware and vu. Patient limites her sodium intake and has been advised to stay under 3 grams per day.     Patient agreeable to f/u from [facility] in 1-2 weeks.     Offered patient enrollment in the Remote Patient Monitoring (RPM) program for in-home monitoring: Yes, but did not enroll at this time: already monitoring with home equipment.     Assessments Completed:   Hypertension - Encounter Level    Symptom course: improving          Medications Reviewed:   Completed during this call    Advance Care Planning:   Reviewed and current     Care Planning:   Education Documentation  Teach reporting changes in condition, taught by Adele Conde, RN at 2025 10:44

## 2025-05-02 RX ORDER — CETIRIZINE HYDROCHLORIDE 10 MG/1
10 TABLET ORAL DAILY
Qty: 30 TABLET | Refills: 5 | Status: SHIPPED | OUTPATIENT
Start: 2025-05-02

## 2025-05-02 RX ORDER — POTASSIUM CHLORIDE 1500 MG/1
TABLET, EXTENDED RELEASE ORAL
Qty: 30 TABLET | Refills: 5 | Status: SHIPPED | OUTPATIENT
Start: 2025-05-02

## 2025-05-02 RX ORDER — TRAZODONE HYDROCHLORIDE 100 MG/1
200 TABLET ORAL NIGHTLY
Qty: 60 TABLET | Refills: 5 | Status: SHIPPED | OUTPATIENT
Start: 2025-05-02

## 2025-05-02 RX ORDER — ESCITALOPRAM OXALATE 20 MG/1
20 TABLET ORAL DAILY
Qty: 30 TABLET | Refills: 5 | Status: SHIPPED | OUTPATIENT
Start: 2025-05-02

## 2025-05-02 RX ORDER — ASPIRIN 81 MG
81 TABLET,CHEWABLE ORAL
Qty: 30 TABLET | Refills: 5 | Status: SHIPPED | OUTPATIENT
Start: 2025-05-02

## 2025-05-02 NOTE — RESULT ENCOUNTER NOTE
Your Covid-10 teste resulted not detected/negative. What happens if I have a negative test?    Remember to wash your hands often, avoid touching your face, stay 6 feet from people you do not live with, and wear a cloth facemask when you go out in public. A negative COVID-19 test at one point in time does not mean you will stay negative. You could become ill with COVID-19 and/or test positive at any time. If you are a close contact of a confirmed or suspected case, continue to stay home and away from others until 14 days after your last exposure. If you do not have symptoms, and were not in close contact with a confirmed or suspected case, you can stop isolating. If you currently have symptoms of COVID-19, and were not in close contact with a confirmed or suspected case, you should keep monitoring symptoms and talk to your doctor or other healthcare provider about staying home and if you need to get tested again. If you develop symptoms of COVID-19, stay at home and away from others and talk to your doctor or other healthcare provider about getting tested again. For additional information, visit coronavirus. [place].gov. For answers to your COVID-19 questions, call 9-646-0-ASK-[facility] (8-673.475.7132). c/w Synthroid

## 2025-05-05 RX ORDER — DIPHENOXYLATE HYDROCHLORIDE AND ATROPINE SULFATE 2.5; .025 MG/1; MG/1
1 TABLET ORAL PRN
Qty: 60 TABLET | Refills: 1 | Status: SHIPPED | OUTPATIENT
Start: 2025-05-05 | End: 2025-08-03

## 2025-05-12 ENCOUNTER — CARE COORDINATION (OUTPATIENT)
Dept: CARE COORDINATION | Age: 75
End: 2025-05-12

## 2025-05-12 RX ORDER — HYDRALAZINE HYDROCHLORIDE 25 MG/1
25 TABLET, FILM COATED ORAL 2 TIMES DAILY
Qty: 60 TABLET | Refills: 5 | Status: SHIPPED | OUTPATIENT
Start: 2025-05-12

## 2025-05-12 NOTE — CARE COORDINATION
Ambulatory Care Coordination Note     2025 9:05 AM     Patient Current Location:  Home: [address] [place] [address]     ACM contacted the patient by telephone. Verified name and  with patient as identifiers.         ACM: Adele Conde RN     Challenges to be reviewed by the provider   Additional needs identified to be addressed with provider No  none               Method of communication with provider: none.    Utilization: Patient has not had any utilization since our last call.     Care Summary Note:     ACM outreached patient for cc follow up. Patient reports worsening chronic lower back pain. Received an epidural a few weeks ago with no relief. Patient states pain management is working with her insurance to get another injection approved.    Scheduled for f/u with Urology on .    BP improved with hydralazine 25 mg BID. Continues to monitor her BP daily: 153/74 148/54 139/56 128/58 149/65 127/54 138/61 152/82. Patient will continue monitoring blood pressure with home monitor and reports abnormal readings to ACM of pcp.     Offered patient enrollment in the Remote Patient Monitoring (RPM) program for in-home monitoring: Yes, but did not enroll at this time: already monitoring with home equipment.     Assessments Completed:   Hypertension - Encounter Level    Symptom course: stable      ,   General Assessment    Do you have any symptoms that are causing concern?: No          Medications Reviewed:   Patient denies any changes with medications and reports taking all medications as prescribed.    Advance Care Planning:   Reviewed and current     Care Planning:   Not completed during this call    PCP/Specialist follow up:   Future Appointments         Provider Specialty Dept Phone    2025 11:00 AM Romeo Duff MD Pulmonology [phone]    2025 10:00 AM Aman Callaway PA Nephrology [phone]    2025 11:00 AM Yumiko Govea DO Internal Medicine [phone]

## 2025-05-15 ENCOUNTER — TRANSCRIBE ORDERS (OUTPATIENT)
Dept: ADMINISTRATIVE | Age: 75
End: 2025-05-15

## 2025-05-15 DIAGNOSIS — N13.30 HYDROCALYCOSIS: Primary | ICD-10-CM

## 2025-05-19 ENCOUNTER — CARE COORDINATION (OUTPATIENT)
Dept: CARE COORDINATION | Age: 75
End: 2025-05-19

## 2025-05-19 ENCOUNTER — OFFICE VISIT (OUTPATIENT)
Dept: INTERNAL MEDICINE CLINIC | Age: 75
End: 2025-05-19
Payer: MEDICARE

## 2025-05-19 VITALS
WEIGHT: 176 LBS | HEART RATE: 72 BPM | BODY MASS INDEX: 37.97 KG/M2 | SYSTOLIC BLOOD PRESSURE: 158 MMHG | HEIGHT: 57 IN | OXYGEN SATURATION: 92 % | DIASTOLIC BLOOD PRESSURE: 80 MMHG

## 2025-05-19 DIAGNOSIS — M54.6 ACUTE RIGHT-SIDED THORACIC BACK PAIN: ICD-10-CM

## 2025-05-19 DIAGNOSIS — I10 ESSENTIAL HYPERTENSION: ICD-10-CM

## 2025-05-19 DIAGNOSIS — R10.9 FLANK PAIN: Primary | ICD-10-CM

## 2025-05-19 LAB
BILIRUBIN, POC: ABNORMAL
BLOOD URINE, POC: ABNORMAL
CLARITY, POC: CLEAR
COLOR, POC: YELLOW
GLUCOSE URINE, POC: ABNORMAL MG/DL
KETONES, POC: ABNORMAL MG/DL
LEUKOCYTE EST, POC: ABNORMAL
NITRITE, POC: ABNORMAL
PH, POC: 6.5
PROTEIN, POC: ABNORMAL MG/DL
SPECIFIC GRAVITY, POC: 1.01
UROBILINOGEN, POC: 0.2 MG/DL

## 2025-05-19 PROCEDURE — 3079F DIAST BP 80-89 MM HG: CPT | Performed by: NURSE PRACTITIONER

## 2025-05-19 PROCEDURE — 3077F SYST BP >= 140 MM HG: CPT | Performed by: NURSE PRACTITIONER

## 2025-05-19 PROCEDURE — 1159F MED LIST DOCD IN RCRD: CPT | Performed by: NURSE PRACTITIONER

## 2025-05-19 PROCEDURE — 1160F RVW MEDS BY RX/DR IN RCRD: CPT | Performed by: NURSE PRACTITIONER

## 2025-05-19 PROCEDURE — 1123F ACP DISCUSS/DSCN MKR DOCD: CPT | Performed by: NURSE PRACTITIONER

## 2025-05-19 PROCEDURE — 99214 OFFICE O/P EST MOD 30 MIN: CPT | Performed by: NURSE PRACTITIONER

## 2025-05-19 PROCEDURE — 81002 URINALYSIS NONAUTO W/O SCOPE: CPT | Performed by: NURSE PRACTITIONER

## 2025-05-19 RX ORDER — PREDNISONE 10 MG/1
TABLET ORAL
Qty: 30 TABLET | Refills: 0 | Status: SHIPPED | OUTPATIENT
Start: 2025-05-19

## 2025-05-19 NOTE — PROGRESS NOTES
Office Visit   5/19/2025    Assessment and Plan:  1. Flank pain       -  acute, new problem       -  UA-trace blood otherwise normal       -  Urine culture sent       -  follows with Urology, Dr. Nelson Palacios  Orders:  -    POCT Urinalysis no Micro  -     Culture, Urine  2. Acute right-sided thoracic back pain  -     predniSONE (DELTASONE) 10 MG tablet; 4 tabs po qd x 3 days, 3 tabs po 3 days, 2 tabs x 3 days, 1 tabs qd x 3 days, Disp-30 tablet, R-0Normal    3. Essential hypertension       Blood pressure is elevated today.  Patient did not take her BP meds.  Usually controlled on olmesartan 40 mg daily.  Monitor BP at home.  Follow-up with primary care provider in 2 weeks    Return in about 2 weeks (around 6/2/2025) for with Dr. Govea for HTN and back pain.     Subjective:  Chief Complaint   Patient presents with    Flank Pain    Fatigue     All started last week, Thursday. Was at urology on the 13th and there was no infection.        HPI:   Mary Jane Dickey is a 75 y.o. female who presents to the clinic today for acute visit.    Patient states she couldn't get out of bed all weekend due to feeling weak.  Not eating and drinking as much due to sleeping all the time.    Lives on her own-has meals on wheels  Had renal US on 4/3/25-mild left sided hydronephrosis.  No nephrolithiasis.  Right sided back for 3 to 4 weeks worse with twisting or movement.  Better if laying down  Taking Baclofen once a day  Did not take BP medicines today    Review of Systems   Constitutional:  Positive for fatigue. Negative for chills and fever.   Respiratory:  Negative for cough, shortness of breath and wheezing.    Cardiovascular:  Negative for chest pain, palpitations and leg swelling.   Musculoskeletal:  Positive for back pain.   Psychiatric/Behavioral:  Positive for sleep disturbance. Negative for dysphoric mood. The patient is not nervous/anxious.        Allergies   Allergen Reactions    Amlodipine Swelling    Ativan [Lorazepam]

## 2025-05-19 NOTE — CARE COORDINATION
Ambulatory Care Coordination Note     2025 8:50 AM     Patient Current Location:  Home: [address] [place] [address]     ACM contacted the patient by telephone. Verified name and  with patient as identifiers.         ACM: Adele Conde RN     Challenges to be reviewed by the provider   Additional needs identified to be addressed with provider Yes  Weakness, difficulty urinating (patient reports this is not new), feels \"rotten\" all over, back pain, thinks she may feel sob with exertion but isn't sure. /80, pulse 88 . Assisted patient with scheduling same day appointment.                 Method of communication with provider:  same day appointment scheduled .    Utilization: Patient has not had any utilization since our last call.     Care Summary Note:     ACM outreached patient for cc follow up. Patient states she missed her grandson's wedding over the weekend due to feeling unwell. Patient reports she feels \"rotten all over,\" unsteady on her feet, not peeing like she should (ongoing issue), and thinks she may feel sob with exertion but isn't sure. No new falls reported. BP check this morning was 144/80 pulse 88. Denies fever or chest pain. Urinary issues are not new. Hx of recurring UTI. States she was seen by Urology last week and urine sample was clear. Scheduled for CT scan abd/pelvis on . Patient agreeable to same day appointment - [facility] assisted with scheduling. Patient intends to ask a friend to drive her. Red flag sx reviewed, patient vu.     Patient agreeable to follow up from [facility] in 1-2 days.     Offered patient enrollment in the Remote Patient Monitoring (RPM) program for in-home monitoring: Yes, but did not enroll at this time: already monitoring with home equipment.     Assessments Completed:   Hypertension - Encounter Level    Symptom course: stable      ,   General Assessment    Do you have any symptoms that are causing concern?: Yes  Reported Symptoms:  (Comment:

## 2025-05-20 LAB — BACTERIA UR CULT: NORMAL

## 2025-05-21 ENCOUNTER — CARE COORDINATION (OUTPATIENT)
Dept: CARE COORDINATION | Age: 75
End: 2025-05-21

## 2025-05-21 ENCOUNTER — RESULTS FOLLOW-UP (OUTPATIENT)
Dept: INTERNAL MEDICINE CLINIC | Age: 75
End: 2025-05-21

## 2025-05-21 RX ORDER — ESTRADIOL 0.1 MG/G
CREAM VAGINAL
Qty: 42.5 G | Refills: 3 | Status: SHIPPED | OUTPATIENT
Start: 2025-05-21

## 2025-05-21 RX ORDER — BACLOFEN 10 MG/1
10 TABLET ORAL 3 TIMES DAILY PRN
Qty: 60 TABLET | Refills: 2 | Status: SHIPPED | OUTPATIENT
Start: 2025-05-21

## 2025-05-21 NOTE — CARE COORDINATION
Ambulatory Care Coordination Note     2025 8:33 AM     Patient Current Location:  Home: [address] [place] [address]     Patient contacted the ACM by telephone. Verified name and  with patient as identifiers.         ACM: Adele Conde RN     Challenges to be reviewed by the provider   Additional needs identified to be addressed with provider No               Method of communication with provider: none.    Utilization: Patient has not had any utilization since our last call.     Care Summary Note:     Incoming call received from patient who reports she is taking prednisone with improvement in back pain. Patient wondering if taking a steroid will effect the results of her upcoming CT scan abdomen/pelvis. ACM informed patient this should not effect the results of imaging but advised she call Urology to confirm, patient vu and intends to call today.     Urine negative for infections, patient aware and vu. No new or worsening sx reported. Patient followed by Urology for recurring urinary infection. Patient has standing order for urinalysis in place. Patient is able to identify s/sx of UTI and appropriate interventions.     No other immediate concerns reported. Patient agreeable to follow up from ACM in 1-2 weeks but will outreach ACM of [facility] if urgent needs arise.     Offered patient enrollment in the Remote Patient Monitoring (RPM) program for in-home monitoring: Yes, but did not enroll at this time: already monitoring with home equipment.     Assessments Completed:   General Assessment    Do you have any symptoms that are causing concern?: Yes  Progression since Onset: Gradually Improving  Reported Symptoms:  (Comment: back pain)          Medications Reviewed:   Completed during this call  Taking prednisone with improvement in back pain    Advance Care Planning:   Reviewed and current     Care Planning:   Not completed during this call    PCP/Specialist follow up:   Future Appointments

## 2025-05-22 ENCOUNTER — HOSPITAL ENCOUNTER (OUTPATIENT)
Dept: CT IMAGING | Age: 75
Discharge: HOME | End: 2025-05-22
Attending: UROLOGY
Payer: MEDICARE

## 2025-05-22 DIAGNOSIS — N13.30 HYDROCALYCOSIS: ICD-10-CM

## 2025-05-22 PROCEDURE — 74176 CT ABD & PELVIS W/O CONTRAST: CPT

## 2025-05-27 ENCOUNTER — CARE COORDINATION (OUTPATIENT)
Dept: CARE COORDINATION | Age: 75
End: 2025-05-27

## 2025-05-27 NOTE — CARE COORDINATION
Incoming VM received from patient requesting refill of hydralazine, patient states she has a few days left. [facility] outreached patient and informed her that a refill was sent to [facility] on 5/12. Patient will outreach [facility] to inquire about the refill and notify [facility] if a short term supply needs sent to her local pharmacy.

## 2025-05-28 PROBLEM — M54.6 ACUTE RIGHT-SIDED THORACIC BACK PAIN: Status: ACTIVE | Noted: 2025-05-28

## 2025-05-28 PROBLEM — R10.9 FLANK PAIN: Status: ACTIVE | Noted: 2025-05-28

## 2025-05-28 ASSESSMENT — ENCOUNTER SYMPTOMS: BACK PAIN: 1

## 2025-06-03 RX ORDER — BUSPIRONE HYDROCHLORIDE 15 MG/1
15 TABLET ORAL 3 TIMES DAILY
Qty: 90 TABLET | Refills: 5 | Status: SHIPPED | OUTPATIENT
Start: 2025-06-03

## 2025-06-03 NOTE — TELEPHONE ENCOUNTER
Last OV: 5/19/2025  Next OV: 7/11/2025    Next appointment due:6/02/2025    Last fill:11/04/2024  Refills:5    1 year previously ordered, OK to fill

## 2025-06-04 ENCOUNTER — HOSPITAL ENCOUNTER (OUTPATIENT)
Age: 75
Discharge: HOME | End: 2025-06-04
Payer: MEDICARE

## 2025-06-04 ENCOUNTER — CARE COORDINATION (OUTPATIENT)
Dept: CARE COORDINATION | Age: 75
End: 2025-06-04

## 2025-06-04 DIAGNOSIS — M81.0 OSTEOPOROSIS, POST-MENOPAUSAL: Chronic | ICD-10-CM

## 2025-06-04 DIAGNOSIS — I10 ESSENTIAL HYPERTENSION: Chronic | ICD-10-CM

## 2025-06-04 DIAGNOSIS — E53.8 VITAMIN B12 DEFICIENCY: ICD-10-CM

## 2025-06-04 DIAGNOSIS — E78.5 HYPERLIPIDEMIA LDL GOAL <100: ICD-10-CM

## 2025-06-04 DIAGNOSIS — E55.9 VITAMIN D INSUFFICIENCY: ICD-10-CM

## 2025-06-04 LAB
25(OH)D3 SERPL-MCNC: 41.8 NG/ML
ALBUMIN SERPL-MCNC: 3.8 G/DL (ref 3.4–5)
ALBUMIN/GLOB SERPL: 1.6 {RATIO} (ref 1.1–2.2)
ALP SERPL-CCNC: 38 U/L (ref 40–129)
ALT SERPL-CCNC: 32 U/L (ref 10–40)
ANION GAP SERPL CALCULATED.3IONS-SCNC: 9 MMOL/L (ref 3–16)
AST SERPL-CCNC: 31 U/L (ref 15–37)
BACTERIA URNS QL MICRO: NORMAL /HPF
BASOPHILS # BLD: 0 K/UL (ref 0–0.2)
BASOPHILS NFR BLD: 0.7 %
BILIRUB SERPL-MCNC: 0.3 MG/DL (ref 0–1)
BILIRUB UR QL STRIP.AUTO: NEGATIVE
BUN SERPL-MCNC: 11 MG/DL (ref 7–20)
CALCIUM SERPL-MCNC: 9 MG/DL (ref 8.3–10.6)
CHLORIDE SERPL-SCNC: 99 MMOL/L (ref 99–110)
CHOLEST SERPL-MCNC: 138 MG/DL (ref 0–199)
CLARITY UR: CLEAR
CO2 SERPL-SCNC: 26 MMOL/L (ref 21–32)
COLOR UR: YELLOW
CREAT SERPL-MCNC: 0.7 MG/DL (ref 0.6–1.2)
CREAT UR-MCNC: 40.9 MG/DL (ref 28–259)
DEPRECATED RDW RBC AUTO: 13.5 % (ref 12.4–15.4)
EOSINOPHIL # BLD: 0.3 K/UL (ref 0–0.6)
EOSINOPHIL NFR BLD: 4.8 %
EPI CELLS #/AREA URNS AUTO: 2 /HPF (ref 0–5)
FOLATE SERPL-MCNC: 14.7 NG/ML (ref 4.78–24.2)
GFR SERPLBLD CREATININE-BSD FMLA CKD-EPI: 90 ML/MIN/{1.73_M2}
GLUCOSE SERPL-MCNC: 135 MG/DL (ref 70–99)
GLUCOSE UR STRIP.AUTO-MCNC: NEGATIVE MG/DL
HCT VFR BLD AUTO: 35.9 % (ref 36–48)
HDLC SERPL-MCNC: 54 MG/DL (ref 40–60)
HGB BLD-MCNC: 11.9 G/DL (ref 12–16)
HGB UR QL STRIP.AUTO: NEGATIVE
HYALINE CASTS #/AREA URNS AUTO: 0 /LPF (ref 0–8)
KETONES UR STRIP.AUTO-MCNC: NEGATIVE MG/DL
LDLC SERPL CALC-MCNC: ABNORMAL MG/DL
LDLC SERPL-MCNC: 34 MG/DL
LEUKOCYTE ESTERASE UR QL STRIP.AUTO: ABNORMAL
LYMPHOCYTES # BLD: 1.8 K/UL (ref 1–5.1)
LYMPHOCYTES NFR BLD: 28.7 %
MAGNESIUM SERPL-MCNC: 1.8 MG/DL (ref 1.8–2.4)
MCH RBC QN AUTO: 31.2 PG (ref 26–34)
MCHC RBC AUTO-ENTMCNC: 33.2 G/DL (ref 31–36)
MCV RBC AUTO: 93.9 FL (ref 80–100)
MONOCYTES # BLD: 0.5 K/UL (ref 0–1.3)
MONOCYTES NFR BLD: 8.3 %
NEUTROPHILS # BLD: 3.7 K/UL (ref 1.7–7.7)
NEUTROPHILS NFR BLD: 57.5 %
NITRITE UR QL STRIP.AUTO: NEGATIVE
PH UR STRIP.AUTO: 6.5 [PH] (ref 5–8)
PHOSPHATE SERPL-MCNC: 3.6 MG/DL (ref 2.5–4.9)
PLATELET # BLD AUTO: 163 K/UL (ref 135–450)
PMV BLD AUTO: 8.2 FL (ref 5–10.5)
POTASSIUM SERPL-SCNC: 4.4 MMOL/L (ref 3.5–5.1)
PROT SERPL-MCNC: 6.2 G/DL (ref 6.4–8.2)
PROT UR STRIP.AUTO-MCNC: NEGATIVE MG/DL
PROT UR-MCNC: 8.66 MG/DL
PROT/CREAT UR-RTO: 0.2 MG/DL
RBC # BLD AUTO: 3.83 M/UL (ref 4–5.2)
RBC CLUMPS #/AREA URNS AUTO: 3 /HPF (ref 0–4)
SODIUM SERPL-SCNC: 134 MMOL/L (ref 136–145)
SP GR UR STRIP.AUTO: <=1.005 (ref 1–1.03)
TRIGL SERPL-MCNC: 316 MG/DL (ref 0–150)
TSH SERPL DL<=0.005 MIU/L-ACNC: 1.35 UIU/ML (ref 0.27–4.2)
UA COMPLETE W REFLEX CULTURE PNL UR: ABNORMAL
UA DIPSTICK W REFLEX MICRO PNL UR: YES
URN SPEC COLLECT METH UR: ABNORMAL
UROBILINOGEN UR STRIP-ACNC: 0.2 E.U./DL
VIT B12 SERPL-MCNC: 548 PG/ML (ref 211–911)
VLDLC SERPL CALC-MCNC: ABNORMAL MG/DL
WBC # BLD AUTO: 6.4 K/UL (ref 4–11)
WBC #/AREA URNS AUTO: 4 /HPF (ref 0–5)

## 2025-06-04 PROCEDURE — 81001 URINALYSIS AUTO W/SCOPE: CPT

## 2025-06-04 PROCEDURE — 84100 ASSAY OF PHOSPHORUS: CPT

## 2025-06-04 PROCEDURE — 82570 ASSAY OF URINE CREATININE: CPT

## 2025-06-04 PROCEDURE — 82607 VITAMIN B-12: CPT

## 2025-06-04 PROCEDURE — 80061 LIPID PANEL: CPT

## 2025-06-04 PROCEDURE — 82746 ASSAY OF FOLIC ACID SERUM: CPT

## 2025-06-04 PROCEDURE — 84156 ASSAY OF PROTEIN URINE: CPT

## 2025-06-04 PROCEDURE — 83735 ASSAY OF MAGNESIUM: CPT

## 2025-06-04 PROCEDURE — 36415 COLL VENOUS BLD VENIPUNCTURE: CPT

## 2025-06-04 PROCEDURE — 84443 ASSAY THYROID STIM HORMONE: CPT

## 2025-06-04 PROCEDURE — 85025 COMPLETE CBC W/AUTO DIFF WBC: CPT

## 2025-06-04 PROCEDURE — 80053 COMPREHEN METABOLIC PANEL: CPT

## 2025-06-04 PROCEDURE — 82306 VITAMIN D 25 HYDROXY: CPT

## 2025-06-04 NOTE — CARE COORDINATION
Ambulatory Care Coordination Note     2025 10:13 AM     Patient Current Location:  Home: [address] [place] [address]     ACM contacted the patient by telephone. Verified name and  with patient as identifiers.         ACM: Adele Conde RN     Challenges to be reviewed by the provider   Additional needs identified to be addressed with provider Yes                 Method of communication with provider: none.    Utilization: Patient has not had any utilization since our last call.     Care Summary Note:     ACM returned call to patient who reports she doesn't feel well since completing prednisone. Patient states she felt so much better while on prednisone but now her chronic pain and fatigue has returned. ACM educated patient on the benefits of prednisone but due to the potential for serious side effects it isn't usually recommended for long term use. Patient is scheduled to see Dr. Govea next month and declined sooner appointment. Patient states she will continue to monitor her sx and outreach [facility] for a sooner appointment if needed. Patient states she is at her neurology appointment and requests to end the call.     Offered patient enrollment in the Remote Patient Monitoring (RPM) program for in-home monitoring: Yes, but did not enroll at this time: already monitoring with home equipment.     Assessments Completed:   Hypertension - Encounter Level    Symptom course: stable      ,   General Assessment    Do you have any symptoms that are causing concern?: Yes  Progression since Onset: Unchanged  Reported Symptoms:  (Comment: generalized pain, fatigue)          Medications Reviewed:   Patient denies any changes with medications and reports taking all medications as prescribed.    Advance Care Planning:   Reviewed and current     Care Planning:   Not completed during this call    PCP/Specialist follow up:   Future Appointments         Provider Specialty Dept Phone    2025 11:00 AM

## 2025-06-05 ENCOUNTER — TELEMEDICINE (OUTPATIENT)
Age: 75
End: 2025-06-05
Payer: MEDICARE

## 2025-06-05 DIAGNOSIS — G47.39 MIXED SLEEP APNEA: ICD-10-CM

## 2025-06-05 DIAGNOSIS — G47.31 CENTRAL SLEEP APNEA: Primary | ICD-10-CM

## 2025-06-05 PROCEDURE — G2211 COMPLEX E/M VISIT ADD ON: HCPCS | Performed by: STUDENT IN AN ORGANIZED HEALTH CARE EDUCATION/TRAINING PROGRAM

## 2025-06-05 PROCEDURE — 99214 OFFICE O/P EST MOD 30 MIN: CPT | Performed by: STUDENT IN AN ORGANIZED HEALTH CARE EDUCATION/TRAINING PROGRAM

## 2025-06-05 RX ORDER — IBUPROFEN 800 MG/1
TABLET, FILM COATED ORAL
COMMUNITY
Start: 2025-05-25

## 2025-06-05 RX ORDER — FLUTICASONE PROPIONATE 50 MCG
SPRAY, SUSPENSION (ML) NASAL
COMMUNITY
Start: 2025-04-02

## 2025-06-05 ASSESSMENT — SLEEP AND FATIGUE QUESTIONNAIRES
HOW LIKELY ARE YOU TO NOD OFF OR FALL ASLEEP WHEN YOU ARE A PASSENGER IN A CAR FOR AN HOUR WITHOUT A BREAK: SLIGHT CHANCE OF DOZING
HOW LIKELY ARE YOU TO NOD OFF OR FALL ASLEEP WHILE WATCHING TV: HIGH CHANCE OF DOZING
HOW LIKELY ARE YOU TO NOD OFF OR FALL ASLEEP WHILE SITTING QUIETLY AFTER LUNCH WITHOUT ALCOHOL: MODERATE CHANCE OF DOZING
HOW LIKELY ARE YOU TO NOD OFF OR FALL ASLEEP WHILE SITTING INACTIVE IN A PUBLIC PLACE: WOULD NEVER DOZE
HOW LIKELY ARE YOU TO NOD OFF OR FALL ASLEEP WHILE LYING DOWN TO REST IN THE AFTERNOON WHEN CIRCUMSTANCES PERMIT: HIGH CHANCE OF DOZING
HOW LIKELY ARE YOU TO NOD OFF OR FALL ASLEEP WHILE SITTING AND TALKING TO SOMEONE: WOULD NEVER DOZE
ESS TOTAL SCORE: 11
HOW LIKELY ARE YOU TO NOD OFF OR FALL ASLEEP WHILE SITTING AND READING: MODERATE CHANCE OF DOZING
HOW LIKELY ARE YOU TO NOD OFF OR FALL ASLEEP IN A CAR, WHILE STOPPED FOR A FEW MINUTES IN TRAFFIC: WOULD NEVER DOZE

## 2025-06-05 NOTE — PROGRESS NOTES
[facility]  Sleep Medicine  [address], [address]  [place] [address]    Chief Complaint   Patient presents with    New Patient     Sleep apnea    LUH Nichols       Mary Jane Dickey is a 75 y.o. female who comes in for evaluation of previously diagnosed BEBE and insomnia. She was diagnosed in several years ago. Patient has been on PAP therapy since then.    Pertinent PMHx includes: BEBE, dementia, OA, insomnia, anxiety and depression.    She was diagnosed with moderate obstructive sleep apnea and is being treated with PAP therapy.   She has been on PAP therapy for some years.  She states she wears the PAP device most nights.   She goes to bed at variable times. She falls asleep in 10-15 minutes.  She awakens a few times per night (usually to use the bathroom) and but usually he is able to fall back to sleep. The average total amount of sleep is variable hours per night and takes naps. She does use sleep aid/s: trazodone 200 mg nightly. Symptoms of sleep apnea have improved since using PAP therapy.  She continues to have complaints of daytime sleepiness. She is not snoring with the machine on.  She does complain of too high pressure.   DME: [employer]  Mask: nasal mask  Machine: Dreamstation 2 Auto CPAP    Caffeinated drinks/day: 1-2 glasses of tea  Alcohol drinks/day/week: 3 glasses a day  Occupation: retired      DATA REVIEWED TODAY:    Diagnostic Review (most recent)  PSG on 8/22/2023 showed apnea hypopnea index of 16.3/h with oxygen desaturation down to a damon of 83%.  This study was completed at [facility].    [place] & Insomnia Severity Index scores      6/5/2025    11:09 AM 2/21/2023    10:54 AM 12/12/2022    10:58 AM 7/1/2021     9:39 AM 4/1/2021    10:18 AM 1/5/2021    12:05 PM   Sleep Medicine   Sitting and reading 2 1 1 0 2 1   Watching TV 3 2 1 1 2 0   Sitting, inactive in a public place (e.g. a theatre or a meeting) 0 0 0 0 0 0   As a passenger in a car for an hour without a break 1 0 0

## 2025-06-06 ENCOUNTER — TELEPHONE (OUTPATIENT)
Dept: SLEEP CENTER | Age: 75
End: 2025-06-06

## 2025-06-06 NOTE — TELEPHONE ENCOUNTER
Called to schedule cpap per Ash     Someone picked up and said to put on no call list for selling something   Assuming they thought I was a spam caller     Emailed pt with lab's #     Medicare insurance

## 2025-06-11 ENCOUNTER — CARE COORDINATION (OUTPATIENT)
Dept: CARE COORDINATION | Age: 75
End: 2025-06-11

## 2025-06-11 NOTE — CARE COORDINATION
[facility] outreached patient for cc follow up. Patient is grocery shopping at the time of my call, requests call return from [facility] next week.

## 2025-06-18 ENCOUNTER — CARE COORDINATION (OUTPATIENT)
Dept: CARE COORDINATION | Age: 75
End: 2025-06-18

## 2025-06-18 NOTE — CARE COORDINATION
Ambulatory Care Coordination Note     2025 11:51 AM     Patient Current Location:  Home: [address] [place] [address]     ACM contacted the patient by telephone. Verified name and  with patient as identifiers.         ACM: Adele Conde RN     Challenges to be reviewed by the provider   Additional needs identified to be addressed with provider Yes  Puffy/watery eyes, increase gong, mild non-productive cough, sinus pressure/headache x2 months. Afebrile.                Method of communication with provider: phone.    Utilization: Patient has not had any utilization since our last call.     Care Summary Note: ACM returned call to patient who reports watery/puffy eyes, non-productive cough, increase gong, sinus pressure/headache, sensitivity to light x2 months. Patient feel sx are related to seasonal allergies. Afebrile. Taking Zyrtec, loratadine and flonase nasal spray with minimal relief. Patient requesting additional tx. Patient advised ACM will outreach Griselda with [facility] to discuss same day VV, patient vu.     ACM spoke with Griselda. No same day VV available. ACM offered in office appointment. Patient states she will wait to address her sx at her next appt with Dr. Govea on 7/10. Patient will call the office if sooner appt is needed.     Patient requesting a refill of Voltaren gel. After review of chart, it appears this is an outdated, discontinued medication. Patient states she will discuss a refill with Dr. Govea next month.     Patient was seen by nephrology on . OV note reviewed. Nephrology satisfied with BP controlled. Patient will continue checking with home monitor and report abnormal readings to pcp.     Offered patient enrollment in the Remote Patient Monitoring (RPM) program for in-home monitoring: Yes, but did not enroll at this time: already monitoring with home equipment.     Assessments Completed:   General Assessment    Do you have any symptoms that are causing concern?:

## 2025-07-02 RX ORDER — OLMESARTAN MEDOXOMIL 40 MG/1
40 TABLET ORAL DAILY
Qty: 90 TABLET | Refills: 3 | Status: SHIPPED | OUTPATIENT
Start: 2025-07-02

## 2025-07-07 ENCOUNTER — CARE COORDINATION (OUTPATIENT)
Dept: CARE COORDINATION | Age: 75
End: 2025-07-07

## 2025-07-07 NOTE — CARE COORDINATION
Ambulatory Care Coordination Note     2025 9:28 AM     Patient Current Location:  Home: [address] [place] [address]     ACM contacted the patient by telephone. Verified name and  with patient as identifiers.         ACM: Adele Conde RN     Challenges to be reviewed by the provider   Additional needs identified to be addressed with provider No                 Method of communication with provider: none.    Utilization: Patient has not had any utilization since our last call.     Care Summary Note: ACM outreached patient who reports she is doing well. Sinus pressure, headache and light sensitivity resolved. Patient attributes sx to seasonal allergies.     Patient states she received an injection in her back a few weeks ago which helped with the pain.     She continues to monitor her blood pressure prn. She admits she hasn't checked it recently. ACM advised patient monitor her blood pressure 2-3 times weekly and record findings, patient vu and is agreeable to this plan. Patient is able to identify s/sx of htn and when to notify her pcp.     No immediate concerns reported.     Offered patient enrollment in the Remote Patient Monitoring (RPM) program for in-home monitoring: Yes, but did not enroll at this time: already monitoring with home equipment.     Assessments Completed:   No changes since last call    Medications Reviewed:   Patient denies any changes with medications and reports taking all medications as prescribed.    Advance Care Planning:   Reviewed and current     Care Planning:   Not completed during this call    PCP/Specialist follow up:   Future Appointments         Provider Specialty Dept Phone    7/10/2025 11:00 AM Yumiko Govea DO Internal Medicine [phone]    2025 8:00 PM [facility] SLEEP LAB ROOM 2 Sleep Center [phone]    2026 11:00 AM Aman Callaway PA Nephrology [phone]            Follow Up:   Plan for next AC outreach in approximately 3 weeks to

## 2025-07-10 ENCOUNTER — OFFICE VISIT (OUTPATIENT)
Dept: INTERNAL MEDICINE CLINIC | Age: 75
End: 2025-07-10

## 2025-07-10 VITALS
DIASTOLIC BLOOD PRESSURE: 70 MMHG | BODY MASS INDEX: 38.27 KG/M2 | HEART RATE: 78 BPM | WEIGHT: 177.4 LBS | HEIGHT: 57 IN | OXYGEN SATURATION: 92 % | SYSTOLIC BLOOD PRESSURE: 130 MMHG

## 2025-07-10 DIAGNOSIS — N39.0 RECURRENT UTI: Chronic | ICD-10-CM

## 2025-07-10 DIAGNOSIS — E66.01 MORBID (SEVERE) OBESITY DUE TO EXCESS CALORIES (HCC): ICD-10-CM

## 2025-07-10 DIAGNOSIS — F41.9 ANXIETY: ICD-10-CM

## 2025-07-10 DIAGNOSIS — G30.1 LATE ONSET ALZHEIMER'S DISEASE WITHOUT BEHAVIORAL DISTURBANCE (HCC): ICD-10-CM

## 2025-07-10 DIAGNOSIS — E66.01 SEVERE OBESITY (BMI 35.0-39.9) WITH COMORBIDITY (HCC): ICD-10-CM

## 2025-07-10 DIAGNOSIS — M51.360 DEGENERATION OF INTERVERTEBRAL DISC OF LUMBAR REGION WITH DISCOGENIC BACK PAIN: Primary | ICD-10-CM

## 2025-07-10 DIAGNOSIS — G47.37 CENTRAL SLEEP APNEA DUE TO MEDICAL CONDITION: ICD-10-CM

## 2025-07-10 DIAGNOSIS — I10 ESSENTIAL HYPERTENSION: Chronic | ICD-10-CM

## 2025-07-10 DIAGNOSIS — E78.5 HYPERLIPIDEMIA LDL GOAL <100: ICD-10-CM

## 2025-07-10 DIAGNOSIS — F02.80 LATE ONSET ALZHEIMER'S DISEASE WITHOUT BEHAVIORAL DISTURBANCE (HCC): ICD-10-CM

## 2025-07-10 DIAGNOSIS — J30.2 SEASONAL ALLERGIES: ICD-10-CM

## 2025-07-10 DIAGNOSIS — G47.33 OBSTRUCTIVE SLEEP APNEA ON CPAP: ICD-10-CM

## 2025-07-10 DIAGNOSIS — J30.0 CHRONIC VASOMOTOR RHINITIS: Chronic | ICD-10-CM

## 2025-07-10 DIAGNOSIS — F33.1 MODERATE EPISODE OF RECURRENT MAJOR DEPRESSIVE DISORDER (HCC): Chronic | ICD-10-CM

## 2025-07-10 DIAGNOSIS — G47.10 HYPERSOMNOLENT: ICD-10-CM

## 2025-07-10 DIAGNOSIS — R73.9 HYPERGLYCEMIA: ICD-10-CM

## 2025-07-10 DIAGNOSIS — L21.0 SEBORRHEA CAPITIS: ICD-10-CM

## 2025-07-10 PROBLEM — M54.6 ACUTE RIGHT-SIDED THORACIC BACK PAIN: Status: RESOLVED | Noted: 2025-05-28 | Resolved: 2025-07-10

## 2025-07-10 RX ORDER — FLUTICASONE PROPIONATE 50 MCG
2 SPRAY, SUSPENSION (ML) NASAL DAILY
Qty: 16 G | Refills: 5 | Status: SHIPPED | OUTPATIENT
Start: 2025-07-10

## 2025-07-10 RX ORDER — FEXOFENADINE HCL 180 MG/1
180 TABLET ORAL DAILY
Qty: 30 TABLET | Refills: 5 | Status: SHIPPED | OUTPATIENT
Start: 2025-07-10

## 2025-07-10 RX ORDER — KETOCONAZOLE 20 MG/ML
SHAMPOO, SUSPENSION TOPICAL
Qty: 120 ML | Refills: 5 | Status: SHIPPED | OUTPATIENT
Start: 2025-07-10

## 2025-07-10 NOTE — PROGRESS NOTES
needed      Calcium Carbonate-Vit D-Min (CALCIUM 1200 PO) Take 1 capsule by mouth 2 times daily.        gabapentin (NEURONTIN) 600 MG tablet Take 1 tablet by mouth 3 times daily for 180 days. 90 tablet 5     No current facility-administered medications for this visit.       Return in about 3 months (around 10/10/2025).

## 2025-07-10 NOTE — PATIENT INSTRUCTIONS
Please use Flonase every morning.   Do not take Zyrtec and Xyzal. Take only 1.  - I have changed Zyrtec to Allegra (fexofenadine) which should come with your next pill pack.   - use Flonase nose spray every morning.     You should take a fiber supplement every day. Even if you are having diarrhea.   - if you get constipated from using the antidiarrhea pills (Lomotil=diphenoxylate-atropine=\"little white pills), just stop taking the pills and DO NOT take anything for constipation.     Please call with the name of the \"nerve pill\" that you are wondering about having in your pill pack.   - you can call here or let Adele know the next time you talk to her .

## 2025-07-11 NOTE — ASSESSMENT & PLAN NOTE
Has mixed central and obstructive sleep apnea.  Compliant with CPAP.  Central sleep apnea is likely related to chronic narcotic use due to chronic pain.  Scheduled to undergo sleep study later this month.

## 2025-07-11 NOTE — ASSESSMENT & PLAN NOTE
Patient should not take both Zyrtec and Xyzal.  Discontinue both as they are ineffective.  Start Allegra 180 mg daily.  Resume Flonase.  Patient does have chronic vasomotor rhinitis which is likely driving her symptoms and they will likely not be helped by antihistamines.

## 2025-07-11 NOTE — ASSESSMENT & PLAN NOTE
Patient likely has combined central and obstructive sleep apnea.  Central apnea likely related to chronic narcotic use due to chronic low back pain.  Scheduled to undergo sleep study later this month.  Does report compliance with CPAP.  Undertreated sleep apnea is likely contributing to her sense of unwellness and worsening memory problems.

## 2025-07-11 NOTE — ASSESSMENT & PLAN NOTE
Patient continues to live independently and drives without incident.  Medications come through [facility] and pill packs which helps with medication compliance.  Patient reports poor family support.  She is active with care management.  Discussed palliative care to try to provide patient with additional home support and she is agreeable.  Will see if we can get home palliative care involved with patient.

## 2025-07-11 NOTE — ASSESSMENT & PLAN NOTE
Stable.  Most recent glucose was 135.  Check hemoglobin A1c with next set of labs.  Hemoglobin A1c in October 2024 was 5.1%.

## 2025-07-11 NOTE — ASSESSMENT & PLAN NOTE
Remains on Lexapro 20 mg daily.  Reports \"nerve pill\" is not in her pill packs so she does not take it regularly.  Patient will call with the name of \"nerve pill\" (?  BuSpar) to see if she would benefit from scheduled use.  Patient may also benefit from referral to psychiatry but she is not agreed in the past.

## 2025-07-11 NOTE — ASSESSMENT & PLAN NOTE
Resume Flonase.  Unfortunately, insurance does not cover Dymista.  Change to Zyrtec to Allegra.  Consider adding Atrovent nose spray.

## 2025-07-11 NOTE — ASSESSMENT & PLAN NOTE
Secondary to pain as well as suspected undertreated sleep apnea which is likely combination of central and obstructive sleep apnea.  Patient reports compliance with CPAP.  Reports that she does have an upcoming sleep study later this month.

## 2025-07-11 NOTE — ASSESSMENT & PLAN NOTE
Well-controlled.  Continue olmesartan 40 mg daily and hydralazine 25 mg twice daily.  Had difficulties with elevated blood pressure in early 2025, presumptively related to COVID infection, that is since resolved.

## 2025-07-11 NOTE — ASSESSMENT & PLAN NOTE
Follows with Dr. Zelaya for pain management.  Creatinine is normal.  May take ibuprofen 800 mg 3 times daily as needed for severe pain.  However, she has struggled with hyponatremia and recommend that she limit NSAID to no more than 3-4 doses per week as NSAIDs may contribute to hyponatremia.

## 2025-07-11 NOTE — ASSESSMENT & PLAN NOTE
Continue Lexapro 20 mg daily.  Patient reports that her \"nerve pill\" is ineffective as it is separate from her other medications and is wondering if it can be included in her pill pack.  She will call the office with the name of this pill.

## 2025-07-15 ENCOUNTER — TELEPHONE (OUTPATIENT)
Dept: INTERNAL MEDICINE CLINIC | Age: 75
End: 2025-07-15

## 2025-07-15 NOTE — TELEPHONE ENCOUNTER
This patient needs a PRIOR AUTHORIZATION for a Buy and Bill PROLIA sent to the MEDICAL BENEFIT.  A prescription has been placed in the medications list.    NOT PHARMACY BENEFIT!!!!!!!!!!!

## 2025-07-18 ENCOUNTER — TELEPHONE (OUTPATIENT)
Dept: INTERNAL MEDICINE CLINIC | Age: 75
End: 2025-07-18

## 2025-07-18 NOTE — TELEPHONE ENCOUNTER
Pt is reporting diarrhea x 5 days and was taking a medication but it was not helping. She was asking if  can order something for her. She was informed that  is out and another provider will have to see pt for any prescriptions. Pt said she cannot come in. She was advised that going on 5 days with diarrhea, she is at risk for dehydration and other complications. She was asked what the medication was that she was taking and she said diphenoxylate. She asked for something OTC she may take. She was again advised she should come in for an assessment. She will call us back next week should she needs to be seen and can come in.

## 2025-07-21 DIAGNOSIS — R30.0 DYSURIA: ICD-10-CM

## 2025-07-21 DIAGNOSIS — N39.0 RECURRENT UTI: ICD-10-CM

## 2025-07-21 NOTE — TELEPHONE ENCOUNTER
Submitted PA for Prolia  Via [facility]: [id number]  STATUS: PENDING.    Follow up done daily; if no decision with in three days we will refax.  If another three days goes by with no decision will call the insurance for status.

## 2025-07-23 ENCOUNTER — TELEPHONE (OUTPATIENT)
Dept: INTERNAL MEDICINE CLINIC | Age: 75
End: 2025-07-23

## 2025-07-23 DIAGNOSIS — Z12.11 COLON CANCER SCREENING: Primary | ICD-10-CM

## 2025-07-23 NOTE — TELEPHONE ENCOUNTER
Pt still having some diarrhea but that has slowed down some - no new symptoms.    Last seen 7/10 and  mentioned the cologuard kit and pt is agreeable to that.    'Bowel Issues  - Loose stools managed with Lomotil, symptoms persisting longer than usual  - Considering home test kit for colon cancer screening  - Daily fiber supplements except during diarrhea'    Order pended.

## 2025-07-23 NOTE — TELEPHONE ENCOUNTER
Patient is approved for Prolia. Patient as appointment in October with Dr Govea. Do you want the patient to have Prolia done in August or her October appointment?

## 2025-07-23 NOTE — TELEPHONE ENCOUNTER
The medication PROLIA is APPROVED from 7/21/2025 to 7/21/2026.    Please notify the patient.    If this requires a response please respond to the pool ( P [facility] MEDICATION PRE-AUTH).

## 2025-07-24 DIAGNOSIS — N30.00 ACUTE CYSTITIS WITHOUT HEMATURIA: Primary | ICD-10-CM

## 2025-07-24 LAB
BACTERIA UR CULT: ABNORMAL
ORGANISM: ABNORMAL

## 2025-07-24 RX ORDER — CIPROFLOXACIN 500 MG/1
500 TABLET, FILM COATED ORAL 2 TIMES DAILY
Qty: 14 TABLET | Refills: 0 | Status: SHIPPED | OUTPATIENT
Start: 2025-07-24 | End: 2025-07-31

## 2025-07-24 NOTE — TELEPHONE ENCOUNTER
Patient calling, states she is still having back ache and stomach ache, not urinating well. Requesting prescription called in to her pharmacy. Patient aware provider is out today. Please advise

## 2025-07-28 ENCOUNTER — CARE COORDINATION (OUTPATIENT)
Dept: CARE COORDINATION | Age: 75
End: 2025-07-28

## 2025-07-28 NOTE — CARE COORDINATION
Ambulatory Care Coordination Note     2025 11:24 AM     Patient Current Location:  Home: [address] [place] [address]     ACM contacted the patient by telephone. Verified name and  with patient as identifiers.         ACM: Adele Conde RN     Challenges to be reviewed by the provider   Additional needs identified to be addressed with provider No                 Method of communication with provider: none.    Utilization: Patient has not had any utilization since our last call.     Care Summary Note:     [facility] outreached patient for cc follow up.     Currently taking Cipro 500 mg BID for UTI. Patient reports sx improving. Patient is established with Urology and is seen every 6 months. Patient is able to identify s/sx of UTI and when to notify her pcp or Urologist. Patient has standing order in place for UA. Patient advised to continue abx until gone and notify provider with new or worsening sx.     Diarrhea improved. No sx of dehydration reported. Patient advised she will receive cologuard test in the mail. Once complete, she should contact cologuard to pickup the test from her home, patient massiel.     Sleep study rescheduled d/t UTI. Patient reports compliance with cpap. No questions or concerns voiced.     Patient is scheduled to receive Prolia injection in 2025.     Patient agreeable to follow up from [facility] in 2 weeks.     Offered patient enrollment in the Remote Patient Monitoring (RPM) program for in-home monitoring: Yes, but did not enroll at this time: already monitoring with home equipment.     Assessments Completed:   General Assessment    Do you have any symptoms that are causing concern?: No     Taking cipro BID for UTI. Patient reports malodorous urine resolved. No new or worsening sx r/t UTI reported.   Patient is established with Urology and is seen every 6 months.     Medications Reviewed:   Completed during this call  Taking cipro 500 mg BID for UTI.   All medications

## 2025-08-03 LAB — NONINV COLON CA DNA+OCC BLD SCRN STL QL: NEGATIVE

## 2025-08-04 ENCOUNTER — CARE COORDINATION (OUTPATIENT)
Dept: CARE COORDINATION | Age: 75
End: 2025-08-04

## 2025-08-05 DIAGNOSIS — J30.2 SEASONAL ALLERGIES: ICD-10-CM

## 2025-08-12 ENCOUNTER — CARE COORDINATION (OUTPATIENT)
Dept: CARE COORDINATION | Age: 75
End: 2025-08-12

## 2025-08-12 ENCOUNTER — TELEPHONE (OUTPATIENT)
Dept: INTERNAL MEDICINE CLINIC | Age: 75
End: 2025-08-12

## 2025-08-12 ENCOUNTER — NURSE TRIAGE (OUTPATIENT)
Dept: CARE COORDINATION | Age: 75
End: 2025-08-12

## 2025-08-13 ENCOUNTER — CARE COORDINATION (OUTPATIENT)
Dept: CARE COORDINATION | Age: 75
End: 2025-08-13

## 2025-08-15 ENCOUNTER — OFFICE VISIT (OUTPATIENT)
Dept: INTERNAL MEDICINE CLINIC | Age: 75
End: 2025-08-15

## 2025-08-15 VITALS
BODY MASS INDEX: 37.49 KG/M2 | HEART RATE: 69 BPM | WEIGHT: 173.8 LBS | HEIGHT: 57 IN | OXYGEN SATURATION: 90 % | DIASTOLIC BLOOD PRESSURE: 70 MMHG | SYSTOLIC BLOOD PRESSURE: 136 MMHG

## 2025-08-15 DIAGNOSIS — J31.0 RHINITIS, UNSPECIFIED TYPE: ICD-10-CM

## 2025-08-15 DIAGNOSIS — R19.7 DIARRHEA, UNSPECIFIED TYPE: ICD-10-CM

## 2025-08-15 DIAGNOSIS — N30.00 ACUTE CYSTITIS WITHOUT HEMATURIA: ICD-10-CM

## 2025-08-15 DIAGNOSIS — R19.5 HEME POSITIVE STOOL: ICD-10-CM

## 2025-08-15 DIAGNOSIS — N30.01 ACUTE CYSTITIS WITH HEMATURIA: ICD-10-CM

## 2025-08-15 DIAGNOSIS — M25.50 POLYARTHRALGIA: ICD-10-CM

## 2025-08-15 DIAGNOSIS — R19.5 HEME POSITIVE STOOL: Primary | ICD-10-CM

## 2025-08-15 LAB
BILIRUBIN, POC: NEGATIVE
BLOOD URINE, POC: NORMAL
CLARITY, POC: NORMAL
COLOR, POC: YELLOW
CONTROL: NORMAL
FECAL BLOOD IMMUNOCHEMICAL TEST: NORMAL
GLUCOSE URINE, POC: NEGATIVE MG/DL
KETONES, POC: NEGATIVE MG/DL
LEUKOCYTE EST, POC: NORMAL
NITRITE, POC: POSITIVE
PH, POC: 6
PROTEIN, POC: NORMAL MG/DL
SPECIFIC GRAVITY, POC: 1.01
UROBILINOGEN, POC: 0.2 MG/DL

## 2025-08-15 RX ORDER — CIPROFLOXACIN 500 MG/1
500 TABLET, FILM COATED ORAL 2 TIMES DAILY
Qty: 14 TABLET | Refills: 0 | Status: SHIPPED | OUTPATIENT
Start: 2025-08-15 | End: 2025-08-22

## 2025-08-15 RX ORDER — PREDNISONE 10 MG/1
TABLET ORAL
Qty: 30 TABLET | Refills: 0 | Status: SHIPPED | OUTPATIENT
Start: 2025-08-15 | End: 2025-08-27

## 2025-08-15 RX ORDER — OXYCODONE AND ACETAMINOPHEN 5; 325 MG/1; MG/1
1 TABLET ORAL EVERY 4 HOURS PRN
COMMUNITY

## 2025-08-16 LAB
ANION GAP SERPL CALCULATED.3IONS-SCNC: 9 MMOL/L (ref 3–16)
BASOPHILS # BLD: 0.1 K/UL (ref 0–0.2)
BASOPHILS NFR BLD: 0.7 %
BUN SERPL-MCNC: 10 MG/DL (ref 7–20)
CALCIUM SERPL-MCNC: 10.1 MG/DL (ref 8.3–10.6)
CHLORIDE SERPL-SCNC: 99 MMOL/L (ref 99–110)
CO2 SERPL-SCNC: 27 MMOL/L (ref 21–32)
CREAT SERPL-MCNC: 0.7 MG/DL (ref 0.6–1.2)
DEPRECATED RDW RBC AUTO: 12.2 % (ref 12.4–15.4)
EOSINOPHIL # BLD: 0.1 K/UL (ref 0–0.6)
EOSINOPHIL NFR BLD: 1.2 %
GFR SERPLBLD CREATININE-BSD FMLA CKD-EPI: 90 ML/MIN/{1.73_M2}
GLUCOSE SERPL-MCNC: 70 MG/DL (ref 70–99)
HCT VFR BLD AUTO: 37 % (ref 36–48)
HGB BLD-MCNC: 13.2 G/DL (ref 12–16)
LYMPHOCYTES # BLD: 2.3 K/UL (ref 1–5.1)
LYMPHOCYTES NFR BLD: 29 %
MCH RBC QN AUTO: 32.1 PG (ref 26–34)
MCHC RBC AUTO-ENTMCNC: 35.6 G/DL (ref 31–36)
MCV RBC AUTO: 90.2 FL (ref 80–100)
MONOCYTES # BLD: 0.8 K/UL (ref 0–1.3)
MONOCYTES NFR BLD: 10 %
NEUTROPHILS # BLD: 4.6 K/UL (ref 1.7–7.7)
NEUTROPHILS NFR BLD: 59.1 %
PLATELET # BLD AUTO: 220 K/UL (ref 135–450)
PMV BLD AUTO: 8.8 FL (ref 5–10.5)
POTASSIUM SERPL-SCNC: 4.2 MMOL/L (ref 3.5–5.1)
RBC # BLD AUTO: 4.1 M/UL (ref 4–5.2)
SODIUM SERPL-SCNC: 135 MMOL/L (ref 136–145)
WBC # BLD AUTO: 7.8 K/UL (ref 4–11)

## 2025-08-17 LAB
BACTERIA UR CULT: ABNORMAL
ORGANISM: ABNORMAL

## 2025-08-18 ENCOUNTER — HOSPITAL ENCOUNTER (OUTPATIENT)
Age: 75
Discharge: HOME | End: 2025-08-18
Payer: MEDICARE

## 2025-08-18 ENCOUNTER — HOSPITAL ENCOUNTER (OUTPATIENT)
Dept: GENERAL RADIOLOGY | Age: 75
Discharge: HOME | End: 2025-08-18
Payer: MEDICARE

## 2025-08-18 DIAGNOSIS — R19.7 DIARRHEA, UNSPECIFIED TYPE: ICD-10-CM

## 2025-08-18 LAB
BACTERIA UR CULT: ABNORMAL
ORGANISM: ABNORMAL

## 2025-08-18 PROCEDURE — 74019 RADEX ABDOMEN 2 VIEWS: CPT

## 2025-08-19 ENCOUNTER — CARE COORDINATION (OUTPATIENT)
Dept: CARE COORDINATION | Age: 75
End: 2025-08-19

## 2025-08-20 ENCOUNTER — CARE COORDINATION (OUTPATIENT)
Dept: CARE COORDINATION | Age: 75
End: 2025-08-20

## 2025-08-20 SDOH — HEALTH STABILITY: MENTAL HEALTH
DO YOU FEEL STRESS - TENSE, RESTLESS, NERVOUS, OR ANXIOUS, OR UNABLE TO SLEEP AT NIGHT BECAUSE YOUR MIND IS TROUBLED ALL THE TIME - THESE DAYS?: RATHER MUCH

## 2025-08-20 SDOH — ECONOMIC STABILITY: TRANSPORTATION INSECURITY: IN THE PAST 12 MONTHS, HAS LACK OF TRANSPORTATION KEPT YOU FROM MEDICAL APPOINTMENTS OR FROM GETTING MEDICATIONS?: NO

## 2025-08-20 SDOH — HEALTH STABILITY: MENTAL HEALTH: HOW MANY DRINKS CONTAINING ALCOHOL DO YOU HAVE ON A TYPICAL DAY WHEN YOU ARE DRINKING?: PATIENT DOES NOT DRINK

## 2025-08-20 SDOH — SOCIAL STABILITY: SOCIAL NETWORK: HOW OFTEN DO YOU ATTEND MEETINGS OF THE CLUBS OR ORGANIZATIONS YOU BELONG TO?: NEVER

## 2025-08-20 SDOH — ECONOMIC STABILITY: FOOD INSECURITY: WITHIN THE PAST 12 MONTHS, YOU WORRIED THAT YOUR FOOD WOULD RUN OUT BEFORE YOU GOT THE MONEY TO BUY MORE.: NEVER TRUE

## 2025-08-20 SDOH — SOCIAL STABILITY: SOCIAL NETWORK: HOW OFTEN DO YOU GET TOGETHER WITH FRIENDS OR RELATIVES?: TWICE A WEEK

## 2025-08-20 SDOH — HEALTH STABILITY: PHYSICAL HEALTH: ON AVERAGE, HOW MANY DAYS PER WEEK DO YOU ENGAGE IN MODERATE TO STRENUOUS EXERCISE (LIKE A BRISK WALK)?: 0 DAYS

## 2025-08-20 SDOH — ECONOMIC STABILITY: FOOD INSECURITY: WITHIN THE PAST 12 MONTHS, THE FOOD YOU BOUGHT JUST DIDN'T LAST AND YOU DIDN'T HAVE MONEY TO GET MORE.: NEVER TRUE

## 2025-08-20 SDOH — HEALTH STABILITY: MENTAL HEALTH: HOW OFTEN DO YOU HAVE A DRINK CONTAINING ALCOHOL?: NEVER

## 2025-08-20 SDOH — ECONOMIC STABILITY: HOUSING INSECURITY: IN THE LAST 12 MONTHS, WAS THERE A TIME WHEN YOU WERE NOT ABLE TO PAY THE MORTGAGE OR RENT ON TIME?: NO

## 2025-08-20 SDOH — SOCIAL STABILITY: SOCIAL NETWORK: IN A TYPICAL WEEK, HOW MANY TIMES DO YOU TALK ON THE PHONE WITH FAMILY, FRIENDS, OR NEIGHBORS?: TWICE A WEEK

## 2025-08-20 SDOH — HEALTH STABILITY: PHYSICAL HEALTH: ON AVERAGE, HOW MANY MINUTES DO YOU ENGAGE IN EXERCISE AT THIS LEVEL?: 0 MIN

## 2025-08-20 SDOH — SOCIAL STABILITY: SOCIAL NETWORK: HOW OFTEN DO YOU ATTEND CHURCH OR RELIGIOUS SERVICES?: MORE THAN 4 TIMES PER YEAR

## 2025-08-20 SDOH — ECONOMIC STABILITY: FOOD INSECURITY: HOW HARD IS IT FOR YOU TO PAY FOR THE VERY BASICS LIKE FOOD, HOUSING, MEDICAL CARE, AND HEATING?: NOT VERY HARD

## 2025-08-20 SDOH — SOCIAL STABILITY: SOCIAL INSECURITY: ARE YOU MARRIED, WIDOWED, DIVORCED, SEPARATED, NEVER MARRIED, OR LIVING WITH A PARTNER?: WIDOWED

## 2025-08-20 ASSESSMENT — ACTIVITIES OF DAILY LIVING (ADL): LACK_OF_TRANSPORTATION: NO

## 2025-08-27 ENCOUNTER — CARE COORDINATION (OUTPATIENT)
Dept: CARE COORDINATION | Age: 75
End: 2025-08-27

## 2025-08-29 DIAGNOSIS — F02.80 LATE ONSET ALZHEIMER'S DISEASE WITHOUT BEHAVIORAL DISTURBANCE (HCC): ICD-10-CM

## 2025-08-29 DIAGNOSIS — G30.1 LATE ONSET ALZHEIMER'S DISEASE WITHOUT BEHAVIORAL DISTURBANCE (HCC): ICD-10-CM

## 2025-09-02 RX ORDER — GABAPENTIN 600 MG/1
600 TABLET ORAL 3 TIMES DAILY
Qty: 270 TABLET | Refills: 1 | Status: SHIPPED | OUTPATIENT
Start: 2025-09-02 | End: 2026-03-02

## 2025-09-02 RX ORDER — MEMANTINE HYDROCHLORIDE 10 MG/1
10 TABLET ORAL 2 TIMES DAILY
Qty: 180 TABLET | Refills: 1 | Status: SHIPPED | OUTPATIENT
Start: 2025-09-02

## 2025-09-03 ENCOUNTER — CARE COORDINATION (OUTPATIENT)
Dept: CARE COORDINATION | Age: 75
End: 2025-09-03

## 2025-09-03 RX ORDER — ALBUTEROL SULFATE 90 UG/1
2 INHALANT RESPIRATORY (INHALATION) 4 TIMES DAILY PRN
Qty: 8.5 EACH | Refills: 2 | Status: SHIPPED | OUTPATIENT
Start: 2025-09-03

## (undated) DEVICE — SYRINGE, LUER LOCK, 10ML: Brand: MEDLINE

## (undated) DEVICE — NEEDLE SPNL WEISS LNG 18 GAX5 IN MOD TUOHY PT TW PERISAFE

## (undated) DEVICE — SURGICAL PROC PACK SHT WEST V4

## (undated) DEVICE — GLOVE ORANGE PI 7 1/2   MSG9075

## (undated) DEVICE — 6 ML SYRINGE LUER-LOCK TIP: Brand: MONOJECT

## (undated) DEVICE — PORT VLV 2 W NDL FREE SMRTSITE

## (undated) DEVICE — APPLICATOR MEDICATED 3 CC SOLUTION CLR STRL CHLORAPREP

## (undated) DEVICE — NEEDLE FNAC 22GA L6IN CHIBA TYP THN WALL

## (undated) DEVICE — 3 ML SYRINGE LUER-LOCK TIP: Brand: MONOJECT

## (undated) DEVICE — APPLICATOR MEDICATED 3 CC SOLUTION CLR STRL CHLORAPREP 930400

## (undated) DEVICE — PEN: MARKING STD 100/CS: Brand: MEDICAL ACTION INDUSTRIES

## (undated) DEVICE — 7496-8Z MINI-PLUS KIT: Brand: DEVON

## (undated) DEVICE — Device: Brand: MEDEX

## (undated) DEVICE — Z DUP USE 2522782 SOLUTION IRRIG 1000ML STRL H2O PLAS CONTAINER UROMATIC

## (undated) DEVICE — NEEDLE SPNL 22GA L3.5IN BLK HUB S STL REG WALL FIT STYL W/

## (undated) DEVICE — MEDIA CONTRAST RX ISOVUE-300 61% 30ML VIALS

## (undated) DEVICE — SOLUTION IRRIG 250ML STRL H2O PLAS POUR BTL USP

## (undated) DEVICE — STERILE POLYISOPRENE POWDER-FREE SURGICAL GLOVES: Brand: PROTEXIS

## (undated) DEVICE — SYRINGE TB 1ML NDL 25GA L0.625IN PLAS SLIP TIP CONVENTIONAL

## (undated) DEVICE — Device

## (undated) DEVICE — Z DISCONTNUED USE 2132719 NEEDLE FLTR 19GA L1.5IN WALL THK5UM BRN POLYPR HUB S STL

## (undated) DEVICE — STANDARD HYPODERMIC NEEDLE,POLYPROPYLENE HUB: Brand: MONOJECT

## (undated) DEVICE — CONTAINER,SPECIMEN,OR STERILE,4OZ: Brand: MEDLINE

## (undated) DEVICE — AVANOS* EXTENSION SETS: Brand: EXTENSION SET, MINI BORE, 12" LENGTH, 0.50ML VOLUME 25

## (undated) DEVICE — CYSTO/BLADDER IRRIGATION SET, REGULATING CLAMP

## (undated) DEVICE — TOWEL,OR,DSP,ST,BLUE,DLX,1/PK,80PK/CS: Brand: MEDLINE

## (undated) DEVICE — NEEDLE HYPO 25GA L1.5IN BLU POLYPR HUB S STL REG BVL STR

## (undated) DEVICE — Device: Brand: JELCO

## (undated) DEVICE — SYRINGE MED 3ML CLR PLAS STD N CTRL LUERLOCK TIP DISP

## (undated) DEVICE — NEEDLE SPNL 22GA L5IN BLK HUB S STL W/ QNCKE PNT W/OUT

## (undated) DEVICE — SOLUTION IRRIG BSS ST 500ML

## (undated) DEVICE — GAUZE,SPONGE,4"X4",8PLY,STRL,LF,10/TRAY: Brand: MEDLINE

## (undated) DEVICE — SINGLE USE FLEXIBLE RHINOLARYNGOSCOPE

## (undated) DEVICE — CODMAN® SURGICAL PATTIES 1/2" X 3" (1.27CM X 7.62CM): Brand: CODMAN®

## (undated) DEVICE — SYRINGE MED 30ML STD CLR PLAS LUERLOCK TIP N CTRL DISP

## (undated) DEVICE — HYPODERMIC SAFETY NEEDLE: Brand: MAGELLAN

## (undated) DEVICE — COVER,MAYO STAND,STERILE: Brand: MEDLINE

## (undated) DEVICE — TRAY PREP DRY W/ PREM GLV 2 APPL 6 SPNG 2 UNDPD 1 OVERWRAP

## (undated) DEVICE — SYRINGE MED 10ML LUERLOCK TIP W/O SFTY DISP

## (undated) DEVICE — PAD GRND NEUT ELECTRD AD DISP

## (undated) DEVICE — GLOVE,SURG,TRIUMPH MICRO,LTX,PF,7.5: Brand: MEDLINE

## (undated) DEVICE — SOLUTION IV IRRIG WATER 1000ML POUR BRL 2F7114

## (undated) DEVICE — CYSTO PACK: Brand: MEDLINE INDUSTRIES, INC.

## (undated) DEVICE — TRAY ANES EPI 5CC GLS LL 18GA CUST